# Patient Record
Sex: FEMALE | Race: WHITE | NOT HISPANIC OR LATINO | Employment: OTHER | RURAL
[De-identification: names, ages, dates, MRNs, and addresses within clinical notes are randomized per-mention and may not be internally consistent; named-entity substitution may affect disease eponyms.]

---

## 2019-07-09 ENCOUNTER — TELEPHONE (OUTPATIENT)
Dept: FAMILY MEDICINE CLINIC | Facility: CLINIC | Age: 49
End: 2019-07-09

## 2019-07-09 DIAGNOSIS — I10 ESSENTIAL HYPERTENSION: Primary | ICD-10-CM

## 2019-07-09 RX ORDER — LISINOPRIL AND HYDROCHLOROTHIAZIDE 25; 20 MG/1; MG/1
1 TABLET ORAL DAILY
Qty: 30 TABLET | Refills: 1 | Status: SHIPPED | OUTPATIENT
Start: 2019-07-09 | End: 2019-08-15 | Stop reason: SDUPTHER

## 2019-07-09 NOTE — TELEPHONE ENCOUNTER
REQUESTING A REFILL ON HER MEDICATION LISINOPRIL 20-25MG. PLEASE SEND TO [facility]

## 2019-08-15 ENCOUNTER — TELEPHONE (OUTPATIENT)
Dept: FAMILY MEDICINE CLINIC | Facility: CLINIC | Age: 49
End: 2019-08-15

## 2019-08-15 DIAGNOSIS — I10 ESSENTIAL HYPERTENSION: ICD-10-CM

## 2019-08-15 RX ORDER — LISINOPRIL AND HYDROCHLOROTHIAZIDE 25; 20 MG/1; MG/1
1 TABLET ORAL DAILY
Qty: 30 TABLET | Refills: 2 | Status: SHIPPED | OUTPATIENT
Start: 2019-08-15 | End: 2019-09-16 | Stop reason: SDUPTHER

## 2019-09-13 PROBLEM — K57.30 DIVERTICULOSIS OF COLON: Status: ACTIVE | Noted: 2019-09-13

## 2019-09-13 PROBLEM — M50.00 CERVICAL DISC DISEASE WITH MYELOPATHY: Status: ACTIVE | Noted: 2019-09-13

## 2019-09-13 PROBLEM — M85.88 OSTEOPENIA OF SPINE: Status: ACTIVE | Noted: 2019-09-13

## 2019-09-13 PROBLEM — G47.00 INSOMNIA, ORGANIC: Status: ACTIVE | Noted: 2019-09-13

## 2019-09-13 PROBLEM — Z72.0 TOBACCO USE: Status: ACTIVE | Noted: 2019-09-13

## 2019-09-13 PROBLEM — E78.2 MIXED HYPERLIPIDEMIA: Status: ACTIVE | Noted: 2019-09-13

## 2019-09-13 PROBLEM — I10 HYPERTENSION, BENIGN: Status: ACTIVE | Noted: 2019-09-13

## 2019-09-13 PROBLEM — E66.3 OVERWEIGHT (BMI 25.0-29.9): Status: ACTIVE | Noted: 2019-09-13

## 2019-09-13 PROBLEM — J30.9 ALLERGIC RHINITIS: Status: ACTIVE | Noted: 2019-09-13

## 2019-09-13 RX ORDER — ASPIRIN 81 MG/1
1 TABLET, CHEWABLE ORAL DAILY
COMMUNITY
Start: 2018-05-22 | End: 2019-09-16

## 2019-09-13 RX ORDER — PHENDIMETRAZINE TARTRATE 35 MG/1
1 TABLET ORAL DAILY
COMMUNITY
End: 2019-09-16

## 2019-09-13 RX ORDER — DIPHENHYDRAMINE HCL 25 MG
1 TABLET,DISINTEGRATING ORAL DAILY
COMMUNITY
End: 2019-09-16

## 2019-09-13 RX ORDER — GUAIFENESIN/EPHEDRINE HCL 200-12.5MG
1 TABLET ORAL 3 TIMES DAILY
COMMUNITY
End: 2019-09-16

## 2019-09-16 ENCOUNTER — OFFICE VISIT (OUTPATIENT)
Dept: FAMILY MEDICINE CLINIC | Facility: CLINIC | Age: 49
End: 2019-09-16

## 2019-09-16 VITALS
SYSTOLIC BLOOD PRESSURE: 128 MMHG | DIASTOLIC BLOOD PRESSURE: 68 MMHG | OXYGEN SATURATION: 95 % | RESPIRATION RATE: 18 BRPM | HEART RATE: 71 BPM | WEIGHT: 175 LBS | HEIGHT: 65 IN | TEMPERATURE: 97.7 F | BODY MASS INDEX: 29.16 KG/M2

## 2019-09-16 DIAGNOSIS — Z72.0 TOBACCO USE: ICD-10-CM

## 2019-09-16 DIAGNOSIS — N30.90 CYSTITIS: Primary | ICD-10-CM

## 2019-09-16 DIAGNOSIS — Z13.220 SCREENING FOR HYPERLIPIDEMIA: ICD-10-CM

## 2019-09-16 DIAGNOSIS — E66.3 OVERWEIGHT (BMI 25.0-29.9): ICD-10-CM

## 2019-09-16 DIAGNOSIS — L57.0 ACTINIC KERATOSIS: ICD-10-CM

## 2019-09-16 DIAGNOSIS — I10 HYPERTENSION, BENIGN: ICD-10-CM

## 2019-09-16 DIAGNOSIS — E78.2 MIXED HYPERLIPIDEMIA: ICD-10-CM

## 2019-09-16 DIAGNOSIS — Z12.39 SCREENING FOR BREAST CANCER: ICD-10-CM

## 2019-09-16 LAB
BILIRUB BLD-MCNC: NEGATIVE MG/DL
CLARITY, POC: CLEAR
COLOR UR: NORMAL
GLUCOSE UR STRIP-MCNC: NEGATIVE MG/DL
KETONES UR QL: NEGATIVE
LEUKOCYTE EST, POC: NEGATIVE
NITRITE UR-MCNC: NEGATIVE MG/ML
PH UR: 5 [PH] (ref 5–8)
PROT UR STRIP-MCNC: NEGATIVE MG/DL
RBC # UR STRIP: NEGATIVE /UL
SP GR UR: 1.01 (ref 1–1.03)
UROBILINOGEN UR QL: NORMAL

## 2019-09-16 PROCEDURE — 99214 OFFICE O/P EST MOD 30 MIN: CPT | Performed by: FAMILY MEDICINE

## 2019-09-16 RX ORDER — LISINOPRIL AND HYDROCHLOROTHIAZIDE 25; 20 MG/1; MG/1
1 TABLET ORAL DAILY
Qty: 30 TABLET | Refills: 2 | Status: SHIPPED | OUTPATIENT
Start: 2019-09-16 | End: 2019-10-07 | Stop reason: HOSPADM

## 2019-09-16 RX ORDER — SULFAMETHOXAZOLE AND TRIMETHOPRIM 400; 80 MG/1; MG/1
1 TABLET ORAL 2 TIMES DAILY
Qty: 20 TABLET | Refills: 6 | Status: SHIPPED | OUTPATIENT
Start: 2019-09-16 | End: 2019-10-07 | Stop reason: HOSPADM

## 2019-09-16 NOTE — PROGRESS NOTES
Subjective   Sada Le is a 48 y.o. female.     Sada has been seen by Dr. Trejo- opthalmology in 08/2019 for a retinal tear in right eye. She receives monthly injections to her right eye since April 2019.      Urinary Tract Infection    This is a new problem. The current episode started in the past 7 days. The problem has been gradually worsening. The quality of the pain is described as burning. The pain is moderate. The maximum temperature recorded prior to her arrival was 100 - 100.9 F. Associated symptoms include chills, flank pain, frequency, nausea and urgency. Pertinent negatives include no vomiting. She has tried home medications (AZO) for the symptoms. The treatment provided no relief.   Hypertension   This is a chronic problem. The current episode started more than 1 year ago. The problem is controlled. Pertinent negatives include no blurred vision, chest pain, headaches, palpitations, peripheral edema or shortness of breath. Risk factors for coronary artery disease include dyslipidemia, obesity and family history. Current antihypertension treatment includes ACE inhibitors and diuretics. The current treatment provides significant improvement.   Hyperlipidemia   This is a chronic problem. The current episode started more than 1 year ago. Pertinent negatives include no chest pain or shortness of breath. She is currently on no antihyperlipidemic treatment. The current treatment provides moderate improvement of lipids. Risk factors for coronary artery disease include dyslipidemia, hypertension, obesity and family history.    Lesion:   Sada Le is here today for a lesion. The lesion appeared gradual and has been occurring for 6 month(s) ago.. The course has been increasing in size. The lesion is characterized as no sign of infection. The lesion is located over right shoulder. The symptoms have been associated with recurrent physical trama due to location.     The following portions of the  patient's history were reviewed and updated as appropriate: allergies, current medications, past family history, past medical history, past social history, past surgical history and problem list.    Allergies:  Allergies   Allergen Reactions   • Ciprofloxacin Rash   • Penicillin G Rash       Social History:  Social History     Socioeconomic History   • Marital status:      Spouse name: Not on file   • Number of children: Not on file   • Years of education: Not on file   • Highest education level: Not on file   Tobacco Use   • Smoking status: Current Every Day Smoker     Packs/day: 1.00     Years: 34.00     Pack years: 34.00     Start date: 1985   • Smokeless tobacco: Never Used   Substance and Sexual Activity   • Alcohol use: Yes     Frequency: Monthly or less     Drinks per session: 3 or 4     Binge frequency: Less than monthly     Comment: Occasional   • Drug use: No   • Sexual activity: Defer       Family History:  Family History   Problem Relation Age of Onset   • Alzheimer's disease Mother    • Heart disease Mother         cardiovascular   • Diabetes Father         mellitus   • Heart attack Father    • Heart disease Sister         cardiovascular   • Diabetes Maternal Aunt         mellitus   • Heart disease Maternal Aunt         cardiovascular   • Breast cancer Maternal Aunt    • Diabetes Maternal Uncle         mellitus   • Diabetes Maternal Grandmother         mellitus   • Pancreatic cancer Maternal Grandmother        Past Medical History :  Patient Active Problem List   Diagnosis   • Allergic rhinitis   • Cervical disc disease with myelopathy   • Diverticulosis of colon   • Hypertension, benign   • Insomnia, organic   • Overweight (BMI 25.0-29.9)   • Tobacco use   • Osteopenia of spine   • Mixed hyperlipidemia   • Actinic keratosis       Medication List:    Current Outpatient Medications:   •  lisinopril-hydrochlorothiazide (PRINZIDE,ZESTORETIC) 20-25 MG per tablet, Take 1 tablet by mouth Daily., Disp:  "30 tablet, Rfl: 2  •  Omega-3 Fatty Acids (EPA PO), Take 1 tablet by mouth Daily., Disp: , Rfl:   •  sulfamethoxazole-trimethoprim (BACTRIM,SEPTRA) 400-80 MG tablet, Take 1 tablet by mouth 2 (Two) Times a Day., Disp: 20 tablet, Rfl: 6    Past Surgical History:  Past Surgical History:   Procedure Laterality Date   • TOTAL ABDOMINAL HYSTERECTOMY WITH SALPINGO OOPHORECTOMY  1995    Endometriosis       Review of Systems:  Review of Systems   Constitutional: Positive for chills.   HENT: Negative for congestion and voice change.    Eyes: Negative for blurred vision.   Respiratory: Negative for shortness of breath.    Cardiovascular: Negative for chest pain and palpitations.   Gastrointestinal: Positive for nausea. Negative for abdominal pain, constipation, diarrhea and vomiting.   Endocrine: Negative for cold intolerance, heat intolerance, polydipsia and polyuria.   Genitourinary: Positive for flank pain, frequency and urgency. Negative for pelvic pain.   Skin: Negative for rash.   Neurological: Negative for weakness and headache.   Hematological: Negative for adenopathy.       Physical Exam:  Vital Signs:  Visit Vitals  /68 (BP Location: Right arm, Patient Position: Sitting, Cuff Size: Adult)   Pulse 71   Temp 97.7 °F (36.5 °C) (Oral)   Resp 18   Ht 165.1 cm (65\")   Wt 79.4 kg (175 lb)   LMP 01/01/1996 (Approximate)   SpO2 95% Comment: Room air   Breastfeeding? No   BMI 29.12 kg/m²       Physical Exam   Constitutional: She is oriented to person, place, and time. She appears well-developed and well-nourished. No distress.   Eyes: Conjunctivae are normal.   Neck: Neck supple. No JVD present. No thyromegaly present.   Cardiovascular: Normal rate, regular rhythm, normal heart sounds and intact distal pulses. Exam reveals no gallop and no friction rub.   No murmur heard.  Pulmonary/Chest: Effort normal and breath sounds normal. No respiratory distress. She has no wheezes. She has no rales.   Abdominal: Soft. Bowel " sounds are normal. She exhibits no distension and no mass. There is no tenderness. There is no CVA tenderness.   Musculoskeletal: She exhibits no edema.   Lymphadenopathy:     She has no cervical adenopathy.   Neurological: She is alert and oriented to person, place, and time. She displays normal reflexes. Coordination normal.   Skin: Skin is warm. No rash noted. No erythema.   There is an 8 mm excoriated lesion over the right distal should suggestive of an AK     Psychiatric: She has a normal mood and affect.   Vitals reviewed.      Assessment and Plan:  Problem List Items Addressed This Visit        Unprioritized    Hypertension, benign    Overview     Controlled.   Low salt low calorie diet and regular exercise and followup in 1 mo  She will monitor her pressures and notify us of her pressures over the next 1 week.         Current Assessment & Plan     Hypertension is improving with treatment.  Continue current treatment regimen.  Blood pressure will be reassessed at the next regular appointment.         Relevant Medications    lisinopril-hydrochlorothiazide (PRINZIDE,ZESTORETIC) 20-25 MG per tablet    Other Relevant Orders    CBC & Differential    Comprehensive Metabolic Panel    TSH    Overweight (BMI 25.0-29.9)    Overview     She has lost 40 lb over the last yr. She continues follow a good diet. She will increase exercise.          Tobacco use    Overview     She is strongly encouraged to stop smoking. Cessation techniques discussed and encouraged. The consequences of not stopping discussed, ie, CAD, PVD, Stroke, Lung and other cancers.         Mixed hyperlipidemia    Overview     Diet controled. She should see improvement with her successful wt loss.  We discussed her lipid panel.         Actinic keratosis    Overview     Likely right shoulder we will re-examine in 1 mo given it is avulsed today.            Other Visit Diagnoses     Cystitis    -  Primary    She has had 2 doses of antibotics and her urine is  clear as a results  she will complete 3 more days.     Relevant Medications    sulfamethoxazole-trimethoprim (BACTRIM,SEPTRA) 400-80 MG tablet    Other Relevant Orders    POCT urinalysis dipstick, manual (Completed)    Urine Culture - Urine, Urine, Clean Catch    Screening for hyperlipidemia        Relevant Orders    Lipid Panel With / Chol / HDL Ratio    Screening for breast cancer        Relevant Orders    Mammo Screening Digital Tomosynthesis Bilateral With CAD          An After Visit Summary and PPPS were given to the patient.

## 2019-09-17 LAB
BASOPHILS # BLD AUTO: 0 X10E3/UL (ref 0–0.2)
BASOPHILS NFR BLD AUTO: 0 %
CHOLEST SERPL-MCNC: 231 MG/DL (ref 100–199)
CHOLEST/HDLC SERPL: 4.1 RATIO (ref 0–4.4)
EOSINOPHIL # BLD AUTO: 0.2 X10E3/UL (ref 0–0.4)
EOSINOPHIL NFR BLD AUTO: 2 %
ERYTHROCYTE [DISTWIDTH] IN BLOOD BY AUTOMATED COUNT: 13 % (ref 12.3–15.4)
HCT VFR BLD AUTO: 41.9 % (ref 34–46.6)
HDLC SERPL-MCNC: 56 MG/DL
HGB BLD-MCNC: 14.8 G/DL (ref 11.1–15.9)
IMM GRANULOCYTES # BLD AUTO: 0.1 X10E3/UL (ref 0–0.1)
IMM GRANULOCYTES NFR BLD AUTO: 1 %
LDLC SERPL CALC-MCNC: 152 MG/DL (ref 0–99)
LYMPHOCYTES # BLD AUTO: 2.5 X10E3/UL (ref 0.7–3.1)
LYMPHOCYTES NFR BLD AUTO: 26 %
MCH RBC QN AUTO: 31.8 PG (ref 26.6–33)
MCHC RBC AUTO-ENTMCNC: 35.3 G/DL (ref 31.5–35.7)
MCV RBC AUTO: 90 FL (ref 79–97)
MONOCYTES # BLD AUTO: 0.7 X10E3/UL (ref 0.1–0.9)
MONOCYTES NFR BLD AUTO: 7 %
NEUTROPHILS # BLD AUTO: 6.1 X10E3/UL (ref 1.4–7)
NEUTROPHILS NFR BLD AUTO: 64 %
PLATELET # BLD AUTO: 225 X10E3/UL (ref 150–450)
RBC # BLD AUTO: 4.65 X10E6/UL (ref 3.77–5.28)
TRIGL SERPL-MCNC: 117 MG/DL (ref 0–149)
VLDLC SERPL CALC-MCNC: 23 MG/DL (ref 5–40)
WBC # BLD AUTO: 9.6 X10E3/UL (ref 3.4–10.8)

## 2019-09-18 LAB
BACTERIA UR CULT: NO GROWTH
BACTERIA UR CULT: NORMAL

## 2019-09-19 LAB
TSH SERPL DL<=0.005 MIU/L-ACNC: 2.07 UIU/ML (ref 0.45–4.5)
WRITTEN AUTHORIZATION: NORMAL

## 2019-09-21 ENCOUNTER — RESULTS ENCOUNTER (OUTPATIENT)
Dept: FAMILY MEDICINE CLINIC | Facility: CLINIC | Age: 49
End: 2019-09-21

## 2019-09-21 DIAGNOSIS — I10 HYPERTENSION, BENIGN: ICD-10-CM

## 2019-09-23 ENCOUNTER — HOSPITAL ENCOUNTER (OUTPATIENT)
Dept: MAMMOGRAPHY | Facility: HOSPITAL | Age: 49
Discharge: HOME | End: 2019-09-23
Admitting: FAMILY MEDICINE

## 2019-09-23 DIAGNOSIS — Z12.39 SCREENING FOR BREAST CANCER: ICD-10-CM

## 2019-09-23 PROCEDURE — 77067 SCR MAMMO BI INCL CAD: CPT

## 2019-09-23 PROCEDURE — 77063 BREAST TOMOSYNTHESIS BI: CPT

## 2019-09-24 ENCOUNTER — TELEPHONE (OUTPATIENT)
Dept: FAMILY MEDICINE CLINIC | Facility: CLINIC | Age: 49
End: 2019-09-24

## 2019-09-27 LAB
ALBUMIN SERPL-MCNC: 4.5 G/DL (ref 3.5–5.5)
ALBUMIN/GLOB SERPL: 1.7 {RATIO} (ref 1.2–2.2)
ALP SERPL-CCNC: 96 IU/L (ref 39–117)
ALT SERPL-CCNC: 12 IU/L (ref 0–32)
AST SERPL-CCNC: 15 IU/L (ref 0–40)
BILIRUB SERPL-MCNC: ABNORMAL MG/DL
BUN SERPL-MCNC: 20 MG/DL (ref 6–24)
BUN/CREAT SERPL: 18 (ref 9–23)
CALCIUM SERPL-MCNC: 9.5 MG/DL (ref 8.7–10.2)
CHLORIDE SERPL-SCNC: 87 MMOL/L (ref 96–106)
CO2 SERPL-SCNC: 19 MMOL/L (ref 20–29)
CREAT SERPL-MCNC: 1.09 MG/DL (ref 0.57–1)
GLOBULIN SER CALC-MCNC: 2.6 G/DL (ref 1.5–4.5)
GLUCOSE SERPL-MCNC: 147 MG/DL (ref 65–99)
POTASSIUM SERPL-SCNC: 4 MMOL/L (ref 3.5–5.2)
PROT SERPL-MCNC: 7.1 G/DL (ref 6–8.5)
SODIUM SERPL-SCNC: 131 MMOL/L (ref 134–144)
WRITTEN AUTHORIZATION: NORMAL

## 2019-09-29 ENCOUNTER — HOSPITAL ENCOUNTER (EMERGENCY)
Dept: CARDIOLOGY | Facility: HOSPITAL | Age: 49
Discharge: HOME | End: 2019-09-29

## 2019-09-29 ENCOUNTER — HOSPITAL ENCOUNTER (INPATIENT)
Facility: HOSPITAL | Age: 49
LOS: 8 days | Discharge: HOME HEALTH | End: 2019-10-07
Attending: INTERNAL MEDICINE | Admitting: INTERNAL MEDICINE

## 2019-09-29 ENCOUNTER — APPOINTMENT (OUTPATIENT)
Dept: GENERAL RADIOLOGY | Facility: HOSPITAL | Age: 49
End: 2019-09-29

## 2019-09-29 DIAGNOSIS — I21.02 STEMI INVOLVING LEFT ANTERIOR DESCENDING CORONARY ARTERY (HCC): Primary | ICD-10-CM

## 2019-09-29 PROBLEM — I25.10 CAD (CORONARY ARTERY DISEASE): Chronic | Status: ACTIVE | Noted: 2019-09-29

## 2019-09-29 LAB
ACT BLD: 202 SECONDS (ref 89–137)
ACT BLD: 219 SECONDS (ref 89–137)
ACT BLD: 252 SECONDS (ref 89–137)
ACT BLD: 312 SECONDS (ref 89–137)
ALBUMIN SERPL-MCNC: 3 G/DL (ref 3.5–4.8)
ALBUMIN SERPL-MCNC: 3.3 G/DL (ref 3.5–4.8)
ALBUMIN/GLOB SERPL: 1.3 G/DL (ref 1–1.7)
ALBUMIN/GLOB SERPL: 1.3 G/DL (ref 1–1.7)
ALP SERPL-CCNC: 61 U/L (ref 32–91)
ALP SERPL-CCNC: 64 U/L (ref 32–91)
ALT SERPL W P-5'-P-CCNC: 305 U/L (ref 14–54)
ALT SERPL W P-5'-P-CCNC: 312 U/L (ref 14–54)
ANION GAP SERPL CALCULATED.3IONS-SCNC: 15.3 MMOL/L (ref 5–15)
ANION GAP SERPL CALCULATED.3IONS-SCNC: 16.7 MMOL/L (ref 5–15)
ARTERIAL PATENCY WRIST A: ABNORMAL
ARTERIAL PATENCY WRIST A: ABNORMAL
AST SERPL-CCNC: 344 U/L (ref 15–41)
AST SERPL-CCNC: 437 U/L (ref 15–41)
BACTERIA UR QL AUTO: ABNORMAL /HPF
BASE DEFICIT: -1.4 MEQ/LITER
BASE DEFICIT: -1.8 MEQ/LITER
BASE EXCESS BLDA CALC-SCNC: -2.1 MMOL/L (ref 0–3)
BASE EXCESS BLDA CALC-SCNC: -2.3 MMOL/L (ref 0–3)
BASOPHILS # BLD MANUAL: 0.3 10*3/MM3 (ref 0–0.2)
BASOPHILS NFR BLD AUTO: 1 % (ref 0–1.5)
BDY SITE: ABNORMAL
BDY SITE: ABNORMAL
BILIRUB SERPL-MCNC: 0.8 MG/DL (ref 0.3–1.2)
BILIRUB SERPL-MCNC: 0.9 MG/DL (ref 0.3–1.2)
BILIRUB UR QL STRIP: NEGATIVE
BUN BLD-MCNC: 12 MG/DL (ref 8–20)
BUN BLD-MCNC: 13 MG/DL (ref 8–20)
BUN/CREAT SERPL: 14.4 (ref 5.4–26.2)
BUN/CREAT SERPL: 15 (ref 5.4–26.2)
BURR CELLS BLD QL SMEAR: ABNORMAL
BURR CELLS BLD QL SMEAR: ABNORMAL
CALCIUM SPEC-SCNC: 6.5 MG/DL (ref 8.9–10.3)
CALCIUM SPEC-SCNC: 7.2 MG/DL (ref 8.9–10.3)
CHLORIDE SERPL-SCNC: 101 MMOL/L (ref 101–111)
CHLORIDE SERPL-SCNC: 104 MMOL/L (ref 101–111)
CLARITY UR: CLEAR
CO2 BLDA-SCNC: 25.9 MMOL/L (ref 22–29)
CO2 BLDA-SCNC: 26.5 MMOL/L (ref 22–29)
CO2 SERPL-SCNC: 19 MMOL/L (ref 22–32)
CO2 SERPL-SCNC: 21 MMOL/L (ref 22–32)
COLOR UR: YELLOW
CREAT BLD-MCNC: 0.8 MG/DL (ref 0.4–1)
CREAT BLD-MCNC: 0.9 MG/DL (ref 0.4–1)
CRP SERPL-MCNC: 0.45 MG/DL (ref 0–0.7)
D-LACTATE SERPL-SCNC: 3 MMOL/L (ref 0.5–2.2)
DEPRECATED RDW RBC AUTO: 42 FL (ref 37–54)
DEPRECATED RDW RBC AUTO: 42.4 FL (ref 37–54)
EOSINOPHIL # BLD MANUAL: 0.3 10*3/MM3 (ref 0–0.4)
EOSINOPHIL NFR BLD MANUAL: 1 % (ref 0.3–6.2)
ERYTHROCYTE [DISTWIDTH] IN BLOOD BY AUTOMATED COUNT: 13 % (ref 12.3–15.4)
ERYTHROCYTE [DISTWIDTH] IN BLOOD BY AUTOMATED COUNT: 13.2 % (ref 12.3–15.4)
GFR SERPL CREATININE-BSD FRML MDRD: 67 ML/MIN/1.73
GFR SERPL CREATININE-BSD FRML MDRD: 76 ML/MIN/1.73
GIANT PLATELETS: ABNORMAL
GLOBULIN UR ELPH-MCNC: 2.3 GM/DL (ref 2.5–3.8)
GLOBULIN UR ELPH-MCNC: 2.5 GM/DL (ref 2.5–3.8)
GLUCOSE BLD-MCNC: 123 MG/DL (ref 65–99)
GLUCOSE BLD-MCNC: 132 MG/DL (ref 65–99)
GLUCOSE UR STRIP-MCNC: NEGATIVE MG/DL
HCO3 MIXED: 24.4 MMOL/L (ref 21–29)
HCO3 MIXED: 24.9 MMOL/L (ref 21–29)
HCT VFR BLD AUTO: 38 % (ref 34–46.6)
HCT VFR BLD AUTO: 39.5 % (ref 34–46.6)
HEMODILUTION: NO
HEMODILUTION: NO
HGB BLD-MCNC: 13.1 G/DL (ref 12–15.9)
HGB BLD-MCNC: 13.2 G/DL (ref 12–15.9)
HGB UR QL STRIP.AUTO: ABNORMAL
HOLD SPECIMEN: NORMAL
HOROWITZ INDEX BLD+IHG-RTO: <21 %
HOROWITZ INDEX BLD+IHG-RTO: <21 %
HYALINE CASTS UR QL AUTO: ABNORMAL /LPF
KETONES UR QL STRIP: NEGATIVE
L PNEUMO1 AG UR QL IA: NEGATIVE
LDH SERPL-CCNC: NORMAL U/L
LDH SERPL-CCNC: NORMAL U/L
LEUKOCYTE ESTERASE UR QL STRIP.AUTO: NEGATIVE
LYMPHOCYTES # BLD MANUAL: 0.99 10*3/MM3 (ref 0.7–3.1)
LYMPHOCYTES # BLD MANUAL: 1.52 10*3/MM3 (ref 0.7–3.1)
LYMPHOCYTES NFR BLD MANUAL: 2 % (ref 5–12)
LYMPHOCYTES NFR BLD MANUAL: 2 % (ref 5–12)
LYMPHOCYTES NFR BLD MANUAL: 5 % (ref 19.6–45.3)
LYMPHOCYTES NFR BLD MANUAL: 5 % (ref 19.6–45.3)
MAGNESIUM SERPL-MCNC: 1.9 MG/DL (ref 1.8–2.5)
MAGNESIUM SERPL-MCNC: 1.9 MG/DL (ref 1.8–2.5)
MCH RBC QN AUTO: 30.9 PG (ref 26.6–33)
MCH RBC QN AUTO: 31.2 PG (ref 26.6–33)
MCHC RBC AUTO-ENTMCNC: 33.4 G/DL (ref 31.5–35.7)
MCHC RBC AUTO-ENTMCNC: 34.4 G/DL (ref 31.5–35.7)
MCV RBC AUTO: 90.6 FL (ref 79–97)
MCV RBC AUTO: 92.3 FL (ref 79–97)
MODALITY: ABNORMAL
MODALITY: ABNORMAL
MONOCYTES # BLD AUTO: 0.4 10*3/MM3 (ref 0.1–0.9)
MONOCYTES # BLD AUTO: 0.61 10*3/MM3 (ref 0.1–0.9)
NEUTROPHILS # BLD AUTO: 18.41 10*3/MM3 (ref 1.7–7)
NEUTROPHILS # BLD AUTO: 27.66 10*3/MM3 (ref 1.7–7)
NEUTROPHILS NFR BLD MANUAL: 76 % (ref 42.7–76)
NEUTROPHILS NFR BLD MANUAL: 89 % (ref 42.7–76)
NEUTS BAND NFR BLD MANUAL: 15 % (ref 0–5)
NEUTS BAND NFR BLD MANUAL: 4 % (ref 0–5)
NITRITE UR QL STRIP: NEGATIVE
NRBC SPEC MANUAL: 1 /100 WBC (ref 0–0.2)
O2 SATURATION MIXED: 48.7 %
O2 SATURATION MIXED: 49.1 %
PCO2 MIXED: 48.2 MMHG (ref 35–51)
PCO2 MIXED: 50.2 MMHG (ref 35–51)
PH MIXED: 7.3 PH UNITS (ref 7.32–7.45)
PH MIXED: 7.31 PH UNITS (ref 7.32–7.45)
PH UR STRIP.AUTO: 6 [PH] (ref 5–8)
PHOSPHATE SERPL-MCNC: 3.9 MG/DL (ref 2.4–4.7)
PHOSPHATE SERPL-MCNC: 6.8 MG/DL (ref 2.4–4.7)
PLAT MORPH BLD: NORMAL
PLATELET # BLD AUTO: 212 10*3/MM3 (ref 140–450)
PLATELET # BLD AUTO: 272 10*3/MM3 (ref 140–450)
PMV BLD AUTO: 8.4 FL (ref 6–12)
PMV BLD AUTO: 9.3 FL (ref 6–12)
PO2 MIXED: 29 MMHG
PO2 MIXED: 29.4 MMHG
POTASSIUM BLD-SCNC: 3.7 MMOL/L (ref 3.6–5.1)
POTASSIUM BLD-SCNC: 4.3 MMOL/L (ref 3.6–5.1)
PROCALCITONIN SERPL-MCNC: 0.18 NG/ML (ref 0–0.5)
PROT SERPL-MCNC: 5.3 G/DL (ref 6.1–7.9)
PROT SERPL-MCNC: 5.8 G/DL (ref 6.1–7.9)
PROT UR QL STRIP: ABNORMAL
RBC # BLD AUTO: 4.19 10*6/MM3 (ref 3.77–5.28)
RBC # BLD AUTO: 4.27 10*6/MM3 (ref 3.77–5.28)
RBC # UR: ABNORMAL /HPF
REF LAB TEST METHOD: ABNORMAL
S PNEUM AG SPEC QL LA: NEGATIVE
SCAN SLIDE: NORMAL
SCAN SLIDE: NORMAL
SODIUM BLD-SCNC: 133 MMOL/L (ref 136–144)
SODIUM BLD-SCNC: 136 MMOL/L (ref 136–144)
SP GR UR STRIP: 1.08 (ref 1–1.03)
SQUAMOUS #/AREA URNS HPF: ABNORMAL /HPF
TROPONIN I SERPL-MCNC: 18.62 NG/ML (ref 0–0.03)
TROPONIN I SERPL-MCNC: >73 NG/ML (ref 0–0.03)
UROBILINOGEN UR QL STRIP: ABNORMAL
WBC MORPH BLD: NORMAL
WBC MORPH BLD: NORMAL
WBC NRBC COR # BLD: 19.8 10*3/MM3 (ref 3.4–10.8)
WBC NRBC COR # BLD: 30.4 10*3/MM3 (ref 3.4–10.8)
WBC UR QL AUTO: ABNORMAL /HPF

## 2019-09-29 PROCEDURE — C1725 CATH, TRANSLUMIN NON-LASER: HCPCS | Performed by: INTERNAL MEDICINE

## 2019-09-29 PROCEDURE — B2151ZZ FLUOROSCOPY OF LEFT HEART USING LOW OSMOLAR CONTRAST: ICD-10-PCS | Performed by: INTERNAL MEDICINE

## 2019-09-29 PROCEDURE — B2111ZZ FLUOROSCOPY OF MULTIPLE CORONARY ARTERIES USING LOW OSMOLAR CONTRAST: ICD-10-PCS | Performed by: INTERNAL MEDICINE

## 2019-09-29 PROCEDURE — 93460 R&L HRT ART/VENTRICLE ANGIO: CPT | Performed by: INTERNAL MEDICINE

## 2019-09-29 PROCEDURE — 84145 PROCALCITONIN (PCT): CPT | Performed by: NURSE PRACTITIONER

## 2019-09-29 PROCEDURE — C9600 PERC DRUG-EL COR STENT SING: HCPCS | Performed by: INTERNAL MEDICINE

## 2019-09-29 PROCEDURE — C1887 CATHETER, GUIDING: HCPCS | Performed by: INTERNAL MEDICINE

## 2019-09-29 PROCEDURE — 87086 URINE CULTURE/COLONY COUNT: CPT | Performed by: NURSE PRACTITIONER

## 2019-09-29 PROCEDURE — 25010000002 MAGNESIUM SULFATE 2 GM/50ML SOLUTION: Performed by: INTERNAL MEDICINE

## 2019-09-29 PROCEDURE — B41G1ZZ FLUOROSCOPY OF LEFT LOWER EXTREMITY ARTERIES USING LOW OSMOLAR CONTRAST: ICD-10-PCS | Performed by: INTERNAL MEDICINE

## 2019-09-29 PROCEDURE — 83615 LACTATE (LD) (LDH) ENZYME: CPT | Performed by: INTERNAL MEDICINE

## 2019-09-29 PROCEDURE — C1874 STENT, COATED/COV W/DEL SYS: HCPCS | Performed by: INTERNAL MEDICINE

## 2019-09-29 PROCEDURE — 0099U HC BIOFIRE FILMARRAY RESP PANEL 1: CPT | Performed by: NURSE PRACTITIONER

## 2019-09-29 PROCEDURE — 99152 MOD SED SAME PHYS/QHP 5/>YRS: CPT | Performed by: INTERNAL MEDICINE

## 2019-09-29 PROCEDURE — 85025 COMPLETE CBC W/AUTO DIFF WBC: CPT | Performed by: INTERNAL MEDICINE

## 2019-09-29 PROCEDURE — 84100 ASSAY OF PHOSPHORUS: CPT | Performed by: INTERNAL MEDICINE

## 2019-09-29 PROCEDURE — 027034Z DILATION OF CORONARY ARTERY, ONE ARTERY WITH DRUG-ELUTING INTRALUMINAL DEVICE, PERCUTANEOUS APPROACH: ICD-10-PCS | Performed by: INTERNAL MEDICINE

## 2019-09-29 PROCEDURE — 5A0221D ASSISTANCE WITH CARDIAC OUTPUT USING IMPELLER PUMP, CONTINUOUS: ICD-10-PCS | Performed by: INTERNAL MEDICINE

## 2019-09-29 PROCEDURE — 71045 X-RAY EXAM CHEST 1 VIEW: CPT

## 2019-09-29 PROCEDURE — 85347 COAGULATION TIME ACTIVATED: CPT

## 2019-09-29 PROCEDURE — C1769 GUIDE WIRE: HCPCS | Performed by: INTERNAL MEDICINE

## 2019-09-29 PROCEDURE — C1894 INTRO/SHEATH, NON-LASER: HCPCS | Performed by: INTERNAL MEDICINE

## 2019-09-29 PROCEDURE — 25010000002 PHENYLEPHRINE 10 MG/ML SOLUTION: Performed by: INTERNAL MEDICINE

## 2019-09-29 PROCEDURE — 93308 TTE F-UP OR LMTD: CPT | Performed by: INTERNAL MEDICINE

## 2019-09-29 PROCEDURE — 82803 BLOOD GASES ANY COMBINATION: CPT

## 2019-09-29 PROCEDURE — C1760 CLOSURE DEV, VASC: HCPCS | Performed by: INTERNAL MEDICINE

## 2019-09-29 PROCEDURE — 25010000002 HEPARIN (PORCINE) PER 1000 UNITS: Performed by: INTERNAL MEDICINE

## 2019-09-29 PROCEDURE — 02WA3RZ REVISION OF SHORT-TERM EXTERNAL HEART ASSIST SYSTEM IN HEART, PERCUTANEOUS APPROACH: ICD-10-PCS | Performed by: INTERNAL MEDICINE

## 2019-09-29 PROCEDURE — 87040 BLOOD CULTURE FOR BACTERIA: CPT | Performed by: NURSE PRACTITIONER

## 2019-09-29 PROCEDURE — 92941 PRQ TRLML REVSC TOT OCCL AMI: CPT | Performed by: INTERNAL MEDICINE

## 2019-09-29 PROCEDURE — 93325 DOPPLER ECHO COLOR FLOW MAPG: CPT

## 2019-09-29 PROCEDURE — B41F1ZZ FLUOROSCOPY OF RIGHT LOWER EXTREMITY ARTERIES USING LOW OSMOLAR CONTRAST: ICD-10-PCS | Performed by: INTERNAL MEDICINE

## 2019-09-29 PROCEDURE — 80053 COMPREHEN METABOLIC PANEL: CPT | Performed by: INTERNAL MEDICINE

## 2019-09-29 PROCEDURE — C1751 CATH, INF, PER/CENT/MIDLINE: HCPCS | Performed by: INTERNAL MEDICINE

## 2019-09-29 PROCEDURE — 99292 CRITICAL CARE ADDL 30 MIN: CPT | Performed by: INTERNAL MEDICINE

## 2019-09-29 PROCEDURE — 87899 AGENT NOS ASSAY W/OPTIC: CPT | Performed by: NURSE PRACTITIONER

## 2019-09-29 PROCEDURE — 99153 MOD SED SAME PHYS/QHP EA: CPT | Performed by: INTERNAL MEDICINE

## 2019-09-29 PROCEDURE — 02HQ32Z INSERTION OF MONITORING DEVICE INTO RIGHT PULMONARY ARTERY, PERCUTANEOUS APPROACH: ICD-10-PCS | Performed by: INTERNAL MEDICINE

## 2019-09-29 PROCEDURE — 83735 ASSAY OF MAGNESIUM: CPT | Performed by: INTERNAL MEDICINE

## 2019-09-29 PROCEDURE — 94640 AIRWAY INHALATION TREATMENT: CPT

## 2019-09-29 PROCEDURE — 02HA3RZ INSERTION OF SHORT-TERM EXTERNAL HEART ASSIST SYSTEM INTO HEART, PERCUTANEOUS APPROACH: ICD-10-PCS | Performed by: INTERNAL MEDICINE

## 2019-09-29 PROCEDURE — 33990 INSJ PERQ VAD L HRT ARTERIAL: CPT | Performed by: INTERNAL MEDICINE

## 2019-09-29 PROCEDURE — 83605 ASSAY OF LACTIC ACID: CPT | Performed by: INTERNAL MEDICINE

## 2019-09-29 PROCEDURE — 85007 BL SMEAR W/DIFF WBC COUNT: CPT | Performed by: INTERNAL MEDICINE

## 2019-09-29 PROCEDURE — L1830 KO IMMOB CANVAS LONG PRE OTS: HCPCS | Performed by: INTERNAL MEDICINE

## 2019-09-29 PROCEDURE — 87081 CULTURE SCREEN ONLY: CPT | Performed by: NURSE PRACTITIONER

## 2019-09-29 PROCEDURE — 0 IOPAMIDOL PER 1 ML: Performed by: INTERNAL MEDICINE

## 2019-09-29 PROCEDURE — 4A023N8 MEASUREMENT OF CARDIAC SAMPLING AND PRESSURE, BILATERAL, PERCUTANEOUS APPROACH: ICD-10-PCS | Performed by: INTERNAL MEDICINE

## 2019-09-29 PROCEDURE — 33993 REPOSG PERQ R/L HRT VAD: CPT | Performed by: INTERNAL MEDICINE

## 2019-09-29 PROCEDURE — 94799 UNLISTED PULMONARY SVC/PX: CPT

## 2019-09-29 PROCEDURE — 84484 ASSAY OF TROPONIN QUANT: CPT | Performed by: INTERNAL MEDICINE

## 2019-09-29 PROCEDURE — 86140 C-REACTIVE PROTEIN: CPT | Performed by: NURSE PRACTITIONER

## 2019-09-29 PROCEDURE — 93503 INSERT/PLACE HEART CATHETER: CPT | Performed by: INTERNAL MEDICINE

## 2019-09-29 PROCEDURE — 81001 URINALYSIS AUTO W/SCOPE: CPT | Performed by: NURSE PRACTITIONER

## 2019-09-29 PROCEDURE — 93308 TTE F-UP OR LMTD: CPT

## 2019-09-29 PROCEDURE — 99291 CRITICAL CARE FIRST HOUR: CPT | Performed by: INTERNAL MEDICINE

## 2019-09-29 DEVICE — XIENCE SIERRA™ EVEROLIMUS ELUTING CORONARY STENT SYSTEM 3.50 MM X 33 MM / RAPID-EXCHANGE
Type: IMPLANTABLE DEVICE | Status: FUNCTIONAL
Brand: XIENCE SIERRA™

## 2019-09-29 RX ORDER — ACETAMINOPHEN 650 MG/1
650 SUPPOSITORY RECTAL EVERY 4 HOURS PRN
Status: DISCONTINUED | OUTPATIENT
Start: 2019-09-29 | End: 2019-10-07 | Stop reason: HOSPADM

## 2019-09-29 RX ORDER — MAGNESIUM SULFATE HEPTAHYDRATE 40 MG/ML
2 INJECTION, SOLUTION INTRAVENOUS ONCE
Status: COMPLETED | OUTPATIENT
Start: 2019-09-29 | End: 2019-09-29

## 2019-09-29 RX ORDER — SODIUM CHLORIDE 9 MG/ML
250 INJECTION, SOLUTION INTRAVENOUS ONCE AS NEEDED
Status: DISCONTINUED | OUTPATIENT
Start: 2019-09-29 | End: 2019-10-07 | Stop reason: HOSPADM

## 2019-09-29 RX ORDER — PHENYLEPHRINE HYDROCHLORIDE 10 MG/ML
INJECTION INTRAVENOUS AS NEEDED
Status: DISCONTINUED | OUTPATIENT
Start: 2019-09-29 | End: 2019-09-29 | Stop reason: HOSPADM

## 2019-09-29 RX ORDER — NITROGLYCERIN 0.4 MG/1
0.4 TABLET SUBLINGUAL
Status: DISCONTINUED | OUTPATIENT
Start: 2019-09-29 | End: 2019-10-07 | Stop reason: HOSPADM

## 2019-09-29 RX ORDER — ASPIRIN 81 MG/1
81 TABLET, CHEWABLE ORAL DAILY
Status: DISCONTINUED | OUTPATIENT
Start: 2019-09-30 | End: 2019-10-07 | Stop reason: HOSPADM

## 2019-09-29 RX ORDER — IPRATROPIUM BROMIDE AND ALBUTEROL SULFATE 2.5; .5 MG/3ML; MG/3ML
3 SOLUTION RESPIRATORY (INHALATION)
Status: DISCONTINUED | OUTPATIENT
Start: 2019-09-29 | End: 2019-10-07 | Stop reason: HOSPADM

## 2019-09-29 RX ORDER — SODIUM CHLORIDE 0.9 % (FLUSH) 0.9 %
10 SYRINGE (ML) INJECTION EVERY 12 HOURS SCHEDULED
Status: DISCONTINUED | OUTPATIENT
Start: 2019-09-29 | End: 2019-10-07 | Stop reason: HOSPADM

## 2019-09-29 RX ORDER — NOREPINEPHRINE BIT/0.9 % NACL 8 MG/250ML
INFUSION BOTTLE (ML) INTRAVENOUS CONTINUOUS PRN
Status: DISCONTINUED | OUTPATIENT
Start: 2019-09-29 | End: 2019-09-29 | Stop reason: HOSPADM

## 2019-09-29 RX ORDER — ONDANSETRON 2 MG/ML
4 INJECTION INTRAMUSCULAR; INTRAVENOUS EVERY 6 HOURS PRN
Status: DISCONTINUED | OUTPATIENT
Start: 2019-09-29 | End: 2019-10-07 | Stop reason: HOSPADM

## 2019-09-29 RX ORDER — NITROGLYCERIN 5 MG/ML
INJECTION, SOLUTION INTRAVENOUS AS NEEDED
Status: DISCONTINUED | OUTPATIENT
Start: 2019-09-29 | End: 2019-09-29 | Stop reason: HOSPADM

## 2019-09-29 RX ORDER — ALUMINA, MAGNESIA, AND SIMETHICONE 2400; 2400; 240 MG/30ML; MG/30ML; MG/30ML
15 SUSPENSION ORAL EVERY 6 HOURS PRN
Status: DISCONTINUED | OUTPATIENT
Start: 2019-09-29 | End: 2019-10-07 | Stop reason: HOSPADM

## 2019-09-29 RX ORDER — HEPARIN SODIUM 1000 [USP'U]/ML
INJECTION, SOLUTION INTRAVENOUS; SUBCUTANEOUS AS NEEDED
Status: DISCONTINUED | OUTPATIENT
Start: 2019-09-29 | End: 2019-09-29 | Stop reason: HOSPADM

## 2019-09-29 RX ORDER — PANTOPRAZOLE SODIUM 40 MG/10ML
40 INJECTION, POWDER, LYOPHILIZED, FOR SOLUTION INTRAVENOUS EVERY MORNING
Status: DISCONTINUED | OUTPATIENT
Start: 2019-09-30 | End: 2019-09-29

## 2019-09-29 RX ORDER — SODIUM CHLORIDE 9 MG/ML
100 INJECTION, SOLUTION INTRAVENOUS CONTINUOUS
Status: DISCONTINUED | OUTPATIENT
Start: 2019-09-29 | End: 2019-10-01

## 2019-09-29 RX ORDER — NOREPINEPHRINE BIT/0.9 % NACL 8 MG/250ML
.02-.3 INFUSION BOTTLE (ML) INTRAVENOUS
Status: DISCONTINUED | OUTPATIENT
Start: 2019-09-29 | End: 2019-09-30

## 2019-09-29 RX ORDER — LIDOCAINE HYDROCHLORIDE 20 MG/ML
INJECTION, SOLUTION INFILTRATION; PERINEURAL AS NEEDED
Status: DISCONTINUED | OUTPATIENT
Start: 2019-09-29 | End: 2019-09-29 | Stop reason: HOSPADM

## 2019-09-29 RX ORDER — ONDANSETRON 4 MG/1
4 TABLET, FILM COATED ORAL EVERY 6 HOURS PRN
Status: DISCONTINUED | OUTPATIENT
Start: 2019-09-29 | End: 2019-10-07 | Stop reason: HOSPADM

## 2019-09-29 RX ORDER — ATROPINE SULFATE 1 MG/ML
.5-1 INJECTION, SOLUTION INTRAMUSCULAR; INTRAVENOUS; SUBCUTANEOUS
Status: DISCONTINUED | OUTPATIENT
Start: 2019-09-29 | End: 2019-10-01

## 2019-09-29 RX ORDER — ACETAMINOPHEN 325 MG/1
650 TABLET ORAL EVERY 4 HOURS PRN
Status: DISCONTINUED | OUTPATIENT
Start: 2019-09-29 | End: 2019-10-07 | Stop reason: HOSPADM

## 2019-09-29 RX ORDER — BISACODYL 10 MG
10 SUPPOSITORY, RECTAL RECTAL DAILY PRN
Status: DISCONTINUED | OUTPATIENT
Start: 2019-09-29 | End: 2019-10-07 | Stop reason: HOSPADM

## 2019-09-29 RX ORDER — SODIUM CHLORIDE 0.9 % (FLUSH) 0.9 %
10 SYRINGE (ML) INJECTION AS NEEDED
Status: DISCONTINUED | OUTPATIENT
Start: 2019-09-29 | End: 2019-10-07 | Stop reason: HOSPADM

## 2019-09-29 RX ORDER — SODIUM CHLORIDE 9 MG/ML
INJECTION, SOLUTION INTRAVENOUS CONTINUOUS PRN
Status: COMPLETED | OUTPATIENT
Start: 2019-09-29 | End: 2019-09-29

## 2019-09-29 RX ORDER — VANCOMYCIN HYDROCHLORIDE
1500 ONCE
Status: COMPLETED | OUTPATIENT
Start: 2019-09-29 | End: 2019-09-29

## 2019-09-29 RX ORDER — PANTOPRAZOLE SODIUM 40 MG/10ML
40 INJECTION, POWDER, LYOPHILIZED, FOR SOLUTION INTRAVENOUS EVERY MORNING
Status: DISCONTINUED | OUTPATIENT
Start: 2019-09-30 | End: 2019-09-30

## 2019-09-29 RX ADMIN — Medication 10 ML: at 22:59

## 2019-09-29 RX ADMIN — HEPARIN SODIUM AND DEXTROSE 13 ML/HR: 5000; 5 INJECTION INTRAVENOUS at 20:15

## 2019-09-29 RX ADMIN — VANCOMYCIN HYDROCHLORIDE 1500 MG: at 22:15

## 2019-09-29 RX ADMIN — MAGNESIUM SULFATE HEPTAHYDRATE 2 G: 40 INJECTION, SOLUTION INTRAVENOUS at 22:58

## 2019-09-29 RX ADMIN — IPRATROPIUM BROMIDE AND ALBUTEROL SULFATE 3 ML: .5; 3 SOLUTION RESPIRATORY (INHALATION) at 22:55

## 2019-09-29 RX ADMIN — TICAGRELOR 90 MG: 90 TABLET ORAL at 22:57

## 2019-09-30 ENCOUNTER — HOSPITAL ENCOUNTER (INPATIENT)
Dept: CARDIOLOGY | Facility: HOSPITAL | Age: 49
Discharge: HOME | End: 2019-09-30

## 2019-09-30 VITALS
WEIGHT: 185 LBS | DIASTOLIC BLOOD PRESSURE: 72 MMHG | BODY MASS INDEX: 30.82 KG/M2 | SYSTOLIC BLOOD PRESSURE: 118 MMHG | HEIGHT: 65 IN

## 2019-09-30 PROBLEM — R65.20 SEVERE SEPSIS (HCC): Status: ACTIVE | Noted: 2019-09-30

## 2019-09-30 PROBLEM — A41.9 SEVERE SEPSIS (HCC): Status: ACTIVE | Noted: 2019-09-30

## 2019-09-30 LAB
ACT BLD: 136 SECONDS (ref 89–137)
ALBUMIN SERPL-MCNC: 2.9 G/DL (ref 3.5–4.8)
ALBUMIN/GLOB SERPL: 1.3 G/DL (ref 1–1.7)
ALP SERPL-CCNC: 54 U/L (ref 32–91)
ALT SERPL W P-5'-P-CCNC: 249 U/L (ref 14–54)
ANION GAP SERPL CALCULATED.3IONS-SCNC: 14.9 MMOL/L (ref 5–15)
ARTERIAL PATENCY WRIST A: ABNORMAL
ARTERIAL PATENCY WRIST A: ABNORMAL
ARTICHOKE IGE QN: 106 MG/DL (ref 0–100)
AST SERPL-CCNC: 383 U/L (ref 15–41)
ATMOSPHERIC PRESS: ABNORMAL MM[HG]
B PERT DNA SPEC QL NAA+PROBE: NOT DETECTED
BASE DEFICIT: -2.8 MEQ/LITER
BASE EXCESS BLDA CALC-SCNC: -2.6 MMOL/L (ref 0–3)
BASE EXCESS BLDA CALC-SCNC: -2.7 MMOL/L (ref 0–3)
BASOPHILS # BLD AUTO: 0 10*3/MM3 (ref 0–0.2)
BASOPHILS NFR BLD AUTO: 0.1 % (ref 0–1.5)
BDY SITE: ABNORMAL
BDY SITE: ABNORMAL
BILIRUB SERPL-MCNC: 0.7 MG/DL (ref 0.3–1.2)
BUN BLD-MCNC: 14 MG/DL (ref 8–20)
BUN/CREAT SERPL: 20 (ref 5.4–26.2)
C PNEUM DNA NPH QL NAA+NON-PROBE: NOT DETECTED
CA-I SERPL ISE-MCNC: 1.05 MMOL/L (ref 1.2–1.3)
CA-I SERPL ISE-MCNC: 1.08 MMOL/L (ref 1.2–1.3)
CA-I SERPL ISE-MCNC: 1.1 MMOL/L (ref 1.2–1.3)
CALCIUM SPEC-SCNC: 7 MG/DL (ref 8.9–10.3)
CHLORIDE SERPL-SCNC: 101 MMOL/L (ref 101–111)
CHOLEST SERPL-MCNC: 159 MG/DL
CK MB SERPL-CCNC: 155.9 NG/ML (ref 0.6–6.3)
CK MB SERPL-CCNC: 56.7 NG/ML (ref 0.6–6.3)
CK MB SERPL-CCNC: >300 NG/ML (ref 0.6–6.3)
CK SERPL-CCNC: 1609 U/L (ref 38–234)
CK SERPL-CCNC: 2555 U/L (ref 38–234)
CK SERPL-CCNC: 3333 U/L (ref 38–234)
CO2 BLDA-SCNC: 22.1 MMOL/L (ref 22–29)
CO2 BLDA-SCNC: 24.1 MMOL/L (ref 22–29)
CO2 SERPL-SCNC: 20 MMOL/L (ref 22–32)
CREAT BLD-MCNC: 0.7 MG/DL (ref 0.4–1)
D-LACTATE SERPL-SCNC: 1.8 MMOL/L (ref 0.5–2.2)
D-LACTATE SERPL-SCNC: 2.1 MMOL/L (ref 0.5–2.2)
DEPRECATED RDW RBC AUTO: 42 FL (ref 37–54)
EOSINOPHIL # BLD AUTO: 0 10*3/MM3 (ref 0–0.4)
EOSINOPHIL NFR BLD AUTO: 0.2 % (ref 0.3–6.2)
ERYTHROCYTE [DISTWIDTH] IN BLOOD BY AUTOMATED COUNT: 13 % (ref 12.3–15.4)
FLUAV H1 2009 PAND RNA NPH QL NAA+PROBE: NOT DETECTED
FLUAV H1 HA GENE NPH QL NAA+PROBE: NOT DETECTED
FLUAV H3 RNA NPH QL NAA+PROBE: NOT DETECTED
FLUAV SUBTYP SPEC NAA+PROBE: NOT DETECTED
FLUBV RNA ISLT QL NAA+PROBE: NOT DETECTED
GFR SERPL CREATININE-BSD FRML MDRD: 89 ML/MIN/1.73
GLOBULIN UR ELPH-MCNC: 2.3 GM/DL (ref 2.5–3.8)
GLUCOSE BLD-MCNC: 111 MG/DL (ref 65–99)
HADV DNA SPEC NAA+PROBE: NOT DETECTED
HBA1C MFR BLD: 5.2 % (ref 3.5–5.6)
HCO3 BLDA-SCNC: 21.1 MMOL/L (ref 21–28)
HCO3 MIXED: 22.9 MMOL/L (ref 21–29)
HCOV 229E RNA SPEC QL NAA+PROBE: NOT DETECTED
HCOV HKU1 RNA SPEC QL NAA+PROBE: NOT DETECTED
HCOV NL63 RNA SPEC QL NAA+PROBE: NOT DETECTED
HCOV OC43 RNA SPEC QL NAA+PROBE: NOT DETECTED
HCT VFR BLD AUTO: 34.5 % (ref 34–46.6)
HDLC SERPL QL: 3.98
HDLC SERPL-MCNC: 40 MG/DL
HEMODILUTION: NO
HEMODILUTION: NO
HGB BLD-MCNC: 11.6 G/DL (ref 12–15.9)
HMPV RNA NPH QL NAA+NON-PROBE: NOT DETECTED
HOROWITZ INDEX BLD+IHG-RTO: <21 %
HOROWITZ INDEX BLD+IHG-RTO: <21 %
HPIV1 RNA SPEC QL NAA+PROBE: NOT DETECTED
HPIV2 RNA SPEC QL NAA+PROBE: NOT DETECTED
HPIV3 RNA NPH QL NAA+PROBE: NOT DETECTED
HPIV4 P GENE NPH QL NAA+PROBE: NOT DETECTED
INR PPP: 1.08 (ref 0.9–1.1)
LDLC/HDLC SERPL: 2.16 {RATIO}
LYMPHOCYTES # BLD AUTO: 1.6 10*3/MM3 (ref 0.7–3.1)
LYMPHOCYTES NFR BLD AUTO: 12 % (ref 19.6–45.3)
M PNEUMO IGG SER IA-ACNC: NOT DETECTED
MAGNESIUM SERPL-MCNC: 2.2 MG/DL (ref 1.8–2.5)
MCH RBC QN AUTO: 30.8 PG (ref 26.6–33)
MCHC RBC AUTO-ENTMCNC: 33.7 G/DL (ref 31.5–35.7)
MCV RBC AUTO: 91.5 FL (ref 79–97)
MODALITY: ABNORMAL
MODALITY: ABNORMAL
MONOCYTES # BLD AUTO: 1 10*3/MM3 (ref 0.1–0.9)
MONOCYTES NFR BLD AUTO: 8 % (ref 5–12)
NEUTROPHILS # BLD AUTO: 10.5 10*3/MM3 (ref 1.7–7)
NEUTROPHILS NFR BLD AUTO: 79.7 % (ref 42.7–76)
NRBC BLD AUTO-RTO: 0.1 /100 WBC (ref 0–0.2)
O2 SATURATION MIXED: 91.3 %
PCO2 BLDA: 32 MM HG (ref 35–48)
PCO2 MIXED: 41.4 MMHG (ref 35–51)
PH BLDA: 7.43 PH UNITS (ref 7.35–7.45)
PH MIXED: 7.35 PH UNITS (ref 7.32–7.45)
PHOSPHATE SERPL-MCNC: 3.3 MG/DL (ref 2.4–4.7)
PLATELET # BLD AUTO: 172 10*3/MM3 (ref 140–450)
PMV BLD AUTO: 8.4 FL (ref 6–12)
PO2 BLDA: 115.9 MM HG (ref 83–108)
PO2 MIXED: 65 MMHG
POTASSIUM BLD-SCNC: 3.9 MMOL/L (ref 3.6–5.1)
PROT SERPL-MCNC: 5.2 G/DL (ref 6.1–7.9)
PROTHROMBIN TIME: 11.1 SECONDS (ref 9.6–11.7)
RBC # BLD AUTO: 3.78 10*6/MM3 (ref 3.77–5.28)
RHINOVIRUS RNA SPEC NAA+PROBE: DETECTED
RSV RNA NPH QL NAA+NON-PROBE: NOT DETECTED
SAO2 % BLDCOA: 98.7 % (ref 94–98)
SODIUM BLD-SCNC: 132 MMOL/L (ref 136–144)
TRIGL SERPL-MCNC: 163 MG/DL
TROPONIN I SERPL-MCNC: 30.86 NG/ML (ref 0–0.03)
TROPONIN I SERPL-MCNC: 41.95 NG/ML (ref 0–0.03)
TROPONIN I SERPL-MCNC: 72.34 NG/ML (ref 0–0.03)
VLDLC SERPL-MCNC: 32.6 MG/DL
WBC NRBC COR # BLD: 13.1 10*3/MM3 (ref 3.4–10.8)

## 2019-09-30 PROCEDURE — 93005 ELECTROCARDIOGRAM TRACING: CPT | Performed by: NURSE PRACTITIONER

## 2019-09-30 PROCEDURE — 82550 ASSAY OF CK (CPK): CPT | Performed by: INTERNAL MEDICINE

## 2019-09-30 PROCEDURE — 83605 ASSAY OF LACTIC ACID: CPT | Performed by: INTERNAL MEDICINE

## 2019-09-30 PROCEDURE — 80053 COMPREHEN METABOLIC PANEL: CPT | Performed by: NURSE PRACTITIONER

## 2019-09-30 PROCEDURE — 82565 ASSAY OF CREATININE: CPT | Performed by: INTERNAL MEDICINE

## 2019-09-30 PROCEDURE — 94799 UNLISTED PULMONARY SVC/PX: CPT

## 2019-09-30 PROCEDURE — 25010000002 HEPARIN (PORCINE) 25000-5 UT/500ML-% SOLUTION: Performed by: INTERNAL MEDICINE

## 2019-09-30 PROCEDURE — 25010000002 MORPHINE PER 10 MG

## 2019-09-30 PROCEDURE — 80061 LIPID PANEL: CPT | Performed by: NURSE PRACTITIONER

## 2019-09-30 PROCEDURE — 82803 BLOOD GASES ANY COMBINATION: CPT

## 2019-09-30 PROCEDURE — 84484 ASSAY OF TROPONIN QUANT: CPT | Performed by: NURSE PRACTITIONER

## 2019-09-30 PROCEDURE — 93005 ELECTROCARDIOGRAM TRACING: CPT | Performed by: INTERNAL MEDICINE

## 2019-09-30 PROCEDURE — 82330 ASSAY OF CALCIUM: CPT | Performed by: INTERNAL MEDICINE

## 2019-09-30 PROCEDURE — 99233 SBSQ HOSP IP/OBS HIGH 50: CPT | Performed by: INTERNAL MEDICINE

## 2019-09-30 PROCEDURE — 84484 ASSAY OF TROPONIN QUANT: CPT | Performed by: INTERNAL MEDICINE

## 2019-09-30 PROCEDURE — 33992 RMVL PERQ LEFT HEART VAD: CPT | Performed by: INTERNAL MEDICINE

## 2019-09-30 PROCEDURE — 85347 COAGULATION TIME ACTIVATED: CPT

## 2019-09-30 PROCEDURE — 93325 DOPPLER ECHO COLOR FLOW MAPG: CPT

## 2019-09-30 PROCEDURE — 93308 TTE F-UP OR LMTD: CPT

## 2019-09-30 PROCEDURE — 83036 HEMOGLOBIN GLYCOSYLATED A1C: CPT | Performed by: INTERNAL MEDICINE

## 2019-09-30 PROCEDURE — 93010 ELECTROCARDIOGRAM REPORT: CPT | Performed by: INTERNAL MEDICINE

## 2019-09-30 PROCEDURE — 33993 REPOSG PERQ R/L HRT VAD: CPT | Performed by: INTERNAL MEDICINE

## 2019-09-30 PROCEDURE — 82553 CREATINE MB FRACTION: CPT | Performed by: INTERNAL MEDICINE

## 2019-09-30 PROCEDURE — 85025 COMPLETE CBC W/AUTO DIFF WBC: CPT | Performed by: NURSE PRACTITIONER

## 2019-09-30 PROCEDURE — 85610 PROTHROMBIN TIME: CPT | Performed by: INTERNAL MEDICINE

## 2019-09-30 PROCEDURE — 25010000002 VANCOMYCIN 10 G RECONSTITUTED SOLUTION: Performed by: INTERNAL MEDICINE

## 2019-09-30 PROCEDURE — 84100 ASSAY OF PHOSPHORUS: CPT | Performed by: NURSE PRACTITIONER

## 2019-09-30 PROCEDURE — 83735 ASSAY OF MAGNESIUM: CPT | Performed by: NURSE PRACTITIONER

## 2019-09-30 PROCEDURE — 25010000002 CALCIUM GLUCONATE PER 10 ML: Performed by: NURSE PRACTITIONER

## 2019-09-30 RX ORDER — MORPHINE SULFATE 4 MG/ML
4 INJECTION, SOLUTION INTRAMUSCULAR; INTRAVENOUS ONCE
Status: DISCONTINUED | OUTPATIENT
Start: 2019-09-30 | End: 2019-10-04

## 2019-09-30 RX ORDER — CALCIUM GLUCONATE 20 MG/ML
1 INJECTION, SOLUTION INTRAVENOUS ONCE
Status: COMPLETED | OUTPATIENT
Start: 2019-09-30 | End: 2019-09-30

## 2019-09-30 RX ORDER — HEPARIN SODIUM,PORCINE/D5W 25000/500
13 INTRAVENOUS SOLUTION INTRAVENOUS CONTINUOUS
Status: DISCONTINUED | OUTPATIENT
Start: 2019-09-30 | End: 2019-09-30 | Stop reason: SDUPTHER

## 2019-09-30 RX ORDER — OXYCODONE HYDROCHLORIDE 5 MG/1
5 TABLET ORAL EVERY 4 HOURS PRN
Status: DISCONTINUED | OUTPATIENT
Start: 2019-09-30 | End: 2019-10-03

## 2019-09-30 RX ORDER — HEPARIN SODIUM,PORCINE/D5W 25000/500
13 INTRAVENOUS SOLUTION INTRAVENOUS CONTINUOUS
Status: DISCONTINUED | OUTPATIENT
Start: 2019-09-30 | End: 2019-10-01

## 2019-09-30 RX ORDER — TRAMADOL HYDROCHLORIDE 50 MG/1
50 TABLET ORAL EVERY 4 HOURS PRN
Status: DISCONTINUED | OUTPATIENT
Start: 2019-09-30 | End: 2019-10-02

## 2019-09-30 RX ORDER — MORPHINE SULFATE 4 MG/ML
INJECTION, SOLUTION INTRAMUSCULAR; INTRAVENOUS
Status: COMPLETED
Start: 2019-09-30 | End: 2019-09-30

## 2019-09-30 RX ADMIN — IPRATROPIUM BROMIDE AND ALBUTEROL SULFATE 3 ML: .5; 3 SOLUTION RESPIRATORY (INHALATION) at 15:37

## 2019-09-30 RX ADMIN — IPRATROPIUM BROMIDE AND ALBUTEROL SULFATE 3 ML: .5; 3 SOLUTION RESPIRATORY (INHALATION) at 19:25

## 2019-09-30 RX ADMIN — TICAGRELOR 90 MG: 90 TABLET ORAL at 08:42

## 2019-09-30 RX ADMIN — Medication 10 ML: at 20:03

## 2019-09-30 RX ADMIN — PANTOPRAZOLE SODIUM 40 MG: 40 INJECTION, POWDER, FOR SOLUTION INTRAVENOUS at 06:53

## 2019-09-30 RX ADMIN — TICAGRELOR 90 MG: 90 TABLET ORAL at 20:03

## 2019-09-30 RX ADMIN — IPRATROPIUM BROMIDE AND ALBUTEROL SULFATE 3 ML: .5; 3 SOLUTION RESPIRATORY (INHALATION) at 07:07

## 2019-09-30 RX ADMIN — OXYCODONE HYDROCHLORIDE 5 MG: 5 TABLET ORAL at 05:36

## 2019-09-30 RX ADMIN — IPRATROPIUM BROMIDE AND ALBUTEROL SULFATE 3 ML: .5; 3 SOLUTION RESPIRATORY (INHALATION) at 11:25

## 2019-09-30 RX ADMIN — TRAMADOL HYDROCHLORIDE 50 MG: 50 TABLET, COATED ORAL at 20:03

## 2019-09-30 RX ADMIN — SODIUM CHLORIDE 100 ML/HR: 0.9 INJECTION, SOLUTION INTRAVENOUS at 00:04

## 2019-09-30 RX ADMIN — AZTREONAM 2 G: 2 INJECTION, POWDER, LYOPHILIZED, FOR SOLUTION INTRAMUSCULAR; INTRAVENOUS at 01:19

## 2019-09-30 RX ADMIN — ACETAMINOPHEN 650 MG: 325 TABLET ORAL at 00:39

## 2019-09-30 RX ADMIN — TRAMADOL HYDROCHLORIDE 50 MG: 50 TABLET, COATED ORAL at 16:14

## 2019-09-30 RX ADMIN — CALCIUM GLUCONATE 1 G: 20 INJECTION, SOLUTION INTRAVENOUS at 08:42

## 2019-09-30 RX ADMIN — MORPHINE SULFATE: 4 INJECTION INTRAVENOUS at 11:00

## 2019-09-30 RX ADMIN — ASPIRIN 81 MG 81 MG: 81 TABLET ORAL at 08:42

## 2019-09-30 RX ADMIN — CALCIUM GLUCONATE 2 G: 98 INJECTION, SOLUTION INTRAVENOUS at 23:10

## 2019-09-30 RX ADMIN — Medication 10 ML: at 08:43

## 2019-09-30 RX ADMIN — CALCIUM GLUCONATE 1 G: 20 INJECTION, SOLUTION INTRAVENOUS at 16:14

## 2019-10-01 ENCOUNTER — APPOINTMENT (OUTPATIENT)
Dept: GENERAL RADIOLOGY | Facility: HOSPITAL | Age: 49
End: 2019-10-01

## 2019-10-01 PROBLEM — E83.51 HYPOCALCEMIA: Status: ACTIVE | Noted: 2019-10-01

## 2019-10-01 PROBLEM — E66.9 OBESITY (BMI 30-39.9): Chronic | Status: ACTIVE | Noted: 2019-09-13

## 2019-10-01 PROBLEM — R57.0 SHOCK, CARDIOGENIC: Status: RESOLVED | Noted: 2019-10-01 | Resolved: 2019-10-01

## 2019-10-01 PROBLEM — E66.3 OVERWEIGHT (BMI 25.0-29.9): Chronic | Status: ACTIVE | Noted: 2019-09-13

## 2019-10-01 PROBLEM — F17.200 TOBACCO DEPENDENCY: Chronic | Status: ACTIVE | Noted: 2019-09-13

## 2019-10-01 PROBLEM — R79.89 ELEVATED LFTS: Status: ACTIVE | Noted: 2019-10-01

## 2019-10-01 PROBLEM — R57.0 SHOCK, CARDIOGENIC (HCC): Status: ACTIVE | Noted: 2019-10-01

## 2019-10-01 PROBLEM — J20.6 ACUTE BRONCHITIS DUE TO RHINOVIRUS: Status: ACTIVE | Noted: 2019-10-01

## 2019-10-01 PROBLEM — D69.6 THROMBOCYTOPENIA: Status: ACTIVE | Noted: 2019-10-01

## 2019-10-01 PROBLEM — I25.10 CAD (CORONARY ARTERY DISEASE): Status: ACTIVE | Noted: 2019-09-29

## 2019-10-01 PROBLEM — R65.20 SEVERE SEPSIS (HCC): Status: RESOLVED | Noted: 2019-09-30 | Resolved: 2019-10-01

## 2019-10-01 PROBLEM — G47.00 INSOMNIA, ORGANIC: Chronic | Status: ACTIVE | Noted: 2019-09-13

## 2019-10-01 PROBLEM — A41.9 SEVERE SEPSIS: Status: RESOLVED | Noted: 2019-09-30 | Resolved: 2019-10-01

## 2019-10-01 PROBLEM — D64.9 NORMOCYTIC ANEMIA: Status: ACTIVE | Noted: 2019-10-01

## 2019-10-01 PROBLEM — E78.2 MIXED HYPERLIPIDEMIA: Chronic | Status: ACTIVE | Noted: 2019-09-13

## 2019-10-01 PROBLEM — Z72.0 TOBACCO USE: Chronic | Status: ACTIVE | Noted: 2019-09-13

## 2019-10-01 PROBLEM — I10 HYPERTENSION, BENIGN: Chronic | Status: ACTIVE | Noted: 2019-09-13

## 2019-10-01 LAB
ALBUMIN SERPL-MCNC: 2.9 G/DL (ref 3.5–4.8)
ALBUMIN/GLOB SERPL: 1.2 G/DL (ref 1–1.7)
ALP SERPL-CCNC: 50 U/L (ref 32–91)
ALT SERPL W P-5'-P-CCNC: 162 U/L (ref 14–54)
ANION GAP SERPL CALCULATED.3IONS-SCNC: 14.8 MMOL/L (ref 5–15)
APTT PPP: 26.5 SECONDS (ref 61–76.5)
AST SERPL-CCNC: 149 U/L (ref 15–41)
BACTERIA SPEC AEROBE CULT: NO GROWTH
BASOPHILS # BLD AUTO: 0 10*3/MM3 (ref 0–0.2)
BASOPHILS NFR BLD AUTO: 0.3 % (ref 0–1.5)
BILIRUB SERPL-MCNC: 0.9 MG/DL (ref 0.3–1.2)
BUN BLD-MCNC: 6 MG/DL (ref 8–20)
BUN/CREAT SERPL: 7.5 (ref 5.4–26.2)
CA-I SERPL ISE-MCNC: 1.14 MMOL/L (ref 1.2–1.3)
CA-I SERPL ISE-MCNC: 1.16 MMOL/L (ref 1.2–1.3)
CA-I SERPL ISE-MCNC: 1.16 MMOL/L (ref 1.2–1.3)
CALCIUM SPEC-SCNC: 7.7 MG/DL (ref 8.9–10.3)
CHLORIDE SERPL-SCNC: 102 MMOL/L (ref 101–111)
CK MB SERPL-CCNC: 21.5 NG/ML (ref 0.6–6.3)
CK SERPL-CCNC: 1085 U/L (ref 20–180)
CO2 SERPL-SCNC: 18 MMOL/L (ref 22–32)
CREAT BLD-MCNC: 0.7 MG/DL (ref 0.4–1)
CREAT BLD-MCNC: 0.8 MG/DL (ref 0.4–1)
DEPRECATED RDW RBC AUTO: 44.6 FL (ref 37–54)
EOSINOPHIL # BLD AUTO: 0.1 10*3/MM3 (ref 0–0.4)
EOSINOPHIL NFR BLD AUTO: 1 % (ref 0.3–6.2)
ERYTHROCYTE [DISTWIDTH] IN BLOOD BY AUTOMATED COUNT: 13.4 % (ref 12.3–15.4)
GFR SERPL CREATININE-BSD FRML MDRD: 76 ML/MIN/1.73
GFR SERPL CREATININE-BSD FRML MDRD: 89 ML/MIN/1.73
GLOBULIN UR ELPH-MCNC: 2.4 GM/DL (ref 2.5–3.8)
GLUCOSE BLD-MCNC: 86 MG/DL (ref 65–99)
HCT VFR BLD AUTO: 32.5 % (ref 34–46.6)
HGB BLD-MCNC: 10.6 G/DL (ref 12–15.9)
LYMPHOCYTES # BLD AUTO: 2.4 10*3/MM3 (ref 0.7–3.1)
LYMPHOCYTES NFR BLD AUTO: 22.5 % (ref 19.6–45.3)
MAGNESIUM SERPL-MCNC: 1.7 MG/DL (ref 1.8–2.5)
MCH RBC QN AUTO: 30.8 PG (ref 26.6–33)
MCHC RBC AUTO-ENTMCNC: 32.7 G/DL (ref 31.5–35.7)
MCV RBC AUTO: 94.4 FL (ref 79–97)
MONOCYTES # BLD AUTO: 0.8 10*3/MM3 (ref 0.1–0.9)
MONOCYTES NFR BLD AUTO: 7.6 % (ref 5–12)
MRSA SPEC QL CULT: NORMAL
NEUTROPHILS # BLD AUTO: 7.5 10*3/MM3 (ref 1.7–7)
NEUTROPHILS NFR BLD AUTO: 68.6 % (ref 42.7–76)
NRBC BLD AUTO-RTO: 0 /100 WBC (ref 0–0.2)
PHOSPHATE SERPL-MCNC: 2.5 MG/DL (ref 2.4–4.7)
PLATELET # BLD AUTO: 137 10*3/MM3 (ref 140–450)
PMV BLD AUTO: 8.9 FL (ref 6–12)
POTASSIUM BLD-SCNC: 3.8 MMOL/L (ref 3.6–5.1)
PROT SERPL-MCNC: 5.3 G/DL (ref 6.1–7.9)
RBC # BLD AUTO: 3.44 10*6/MM3 (ref 3.77–5.28)
SODIUM BLD-SCNC: 131 MMOL/L (ref 136–144)
TROPONIN I SERPL-MCNC: 15.81 NG/ML (ref 0–0.03)
TROPONIN I SERPL-MCNC: 17.05 NG/ML (ref 0–0.03)
TROPONIN I SERPL-MCNC: 22.79 NG/ML (ref 0–0.03)
TROPONIN I SERPL-MCNC: 23.11 NG/ML (ref 0–0.03)
WBC NRBC COR # BLD: 10.9 10*3/MM3 (ref 3.4–10.8)

## 2019-10-01 PROCEDURE — 80053 COMPREHEN METABOLIC PANEL: CPT | Performed by: NURSE PRACTITIONER

## 2019-10-01 PROCEDURE — 94799 UNLISTED PULMONARY SVC/PX: CPT

## 2019-10-01 PROCEDURE — 82550 ASSAY OF CK (CPK): CPT | Performed by: INTERNAL MEDICINE

## 2019-10-01 PROCEDURE — 93010 ELECTROCARDIOGRAM REPORT: CPT | Performed by: INTERNAL MEDICINE

## 2019-10-01 PROCEDURE — 82553 CREATINE MB FRACTION: CPT | Performed by: INTERNAL MEDICINE

## 2019-10-01 PROCEDURE — 84484 ASSAY OF TROPONIN QUANT: CPT | Performed by: INTERNAL MEDICINE

## 2019-10-01 PROCEDURE — 99222 1ST HOSP IP/OBS MODERATE 55: CPT | Performed by: INTERNAL MEDICINE

## 2019-10-01 PROCEDURE — 90686 IIV4 VACC NO PRSV 0.5 ML IM: CPT | Performed by: INTERNAL MEDICINE

## 2019-10-01 PROCEDURE — 93005 ELECTROCARDIOGRAM TRACING: CPT | Performed by: INTERNAL MEDICINE

## 2019-10-01 PROCEDURE — 25010000002 CALCIUM GLUCONATE PER 10 ML: Performed by: INTERNAL MEDICINE

## 2019-10-01 PROCEDURE — 99233 SBSQ HOSP IP/OBS HIGH 50: CPT | Performed by: INTERNAL MEDICINE

## 2019-10-01 PROCEDURE — 85025 COMPLETE CBC W/AUTO DIFF WBC: CPT | Performed by: NURSE PRACTITIONER

## 2019-10-01 PROCEDURE — 25010000002 INFLUENZA VAC SPLIT QUAD 0.5 ML SUSPENSION PREFILLED SYRINGE: Performed by: INTERNAL MEDICINE

## 2019-10-01 PROCEDURE — 71045 X-RAY EXAM CHEST 1 VIEW: CPT

## 2019-10-01 PROCEDURE — 25010000002 MAGNESIUM SULFATE 2 GM/50ML SOLUTION: Performed by: INTERNAL MEDICINE

## 2019-10-01 PROCEDURE — 83735 ASSAY OF MAGNESIUM: CPT | Performed by: NURSE PRACTITIONER

## 2019-10-01 PROCEDURE — G0008 ADMIN INFLUENZA VIRUS VAC: HCPCS | Performed by: INTERNAL MEDICINE

## 2019-10-01 PROCEDURE — 82565 ASSAY OF CREATININE: CPT | Performed by: INTERNAL MEDICINE

## 2019-10-01 PROCEDURE — 82330 ASSAY OF CALCIUM: CPT | Performed by: INTERNAL MEDICINE

## 2019-10-01 PROCEDURE — 85730 THROMBOPLASTIN TIME PARTIAL: CPT | Performed by: INTERNAL MEDICINE

## 2019-10-01 PROCEDURE — 25010000002 ENOXAPARIN PER 10 MG: Performed by: INTERNAL MEDICINE

## 2019-10-01 PROCEDURE — 84100 ASSAY OF PHOSPHORUS: CPT | Performed by: NURSE PRACTITIONER

## 2019-10-01 RX ORDER — MAGNESIUM SULFATE HEPTAHYDRATE 40 MG/ML
2 INJECTION, SOLUTION INTRAVENOUS ONCE
Status: COMPLETED | OUTPATIENT
Start: 2019-10-01 | End: 2019-10-01

## 2019-10-01 RX ORDER — SPIRONOLACTONE 25 MG/1
12.5 TABLET ORAL ONCE
Status: DISCONTINUED | OUTPATIENT
Start: 2019-10-02 | End: 2019-10-07 | Stop reason: HOSPADM

## 2019-10-01 RX ORDER — POTASSIUM CHLORIDE 20 MEQ/1
20 TABLET, EXTENDED RELEASE ORAL DAILY
Status: DISCONTINUED | OUTPATIENT
Start: 2019-10-01 | End: 2019-10-05

## 2019-10-01 RX ORDER — ATORVASTATIN CALCIUM 40 MG/1
40 TABLET, FILM COATED ORAL NIGHTLY
Status: DISCONTINUED | OUTPATIENT
Start: 2019-10-01 | End: 2019-10-07 | Stop reason: HOSPADM

## 2019-10-01 RX ORDER — SPIRONOLACTONE 25 MG/1
12.5 TABLET ORAL DAILY
Status: DISCONTINUED | OUTPATIENT
Start: 2019-10-01 | End: 2019-10-03

## 2019-10-01 RX ADMIN — TRAMADOL HYDROCHLORIDE 50 MG: 50 TABLET, COATED ORAL at 14:35

## 2019-10-01 RX ADMIN — TRAMADOL HYDROCHLORIDE 50 MG: 50 TABLET, COATED ORAL at 20:22

## 2019-10-01 RX ADMIN — METOPROLOL TARTRATE 12.5 MG: 25 TABLET, FILM COATED ORAL at 11:10

## 2019-10-01 RX ADMIN — ACETAMINOPHEN 650 MG: 325 TABLET ORAL at 23:19

## 2019-10-01 RX ADMIN — TRAMADOL HYDROCHLORIDE 50 MG: 50 TABLET, COATED ORAL at 00:32

## 2019-10-01 RX ADMIN — Medication 10 ML: at 20:24

## 2019-10-01 RX ADMIN — SODIUM CHLORIDE 500 ML: 900 INJECTION, SOLUTION INTRAVENOUS at 14:35

## 2019-10-01 RX ADMIN — ATORVASTATIN CALCIUM 40 MG: 40 TABLET, FILM COATED ORAL at 20:23

## 2019-10-01 RX ADMIN — INFLUENZA A VIRUS A/BRISBANE/02/2018 IVR-190 (H1N1) ANTIGEN (PROPIOLACTONE INACTIVATED), INFLUENZA A VIRUS A/KANSAS/14/2017 X-327 (H3N2) ANTIGEN (PROPIOLACTONE INACTIVATED), INFLUENZA B VIRUS B/MARYLAND/15/2016 ANTIGEN (PROPIOLACTONE INACTIVATED), INFLUENZA B VIRUS B/PHUKET/3073/2013 BVR-1B ANTIGEN (PROPIOLACTONE INACTIVATED) 0.5 ML: 15; 15; 15; 15 INJECTION, SUSPENSION INTRAMUSCULAR at 11:18

## 2019-10-01 RX ADMIN — IPRATROPIUM BROMIDE AND ALBUTEROL SULFATE 3 ML: .5; 3 SOLUTION RESPIRATORY (INHALATION) at 11:39

## 2019-10-01 RX ADMIN — SODIUM CHLORIDE 500 ML: 0.9 INJECTION, SOLUTION INTRAVENOUS at 23:15

## 2019-10-01 RX ADMIN — IPRATROPIUM BROMIDE AND ALBUTEROL SULFATE 3 ML: .5; 3 SOLUTION RESPIRATORY (INHALATION) at 19:50

## 2019-10-01 RX ADMIN — CALCIUM GLUCONATE 2 G: 98 INJECTION, SOLUTION INTRAVENOUS at 14:35

## 2019-10-01 RX ADMIN — SPIRONOLACTONE 12.5 MG: 25 TABLET ORAL at 20:22

## 2019-10-01 RX ADMIN — Medication 10 ML: at 08:24

## 2019-10-01 RX ADMIN — MAGNESIUM SULFATE HEPTAHYDRATE 2 G: 40 INJECTION, SOLUTION INTRAVENOUS at 11:09

## 2019-10-01 RX ADMIN — ENOXAPARIN SODIUM 40 MG: 40 INJECTION SUBCUTANEOUS at 16:56

## 2019-10-01 RX ADMIN — ASPIRIN 81 MG 81 MG: 81 TABLET ORAL at 08:23

## 2019-10-01 RX ADMIN — TICAGRELOR 90 MG: 90 TABLET ORAL at 08:23

## 2019-10-01 RX ADMIN — TICAGRELOR 90 MG: 90 TABLET ORAL at 20:23

## 2019-10-01 RX ADMIN — IPRATROPIUM BROMIDE AND ALBUTEROL SULFATE 3 ML: .5; 3 SOLUTION RESPIRATORY (INHALATION) at 15:16

## 2019-10-01 RX ADMIN — POTASSIUM CHLORIDE 20 MEQ: 1500 TABLET, EXTENDED RELEASE ORAL at 11:10

## 2019-10-01 RX ADMIN — TRAMADOL HYDROCHLORIDE 50 MG: 50 TABLET, COATED ORAL at 08:23

## 2019-10-01 NOTE — PLAN OF CARE
Problem: Skin Injury Risk (Adult)  Goal: Identify Related Risk Factors and Signs and Symptoms  Outcome: Ongoing (interventions implemented as appropriate)   10/01/19 1621   Skin Injury Risk (Adult)   Related Risk Factors (Skin Injury Risk) critical care admission     Goal: Skin Health and Integrity  Outcome: Ongoing (interventions implemented as appropriate)   10/01/19 1621   Skin Injury Risk (Adult)   Skin Health and Integrity making progress toward outcome       Problem: Patient Care Overview  Goal: Plan of Care Review  Outcome: Ongoing (interventions implemented as appropriate)   10/01/19 1621   Coping/Psychosocial   Plan of Care Reviewed With patient   Plan of Care Review   Progress improving       Problem: Pain, Chronic (Adult)  Goal: Identify Related Risk Factors and Signs and Symptoms  Outcome: Ongoing (interventions implemented as appropriate)   10/01/19 1621   Pain, Chronic (Adult)   Related Risk Factors (Chronic Pain) nerve injury   Signs and Symptoms (Chronic Pain) verbalization of pain/discomfort for a prolonged time period     Goal: Acceptable Pain/Comfort Level and Functional Ability  Outcome: Ongoing (interventions implemented as appropriate)   10/01/19 1621   Pain, Chronic (Adult)   Acceptable Pain/Comfort Level and Functional Ability making progress toward outcome       Problem: Cardiac: ACS (Acute Coronary Syndrome) (Adult)  Goal: Signs and Symptoms of Listed Potential Problems Will be Absent, Minimized or Managed (Cardiac: ACS)  Outcome: Ongoing (interventions implemented as appropriate)   10/01/19 1621   Goal/Outcome Evaluation   Problems Assessed (Acute Coronary Syndrome) all   Problems Present (Acute Coronary Syn) situational response

## 2019-10-01 NOTE — PROGRESS NOTES
Continued Stay Note  [facility]     Patient Name: Sada Le  MRN: [id number]  Today's Date: 10/1/2019    Admit Date: 9/29/2019    Discharge Plan     Row Name 10/01/19 1159       Plan    Plan  [facility] home care. Watch for lifevest needs. Echo is pending> Brillenta prior auth is in peer to peer and may take 24-48 hours    Plan Comments  Spoke with patient and daughter at bedside. Daughter states this is all new for her mother and she needs education, medicine teaching and is very weak. [facility] home care referral made. Still watching for life vest needs and brillenta is in peer to peer.     Row Name 10/01/19 1139       Plan    Plan  Home with family. Watch for lifevest needs. Echo is pending. Brillenta prior auth is in process of peer to peer    Plan Comments  Call placed to [facility] HIP rep ericka at [phone]. He states patient will have to have peer to peer to get brilenta as she has not been on other drugs on the formulary for this plan and failed them. Comfort Montiel notified and states she will do peer to peer. Call to prior auth and chose peer to peer option  and left voicemail with chela information . Peer to peer line states they have 24 -48 hours to call her back. Comfort notified. verbalizes understanding. Patient and daughter informed and both verbalize understanding as well    Row Name 10/01/19 8230       Plan    Plan  Home with family. Brillenta prior auth was denied. See also below    Plan Comments  Call placed at 9 am today to insurance prior auth department. Narayan BARBOSA states this patient was denied because patient had not failed plavix and that was their formulary for stemi. Comfort montiel NP for cardiolgy informed and she states she will call the office and try to get the prior auth changed. States she wants patient on Brillenta because it is the drug of choice for stemi. Patient and daughter informed at bedside that it was denied, but that we are woking to get it overturned. Comfort Montiel can be reached at  [phone]             Cari Man RN
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
## 2019-10-01 NOTE — PROGRESS NOTES
Continued Stay Note  KHUSHBU Kemp     Patient Name: Sada Le  MRN: [id number]  Today's Date: 10/1/2019    Admit Date: 9/29/2019    Discharge Plan     Row Name 10/01/19 1052       Plan    Plan  Home with family. Brillenta prior auth was denied. See also below    Plan Comments  Call placed at 9 am today to insurance prior auth department. Narayan BARBOSA states this patient was denied because patient had not failed plavix and that was their formulary for stemi. Comfort montiel NP for cardiolgy informed and she states she will call the office and try to get the prior auth changed. States she wants patient on Brillenta because it is the drug of choice for stemi. Patient and daughter informed at bedside that it was denied, but that we are woking to get it overturned. Comfort Montiel can be reached at [phone]         NP notes state that patient is pending an echo and if EF mendez not improved she will need lifevest    Cari Man RN
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
## 2019-10-01 NOTE — DISCHARGE PLACEMENT REQUEST
"Magdalena Le (48 y.o. Female)     Date of Birth Social Security Number Address Home Phone MRN    1970  [address] [facility] [phone] [id number]    Religious Marital Status          Unknown        Admission Date Admission Type Admitting Provider Attending Provider Department, Room/Bed    9/29/19 Urgent Brant Martinez MD Keith, Matthew Cody Lee, MD [facility] CARDIOVASCULAR CARE UNIT, 2209/1    Discharge Date Discharge Disposition Discharge Destination                       Attending Provider:  Brant Martinez MD    Allergies:  Penicillins, Ciprofloxacin, Penicillin G    Isolation:  Contact Drop   Infection:  Rhinovirus  (09/30/19)   Code Status:  CPR    Ht:  165.1 cm (65\")   Wt:  84 kg (185 lb 3 oz)    Admission Cmt:  None   Principal Problem:  STEMI involving left anterior descending coronary artery (CMS/HCC) [I21.02]                 Active Insurance as of 9/29/2019     Primary Coverage     Payor Plan Insurance Group Employer/Plan Group    [employer] [id number]     Payor Plan Address Payor Plan Phone Number Payor Plan Fax Number Effective Dates    MAIL STOP:   8/1/2019 - None Entered    [address] [address]       [facility] [address]       Subscriber Name Subscriber Birth Date Member ID       MAGDALENA LE 1970 [id number]                 Emergency Contacts      (Rel.) Home Phone Work Phone Mobile Phone    ARTHUR CORBIN (Daughter) -- -- [phone]              "

## 2019-10-01 NOTE — PROGRESS NOTES
[facility]  Brant Martinez MD, PhD        LOS:  LOS: 2 days   Patient Name: Sada Le  Age/Sex: 48 y.o. female  : 1970  MRN: [id number]    Day of Service: 10/01/19   Length of Stay: 2  Encounter Provider: Brant Martinez MD, PhD  Place of Service: [facility] CARDIOLOGY  Patient Care Team:  Yodit Johnston MD as PCP - General (Family Medicine)    Subjective:     Chief Complaint: f/u anterior STEMI    Subjective: Patient is doing well, she reports feeling tired but denies CP or SOA.  She did have temp 100.9 at 4pm yesterday but has had no additional elevated temps.  WBC trending down.  Troponin trending down. Impella was removed yesterday.  No issues with groin sites. BP dropped with metoprolol which was stopped. I started aldactone with -120 systolic on my encounter and pt feeling well and creatinine ok today on review. Pt CP free. On RA doing well in ICU, Tele reviewed. NSR. No ventricular ectopy or tachyarrhythmia s/p reperfusion.     Current Medications:   Scheduled Meds:  aspirin 81 mg Oral Daily   atorvastatin 40 mg Oral Nightly   enoxaparin 40 mg Subcutaneous Q24H   ipratropium-albuterol 3 mL Nebulization 4x Daily - RT   magnesium sulfate 2 g Intravenous Once   metoprolol tartrate 12.5 mg Oral Q12H   Morphine 4 mg Intravenous Once   sodium chloride 10 mL Intravenous Q12H   ticagrelor 90 mg Oral BID     Continuous Infusions:     Allergies:  Allergies   Allergen Reactions   • Penicillins Rash   • Ciprofloxacin Rash   • Penicillin G Rash       Review of Systems   Constitution: Positive for weakness.        Generalized fatigue   HENT: Negative.    Eyes: Negative.    Cardiovascular: Negative.    Respiratory: Negative.    Endocrine: Negative.    Hematologic/Lymphatic: Negative.    Skin: Negative.    Musculoskeletal: Negative.    Gastrointestinal: Negative.    Genitourinary: Negative.    Psychiatric/Behavioral: Negative.    Allergic/Immunologic:  Negative.        Objective:     Temp:  [98.1 °F (36.7 °C)-100.9 °F (38.3 °C)] 98.9 °F (37.2 °C)  Heart Rate:  [] 77  Resp:  [16-18] 18  BP: ()/(51-87) 98/51  Arterial Line BP: (113-126)/(66-77) 113/66     Intake/Output Summary (Last 24 hours) at 10/1/2019 0952  Last data filed at 10/1/2019 0530  Gross per 24 hour   Intake 3010 ml   Output 2210 ml   Net 800 ml     Body mass index is 30.82 kg/m².      09/29/19 2015 09/30/19  0557 10/01/19  0400   Weight: 84.1 kg (185 lb 6.5 oz) 84.1 kg (185 lb 6.5 oz) 84 kg (185 lb 3 oz)         Physical Exam:  General Appearance:    Alert, cooperative, in no acute distress, comfortable on RA               Neck:   supple, no JVD, no bruits.    Lungs:     Clear to auscultation,respirations regular, even and                  unlabored    Heart:    Regular rhythm and normal rate, normal S1 and S2, no murmurs   Abdomen:     Normal bowel sounds, no masses, no organomegaly, soft        non-tender, non-distended, no guarding, no rebound                tenderness   Extremities:   Moves all extremities well, no edema, no cyanosis, no             Redness, bilat groin soft left groin bruised but no hematoma   Pulses:   Pulses palpable and equal bilaterally   Skin:   No bleeding, bruising or rash   Neurologic:   Awake, alert, oriented x3    Groin stable, no hematoma, pulses distally intact bilaterally.          Lab Review:   Results from last 7 days   Lab Units 10/01/19  0533 09/30/19  2133  09/30/19  0538   SODIUM mmol/L 131*  --   --  132*   POTASSIUM mmol/L 3.8  --   --  3.9   CHLORIDE mmol/L 102  --   --  101   CO2 mmol/L 18.0*  --   --  20.0*   BUN mg/dL 6*  --   --  14   CREATININE mg/dL 0.80 0.70   < > 0.70   GLUCOSE mg/dL 86  --   --  111*   CALCIUM mg/dL 7.7*  --   --  7.0*   AST (SGOT) U/L 149*  --   --  383*   ALT (SGPT) U/L 162*  --   --  249*    < > = values in this interval not displayed.     Results from last 7 days   Lab Units 10/01/19  0533 10/01/19  0022  09/30/19  2133 09/30/19  1833 09/30/19  1411 09/30/19  1410 09/30/19  0538 09/29/19  2133 09/29/19  1859   CK TOTAL U/L 1,085*  --  1,609*  --  2,555*  --  3,333*  --   --    TROPONIN I ng/mL 23.110* 22.790*  --  30.860*  --  41.950* 72.340* >73.000* 18.620*     Results from last 7 days   Lab Units 10/01/19  0533 09/30/19  0538   WBC 10*3/mm3 10.90* 13.10*   HEMOGLOBIN g/dL 10.6* 11.6*   HEMATOCRIT % 32.5* 34.5   PLATELETS 10*3/mm3 137* 172     Results from last 7 days   Lab Units 10/01/19  0533 09/30/19  0632   INR   --  1.08   APTT seconds 26.5*  --      Results from last 7 days   Lab Units 10/01/19  0533 09/30/19  0538   MAGNESIUM mg/dL 1.7* 2.2     Results from last 7 days   Lab Units 09/30/19  0538   CHOLESTEROL mg/dL 159   TRIGLYCERIDES mg/dL 163*   HDL CHOL mg/dL 40               Recent Radiology:  Imaging Results (most recent)     Procedure Component Value Units Date/Time    XR Chest 1 View [[id number]] Collected:  09/29/19 1956     Updated:  09/29/19 2159    Narrative:       Exam: Frontal chest    INDICATION: Dyspnea on exertion    COMPARISON: None    FINDINGS:  The lungs are clear and there are no effusions.  Poor inspiratory effort.    The heart size is normal.  Central catheter arising from the abdomen and terminates overlying the aorta.    Normal upper abdomen.      Impression:       There is some sort of catheter overlying the aorta, but below the diaphragm the catheters seem to the right of midline.  The exact course of this catheter is unclear.    No acute findings.    Electronically signed by:  Navdeep Maharaj M.D.    9/29/2019 7:58 PM          ECHOCARDIOGRAM:    Results for orders placed during the hospital encounter of 09/29/19   Adult Transthoracic Echo Limited W/ Cont if Necessary Per Protocol    Narrative Brant Martinez MD     9/29/2019 10:09 PM   Brant Martinez MD, PhD  Date of service 9-29-19    Procedure :   ultrasound-guided repositioning of Impella CP hemogenic    ventricular assist device in the CVICU.     Ultrasound was obtained in limited fashion demonstrating   malpositioning of the Impella CP device in the left ventricle   after transport to the CVICU.  The device was then repositioned   approximately 3 to 3.5 cm from the aortic valve annulus.  There   were no complications no blood loss no contrast usage.  Patient   remained heme dynamically and electrically stable.  There were no   low flow alarms or suction events.  The patient tolerated the   procedure well.  The device was resecured in place in the left   groin as well as to the left leg sterilely.  Knee immobilization   device was also placed.      No complications    Brant Martinez MD, PhD         I reviewed the patient's new clinical results.    EKG:      Assessment:       STEMI involving left anterior descending coronary artery (CMS/HCC)    Hypertension, benign    Overweight (BMI 25.0-29.9)    Tobacco use    CAD (coronary artery disease)    Severe sepsis (CMS/HCC)    1. LAD STEMI/CAD  - s/p PCI to the proximal / ostial to mid LAD with Xience 3.5 x 33 mm drug-eluting stent postdilated to 4.5 mm at 18 familia with good angiographic results.  - residual borderline lesions in the distal LAD which will be staged for FFR in future. Nonobstructive disease in the circumflex and RCA  -Dual platelet therapy aspirin and Brilinta uninterrupted 1 year  -High intensity statin therapy per guidelines - LFTs trending down, will plan to start statin therapy tomorrow with lipitor 20mg po daily  -[place] ACE inhibitor and Aldactone as able per blood pressures and renal function  - troponin trending down 72 9/30, down to 23     2. Cardiogenic shock / acute systolic HF   - improved, impella removed 9/29  - metop 12.5mg PO Q12hr initiated  -Estimated EF 20%, postop echo 30% with mid to apical septal akinesis, apical akinesis mid to apical anterior akinesis and apical lateral and apical inferior akinesis.  -Afterload reducers as  indicated- b/p borderline, will try to start low dose ACEI tommorrow  -will reassess most recent ECHO, may need to d/c with lifevest if EF remains < 35%     3. Essential hypertension  - controlled, b/p 110-120 systolic currently  - Hold lisinopril, afterload reduction as needed     4. Hyperlipidemia  - needs high intensity statin, however holding d/t elevated LFTs likely shock liver     5. Elevated LFTs, downtrend with resolution of jhypotension  - likely shock, continue to follow, avoid statin - will plan to start tomorrow     6. Insomnia, melatonin as needed, avoid oversedation    7. Hypokalemia / Hypomagnesium    Plan:   Continue DAPT with ASA / Brilinta for 1 year uninterrupted  Patient doing well, her b/p is borderline 90s systolic, mild JVD not volume deplete.  Will stop low dose BB, started aldactone 12.5 daily  Plan to start statin therapy tomorrow- LFTs trending down  Echo with EF 30-35%, will offer life vest for 40 days followed by echo and if no improvement then ICD referral.   Smoking cessation counseling provided  Kidney function stable, will continue to monitor, ensure electrolytes adequately replaced to ensure K at 4 and Mg at 2     D/w patient and daughter at bedside today with nursing staff present.       Brant Martinez MD, PhD  10/01/19  9:52 AM

## 2019-10-01 NOTE — PLAN OF CARE
Problem: Patient Care Overview  Goal: Plan of Care Review  Outcome: Ongoing (interventions implemented as appropriate)  S/p stent placement 9/29. No issues overnight. Increased mobility. IS encouraged.

## 2019-10-01 NOTE — PAYOR COMM NOTE
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 hyperlipidemia      STEMI   -s/p emergent cardiac catheterization with stent to LAD, additional nonobstructive lesions to distal LAD, RCA, and LCx that warrant future evaluation (09/29/19)  -on aspirin and Brilinta  -started on statin  -ACE-I on hold  -troponin trended down to 22, now 23; monitor  -cardiology following      Cardiac arrest with ROSC  -VF arrest in  transit with EMS, ACLS protocol with successful ROSC, short, awakened appropriately and did not require intubation  -VF arrest x 1 in cath lab, corrected by successful defibrillation, again awoke and was neurologically intact.  -Chest sore secondary to ACLS measures, at risk for pneumonia, aggressive pulmonary toileting, Incentive spirometry     Cardiogenic shock / acute systolic HF   -improved  -Impella removed 09/30/19  -started on metoprolol 12.5mg PO q12h on 10/01, but now hypotensive; hold BB for now  -estimated EF 20%, postop echo 30% with mid to apical septal akinesis, apical akinesis mid to apical anterior akinesis and apical lateral and apical inferior akinesis.  -per cardiology, afterload reducers as indicated- b/p borderline, will try to start low dose ACEI tommorrow  -per cardiology, may need Lifevest at discharge if EF remains < 35%; CM following      Sepsis  and Pneumonia ruled out by pulmonary     Acute bronchitis secondary to Rhinovirus  -supportive care  -droplet precautions      Elevated LFTs, likely shock liver  -Alk Phos 64, ,  on admission  -LFTs trending down, monitor    Acute hypocalcemia  -replace and monitor     Normocytic Anemia  -hgb trending down  -monitor H&H    Thrombocytopenia  -likely secondary to aspirin, Brilinta, and Lovenox  -monitor and trend      Essential Hypertension, chronic, controlled  -lisinopril on hold d/t borderline low BP  -patient now hypotensive after starting on metoprolol----will hold metoprolol and closely monitor BP     Hyperlipidemia  -started on statin   -monitor LFTs     Likely COPD, related to smoking history  -Duonebs scheduled and PRN  -pulmonary recommends outpatient PFTs and follow up in outpatient pulmonary clinic     Insomnia  -on melatonin     Tobacco dependency  -34 pack year smoking history; reports that she has quit on admission  -encourage cessation     Obesity  -BMI 30.82  -per PCP, patient lost 40 lb over the past year  -continued  lifestyle modifications encouraged    Hypokalemia / Hypomagnesium----replaced    VTE prophylaxis - Lovenox       Plan for disposition:  Home with home health once cleared by cardiology     Electronically signed by DARIEL Muir, 10/01/19, 1:37 PM.            Electronically signed by Damaris Willis APRN at 10/01/19 0692

## 2019-10-02 LAB
ALBUMIN SERPL-MCNC: 2.7 G/DL (ref 3.5–4.8)
ALBUMIN/GLOB SERPL: 1.2 G/DL (ref 1–1.7)
ALP SERPL-CCNC: 43 U/L (ref 32–91)
ALT SERPL W P-5'-P-CCNC: 104 U/L (ref 14–54)
ANION GAP SERPL CALCULATED.3IONS-SCNC: 10.9 MMOL/L (ref 5–15)
AST SERPL-CCNC: 75 U/L (ref 15–41)
BASOPHILS # BLD AUTO: 0 10*3/MM3 (ref 0–0.2)
BASOPHILS NFR BLD AUTO: 0.3 % (ref 0–1.5)
BH CV ECHO MEAS - % IVS THICK: 19 %
BH CV ECHO MEAS - % LVPW THICK: 60.6 %
BH CV ECHO MEAS - AO ROOT AREA (BSA CORRECTED): 2.3
BH CV ECHO MEAS - AO ROOT AREA: 14.6 CM^2
BH CV ECHO MEAS - AO ROOT DIAM: 4.3 CM
BH CV ECHO MEAS - BSA(HAYCOCK): 2 M^2
BH CV ECHO MEAS - BSA: 1.9 M^2
BH CV ECHO MEAS - BZI_BMI: 30.8 KILOGRAMS/M^2
BH CV ECHO MEAS - BZI_METRIC_HEIGHT: 165.1 CM
BH CV ECHO MEAS - BZI_METRIC_WEIGHT: 83.9 KG
BH CV ECHO MEAS - EDV(CUBED): 114.3 ML
BH CV ECHO MEAS - EDV(MOD-SP2): 93.8 ML
BH CV ECHO MEAS - EDV(MOD-SP4): 100.4 ML
BH CV ECHO MEAS - EDV(TEICH): 110.3 ML
BH CV ECHO MEAS - EF(CUBED): 67.4 %
BH CV ECHO MEAS - EF(MOD-BP): 34 %
BH CV ECHO MEAS - EF(MOD-SP2): 35.8 %
BH CV ECHO MEAS - EF(MOD-SP4): 45.2 %
BH CV ECHO MEAS - EF(TEICH): 58.9 %
BH CV ECHO MEAS - ESV(CUBED): 37.2 ML
BH CV ECHO MEAS - ESV(MOD-SP2): 60.2 ML
BH CV ECHO MEAS - ESV(MOD-SP4): 55 ML
BH CV ECHO MEAS - ESV(TEICH): 45.4 ML
BH CV ECHO MEAS - FS: 31.2 %
BH CV ECHO MEAS - IVS/LVPW: 1.3
BH CV ECHO MEAS - IVSD: 1.1 CM
BH CV ECHO MEAS - IVSS: 1.3 CM
BH CV ECHO MEAS - LV DIASTOLIC VOL/BSA (35-75): 52.5 ML/M^2
BH CV ECHO MEAS - LV MASS(C)D: 168.5 GRAMS
BH CV ECHO MEAS - LV MASS(C)DI: 88.1 GRAMS/M^2
BH CV ECHO MEAS - LV MASS(C)S: 151.3 GRAMS
BH CV ECHO MEAS - LV MASS(C)SI: 79.1 GRAMS/M^2
BH CV ECHO MEAS - LV SYSTOLIC VOL/BSA (12-30): 28.7 ML/M^2
BH CV ECHO MEAS - LVIDD: 4.9 CM
BH CV ECHO MEAS - LVIDS: 3.3 CM
BH CV ECHO MEAS - LVOT AREA: 4.2 CM^2
BH CV ECHO MEAS - LVOT DIAM: 2.3 CM
BH CV ECHO MEAS - LVPWD: 0.84 CM
BH CV ECHO MEAS - LVPWS: 1.4 CM
BH CV ECHO MEAS - RVDD: 2.3 CM
BH CV ECHO MEAS - SI(CUBED): 40.3 ML/M^2
BH CV ECHO MEAS - SI(MOD-SP2): 17.6 ML/M^2
BH CV ECHO MEAS - SI(MOD-SP4): 23.7 ML/M^2
BH CV ECHO MEAS - SI(TEICH): 33.9 ML/M^2
BH CV ECHO MEAS - SV(CUBED): 77.1 ML
BH CV ECHO MEAS - SV(MOD-SP2): 33.6 ML
BH CV ECHO MEAS - SV(MOD-SP4): 45.4 ML
BH CV ECHO MEAS - SV(TEICH): 64.9 ML
BILIRUB SERPL-MCNC: 0.7 MG/DL (ref 0.3–1.2)
BUN BLD-MCNC: 5 MG/DL (ref 8–20)
BUN/CREAT SERPL: 7.1 (ref 5.4–26.2)
CA-I SERPL ISE-MCNC: 1.13 MMOL/L (ref 1.2–1.3)
CALCIUM SPEC-SCNC: 7.4 MG/DL (ref 8.9–10.3)
CHLORIDE SERPL-SCNC: 104 MMOL/L (ref 101–111)
CO2 SERPL-SCNC: 22 MMOL/L (ref 22–32)
CREAT BLD-MCNC: 0.7 MG/DL (ref 0.4–1)
DEPRECATED RDW RBC AUTO: 43.8 FL (ref 37–54)
EOSINOPHIL # BLD AUTO: 0.1 10*3/MM3 (ref 0–0.4)
EOSINOPHIL NFR BLD AUTO: 1.8 % (ref 0.3–6.2)
ERYTHROCYTE [DISTWIDTH] IN BLOOD BY AUTOMATED COUNT: 13.3 % (ref 12.3–15.4)
GFR SERPL CREATININE-BSD FRML MDRD: 89 ML/MIN/1.73
GLOBULIN UR ELPH-MCNC: 2.2 GM/DL (ref 2.5–3.8)
GLUCOSE BLD-MCNC: 93 MG/DL (ref 65–99)
HCT VFR BLD AUTO: 26.3 % (ref 34–46.6)
HGB BLD-MCNC: 8.9 G/DL (ref 12–15.9)
LYMPHOCYTES # BLD AUTO: 1.9 10*3/MM3 (ref 0.7–3.1)
LYMPHOCYTES NFR BLD AUTO: 24.7 % (ref 19.6–45.3)
MAGNESIUM SERPL-MCNC: 1.9 MG/DL (ref 1.8–2.5)
MCH RBC QN AUTO: 31.7 PG (ref 26.6–33)
MCHC RBC AUTO-ENTMCNC: 34 G/DL (ref 31.5–35.7)
MCV RBC AUTO: 93.1 FL (ref 79–97)
MONOCYTES # BLD AUTO: 0.7 10*3/MM3 (ref 0.1–0.9)
MONOCYTES NFR BLD AUTO: 9.2 % (ref 5–12)
NEUTROPHILS # BLD AUTO: 5 10*3/MM3 (ref 1.7–7)
NEUTROPHILS NFR BLD AUTO: 64 % (ref 42.7–76)
NRBC BLD AUTO-RTO: 0 /100 WBC (ref 0–0.2)
PHOSPHATE SERPL-MCNC: 3 MG/DL (ref 2.4–4.7)
PLATELET # BLD AUTO: 104 10*3/MM3 (ref 140–450)
PMV BLD AUTO: 8.9 FL (ref 6–12)
POTASSIUM BLD-SCNC: 3.9 MMOL/L (ref 3.6–5.1)
PROT SERPL-MCNC: 4.9 G/DL (ref 6.1–7.9)
RBC # BLD AUTO: 2.82 10*6/MM3 (ref 3.77–5.28)
SODIUM BLD-SCNC: 133 MMOL/L (ref 136–144)
TROPONIN I SERPL-MCNC: 10.02 NG/ML (ref 0–0.03)
TROPONIN I SERPL-MCNC: 11.44 NG/ML (ref 0–0.03)
TROPONIN I SERPL-MCNC: 15.15 NG/ML (ref 0–0.03)
TROPONIN I SERPL-MCNC: 8.46 NG/ML (ref 0–0.03)
WBC NRBC COR # BLD: 7.9 10*3/MM3 (ref 3.4–10.8)

## 2019-10-02 PROCEDURE — 99233 SBSQ HOSP IP/OBS HIGH 50: CPT | Performed by: INTERNAL MEDICINE

## 2019-10-02 PROCEDURE — 93005 ELECTROCARDIOGRAM TRACING: CPT | Performed by: NURSE PRACTITIONER

## 2019-10-02 PROCEDURE — 85025 COMPLETE CBC W/AUTO DIFF WBC: CPT | Performed by: NURSE PRACTITIONER

## 2019-10-02 PROCEDURE — 94799 UNLISTED PULMONARY SVC/PX: CPT

## 2019-10-02 PROCEDURE — 84484 ASSAY OF TROPONIN QUANT: CPT | Performed by: INTERNAL MEDICINE

## 2019-10-02 PROCEDURE — 82330 ASSAY OF CALCIUM: CPT | Performed by: NURSE PRACTITIONER

## 2019-10-02 PROCEDURE — 25010000002 ENOXAPARIN PER 10 MG: Performed by: INTERNAL MEDICINE

## 2019-10-02 PROCEDURE — 80053 COMPREHEN METABOLIC PANEL: CPT | Performed by: INTERNAL MEDICINE

## 2019-10-02 PROCEDURE — 25010000002 CALCIUM GLUCONATE PER 10 ML: Performed by: NURSE PRACTITIONER

## 2019-10-02 PROCEDURE — 93010 ELECTROCARDIOGRAM REPORT: CPT | Performed by: INTERNAL MEDICINE

## 2019-10-02 PROCEDURE — 83735 ASSAY OF MAGNESIUM: CPT | Performed by: INTERNAL MEDICINE

## 2019-10-02 PROCEDURE — 84100 ASSAY OF PHOSPHORUS: CPT | Performed by: NURSE PRACTITIONER

## 2019-10-02 RX ORDER — DIAZEPAM 5 MG/1
5 TABLET ORAL EVERY 8 HOURS PRN
Status: DISCONTINUED | OUTPATIENT
Start: 2019-10-02 | End: 2019-10-07 | Stop reason: HOSPADM

## 2019-10-02 RX ORDER — BENZONATATE 100 MG/1
200 CAPSULE ORAL 3 TIMES DAILY PRN
Status: DISCONTINUED | OUTPATIENT
Start: 2019-10-02 | End: 2019-10-07 | Stop reason: HOSPADM

## 2019-10-02 RX ADMIN — NYSTATIN 15 ML: 100000 SUSPENSION ORAL at 21:03

## 2019-10-02 RX ADMIN — BENZONATATE 200 MG: 100 CAPSULE, LIQUID FILLED ORAL at 05:48

## 2019-10-02 RX ADMIN — IPRATROPIUM BROMIDE AND ALBUTEROL SULFATE 3 ML: .5; 3 SOLUTION RESPIRATORY (INHALATION) at 06:14

## 2019-10-02 RX ADMIN — TRAMADOL HYDROCHLORIDE 50 MG: 50 TABLET, COATED ORAL at 15:24

## 2019-10-02 RX ADMIN — SPIRONOLACTONE 12.5 MG: 25 TABLET ORAL at 09:41

## 2019-10-02 RX ADMIN — ATORVASTATIN CALCIUM 40 MG: 40 TABLET, FILM COATED ORAL at 21:03

## 2019-10-02 RX ADMIN — Medication 10 ML: at 09:45

## 2019-10-02 RX ADMIN — ENOXAPARIN SODIUM 40 MG: 40 INJECTION SUBCUTANEOUS at 17:18

## 2019-10-02 RX ADMIN — ASPIRIN 81 MG 81 MG: 81 TABLET ORAL at 09:41

## 2019-10-02 RX ADMIN — ACETAMINOPHEN 650 MG: 325 TABLET ORAL at 12:11

## 2019-10-02 RX ADMIN — ACETAMINOPHEN 650 MG: 325 TABLET ORAL at 22:30

## 2019-10-02 RX ADMIN — DIAZEPAM 5 MG: 5 TABLET ORAL at 17:14

## 2019-10-02 RX ADMIN — IPRATROPIUM BROMIDE AND ALBUTEROL SULFATE 3 ML: .5; 3 SOLUTION RESPIRATORY (INHALATION) at 11:41

## 2019-10-02 RX ADMIN — BENZONATATE 200 MG: 100 CAPSULE, LIQUID FILLED ORAL at 21:14

## 2019-10-02 RX ADMIN — TRAMADOL HYDROCHLORIDE 50 MG: 50 TABLET, COATED ORAL at 09:41

## 2019-10-02 RX ADMIN — CALCIUM GLUCONATE 2 G: 98 INJECTION, SOLUTION INTRAVENOUS at 04:12

## 2019-10-02 RX ADMIN — TICAGRELOR 90 MG: 90 TABLET ORAL at 21:03

## 2019-10-02 RX ADMIN — IPRATROPIUM BROMIDE AND ALBUTEROL SULFATE 3 ML: .5; 3 SOLUTION RESPIRATORY (INHALATION) at 15:43

## 2019-10-02 RX ADMIN — TICAGRELOR 90 MG: 90 TABLET ORAL at 09:41

## 2019-10-02 RX ADMIN — MAGNESIUM HYDROXIDE 10 ML: 2400 SUSPENSION ORAL at 09:41

## 2019-10-02 RX ADMIN — IPRATROPIUM BROMIDE AND ALBUTEROL SULFATE 3 ML: .5; 3 SOLUTION RESPIRATORY (INHALATION) at 20:02

## 2019-10-02 RX ADMIN — POTASSIUM CHLORIDE 20 MEQ: 1500 TABLET, EXTENDED RELEASE ORAL at 09:41

## 2019-10-02 RX ADMIN — Medication 10 ML: at 21:14

## 2019-10-02 NOTE — PROGRESS NOTES
"[facility]        LOS:  LOS: 3 days   Patient Name: Sada Le  Age/Sex: 48 y.o. female  : 1970  MRN: [id number]    Day of Service: 10/02/19   Length of Stay: 3  Encounter Provider: Amos Olivia MD, PhD  Place of Service: [facility]  Patient Care Team:  Yodit Johnston MD as PCP - General (Family Medicine)    Subjective:     Chief Complaint: f/u anterior STEMI    Subjective: patient reports she does not feel well today.  She has been up out of bed, she has ambulated several times yesterday and today.  She reports feeling chilled and achy.  She states her mouth and tongue hurt as well as her left arm.  She \"feels sore\". She denies chest pain and SOA.  She did have some hypotension yesterday, she received IV fluids.  Her metoprolol has been discontinued.  She has been initiated on Spironolactone. She also reports she has not slept in 2 days rarely due to oral mucositis from biting her tongue and anxiety (feeling shaky)    Current Medications:   Scheduled Meds:  aspirin 81 mg Oral Daily   atorvastatin 40 mg Oral Nightly   enoxaparin 40 mg Subcutaneous Q24H   ipratropium-albuterol 3 mL Nebulization 4x Daily - RT   Morphine 4 mg Intravenous Once   potassium chloride 20 mEq Oral Daily   sodium chloride 10 mL Intravenous Q12H   spironolactone 12.5 mg Oral Daily   spironolactone 12.5 mg Oral Once   ticagrelor 90 mg Oral BID     Continuous Infusions:     Allergies:  Allergies   Allergen Reactions   • Penicillins Rash   • Ciprofloxacin Rash   • Penicillin G Rash     Review of Symptoms:  Constitutional: Patient afebrile no chills or unexpected weight changes but feels anxious shaky  Respiratory: No cough, no wheezing or dyspnea  Cardiovascular: No chest pain per se but does have chest wall pain from previous defibrillation, but no palpitations, dyspnea, orthopnea and no edema  Gastrointestinal: No nausea, vomiting, constipation or diarrhea.  No " melena or dark stools  Oral: Complaining of lateral tongue pain    All other systems reviewed and are negative            Objective:     Temp:  [98 °F (36.7 °C)-100.6 °F (38.1 °C)] 98 °F (36.7 °C)  Heart Rate:  [63-87] 70  Resp:  [14-15] 14  BP: ()/(46-75) 106/72     Intake/Output Summary (Last 24 hours) at 10/2/2019 1139  Last data filed at 10/2/2019 0441  Gross per 24 hour   Intake 1767 ml   Output 1650 ml   Net 117 ml     Body mass index is 29.9 kg/m².      09/30/19  0557 10/01/19  0400 10/02/19  0400   Weight: 84.1 kg (185 lb 6.5 oz) 84 kg (185 lb 3 oz) 81.5 kg (179 lb 10.8 oz)       Physical exam  Constitutional: well-nourished, and appears stated age in no acute distress  PERRL: Conjunctiva clear, no pallor, anicteric  HENMT: normocephalic, normal dentition, no cyanosis or pallor  Neck:no bruits, or thrills and bilateral normal carotid upstroke. Normal jugular venous pressure  Cardiovascular: No parasternal heaves an non-displaced focal PMI. Normal rate and rhythm: no rub, gallop, murmur or click and normal S1 and S2; no lower or upper extremity edema.   Lungs: unlabored, no wheezing with no rales or rhonchi on auscultation.  Extremities: Warm, no clubbing, cyanosis. Full and equal peripheral pulses in extremities with no bruits appreciated.   Abdomen: soft, non-tender, non-distended  Musculoskeletal: no joint tenderness or swelling and no erythema  Skin: Warm and dry, non-erythematous   Neuro:alert and normal affect. Oriented to time, place and person.       Lab Review:   Results from last 7 days   Lab Units 10/02/19  0116 10/01/19  1235 10/01/19  0533   SODIUM mmol/L 133*  --  131*   POTASSIUM mmol/L 3.9  --  3.8   CHLORIDE mmol/L 104  --  102   CO2 mmol/L 22.0  --  18.0*   BUN mg/dL 5*  --  6*   CREATININE mg/dL 0.70 0.70 0.80   GLUCOSE mg/dL 93  --  86   CALCIUM mg/dL 7.4*  --  7.7*   AST (SGOT) U/L 75*  --  149*   ALT (SGPT) U/L 104*  --  162*     Results from last 7 days   Lab Units 10/02/19  0554  10/02/19  0116 10/01/19  1828 10/01/19  1235 10/01/19  0533 10/01/19  0022 09/30/19  2133 09/30/19  1833 09/30/19  1411 09/30/19  1410 09/30/19  0538 09/29/19  2133 09/29/19  1859   CK TOTAL U/L  --   --   --   --  1,085*  --  1,609*  --  2,555*  --  3,333*  --   --    TROPONIN I ng/mL 11.440* 15.150* 15.810* 17.050* 23.110* 22.790*  --  30.860*  --  41.950* 72.340* >73.000* 18.620*     Results from last 7 days   Lab Units 10/02/19  0116 10/01/19  0533   WBC 10*3/mm3 7.90 10.90*   HEMOGLOBIN g/dL 8.9* 10.6*   HEMATOCRIT % 26.3* 32.5*   PLATELETS 10*3/mm3 104* 137*     Results from last 7 days   Lab Units 10/01/19  0533 09/30/19  0632   INR   --  1.08   APTT seconds 26.5*  --      Results from last 7 days   Lab Units 10/02/19  0116 10/01/19  0533   MAGNESIUM mg/dL 1.9 1.7*     Results from last 7 days   Lab Units 09/30/19  0538   CHOLESTEROL mg/dL 159   TRIGLYCERIDES mg/dL 163*   HDL CHOL mg/dL 40               Recent Radiology:  Imaging Results (most recent)     Procedure Component Value Units Date/Time    XR Chest 1 View [[id number]] Collected:  10/01/19 1418     Updated:  10/01/19 1421    Narrative:       DATE OF EXAM:  10/1/2019 2:14 PM     PROCEDURE:  XR CHEST 1 VW-     INDICATIONS:  SOA; I21.02-ST elevation (STEMI) myocardial infarction involving left  anterior descending coronary artery       COMPARISON:  AP portable chest 09/29/2019.     TECHNIQUE:   Single radiographic view of the chest was obtained.     FINDINGS:  Mild medial left basilar and right infrahilar linear opacities.  Persistent subsegmental atelectasis or peribronchial lower infiltrates.  Stable mild cardiac enlargement. No pleural effusion or pneumothorax.       Impression:       Linear opacities in the right infrahilar region medial left lung base  favored to represent atelectasis, although subtle peribronchiolar  infiltrates or excluded.     Electronically Signed By-Dr. Sarita Pham MD On:10/1/2019 2:19 PM  This report was finalized on  [id number] by Dr. Sarita Pham MD.    XR Chest 1 View [[id number]] Collected:  09/29/19 1956     Updated:  09/29/19 2159    Narrative:       Exam: Frontal chest    INDICATION: Dyspnea on exertion    COMPARISON: None    FINDINGS:  The lungs are clear and there are no effusions.  Poor inspiratory effort.    The heart size is normal.  Central catheter arising from the abdomen and terminates overlying the aorta.    Normal upper abdomen.      Impression:       There is some sort of catheter overlying the aorta, but below the diaphragm the catheters seem to the right of midline.  The exact course of this catheter is unclear.    No acute findings.    Electronically signed by:  Navdeep Maharaj M.D.    9/29/2019 7:58 PM          ECHOCARDIOGRAM:    Results for orders placed during the hospital encounter of 09/29/19   Adult Transthoracic Echo Limited W/ Cont if Necessary Per Protocol    Narrative Brant Martinez MD     9/29/2019 10:09 PM   Brant Martinez MD, PhD  Date of service 9-29-19    Procedure :   ultrasound-guided repositioning of Impella CP hemogenic   ventricular assist device in the CVICU.     Ultrasound was obtained in limited fashion demonstrating   malpositioning of the Impella CP device in the left ventricle   after transport to the CVICU.  The device was then repositioned   approximately 3 to 3.5 cm from the aortic valve annulus.  There   were no complications no blood loss no contrast usage.  Patient   remained heme dynamically and electrically stable.  There were no   low flow alarms or suction events.  The patient tolerated the   procedure well.  The device was resecured in place in the left   groin as well as to the left leg sterilely.  Knee immobilization   device was also placed.      No complications    Brant Martinez MD, PhD         I reviewed the patient's new clinical results.    EKG:      Assessment:       STEMI involving left anterior descending coronary artery  (CMS/HCC)    Hypertension, benign    Insomnia, organic    Obesity (BMI 30-39.9)    Tobacco dependency    Mixed hyperlipidemia    CAD (coronary artery disease)    Acute bronchitis due to Rhinovirus    Elevated LFTs    Hypocalcemia    Normocytic anemia    Thrombocytopenia (CMS/HCC)    1. LAD STEMI/CAD  - s/p PCI to the proximal / ostial to mid LAD with Xience 3.5 x 33 mm drug-eluting stent postdilated to 4.5 mm at 18 familia with good angiographic results.  - residual borderline lesions in the distal LAD which will be staged for FFR in future. Nonobstructive disease in the circumflex and RCA  -Dual platelet therapy aspirin and Brilinta uninterrupted 1 year  -High intensity statin therapy per guidelines - LFTs trending down, on statin therapy  -ACEI and metop currently held d/t borderline b/p, on spironolactone  - troponin trending down 11.4 10/2     2. Cardiogenic shock / acute systolic HF   - improved, impella removed 9/29  - metop stopped yesterday d/t hypotension, holding ACEI, on spironolactone  -Estimated EF 20%, postop echo 30% with mid to apical septal akinesis, apical akinesis mid to apical anterior akinesis and apical lateral and apical inferior akinesis.  -will reassess most recent ECHO, may need to d/c with lifevest if EF remains < 35%     3. Cardiac Arrest with  ROSC    4. H/o Essential hypertension however borderline b/p currently  - holding BB and ACEI, b/p has been borderline     4. Hyperlipidemia  - needs high intensity statin, initially held d/t shock liver, LFTs trending down, on statin therapy     5. Elevated LFTs  - likely shock, trended down     6. Insomnia, melatonin as needed, avoid oversedation     7. Hypokalemia / Hypomagnesium  - replace to maintain K at 4 and Mg at 2    8. Acute bronchitis and Rhinovirus    9. Possible COPD, smoking history  - pulm following, recommends PFTs and outpt f/u   - on duonebs    Plan:   Continue DAPT uninterrupted for 1 year- I am awaiting peer to peer review call  from insurance as they are currently denying coverage for Brilinta despite FDA guidelines stating brilinta over plavix.    Continue statin therapy  On spironolactone, had to stop metop 12.5mg PO BID yesterday d/t hypotension, not on ACEI d/t borderline b/p  I personally reviewed the echo from 2 days ago which showed ejection fraction of approximately 35%.  Get improvement from presenting cardia genic shock ejection fraction of approximately 15%.  [facility] to [facility].  Pt is high risk for PNA, continue pulm. Toilet, IS, up out of bed, cough / deep breath  For her mouth pain I am going to prescribe a benzocaine wash.  For her anxiety am going to prescribe Valium 5 mg every 8 as needed.    She is stable for transfer to the floor.    Greater than 30 minutes of face-to-face/floor time was spent with the patient and family and nursing coordinating plan and patient management.

## 2019-10-02 NOTE — PLAN OF CARE
Problem: Skin Injury Risk (Adult)  Intervention: Prevent/Manage Excess Moisture   10/01/19 2000 10/02/19 0000   Hygiene Care   Bathing/Skin Care bedtime care --    Skin Interventions   Skin Protection --  adhesive use limited;incontinence pads utilized;pulse oximeter probe site changed;skin-to-device areas padded;skin-to-skin areas padded;transparent dressing maintained;tubing/devices free from skin contact;sacral silicone foam dressing in place     Intervention: Maintain Head of Bed Elevation Less Than 30 Degrees as Tolerated   10/02/19 0000   Positioning   Head of Bed (HOB) HOB at 20-30 degrees     Intervention: Prevent/Minimize Shear/Friction Injuries   10/02/19 0000   Skin Interventions   Pressure Reduction Devices foam padding utilized;positioning supports utilized;pressure-redistributing mattress utilized;specialty bed utilized   Positioning   Positioning/Transfer Devices pillows     Intervention: Prevent or Minimize Pressure   10/02/19 0000   Skin Interventions   Pressure Reduction Techniques frequent weight shift encouraged;weight shift assistance provided;pressure points protected   Positioning   Body Position turned  (turns self)       Goal: Identify Related Risk Factors and Signs and Symptoms  Outcome: Ongoing (interventions implemented as appropriate)   10/02/19 0339   Skin Injury Risk (Adult)   Related Risk Factors (Skin Injury Risk) critical care admission     Goal: Skin Health and Integrity  Outcome: Ongoing (interventions implemented as appropriate)   10/02/19 0339   Skin Injury Risk (Adult)   Skin Health and Integrity making progress toward outcome       Problem: Patient Care Overview  Goal: Plan of Care Review  Outcome: Ongoing (interventions implemented as appropriate)   10/01/19 2000 10/02/19 0339   Coping/Psychosocial   Plan of Care Reviewed With patient;daughter --    Plan of Care Review   Progress --  improving     Goal: Individualization and Mutuality  Outcome: Ongoing (interventions  implemented as appropriate)    Goal: Discharge Needs Assessment  Outcome: Ongoing (interventions implemented as appropriate)    Goal: Interprofessional Rounds/Family Conf  Outcome: Ongoing (interventions implemented as appropriate)   10/02/19 0339   Interdisciplinary Rounds/Family Conf   Summary Pt receiving fluid and electrolyte therapy overnight. Will cont to monitor.    Participants nursing;patient;family;advanced practice nurse       Problem: Pain, Chronic (Adult)  Intervention: Manage Persistent Pain Analgesia   10/02/19 0000 10/02/19 0339   Promote Effective Bowel Elimination   Bowel Intervention --  adequate fluid intake promoted;ambulation promoted   Safety Management   Medication Review/Management medications reviewed;high risk medications identified --      Intervention: Support Psychosocial Response to Persistent Pain   10/02/19 0000   Coping/Psychosocial Interventions   Supportive Measures relaxation techniques promoted   Psychosocial Support   Diversional Activities smartphone     Intervention: Mutually Develop/Implement Persistent Pain Management Plan   10/02/19 0000 10/02/19 0339   Promote Oxygenation/Perfusion   Pain Management Interventions --  see MAR;quiet environment facilitated   Cognitive Interventions   Sensory Stimulation Regulation care clustered;lighting decreased;quiet environment promoted --        Goal: Identify Related Risk Factors and Signs and Symptoms  Outcome: Ongoing (interventions implemented as appropriate)   10/02/19 0339   Pain, Chronic (Adult)   Related Risk Factors (Chronic Pain) nerve injury   Signs and Symptoms (Chronic Pain) verbalization of pain/discomfort for a prolonged time period     Goal: Acceptable Pain/Comfort Level and Functional Ability  Outcome: Ongoing (interventions implemented as appropriate)   10/02/19 0339   Pain, Chronic (Adult)   Acceptable Pain/Comfort Level and Functional Ability making progress toward outcome       Problem: Cardiac: ACS (Acute Coronary  Syndrome) (Adult)  Goal: Signs and Symptoms of Listed Potential Problems Will be Absent, Minimized or Managed (Cardiac: ACS)  Outcome: Ongoing (interventions implemented as appropriate)   10/02/19 0338   Goal/Outcome Evaluation   Problems Assessed (Acute Coronary Syndrome) all   Problems Present (Acute Coronary Syn) situational response
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
## 2019-10-03 ENCOUNTER — HOSPITAL ENCOUNTER (INPATIENT)
Dept: CARDIOLOGY | Facility: HOSPITAL | Age: 49
Discharge: HOME | End: 2019-10-03

## 2019-10-03 LAB
ALBUMIN SERPL-MCNC: 3.2 G/DL (ref 3.5–4.8)
ALBUMIN/GLOB SERPL: 1.1 G/DL (ref 1–1.7)
ALP SERPL-CCNC: 52 U/L (ref 32–91)
ALT SERPL W P-5'-P-CCNC: 88 U/L (ref 14–54)
ANION GAP SERPL CALCULATED.3IONS-SCNC: 14.3 MMOL/L (ref 5–15)
AST SERPL-CCNC: 56 U/L (ref 15–41)
BILIRUB SERPL-MCNC: 0.6 MG/DL (ref 0.3–1.2)
BUN BLD-MCNC: 6 MG/DL (ref 8–20)
BUN/CREAT SERPL: 7.5 (ref 5.4–26.2)
CA-I SERPL ISE-MCNC: 1.17 MMOL/L (ref 1.2–1.3)
CALCIUM SPEC-SCNC: 8.3 MG/DL (ref 8.9–10.3)
CHLORIDE SERPL-SCNC: 103 MMOL/L (ref 101–111)
CO2 SERPL-SCNC: 25 MMOL/L (ref 22–32)
CREAT BLD-MCNC: 0.8 MG/DL (ref 0.4–1)
DEPRECATED RDW RBC AUTO: 42.4 FL (ref 37–54)
EOSINOPHIL # BLD MANUAL: 0.37 10*3/MM3 (ref 0–0.4)
EOSINOPHIL NFR BLD MANUAL: 5 % (ref 0.3–6.2)
ERYTHROCYTE [DISTWIDTH] IN BLOOD BY AUTOMATED COUNT: 12.9 % (ref 12.3–15.4)
GFR SERPL CREATININE-BSD FRML MDRD: 76 ML/MIN/1.73
GLOBULIN UR ELPH-MCNC: 2.8 GM/DL (ref 2.5–3.8)
GLUCOSE BLD-MCNC: 93 MG/DL (ref 65–99)
HCT VFR BLD AUTO: 30.2 % (ref 34–46.6)
HGB BLD-MCNC: 10.2 G/DL (ref 12–15.9)
LARGE PLATELETS: ABNORMAL
LYMPHOCYTES # BLD MANUAL: 1.55 10*3/MM3 (ref 0.7–3.1)
LYMPHOCYTES NFR BLD MANUAL: 11 % (ref 5–12)
LYMPHOCYTES NFR BLD MANUAL: 21 % (ref 19.6–45.3)
MAGNESIUM SERPL-MCNC: 2.2 MG/DL (ref 1.8–2.5)
MCH RBC QN AUTO: 31.6 PG (ref 26.6–33)
MCHC RBC AUTO-ENTMCNC: 33.8 G/DL (ref 31.5–35.7)
MCV RBC AUTO: 93.6 FL (ref 79–97)
METAMYELOCYTES NFR BLD MANUAL: 2 % (ref 0–0)
MONOCYTES # BLD AUTO: 0.81 10*3/MM3 (ref 0.1–0.9)
NEUTROPHILS # BLD AUTO: 4.51 10*3/MM3 (ref 1.7–7)
NEUTROPHILS NFR BLD MANUAL: 57 % (ref 42.7–76)
NEUTS BAND NFR BLD MANUAL: 4 % (ref 0–5)
PHOSPHATE SERPL-MCNC: 4.8 MG/DL (ref 2.4–4.7)
PLATELET # BLD AUTO: 141 10*3/MM3 (ref 140–450)
PMV BLD AUTO: 9.4 FL (ref 6–12)
POTASSIUM BLD-SCNC: 4.3 MMOL/L (ref 3.6–5.1)
PROT SERPL-MCNC: 6 G/DL (ref 6.1–7.9)
RBC # BLD AUTO: 3.22 10*6/MM3 (ref 3.77–5.28)
RBC MORPH BLD: NORMAL
SCAN SLIDE: NORMAL
SODIUM BLD-SCNC: 138 MMOL/L (ref 136–144)
TROPONIN I SERPL-MCNC: 3.31 NG/ML (ref 0–0.03)
TROPONIN I SERPL-MCNC: 3.6 NG/ML (ref 0–0.03)
TROPONIN I SERPL-MCNC: 5.99 NG/ML (ref 0–0.03)
TROPONIN I SERPL-MCNC: 9.15 NG/ML (ref 0–0.03)
WBC MORPH BLD: NORMAL
WBC NRBC COR # BLD: 7.4 10*3/MM3 (ref 3.4–10.8)

## 2019-10-03 PROCEDURE — 85007 BL SMEAR W/DIFF WBC COUNT: CPT | Performed by: NURSE PRACTITIONER

## 2019-10-03 PROCEDURE — 99232 SBSQ HOSP IP/OBS MODERATE 35: CPT | Performed by: INTERNAL MEDICINE

## 2019-10-03 PROCEDURE — 94799 UNLISTED PULMONARY SVC/PX: CPT

## 2019-10-03 PROCEDURE — 82330 ASSAY OF CALCIUM: CPT | Performed by: NURSE PRACTITIONER

## 2019-10-03 PROCEDURE — 85025 COMPLETE CBC W/AUTO DIFF WBC: CPT | Performed by: NURSE PRACTITIONER

## 2019-10-03 PROCEDURE — 99233 SBSQ HOSP IP/OBS HIGH 50: CPT | Performed by: INTERNAL MEDICINE

## 2019-10-03 PROCEDURE — 25010000002 ENOXAPARIN PER 10 MG: Performed by: INTERNAL MEDICINE

## 2019-10-03 PROCEDURE — 93306 TTE W/DOPPLER COMPLETE: CPT

## 2019-10-03 PROCEDURE — 84100 ASSAY OF PHOSPHORUS: CPT | Performed by: NURSE PRACTITIONER

## 2019-10-03 PROCEDURE — 93005 ELECTROCARDIOGRAM TRACING: CPT | Performed by: NURSE PRACTITIONER

## 2019-10-03 PROCEDURE — 83735 ASSAY OF MAGNESIUM: CPT | Performed by: INTERNAL MEDICINE

## 2019-10-03 PROCEDURE — 25010000002 FUROSEMIDE PER 20 MG: Performed by: INTERNAL MEDICINE

## 2019-10-03 PROCEDURE — 84484 ASSAY OF TROPONIN QUANT: CPT | Performed by: INTERNAL MEDICINE

## 2019-10-03 PROCEDURE — 93010 ELECTROCARDIOGRAM REPORT: CPT | Performed by: INTERNAL MEDICINE

## 2019-10-03 PROCEDURE — 0399T HC MYOCARDL STRAIN IMAG QUAN ASSMT PER SESS: CPT

## 2019-10-03 PROCEDURE — 80053 COMPREHEN METABOLIC PANEL: CPT | Performed by: INTERNAL MEDICINE

## 2019-10-03 RX ORDER — TRAMADOL HYDROCHLORIDE 50 MG/1
25 TABLET ORAL EVERY 6 HOURS PRN
Status: DISCONTINUED | OUTPATIENT
Start: 2019-10-03 | End: 2019-10-07 | Stop reason: HOSPADM

## 2019-10-03 RX ORDER — FUROSEMIDE 20 MG/1
20 TABLET ORAL DAILY
Status: DISCONTINUED | OUTPATIENT
Start: 2019-10-04 | End: 2019-10-04

## 2019-10-03 RX ORDER — SPIRONOLACTONE 25 MG/1
25 TABLET ORAL DAILY
Status: DISCONTINUED | OUTPATIENT
Start: 2019-10-04 | End: 2019-10-07 | Stop reason: HOSPADM

## 2019-10-03 RX ORDER — FUROSEMIDE 10 MG/ML
40 INJECTION INTRAMUSCULAR; INTRAVENOUS ONCE
Status: COMPLETED | OUTPATIENT
Start: 2019-10-03 | End: 2019-10-03

## 2019-10-03 RX ADMIN — Medication 10 ML: at 09:41

## 2019-10-03 RX ADMIN — NYSTATIN 15 ML: 100000 SUSPENSION ORAL at 11:07

## 2019-10-03 RX ADMIN — NYSTATIN 15 ML: 100000 SUSPENSION ORAL at 19:56

## 2019-10-03 RX ADMIN — TICAGRELOR 90 MG: 90 TABLET ORAL at 08:47

## 2019-10-03 RX ADMIN — IPRATROPIUM BROMIDE AND ALBUTEROL SULFATE 3 ML: .5; 3 SOLUTION RESPIRATORY (INHALATION) at 08:22

## 2019-10-03 RX ADMIN — NYSTATIN 15 ML: 100000 SUSPENSION ORAL at 20:51

## 2019-10-03 RX ADMIN — IPRATROPIUM BROMIDE AND ALBUTEROL SULFATE 3 ML: .5; 3 SOLUTION RESPIRATORY (INHALATION) at 19:04

## 2019-10-03 RX ADMIN — ENOXAPARIN SODIUM 40 MG: 40 INJECTION SUBCUTANEOUS at 15:36

## 2019-10-03 RX ADMIN — NYSTATIN 15 ML: 100000 SUSPENSION ORAL at 08:52

## 2019-10-03 RX ADMIN — Medication 10 ML: at 08:48

## 2019-10-03 RX ADMIN — BENZONATATE 200 MG: 100 CAPSULE, LIQUID FILLED ORAL at 22:37

## 2019-10-03 RX ADMIN — DIAZEPAM 5 MG: 5 TABLET ORAL at 20:50

## 2019-10-03 RX ADMIN — ATORVASTATIN CALCIUM 40 MG: 40 TABLET, FILM COATED ORAL at 20:50

## 2019-10-03 RX ADMIN — DIAZEPAM 5 MG: 5 TABLET ORAL at 02:58

## 2019-10-03 RX ADMIN — TRAMADOL HYDROCHLORIDE 25 MG: 50 TABLET, FILM COATED ORAL at 13:56

## 2019-10-03 RX ADMIN — Medication 10 ML: at 20:51

## 2019-10-03 RX ADMIN — BENZONATATE 200 MG: 100 CAPSULE, LIQUID FILLED ORAL at 08:48

## 2019-10-03 RX ADMIN — ASPIRIN 81 MG 81 MG: 81 TABLET ORAL at 08:48

## 2019-10-03 RX ADMIN — TICAGRELOR 90 MG: 90 TABLET ORAL at 20:50

## 2019-10-03 RX ADMIN — FUROSEMIDE 40 MG: 10 INJECTION, SOLUTION INTRAMUSCULAR; INTRAVENOUS at 20:50

## 2019-10-03 RX ADMIN — SPIRONOLACTONE 12.5 MG: 25 TABLET ORAL at 08:48

## 2019-10-03 NOTE — NURSING NOTE
Transferred patient to [facility] via nurse transport. All of her belongings were brought along with her. No acute distress noted. Gave report to GRACIA Orlando.

## 2019-10-03 NOTE — PROGRESS NOTES
Continued Stay Note  KHUSHBU Kemp     Patient Name: Sada Le  MRN: [id number]  Today's Date: 10/3/2019    Admit Date: 9/29/2019    Called Peer to Peer # [phone] (Ref #[id number]) spoke with representative to check on status . Informed  it can take 24-48 hrs.for a peer to peer MD to our physician.  Updated Belinda WEBER.        Chary Garcia RN

## 2019-10-03 NOTE — PROGRESS NOTES
[facility]  Brant Martinez MD, PhD        LOS:  LOS: 4 days   Patient Name: Sada Le  Age/Sex: 49 y.o. female  : 1970  MRN: [id number]    Day of Service: 10/03/19   Length of Stay: 4  Encounter Provider: Brant Martinez MD PhD  Place of Service: [facility] CARDIOLOGY  Patient Care Team:  Yodit Johnston MD as PCP - General (Family Medicine)    Subjective:     Chief Complaint: f/u anterior STEMI    Subjective: Seen and examined at bedside with nursing present.  Overall patient reports she feels better today.  Hemoglobin improved last lab likely delusional.  All counts essentially better.  No acute bleeding suspected.  No hypotension overnight.  Tolerating Aldactone with normal renal function 0.8 and no hyperkalemia.  Not able to tolerate beta-blockers or ACE inhibitors at this time.  Pending re-echo for LV systolic function analysis.  We discussed LifeVest which she will need for at least 40 days.  She still under close observation for post acute myocardial infarctions sequela and complications after large infarct troponin greater than 73 which is max detectable limit.  She still c/o mouth pain, she also complains of neck and left arm pain.  She denies CP.  She states she has some exertional dyspnea but does not feel overtly short of breath.  Rhinovirus positive.    Current Medications:   Scheduled Meds:  aspirin 81 mg Oral Daily   atorvastatin 40 mg Oral Nightly   enoxaparin 40 mg Subcutaneous Q24H   hydrocortisone-diphenhydramine-nystatin 15 mL Oral 4x Daily   ipratropium-albuterol 3 mL Nebulization 4x Daily - RT   Morphine 4 mg Intravenous Once   potassium chloride 20 mEq Oral Daily   sodium chloride 10 mL Intravenous Q12H   spironolactone 12.5 mg Oral Daily   spironolactone 12.5 mg Oral Once   ticagrelor 90 mg Oral BID     Continuous Infusions:     Allergies:  Allergies   Allergen Reactions   • Penicillins Rash   • Ciprofloxacin Rash   • Penicillin G  Rash       Review of Systems   Constitution: Negative.   HENT:        Tongue pain / sore mouth   Cardiovascular: Negative.    Respiratory:        Mild dyspnea with exertion   Hematologic/Lymphatic: Negative.    Skin: Negative.    Musculoskeletal: Negative.    Gastrointestinal: Negative.    Genitourinary: Negative.    Neurological: Negative.    Psychiatric/Behavioral: Negative.          Objective:     Temp:  [98.7 °F (37.1 °C)-100.4 °F (38 °C)] 98.8 °F (37.1 °C)  Heart Rate:  [70-92] 78  Resp:  [12-18] 16  BP: (103-115)/(71-76) 103/71     Intake/Output Summary (Last 24 hours) at 10/3/2019 1008  Last data filed at 10/3/2019 0636  Gross per 24 hour   Intake 1160 ml   Output 950 ml   Net 210 ml     Body mass index is 30.5 kg/m².      10/02/19  0400 10/02/19  2222 10/03/19  0528   Weight: 81.5 kg (179 lb 10.8 oz) 80.6 kg (177 lb 11.1 oz) 80.6 kg (177 lb 11.1 oz)         Physical Exam:  General Appearance:    Alert, cooperative, in no acute distress, alert and oriented, no distress, comfortable in bed, supplemental O2.           Throat:  bruising on left side of tongue, trachea midline   Neck:   supple, mild JVD HJR   Lungs:     Rhonchi in bilat bases, otherwise clear apically, on 2L NC, non labored, normal excursion    Heart:    Regular rhythm and normal rate 70s to 80s, normal S1 and S2, no murmurs no S3 no S4 no heave no lift   Abdomen:     Normal bowel sounds, no masses, no organomegaly, soft        non-tender, non-distended, no guarding, no rebound                tenderness no discoloration or ecchymoses   Extremities:   Moves all extremities well, no edema, no cyanosis, no             Redness bilat groin site with minimal bruising, no hematoma, no oozing   Pulses:   Pulses palpable and equal bilaterally   Skin:   No bleeding, bruising or rash   Neurologic:   Awake, alert, oriented x3, no focal neurologic deficits.         Lab Review:   Results from last 7 days   Lab Units 10/03/19  0541 10/02/19  0116   SODIUM  mmol/L 138 133*   POTASSIUM mmol/L 4.3 3.9   CHLORIDE mmol/L 103 104   CO2 mmol/L 25.0 22.0   BUN mg/dL 6* 5*   CREATININE mg/dL 0.80 0.70   GLUCOSE mg/dL 93 93   CALCIUM mg/dL 8.3* 7.4*   AST (SGOT) U/L 56* 75*   ALT (SGPT) U/L 88* 104*     Results from last 7 days   Lab Units 10/03/19  0541 10/03/19  0005 10/02/19  1820 10/02/19  1343 10/02/19  0554 10/02/19  0116 10/01/19  1828 10/01/19  1235 10/01/19  0533 10/01/19  0022 09/30/19  2133 09/30/19  1833 09/30/19  1411 09/30/19  1410 09/30/19  0538 09/29/19  2133 09/29/19  1859   CK TOTAL U/L  --   --   --   --   --   --   --   --  1,085*  --  1,609*  --  2,555*  --  3,333*  --   --    TROPONIN I ng/mL 5.990* 9.150* 8.460* 10.020* 11.440* 15.150* 15.810* 17.050* 23.110* 22.790*  --  30.860*  --  41.950* 72.340* >73.000* 18.620*     Results from last 7 days   Lab Units 10/03/19  0541 10/02/19  0116   WBC 10*3/mm3 7.40 7.90   HEMOGLOBIN g/dL 10.2* 8.9*   HEMATOCRIT % 30.2* 26.3*   PLATELETS 10*3/mm3 141 104*     Results from last 7 days   Lab Units 10/01/19  0533 09/30/19  0632   INR   --  1.08   APTT seconds 26.5*  --      Results from last 7 days   Lab Units 10/03/19  0541 10/02/19  0116   MAGNESIUM mg/dL 2.2 1.9     Results from last 7 days   Lab Units 09/30/19  0538   CHOLESTEROL mg/dL 159   TRIGLYCERIDES mg/dL 163*   HDL CHOL mg/dL 40               Recent Radiology:  Imaging Results (most recent)     Procedure Component Value Units Date/Time    XR Chest 1 View [[id number]] Collected:  10/01/19 1418     Updated:  10/01/19 1421    Narrative:       DATE OF EXAM:  10/1/2019 2:14 PM     PROCEDURE:  XR CHEST 1 VW-     INDICATIONS:  SOA; I21.02-ST elevation (STEMI) myocardial infarction involving left  anterior descending coronary artery       COMPARISON:  AP portable chest 09/29/2019.     TECHNIQUE:   Single radiographic view of the chest was obtained.     FINDINGS:  Mild medial left basilar and right infrahilar linear opacities.  Persistent subsegmental atelectasis or  peribronchial lower infiltrates.  Stable mild cardiac enlargement. No pleural effusion or pneumothorax.       Impression:       Linear opacities in the right infrahilar region medial left lung base  favored to represent atelectasis, although subtle peribronchiolar  infiltrates or excluded.     Electronically Signed By-Dr. Sarita Pham MD On:10/1/2019 2:19 PM  This report was finalized on [id number] by Dr. Sarita Pham MD.    XR Chest 1 View [[id number]] Collected:  09/29/19 1956     Updated:  09/29/19 2159    Narrative:       Exam: Frontal chest    INDICATION: Dyspnea on exertion    COMPARISON: None    FINDINGS:  The lungs are clear and there are no effusions.  Poor inspiratory effort.    The heart size is normal.  Central catheter arising from the abdomen and terminates overlying the aorta.    Normal upper abdomen.      Impression:       There is some sort of catheter overlying the aorta, but below the diaphragm the catheters seem to the right of midline.  The exact course of this catheter is unclear.    No acute findings.    Electronically signed by:  Navdepe Maharaj M.D.    9/29/2019 7:58 PM          ECHOCARDIOGRAM:    Results for orders placed during the hospital encounter of 09/29/19   Adult Transthoracic Echo Limited W/ Cont if Necessary Per Protocol    Narrative · Left ventricular systolic function is severely decreased.  · Moderate dilation of the aortic root is present.            I reviewed the patient's new clinical results.    EKG:      Assessment:       STEMI involving left anterior descending coronary artery (CMS/HCC)    Hypertension, benign    Insomnia, organic    Obesity (BMI 30-39.9)    Tobacco dependency    Mixed hyperlipidemia    CAD (coronary artery disease)    Acute bronchitis due to Rhinovirus    Elevated LFTs    Hypocalcemia    Normocytic anemia    Thrombocytopenia (CMS/HCC)    1. LAD STEMI/CAD  - s/p PCI to the proximal / ostial to mid LAD with Xience 3.5 x 33 mm drug-eluting stent  postdilated to 4.5 mm at 18 familia with good angiographic results.  - residual borderline lesions in the distal LAD which will be staged for FFR in future. Nonobstructive disease in the circumflex and RCA  -Dual platelet therapy aspirin and Brilinta uninterrupted 1 year, consider longer-term Plavix therapy with benefits up to 30 minutes per DAPT trial with low bleeding risk and residual lesions possible further unstable plaque.  -High intensity statin therapy per guidelines - LFTs trending down, on statin therapy  -ACEI and metop currently held d/t borderline b/p, on spironolactone, increased to 25 daily  - troponin trending down 11.4 10/2, down to 5 today tinged to fall     2. Cardiogenic shock / acute systolic HF   - improved, impella removed 9/29  - metop stopped previously d/t hypotension, holding ACEI, on spironolactone, increase as above  -Estimated EF 20%, postop echo 30% with mid to apical septal akinesis, apical akinesis mid to apical anterior akinesis and apical lateral and apical inferior akinesis.  -We will need LifeVest, approved  Repeat 2D echo pending for assessment on medical therapy     3. Cardiac Arrest with  ROSC, VF arrest, none since reperfusion or greater than 48 hours out from initial insult.  No indication for antiarrhythmic or ICD acutely.     4. H/o Essential hypertension however borderline b/p currently  - holding BB and ACEI, b/p has been borderline     4. Hyperlipidemia  - needs high intensity statin, initially held d/t shock liver, LFTs trending down, on statin therapy     5. Elevated LFTs  - likely shock, trended down     6. Insomnia, melatonin as needed, avoid oversedation     7. Hypokalemia / Hypomagnesium  - replace to maintain K at 4 and Mg at 2  On Aldactone, no loop diuretics presently, replace as needed     8. Acute bronchitis and Rhinovirus     9. Possible COPD, smoking history  - pulm following, recommends PFTs and outpt f/u   - on duonebs    Plan:   Continue DAPT uninterrupted  for 1 year- I am awaiting peer to peer review call from insurance as they are currently denying coverage for Brilinta despite FDA guidelines stating brilinta over plavix-- I am in discussion with case management.   Continue statin therapy.   On spironolactone, had to stop metop 12.5mg PO BID 10/1 d/t hypotension, not on ACEI d/t borderline b/p--- b/p remains 100s systolic  EF remains < 35%, lifevest has been ordered- I have filled out appropriate [employer] paperwork  Pt is high risk for PNA, continue pulm. Toilet, IS, up out of bed, cough / deep breath  She is requesting Ultram for pain instead of oxycodone's , will order  She has benzocaine mouthwash for her sore mouth.          Continues to be in guarded condition, borderline blood pressures unable to tolerate advanced heart failure therapies status post large anterior MI. continue to follow for postinfarction mechanical complications    It is a pleasure to be involved in her cardiac care  Brant Martinez MD, PhD    Greater than 35 minutes spent face-to-face with the patient greater 50% education of post infarct sequela, complications potentially, though LifeVest discussion, medical therapy discussion and antiplatelet therapy discussion.    10/03/19  10:08 AM

## 2019-10-03 NOTE — CONSULTS
Cardiac rehab brochure, antiplatelet information along with Brilinta prescription cards explained.  working on that for patient. Explained cardiac rehab program and benefits. Call following discharge. GRACIA Rankin

## 2019-10-03 NOTE — PLAN OF CARE
Problem: Skin Injury Risk (Adult)  Goal: Identify Related Risk Factors and Signs and Symptoms  Outcome: Ongoing (interventions implemented as appropriate)    Goal: Skin Health and Integrity  Outcome: Ongoing (interventions implemented as appropriate)      Problem: Patient Care Overview  Goal: Plan of Care Review  Outcome: Ongoing (interventions implemented as appropriate)   10/03/19 0443   Coping/Psychosocial   Plan of Care Reviewed With patient   Plan of Care Review   Progress improving   OTHER   Outcome Summary Pt came from [facility] last night and rested well throughout the night. Pt did spike a fever last night but has been having one the past few nights; tylenol was given.        Problem: Pain, Chronic (Adult)  Goal: Identify Related Risk Factors and Signs and Symptoms  Outcome: Ongoing (interventions implemented as appropriate)    Goal: Acceptable Pain/Comfort Level and Functional Ability  Outcome: Ongoing (interventions implemented as appropriate)      Problem: Cardiac: ACS (Acute Coronary Syndrome) (Adult)  Goal: Signs and Symptoms of Listed Potential Problems Will be Absent, Minimized or Managed (Cardiac: ACS)  Outcome: Ongoing (interventions implemented as appropriate)

## 2019-10-04 LAB
ALBUMIN SERPL-MCNC: 3.6 G/DL (ref 3.5–4.8)
ALBUMIN/GLOB SERPL: 1.1 G/DL (ref 1–1.7)
ALP SERPL-CCNC: 57 U/L (ref 32–91)
ALT SERPL W P-5'-P-CCNC: 77 U/L (ref 14–54)
ANION GAP SERPL CALCULATED.3IONS-SCNC: 17.6 MMOL/L (ref 5–15)
AST SERPL-CCNC: 44 U/L (ref 15–41)
BACTERIA SPEC AEROBE CULT: NORMAL
BACTERIA SPEC AEROBE CULT: NORMAL
BASOPHILS # BLD AUTO: 0 10*3/MM3 (ref 0–0.2)
BASOPHILS NFR BLD AUTO: 0.6 % (ref 0–1.5)
BH CV ECHO MEAS - ACS: 1.9 CM
BH CV ECHO MEAS - AO MAX PG (FULL): -0.33 MMHG
BH CV ECHO MEAS - AO MAX PG: 4 MMHG
BH CV ECHO MEAS - AO MEAN PG (FULL): 0.45 MMHG
BH CV ECHO MEAS - AO MEAN PG: 2.5 MMHG
BH CV ECHO MEAS - AO ROOT AREA (BSA CORRECTED): 1.9
BH CV ECHO MEAS - AO ROOT AREA: 10 CM^2
BH CV ECHO MEAS - AO ROOT DIAM: 3.6 CM
BH CV ECHO MEAS - AO V2 MAX: 99.7 CM/SEC
BH CV ECHO MEAS - AO V2 MEAN: 76.3 CM/SEC
BH CV ECHO MEAS - AO V2 VTI: 18.3 CM
BH CV ECHO MEAS - AORTIC HR: 76.2 BPM
BH CV ECHO MEAS - AORTIC R-R: 0.79 SEC
BH CV ECHO MEAS - ASC AORTA: 3.3 CM
BH CV ECHO MEAS - AVA(I,A): 5.7 CM^2
BH CV ECHO MEAS - AVA(I,D): 5.7 CM^2
BH CV ECHO MEAS - AVA(V,A): 5.1 CM^2
BH CV ECHO MEAS - AVA(V,D): 5.1 CM^2
BH CV ECHO MEAS - BSA(HAYCOCK): 1.9 M^2
BH CV ECHO MEAS - BSA: 1.9 M^2
BH CV ECHO MEAS - BZI_BMI: 30.4 KILOGRAMS/M^2
BH CV ECHO MEAS - BZI_METRIC_HEIGHT: 162.6 CM
BH CV ECHO MEAS - BZI_METRIC_WEIGHT: 80.3 KG
BH CV ECHO MEAS - CI(AO): 7.5 L/MIN/M^2
BH CV ECHO MEAS - CI(LVOT): 4.2 L/MIN/M^2
BH CV ECHO MEAS - CO(AO): 13.9 L/MIN
BH CV ECHO MEAS - CO(LVOT): 7.9 L/MIN
BH CV ECHO MEAS - EDV(CUBED): 175.5 ML
BH CV ECHO MEAS - EDV(MOD-SP2): 80.5 ML
BH CV ECHO MEAS - EDV(MOD-SP4): 101.1 ML
BH CV ECHO MEAS - EDV(TEICH): 153.6 ML
BH CV ECHO MEAS - EF(CUBED): 51.3 %
BH CV ECHO MEAS - EF(MOD-BP): 34 %
BH CV ECHO MEAS - EF(MOD-SP2): 39 %
BH CV ECHO MEAS - EF(MOD-SP4): 33.6 %
BH CV ECHO MEAS - EF(TEICH): 42.8 %
BH CV ECHO MEAS - ESV(CUBED): 85.4 ML
BH CV ECHO MEAS - ESV(MOD-SP2): 49.1 ML
BH CV ECHO MEAS - ESV(MOD-SP4): 67.1 ML
BH CV ECHO MEAS - ESV(TEICH): 87.9 ML
BH CV ECHO MEAS - FS: 21.4 %
BH CV ECHO MEAS - IVS/LVPW: 1.3
BH CV ECHO MEAS - IVSD: 1.1 CM
BH CV ECHO MEAS - LA DIMENSION(2D): 3 CM
BH CV ECHO MEAS - LV DIASTOLIC VOL/BSA (35-75): 54.5 ML/M^2
BH CV ECHO MEAS - LV MASS(C)D: 220.7 GRAMS
BH CV ECHO MEAS - LV MASS(C)DI: 118.9 GRAMS/M^2
BH CV ECHO MEAS - LV MAX PG: 4.3 MMHG
BH CV ECHO MEAS - LV MEAN PG: 2.1 MMHG
BH CV ECHO MEAS - LV SYSTOLIC VOL/BSA (12-30): 36.1 ML/M^2
BH CV ECHO MEAS - LV V1 MAX: 103.8 CM/SEC
BH CV ECHO MEAS - LV V1 MEAN: 66.8 CM/SEC
BH CV ECHO MEAS - LV V1 VTI: 21.1 CM
BH CV ECHO MEAS - LVIDD: 5.6 CM
BH CV ECHO MEAS - LVIDS: 4.4 CM
BH CV ECHO MEAS - LVOT AREA: 4.9 CM^2
BH CV ECHO MEAS - LVOT DIAM: 2.5 CM
BH CV ECHO MEAS - LVPWD: 0.88 CM
BH CV ECHO MEAS - MV A MAX VEL: 68.6 CM/SEC
BH CV ECHO MEAS - MV DEC SLOPE: 573.1 CM/SEC^2
BH CV ECHO MEAS - MV DEC TIME: 0.17 SEC
BH CV ECHO MEAS - MV E MAX VEL: 96.2 CM/SEC
BH CV ECHO MEAS - MV E/A: 1.4
BH CV ECHO MEAS - MV MAX PG: 4 MMHG
BH CV ECHO MEAS - MV MEAN PG: 1.9 MMHG
BH CV ECHO MEAS - MV V2 MAX: 100.3 CM/SEC
BH CV ECHO MEAS - MV V2 MEAN: 66 CM/SEC
BH CV ECHO MEAS - MV V2 VTI: 26.1 CM
BH CV ECHO MEAS - MVA(VTI): 4 CM^2
BH CV ECHO MEAS - PA ACC TIME: 0.16 SEC
BH CV ECHO MEAS - PA MAX PG (FULL): 0.17 MMHG
BH CV ECHO MEAS - PA MAX PG: 2.6 MMHG
BH CV ECHO MEAS - PA MEAN PG (FULL): 0.39 MMHG
BH CV ECHO MEAS - PA MEAN PG: 1.4 MMHG
BH CV ECHO MEAS - PA PR(ACCEL): 5.4 MMHG
BH CV ECHO MEAS - PA V2 MAX: 81.1 CM/SEC
BH CV ECHO MEAS - PA V2 MEAN: 56.2 CM/SEC
BH CV ECHO MEAS - PA V2 VTI: 17.8 CM
BH CV ECHO MEAS - PVA(I,A): 4.9 CM^2
BH CV ECHO MEAS - PVA(I,D): 4.9 CM^2
BH CV ECHO MEAS - PVA(V,A): 5.3 CM^2
BH CV ECHO MEAS - PVA(V,D): 5.3 CM^2
BH CV ECHO MEAS - QP/QS: 0.84
BH CV ECHO MEAS - RAP SYSTOLE: 3 MMHG
BH CV ECHO MEAS - RV MAX PG: 2.5 MMHG
BH CV ECHO MEAS - RV MEAN PG: 1 MMHG
BH CV ECHO MEAS - RV V1 MAX: 78.4 CM/SEC
BH CV ECHO MEAS - RV V1 MEAN: 46.7 CM/SEC
BH CV ECHO MEAS - RV V1 VTI: 15.8 CM
BH CV ECHO MEAS - RVDD: 1.9 CM
BH CV ECHO MEAS - RVOT AREA: 5.5 CM^2
BH CV ECHO MEAS - RVOT DIAM: 2.6 CM
BH CV ECHO MEAS - RVSP: 9.5 MMHG
BH CV ECHO MEAS - SI(AO): 98.6 ML/M^2
BH CV ECHO MEAS - SI(CUBED): 48.5 ML/M^2
BH CV ECHO MEAS - SI(LVOT): 55.7 ML/M^2
BH CV ECHO MEAS - SI(MOD-SP2): 16.9 ML/M^2
BH CV ECHO MEAS - SI(MOD-SP4): 18.3 ML/M^2
BH CV ECHO MEAS - SI(TEICH): 35.4 ML/M^2
BH CV ECHO MEAS - SV(AO): 183.1 ML
BH CV ECHO MEAS - SV(CUBED): 90.1 ML
BH CV ECHO MEAS - SV(LVOT): 103.4 ML
BH CV ECHO MEAS - SV(MOD-SP2): 31.4 ML
BH CV ECHO MEAS - SV(MOD-SP4): 34 ML
BH CV ECHO MEAS - SV(RVOT): 86.5 ML
BH CV ECHO MEAS - SV(TEICH): 65.7 ML
BH CV ECHO MEAS - TR MAX VEL: 127.1 CM/SEC
BILIRUB SERPL-MCNC: 1.1 MG/DL (ref 0.3–1.2)
BUN BLD-MCNC: 7 MG/DL (ref 8–20)
BUN/CREAT SERPL: 7.8 (ref 5.4–26.2)
CA-I SERPL ISE-MCNC: 1.16 MMOL/L (ref 1.2–1.3)
CALCIUM SPEC-SCNC: 8.4 MG/DL (ref 8.9–10.3)
CHLORIDE SERPL-SCNC: 101 MMOL/L (ref 101–111)
CO2 SERPL-SCNC: 21 MMOL/L (ref 22–32)
CREAT BLD-MCNC: 0.9 MG/DL (ref 0.4–1)
DEPRECATED RDW RBC AUTO: 42.4 FL (ref 37–54)
EOSINOPHIL # BLD AUTO: 0.2 10*3/MM3 (ref 0–0.4)
EOSINOPHIL NFR BLD AUTO: 3.9 % (ref 0.3–6.2)
ERYTHROCYTE [DISTWIDTH] IN BLOOD BY AUTOMATED COUNT: 13.2 % (ref 12.3–15.4)
GFR SERPL CREATININE-BSD FRML MDRD: 66 ML/MIN/1.73
GLOBULIN UR ELPH-MCNC: 3.3 GM/DL (ref 2.5–3.8)
GLUCOSE BLD-MCNC: 96 MG/DL (ref 65–99)
HCT VFR BLD AUTO: 35.1 % (ref 34–46.6)
HGB BLD-MCNC: 12 G/DL (ref 12–15.9)
LYMPHOCYTES # BLD AUTO: 1.3 10*3/MM3 (ref 0.7–3.1)
LYMPHOCYTES NFR BLD AUTO: 20 % (ref 19.6–45.3)
MAGNESIUM SERPL-MCNC: 2.2 MG/DL (ref 1.8–2.5)
MCH RBC QN AUTO: 31.2 PG (ref 26.6–33)
MCHC RBC AUTO-ENTMCNC: 34.1 G/DL (ref 31.5–35.7)
MCV RBC AUTO: 91.4 FL (ref 79–97)
MONOCYTES # BLD AUTO: 0.8 10*3/MM3 (ref 0.1–0.9)
MONOCYTES NFR BLD AUTO: 12.4 % (ref 5–12)
NEUTROPHILS # BLD AUTO: 4 10*3/MM3 (ref 1.7–7)
NEUTROPHILS NFR BLD AUTO: 63.1 % (ref 42.7–76)
NRBC BLD AUTO-RTO: 0.1 /100 WBC (ref 0–0.2)
PHOSPHATE SERPL-MCNC: 5.3 MG/DL (ref 2.4–4.7)
PLATELET # BLD AUTO: 170 10*3/MM3 (ref 140–450)
PMV BLD AUTO: 8.8 FL (ref 6–12)
POTASSIUM BLD-SCNC: 3.6 MMOL/L (ref 3.6–5.1)
PROT SERPL-MCNC: 6.9 G/DL (ref 6.1–7.9)
RBC # BLD AUTO: 3.84 10*6/MM3 (ref 3.77–5.28)
SODIUM BLD-SCNC: 136 MMOL/L (ref 136–144)
TROPONIN I SERPL-MCNC: 1.06 NG/ML (ref 0–0.03)
TROPONIN I SERPL-MCNC: 1.09 NG/ML (ref 0–0.03)
TROPONIN I SERPL-MCNC: 1.79 NG/ML (ref 0–0.03)
TROPONIN I SERPL-MCNC: 2.61 NG/ML (ref 0–0.03)
WBC NRBC COR # BLD: 6.4 10*3/MM3 (ref 3.4–10.8)

## 2019-10-04 PROCEDURE — 83735 ASSAY OF MAGNESIUM: CPT | Performed by: INTERNAL MEDICINE

## 2019-10-04 PROCEDURE — 94760 N-INVAS EAR/PLS OXIMETRY 1: CPT

## 2019-10-04 PROCEDURE — 25010000002 ENOXAPARIN PER 10 MG: Performed by: INTERNAL MEDICINE

## 2019-10-04 PROCEDURE — 85025 COMPLETE CBC W/AUTO DIFF WBC: CPT | Performed by: NURSE PRACTITIONER

## 2019-10-04 PROCEDURE — 99233 SBSQ HOSP IP/OBS HIGH 50: CPT | Performed by: INTERNAL MEDICINE

## 2019-10-04 PROCEDURE — 99232 SBSQ HOSP IP/OBS MODERATE 35: CPT | Performed by: INTERNAL MEDICINE

## 2019-10-04 PROCEDURE — 82330 ASSAY OF CALCIUM: CPT | Performed by: NURSE PRACTITIONER

## 2019-10-04 PROCEDURE — 0399T ADULT TRANSTHORACIC ECHO COMPLETE W/ CONT IF NECESSARY PER PROTOCOL: CPT | Performed by: INTERNAL MEDICINE

## 2019-10-04 PROCEDURE — 94799 UNLISTED PULMONARY SVC/PX: CPT

## 2019-10-04 PROCEDURE — 93005 ELECTROCARDIOGRAM TRACING: CPT | Performed by: NURSE PRACTITIONER

## 2019-10-04 PROCEDURE — 84484 ASSAY OF TROPONIN QUANT: CPT | Performed by: INTERNAL MEDICINE

## 2019-10-04 PROCEDURE — 80053 COMPREHEN METABOLIC PANEL: CPT | Performed by: INTERNAL MEDICINE

## 2019-10-04 PROCEDURE — 93010 ELECTROCARDIOGRAM REPORT: CPT | Performed by: INTERNAL MEDICINE

## 2019-10-04 PROCEDURE — 84100 ASSAY OF PHOSPHORUS: CPT | Performed by: NURSE PRACTITIONER

## 2019-10-04 PROCEDURE — 25010000002 FUROSEMIDE PER 20 MG: Performed by: INTERNAL MEDICINE

## 2019-10-04 PROCEDURE — 93306 TTE W/DOPPLER COMPLETE: CPT | Performed by: INTERNAL MEDICINE

## 2019-10-04 RX ORDER — POTASSIUM CHLORIDE 20 MEQ/1
20 TABLET, EXTENDED RELEASE ORAL ONCE
Status: COMPLETED | OUTPATIENT
Start: 2019-10-04 | End: 2019-10-04

## 2019-10-04 RX ORDER — FUROSEMIDE 10 MG/ML
40 INJECTION INTRAMUSCULAR; INTRAVENOUS ONCE
Status: DISCONTINUED | OUTPATIENT
Start: 2019-10-04 | End: 2019-10-04

## 2019-10-04 RX ORDER — FUROSEMIDE 10 MG/ML
20 INJECTION INTRAMUSCULAR; INTRAVENOUS ONCE
Status: COMPLETED | OUTPATIENT
Start: 2019-10-04 | End: 2019-10-04

## 2019-10-04 RX ORDER — FUROSEMIDE 40 MG/1
40 TABLET ORAL DAILY
Status: DISCONTINUED | OUTPATIENT
Start: 2019-10-05 | End: 2019-10-07 | Stop reason: HOSPADM

## 2019-10-04 RX ORDER — POTASSIUM CHLORIDE 750 MG/1
10 TABLET, FILM COATED, EXTENDED RELEASE ORAL ONCE
Status: COMPLETED | OUTPATIENT
Start: 2019-10-04 | End: 2019-10-04

## 2019-10-04 RX ORDER — FUROSEMIDE 20 MG/1
20 TABLET ORAL ONCE
Status: COMPLETED | OUTPATIENT
Start: 2019-10-04 | End: 2019-10-04

## 2019-10-04 RX ORDER — POTASSIUM CHLORIDE 20 MEQ/1
20 TABLET, EXTENDED RELEASE ORAL DAILY
Status: DISCONTINUED | OUTPATIENT
Start: 2019-10-04 | End: 2019-10-04

## 2019-10-04 RX ORDER — METOPROLOL SUCCINATE 25 MG/1
12.5 TABLET, EXTENDED RELEASE ORAL
Status: DISCONTINUED | OUTPATIENT
Start: 2019-10-04 | End: 2019-10-07 | Stop reason: HOSPADM

## 2019-10-04 RX ORDER — FUROSEMIDE 40 MG/1
40 TABLET ORAL DAILY
Status: DISCONTINUED | OUTPATIENT
Start: 2019-10-05 | End: 2019-10-04

## 2019-10-04 RX ADMIN — DIAZEPAM 5 MG: 5 TABLET ORAL at 08:09

## 2019-10-04 RX ADMIN — Medication 10 ML: at 08:43

## 2019-10-04 RX ADMIN — TRAMADOL HYDROCHLORIDE 25 MG: 50 TABLET, FILM COATED ORAL at 21:41

## 2019-10-04 RX ADMIN — NYSTATIN 15 ML: 100000 SUSPENSION ORAL at 20:53

## 2019-10-04 RX ADMIN — IPRATROPIUM BROMIDE AND ALBUTEROL SULFATE 3 ML: .5; 3 SOLUTION RESPIRATORY (INHALATION) at 06:50

## 2019-10-04 RX ADMIN — IPRATROPIUM BROMIDE AND ALBUTEROL SULFATE 3 ML: .5; 3 SOLUTION RESPIRATORY (INHALATION) at 18:29

## 2019-10-04 RX ADMIN — BENZONATATE 200 MG: 100 CAPSULE, LIQUID FILLED ORAL at 08:09

## 2019-10-04 RX ADMIN — BENZONATATE 200 MG: 100 CAPSULE, LIQUID FILLED ORAL at 21:02

## 2019-10-04 RX ADMIN — NYSTATIN 15 ML: 100000 SUSPENSION ORAL at 18:01

## 2019-10-04 RX ADMIN — TRAMADOL HYDROCHLORIDE 25 MG: 50 TABLET, FILM COATED ORAL at 08:09

## 2019-10-04 RX ADMIN — FUROSEMIDE 20 MG: 20 TABLET ORAL at 11:59

## 2019-10-04 RX ADMIN — DIAZEPAM 5 MG: 5 TABLET ORAL at 20:51

## 2019-10-04 RX ADMIN — NYSTATIN 15 ML: 100000 SUSPENSION ORAL at 08:02

## 2019-10-04 RX ADMIN — TRAMADOL HYDROCHLORIDE 25 MG: 50 TABLET, FILM COATED ORAL at 16:11

## 2019-10-04 RX ADMIN — NYSTATIN 15 ML: 100000 SUSPENSION ORAL at 11:59

## 2019-10-04 RX ADMIN — FUROSEMIDE 20 MG: 20 TABLET ORAL at 08:02

## 2019-10-04 RX ADMIN — POTASSIUM CHLORIDE 20 MEQ: 1500 TABLET, EXTENDED RELEASE ORAL at 08:01

## 2019-10-04 RX ADMIN — Medication 10 ML: at 20:52

## 2019-10-04 RX ADMIN — ASPIRIN 81 MG 81 MG: 81 TABLET ORAL at 08:02

## 2019-10-04 RX ADMIN — IPRATROPIUM BROMIDE AND ALBUTEROL SULFATE 3 ML: .5; 3 SOLUTION RESPIRATORY (INHALATION) at 15:09

## 2019-10-04 RX ADMIN — POTASSIUM CHLORIDE 20 MEQ: 1500 TABLET, EXTENDED RELEASE ORAL at 14:00

## 2019-10-04 RX ADMIN — TICAGRELOR 90 MG: 90 TABLET ORAL at 08:01

## 2019-10-04 RX ADMIN — SPIRONOLACTONE 25 MG: 25 TABLET ORAL at 08:02

## 2019-10-04 RX ADMIN — POTASSIUM CHLORIDE 10 MEQ: 10 TABLET, EXTENDED RELEASE ORAL at 11:59

## 2019-10-04 RX ADMIN — ATORVASTATIN CALCIUM 40 MG: 40 TABLET, FILM COATED ORAL at 20:51

## 2019-10-04 RX ADMIN — IPRATROPIUM BROMIDE AND ALBUTEROL SULFATE 3 ML: .5; 3 SOLUTION RESPIRATORY (INHALATION) at 11:20

## 2019-10-04 RX ADMIN — METOPROLOL SUCCINATE 12.5 MG: 25 TABLET, EXTENDED RELEASE ORAL at 14:00

## 2019-10-04 RX ADMIN — FUROSEMIDE 20 MG: 10 INJECTION, SOLUTION INTRAMUSCULAR; INTRAVENOUS at 14:00

## 2019-10-04 RX ADMIN — TICAGRELOR 90 MG: 90 TABLET ORAL at 20:52

## 2019-10-04 RX ADMIN — ENOXAPARIN SODIUM 40 MG: 40 INJECTION SUBCUTANEOUS at 15:18

## 2019-10-04 NOTE — PLAN OF CARE
Problem: Skin Injury Risk (Adult)  Goal: Identify Related Risk Factors and Signs and Symptoms  Outcome: Ongoing (interventions implemented as appropriate)   10/04/19 1555   Skin Injury Risk (Adult)   Related Risk Factors (Skin Injury Risk) critical care admission     Goal: Skin Health and Integrity  Outcome: Ongoing (interventions implemented as appropriate)      Problem: Patient Care Overview  Goal: Plan of Care Review  Outcome: Ongoing (interventions implemented as appropriate)    Goal: Discharge Needs Assessment  Outcome: Ongoing (interventions implemented as appropriate)      Problem: Pain, Chronic (Adult)  Goal: Identify Related Risk Factors and Signs and Symptoms  Outcome: Ongoing (interventions implemented as appropriate)    Goal: Acceptable Pain/Comfort Level and Functional Ability  Outcome: Ongoing (interventions implemented as appropriate)      Problem: Cardiac: ACS (Acute Coronary Syndrome) (Adult)  Goal: Signs and Symptoms of Listed Potential Problems Will be Absent, Minimized or Managed (Cardiac: ACS)  Outcome: Ongoing (interventions implemented as appropriate)
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
## 2019-10-04 NOTE — PROGRESS NOTES
"Heart Failure Program  Nurse Navigator  Discharge Planning    Patient Name:Sada Le  :1970  Cardiologist:Dr. Martinez  Current Admission Date: 2019   Previous Admission: none  Admission frequency:  1 admissions in 6 months    Heart Failure history per record:    Symptoms on admission:  Patient admitted 19 for STEMI, s/p PCI and cardiac arrest, no history of cardiac problems prior to admission, acute systolic HF new diagnosis.  Patient will go home on Life Vest.      Admission Weight:  Flowsheet Rows      First Filed Value   Admission Height  165.1 cm (65\") Documented at 2019 0557   Admission Weight  84.1 kg (185 lb 6.5 oz) Documented at 2019            Current Home Medications:  Prior to Admission medications    Medication Sig Start Date End Date Taking? Authorizing Provider   lisinopril-hydrochlorothiazide (PRINZIDE,ZESTORETIC) 20-25 MG per tablet Take 1 tablet by mouth Daily. 19  Yes Yodit Johnston MD   Omega-3 Fatty Acids (EPA PO) Take 1 tablet by mouth Daily.    Provider, MD Al   sulfamethoxazole-trimethoprim (BACTRIM,SEPTRA) 400-80 MG tablet Take 1 tablet by mouth 2 (Two) Times a Day. 19   Yodit Johnston MD       Social history:   Patient lives home with family, patient advises she tries to eat a low calorie and lower salt diet, advises she has lost 60lbs this year intentionally trying to improve her health. No issues with medication or transportation reported.    Smoking status:current, advises she was a 1 ppd smoker but is adamant of quiting    Diagnostics Testing:  BNP level:      Echocardiogram:  Results for orders placed during the hospital encounter of 19   Adult Transthoracic Echo Limited W/ Cont if Necessary Per Protocol    Narrative · Left ventricular systolic function is severely decreased.  · Moderate dilation of the aortic root is present.            Patient Assessment:   Resp even and unlabored, sitting in bed, able to have " "conversation without SOA.      Current O2:none  Home O2:none    Education provided to patient:  Yes- Heart Failure disease education  Yes -Symptom identification/management  yes -Daily Weights  yes- Diet education  n/a- Fluid restriction (if ordered)  Yes- Medication education  yes- Smoking cessation    Acceptance of learning: acceptable, attentive, ask questions, utilizes teachback    Identified needs/barriers:   New diangosis    Intervention:   Extensive teaching about HF s/s and self-care initiatives for post discharge    Patient goal:  \"Gain strength to get back to walking 4 miles a day\"    Anneliese Frederick CNS  Heart Failure Program    "

## 2019-10-04 NOTE — PLAN OF CARE
Problem: Skin Injury Risk (Adult)  Goal: Identify Related Risk Factors and Signs and Symptoms  Outcome: Ongoing (interventions implemented as appropriate)    Goal: Skin Health and Integrity  Outcome: Ongoing (interventions implemented as appropriate)      Problem: Patient Care Overview  Goal: Plan of Care Review  Outcome: Ongoing (interventions implemented as appropriate)   10/04/19 0434   Coping/Psychosocial   Plan of Care Reviewed With patient   Plan of Care Review   Progress improving   OTHER   Outcome Summary Pt recieved life vest yesterday and rested throuhgout the night. Started IV lasix last night and will start PO today       Problem: Pain, Chronic (Adult)  Goal: Identify Related Risk Factors and Signs and Symptoms  Outcome: Ongoing (interventions implemented as appropriate)    Goal: Acceptable Pain/Comfort Level and Functional Ability  Outcome: Ongoing (interventions implemented as appropriate)      Problem: Cardiac: ACS (Acute Coronary Syndrome) (Adult)  Goal: Signs and Symptoms of Listed Potential Problems Will be Absent, Minimized or Managed (Cardiac: ACS)  Outcome: Ongoing (interventions implemented as appropriate)

## 2019-10-04 NOTE — PROGRESS NOTES
[facility]  Brant Matrinez MD,PhD        LOS:  LOS: 5 days   Patient Name: Sada Le  Age/Sex: 49 y.o. female  : 1970  MRN: [id number]    Day of Service: 10/04/19   Length of Stay: 5  Place of Service: [facility] CARDIOLOGY  Patient Care Team:  Yodit Johnston MD as PCP - General (Family Medicine)    Subjective:     Chief Complaint: f/u anterior STEMI    Subjective:seen today, edema improved with lasix iv yesterday, repeat dosing today.  Patient is on room air, better isaura 98%, she complains about left side of mouth hurting.  She states she has minimal SOA.  She is net negative 1.2L.  She still complains of  exertional dyspnea and fatigue.  She has generalized soreness.  She has some anxiety but states valium helping. No hemodynamic or electrical instability, willl try to start low dose metop XL 12.5 daily and additional lasix today x 1.   Current Medications:   Scheduled Meds:    aspirin 81 mg Oral Daily   atorvastatin 40 mg Oral Nightly   enoxaparin 40 mg Subcutaneous Q24H   furosemide 40 mg Oral Daily   hydrocortisone-diphenhydramine-nystatin 15 mL Oral 4x Daily   ipratropium-albuterol 3 mL Nebulization 4x Daily - RT   Morphine 4 mg Intravenous Once   potassium chloride 20 mEq Oral Daily   potassium chloride 10 mEq Oral Once   sodium chloride 10 mL Intravenous Q12H   spironolactone 12.5 mg Oral Once   spironolactone 25 mg Oral Daily   ticagrelor 90 mg Oral BID     Continuous Infusions:     Allergies:  Allergies   Allergen Reactions   • Penicillins Rash   • Ciprofloxacin Rash   • Penicillin G Rash       Review of Systems   Constitution:        Generalized body soreness   HENT:        Left side of mouth and left side of tongue sore, ulceration noted   Cardiovascular: Positive for dyspnea on exertion.   Respiratory: Positive for cough.         Mild ALDRIDGE   Endocrine: Negative.    Hematologic/Lymphatic: Negative.    Skin:        Bruising on bilat groin    Gastrointestinal: Negative.    Genitourinary: Negative.    Neurological: Negative.    Psychiatric/Behavioral: The patient is nervous/anxious.    Allergic/Immunologic: Negative.          Objective:     Temp:  [98.5 °F (36.9 °C)-99.9 °F (37.7 °C)] 98.9 °F (37.2 °C)  Heart Rate:  [] 82  Resp:  [12-18] 18  BP: (111-127)/(76-84) 111/78     Intake/Output Summary (Last 24 hours) at 10/4/2019 1132  Last data filed at 10/4/2019 0459  Gross per 24 hour   Intake 720 ml   Output 2000 ml   Net -1280 ml     Body mass index is 30.31 kg/m².      10/02/19  2222 10/03/19  0528 10/04/19  0447   Weight: 80.6 kg (177 lb 11.1 oz) 80.6 kg (177 lb 11.1 oz) 80.1 kg (176 lb 9.4 oz)         Physical Exam:  General Appearance:    Alert, cooperative, in no acute distress, AAOx3, on RA           Throat:   Poor dentition, ulceration on left lower cheek, tongue abrasion   Neck:   supple,  + JVD/HJR still but better   Lungs:     Relatively clear, non labored, room air, even respirations    Heart:    Regular rhythm and normal rate, normal S1 and S2, no new murmurs   Abdomen:     Normal bowel sounds, no masses, no organomegaly, soft        non-tender, non-distended, no guarding, no rebound                Tenderness, less bloated she says   Extremities:   Moves all extremities well, no edema, no cyanosis, bilat groin with slight bruising, no hematoma, groins stable   Pulses:   Pulses palpable and equal bilaterally       Neurologic:   Awake, alert, oriented x3, no dificits         Lab Review:   Results from last 7 days   Lab Units 10/04/19  0534 10/03/19  0541   SODIUM mmol/L 136 138   POTASSIUM mmol/L 3.6 4.3   CHLORIDE mmol/L 101 103   CO2 mmol/L 21.0* 25.0   BUN mg/dL 7* 6*   CREATININE mg/dL 0.90 0.80   GLUCOSE mg/dL 96 93   CALCIUM mg/dL 8.4* 8.3*   AST (SGOT) U/L 44* 56*   ALT (SGPT) U/L 77* 88*     Results from last 7 days   Lab Units 10/04/19  0535 10/03/19  2331 10/03/19  1827 10/03/19  1312 10/03/19  0541 10/03/19  0005 10/02/19  1820  10/02/19  1343 10/02/19  0554 10/02/19  0116 10/01/19  1828 10/01/19  1235 10/01/19  0533 10/01/19  0022 09/30/19  2133 09/30/19  1833 09/30/19  1411 09/30/19  1410 09/30/19  0538 09/29/19  2133 09/29/19  1859   CK TOTAL U/L  --   --   --   --   --   --   --   --   --   --   --   --  1,085*  --  1,609*  --  2,555*  --  3,333*  --   --    TROPONIN I ng/mL 1.790* 2.610* 3.310* 3.600* 5.990* 9.150* 8.460* 10.020* 11.440* 15.150* 15.810* 17.050* 23.110* 22.790*  --  30.860*  --  41.950* 72.340* >73.000* 18.620*     Results from last 7 days   Lab Units 10/04/19  0532 10/03/19  0541   WBC 10*3/mm3 6.40 7.40   HEMOGLOBIN g/dL 12.0 10.2*   HEMATOCRIT % 35.1 30.2*   PLATELETS 10*3/mm3 170 141     Results from last 7 days   Lab Units 10/01/19  0533 09/30/19  0632   INR   --  1.08   APTT seconds 26.5*  --      Results from last 7 days   Lab Units 10/04/19  0534 10/03/19  0541   MAGNESIUM mg/dL 2.2 2.2     Results from last 7 days   Lab Units 09/30/19  0538   CHOLESTEROL mg/dL 159   TRIGLYCERIDES mg/dL 163*   HDL CHOL mg/dL 40               Recent Radiology:  Imaging Results (most recent)     Procedure Component Value Units Date/Time    XR Chest 1 View [[id number]] Collected:  10/01/19 1418     Updated:  10/01/19 1421    Narrative:       DATE OF EXAM:  10/1/2019 2:14 PM     PROCEDURE:  XR CHEST 1 VW-     INDICATIONS:  SOA; I21.02-ST elevation (STEMI) myocardial infarction involving left  anterior descending coronary artery       COMPARISON:  AP portable chest 09/29/2019.     TECHNIQUE:   Single radiographic view of the chest was obtained.     FINDINGS:  Mild medial left basilar and right infrahilar linear opacities.  Persistent subsegmental atelectasis or peribronchial lower infiltrates.  Stable mild cardiac enlargement. No pleural effusion or pneumothorax.       Impression:       Linear opacities in the right infrahilar region medial left lung base  favored to represent atelectasis, although subtle peribronchiolar  infiltrates  or excluded.     Electronically Signed By-Dr. Sarita Pham MD On:10/1/2019 2:19 PM  This report was finalized on [id number] by Dr. Sarita Pham MD.    XR Chest 1 View [[id number]] Collected:  09/29/19 1956     Updated:  09/29/19 2159    Narrative:       Exam: Frontal chest    INDICATION: Dyspnea on exertion    COMPARISON: None    FINDINGS:  The lungs are clear and there are no effusions.  Poor inspiratory effort.    The heart size is normal.  Central catheter arising from the abdomen and terminates overlying the aorta.    Normal upper abdomen.      Impression:       There is some sort of catheter overlying the aorta, but below the diaphragm the catheters seem to the right of midline.  The exact course of this catheter is unclear.    No acute findings.    Electronically signed by:  Navdeep Maharaj M.D.    9/29/2019 7:58 PM          ECHOCARDIOGRAM:    Results for orders placed during the hospital encounter of 09/29/19   Adult Transthoracic Echo Limited W/ Cont if Necessary Per Protocol    Narrative · Left ventricular systolic function is severely decreased.  · Moderate dilation of the aortic root is present.            I reviewed the patient's new clinical results.    EKG:      Assessment:       STEMI involving left anterior descending coronary artery (CMS/HCC)    Hypertension, benign    Insomnia, organic    Obesity (BMI 30-39.9)    Tobacco dependency    Mixed hyperlipidemia    CAD (coronary artery disease)    Acute bronchitis due to Rhinovirus    Elevated LFTs    Hypocalcemia    Normocytic anemia    Thrombocytopenia (CMS/HCC)    1. LAD STEMI/CAD  - s/p PCI to the proximal / ostial to mid LAD with Xience 3.5 x 33 mm drug-eluting stent postdilated to 4.5 mm at 18 familia with good angiographic results.  - residual borderline lesions in the distal LAD which will be staged for FFR in future. Nonobstructive disease in the circumflex and RCA  -Dual platelet therapy aspirin and Brilinta uninterrupted 1 year, consider  longer-term Plavix therapy with benefits up to 30 minutes per DAPT trial with low bleeding risk and residual lesions possible further unstable plaque.  -High intensity statin therapy per guidelines - LFTs trending down, on statin therapy  -ACEI  currently held d/t borderline b/p, on spironolactone, increased to 25 daily  -troponin trending down, peak > 75, down to 1.7 today  - start metop XL 12.5 daily tomorrow  - additional lasix x 1 today  Replace K     2. Cardiogenic shock / acute systolic HF   - improved, impella removed 9/29  -Estimated EF 20%, postop echo 30% with mid to apical septal akinesis, apical akinesis mid to apical anterior akinesis and apical lateral and apical inferior akinesis.  -lifevest at discharge- already has vest in room  - unable to tolerate metop / have not initiated ACEI d/t hypotension  - lasix 40mg daily added, iv 20mg extra x 1 today  Replace K     3. Cardiac Arrest with  ROSC, VF arrest, none since reperfusion or greater than 48 hours out from initial insult.  No indication for antiarrhythmic or ICD acutely.     4. H/o Essential hypertension however borderline b/p currently  - holding ACEI, b/p has been borderline     4. Hyperlipidemia  - needs high intensity statin, initially held d/t shock liver, LFTs trending down, on statin therapy     5. Elevated LFTs  - likely shock, trended down     6. Insomnia, melatonin as needed, avoid oversedation     7. Hypokalemia / Hypomagnesium  - replace to maintain K at 4 and Mg at 2  - On Aldactone, lasix added, will replace as needed     8. Acute bronchitis and Rhinovirus     9. Possible COPD, smoking history  - pulm following, recommends PFTs and outpt f/u   - on duonebs    Plan:   Continue DAPT uninterrupted x 1 year- insurance has approved Brilinta  Continue statin therapy  On spironolactone, not on ACEI d/t borderline b/p  We have initiated Lasix 40mg PO daily, observe b/p, can try restarting metop 12.5mg XL tomorrow PO if pt's b/p tolerates with  scheduled diuresis.  EF < 35%, pt has lifevest for discharge.   Pt is high risk for PNA, continue pulm. Toilet, IS, up out of bed, cough / deep breath    Discussed above plans with patient and daughter at bedside and nursing as well.    Guarded but stable condition s/p large anterior MI    Cont to watch through the weekend and anticipated DC on Monday on stable meds, advanced as able per GDMT guidelines, BP borderline..     Brant Martinez MD, PhD    10/04/19  11:32 AM

## 2019-10-05 LAB
ALBUMIN SERPL-MCNC: 3.7 G/DL (ref 3.5–4.8)
ALBUMIN/GLOB SERPL: 1.1 G/DL (ref 1–1.7)
ALP SERPL-CCNC: 58 U/L (ref 32–91)
ALT SERPL W P-5'-P-CCNC: 65 U/L (ref 14–54)
ANION GAP SERPL CALCULATED.3IONS-SCNC: 16.4 MMOL/L (ref 5–15)
AST SERPL-CCNC: 33 U/L (ref 15–41)
BILIRUB SERPL-MCNC: 0.8 MG/DL (ref 0.3–1.2)
BUN BLD-MCNC: 14 MG/DL (ref 8–20)
BUN/CREAT SERPL: 14 (ref 5.4–26.2)
CA-I SERPL ISE-MCNC: 1.23 MMOL/L (ref 1.2–1.3)
CALCIUM SPEC-SCNC: 9 MG/DL (ref 8.9–10.3)
CHLORIDE SERPL-SCNC: 99 MMOL/L (ref 101–111)
CO2 SERPL-SCNC: 25 MMOL/L (ref 22–32)
CREAT BLD-MCNC: 1 MG/DL (ref 0.4–1)
DEPRECATED RDW RBC AUTO: 42.9 FL (ref 37–54)
EOSINOPHIL # BLD MANUAL: 0.28 10*3/MM3 (ref 0–0.4)
EOSINOPHIL NFR BLD MANUAL: 4 % (ref 0.3–6.2)
ERYTHROCYTE [DISTWIDTH] IN BLOOD BY AUTOMATED COUNT: 13.3 % (ref 12.3–15.4)
GFR SERPL CREATININE-BSD FRML MDRD: 59 ML/MIN/1.73
GLOBULIN UR ELPH-MCNC: 3.4 GM/DL (ref 2.5–3.8)
GLUCOSE BLD-MCNC: 107 MG/DL (ref 65–99)
HCT VFR BLD AUTO: 35.1 % (ref 34–46.6)
HGB BLD-MCNC: 12 G/DL (ref 12–15.9)
LYMPHOCYTES # BLD MANUAL: 1.85 10*3/MM3 (ref 0.7–3.1)
LYMPHOCYTES NFR BLD MANUAL: 12 % (ref 5–12)
LYMPHOCYTES NFR BLD MANUAL: 26 % (ref 19.6–45.3)
MAGNESIUM SERPL-MCNC: 2.1 MG/DL (ref 1.8–2.5)
MCH RBC QN AUTO: 31.5 PG (ref 26.6–33)
MCHC RBC AUTO-ENTMCNC: 34.3 G/DL (ref 31.5–35.7)
MCV RBC AUTO: 91.9 FL (ref 79–97)
METAMYELOCYTES NFR BLD MANUAL: 2 % (ref 0–0)
MONOCYTES # BLD AUTO: 0.85 10*3/MM3 (ref 0.1–0.9)
NEUTROPHILS # BLD AUTO: 3.98 10*3/MM3 (ref 1.7–7)
NEUTROPHILS NFR BLD MANUAL: 52 % (ref 42.7–76)
NEUTS BAND NFR BLD MANUAL: 4 % (ref 0–5)
PHOSPHATE SERPL-MCNC: 5 MG/DL (ref 2.4–4.7)
PLAT MORPH BLD: NORMAL
PLATELET # BLD AUTO: 237 10*3/MM3 (ref 140–450)
PMV BLD AUTO: 8.3 FL (ref 6–12)
POTASSIUM BLD-SCNC: 4.4 MMOL/L (ref 3.6–5.1)
PROT SERPL-MCNC: 7.1 G/DL (ref 6.1–7.9)
RBC # BLD AUTO: 3.82 10*6/MM3 (ref 3.77–5.28)
RBC MORPH BLD: NORMAL
SCAN SLIDE: NORMAL
SODIUM BLD-SCNC: 136 MMOL/L (ref 136–144)
TOXIC GRANULATION: ABNORMAL
TROPONIN I SERPL-MCNC: 0.73 NG/ML (ref 0–0.03)
TROPONIN I SERPL-MCNC: 0.92 NG/ML (ref 0–0.03)
WBC NRBC COR # BLD: 7.1 10*3/MM3 (ref 3.4–10.8)

## 2019-10-05 PROCEDURE — 80053 COMPREHEN METABOLIC PANEL: CPT | Performed by: INTERNAL MEDICINE

## 2019-10-05 PROCEDURE — 99233 SBSQ HOSP IP/OBS HIGH 50: CPT | Performed by: INTERNAL MEDICINE

## 2019-10-05 PROCEDURE — 93010 ELECTROCARDIOGRAM REPORT: CPT | Performed by: INTERNAL MEDICINE

## 2019-10-05 PROCEDURE — 25010000002 ENOXAPARIN PER 10 MG: Performed by: INTERNAL MEDICINE

## 2019-10-05 PROCEDURE — 93005 ELECTROCARDIOGRAM TRACING: CPT | Performed by: NURSE PRACTITIONER

## 2019-10-05 PROCEDURE — 85025 COMPLETE CBC W/AUTO DIFF WBC: CPT | Performed by: NURSE PRACTITIONER

## 2019-10-05 PROCEDURE — 84484 ASSAY OF TROPONIN QUANT: CPT | Performed by: INTERNAL MEDICINE

## 2019-10-05 PROCEDURE — 85007 BL SMEAR W/DIFF WBC COUNT: CPT | Performed by: NURSE PRACTITIONER

## 2019-10-05 PROCEDURE — 84100 ASSAY OF PHOSPHORUS: CPT | Performed by: NURSE PRACTITIONER

## 2019-10-05 PROCEDURE — 99232 SBSQ HOSP IP/OBS MODERATE 35: CPT | Performed by: INTERNAL MEDICINE

## 2019-10-05 PROCEDURE — 82330 ASSAY OF CALCIUM: CPT | Performed by: NURSE PRACTITIONER

## 2019-10-05 PROCEDURE — 94760 N-INVAS EAR/PLS OXIMETRY 1: CPT

## 2019-10-05 PROCEDURE — 83735 ASSAY OF MAGNESIUM: CPT | Performed by: INTERNAL MEDICINE

## 2019-10-05 PROCEDURE — 94799 UNLISTED PULMONARY SVC/PX: CPT

## 2019-10-05 RX ORDER — LIDOCAINE 50 MG/G
1 PATCH TOPICAL
Status: DISCONTINUED | OUTPATIENT
Start: 2019-10-05 | End: 2019-10-07 | Stop reason: HOSPADM

## 2019-10-05 RX ADMIN — ASPIRIN 81 MG 81 MG: 81 TABLET ORAL at 09:02

## 2019-10-05 RX ADMIN — DIAZEPAM 5 MG: 5 TABLET ORAL at 17:22

## 2019-10-05 RX ADMIN — NYSTATIN 15 ML: 100000 SUSPENSION ORAL at 17:46

## 2019-10-05 RX ADMIN — SPIRONOLACTONE 25 MG: 25 TABLET ORAL at 09:02

## 2019-10-05 RX ADMIN — TRAMADOL HYDROCHLORIDE 25 MG: 50 TABLET, FILM COATED ORAL at 11:49

## 2019-10-05 RX ADMIN — TICAGRELOR 90 MG: 90 TABLET ORAL at 20:20

## 2019-10-05 RX ADMIN — Medication 10 ML: at 20:20

## 2019-10-05 RX ADMIN — POTASSIUM CHLORIDE 20 MEQ: 1500 TABLET, EXTENDED RELEASE ORAL at 09:02

## 2019-10-05 RX ADMIN — NYSTATIN 15 ML: 100000 SUSPENSION ORAL at 09:00

## 2019-10-05 RX ADMIN — DIAZEPAM 5 MG: 5 TABLET ORAL at 09:12

## 2019-10-05 RX ADMIN — IPRATROPIUM BROMIDE AND ALBUTEROL SULFATE 3 ML: .5; 3 SOLUTION RESPIRATORY (INHALATION) at 14:26

## 2019-10-05 RX ADMIN — IPRATROPIUM BROMIDE AND ALBUTEROL SULFATE 3 ML: .5; 3 SOLUTION RESPIRATORY (INHALATION) at 18:13

## 2019-10-05 RX ADMIN — ATORVASTATIN CALCIUM 40 MG: 40 TABLET, FILM COATED ORAL at 20:20

## 2019-10-05 RX ADMIN — FUROSEMIDE 40 MG: 40 TABLET ORAL at 09:02

## 2019-10-05 RX ADMIN — BENZONATATE 200 MG: 100 CAPSULE, LIQUID FILLED ORAL at 09:12

## 2019-10-05 RX ADMIN — TRAMADOL HYDROCHLORIDE 25 MG: 50 TABLET, FILM COATED ORAL at 20:24

## 2019-10-05 RX ADMIN — Medication 10 ML: at 09:02

## 2019-10-05 RX ADMIN — ACETAMINOPHEN 650 MG: 325 TABLET ORAL at 17:22

## 2019-10-05 RX ADMIN — NYSTATIN 15 ML: 100000 SUSPENSION ORAL at 11:49

## 2019-10-05 RX ADMIN — METOPROLOL SUCCINATE 12.5 MG: 25 TABLET, EXTENDED RELEASE ORAL at 09:01

## 2019-10-05 RX ADMIN — ENOXAPARIN SODIUM 40 MG: 40 INJECTION SUBCUTANEOUS at 16:35

## 2019-10-05 RX ADMIN — IPRATROPIUM BROMIDE AND ALBUTEROL SULFATE 3 ML: .5; 3 SOLUTION RESPIRATORY (INHALATION) at 06:39

## 2019-10-05 RX ADMIN — NYSTATIN 15 ML: 100000 SUSPENSION ORAL at 20:20

## 2019-10-05 RX ADMIN — LIDOCAINE 1 PATCH: 50 PATCH CUTANEOUS at 14:35

## 2019-10-05 RX ADMIN — IPRATROPIUM BROMIDE AND ALBUTEROL SULFATE 3 ML: .5; 3 SOLUTION RESPIRATORY (INHALATION) at 11:44

## 2019-10-05 RX ADMIN — TICAGRELOR 90 MG: 90 TABLET ORAL at 09:01

## 2019-10-05 NOTE — PLAN OF CARE
Problem: Skin Injury Risk (Adult)  Goal: Skin Health and Integrity  Outcome: Ongoing (interventions implemented as appropriate)      Problem: Patient Care Overview  Goal: Plan of Care Review  Outcome: Ongoing (interventions implemented as appropriate)   10/05/19 1800   Coping/Psychosocial   Plan of Care Reviewed With patient   Plan of Care Review   Progress improving   OTHER   Outcome Summary Patient still having left sided chest/arm pain. Vitals remained stable and plans for discharge on Monday.      Goal: Individualization and Mutuality  Outcome: Ongoing (interventions implemented as appropriate)    Goal: Discharge Needs Assessment  Outcome: Ongoing (interventions implemented as appropriate)    Goal: Interprofessional Rounds/Family Conf  Outcome: Ongoing (interventions implemented as appropriate)      Problem: Pain, Chronic (Adult)  Goal: Acceptable Pain/Comfort Level and Functional Ability  Outcome: Ongoing (interventions implemented as appropriate)      Problem: Cardiac: ACS (Acute Coronary Syndrome) (Adult)  Goal: Signs and Symptoms of Listed Potential Problems Will be Absent, Minimized or Managed (Cardiac: ACS)  Outcome: Ongoing (interventions implemented as appropriate)

## 2019-10-05 NOTE — PLAN OF CARE
Problem: Patient Care Overview  Goal: Plan of Care Review  Outcome: Ongoing (interventions implemented as appropriate)      Problem: Pain, Chronic (Adult)  Goal: Acceptable Pain/Comfort Level and Functional Ability  Outcome: Ongoing (interventions implemented as appropriate)      Problem: Cardiac: ACS (Acute Coronary Syndrome) (Adult)  Goal: Signs and Symptoms of Listed Potential Problems Will be Absent, Minimized or Managed (Cardiac: ACS)  Outcome: Ongoing (interventions implemented as appropriate)
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
## 2019-10-05 NOTE — PROGRESS NOTES
Referring Provider: Hospitalist    Reason for follow-up: Follow-up acute myocardial function  Patient Care Team:  Yodit Johnston MD as PCP - General (Family Medicine)    Subjective .  No chest pain or shortness of breath    Objective  In bed comfortably     Review of Systems   Constitution: Negative for fever and malaise/fatigue.   HENT: Negative for ear pain and nosebleeds.    Eyes: Negative for blurred vision and double vision.   Cardiovascular: Negative for chest pain, dyspnea on exertion and palpitations.   Respiratory: Negative for cough and shortness of breath.    Skin: Negative for rash.   Musculoskeletal: Negative for joint pain.   Gastrointestinal: Negative for abdominal pain, nausea and vomiting.   Neurological: Negative for focal weakness and headaches.   Psychiatric/Behavioral: Negative for depression. The patient is not nervous/anxious.    All other systems reviewed and are negative.      Penicillins; Ciprofloxacin; and Penicillin g    Scheduled Meds:    aspirin 81 mg Oral Daily   atorvastatin 40 mg Oral Nightly   enoxaparin 40 mg Subcutaneous Q24H   furosemide 40 mg Oral Daily   hydrocortisone-diphenhydramine-nystatin 15 mL Oral 4x Daily   ipratropium-albuterol 3 mL Nebulization 4x Daily - RT   metoprolol succinate XL 12.5 mg Oral Q24H   potassium chloride 20 mEq Oral Daily   sodium chloride 10 mL Intravenous Q12H   spironolactone 12.5 mg Oral Once   spironolactone 25 mg Oral Daily   ticagrelor 90 mg Oral BID     Continuous Infusions:   PRN Meds:.•  acetaminophen **OR** acetaminophen  •  aluminum-magnesium hydroxide-simethicone  •  benzocaine  •  benzonatate  •  bisacodyl  •  diazePAM  •  ipratropium-albuterol  •  magnesium hydroxide  •  nitroglycerin  •  ondansetron **OR** ondansetron  •  sodium chloride  •  sodium chloride  •  traMADol        VITAL SIGNS  Vitals:    10/05/19 0900 10/05/19 1030 10/05/19 1144 10/05/19 1147   BP: 118/78 123/79     Pulse: 73 74 82 78   Resp:  14 16 16   Temp:  98.3 °F  "(36.8 °C)     TempSrc:  Oral     SpO2:  95% 95%    Weight:       Height:           Flowsheet Rows      First Filed Value   Admission Height  165.1 cm (65\") Documented at 09/30/2019 0557   Admission Weight  84.1 kg (185 lb 6.5 oz) Documented at 09/29/2019 2015           TELEMETRY: Sinus rhythm    Physical Exam:  Physical Exam   Constitutional: She appears well-developed and well-nourished.   HENT:   Head: Normocephalic and atraumatic.   Eyes: Conjunctivae and EOM are normal. Pupils are equal, round, and reactive to light. No scleral icterus.   Neck: Normal range of motion. Neck supple. No JVD present. Carotid bruit is not present.   Cardiovascular: Normal rate, regular rhythm, S1 normal, S2 normal, normal heart sounds and intact distal pulses. PMI is not displaced.   Pulmonary/Chest: Effort normal and breath sounds normal. She has no wheezes. She has no rales.   Abdominal: Soft. Bowel sounds are normal.   Musculoskeletal: Normal range of motion.   Neurological: She is alert. She has normal strength.   No focal deficits   Skin: Skin is warm and dry. No rash noted.   Psychiatric: She has a normal mood and affect.        Results Review:   I reviewed the patient's new clinical results.  Lab Results (last 24 hours)     Procedure Component Value Units Date/Time    CBC & Differential [[id number]] Collected:  10/05/19 0605    Specimen:  Blood Updated:  10/05/19 0807    Narrative:       The following orders were created for panel order CBC & Differential.  Procedure                               Abnormality         Status                     ---------                               -----------         ------                     CBC Auto Differential[[id number]]        Normal              Final result                 Please view results for these tests on the individual orders.    CBC Auto Differential [[id number]]  (Normal) Collected:  10/05/19 0605    Specimen:  Blood Updated:  10/05/19 0807     WBC 7.10 10*3/mm3      RBC 3.82 " 10*6/mm3      Hemoglobin 12.0 g/dL      Hematocrit 35.1 %      MCV 91.9 fL      MCH 31.5 pg      MCHC 34.3 g/dL      RDW 13.3 %      RDW-SD 42.9 fl      MPV 8.3 fL      Platelets 237 10*3/mm3     Scan Slide [[id number]] Collected:  10/05/19 0605    Specimen:  Blood Updated:  10/05/19 0807     Scan Slide --     Comment: See Manual Differential Results       Manual Differential [[id number]]  (Abnormal) Collected:  10/05/19 0605    Specimen:  Blood Updated:  10/05/19 0807     Neutrophil % 52.0 %      Lymphocyte % 26.0 %      Monocyte % 12.0 %      Eosinophil % 4.0 %      Bands %  4.0 %      Metamyelocyte % 2.0 %      Neutrophils Absolute 3.98 10*3/mm3      Lymphocytes Absolute 1.85 10*3/mm3      Monocytes Absolute 0.85 10*3/mm3      Eosinophils Absolute 0.28 10*3/mm3      RBC Morphology Normal     Toxic Granulation Slight/1+     Platelet Morphology Normal    Narrative:       Slide Reviewed    Comprehensive Metabolic Panel [[id number]]  (Abnormal) Collected:  10/05/19 0605    Specimen:  Blood Updated:  10/05/19 0712     Glucose 107 mg/dL      BUN 14 mg/dL      Creatinine 1.00 mg/dL      Sodium 136 mmol/L      Potassium 4.4 mmol/L      Chloride 99 mmol/L      CO2 25.0 mmol/L      Calcium 9.0 mg/dL      Total Protein 7.1 g/dL      Albumin 3.70 g/dL      ALT (SGPT) 65 U/L      AST (SGOT) 33 U/L      Alkaline Phosphatase 58 U/L      Total Bilirubin 0.8 mg/dL      eGFR Non African Amer 59 mL/min/1.73      Globulin 3.4 gm/dL      A/G Ratio 1.1 g/dL      BUN/Creatinine Ratio 14.0     Anion Gap 16.4 mmol/L     Phosphorus [[id number]]  (Abnormal) Collected:  10/05/19 0605    Specimen:  Blood Updated:  10/05/19 0711     Phosphorus 5.0 mg/dL     Magnesium [[id number]]  (Normal) Collected:  10/05/19 0605    Specimen:  Blood Updated:  10/05/19 0711     Magnesium 2.1 mg/dL     Troponin [[id number]]  (Abnormal) Collected:  10/05/19 0605    Specimen:  Blood Updated:  10/05/19 0704     Troponin I 0.730 ng/mL      Comment: Result checked         Narrative:       Troponin I Reference Range:    0.00-0.03  Negative.  Repeat testing in 4-6 hours if clinically indicated.    0.04-0.29  Suspicious for myocardial injury. Serial measurements and clinical  correlation may be necessary to confirm or exclude diagnosis of acute  coronary syndrome.  Repeat testing in 4-6 hours if indicated.     >0.29 Consistent with myocardial injury.  Recommend clinical and laboratory correlation.     Results my be falsely decreased if patient taking Biotin.     Calcium, Ionized [[id number]]  (Normal) Collected:  10/05/19 0605    Specimen:  Blood Updated:  10/05/19 0628     Ionized Calcium 1.23 mmol/L     Troponin [[id number]]  (Abnormal) Collected:  10/04/19 2342    Specimen:  Blood Updated:  10/05/19 0109     Troponin I 0.920 ng/mL     Narrative:       Troponin I Reference Range:    0.00-0.03  Negative.  Repeat testing in 4-6 hours if clinically indicated.    0.04-0.29  Suspicious for myocardial injury. Serial measurements and clinical  correlation may be necessary to confirm or exclude diagnosis of acute  coronary syndrome.  Repeat testing in 4-6 hours if indicated.     >0.29 Consistent with myocardial injury.  Recommend clinical and laboratory correlation.     Results my be falsely decreased if patient taking Biotin.     Blood Culture - Blood, Groin, left [[id number]] Collected:  09/29/19 2252    Specimen:  Blood from Groin, left Updated:  10/04/19 2300     Blood Culture No growth at 5 days    Blood Culture - Blood, Arm, Right [[id number]] Collected:  09/29/19 2157    Specimen:  Blood from Arm, Right Updated:  10/04/19 2227     Blood Culture No growth at 5 days    Troponin [[id number]]  (Abnormal) Collected:  10/04/19 1818    Specimen:  Blood Updated:  10/04/19 1944     Troponin I 1.060 ng/mL     Narrative:       Troponin I Reference Range:    0.00-0.03  Negative.  Repeat testing in 4-6 hours if clinically indicated.    0.04-0.29  Suspicious for myocardial injury. Serial measurements  and clinical  correlation may be necessary to confirm or exclude diagnosis of acute  coronary syndrome.  Repeat testing in 4-6 hours if indicated.     >0.29 Consistent with myocardial injury.  Recommend clinical and laboratory correlation.     Results my be falsely decreased if patient taking Biotin.     Troponin [[id number]]  (Abnormal) Collected:  10/04/19 1245    Specimen:  Blood Updated:  10/04/19 1450     Troponin I 1.090 ng/mL     Narrative:       Troponin I Reference Range:    0.00-0.03  Negative.  Repeat testing in 4-6 hours if clinically indicated.    0.04-0.29  Suspicious for myocardial injury. Serial measurements and clinical  correlation may be necessary to confirm or exclude diagnosis of acute  coronary syndrome.  Repeat testing in 4-6 hours if indicated.     >0.29 Consistent with myocardial injury.  Recommend clinical and laboratory correlation.     Results my be falsely decreased if patient taking Biotin.           Imaging Results (last 24 hours)     ** No results found for the last 24 hours. **          EKG      I personally viewed and interpreted the patient's EKG/Telemetry data:    ECHOCARDIOGRAM:    STRESS MYOVIEW:    CARDIAC CATHETERIZATION:    OTHER:         Assessment/Plan     #1 coronary artery disease status post MI and stent placement to the LAD  2.  History of hypertension  3.  History of hyperlipidemia  4.  History of elevated liver function tests  5.  Status post cardiac shock with congestive heart failure  6.  Status post cardiac arrest and successful resuscitation  7.  Anemia with thrombocytopenia    Patient presented with chest pain and had acute anterior wall MI status post PCI to the proximal ostial and mid LAD with drug-eluting stents  Patient had an awful disease in the circumducts artery and RCA  Patient is currently on aspirin Brilinta spironolactone beta-blockers and statins  If patient's blood pressure is better we will also start on low-dose ACE inhibitors  Patient had an  echocardiogram which showed a EF of about 20 to 30%  Patient will go home on LifeVest patient statins were held secondary to elevated liver function tests but they are better now  Patient is advised to stop smoking      I discussed the patients findings and my recommendations with patient and family    Cecilio Rand MD  10/05/19  12:25 PM

## 2019-10-06 LAB
ALBUMIN SERPL-MCNC: 3.6 G/DL (ref 3.5–4.8)
ALBUMIN/GLOB SERPL: 1 G/DL (ref 1–1.7)
ALP SERPL-CCNC: 59 U/L (ref 32–91)
ALT SERPL W P-5'-P-CCNC: 56 U/L (ref 14–54)
ANION GAP SERPL CALCULATED.3IONS-SCNC: 16.5 MMOL/L (ref 5–15)
ANISOCYTOSIS BLD QL: NORMAL
AST SERPL-CCNC: 29 U/L (ref 15–41)
BILIRUB SERPL-MCNC: 0.8 MG/DL (ref 0.3–1.2)
BUN BLD-MCNC: 16 MG/DL (ref 8–20)
BUN/CREAT SERPL: 16 (ref 5.4–26.2)
CA-I SERPL ISE-MCNC: 1.22 MMOL/L (ref 1.2–1.3)
CALCIUM SPEC-SCNC: 8.9 MG/DL (ref 8.9–10.3)
CHLORIDE SERPL-SCNC: 96 MMOL/L (ref 101–111)
CO2 SERPL-SCNC: 26 MMOL/L (ref 22–32)
CREAT BLD-MCNC: 1 MG/DL (ref 0.4–1)
DEPRECATED RDW RBC AUTO: 42.9 FL (ref 37–54)
EOSINOPHIL # BLD MANUAL: 0.21 10*3/MM3 (ref 0–0.4)
EOSINOPHIL NFR BLD MANUAL: 3 % (ref 0.3–6.2)
ERYTHROCYTE [DISTWIDTH] IN BLOOD BY AUTOMATED COUNT: 13.3 % (ref 12.3–15.4)
GFR SERPL CREATININE-BSD FRML MDRD: 59 ML/MIN/1.73
GIANT PLATELETS: NORMAL
GLOBULIN UR ELPH-MCNC: 3.5 GM/DL (ref 2.5–3.8)
GLUCOSE BLD-MCNC: 90 MG/DL (ref 65–99)
HCT VFR BLD AUTO: 35.2 % (ref 34–46.6)
HGB BLD-MCNC: 11.9 G/DL (ref 12–15.9)
LYMPHOCYTES # BLD MANUAL: 2.03 10*3/MM3 (ref 0.7–3.1)
LYMPHOCYTES NFR BLD MANUAL: 12 % (ref 5–12)
LYMPHOCYTES NFR BLD MANUAL: 29 % (ref 19.6–45.3)
MAGNESIUM SERPL-MCNC: 2.3 MG/DL (ref 1.8–2.5)
MCH RBC QN AUTO: 31.2 PG (ref 26.6–33)
MCHC RBC AUTO-ENTMCNC: 33.8 G/DL (ref 31.5–35.7)
MCV RBC AUTO: 92.5 FL (ref 79–97)
MONOCYTES # BLD AUTO: 0.84 10*3/MM3 (ref 0.1–0.9)
NEUTROPHILS # BLD AUTO: 3.92 10*3/MM3 (ref 1.7–7)
NEUTROPHILS NFR BLD MANUAL: 51 % (ref 42.7–76)
NEUTS BAND NFR BLD MANUAL: 5 % (ref 0–5)
PHOSPHATE SERPL-MCNC: 5.4 MG/DL (ref 2.4–4.7)
PLATELET # BLD AUTO: 264 10*3/MM3 (ref 140–450)
PMV BLD AUTO: 8.7 FL (ref 6–12)
POTASSIUM BLD-SCNC: 4.5 MMOL/L (ref 3.6–5.1)
PROT SERPL-MCNC: 7.1 G/DL (ref 6.1–7.9)
RBC # BLD AUTO: 3.81 10*6/MM3 (ref 3.77–5.28)
SCAN SLIDE: NORMAL
SODIUM BLD-SCNC: 134 MMOL/L (ref 136–144)
TROPONIN I SERPL-MCNC: 0.34 NG/ML (ref 0–0.03)
WBC MORPH BLD: NORMAL
WBC NRBC COR # BLD: 7 10*3/MM3 (ref 3.4–10.8)

## 2019-10-06 PROCEDURE — 80053 COMPREHEN METABOLIC PANEL: CPT | Performed by: INTERNAL MEDICINE

## 2019-10-06 PROCEDURE — 25010000002 ENOXAPARIN PER 10 MG: Performed by: INTERNAL MEDICINE

## 2019-10-06 PROCEDURE — 85025 COMPLETE CBC W/AUTO DIFF WBC: CPT | Performed by: NURSE PRACTITIONER

## 2019-10-06 PROCEDURE — 82330 ASSAY OF CALCIUM: CPT | Performed by: NURSE PRACTITIONER

## 2019-10-06 PROCEDURE — 83735 ASSAY OF MAGNESIUM: CPT | Performed by: INTERNAL MEDICINE

## 2019-10-06 PROCEDURE — 84100 ASSAY OF PHOSPHORUS: CPT | Performed by: NURSE PRACTITIONER

## 2019-10-06 PROCEDURE — 94799 UNLISTED PULMONARY SVC/PX: CPT

## 2019-10-06 PROCEDURE — 93005 ELECTROCARDIOGRAM TRACING: CPT | Performed by: NURSE PRACTITIONER

## 2019-10-06 PROCEDURE — 85007 BL SMEAR W/DIFF WBC COUNT: CPT | Performed by: NURSE PRACTITIONER

## 2019-10-06 PROCEDURE — 84484 ASSAY OF TROPONIN QUANT: CPT | Performed by: INTERNAL MEDICINE

## 2019-10-06 PROCEDURE — 99232 SBSQ HOSP IP/OBS MODERATE 35: CPT | Performed by: INTERNAL MEDICINE

## 2019-10-06 PROCEDURE — 93010 ELECTROCARDIOGRAM REPORT: CPT | Performed by: INTERNAL MEDICINE

## 2019-10-06 RX ADMIN — NYSTATIN 15 ML: 100000 SUSPENSION ORAL at 20:12

## 2019-10-06 RX ADMIN — TRAMADOL HYDROCHLORIDE 25 MG: 50 TABLET, FILM COATED ORAL at 20:06

## 2019-10-06 RX ADMIN — LIDOCAINE 1 PATCH: 50 PATCH CUTANEOUS at 08:41

## 2019-10-06 RX ADMIN — BENZONATATE 200 MG: 100 CAPSULE, LIQUID FILLED ORAL at 08:30

## 2019-10-06 RX ADMIN — IPRATROPIUM BROMIDE AND ALBUTEROL SULFATE 3 ML: .5; 3 SOLUTION RESPIRATORY (INHALATION) at 18:47

## 2019-10-06 RX ADMIN — IPRATROPIUM BROMIDE AND ALBUTEROL SULFATE 3 ML: .5; 3 SOLUTION RESPIRATORY (INHALATION) at 15:03

## 2019-10-06 RX ADMIN — NYSTATIN 15 ML: 100000 SUSPENSION ORAL at 11:57

## 2019-10-06 RX ADMIN — TRAMADOL HYDROCHLORIDE 25 MG: 50 TABLET, FILM COATED ORAL at 11:57

## 2019-10-06 RX ADMIN — ENOXAPARIN SODIUM 40 MG: 40 INJECTION SUBCUTANEOUS at 17:09

## 2019-10-06 RX ADMIN — BENZONATATE 200 MG: 100 CAPSULE, LIQUID FILLED ORAL at 20:10

## 2019-10-06 RX ADMIN — Medication 10 ML: at 20:13

## 2019-10-06 RX ADMIN — SPIRONOLACTONE 25 MG: 25 TABLET ORAL at 08:30

## 2019-10-06 RX ADMIN — IPRATROPIUM BROMIDE AND ALBUTEROL SULFATE 3 ML: .5; 3 SOLUTION RESPIRATORY (INHALATION) at 06:16

## 2019-10-06 RX ADMIN — IPRATROPIUM BROMIDE AND ALBUTEROL SULFATE 3 ML: .5; 3 SOLUTION RESPIRATORY (INHALATION) at 10:17

## 2019-10-06 RX ADMIN — FUROSEMIDE 40 MG: 40 TABLET ORAL at 08:29

## 2019-10-06 RX ADMIN — ATORVASTATIN CALCIUM 40 MG: 40 TABLET, FILM COATED ORAL at 20:06

## 2019-10-06 RX ADMIN — TICAGRELOR 90 MG: 90 TABLET ORAL at 08:30

## 2019-10-06 RX ADMIN — DIAZEPAM 5 MG: 5 TABLET ORAL at 17:29

## 2019-10-06 RX ADMIN — DIAZEPAM 5 MG: 5 TABLET ORAL at 08:30

## 2019-10-06 RX ADMIN — Medication 10 ML: at 08:31

## 2019-10-06 RX ADMIN — NYSTATIN 15 ML: 100000 SUSPENSION ORAL at 09:41

## 2019-10-06 RX ADMIN — TICAGRELOR 90 MG: 90 TABLET ORAL at 20:06

## 2019-10-06 RX ADMIN — NYSTATIN 15 ML: 100000 SUSPENSION ORAL at 17:09

## 2019-10-06 RX ADMIN — ASPIRIN 81 MG 81 MG: 81 TABLET ORAL at 08:30

## 2019-10-06 RX ADMIN — METOPROLOL SUCCINATE 12.5 MG: 25 TABLET, EXTENDED RELEASE ORAL at 08:29
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
## 2019-10-06 NOTE — PLAN OF CARE
Problem: Skin Injury Risk (Adult)  Goal: Skin Health and Integrity  Outcome: Ongoing (interventions implemented as appropriate)      Problem: Patient Care Overview  Goal: Plan of Care Review  Outcome: Ongoing (interventions implemented as appropriate)   10/06/19 1524   Coping/Psychosocial   Plan of Care Reviewed With patient   Plan of Care Review   Progress improving   OTHER   Outcome Summary Patients vitals remained stable today and less complaints of pain today. Plans to D/C tomorrow. Patient voiced some anxiety about going home. Will continue to monitor.     Goal: Individualization and Mutuality  Outcome: Ongoing (interventions implemented as appropriate)    Goal: Discharge Needs Assessment  Outcome: Ongoing (interventions implemented as appropriate)    Goal: Interprofessional Rounds/Family Conf  Outcome: Ongoing (interventions implemented as appropriate)      Problem: Pain, Chronic (Adult)  Goal: Acceptable Pain/Comfort Level and Functional Ability  Outcome: Ongoing (interventions implemented as appropriate)      Problem: Cardiac: ACS (Acute Coronary Syndrome) (Adult)  Goal: Signs and Symptoms of Listed Potential Problems Will be Absent, Minimized or Managed (Cardiac: ACS)  Outcome: Ongoing (interventions implemented as appropriate)

## 2019-10-06 NOTE — PROGRESS NOTES
Referring Provider: Hospitalist    Reason for follow-up: Follow-up acute myocardial function  Patient Care Team:  Yodit Johnston MD as PCP - General (Family Medicine)    Subjective .  No chest pain or shortness of breath    Objective  In bed comfortably     Review of Systems   Constitution: Negative for fever and malaise/fatigue.   HENT: Negative for ear pain and nosebleeds.    Eyes: Negative for blurred vision and double vision.   Cardiovascular: Negative for chest pain, dyspnea on exertion and palpitations.   Respiratory: Negative for cough and shortness of breath.    Skin: Negative for rash.   Musculoskeletal: Negative for joint pain.   Gastrointestinal: Negative for abdominal pain, nausea and vomiting.   Neurological: Negative for focal weakness and headaches.   Psychiatric/Behavioral: Negative for depression. The patient is not nervous/anxious.    All other systems reviewed and are negative.      Penicillins; Ciprofloxacin; and Penicillin g    Scheduled Meds:    aspirin 81 mg Oral Daily   atorvastatin 40 mg Oral Nightly   enoxaparin 40 mg Subcutaneous Q24H   furosemide 40 mg Oral Daily   hydrocortisone-diphenhydramine-nystatin 15 mL Oral 4x Daily   ipratropium-albuterol 3 mL Nebulization 4x Daily - RT   lidocaine 1 patch Transdermal Q24H   metoprolol succinate XL 12.5 mg Oral Q24H   sodium chloride 10 mL Intravenous Q12H   spironolactone 12.5 mg Oral Once   spironolactone 25 mg Oral Daily   ticagrelor 90 mg Oral BID     Continuous Infusions:   PRN Meds:.•  acetaminophen **OR** acetaminophen  •  aluminum-magnesium hydroxide-simethicone  •  benzocaine  •  benzonatate  •  bisacodyl  •  diazePAM  •  ipratropium-albuterol  •  magnesium hydroxide  •  nitroglycerin  •  ondansetron **OR** ondansetron  •  sodium chloride  •  sodium chloride  •  traMADol        VITAL SIGNS  Vitals:    10/06/19 0630 10/06/19 0829 10/06/19 1017 10/06/19 1020   BP: 104/68 116/73     BP Location: Right arm      Patient Position: Lying     "  Pulse: 72 76 80 78   Resp: 12 24 22   Temp: 98.5 °F (36.9 °C)      TempSrc: Oral      SpO2: 91%  94%    Weight:       Height:           Flowsheet Rows      First Filed Value   Admission Height  165.1 cm (65\") Documented at 09/30/2019 0557   Admission Weight  84.1 kg (185 lb 6.5 oz) Documented at 09/29/2019 2015           TELEMETRY: Sinus rhythm    Physical Exam:  Physical Exam   Constitutional: She appears well-developed and well-nourished.   HENT:   Head: Normocephalic and atraumatic.   Eyes: Conjunctivae and EOM are normal. Pupils are equal, round, and reactive to light. No scleral icterus.   Neck: Normal range of motion. Neck supple. No JVD present. Carotid bruit is not present.   Cardiovascular: Normal rate, regular rhythm, S1 normal, S2 normal, normal heart sounds and intact distal pulses. PMI is not displaced.   Pulmonary/Chest: Effort normal and breath sounds normal. She has no wheezes. She has no rales.   Abdominal: Soft. Bowel sounds are normal.   Musculoskeletal: Normal range of motion.   Neurological: She is alert. She has normal strength.   No focal deficits   Skin: Skin is warm and dry. No rash noted.   Psychiatric: She has a normal mood and affect.        Results Review:   I reviewed the patient's new clinical results.  Lab Results (last 24 hours)     Procedure Component Value Units Date/Time    Troponin [[id number]]  (Abnormal) Collected:  10/06/19 0210    Specimen:  Blood Updated:  10/06/19 0401     Troponin I 0.340 ng/mL     Narrative:       Troponin I Reference Range:    0.00-0.03  Negative.  Repeat testing in 4-6 hours if clinically indicated.    0.04-0.29  Suspicious for myocardial injury. Serial measurements and clinical  correlation may be necessary to confirm or exclude diagnosis of acute  coronary syndrome.  Repeat testing in 4-6 hours if indicated.     >0.29 Consistent with myocardial injury.  Recommend clinical and laboratory correlation.     Results my be falsely decreased if patient " taking Biotin.     CBC & Differential [[id number]] Collected:  10/06/19 0210    Specimen:  Blood Updated:  10/06/19 0355    Narrative:       The following orders were created for panel order CBC & Differential.  Procedure                               Abnormality         Status                     ---------                               -----------         ------                     CBC Auto Differential[[id number]]        Abnormal            Final result                 Please view results for these tests on the individual orders.    CBC Auto Differential [[id number]]  (Abnormal) Collected:  10/06/19 0210    Specimen:  Blood Updated:  10/06/19 0355     WBC 7.00 10*3/mm3      RBC 3.81 10*6/mm3      Hemoglobin 11.9 g/dL      Hematocrit 35.2 %      MCV 92.5 fL      MCH 31.2 pg      MCHC 33.8 g/dL      RDW 13.3 %      RDW-SD 42.9 fl      MPV 8.7 fL      Platelets 264 10*3/mm3     Scan Slide [[id number]] Collected:  10/06/19 0210    Specimen:  Blood Updated:  10/06/19 0355     Scan Slide --     Comment: See Manual Differential Results       Manual Differential [[id number]] Collected:  10/06/19 0210    Specimen:  Blood Updated:  10/06/19 0355     Neutrophil % 51.0 %      Lymphocyte % 29.0 %      Monocyte % 12.0 %      Eosinophil % 3.0 %      Bands %  5.0 %      Neutrophils Absolute 3.92 10*3/mm3      Lymphocytes Absolute 2.03 10*3/mm3      Monocytes Absolute 0.84 10*3/mm3      Eosinophils Absolute 0.21 10*3/mm3      Anisocytosis Slight/1+     WBC Morphology Normal     Giant Platelets Slight/1+    Phosphorus [[id number]]  (Abnormal) Collected:  10/06/19 0210    Specimen:  Blood Updated:  10/06/19 0351     Phosphorus 5.4 mg/dL     Comprehensive Metabolic Panel [[id number]]  (Abnormal) Collected:  10/06/19 0210    Specimen:  Blood Updated:  10/06/19 0351     Glucose 90 mg/dL      BUN 16 mg/dL      Creatinine 1.00 mg/dL      Sodium 134 mmol/L      Potassium 4.5 mmol/L      Chloride 96 mmol/L      CO2 26.0 mmol/L      Calcium 8.9  mg/dL      Total Protein 7.1 g/dL      Albumin 3.60 g/dL      ALT (SGPT) 56 U/L      AST (SGOT) 29 U/L      Alkaline Phosphatase 59 U/L      Total Bilirubin 0.8 mg/dL      eGFR Non African Amer 59 mL/min/1.73      Globulin 3.5 gm/dL      A/G Ratio 1.0 g/dL      BUN/Creatinine Ratio 16.0     Anion Gap 16.5 mmol/L     Magnesium [[id number]]  (Normal) Collected:  10/06/19 0210    Specimen:  Blood Updated:  10/06/19 0351     Magnesium 2.3 mg/dL     Calcium, Ionized [[id number]]  (Normal) Collected:  10/06/19 0210    Specimen:  Blood Updated:  10/06/19 0310     Ionized Calcium 1.22 mmol/L           Imaging Results (last 24 hours)     ** No results found for the last 24 hours. **          EKG      I personally viewed and interpreted the patient's EKG/Telemetry data:    ECHOCARDIOGRAM:    STRESS MYOVIEW:    CARDIAC CATHETERIZATION:    OTHER:         Assessment/Plan     #1 coronary artery disease status post MI and stent placement to the LAD  2.  History of hypertension  3.  History of hyperlipidemia  4.  History of elevated liver function tests  5.  Status post cardiac shock with congestive heart failure  6.  Status post cardiac arrest and successful resuscitation  7.  Anemia with thrombocytopenia    Patient presented with chest pain and had acute anterior wall MI status post PCI to the proximal ostial and mid LAD with drug-eluting stents  Patient had nonobstructive disease in the circumflex artery and RCA  Patient is currently on aspirin Brilinta spironolactone beta-blockers and statins  If patient's blood pressure is better we will also start on low-dose ACE inhibitors  Patient had an echocardiogram which showed a EF of about 20 to 30%  Patient will go home on [employer] patient statins were held secondary to elevated liver function tests but they are better now  Patient is advised to stop smoking      I discussed the patients findings and my recommendations with patient and family    Cecilio Rand MD  10/06/19  12:05  PM

## 2019-10-07 ENCOUNTER — APPOINTMENT (OUTPATIENT)
Dept: GENERAL RADIOLOGY | Facility: HOSPITAL | Age: 49
End: 2019-10-07

## 2019-10-07 VITALS
WEIGHT: 171.96 LBS | HEART RATE: 69 BPM | RESPIRATION RATE: 20 BRPM | HEIGHT: 64 IN | BODY MASS INDEX: 29.36 KG/M2 | OXYGEN SATURATION: 95 % | SYSTOLIC BLOOD PRESSURE: 117 MMHG | DIASTOLIC BLOOD PRESSURE: 80 MMHG | TEMPERATURE: 97.6 F

## 2019-10-07 PROBLEM — R57.0 SHOCK, CARDIOGENIC (HCC): Status: ACTIVE | Noted: 2019-10-07

## 2019-10-07 PROBLEM — I25.5 ISCHEMIC CARDIOMYOPATHY: Status: ACTIVE | Noted: 2019-10-07

## 2019-10-07 PROBLEM — I21.02 STEMI INVOLVING LEFT ANTERIOR DESCENDING CORONARY ARTERY: Status: ACTIVE | Noted: 2019-10-07

## 2019-10-07 PROBLEM — I46.9 CARDIAC ARREST WITH VENTRICULAR FIBRILLATION (HCC): Status: ACTIVE | Noted: 2019-10-07

## 2019-10-07 PROBLEM — I49.01 CARDIAC ARREST WITH VENTRICULAR FIBRILLATION (HCC): Status: ACTIVE | Noted: 2019-10-07

## 2019-10-07 LAB
ALBUMIN SERPL-MCNC: 3.5 G/DL (ref 3.5–4.8)
ALBUMIN/GLOB SERPL: 1.1 G/DL (ref 1–1.7)
ALP SERPL-CCNC: 76 U/L (ref 32–91)
ALT SERPL W P-5'-P-CCNC: 50 U/L (ref 14–54)
ANION GAP SERPL CALCULATED.3IONS-SCNC: 15.5 MMOL/L (ref 5–15)
AST SERPL-CCNC: 27 U/L (ref 15–41)
BASOPHILS # BLD AUTO: 0 10*3/MM3 (ref 0–0.2)
BASOPHILS NFR BLD AUTO: 0.5 % (ref 0–1.5)
BILIRUB SERPL-MCNC: 0.4 MG/DL (ref 0.3–1.2)
BUN BLD-MCNC: 16 MG/DL (ref 8–20)
BUN/CREAT SERPL: 17.8 (ref 5.4–26.2)
CA-I SERPL ISE-MCNC: 1.18 MMOL/L (ref 1.2–1.3)
CALCIUM SPEC-SCNC: 8.6 MG/DL (ref 8.9–10.3)
CHLORIDE SERPL-SCNC: 98 MMOL/L (ref 101–111)
CO2 SERPL-SCNC: 27 MMOL/L (ref 22–32)
CREAT BLD-MCNC: 0.9 MG/DL (ref 0.4–1)
DEPRECATED RDW RBC AUTO: 42.4 FL (ref 37–54)
EOSINOPHIL # BLD AUTO: 0.3 10*3/MM3 (ref 0–0.4)
EOSINOPHIL NFR BLD AUTO: 3.4 % (ref 0.3–6.2)
ERYTHROCYTE [DISTWIDTH] IN BLOOD BY AUTOMATED COUNT: 12.9 % (ref 12.3–15.4)
GFR SERPL CREATININE-BSD FRML MDRD: 66 ML/MIN/1.73
GLOBULIN UR ELPH-MCNC: 3.1 GM/DL (ref 2.5–3.8)
GLUCOSE BLD-MCNC: 104 MG/DL (ref 65–99)
HCT VFR BLD AUTO: 33.7 % (ref 34–46.6)
HGB BLD-MCNC: 11.3 G/DL (ref 12–15.9)
LYMPHOCYTES # BLD AUTO: 2.2 10*3/MM3 (ref 0.7–3.1)
LYMPHOCYTES NFR BLD AUTO: 27.9 % (ref 19.6–45.3)
MAGNESIUM SERPL-MCNC: 2.3 MG/DL (ref 1.8–2.5)
MCH RBC QN AUTO: 31.1 PG (ref 26.6–33)
MCHC RBC AUTO-ENTMCNC: 33.6 G/DL (ref 31.5–35.7)
MCV RBC AUTO: 92.7 FL (ref 79–97)
MONOCYTES # BLD AUTO: 0.8 10*3/MM3 (ref 0.1–0.9)
MONOCYTES NFR BLD AUTO: 10.8 % (ref 5–12)
NEUTROPHILS # BLD AUTO: 4.5 10*3/MM3 (ref 1.7–7)
NEUTROPHILS NFR BLD AUTO: 57.4 % (ref 42.7–76)
NRBC BLD AUTO-RTO: 0.1 /100 WBC (ref 0–0.2)
PHOSPHATE SERPL-MCNC: 4.2 MG/DL (ref 2.4–4.7)
PLATELET # BLD AUTO: 283 10*3/MM3 (ref 140–450)
PMV BLD AUTO: 8.8 FL (ref 6–12)
POTASSIUM BLD-SCNC: 4.5 MMOL/L (ref 3.6–5.1)
PROT SERPL-MCNC: 6.6 G/DL (ref 6.1–7.9)
RBC # BLD AUTO: 3.63 10*6/MM3 (ref 3.77–5.28)
SODIUM BLD-SCNC: 136 MMOL/L (ref 136–144)
WBC NRBC COR # BLD: 7.9 10*3/MM3 (ref 3.4–10.8)

## 2019-10-07 PROCEDURE — 99238 HOSP IP/OBS DSCHRG MGMT 30/<: CPT | Performed by: HOSPITALIST

## 2019-10-07 PROCEDURE — 82330 ASSAY OF CALCIUM: CPT | Performed by: NURSE PRACTITIONER

## 2019-10-07 PROCEDURE — 71046 X-RAY EXAM CHEST 2 VIEWS: CPT

## 2019-10-07 PROCEDURE — 85025 COMPLETE CBC W/AUTO DIFF WBC: CPT | Performed by: NURSE PRACTITIONER

## 2019-10-07 PROCEDURE — 93005 ELECTROCARDIOGRAM TRACING: CPT | Performed by: NURSE PRACTITIONER

## 2019-10-07 PROCEDURE — 84100 ASSAY OF PHOSPHORUS: CPT | Performed by: NURSE PRACTITIONER

## 2019-10-07 PROCEDURE — 99231 SBSQ HOSP IP/OBS SF/LOW 25: CPT | Performed by: INTERNAL MEDICINE

## 2019-10-07 PROCEDURE — 83735 ASSAY OF MAGNESIUM: CPT | Performed by: INTERNAL MEDICINE

## 2019-10-07 PROCEDURE — 93010 ELECTROCARDIOGRAM REPORT: CPT | Performed by: INTERNAL MEDICINE

## 2019-10-07 PROCEDURE — 80053 COMPREHEN METABOLIC PANEL: CPT | Performed by: INTERNAL MEDICINE

## 2019-10-07 PROCEDURE — 94799 UNLISTED PULMONARY SVC/PX: CPT

## 2019-10-07 RX ORDER — BENZONATATE 100 MG/1
100 CAPSULE ORAL 3 TIMES DAILY PRN
Qty: 30 CAPSULE | Refills: 0 | Status: SHIPPED | OUTPATIENT
Start: 2019-10-07 | End: 2019-10-25

## 2019-10-07 RX ORDER — BISACODYL 10 MG
10 SUPPOSITORY, RECTAL RECTAL DAILY PRN
Qty: 14 SUPPOSITORY | Refills: 0 | Status: SHIPPED | OUTPATIENT
Start: 2019-10-07 | End: 2019-10-21

## 2019-10-07 RX ORDER — ASPIRIN 81 MG/1
81 TABLET, CHEWABLE ORAL DAILY
Qty: 30 TABLET | Refills: 6 | Status: SHIPPED | OUTPATIENT
Start: 2019-10-08 | End: 2020-05-04 | Stop reason: SDUPTHER

## 2019-10-07 RX ORDER — NITROGLYCERIN 0.4 MG/1
0.4 TABLET SUBLINGUAL
Qty: 100 TABLET | Refills: 12 | Status: SHIPPED | OUTPATIENT
Start: 2019-10-07 | End: 2021-02-11

## 2019-10-07 RX ORDER — SPIRONOLACTONE 25 MG/1
25 TABLET ORAL DAILY
Qty: 30 TABLET | Refills: 3 | Status: SHIPPED | OUTPATIENT
Start: 2019-10-08 | End: 2020-02-19 | Stop reason: SDUPTHER

## 2019-10-07 RX ORDER — FUROSEMIDE 40 MG/1
40 TABLET ORAL DAILY
Qty: 30 TABLET | Refills: 3 | Status: SHIPPED | OUTPATIENT
Start: 2019-10-08 | End: 2019-10-25 | Stop reason: SDUPTHER

## 2019-10-07 RX ORDER — ATORVASTATIN CALCIUM 40 MG/1
40 TABLET, FILM COATED ORAL NIGHTLY
Qty: 30 TABLET | Refills: 3 | Status: SHIPPED | OUTPATIENT
Start: 2019-10-07 | End: 2019-10-30 | Stop reason: DRUGHIGH

## 2019-10-07 RX ORDER — METOPROLOL SUCCINATE 25 MG/1
12.5 TABLET, EXTENDED RELEASE ORAL
Qty: 30 TABLET | Refills: 3 | Status: SHIPPED | OUTPATIENT
Start: 2019-10-08 | End: 2019-10-16 | Stop reason: SDUPTHER

## 2019-10-07 RX ADMIN — LIDOCAINE 1 PATCH: 50 PATCH CUTANEOUS at 08:26

## 2019-10-07 RX ADMIN — TRAMADOL HYDROCHLORIDE 25 MG: 50 TABLET, FILM COATED ORAL at 08:23

## 2019-10-07 RX ADMIN — METOPROLOL SUCCINATE 12.5 MG: 25 TABLET, EXTENDED RELEASE ORAL at 08:23

## 2019-10-07 RX ADMIN — NYSTATIN 15 ML: 100000 SUSPENSION ORAL at 11:39

## 2019-10-07 RX ADMIN — SPIRONOLACTONE 25 MG: 25 TABLET ORAL at 08:21

## 2019-10-07 RX ADMIN — ASPIRIN 81 MG 81 MG: 81 TABLET ORAL at 08:23

## 2019-10-07 RX ADMIN — IPRATROPIUM BROMIDE AND ALBUTEROL SULFATE 3 ML: .5; 3 SOLUTION RESPIRATORY (INHALATION) at 06:40

## 2019-10-07 RX ADMIN — TICAGRELOR 90 MG: 90 TABLET ORAL at 08:23

## 2019-10-07 RX ADMIN — BENZONATATE 200 MG: 100 CAPSULE, LIQUID FILLED ORAL at 11:34

## 2019-10-07 RX ADMIN — NYSTATIN 15 ML: 100000 SUSPENSION ORAL at 08:20

## 2019-10-07 RX ADMIN — Medication 10 ML: at 08:24

## 2019-10-07 RX ADMIN — DIAZEPAM 5 MG: 5 TABLET ORAL at 11:34

## 2019-10-07 RX ADMIN — IPRATROPIUM BROMIDE AND ALBUTEROL SULFATE 3 ML: .5; 3 SOLUTION RESPIRATORY (INHALATION) at 10:52

## 2019-10-07 RX ADMIN — FUROSEMIDE 40 MG: 40 TABLET ORAL at 08:23
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
## 2019-10-07 NOTE — NURSING NOTE
Pt discharged home; with all belongings;wearing life vest accompanied by family and transporter; no distress noted

## 2019-10-07 NOTE — PROGRESS NOTES
Case Management Discharge Note    Final Note: [facility]                  Final Discharge Disposition Code: 06 - home with home health care
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
## 2019-10-07 NOTE — PLAN OF CARE
Problem: Skin Injury Risk (Adult)  Goal: Identify Related Risk Factors and Signs and Symptoms  Outcome: Ongoing (interventions implemented as appropriate)    Goal: Skin Health and Integrity  Outcome: Ongoing (interventions implemented as appropriate)      Problem: Patient Care Overview  Goal: Plan of Care Review  Outcome: Ongoing (interventions implemented as appropriate)    Goal: Individualization and Mutuality  Outcome: Ongoing (interventions implemented as appropriate)    Goal: Discharge Needs Assessment  Outcome: Ongoing (interventions implemented as appropriate)    Goal: Interprofessional Rounds/Family Conf  Outcome: Ongoing (interventions implemented as appropriate)      Problem: Pain, Chronic (Adult)  Goal: Identify Related Risk Factors and Signs and Symptoms  Outcome: Ongoing (interventions implemented as appropriate)    Goal: Acceptable Pain/Comfort Level and Functional Ability  Outcome: Ongoing (interventions implemented as appropriate)      Problem: Cardiac: ACS (Acute Coronary Syndrome) (Adult)  Goal: Signs and Symptoms of Listed Potential Problems Will be Absent, Minimized or Managed (Cardiac: ACS)  Outcome: Ongoing (interventions implemented as appropriate)

## 2019-10-07 NOTE — DISCHARGE INSTRUCTIONS
Rest and relax today  no lifting over 10 lbs x 2week  no driving for 24 hrs  no tub baths or hot tubs for 1 week  no stairs for 3 days  monitor right groin for s/s of bleeding    Continue aspirin and brilinta uninterrupted x 30 days   Patient to remain off work x 30 days or until otherwise directed by MD   Daily weight, call office if weight gain exceeds 2lb in 24 hr or 5 lb in 1 week  Keep b/p and weight log and present to provider at follow up.

## 2019-10-08 ENCOUNTER — READMISSION MANAGEMENT (OUTPATIENT)
Dept: CALL CENTER | Facility: HOSPITAL | Age: 49
End: 2019-10-08

## 2019-10-08 NOTE — OUTREACH NOTE
Prep Survey      Responses   Facility patient discharged from?  [facility]   Is patient eligible?  Yes   Discharge diagnosis    STEMI (POA) s/p SERENITY to LAD with nonobstructive lesions to distal LAD, RCA and LCx   Does the patient have one of the following disease processes/diagnoses(primary or secondary)?  Acute MI (STEMI,NSTEMI)   Does the patient have Home health ordered?  Yes   What is the [facility] health agency?   [facility]   Is there a DME ordered?  Yes   What DME was ordered?  [employer]   Prep survey completed?  Yes          Ginny Varma RN

## 2019-10-09 ENCOUNTER — READMISSION MANAGEMENT (OUTPATIENT)
Dept: CALL CENTER | Facility: HOSPITAL | Age: 49
End: 2019-10-09

## 2019-10-09 NOTE — OUTREACH NOTE
AMI Week 1 Survey      Responses   Facility patient discharged from?  [facility]   Does the patient have one of the following disease processes/diagnoses(primary or secondary)?  Acute MI (STEMI,NSTEMI)   Is there a successful TCM telephone encounter documented?  No   Week 1 attempt successful?  Yes   Call start time  1127   Call end time  1130   Discharge diagnosis    STEMI (POA) s/p SERENITY to LAD with nonobstructive lesions to distal LAD, RCA and LCx   Meds reviewed with patient/caregiver?  Yes   Is the patient having any side effects they believe may be caused by any medication additions or changes?  No   Does the patient have all prescriptions related to this admission filled (includes statins,anticoagulants,HTN meds,anti-arrhythmia meds)  Yes   Is the patient taking all medications as directed (includes completed medication regime)?  Yes   Does the patient have a primary care provider?   Yes   Does the patient have an appointment with their PCP,cardiologist,or clinic within 7 days of discharge?  Greater than 7 days   What is preventing the patient from scheduling follow up appointments within 7 days of discharge?  Earlier appointment not available   Nursing Interventions  Verified appointment date/time/provider   Has the patient kept scheduled appointments due by today?  N/A   What is the Home health agency?   [facility]   Has home health visited the patient within 72 hours of discharge?  Yes   What DME was ordered?  [employer]   Has all DME been delivered?  Yes   Did the patient receive a copy of their discharge instructions?  Yes   Nursing interventions  Reviewed instructions with patient   What is the patient's perception of their health status since discharge?  Improving   Nursing interventions  Nurse provided patient education   Is the patient/caregiver able to teach back signs and symptoms of when to call for help immediately:  Sudden chest discomfort, Sudden discomfort in arms, back, neck or jaw, Shortness of breath  at any time, Sudden sweating or clammy skin, Nausea or vomiting, Dizziness or lightheadedness, Irregular or rapid heart rate   Is the patient/caregiver able to teach back lifestyle changes to help prevent MIs  Heart healthy diet, Regular exercise as approved by provider, Maintaining a healthy weight, Reducing stress   Is the patient/caregiver able to teach back ways to prevent a second heart attack:  Take medications, Follow up with MD, Participate in Cardiac Rehab, Manage risk factors   Is the patient/caregiver able to teach back the hierarchy of who to call/visit for symptoms/problems? PCP, Specialist, Home health nurse, Urgent Care, ED, 911  Yes   Week 1 call completed?  Yes          Humera Courtney LPN
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
## 2019-10-11 ENCOUNTER — TELEPHONE (OUTPATIENT)
Dept: CARDIAC REHAB | Facility: HOSPITAL | Age: 49
End: 2019-10-11

## 2019-10-15 PROBLEM — Z92.89 HISTORY OF ECHOCARDIOGRAM: Status: ACTIVE | Noted: 2019-10-03

## 2019-10-15 PROBLEM — Z95.5 STENTED CORONARY ARTERY: Status: ACTIVE | Noted: 2019-09-29

## 2019-10-16 ENCOUNTER — READMISSION MANAGEMENT (OUTPATIENT)
Dept: CALL CENTER | Facility: HOSPITAL | Age: 49
End: 2019-10-16

## 2019-10-16 ENCOUNTER — OFFICE VISIT (OUTPATIENT)
Dept: CARDIOLOGY | Facility: CLINIC | Age: 49
End: 2019-10-16

## 2019-10-16 VITALS
WEIGHT: 166 LBS | HEART RATE: 75 BPM | HEIGHT: 64 IN | SYSTOLIC BLOOD PRESSURE: 112 MMHG | DIASTOLIC BLOOD PRESSURE: 82 MMHG | RESPIRATION RATE: 18 BRPM | BODY MASS INDEX: 28.34 KG/M2

## 2019-10-16 DIAGNOSIS — I46.9 CARDIAC ARREST WITH VENTRICULAR FIBRILLATION (HCC): ICD-10-CM

## 2019-10-16 DIAGNOSIS — Z95.5 STENTED CORONARY ARTERY: ICD-10-CM

## 2019-10-16 DIAGNOSIS — I21.02 STEMI INVOLVING LEFT ANTERIOR DESCENDING CORONARY ARTERY (HCC): ICD-10-CM

## 2019-10-16 DIAGNOSIS — I25.5 ISCHEMIC CARDIOMYOPATHY: ICD-10-CM

## 2019-10-16 DIAGNOSIS — E78.2 MIXED HYPERLIPIDEMIA: Chronic | ICD-10-CM

## 2019-10-16 DIAGNOSIS — I10 ESSENTIAL HYPERTENSION: Primary | ICD-10-CM

## 2019-10-16 DIAGNOSIS — I49.01 CARDIAC ARREST WITH VENTRICULAR FIBRILLATION (HCC): ICD-10-CM

## 2019-10-16 PROCEDURE — 99214 OFFICE O/P EST MOD 30 MIN: CPT | Performed by: INTERNAL MEDICINE

## 2019-10-16 RX ORDER — LISINOPRIL 2.5 MG/1
2.5 TABLET ORAL DAILY
Qty: 30 TABLET | Refills: 3 | Status: SHIPPED | OUTPATIENT
Start: 2019-10-16 | End: 2019-10-30 | Stop reason: DRUGHIGH

## 2019-10-16 RX ORDER — METOPROLOL SUCCINATE 25 MG/1
25 TABLET, EXTENDED RELEASE ORAL DAILY
Qty: 30 TABLET | Refills: 3 | Status: SHIPPED | OUTPATIENT
Start: 2019-10-16 | End: 2020-04-02

## 2019-10-16 NOTE — OUTREACH NOTE
AMI Week 1 Survey      Responses   Facility patient discharged from?  [facility]   Does the patient have one of the following disease processes/diagnoses(primary or secondary)?  Acute MI (STEMI,NSTEMI)   Revoke  Decline to participate [NO ANSWER, LEFT VM]      PATIENT RETURNED CALL. VOICES NO NEEDS AT THIS TIME.     Humera Courtney LPN

## 2019-10-16 NOTE — PROGRESS NOTES
Cardiology clinic note  Brant Martinez MD, PhD  [facility] heart specialists  [facility] cardiology    Subjective:     Encounter Date:10/16/2019      Patient ID: Sada Le is a 49 y.o. female.    Chief Complaint:  Chief Complaint   Patient presents with   • Follow-up     PCI        HPI:  History of Present Illness  I had the pleasure of seeing this very pleasant 49-year-old female who is well-known to me from recent hospitalization with LAD ST elevation myocardial infarction complicated by cardiogenic shock and VF arrest prior to revascularization.  She underwent located high risk PCI with Impella support with 3.5 x 33 mm Xience drug-eluting stent to the proximal LAD postdilated to 4.5 mm with good angiographic results.  She is maintained on dual antiplatelet therapy with aspirin and ticagrelor.  Ultimately she had continued hemodynamic support with Impella left ventricular unloading out to 24 to 36 hours with uncomplicated removal of Impella support.  She was observed in the hospital for 7 days to rule out any postinfarction mechanical complications.  She was initiated on goal-directed medical therapy as her hemodynamics allowed with Aldactone 25 daily as well as low-dose metoprolol on discharge 12.5 mg p.o. daily.  She has known essential hypertension which was treated previously with lisinopril which currently she is off secondary to lower blood pressures during her hospitalization.  She presents doing well today with stable bilateral groins with mild bruising but patent distal pulses and no claudication symptoms.  No hematoma no pseudoaneurysms or other peripheral vascular complications.  She does have occasional atypical chest discomfort as shooting pains but she does not know whether she is just hypersensitive to these feelings but nothing feels like the pressure she experienced with her MI.  She is done well and she is now 2 weeks post infarction in her underlying ejection fraction  was 38% biplane with mid to apical anteroseptal and anterior wall and apical akinesis.  Peak troponin was greater than 72 and treated down throughout her hospitalization.  She presents today with no heart failure signs or symptoms and is on Lasix 40 mg p.o. daily in addition to Aldactone and metoprolol.  She presents today for medication optimization and blood pressure today is 112/66 but at home has been ranging up to 130 systolic.  She has stopped smoking since her MI and continues to be abstinent which I encouraged.  Her renal function is normal per last labs.  No syncope or palpitations reported.  No PND orthopnea.  She does have NYHA class II dyspnea on exertion and is trying to work with cardiac rehab recently with very light home physical therapy.  I advised her to take the next 2 weeks easy to encourage stable scar formation as well as up titrate medicines as able.  She says she is not taking any salt intake significantly and is doing well with her diet.  No other concerning signs or symptoms today.      Review of systems otherwise is 14 point review of systems is negative.        The following portions of the patient's history were reviewed and updated as appropriate: allergies, current medications, past family history, past medical history, past social history, past surgical history and problem list.    Problem List:  Patient Active Problem List   Diagnosis   • Allergic rhinitis   • Cervical disc disease with myelopathy   • Diverticulosis of colon   • Hypertension, benign   • Insomnia, organic   • Obesity (BMI 30-39.9)   • Tobacco dependency   • Osteopenia of spine   • Mixed hyperlipidemia   • Actinic keratosis   • STEMI involving left anterior descending coronary artery (CMS/HCC)   • CAD (coronary artery disease)   • Acute bronchitis due to Rhinovirus   • Elevated LFTs   • Hypocalcemia   • Normocytic anemia   • Thrombocytopenia (CMS/HCC)   • STEMI involving left anterior descending coronary artery  (CMS/HCC)   • Cardiac arrest with ventricular fibrillation (CMS/HCC)   • Shock, cardiogenic (CMS/HCC)   • Ischemic cardiomyopathy   • Stented coronary artery   • History of echocardiogram       Past Medical History:  Past Medical History:   Diagnosis Date   • Absence of left thumb     Impression: hx of MRSA, she is signficantly improved She will continue meds and followup if sxs have not resolved over the next 2 weeks.. She is released from care and will notify us of any problems   • Acute otitis media    • Allergic rhinitis    • Arthritis     neck   • Cancer (CMS/HCC)     skin   • Cervical disc disease with myelopathy    • Contact dermatitis or eczema    • Coronary artery disease    • Disorder of bone and cartilage    • Diverticulitis of colon     Impression: CT confirms in the sigmoid colon 10/2011   • Diverticulosis of colon     Impression: No sxs 02/13/2013   • Elevated cholesterol    • Elevated temperature    • External otitis of left ear    • Hearing loss due to cerumen impaction, left    • Hemangioma of liver    • History of echocardiogram 10/03/2019    Severely reduced LV systolic function. EF 34% Distribution indicative of LAD territory injury. Akinesis of the apex as well as apical lateral and mid to apical septal walls. Preservation of posterior inferior lateral walls. Mild AI, mild TR. Normal RV function.    • Hordeolum externum of left lower eyelid     Impression: She was started on Bactrim DS for her infection. She was also advised to use warm compression. She will followup should sxs not improve over the next 48 hrs and resolve over the next 1 weeek.   • Hyperlipidemia     Impression: Diet controled. She should see improvement with her successful wt loss. We discussed her lipid panel.   • Hypertension, benign     Impression: new onset Low salt low calorie diet and regular exercise and followup in 1 mo She will monitor her pressures and notify us of her pressures over the next 1 week.   • Inclusion  cyst of right breast     Impression: We will follow for now. She is encouraged to have Mammography. She is presently without insurance and reluctant. She bobby consider. She will notify us of any change at all in the lesion for the only way to be certain of it's etilogy is to remove it. She understands.   • Insomnia, organic    • Menopausal syndrome    • Overweight (BMI 25.0-29.9)    • RUQ abdominal pain     Impression: Resolving, negative GB u/s, HIDA EF 77%, we will follow   • Stented coronary artery 09/29/2019   • Tobacco use     Impression: She is strongly encouraged to stop smoking. Cessation techniques discussed and encouraged. The consequences of not stopping discussed, ie, CAD, PVD, Stroke, Lung and other cancers.       Past Surgical History:  Past Surgical History:   Procedure Laterality Date   • BIVENTRICULAR ASSIST DEVICE/LEFT VENTRICULAR ASSIST DEVICE INSERTION N/A 9/29/2019    Procedure: Left Ventricular Assist Device;  Surgeon: Brant Martinez MD;  [facility];  Service: Cardiovascular   • CARDIAC CATHETERIZATION N/A 9/29/2019    Procedure: Left Heart Cath;  Surgeon: Brant Martinez MD;  [facility];  Service: Cardiovascular   • CARDIAC CATHETERIZATION N/A 9/29/2019    Procedure: Coronary angiography;  Surgeon: Brant Martinez MD;  [facility];  Service: Cardiovascular   • CARDIAC CATHETERIZATION N/A 9/29/2019    Procedure: Left ventriculography;  Surgeon: Brant Martinez MD;  [facility];  Service: Cardiovascular   • CARDIAC CATHETERIZATION N/A 9/29/2019    Procedure: Stent SERENITY coronary;  Surgeon: Brant Martinez MD;  [facility];  Service: Cardiovascular   • CARDIAC ELECTROPHYSIOLOGY PROCEDURE  9/29/2019    Procedure: Impella Insertion;  Surgeon: Brant Martinez MD;  [facility];   "Service: Cardiovascular   • NM RT/LT HEART CATHETERS N/A 2019    Procedure: Percutaneous Coronary Intervention;  Surgeon: Brant Martinez MD;  Location: [facility] INVASIVE LOCATION;  Service: Cardiovascular   • TOTAL ABDOMINAL HYSTERECTOMY WITH SALPINGO OOPHORECTOMY      Endometriosis       Social History:  Social History     Socioeconomic History   • Marital status:      Spouse name: Not on file   • Number of children: Not on file   • Years of education: Not on file   • Highest education level: Not on file   Tobacco Use   • Smoking status: Former Smoker     Packs/day: 1.00     Years: 34.00     Pack years: 34.00     Types: Cigarettes     Start date:      Last attempt to quit: 2019     Years since quittin.0   • Smokeless tobacco: Never Used   • Tobacco comment: States she is quitting as of 2019.   Substance and Sexual Activity   • Alcohol use: Yes     Alcohol/week: 2.4 oz     Types: 4 Cans of beer per week     Frequency: Monthly or less     Drinks per session: 3 or 4     Binge frequency: Less than monthly   • Drug use: Yes     Types: Marijuana     Comment: occasional   • Sexual activity: Defer       Allergies:  Allergies   Allergen Reactions   • Penicillins Rash   • Ciprofloxacin Rash   • Penicillin G Rash       Immunizations:  Immunization History   Administered Date(s) Administered   • DT 2004   • FLUARIX/FLUZONE/AFLURIA/FLULAVAL QUAD 10/01/2019   • Tdap 2011       ROS:  ROS  Negative as above x14 point review of systems call Minervayolanda     Objective:         /82 (BP Location: Left arm, Patient Position: Sitting)   Pulse 75   Resp 18   Ht 162.6 cm (64\")   Wt 75.3 kg (166 lb)   LMP 1996 (Approximate)   BMI 28.49 kg/m²     Physical Exam  General Appearance:    Alert, cooperative, in no acute distress, AAOx3, on RA               Mouth /throat:   Poor dentition tobacco discoloration, no JVD no ulcers   Neck:   supple,   minimal JVD/HJR+ still " but better   Lungs:     Relatively clear, non labored, room air, even respirations    Heart:    Regular rhythm and normal rate, normal S1 and S2, no new murmurs, no S3-S4 no heave no lift   Abdomen:     Normal bowel sounds, no masses, no organomegaly, soft        non-tender, non-distended, no guarding, no rebound                Tenderness   Extremities:   Moves all extremities well, no edema, no cyanosis, bilat groin with slight bruising, no hematoma, groins stable   Pulses:   Pulses palpable and equal bilaterally 2+ bilaterally, cap refill less than 2 seconds         Neurologic:   Awake, alert, oriented x3, no dificits grossly bilaterally        In-Office Procedure(s):  Procedures    ASCVD RIsk Score::  The ASCVD Risk score (Marielena SALOMON Jr., et al., 2013) failed to calculate for the following reasons:    The patient has a prior MI or stroke diagnosis    Recent Radiology:  Imaging Results (most recent)     None          Lab Review:   Admission on 09/29/2019, Discharged on 10/07/2019   No results displayed because visit has over 200 results.      Office Visit on 09/16/2019   Component Date Value   • Color 09/16/2019 Za    • Clarity, UA 09/16/2019 Clear    • Glucose, UA 09/16/2019 Negative    • Bilirubin 09/16/2019 Negative    • Ketones, UA 09/16/2019 Negative    • Specific Gravity  09/16/2019 1.015    • Blood, UA 09/16/2019 Negative    • pH, Urine 09/16/2019 5.0    • Protein, POC 09/16/2019 Negative    • Urobilinogen, UA 09/16/2019 Normal    • Leukocytes 09/16/2019 Negative    • Nitrite, UA 09/16/2019 Negative    • Urine Culture 09/16/2019 Final report    • Result 1 09/16/2019 No growth    • WBC 09/16/2019 9.6    • RBC 09/16/2019 4.65    • Hemoglobin 09/16/2019 14.8    • Hematocrit 09/16/2019 41.9    • MCV 09/16/2019 90    • MCH 09/16/2019 31.8    • MCHC 09/16/2019 35.3    • RDW 09/16/2019 13.0    • Platelets 09/16/2019 225    • Neutrophil Rel % 09/16/2019 64    • Lymphocyte Rel % 09/16/2019 26    • Monocyte Rel %  09/16/2019 7    • Eosinophil Rel % 09/16/2019 2    • Basophil Rel % 09/16/2019 0    • Neutrophils Absolute 09/16/2019 6.1    • Lymphocytes Absolute 09/16/2019 2.5    • Monocytes Absolute 09/16/2019 0.7    • Eosinophils Absolute 09/16/2019 0.2    • Basophils Absolute 09/16/2019 0.0    • Immature Granulocyte Rel* 09/16/2019 1    • Immature Grans Absolute 09/16/2019 0.1    • Total Cholesterol 09/16/2019 231*   • Triglycerides 09/16/2019 117    • HDL Cholesterol 09/16/2019 56    • VLDL Cholesterol 09/16/2019 23    • LDL Cholesterol  09/16/2019 152*   • Chol/HDL Ratio 09/16/2019 4.1    • TSH 09/16/2019 2.070    • Written Authorization 09/16/2019 Comment    • Glucose 09/16/2019 147*   • BUN 09/16/2019 20    • Creatinine 09/16/2019 1.09*   • eGFR Non African Am 09/16/2019 60    • eGFR  Am 09/16/2019 69    • BUN/Creatinine Ratio 09/16/2019 18    • Sodium 09/16/2019 131*   • Potassium 09/16/2019 4.0    • Chloride 09/16/2019 87*   • Total CO2 09/16/2019 19*   • Calcium 09/16/2019 9.5    • Total Protein 09/16/2019 7.1    • Albumin 09/16/2019 4.5    • Globulin 09/16/2019 2.6    • A/G Ratio 09/16/2019 1.7    • Total Bilirubin 09/16/2019 CANCELED    • Alkaline Phosphatase 09/16/2019 96    • AST (SGOT) 09/16/2019 15    • ALT (SGPT) 09/16/2019 12    • Written Authorization 09/16/2019 Comment    Abstract on 09/03/2019   Component Date Value   • HM Mammogram 11/23/2007 see report    • HM Dexa Scan 09/30/2005 see report                 Assessment:          Diagnosis Plan   1. Essential hypertension  metoprolol succinate XL (TOPROL-XL) 25 MG 24 hr tablet    lisinopril (PRINIVIL,ZESTRIL) 2.5 MG tablet   2. Ischemic cardiomyopathy  Adult Transthoracic Echo Complete W/ Cont if Necessary Per Protocol   3. Mixed hyperlipidemia     4. STEMI involving left anterior descending coronary artery (CMS/HCC)     5. Cardiac arrest with ventricular fibrillation (CMS/HCC)     6. Stented coronary artery            Plan:      1. LAD STEMI/CAD  -  s/p PCI to the proximal / ostial to mid LAD with Xience 3.5 x 33 mm drug-eluting stent postdilated to 4.5 mm at 18 familia with good angiographic results.  - residual borderline lesions in the distal LAD which will be staged for FFR in future. Nonobstructive disease in the circumflex and RCA  -Dual platelet therapy aspirin and Brilinta uninterrupted 1 year, consider longer-term Plavix therapy with benefits up to 30 minutes per DAPT trial with low bleeding risk and residual lesions possible further unstable plaque.  -High intensity statin therapy per guidelines   - on spironolactone, continue 25 daily  -Increase metop XL 25 daily   -Continue Lasix 40 daily, additional twice daily as needed  Add lisinopril 2.5 daily      2. acute systolic HF   - improved, impella removed 9/29  -Estimated EF 35 ,  38% biplane,  mid to apical septal akinesis, apical akinesis mid to apical anterior akinesis and apical lateral and apical inferior akinesis.  Metoprolol as above  Start ACE very low-dose as above continue Aldactone as above  Return to clinic in 2 weeks for further medication titration  Avoid hypotension systolic less than 90     3. Cardiac Arrest with  ROSC, VF arrest, none since reperfusion or greater than 48 hours out from initial insult.  No indication for antiarrhythmic or ICD acutely.  No reperfusion arrhythmia outpatient     4. H/o Essential hypertension however borderline b/p currently  -Stable at goal     4. Hyperlipidemia  -High intensity statin therapy on board     5. Elevated LFTs  -Resolved     6.  Anxiety  Inform patient to see primary care for anxiety for SSRI versus other anxiety lytic.  No suicidal homicidal ideation at this time.     7. Hypokalemia / Hypomagnesium  Recheck labs in 1 to 2 weeks as starting low-dose ACE inhibitor     8. Acute bronchitis and Rhinovirus, stable and improving       Is a pleasure to be involved in her cardiovascular care.  Please call with any questions or  concerns.  Sincerely,  Brant Martinez MD, PhD    Return to clinic in 2 weeks for further medication titration as well as checking labs and electrolytes and renal function.  Will need reecho 40 days status post MI and of less than 35% will be referred for ICD    Greater than 35 minutes spent with the patient today with greater than 50% education of post infarct care, physical therapy and activity restrictions, medication changes and goals, echo follow-up and clinic follow-up.         Brant aMrtinez MD  10/16/19  .

## 2019-10-25 ENCOUNTER — TELEPHONE (OUTPATIENT)
Dept: FAMILY MEDICINE CLINIC | Facility: CLINIC | Age: 49
End: 2019-10-25

## 2019-10-25 ENCOUNTER — OFFICE VISIT (OUTPATIENT)
Dept: FAMILY MEDICINE CLINIC | Facility: CLINIC | Age: 49
End: 2019-10-25

## 2019-10-25 VITALS
SYSTOLIC BLOOD PRESSURE: 128 MMHG | RESPIRATION RATE: 18 BRPM | HEART RATE: 76 BPM | BODY MASS INDEX: 29.67 KG/M2 | HEIGHT: 64 IN | TEMPERATURE: 96.1 F | DIASTOLIC BLOOD PRESSURE: 82 MMHG | WEIGHT: 173.8 LBS | OXYGEN SATURATION: 98 %

## 2019-10-25 DIAGNOSIS — Z87.891 HISTORY OF TOBACCO USE: ICD-10-CM

## 2019-10-25 DIAGNOSIS — I10 HYPERTENSION, BENIGN: Chronic | ICD-10-CM

## 2019-10-25 DIAGNOSIS — Z12.39 ENCOUNTER FOR SCREENING FOR MALIGNANT NEOPLASM OF BREAST: ICD-10-CM

## 2019-10-25 DIAGNOSIS — Z12.4 CERVICAL CANCER SCREENING: ICD-10-CM

## 2019-10-25 DIAGNOSIS — E66.9 OBESITY (BMI 30-39.9): Chronic | ICD-10-CM

## 2019-10-25 DIAGNOSIS — B07.9 VIRAL WARTS, UNSPECIFIED TYPE: ICD-10-CM

## 2019-10-25 DIAGNOSIS — I25.5 ISCHEMIC CARDIOMYOPATHY: ICD-10-CM

## 2019-10-25 DIAGNOSIS — I25.10 CORONARY ARTERY DISEASE INVOLVING NATIVE CORONARY ARTERY OF NATIVE HEART WITHOUT ANGINA PECTORIS: ICD-10-CM

## 2019-10-25 DIAGNOSIS — M85.88 OSTEOPENIA OF SPINE: ICD-10-CM

## 2019-10-25 DIAGNOSIS — F41.9 ANXIETY: ICD-10-CM

## 2019-10-25 DIAGNOSIS — Z00.01 ANNUAL VISIT FOR GENERAL ADULT MEDICAL EXAMINATION WITH ABNORMAL FINDINGS: Primary | ICD-10-CM

## 2019-10-25 DIAGNOSIS — E78.2 MIXED HYPERLIPIDEMIA: Chronic | ICD-10-CM

## 2019-10-25 DIAGNOSIS — Z12.11 COLON CANCER SCREENING: ICD-10-CM

## 2019-10-25 PROBLEM — J20.6 ACUTE BRONCHITIS DUE TO RHINOVIRUS: Status: RESOLVED | Noted: 2019-10-01 | Resolved: 2019-10-25

## 2019-10-25 PROBLEM — E66.3 OVERWEIGHT: Status: ACTIVE | Noted: 2019-10-25

## 2019-10-25 PROBLEM — L57.0 ACTINIC KERATOSIS: Status: RESOLVED | Noted: 2019-09-16 | Resolved: 2019-10-25

## 2019-10-25 PROBLEM — F17.200 TOBACCO DEPENDENCY: Chronic | Status: RESOLVED | Noted: 2019-09-13 | Resolved: 2019-10-25

## 2019-10-25 PROCEDURE — 99214 OFFICE O/P EST MOD 30 MIN: CPT | Performed by: FAMILY MEDICINE

## 2019-10-25 PROCEDURE — 99396 PREV VISIT EST AGE 40-64: CPT | Performed by: FAMILY MEDICINE

## 2019-10-25 RX ORDER — ALPRAZOLAM 0.25 MG/1
0.25 TABLET ORAL EVERY 6 HOURS PRN
Qty: 30 TABLET | Refills: 0 | Status: SHIPPED | OUTPATIENT
Start: 2019-10-25 | End: 2019-10-25 | Stop reason: SDUPTHER

## 2019-10-25 RX ORDER — BUPROPION HYDROCHLORIDE 150 MG/1
150 TABLET ORAL DAILY
Qty: 30 TABLET | Refills: 12 | Status: SHIPPED | OUTPATIENT
Start: 2019-10-25 | End: 2020-11-02

## 2019-10-25 RX ORDER — FUROSEMIDE 40 MG/1
40 TABLET ORAL 2 TIMES DAILY
Qty: 60 TABLET | Refills: 12 | Status: ON HOLD | OUTPATIENT
Start: 2019-10-25 | End: 2020-02-25 | Stop reason: SDUPTHER

## 2019-10-25 RX ORDER — ALPRAZOLAM 0.25 MG/1
0.25 TABLET ORAL EVERY 6 HOURS PRN
Qty: 30 TABLET | Refills: 0 | Status: SHIPPED | OUTPATIENT
Start: 2019-10-25 | End: 2019-11-26

## 2019-10-30 ENCOUNTER — OFFICE VISIT (OUTPATIENT)
Dept: CARDIOLOGY | Facility: CLINIC | Age: 49
End: 2019-10-30

## 2019-10-30 VITALS
SYSTOLIC BLOOD PRESSURE: 114 MMHG | HEIGHT: 64 IN | WEIGHT: 173.8 LBS | RESPIRATION RATE: 18 BRPM | BODY MASS INDEX: 29.67 KG/M2 | DIASTOLIC BLOOD PRESSURE: 78 MMHG | HEART RATE: 62 BPM

## 2019-10-30 DIAGNOSIS — I25.5 ISCHEMIC CARDIOMYOPATHY: ICD-10-CM

## 2019-10-30 DIAGNOSIS — I10 HYPERTENSION, BENIGN: Chronic | ICD-10-CM

## 2019-10-30 DIAGNOSIS — I46.9 CARDIAC ARREST WITH VENTRICULAR FIBRILLATION (HCC): ICD-10-CM

## 2019-10-30 DIAGNOSIS — E78.2 MIXED HYPERLIPIDEMIA: Chronic | ICD-10-CM

## 2019-10-30 DIAGNOSIS — I49.01 CARDIAC ARREST WITH VENTRICULAR FIBRILLATION (HCC): ICD-10-CM

## 2019-10-30 DIAGNOSIS — Z95.5 STENTED CORONARY ARTERY: ICD-10-CM

## 2019-10-30 DIAGNOSIS — I21.02 STEMI INVOLVING LEFT ANTERIOR DESCENDING CORONARY ARTERY (HCC): Primary | ICD-10-CM

## 2019-10-30 PROCEDURE — 99214 OFFICE O/P EST MOD 30 MIN: CPT | Performed by: INTERNAL MEDICINE

## 2019-10-30 RX ORDER — LISINOPRIL 5 MG/1
5 TABLET ORAL 2 TIMES DAILY
Qty: 60 TABLET | Refills: 5 | Status: SHIPPED | OUTPATIENT
Start: 2019-10-30 | End: 2020-01-08

## 2019-10-30 RX ORDER — ATORVASTATIN CALCIUM 80 MG/1
80 TABLET, FILM COATED ORAL DAILY
Qty: 30 TABLET | Refills: 5 | Status: SHIPPED | OUTPATIENT
Start: 2019-10-30 | End: 2020-02-17

## 2019-10-31 ENCOUNTER — HOSPITAL ENCOUNTER (OUTPATIENT)
Dept: CARDIOLOGY | Facility: HOSPITAL | Age: 49
Discharge: HOME | End: 2019-10-31
Admitting: INTERNAL MEDICINE

## 2019-10-31 ENCOUNTER — APPOINTMENT (OUTPATIENT)
Dept: CARDIAC REHAB | Facility: HOSPITAL | Age: 49
End: 2019-10-31

## 2019-10-31 VITALS — WEIGHT: 173.72 LBS | HEIGHT: 64 IN | BODY MASS INDEX: 29.66 KG/M2

## 2019-10-31 DIAGNOSIS — I25.5 ISCHEMIC CARDIOMYOPATHY: ICD-10-CM

## 2019-10-31 PROCEDURE — 93306 TTE W/DOPPLER COMPLETE: CPT

## 2019-11-01 ENCOUNTER — TELEPHONE (OUTPATIENT)
Dept: CARDIOLOGY | Facility: CLINIC | Age: 49
End: 2019-11-01

## 2019-11-01 NOTE — TELEPHONE ENCOUNTER
Echo not read yet. Will check again on Monday. KH   Comprehensive ROS performed and negative except as in HPI

## 2019-11-06 ENCOUNTER — TELEPHONE (OUTPATIENT)
Dept: CARDIOLOGY | Facility: CLINIC | Age: 49
End: 2019-11-06

## 2019-11-06 LAB
BH CV ECHO MEAS - ACS: 1.9 CM
BH CV ECHO MEAS - AI DEC SLOPE: 163.8 CM/SEC^2
BH CV ECHO MEAS - AI DEC TIME: 3 SEC
BH CV ECHO MEAS - AI MAX PG: 93.2 MMHG
BH CV ECHO MEAS - AI MAX VEL: 482.5 CM/SEC
BH CV ECHO MEAS - AI P1/2T: 862.9 MSEC
BH CV ECHO MEAS - AO MAX PG (FULL): 3.5 MMHG
BH CV ECHO MEAS - AO MAX PG: 8.6 MMHG
BH CV ECHO MEAS - AO MEAN PG (FULL): 2.1 MMHG
BH CV ECHO MEAS - AO MEAN PG: 4.2 MMHG
BH CV ECHO MEAS - AO ROOT AREA (BSA CORRECTED): 1.6
BH CV ECHO MEAS - AO ROOT AREA: 6.8 CM^2
BH CV ECHO MEAS - AO ROOT DIAM: 2.9 CM
BH CV ECHO MEAS - AO V2 MAX: 147 CM/SEC
BH CV ECHO MEAS - AO V2 MEAN: 95.1 CM/SEC
BH CV ECHO MEAS - AO V2 VTI: 28.2 CM
BH CV ECHO MEAS - AVA(I,A): 3.2 CM^2
BH CV ECHO MEAS - AVA(I,D): 3.2 CM^2
BH CV ECHO MEAS - AVA(V,A): 2.9 CM^2
BH CV ECHO MEAS - AVA(V,D): 2.9 CM^2
BH CV ECHO MEAS - BSA(HAYCOCK): 1.9 M^2
BH CV ECHO MEAS - BSA: 1.8 M^2
BH CV ECHO MEAS - BZI_BMI: 29.7 KILOGRAMS/M^2
BH CV ECHO MEAS - BZI_METRIC_HEIGHT: 162.6 CM
BH CV ECHO MEAS - BZI_METRIC_WEIGHT: 78.5 KG
BH CV ECHO MEAS - EDV(CUBED): 131.4 ML
BH CV ECHO MEAS - EDV(MOD-SP4): 111.9 ML
BH CV ECHO MEAS - EDV(TEICH): 122.9 ML
BH CV ECHO MEAS - EF(CUBED): 67.5 %
BH CV ECHO MEAS - EF(MOD-BP): 55 %
BH CV ECHO MEAS - EF(MOD-SP4): 58 %
BH CV ECHO MEAS - EF(TEICH): 58.8 %
BH CV ECHO MEAS - ESV(CUBED): 42.7 ML
BH CV ECHO MEAS - ESV(MOD-SP4): 47 ML
BH CV ECHO MEAS - ESV(TEICH): 50.7 ML
BH CV ECHO MEAS - FS: 31.3 %
BH CV ECHO MEAS - IVS/LVPW: 1.1
BH CV ECHO MEAS - IVSD: 1.3 CM
BH CV ECHO MEAS - LA DIMENSION: 3.1 CM
BH CV ECHO MEAS - LA/AO: 1
BH CV ECHO MEAS - LV DIASTOLIC VOL/BSA (35-75): 60.9 ML/M^2
BH CV ECHO MEAS - LV MASS(C)D: 247.3 GRAMS
BH CV ECHO MEAS - LV MASS(C)DI: 134.5 GRAMS/M^2
BH CV ECHO MEAS - LV MAX PG: 5.1 MMHG
BH CV ECHO MEAS - LV MEAN PG: 2.1 MMHG
BH CV ECHO MEAS - LV SYSTOLIC VOL/BSA (12-30): 25.6 ML/M^2
BH CV ECHO MEAS - LV V1 MAX: 113.1 CM/SEC
BH CV ECHO MEAS - LV V1 MEAN: 65.8 CM/SEC
BH CV ECHO MEAS - LV V1 VTI: 23.7 CM
BH CV ECHO MEAS - LVIDD: 5.1 CM
BH CV ECHO MEAS - LVIDS: 3.5 CM
BH CV ECHO MEAS - LVOT AREA: 3.8 CM^2
BH CV ECHO MEAS - LVOT DIAM: 2.2 CM
BH CV ECHO MEAS - LVPWD: 1.2 CM
BH CV ECHO MEAS - MR MAX PG: 57.5 MMHG
BH CV ECHO MEAS - MR MAX VEL: 379 CM/SEC
BH CV ECHO MEAS - MV A MAX VEL: 70.5 CM/SEC
BH CV ECHO MEAS - MV E MAX VEL: 55.2 CM/SEC
BH CV ECHO MEAS - MV E/A: 0.78
BH CV ECHO MEAS - MV MAX PG: 2.3 MMHG
BH CV ECHO MEAS - MV MEAN PG: 0.64 MMHG
BH CV ECHO MEAS - MV V2 MAX: 76.4 CM/SEC
BH CV ECHO MEAS - MV V2 MEAN: 36.5 CM/SEC
BH CV ECHO MEAS - MV V2 VTI: 25.2 CM
BH CV ECHO MEAS - MVA(VTI): 3.5 CM^2
BH CV ECHO MEAS - PA ACC TIME: 0.11 SEC
BH CV ECHO MEAS - PA MAX PG: 3.2 MMHG
BH CV ECHO MEAS - PA PR(ACCEL): 27.6 MMHG
BH CV ECHO MEAS - PA V2 MAX: 89.3 CM/SEC
BH CV ECHO MEAS - PI END-D VEL: 104.9 CM/SEC
BH CV ECHO MEAS - RAP SYSTOLE: 10 MMHG
BH CV ECHO MEAS - RVDD(2D): 1.7 CM
BH CV ECHO MEAS - RVDD: 1.7 CM
BH CV ECHO MEAS - RVSP: 55.1 MMHG
BH CV ECHO MEAS - SI(AO): 104.6 ML/M^2
BH CV ECHO MEAS - SI(CUBED): 48.2 ML/M^2
BH CV ECHO MEAS - SI(LVOT): 48.4 ML/M^2
BH CV ECHO MEAS - SI(MOD-SP4): 35.3 ML/M^2
BH CV ECHO MEAS - SI(TEICH): 39.3 ML/M^2
BH CV ECHO MEAS - SV(AO): 192.3 ML
BH CV ECHO MEAS - SV(CUBED): 88.7 ML
BH CV ECHO MEAS - SV(LVOT): 89 ML
BH CV ECHO MEAS - SV(MOD-SP4): 64.9 ML
BH CV ECHO MEAS - SV(TEICH): 72.2 ML
BH CV ECHO MEAS - TR MAX VEL: 309.8 CM/SEC
MAXIMAL PREDICTED HEART RATE: 170 BPM
STRESS TARGET HR: 145 BPM

## 2019-11-06 PROCEDURE — 93306 TTE W/DOPPLER COMPLETE: CPT | Performed by: INTERNAL MEDICINE

## 2019-11-07 DIAGNOSIS — Q21.10 INTERATRIAL SEPTAL DEFECT: Primary | ICD-10-CM

## 2019-11-07 NOTE — PROGRESS NOTES
Brant Martinez MD, PhD  [facility]  [facility] cardiology  Cardiology clinic note    Subjective:     Encounter Date:10/30/2019      Patient ID: Saad Le is a 49 y.o. female.    Chief Complaint:  Chief Complaint   Patient presents with   • Follow-up       HPI:  History of Present Illness  Per prior encounter  I had the pleasure of seeing this very pleasant 49-year-old female who is well-known to me from recent hospitalization with LAD ST elevation myocardial infarction complicated by cardiogenic shock and VF arrest prior to revascularization.  She underwent located high risk PCI with Impella support with 3.5 x 33 mm Xience drug-eluting stent to the proximal LAD postdilated to 4.5 mm with good angiographic results.  She is maintained on dual antiplatelet therapy with aspirin and ticagrelor.  Ultimately she had continued hemodynamic support with Impella left ventricular unloading out to 24 to 36 hours with uncomplicated removal of Impella support.  She was observed in the hospital for 7 days to rule out any postinfarction mechanical complications.  She was initiated on goal-directed medical therapy as her hemodynamics allowed with Aldactone 25 daily as well as low-dose metoprolol on discharge 12.5 mg p.o. daily.  She has known essential hypertension which was treated previously with lisinopril which currently she is off secondary to lower blood pressures during her hospitalization.  She presents doing well today with stable bilateral groins with mild bruising but patent distal pulses and no claudication symptoms.  No hematoma no pseudoaneurysms or other peripheral vascular complications.  She does have occasional atypical chest discomfort as shooting pains but she does not know whether she is just hypersensitive to these feelings but nothing feels like the pressure she experienced with her MI.  She is done well and she is now 2 weeks post infarction in her underlying ejection fraction was 38%  biplane with mid to apical anteroseptal and anterior wall and apical akinesis.  Peak troponin was greater than 72 and treated down throughout her hospitalization.  She presents today with no heart failure signs or symptoms and is on Lasix 40 mg p.o. daily in addition to Aldactone and metoprolol.  She presents today for medication optimization and blood pressure today is 112/66 but at home has been ranging up to 130 systolic.  She has stopped smoking since her MI and continues to be abstinent which I encouraged.  Her renal function is normal per last labs.  No syncope or palpitations reported.  No PND orthopnea.  She does have NYHA class II dyspnea on exertion and is trying to work with cardiac rehab recently with very light home physical therapy.  I advised her to take the next 2 weeks easy to encourage stable scar formation as well as up titrate medicines as able.  She says she is not taking any salt intake significantly and is doing well with her diet.  No other concerning signs or symptoms last visit.    On today's encounter she is doing very well and euvolemic.  She is on Lasix with much improved volume status.  She is eager to repeat 2D echo.  She is chest pain-free.  She is compliant with all medications.  No dizziness presyncope chest pain nausea vomiting diarrhea fevers chills sweats or other concerning complaints.  She has NYHA2 shortness of air with exertion.  She is starting cardiac rehab.  We have advanced medical therapy to max tolerated doses.  She has no other complaints today.       Review of systems otherwise is 14 point review of systems is negative.  The following portions of the patient's history were reviewed and updated as appropriate: allergies, current medications, past family history, past medical history, past social history, past surgical history and problem list.    Problem List:  Patient Active Problem List   Diagnosis   • Allergic rhinitis   • Cervical disc disease with myelopathy   •  Diverticulosis of colon   • Hypertension, benign   • Insomnia, organic   • Obesity (BMI 30-39.9)   • Osteopenia of spine   • Mixed hyperlipidemia   • STEMI involving left anterior descending coronary artery (CMS/HCC)   • CAD (coronary artery disease)   • Elevated LFTs   • Hypocalcemia   • Normocytic anemia   • Thrombocytopenia (CMS/HCC)   • Cardiac arrest with ventricular fibrillation (CMS/HCC)   • Shock, cardiogenic (CMS/HCC)   • Ischemic cardiomyopathy   • Stented coronary artery   • History of echocardiogram   • Annual visit for general adult medical examination with abnormal findings   • Cervical cancer screening   • Encounter for screening for malignant neoplasm of breast   • Colon cancer screening   • History of tobacco use       Past Medical History:  Past Medical History:   Diagnosis Date   • Absence of left thumb     Impression: hx of MRSA, she is signficantly improved She will continue meds and followup if sxs have not resolved over the next 2 weeks.. She is released from care and will notify us of any problems   • Acute otitis media    • Allergic rhinitis    • Arthritis     neck   • Cancer (CMS/HCC)     skin   • Cervical disc disease with myelopathy    • Contact dermatitis or eczema    • Coronary artery disease    • Disorder of bone and cartilage    • Diverticulitis of colon     Impression: CT confirms in the sigmoid colon 10/2011   • Diverticulosis of colon     Impression: No sxs 02/13/2013   • Elevated cholesterol    • Elevated temperature    • External otitis of left ear    • Hearing loss due to cerumen impaction, left    • Hemangioma of liver    • History of echocardiogram 10/03/2019    Severely reduced LV systolic function. EF 34% Distribution indicative of LAD territory injury. Akinesis of the apex as well as apical lateral and mid to apical septal walls. Preservation of posterior inferior lateral walls. Mild AI, mild TR. Normal RV function.    • Hordeolum externum of left lower eyelid      Impression: She was started on Bactrim DS for her infection. She was also advised to use warm compression. She will followup should sxs not improve over the next 48 hrs and resolve over the next 1 weeek.   • Hyperlipidemia     Impression: Diet controled. She should see improvement with her successful wt loss. We discussed her lipid panel.   • Hypertension, benign     Impression: new onset Low salt low calorie diet and regular exercise and followup in 1 mo She will monitor her pressures and notify us of her pressures over the next 1 week.   • Inclusion cyst of right breast     Impression: We will follow for now. She is encouraged to have Mammography. She is presently without insurance and reluctant. She bobby consider. She will notify us of any change at all in the lesion for the only way to be certain of it's etilogy is to remove it. She understands.   • Insomnia, organic    • Menopausal syndrome    • Overweight (BMI 25.0-29.9)    • RUQ abdominal pain     Impression: Resolving, negative GB u/s, HIDA EF 77%, we will follow   • Stented coronary artery 09/29/2019   • Tobacco use     Impression: She is strongly encouraged to stop smoking. Cessation techniques discussed and encouraged. The consequences of not stopping discussed, ie, CAD, PVD, Stroke, Lung and other cancers.       Past Surgical History:  Past Surgical History:   Procedure Laterality Date   • BIVENTRICULAR ASSIST DEVICE/LEFT VENTRICULAR ASSIST DEVICE INSERTION N/A 9/29/2019    Procedure: Left Ventricular Assist Device;  Surgeon: Brant Martinez MD;  Location: [facility] CATH INVASIVE LOCATION;  Service: Cardiovascular   • CARDIAC CATHETERIZATION N/A 9/29/2019    Procedure: Left Heart Cath;  Surgeon: Brant Martinez MD;  Location: [facility] CATH INVASIVE LOCATION;  Service: Cardiovascular   • CARDIAC CATHETERIZATION N/A 9/29/2019    Procedure: Coronary angiography;  Surgeon: Brant Martinez MD;  Location: [facility] CATH INVASIVE LOCATION;   Service: Cardiovascular   • CARDIAC CATHETERIZATION N/A 2019    Procedure: Left ventriculography;  Surgeon: Brant Martinez MD;  Location: [facility] CATH INVASIVE LOCATION;  Service: Cardiovascular   • CARDIAC CATHETERIZATION N/A 2019    Procedure: Stent SERENITY coronary;  Surgeon: Brant Martinez MD;  Location: [facility] CATH INVASIVE LOCATION;  Service: Cardiovascular   • CARDIAC ELECTROPHYSIOLOGY PROCEDURE  2019    Procedure: Impella Insertion;  Surgeon: Brant Martinez MD;  Location: [facility] CATH INVASIVE LOCATION;  Service: Cardiovascular   • IN RT/LT HEART CATHETERS N/A 2019    Procedure: Percutaneous Coronary Intervention;  Surgeon: Brant Martinez MD;  Location: [facility] CATH INVASIVE LOCATION;  Service: Cardiovascular   • TOTAL ABDOMINAL HYSTERECTOMY WITH SALPINGO OOPHORECTOMY      Endometriosis       Social History:  Social History     Socioeconomic History   • Marital status:      Spouse name: Not on file   • Number of children: Not on file   • Years of education: Not on file   • Highest education level: Not on file   Tobacco Use   • Smoking status: Former Smoker     Packs/day: 1.00     Years: 34.00     Pack years: 34.00     Types: Cigarettes     Start date:      Last attempt to quit: 2019     Years since quittin.1   • Smokeless tobacco: Never Used   • Tobacco comment: States she is quitting as of 2019.   Substance and Sexual Activity   • Alcohol use: Yes     Alcohol/week: 2.4 oz     Types: 4 Cans of beer per week     Frequency: Monthly or less     Drinks per session: 3 or 4     Binge frequency: Less than monthly   • Drug use: Yes     Types: Marijuana     Comment: occasional   • Sexual activity: Defer       Allergies:  Allergies   Allergen Reactions   • Penicillins Rash   • Ciprofloxacin Rash       Immunizations:  Immunization History   Administered Date(s) Administered   • DT 2004   • FLUARIX/FLUZONE/AFLURIA/FLULAVAL QUAD  "10/01/2019   • Tdap 01/01/2011       ROS:  ROS  Negative as above  NYHA class II  CCS class I     Objective:         /78 (BP Location: Left arm, Patient Position: Sitting)   Pulse 62   Resp 18   Ht 162.6 cm (64\")   Wt 78.8 kg (173 lb 12.8 oz)   LMP 01/01/1996 (Approximate)   BMI 29.83 kg/m²     Physical Exam  General Appearance:    Alert, cooperative, in no acute distress, AAOx3, on RA               Mouth /throat:   Poor dentition tobacco discoloration, no JVD no ulcers   Neck:   supple,   minimal JVD/HJR+ still but better   Lungs:     Relatively clear, non labored, room air, even respirations    Heart:    Regular rhythm and normal rate, normal S1 and S2, no new murmurs, no S3-S4 no heave no lift   Abdomen:     Normal bowel sounds, no masses, no organomegaly, soft        non-tender, non-distended, no guarding, no rebound                Tenderness   Extremities:   Moves all extremities well, no edema, no cyanosis, bilat groin with slight bruising, no hematoma, groins stable   Pulses:   Pulses palpable and equal bilaterally 2+ bilaterally, cap refill less than 2 seconds         Neurologic:   Awake, alert, oriented x3, no dificits grossly bilaterally     Unchanged from prior encounter  In-Office Procedure(s):  Procedures    ASCVD RIsk Score::  The ASCVD Risk score (Sawyer AIME Jr., et al., 2013) failed to calculate for the following reasons:    The patient has a prior MI or stroke diagnosis    Recent Radiology:  Imaging Results (Most Recent)     None          Lab Review:   Admission on 09/29/2019, Discharged on 10/07/2019   No results displayed because visit has over 200 results.      Office Visit on 09/16/2019   Component Date Value   • Color 09/16/2019 Za    • Clarity, UA 09/16/2019 Clear    • Glucose, UA 09/16/2019 Negative    • Bilirubin 09/16/2019 Negative    • Ketones, UA 09/16/2019 Negative    • Specific Gravity  09/16/2019 1.015    • Blood, UA 09/16/2019 Negative    • pH, Urine 09/16/2019 5.0    • " Protein, POC 09/16/2019 Negative    • Urobilinogen, UA 09/16/2019 Normal    • Leukocytes 09/16/2019 Negative    • Nitrite, UA 09/16/2019 Negative    • Urine Culture 09/16/2019 Final report    • Result 1 09/16/2019 No growth    • WBC 09/16/2019 9.6    • RBC 09/16/2019 4.65    • Hemoglobin 09/16/2019 14.8    • Hematocrit 09/16/2019 41.9    • MCV 09/16/2019 90    • MCH 09/16/2019 31.8    • MCHC 09/16/2019 35.3    • RDW 09/16/2019 13.0    • Platelets 09/16/2019 225    • Neutrophil Rel % 09/16/2019 64    • Lymphocyte Rel % 09/16/2019 26    • Monocyte Rel % 09/16/2019 7    • Eosinophil Rel % 09/16/2019 2    • Basophil Rel % 09/16/2019 0    • Neutrophils Absolute 09/16/2019 6.1    • Lymphocytes Absolute 09/16/2019 2.5    • Monocytes Absolute 09/16/2019 0.7    • Eosinophils Absolute 09/16/2019 0.2    • Basophils Absolute 09/16/2019 0.0    • Immature Granulocyte Rel* 09/16/2019 1    • Immature Grans Absolute 09/16/2019 0.1    • Total Cholesterol 09/16/2019 231*   • Triglycerides 09/16/2019 117    • HDL Cholesterol 09/16/2019 56    • VLDL Cholesterol 09/16/2019 23    • LDL Cholesterol  09/16/2019 152*   • Chol/HDL Ratio 09/16/2019 4.1    • TSH 09/16/2019 2.070    • Written Authorization 09/16/2019 Comment    • Glucose 09/16/2019 147*   • BUN 09/16/2019 20    • Creatinine 09/16/2019 1.09*   • eGFR Non African Am 09/16/2019 60    • eGFR  Am 09/16/2019 69    • BUN/Creatinine Ratio 09/16/2019 18    • Sodium 09/16/2019 131*   • Potassium 09/16/2019 4.0    • Chloride 09/16/2019 87*   • Total CO2 09/16/2019 19*   • Calcium 09/16/2019 9.5    • Total Protein 09/16/2019 7.1    • Albumin 09/16/2019 4.5    • Globulin 09/16/2019 2.6    • A/G Ratio 09/16/2019 1.7    • Total Bilirubin 09/16/2019 CANCELED    • Alkaline Phosphatase 09/16/2019 96    • AST (SGOT) 09/16/2019 15    • ALT (SGPT) 09/16/2019 12    • Written Authorization 09/16/2019 Comment    Abstract on 09/03/2019   Component Date Value   • HM Mammogram 11/23/2007 see report     • HM Dexa Scan 09/30/2005 see report                 Assessment:          Diagnosis Plan   1. STEMI involving left anterior descending coronary artery (CMS/HCC)     2. Hypertension, benign     3. Mixed hyperlipidemia     4. Cardiac arrest with ventricular fibrillation (CMS/HCC)     5. Ischemic cardiomyopathy     6. Stented coronary artery            Plan:      1. LAD STEMI/CAD  - s/p PCI to the proximal / ostial to mid LAD with Xience 3.5 x 33 mm drug-eluting stent postdilated to 4.5 mm at 18 familia with good angiographic results.  - residual borderline lesions in the distal LAD which will be staged for FFR in future if she has chest pain. Nonobstructive disease in the circumflex and RCA  -Dual platelet therapy aspirin and Brilinta uninterrupted 1 year, consider longer-term Plavix therapy with benefits up to 30 months per DAPT trial with low bleeding risk and residual lesions possible further unstable plaque.  -High intensity statin therapy per guidelines   - on spironolactone, continue 25 daily  -Continue metop XL 25 daily   -Continue Lasix 40 daily, additional twice daily as needed  Increase lisinopril to 5 twice daily  Increase atorvastatin to 80 daily     2. acute systolic HF   - improved, impella removed 9/29  -Estimated EF 35 ,  38% biplane,  mid to apical septal akinesis, apical akinesis mid to apical anterior akinesis and apical lateral and apical inferior akinesis.  Metoprolol as above  Increase ACE inhibitor as above  Increase statin as above  Avoid hypotension systolic less than 90    Pending repeat 2D echo for LV functional assessment     3. Cardiac Arrest with  ROSC, VF arrest, none since reperfusion or greater than 48 hours out from initial insult.  No indication for antiarrhythmic or ICD acutely.  No reperfusion arrhythmia outpatient     4. H/o Essential hypertension however borderline b/p currently  -Stable at goal     4. Hyperlipidemia  -High intensity statin therapy on board     5. Elevated  LFTs  -Resolved     6.  Anxiety  Inform patient to see primary care for anxiety for SSRI versus other anxiety lytic     7. Hypokalemia / Hypomagnesium  Recheck labs in 1 to 2 weeks as starting low-dose ACE inhibitor     8. Acute bronchitis and Rhinovirus, stable and improving        Is a pleasure to be involved in her cardiovascular care.  Please call with any questions or concerns.  Sincerely,  Brant Martinez MD, PhD      reecho pending status post MI and of less than 35% will be referred for ICD     Greater than 25 minutes spent with the patient today with greater than 50% education of post infarct care, physical therapy and activity restrictions, medication changes and goals, echo follow-up and clinic follow-up.            Brant Martinez MD  11/07/19  .

## 2019-11-07 NOTE — TELEPHONE ENCOUNTER
Spoke with patient regarding echo results. Per dr eduardo, she may take off her lifevest. EF 55% ut he does want her to have an echo with bubble study to rule out possible PFO. Patient agreed to have bubble study and will send over for scheduling. BRIAN

## 2019-11-09 PROBLEM — R57.0 SHOCK, CARDIOGENIC: Status: RESOLVED | Noted: 2019-10-07 | Resolved: 2019-11-09

## 2019-11-09 NOTE — ASSESSMENT & PLAN NOTE
Lipid abnormalities are improving with treatment.  Nutritional counseling was provided. and Pharmacotherapy as ordered.  Lipids will be reassessed in 3 months.

## 2019-11-11 ENCOUNTER — OFFICE VISIT (OUTPATIENT)
Dept: CARDIAC REHAB | Facility: HOSPITAL | Age: 49
End: 2019-11-11

## 2019-11-11 DIAGNOSIS — I21.02 ST ELEVATION MYOCARDIAL INFARCTION INVOLVING LEFT ANTERIOR DESCENDING (LAD) CORONARY ARTERY (HCC): Primary | ICD-10-CM

## 2019-11-11 PROCEDURE — 93798 PHYS/QHP OP CAR RHAB W/ECG: CPT

## 2019-11-13 ENCOUNTER — TELEPHONE (OUTPATIENT)
Dept: CARDIOLOGY | Facility: CLINIC | Age: 49
End: 2019-11-13

## 2019-11-13 ENCOUNTER — TREATMENT (OUTPATIENT)
Dept: CARDIAC REHAB | Facility: HOSPITAL | Age: 49
End: 2019-11-13

## 2019-11-13 DIAGNOSIS — I21.02 ST ELEVATION MYOCARDIAL INFARCTION INVOLVING LEFT ANTERIOR DESCENDING (LAD) CORONARY ARTERY (HCC): Primary | ICD-10-CM

## 2019-11-13 PROCEDURE — 93798 PHYS/QHP OP CAR RHAB W/ECG: CPT

## 2019-11-13 NOTE — TELEPHONE ENCOUNTER
Mrs Le went to Cardiac rehab today 11-13-19, was told to call and report her Blood Pressure. States her blood press during her workout was 90/52 and after her workout was 86/56. Requesting a call back. Thank you    [phone]

## 2019-11-15 ENCOUNTER — TREATMENT (OUTPATIENT)
Dept: CARDIAC REHAB | Facility: HOSPITAL | Age: 49
End: 2019-11-15

## 2019-11-15 DIAGNOSIS — I21.02 ST ELEVATION MYOCARDIAL INFARCTION INVOLVING LEFT ANTERIOR DESCENDING (LAD) CORONARY ARTERY (HCC): Primary | ICD-10-CM

## 2019-11-15 PROCEDURE — 93798 PHYS/QHP OP CAR RHAB W/ECG: CPT

## 2019-11-18 ENCOUNTER — TREATMENT (OUTPATIENT)
Dept: CARDIAC REHAB | Facility: HOSPITAL | Age: 49
End: 2019-11-18

## 2019-11-18 DIAGNOSIS — I21.02 ST ELEVATION MYOCARDIAL INFARCTION INVOLVING LEFT ANTERIOR DESCENDING (LAD) CORONARY ARTERY (HCC): Primary | ICD-10-CM

## 2019-11-18 PROCEDURE — 93798 PHYS/QHP OP CAR RHAB W/ECG: CPT

## 2019-11-20 ENCOUNTER — TREATMENT (OUTPATIENT)
Dept: CARDIAC REHAB | Facility: HOSPITAL | Age: 49
End: 2019-11-20

## 2019-11-20 DIAGNOSIS — I21.02 ST ELEVATION MYOCARDIAL INFARCTION INVOLVING LEFT ANTERIOR DESCENDING (LAD) CORONARY ARTERY (HCC): Primary | ICD-10-CM

## 2019-11-20 PROCEDURE — 93798 PHYS/QHP OP CAR RHAB W/ECG: CPT

## 2019-11-22 ENCOUNTER — TREATMENT (OUTPATIENT)
Dept: CARDIAC REHAB | Facility: HOSPITAL | Age: 49
End: 2019-11-22

## 2019-11-22 DIAGNOSIS — I21.02 ST ELEVATION MYOCARDIAL INFARCTION INVOLVING LEFT ANTERIOR DESCENDING (LAD) CORONARY ARTERY (HCC): Primary | ICD-10-CM

## 2019-11-22 PROCEDURE — 93798 PHYS/QHP OP CAR RHAB W/ECG: CPT

## 2019-11-25 ENCOUNTER — TREATMENT (OUTPATIENT)
Dept: CARDIAC REHAB | Facility: HOSPITAL | Age: 49
End: 2019-11-25

## 2019-11-25 ENCOUNTER — TELEPHONE (OUTPATIENT)
Dept: CARDIOLOGY | Facility: CLINIC | Age: 49
End: 2019-11-25

## 2019-11-25 DIAGNOSIS — I21.02 ST ELEVATION MYOCARDIAL INFARCTION INVOLVING LEFT ANTERIOR DESCENDING (LAD) CORONARY ARTERY (HCC): Primary | ICD-10-CM

## 2019-11-25 PROCEDURE — 93798 PHYS/QHP OP CAR RHAB W/ECG: CPT

## 2019-11-26 ENCOUNTER — APPOINTMENT (OUTPATIENT)
Dept: GENERAL RADIOLOGY | Facility: HOSPITAL | Age: 49
End: 2019-11-26

## 2019-11-26 ENCOUNTER — HOSPITAL ENCOUNTER (OUTPATIENT)
Facility: HOSPITAL | Age: 49
Setting detail: OBSERVATION
Discharge: HOME | End: 2019-11-27
Attending: EMERGENCY MEDICINE | Admitting: INTERNAL MEDICINE

## 2019-11-26 DIAGNOSIS — R06.02 SHORTNESS OF BREATH: ICD-10-CM

## 2019-11-26 DIAGNOSIS — R07.9 CHEST PAIN, UNSPECIFIED TYPE: Primary | ICD-10-CM

## 2019-11-26 DIAGNOSIS — E87.6 HYPOKALEMIA: ICD-10-CM

## 2019-11-26 PROBLEM — F41.1 GENERALIZED ANXIETY DISORDER: Chronic | Status: ACTIVE | Noted: 2019-11-26

## 2019-11-26 PROBLEM — Q21.12 PATENT FORAMEN OVALE: Chronic | Status: ACTIVE | Noted: 2019-11-26

## 2019-11-26 LAB
ANION GAP SERPL CALCULATED.3IONS-SCNC: 16 MMOL/L (ref 5–15)
BASOPHILS # BLD AUTO: 0 10*3/MM3 (ref 0–0.2)
BASOPHILS NFR BLD AUTO: 0.4 % (ref 0–1.5)
BUN BLD-MCNC: 16 MG/DL (ref 6–20)
BUN/CREAT SERPL: 16.7 (ref 7–25)
CALCIUM SPEC-SCNC: 9.5 MG/DL (ref 8.6–10.5)
CHLORIDE SERPL-SCNC: 96 MMOL/L (ref 98–107)
CO2 SERPL-SCNC: 27 MMOL/L (ref 22–29)
CREAT BLD-MCNC: 0.96 MG/DL (ref 0.57–1)
D DIMER PPP FEU-MCNC: 0.56 MCGFEU/ML (ref 0.17–0.59)
DEPRECATED RDW RBC AUTO: 41.1 FL (ref 37–54)
EOSINOPHIL # BLD AUTO: 0.1 10*3/MM3 (ref 0–0.4)
EOSINOPHIL NFR BLD AUTO: 1.7 % (ref 0.3–6.2)
ERYTHROCYTE [DISTWIDTH] IN BLOOD BY AUTOMATED COUNT: 12.9 % (ref 12.3–15.4)
GFR SERPL CREATININE-BSD FRML MDRD: 62 ML/MIN/1.73
GLUCOSE BLD-MCNC: 130 MG/DL (ref 65–99)
HCT VFR BLD AUTO: 35.5 % (ref 34–46.6)
HGB BLD-MCNC: 12.1 G/DL (ref 12–15.9)
INR PPP: 1.03 (ref 0.9–1.1)
LYMPHOCYTES # BLD AUTO: 2 10*3/MM3 (ref 0.7–3.1)
LYMPHOCYTES NFR BLD AUTO: 26.9 % (ref 19.6–45.3)
MCH RBC QN AUTO: 31.2 PG (ref 26.6–33)
MCHC RBC AUTO-ENTMCNC: 34.3 G/DL (ref 31.5–35.7)
MCV RBC AUTO: 91.1 FL (ref 79–97)
MONOCYTES # BLD AUTO: 0.7 10*3/MM3 (ref 0.1–0.9)
MONOCYTES NFR BLD AUTO: 8.6 % (ref 5–12)
NEUTROPHILS # BLD AUTO: 4.7 10*3/MM3 (ref 1.7–7)
NEUTROPHILS NFR BLD AUTO: 62.4 % (ref 42.7–76)
NRBC BLD AUTO-RTO: 0 /100 WBC (ref 0–0.2)
NT-PROBNP SERPL-MCNC: 258.9 PG/ML (ref 5–900)
PLATELET # BLD AUTO: 184 10*3/MM3 (ref 140–450)
PMV BLD AUTO: 9.4 FL (ref 6–12)
POTASSIUM BLD-SCNC: 3.3 MMOL/L (ref 3.5–5.2)
POTASSIUM BLD-SCNC: 3.3 MMOL/L (ref 3.5–5.2)
PROTHROMBIN TIME: 10.8 SECONDS (ref 9.6–11.7)
RBC # BLD AUTO: 3.89 10*6/MM3 (ref 3.77–5.28)
SODIUM BLD-SCNC: 139 MMOL/L (ref 136–145)
TROPONIN T SERPL-MCNC: <0.01 NG/ML (ref 0–0.03)
TROPONIN T SERPL-MCNC: <0.01 NG/ML (ref 0–0.03)
WBC NRBC COR # BLD: 7.6 10*3/MM3 (ref 3.4–10.8)

## 2019-11-26 PROCEDURE — 84484 ASSAY OF TROPONIN QUANT: CPT | Performed by: PHYSICIAN ASSISTANT

## 2019-11-26 PROCEDURE — 83880 ASSAY OF NATRIURETIC PEPTIDE: CPT | Performed by: EMERGENCY MEDICINE

## 2019-11-26 PROCEDURE — 85379 FIBRIN DEGRADATION QUANT: CPT | Performed by: EMERGENCY MEDICINE

## 2019-11-26 PROCEDURE — 84484 ASSAY OF TROPONIN QUANT: CPT | Performed by: EMERGENCY MEDICINE

## 2019-11-26 PROCEDURE — 93005 ELECTROCARDIOGRAM TRACING: CPT

## 2019-11-26 PROCEDURE — G0378 HOSPITAL OBSERVATION PER HR: HCPCS

## 2019-11-26 PROCEDURE — 80048 BASIC METABOLIC PNL TOTAL CA: CPT | Performed by: EMERGENCY MEDICINE

## 2019-11-26 PROCEDURE — 85610 PROTHROMBIN TIME: CPT | Performed by: PHYSICIAN ASSISTANT

## 2019-11-26 PROCEDURE — 85025 COMPLETE CBC W/AUTO DIFF WBC: CPT | Performed by: EMERGENCY MEDICINE

## 2019-11-26 PROCEDURE — 84132 ASSAY OF SERUM POTASSIUM: CPT | Performed by: PHYSICIAN ASSISTANT

## 2019-11-26 PROCEDURE — 93005 ELECTROCARDIOGRAM TRACING: CPT | Performed by: EMERGENCY MEDICINE

## 2019-11-26 PROCEDURE — 71045 X-RAY EXAM CHEST 1 VIEW: CPT

## 2019-11-26 PROCEDURE — 99219 PR INITIAL OBSERVATION CARE/DAY 50 MINUTES: CPT | Performed by: PHYSICIAN ASSISTANT

## 2019-11-26 PROCEDURE — 99285 EMERGENCY DEPT VISIT HI MDM: CPT

## 2019-11-26 RX ORDER — ASPIRIN 81 MG/1
81 TABLET, CHEWABLE ORAL DAILY
Status: DISCONTINUED | OUTPATIENT
Start: 2019-11-27 | End: 2019-11-27 | Stop reason: HOSPADM

## 2019-11-26 RX ORDER — SODIUM CHLORIDE 0.9 % (FLUSH) 0.9 %
10 SYRINGE (ML) INJECTION EVERY 12 HOURS SCHEDULED
Status: DISCONTINUED | OUTPATIENT
Start: 2019-11-26 | End: 2019-11-27 | Stop reason: HOSPADM

## 2019-11-26 RX ORDER — MAGNESIUM SULFATE HEPTAHYDRATE 40 MG/ML
2 INJECTION, SOLUTION INTRAVENOUS AS NEEDED
Status: DISCONTINUED | OUTPATIENT
Start: 2019-11-26 | End: 2019-11-27 | Stop reason: HOSPADM

## 2019-11-26 RX ORDER — FUROSEMIDE 40 MG/1
40 TABLET ORAL 2 TIMES DAILY
Status: DISCONTINUED | OUTPATIENT
Start: 2019-11-26 | End: 2019-11-27 | Stop reason: HOSPADM

## 2019-11-26 RX ORDER — SODIUM CHLORIDE 0.9 % (FLUSH) 0.9 %
10 SYRINGE (ML) INJECTION AS NEEDED
Status: DISCONTINUED | OUTPATIENT
Start: 2019-11-26 | End: 2019-11-26

## 2019-11-26 RX ORDER — MAGNESIUM SULFATE HEPTAHYDRATE 40 MG/ML
4 INJECTION, SOLUTION INTRAVENOUS AS NEEDED
Status: DISCONTINUED | OUTPATIENT
Start: 2019-11-26 | End: 2019-11-27 | Stop reason: HOSPADM

## 2019-11-26 RX ORDER — ONDANSETRON 2 MG/ML
4 INJECTION INTRAMUSCULAR; INTRAVENOUS EVERY 6 HOURS PRN
Status: DISCONTINUED | OUTPATIENT
Start: 2019-11-26 | End: 2019-11-27 | Stop reason: HOSPADM

## 2019-11-26 RX ORDER — LISINOPRIL 5 MG/1
5 TABLET ORAL NIGHTLY
Status: DISCONTINUED | OUTPATIENT
Start: 2019-11-26 | End: 2019-11-27 | Stop reason: HOSPADM

## 2019-11-26 RX ORDER — POTASSIUM CHLORIDE 1.5 G/1.77G
40 POWDER, FOR SOLUTION ORAL AS NEEDED
Status: DISCONTINUED | OUTPATIENT
Start: 2019-11-26 | End: 2019-11-27 | Stop reason: HOSPADM

## 2019-11-26 RX ORDER — SODIUM CHLORIDE 9 MG/ML
100 INJECTION, SOLUTION INTRAVENOUS CONTINUOUS
Status: DISCONTINUED | OUTPATIENT
Start: 2019-11-26 | End: 2019-11-26

## 2019-11-26 RX ORDER — METOPROLOL SUCCINATE 25 MG/1
25 TABLET, EXTENDED RELEASE ORAL DAILY
Status: DISCONTINUED | OUTPATIENT
Start: 2019-11-27 | End: 2019-11-27 | Stop reason: HOSPADM

## 2019-11-26 RX ORDER — NITROGLYCERIN 0.4 MG/1
0.4 TABLET SUBLINGUAL
Status: DISCONTINUED | OUTPATIENT
Start: 2019-11-26 | End: 2019-11-27 | Stop reason: HOSPADM

## 2019-11-26 RX ORDER — KETOROLAC TROMETHAMINE 15 MG/ML
15 INJECTION, SOLUTION INTRAMUSCULAR; INTRAVENOUS EVERY 6 HOURS PRN
Status: DISCONTINUED | OUTPATIENT
Start: 2019-11-26 | End: 2019-11-27 | Stop reason: HOSPADM

## 2019-11-26 RX ORDER — ATORVASTATIN CALCIUM 40 MG/1
80 TABLET, FILM COATED ORAL DAILY
Status: DISCONTINUED | OUTPATIENT
Start: 2019-11-27 | End: 2019-11-27 | Stop reason: HOSPADM

## 2019-11-26 RX ORDER — SPIRONOLACTONE 25 MG/1
25 TABLET ORAL DAILY
Status: DISCONTINUED | OUTPATIENT
Start: 2019-11-27 | End: 2019-11-27 | Stop reason: HOSPADM

## 2019-11-26 RX ORDER — DOCUSATE SODIUM 100 MG/1
100 CAPSULE, LIQUID FILLED ORAL 2 TIMES DAILY PRN
Status: DISCONTINUED | OUTPATIENT
Start: 2019-11-26 | End: 2019-11-27 | Stop reason: HOSPADM

## 2019-11-26 RX ORDER — POTASSIUM CHLORIDE 20 MEQ/1
40 TABLET, EXTENDED RELEASE ORAL AS NEEDED
Status: DISCONTINUED | OUTPATIENT
Start: 2019-11-26 | End: 2019-11-27 | Stop reason: HOSPADM

## 2019-11-26 RX ORDER — BUPROPION HYDROCHLORIDE 150 MG/1
150 TABLET ORAL DAILY
Status: DISCONTINUED | OUTPATIENT
Start: 2019-11-27 | End: 2019-11-27 | Stop reason: HOSPADM

## 2019-11-26 RX ORDER — NICOTINE 21 MG/24HR
1 PATCH, TRANSDERMAL 24 HOURS TRANSDERMAL EVERY 24 HOURS
Status: DISCONTINUED | OUTPATIENT
Start: 2019-11-26 | End: 2019-11-27 | Stop reason: HOSPADM

## 2019-11-26 RX ORDER — SODIUM CHLORIDE 0.9 % (FLUSH) 0.9 %
10 SYRINGE (ML) INJECTION AS NEEDED
Status: DISCONTINUED | OUTPATIENT
Start: 2019-11-26 | End: 2019-11-27 | Stop reason: HOSPADM

## 2019-11-26 RX ORDER — CALCIUM GLUCONATE 20 MG/ML
2 INJECTION, SOLUTION INTRAVENOUS AS NEEDED
Status: DISCONTINUED | OUTPATIENT
Start: 2019-11-26 | End: 2019-11-27 | Stop reason: HOSPADM

## 2019-11-26 RX ORDER — POLYETHYLENE GLYCOL 3350 17 G/17G
17 POWDER, FOR SOLUTION ORAL DAILY PRN
COMMUNITY
End: 2020-09-02

## 2019-11-26 RX ORDER — ONDANSETRON 4 MG/1
4 TABLET, FILM COATED ORAL EVERY 6 HOURS PRN
Status: DISCONTINUED | OUTPATIENT
Start: 2019-11-26 | End: 2019-11-27 | Stop reason: HOSPADM

## 2019-11-26 RX ORDER — CALCIUM GLUCONATE 20 MG/ML
1 INJECTION, SOLUTION INTRAVENOUS AS NEEDED
Status: DISCONTINUED | OUTPATIENT
Start: 2019-11-26 | End: 2019-11-27 | Stop reason: HOSPADM

## 2019-11-26 RX ORDER — POTASSIUM CHLORIDE 20 MEQ/1
20 TABLET, EXTENDED RELEASE ORAL DAILY
Status: DISCONTINUED | OUTPATIENT
Start: 2019-11-26 | End: 2019-11-26

## 2019-11-26 RX ORDER — CHOLECALCIFEROL (VITAMIN D3) 125 MCG
5 CAPSULE ORAL NIGHTLY PRN
Status: DISCONTINUED | OUTPATIENT
Start: 2019-11-26 | End: 2019-11-27 | Stop reason: HOSPADM

## 2019-11-26 RX ADMIN — POTASSIUM CHLORIDE 20 MEQ: 1500 TABLET, EXTENDED RELEASE ORAL at 19:02

## 2019-11-26 RX ADMIN — NITROGLYCERIN 1 INCH: 20 OINTMENT TOPICAL at 16:52

## 2019-11-27 ENCOUNTER — DOCUMENTATION (OUTPATIENT)
Dept: CARDIOLOGY | Facility: CLINIC | Age: 49
End: 2019-11-27

## 2019-11-27 ENCOUNTER — APPOINTMENT (OUTPATIENT)
Dept: CARDIAC REHAB | Facility: HOSPITAL | Age: 49
End: 2019-11-27

## 2019-11-27 VITALS
DIASTOLIC BLOOD PRESSURE: 63 MMHG | HEART RATE: 62 BPM | BODY MASS INDEX: 29.24 KG/M2 | RESPIRATION RATE: 16 BRPM | TEMPERATURE: 98.1 F | OXYGEN SATURATION: 98 % | HEIGHT: 64 IN | SYSTOLIC BLOOD PRESSURE: 98 MMHG | WEIGHT: 171.3 LBS

## 2019-11-27 LAB
ALBUMIN SERPL-MCNC: 4.1 G/DL (ref 3.5–5.2)
ALBUMIN/GLOB SERPL: 1.4 G/DL
ALP SERPL-CCNC: 109 U/L (ref 39–117)
ALT SERPL W P-5'-P-CCNC: 22 U/L (ref 1–33)
ANION GAP SERPL CALCULATED.3IONS-SCNC: 12 MMOL/L (ref 5–15)
AST SERPL-CCNC: 16 U/L (ref 1–32)
BASOPHILS # BLD AUTO: 0 10*3/MM3 (ref 0–0.2)
BASOPHILS NFR BLD AUTO: 0.4 % (ref 0–1.5)
BILIRUB SERPL-MCNC: 0.3 MG/DL (ref 0.2–1.2)
BUN BLD-MCNC: 16 MG/DL (ref 6–20)
BUN/CREAT SERPL: 16.2 (ref 7–25)
CALCIUM SPEC-SCNC: 9.4 MG/DL (ref 8.6–10.5)
CHLORIDE SERPL-SCNC: 97 MMOL/L (ref 98–107)
CHOLEST SERPL-MCNC: 145 MG/DL (ref 0–200)
CK SERPL-CCNC: 121 U/L (ref 20–180)
CO2 SERPL-SCNC: 30 MMOL/L (ref 22–29)
CREAT BLD-MCNC: 0.99 MG/DL (ref 0.57–1)
DEPRECATED RDW RBC AUTO: 42 FL (ref 37–54)
EOSINOPHIL # BLD AUTO: 0.2 10*3/MM3 (ref 0–0.4)
EOSINOPHIL NFR BLD AUTO: 2.4 % (ref 0.3–6.2)
ERYTHROCYTE [DISTWIDTH] IN BLOOD BY AUTOMATED COUNT: 13.1 % (ref 12.3–15.4)
FERRITIN SERPL-MCNC: 173.9 NG/ML (ref 13–150)
GFR SERPL CREATININE-BSD FRML MDRD: 59 ML/MIN/1.73
GLOBULIN UR ELPH-MCNC: 3 GM/DL
GLUCOSE BLD-MCNC: 112 MG/DL (ref 65–99)
HBA1C MFR BLD: 5.2 % (ref 3.5–5.6)
HCT VFR BLD AUTO: 35.5 % (ref 34–46.6)
HDLC SERPL-MCNC: 52 MG/DL (ref 40–60)
HGB BLD-MCNC: 12.1 G/DL (ref 12–15.9)
LDLC SERPL CALC-MCNC: 51 MG/DL (ref 0–100)
LDLC/HDLC SERPL: 0.97 {RATIO}
LYMPHOCYTES # BLD AUTO: 3 10*3/MM3 (ref 0.7–3.1)
LYMPHOCYTES NFR BLD AUTO: 34.1 % (ref 19.6–45.3)
MAGNESIUM SERPL-MCNC: 1.9 MG/DL (ref 1.6–2.6)
MCH RBC QN AUTO: 31.2 PG (ref 26.6–33)
MCHC RBC AUTO-ENTMCNC: 34.2 G/DL (ref 31.5–35.7)
MCV RBC AUTO: 91.4 FL (ref 79–97)
MONOCYTES # BLD AUTO: 0.8 10*3/MM3 (ref 0.1–0.9)
MONOCYTES NFR BLD AUTO: 9 % (ref 5–12)
NEUTROPHILS # BLD AUTO: 4.7 10*3/MM3 (ref 1.7–7)
NEUTROPHILS NFR BLD AUTO: 54.1 % (ref 42.7–76)
NRBC BLD AUTO-RTO: 0 /100 WBC (ref 0–0.2)
NT-PROBNP SERPL-MCNC: 401.3 PG/ML (ref 5–900)
PHOSPHATE SERPL-MCNC: 5.2 MG/DL (ref 2.5–4.5)
PLATELET # BLD AUTO: 185 10*3/MM3 (ref 140–450)
PMV BLD AUTO: 9.3 FL (ref 6–12)
POTASSIUM BLD-SCNC: 3.5 MMOL/L (ref 3.5–5.2)
PROT SERPL-MCNC: 7.1 G/DL (ref 6–8.5)
RBC # BLD AUTO: 3.88 10*6/MM3 (ref 3.77–5.28)
SODIUM BLD-SCNC: 139 MMOL/L (ref 136–145)
TRIGL SERPL-MCNC: 212 MG/DL (ref 0–150)
TROPONIN T SERPL-MCNC: <0.01 NG/ML (ref 0–0.03)
TROPONIN T SERPL-MCNC: <0.01 NG/ML (ref 0–0.03)
TSH SERPL DL<=0.05 MIU/L-ACNC: 2.31 UIU/ML (ref 0.27–4.2)
VLDLC SERPL-MCNC: 42.4 MG/DL
WBC NRBC COR # BLD: 8.7 10*3/MM3 (ref 3.4–10.8)

## 2019-11-27 PROCEDURE — 84443 ASSAY THYROID STIM HORMONE: CPT | Performed by: PHYSICIAN ASSISTANT

## 2019-11-27 PROCEDURE — 99217 PR OBSERVATION CARE DISCHARGE MANAGEMENT: CPT | Performed by: INTERNAL MEDICINE

## 2019-11-27 PROCEDURE — 85025 COMPLETE CBC W/AUTO DIFF WBC: CPT | Performed by: PHYSICIAN ASSISTANT

## 2019-11-27 PROCEDURE — 80053 COMPREHEN METABOLIC PANEL: CPT | Performed by: PHYSICIAN ASSISTANT

## 2019-11-27 PROCEDURE — 84100 ASSAY OF PHOSPHORUS: CPT | Performed by: PHYSICIAN ASSISTANT

## 2019-11-27 PROCEDURE — 84484 ASSAY OF TROPONIN QUANT: CPT | Performed by: PHYSICIAN ASSISTANT

## 2019-11-27 PROCEDURE — 82728 ASSAY OF FERRITIN: CPT | Performed by: PHYSICIAN ASSISTANT

## 2019-11-27 PROCEDURE — 82550 ASSAY OF CK (CPK): CPT | Performed by: PHYSICIAN ASSISTANT

## 2019-11-27 PROCEDURE — 83880 ASSAY OF NATRIURETIC PEPTIDE: CPT | Performed by: PHYSICIAN ASSISTANT

## 2019-11-27 PROCEDURE — 83735 ASSAY OF MAGNESIUM: CPT | Performed by: PHYSICIAN ASSISTANT

## 2019-11-27 PROCEDURE — 80061 LIPID PANEL: CPT | Performed by: PHYSICIAN ASSISTANT

## 2019-11-27 PROCEDURE — 93005 ELECTROCARDIOGRAM TRACING: CPT | Performed by: NURSE PRACTITIONER

## 2019-11-27 PROCEDURE — G0378 HOSPITAL OBSERVATION PER HR: HCPCS

## 2019-11-27 PROCEDURE — 83036 HEMOGLOBIN GLYCOSYLATED A1C: CPT | Performed by: PHYSICIAN ASSISTANT

## 2019-11-27 RX ORDER — PRASUGREL 10 MG/1
10 TABLET, FILM COATED ORAL DAILY
Qty: 30 TABLET | Refills: 6 | Status: SHIPPED | OUTPATIENT
Start: 2019-11-27 | End: 2020-06-29 | Stop reason: SDUPTHER

## 2019-11-27 RX ORDER — PRASUGREL 10 MG/1
10 TABLET, FILM COATED ORAL ONCE
Status: COMPLETED | OUTPATIENT
Start: 2019-11-27 | End: 2019-11-27

## 2019-11-27 RX ADMIN — Medication 10 ML: at 09:10

## 2019-11-27 RX ADMIN — PRASUGREL 10 MG: 10 TABLET, FILM COATED ORAL at 16:51

## 2019-11-27 RX ADMIN — METOPROLOL SUCCINATE 25 MG: 25 TABLET, EXTENDED RELEASE ORAL at 09:09

## 2019-11-27 RX ADMIN — FUROSEMIDE 40 MG: 40 TABLET ORAL at 09:09

## 2019-11-27 RX ADMIN — BUPROPION HYDROCHLORIDE 150 MG: 150 TABLET, EXTENDED RELEASE ORAL at 09:09

## 2019-11-27 RX ADMIN — SPIRONOLACTONE 25 MG: 25 TABLET, FILM COATED ORAL at 09:09

## 2019-11-27 RX ADMIN — ASPIRIN 81 MG 81 MG: 81 TABLET ORAL at 09:09

## 2019-11-27 RX ADMIN — TICAGRELOR 90 MG: 90 TABLET ORAL at 11:23

## 2019-11-29 ENCOUNTER — TREATMENT (OUTPATIENT)
Dept: CARDIAC REHAB | Facility: HOSPITAL | Age: 49
End: 2019-11-29

## 2019-11-29 DIAGNOSIS — I21.02 ST ELEVATION MYOCARDIAL INFARCTION INVOLVING LEFT ANTERIOR DESCENDING (LAD) CORONARY ARTERY (HCC): Primary | ICD-10-CM

## 2019-11-29 PROCEDURE — 93798 PHYS/QHP OP CAR RHAB W/ECG: CPT

## 2019-12-02 ENCOUNTER — TREATMENT (OUTPATIENT)
Dept: CARDIAC REHAB | Facility: HOSPITAL | Age: 49
End: 2019-12-02

## 2019-12-02 ENCOUNTER — TELEPHONE (OUTPATIENT)
Dept: CARDIOLOGY | Facility: CLINIC | Age: 49
End: 2019-12-02

## 2019-12-02 DIAGNOSIS — I21.02 ST ELEVATION MYOCARDIAL INFARCTION INVOLVING LEFT ANTERIOR DESCENDING (LAD) CORONARY ARTERY (HCC): Primary | ICD-10-CM

## 2019-12-02 PROCEDURE — 93798 PHYS/QHP OP CAR RHAB W/ECG: CPT

## 2019-12-02 NOTE — TELEPHONE ENCOUNTER
Obtained PA for effient 10mg daily.   Ref number [id number].  Approved through 12/2/19-12/1/20  Patient and pharmacy notified.

## 2019-12-06 ENCOUNTER — TREATMENT (OUTPATIENT)
Dept: CARDIAC REHAB | Facility: HOSPITAL | Age: 49
End: 2019-12-06

## 2019-12-06 DIAGNOSIS — I21.02 ST ELEVATION MYOCARDIAL INFARCTION INVOLVING LEFT ANTERIOR DESCENDING (LAD) CORONARY ARTERY (HCC): Primary | ICD-10-CM

## 2019-12-06 PROCEDURE — 93798 PHYS/QHP OP CAR RHAB W/ECG: CPT

## 2019-12-09 ENCOUNTER — TREATMENT (OUTPATIENT)
Dept: CARDIAC REHAB | Facility: HOSPITAL | Age: 49
End: 2019-12-09

## 2019-12-09 ENCOUNTER — TELEPHONE (OUTPATIENT)
Dept: FAMILY MEDICINE CLINIC | Facility: CLINIC | Age: 49
End: 2019-12-09

## 2019-12-09 DIAGNOSIS — I21.02 ST ELEVATION MYOCARDIAL INFARCTION INVOLVING LEFT ANTERIOR DESCENDING (LAD) CORONARY ARTERY (HCC): Primary | ICD-10-CM

## 2019-12-09 PROCEDURE — 93798 PHYS/QHP OP CAR RHAB W/ECG: CPT

## 2019-12-09 NOTE — TELEPHONE ENCOUNTER
----- Message from Yodit Johnston MD sent at 11/29/2019  5:28 PM EST -----  See how she is doing she had an admission over night for CP and SOA workup negative. Her BS was mildly elevated deserves follow up at some time fasting.

## 2019-12-11 ENCOUNTER — TREATMENT (OUTPATIENT)
Dept: CARDIAC REHAB | Facility: HOSPITAL | Age: 49
End: 2019-12-11

## 2019-12-11 ENCOUNTER — OFFICE VISIT (OUTPATIENT)
Dept: CARDIOLOGY | Facility: CLINIC | Age: 49
End: 2019-12-11

## 2019-12-11 VITALS
SYSTOLIC BLOOD PRESSURE: 118 MMHG | DIASTOLIC BLOOD PRESSURE: 78 MMHG | HEIGHT: 64 IN | WEIGHT: 183 LBS | RESPIRATION RATE: 18 BRPM | HEART RATE: 59 BPM | BODY MASS INDEX: 31.24 KG/M2

## 2019-12-11 DIAGNOSIS — Z95.5 STENTED CORONARY ARTERY: ICD-10-CM

## 2019-12-11 DIAGNOSIS — E78.2 MIXED HYPERLIPIDEMIA: Chronic | ICD-10-CM

## 2019-12-11 DIAGNOSIS — I25.5 ISCHEMIC CARDIOMYOPATHY: ICD-10-CM

## 2019-12-11 DIAGNOSIS — I21.02 STEMI INVOLVING LEFT ANTERIOR DESCENDING CORONARY ARTERY (HCC): ICD-10-CM

## 2019-12-11 DIAGNOSIS — Q23.1 BICUSPID AORTIC VALVE: ICD-10-CM

## 2019-12-11 DIAGNOSIS — I21.02 ST ELEVATION MYOCARDIAL INFARCTION INVOLVING LEFT ANTERIOR DESCENDING (LAD) CORONARY ARTERY (HCC): Primary | ICD-10-CM

## 2019-12-11 DIAGNOSIS — I10 HYPERTENSION, BENIGN: Chronic | ICD-10-CM

## 2019-12-11 DIAGNOSIS — Q21.10 ASD (ATRIAL SEPTAL DEFECT): Primary | ICD-10-CM

## 2019-12-11 PROCEDURE — 99214 OFFICE O/P EST MOD 30 MIN: CPT | Performed by: INTERNAL MEDICINE

## 2019-12-11 PROCEDURE — 93798 PHYS/QHP OP CAR RHAB W/ECG: CPT

## 2019-12-13 ENCOUNTER — TREATMENT (OUTPATIENT)
Dept: CARDIAC REHAB | Facility: HOSPITAL | Age: 49
End: 2019-12-13

## 2019-12-13 DIAGNOSIS — I21.02 ST ELEVATION MYOCARDIAL INFARCTION INVOLVING LEFT ANTERIOR DESCENDING (LAD) CORONARY ARTERY (HCC): Primary | ICD-10-CM

## 2019-12-13 PROCEDURE — 93798 PHYS/QHP OP CAR RHAB W/ECG: CPT

## 2019-12-16 ENCOUNTER — TREATMENT (OUTPATIENT)
Dept: CARDIAC REHAB | Facility: HOSPITAL | Age: 49
End: 2019-12-16

## 2019-12-16 DIAGNOSIS — I21.02 ST ELEVATION MYOCARDIAL INFARCTION INVOLVING LEFT ANTERIOR DESCENDING (LAD) CORONARY ARTERY (HCC): Primary | ICD-10-CM

## 2019-12-16 PROCEDURE — 93798 PHYS/QHP OP CAR RHAB W/ECG: CPT

## 2019-12-18 ENCOUNTER — TREATMENT (OUTPATIENT)
Dept: CARDIAC REHAB | Facility: HOSPITAL | Age: 49
End: 2019-12-18

## 2019-12-18 DIAGNOSIS — I21.02 ST ELEVATION MYOCARDIAL INFARCTION INVOLVING LEFT ANTERIOR DESCENDING (LAD) CORONARY ARTERY (HCC): Primary | ICD-10-CM

## 2019-12-18 PROCEDURE — 93010 ELECTROCARDIOGRAM REPORT: CPT | Performed by: INTERNAL MEDICINE

## 2019-12-18 PROCEDURE — 93798 PHYS/QHP OP CAR RHAB W/ECG: CPT

## 2019-12-20 ENCOUNTER — APPOINTMENT (OUTPATIENT)
Dept: CARDIAC REHAB | Facility: HOSPITAL | Age: 49
End: 2019-12-20

## 2019-12-23 ENCOUNTER — TREATMENT (OUTPATIENT)
Dept: CARDIAC REHAB | Facility: HOSPITAL | Age: 49
End: 2019-12-23

## 2019-12-23 DIAGNOSIS — I21.02 ST ELEVATION MYOCARDIAL INFARCTION INVOLVING LEFT ANTERIOR DESCENDING (LAD) CORONARY ARTERY (HCC): Primary | ICD-10-CM

## 2019-12-23 PROCEDURE — 93798 PHYS/QHP OP CAR RHAB W/ECG: CPT

## 2019-12-26 ENCOUNTER — TRANSCRIBE ORDERS (OUTPATIENT)
Dept: CARDIAC REHAB | Facility: HOSPITAL | Age: 49
End: 2019-12-26

## 2019-12-26 ENCOUNTER — TREATMENT (OUTPATIENT)
Dept: CARDIAC REHAB | Facility: HOSPITAL | Age: 49
End: 2019-12-26

## 2019-12-26 DIAGNOSIS — I21.02 STEMI INVOLVING LEFT ANTERIOR DESCENDING CORONARY ARTERY (HCC): Primary | ICD-10-CM

## 2019-12-26 DIAGNOSIS — Z95.5 S/P INSERTION OF NON-DRUG ELUTING CORONARY ARTERY STENT: ICD-10-CM

## 2019-12-26 DIAGNOSIS — I21.02 ST ELEVATION MYOCARDIAL INFARCTION INVOLVING LEFT ANTERIOR DESCENDING (LAD) CORONARY ARTERY (HCC): Primary | ICD-10-CM

## 2019-12-26 PROCEDURE — 93798 PHYS/QHP OP CAR RHAB W/ECG: CPT

## 2019-12-27 ENCOUNTER — TREATMENT (OUTPATIENT)
Dept: CARDIAC REHAB | Facility: HOSPITAL | Age: 49
End: 2019-12-27

## 2019-12-27 DIAGNOSIS — I21.02 ST ELEVATION MYOCARDIAL INFARCTION INVOLVING LEFT ANTERIOR DESCENDING (LAD) CORONARY ARTERY (HCC): Primary | ICD-10-CM

## 2019-12-27 PROCEDURE — 93798 PHYS/QHP OP CAR RHAB W/ECG: CPT

## 2019-12-30 ENCOUNTER — TREATMENT (OUTPATIENT)
Dept: CARDIAC REHAB | Facility: HOSPITAL | Age: 49
End: 2019-12-30

## 2019-12-30 DIAGNOSIS — I21.02 ST ELEVATION MYOCARDIAL INFARCTION INVOLVING LEFT ANTERIOR DESCENDING (LAD) CORONARY ARTERY (HCC): Primary | ICD-10-CM

## 2019-12-30 PROCEDURE — 93798 PHYS/QHP OP CAR RHAB W/ECG: CPT

## 2020-01-02 NOTE — H&P (VIEW-ONLY)
Cardiology clinic note  Brant Martinez MD, PhD  [facility] cardiology  Subjective:     Encounter Date:12/11/2019      Patient ID: Sada Le is a 49 y.o. female.    Chief Complaint:  Chief Complaint   Patient presents with   • Follow-up       HPI:  History of Present Illness  Per prior encounter  I had the pleasure of seeing this very pleasant 49-year-old female who is well-known to me from recent hospitalization with LAD ST elevation myocardial infarction complicated by cardiogenic shock and VF arrest prior to revascularization.  She underwent located high risk PCI with Impella support with 3.5 x 33 mm Xience drug-eluting stent to the proximal LAD postdilated to 4.5 mm with good angiographic results.  She is maintained on dual antiplatelet therapy with aspirin and ticagrelor.  Ultimately she had continued hemodynamic support with Impella left ventricular unloading out to 24 to 36 hours with uncomplicated removal of Impella support.  She was observed in the hospital for 7 days to rule out any postinfarction mechanical complications.  She was initiated on goal-directed medical therapy as her hemodynamics allowed with Aldactone 25 daily as well as low-dose metoprolol on discharge 12.5 mg p.o. daily.  She has known essential hypertension which was treated previously with lisinopril which currently she is off secondary to lower blood pressures during her hospitalization.  She presents doing well today with stable bilateral groins with mild bruising but patent distal pulses and no claudication symptoms.  No hematoma no pseudoaneurysms or other peripheral vascular complications.  She does have occasional atypical chest discomfort as shooting pains but she does not know whether she is just hypersensitive to these feelings but nothing feels like the pressure she experienced with her MI.  She is done well and she is now 2 weeks post infarction in her underlying ejection fraction was 38% biplane with  mid to apical anteroseptal and anterior wall and apical akinesis.  Peak troponin was greater than 72 and treated down throughout her hospitalization.  She presents today with no heart failure signs or symptoms and is on Lasix 40 mg p.o. daily in addition to Aldactone and metoprolol.  She presents today for medication optimization and blood pressure today is 112/66 but at home has been ranging up to 130 systolic.  She has stopped smoking since her MI and continues to be abstinent which I encouraged.  Her renal function is normal per last labs.  No syncope or palpitations reported.  No PND orthopnea.  She does have NYHA class II dyspnea on exertion and is trying to work with cardiac rehab recently with very light home physical therapy.  I advised her to take the next 2 weeks easy to encourage stable scar formation as well as up titrate medicines as able.  She says she is not taking any salt intake significantly and is doing well with her diet.  No other concerning signs or symptoms last visit.     On today's encounter she is doing very well and euvolemic.  She previously had a hospitalization for some shortness of air and likely anxiety.  She was discharged shortly thereafter.  No EKG changes or chest pain or troponin elevation.  Follow-up 2D echo revealed good recovery of LV systolic function to greater than 50% with mild residual anterior wall abnormality with mild hypokinesis in the infarcted territory.  Notably she had observation of redundant and aneurysmal interatrial septum and bubble study did reveal positivity suggesting PFO and left right interatrial shunt.  She does have a mildly enlarged right ventricle as well as recent demonstration of moderately dilated ascending aorta measuring 4.6 cm.  The aortic valve is not well seen but cannot exclude a bicuspid aortic valve.  We suggested a transesophageal echo which is been ordered and the patient agrees with for possible evaluation of candidacy of closure of her  PFO/small S ASD as well as better visualization of her aortic valve.  No other acute signs or symptoms today.  NYHA class I-II, CCS class I.  She is compliant with aspirin and Effient as her dual antiplatelet therapy and is on low-dose Lasix only along with goal-directed medical therapy including beta-blocker and Aldactone as she was previously limited by assistant low systolics for ACE inhibitor initiation.  No other symptoms today.  No palpitations no syncope no other concerns.    Review of systems negative x14 point review of systems except as mentioned above    Historical data copied forward from previous encounters and EMR as unchanged    The following portions of the patient's history were reviewed and updated as appropriate: allergies, current medications, past family history, past medical history, past social history, past surgical history and problem list.    Problem List:  Patient Active Problem List   Diagnosis   • Allergic rhinitis   • Cervical disc disease with myelopathy   • Diverticulosis of colon   • Hypertension, benign   • Insomnia, organic   • Obesity (BMI 30-39.9)   • Osteopenia of spine   • Mixed hyperlipidemia   • STEMI involving left anterior descending coronary artery (CMS/HCC)   • Elevated LFTs   • Hypocalcemia   • Normocytic anemia   • Thrombocytopenia (CMS/HCC)   • Cardiac arrest with ventricular fibrillation (CMS/HCC)   • Ischemic cardiomyopathy   • Stented coronary artery   • History of echocardiogram   • Annual visit for general adult medical examination with abnormal findings   • Cervical cancer screening   • Encounter for screening for malignant neoplasm of breast   • Colon cancer screening   • History of tobacco use   • Chest pain   • Generalized anxiety disorder   • Hypokalemia       Past Medical History:  Past Medical History:   Diagnosis Date   • Absence of left thumb     Impression: hx of MRSA, she is signficantly improved She will continue meds and followup if sxs have not  resolved over the next 2 weeks.. She is released from care and will notify us of any problems   • Acute otitis media    • Allergic rhinitis    • Arthritis     neck   • Cancer (CMS/HCC)     skin   • Cervical disc disease with myelopathy    • Contact dermatitis or eczema    • Coronary artery disease    • Disorder of bone and cartilage    • Diverticulitis of colon     Impression: CT confirms in the sigmoid colon 10/2011   • Diverticulosis of colon     Impression: No sxs 02/13/2013   • Elevated cholesterol    • Elevated temperature    • External otitis of left ear    • Hearing loss due to cerumen impaction, left    • Hemangioma of liver    • History of echocardiogram 10/03/2019    Severely reduced LV systolic function. EF 34% Distribution indicative of LAD territory injury. Akinesis of the apex as well as apical lateral and mid to apical septal walls. Preservation of posterior inferior lateral walls. Mild AI, mild TR. Normal RV function.    • Hordeolum externum of left lower eyelid     Impression: She was started on Bactrim DS for her infection. She was also advised to use warm compression. She will followup should sxs not improve over the next 48 hrs and resolve over the next 1 weeek.   • Hyperlipidemia     Impression: Diet controled. She should see improvement with her successful wt loss. We discussed her lipid panel.   • Hypertension, benign     Impression: new onset Low salt low calorie diet and regular exercise and followup in 1 mo She will monitor her pressures and notify us of her pressures over the next 1 week.   • Inclusion cyst of right breast     Impression: We will follow for now. She is encouraged to have Mammography. She is presently without insurance and reluctant. She bobby consider. She will notify us of any change at all in the lesion for the only way to be certain of it's etilogy is to remove it. She understands.   • Insomnia, organic    • Menopausal syndrome    • Overweight (BMI 25.0-29.9)    • RUQ  abdominal pain     Impression: Resolving, negative GB u/s, HIDA EF 77%, we will follow   • Stented coronary artery 09/29/2019   • Tobacco use     Impression: She is strongly encouraged to stop smoking. Cessation techniques discussed and encouraged. The consequences of not stopping discussed, ie, CAD, PVD, Stroke, Lung and other cancers.       Past Surgical History:  Past Surgical History:   Procedure Laterality Date   • BIVENTRICULAR ASSIST DEVICE/LEFT VENTRICULAR ASSIST DEVICE INSERTION N/A 9/29/2019    Procedure: Left Ventricular Assist Device;  Surgeon: Brant Martinez MD;  Location: [facility] CATH INVASIVE LOCATION;  Service: Cardiovascular   • CARDIAC CATHETERIZATION N/A 9/29/2019    Procedure: Left Heart Cath;  Surgeon: Brant Martinez MD;  Location: [facility] CATH INVASIVE LOCATION;  Service: Cardiovascular   • CARDIAC CATHETERIZATION N/A 9/29/2019    Procedure: Coronary angiography;  Surgeon: Brant Martinez MD;  Location: [facility] CATH INVASIVE LOCATION;  Service: Cardiovascular   • CARDIAC CATHETERIZATION N/A 9/29/2019    Procedure: Left ventriculography;  Surgeon: Brant Martinez MD;  Location: [facility] CATH INVASIVE LOCATION;  Service: Cardiovascular   • CARDIAC CATHETERIZATION N/A 9/29/2019    Procedure: Stent SERENITY coronary;  Surgeon: Brant Martinez MD;  Location: [facility] CATH INVASIVE LOCATION;  Service: Cardiovascular   • CARDIAC ELECTROPHYSIOLOGY PROCEDURE  9/29/2019    Procedure: Impella Insertion;  Surgeon: Brant Martinez MD;  Location: [facility] CATH INVASIVE LOCATION;  Service: Cardiovascular   • IA RT/LT HEART CATHETERS N/A 9/29/2019    Procedure: Percutaneous Coronary Intervention;  Surgeon: Brant Martinez MD;  Location: [facility] CATH INVASIVE LOCATION;  Service: Cardiovascular   • TOTAL ABDOMINAL HYSTERECTOMY WITH SALPINGO OOPHORECTOMY  1995    Endometriosis       Social History:  Social History     Socioeconomic History   • Marital status:  "     Spouse name: Not on file   • Number of children: Not on file   • Years of education: Not on file   • Highest education level: Not on file   Tobacco Use   • Smoking status: Former Smoker     Packs/day: 1.00     Years: 34.00     Pack years: 34.00     Types: Cigarettes     Start date:      Last attempt to quit: 2019     Years since quittin.2   • Smokeless tobacco: Never Used   • Tobacco comment: States she is quitting as of 2019.   Substance and Sexual Activity   • Alcohol use: Yes     Alcohol/week: 4.0 standard drinks     Types: 4 Cans of beer per week     Frequency: Monthly or less     Drinks per session: 3 or 4     Binge frequency: Less than monthly   • Drug use: Yes     Types: Marijuana     Comment: occasional   • Sexual activity: Defer       Allergies:  Allergies   Allergen Reactions   • Penicillins Rash   • Ciprofloxacin Rash       Immunizations:  Immunization History   Administered Date(s) Administered   • DT 2004   • FLUARIX/FLUZONE/AFLURIA/FLULAVAL QUAD 10/01/2019   • Tdap 2011       ROS:  ROS  Negative as above     Objective:         /78 (BP Location: Left arm, Patient Position: Sitting)   Pulse 59   Resp 18   Ht 162.6 cm (64\")   Wt 83 kg (183 lb)   LMP 1996 (Approximate)   BMI 31.41 kg/m²     Physical Exam  General Appearance:    Alert, cooperative, in no acute distress, AAOx3, on RA               Mouth /throat:   Poor dentition tobacco discoloration, no JVD no ulcers   Neck:   supple,   minimal JVD/HJR+ still but better   Lungs:     Relatively clear, non labored, room air, even respirations    Heart:    Regular rhythm and normal rate, normal S1 and S2, no new murmurs, no S3-S4 no heave no lift   Abdomen:     Normal bowel sounds, no masses, no organomegaly, soft        non-tender, non-distended, no guarding, no rebound                Tenderness   Extremities:   Moves all extremities well, no edema, no cyanosis, bilat groin with slight bruising, no " hematoma, groins stable   Pulses:   Pulses palpable and equal bilaterally 2+ bilaterally, cap refill less than 2 seconds         Neurologic:   Awake, alert, oriented x3, no dificits grossly bilaterally      Unchanged from prior encounter    In-Office Procedure(s):  Procedures    ASCVD RIsk Score::  The ASCVD Risk score (Marielena SALOMON Jr., et al., 2013) failed to calculate for the following reasons:    The patient has a prior MI or stroke diagnosis    Recent Radiology:  Imaging Results (Most Recent)     None          Lab Review:   Admission on 11/26/2019, Discharged on 11/27/2019   Component Date Value   • Glucose 11/26/2019 130*   • BUN 11/26/2019 16    • Creatinine 11/26/2019 0.96    • Sodium 11/26/2019 139    • Potassium 11/26/2019 3.3*   • Chloride 11/26/2019 96*   • CO2 11/26/2019 27.0    • Calcium 11/26/2019 9.5    • eGFR Non  Amer 11/26/2019 62    • BUN/Creatinine Ratio 11/26/2019 16.7    • Anion Gap 11/26/2019 16.0*   • proBNP 11/26/2019 258.9    • Troponin T 11/26/2019 <0.010    • D-Dimer, Quantitative 11/26/2019 0.56    • WBC 11/26/2019 7.60    • RBC 11/26/2019 3.89    • Hemoglobin 11/26/2019 12.1    • Hematocrit 11/26/2019 35.5    • MCV 11/26/2019 91.1    • MCH 11/26/2019 31.2    • MCHC 11/26/2019 34.3    • RDW 11/26/2019 12.9    • RDW-SD 11/26/2019 41.1    • MPV 11/26/2019 9.4    • Platelets 11/26/2019 184    • Neutrophil % 11/26/2019 62.4    • Lymphocyte % 11/26/2019 26.9    • Monocyte % 11/26/2019 8.6    • Eosinophil % 11/26/2019 1.7    • Basophil % 11/26/2019 0.4    • Neutrophils, Absolute 11/26/2019 4.70    • Lymphocytes, Absolute 11/26/2019 2.00    • Monocytes, Absolute 11/26/2019 0.70    • Eosinophils, Absolute 11/26/2019 0.10    • Basophils, Absolute 11/26/2019 0.00    • nRBC 11/26/2019 0.0    • Protime 11/26/2019 10.8    • INR 11/26/2019 1.03    • Troponin T 11/26/2019 <0.010    • Troponin T 11/27/2019 <0.010    • Potassium 11/26/2019 3.3*   • proBNP 11/27/2019 401.3    • WBC 11/27/2019 8.70    •  RBC 11/27/2019 3.88    • Hemoglobin 11/27/2019 12.1    • Hematocrit 11/27/2019 35.5    • MCV 11/27/2019 91.4    • MCH 11/27/2019 31.2    • MCHC 11/27/2019 34.2    • RDW 11/27/2019 13.1    • RDW-SD 11/27/2019 42.0    • MPV 11/27/2019 9.3    • Platelets 11/27/2019 185    • Neutrophil % 11/27/2019 54.1    • Lymphocyte % 11/27/2019 34.1    • Monocyte % 11/27/2019 9.0    • Eosinophil % 11/27/2019 2.4    • Basophil % 11/27/2019 0.4    • Neutrophils, Absolute 11/27/2019 4.70    • Lymphocytes, Absolute 11/27/2019 3.00    • Monocytes, Absolute 11/27/2019 0.80    • Eosinophils, Absolute 11/27/2019 0.20    • Basophils, Absolute 11/27/2019 0.00    • nRBC 11/27/2019 0.0    • Creatine Kinase 11/27/2019 121    • Glucose 11/27/2019 112*   • BUN 11/27/2019 16    • Creatinine 11/27/2019 0.99    • Sodium 11/27/2019 139    • Potassium 11/27/2019 3.5    • Chloride 11/27/2019 97*   • CO2 11/27/2019 30.0*   • Calcium 11/27/2019 9.4    • Total Protein 11/27/2019 7.1    • Albumin 11/27/2019 4.10    • ALT (SGPT) 11/27/2019 22    • AST (SGOT) 11/27/2019 16    • Alkaline Phosphatase 11/27/2019 109    • Total Bilirubin 11/27/2019 0.3    • eGFR Non African Amer 11/27/2019 59*   • Globulin 11/27/2019 3.0    • A/G Ratio 11/27/2019 1.4    • BUN/Creatinine Ratio 11/27/2019 16.2    • Anion Gap 11/27/2019 12.0    • Ferritin 11/27/2019 173.90*   • Hemoglobin A1C 11/27/2019 5.2    • Total Cholesterol 11/27/2019 145    • Triglycerides 11/27/2019 212*   • HDL Cholesterol 11/27/2019 52    • LDL Cholesterol  11/27/2019 51    • VLDL Cholesterol 11/27/2019 42.4    • LDL/HDL Ratio 11/27/2019 0.97    • Magnesium 11/27/2019 1.9    • Phosphorus 11/27/2019 5.2*   • TSH 11/27/2019 2.310    • Troponin T 11/27/2019 <0.010    Hospital Outpatient Visit on 10/31/2019   Component Date Value   • BSA 10/31/2019 1.8    • BH CV ECHO PERICO - RVDD 10/31/2019 1.7    • IVSd 10/31/2019 1.3    • LVIDd 10/31/2019 5.1    • LVIDs 10/31/2019 3.5    • LVPWd 10/31/2019 1.2    • IVS/LVPW  10/31/2019 1.1    • FS 10/31/2019 31.3    • EDV(Teich) 10/31/2019 122.9    • ESV(Teich) 10/31/2019 50.7    • EF(Teich) 10/31/2019 58.8    • EDV(cubed) 10/31/2019 131.4    • ESV(cubed) 10/31/2019 42.7    • EF(cubed) 10/31/2019 67.5    • LV mass(C)d 10/31/2019 247.3    • LV mass(C)dI 10/31/2019 134.5    • SV(Teich) 10/31/2019 72.2    • SI(Teich) 10/31/2019 39.3    • SV(cubed) 10/31/2019 88.7    • SI(cubed) 10/31/2019 48.2    • Ao root diam 10/31/2019 2.9    • Ao root area 10/31/2019 6.8    • ACS 10/31/2019 1.9    • LA dimension 10/31/2019 3.1    • LA/Ao 10/31/2019 1.0    • LVOT diam 10/31/2019 2.2    • LVOT area 10/31/2019 3.8    • EDV(MOD-sp4) 10/31/2019 111.9    • ESV(MOD-sp4) 10/31/2019 47.0    • EF(MOD-sp4) 10/31/2019 58.0    • SV(MOD-sp4) 10/31/2019 64.9    • SI(MOD-sp4) 10/31/2019 35.3    • Ao root area (BSA correc* 10/31/2019 1.6    • LV Amin Vol (BSA correct* 10/31/2019 60.9    • LV Sys Vol (BSA correcte* 10/31/2019 25.6    • MV E max bernie 10/31/2019 55.2    • MV A max bernie 10/31/2019 70.5    • MV E/A 10/31/2019 0.78    • MV V2 max 10/31/2019 76.4    • MV max PG 10/31/2019 2.3    • MV V2 mean 10/31/2019 36.5    • MV mean PG 10/31/2019 0.64    • MV V2 VTI 10/31/2019 25.2    • MVA(VTI) 10/31/2019 3.5    • Ao pk bernie 10/31/2019 147.0    • Ao max PG 10/31/2019 8.6    • Ao max PG (full) 10/31/2019 3.5    • Ao V2 mean 10/31/2019 95.1    • Ao mean PG 10/31/2019 4.2    • Ao mean PG (full) 10/31/2019 2.1    • Ao V2 VTI 10/31/2019 28.2    • HERNANDEZ(I,A) 10/31/2019 3.2    • HERNANDEZ(I,D) 10/31/2019 3.2    • HERNANDEZ(V,A) 10/31/2019 2.9    • HERNANDEZ(V,D) 10/31/2019 2.9    • AI max bernie 10/31/2019 482.5    • AI max PG 10/31/2019 93.2    • AI dec slope 10/31/2019 163.8    • AI dec time 10/31/2019 3.0    • AI P1/2t 10/31/2019 862.9    • LV V1 max PG 10/31/2019 5.1    • LV V1 mean PG 10/31/2019 2.1    • LV V1 max 10/31/2019 113.1    • LV V1 mean 10/31/2019 65.8    • LV V1 VTI 10/31/2019 23.7    • MR max bernie 10/31/2019 379.0    • MR max PG  10/31/2019 57.5    • SV(Ao) 10/31/2019 192.3    • SI(Ao) 10/31/2019 104.6    • SV(LVOT) 10/31/2019 89.0    • SI(LVOT) 10/31/2019 48.4    • PA V2 max 10/31/2019 89.3    • PA max PG 10/31/2019 3.2    • PA acc time 10/31/2019 0.11    • PI end-d bernie 10/31/2019 104.9    • TR max bernie 10/31/2019 309.8    • RVSP(TR) 10/31/2019 55.1    • RAP systole 10/31/2019 10.0    • PA pr(Accel) 10/31/2019 27.6    • BH CV ECHO PERICO - BZI_BMI 10/31/2019 29.7    • BH CV ECHO PERICO - BSA(HA* 10/31/2019 1.9    • BH CV ECHO PERICO - BZI_ME* 10/31/2019 78.5    • BH CV ECHO PERICO - BZI_ME* 10/31/2019 162.6    • Target HR (85%) 10/31/2019 145    • Max. Pred. HR (100%) 10/31/2019 170    • EF(MOD-bp) 10/31/2019 55.0    • RVDd(2D) 10/31/2019 1.7    No results displayed because visit has over 200 results.      Office Visit on 09/16/2019   Component Date Value   • Color 09/16/2019 Za    • Clarity, UA 09/16/2019 Clear    • Glucose, UA 09/16/2019 Negative    • Bilirubin 09/16/2019 Negative    • Ketones, UA 09/16/2019 Negative    • Specific Gravity  09/16/2019 1.015    • Blood, UA 09/16/2019 Negative    • pH, Urine 09/16/2019 5.0    • Protein, POC 09/16/2019 Negative    • Urobilinogen, UA 09/16/2019 Normal    • Leukocytes 09/16/2019 Negative    • Nitrite, UA 09/16/2019 Negative    • Urine Culture 09/16/2019 Final report    • Result 1 09/16/2019 No growth    • WBC 09/16/2019 9.6    • RBC 09/16/2019 4.65    • Hemoglobin 09/16/2019 14.8    • Hematocrit 09/16/2019 41.9    • MCV 09/16/2019 90    • MCH 09/16/2019 31.8    • MCHC 09/16/2019 35.3    • RDW 09/16/2019 13.0    • Platelets 09/16/2019 225    • Neutrophil Rel % 09/16/2019 64    • Lymphocyte Rel % 09/16/2019 26    • Monocyte Rel % 09/16/2019 7    • Eosinophil Rel % 09/16/2019 2    • Basophil Rel % 09/16/2019 0    • Neutrophils Absolute 09/16/2019 6.1    • Lymphocytes Absolute 09/16/2019 2.5    • Monocytes Absolute 09/16/2019 0.7    • Eosinophils Absolute 09/16/2019 0.2    • Basophils Absolute 09/16/2019  0.0    • Immature Granulocyte Rel* 09/16/2019 1    • Immature Grans Absolute 09/16/2019 0.1    • Total Cholesterol 09/16/2019 231*   • Triglycerides 09/16/2019 117    • HDL Cholesterol 09/16/2019 56    • VLDL Cholesterol 09/16/2019 23    • LDL Cholesterol  09/16/2019 152*   • Chol/HDL Ratio 09/16/2019 4.1    • TSH 09/16/2019 2.070    • Written Authorization 09/16/2019 Comment    • Glucose 09/16/2019 147*   • BUN 09/16/2019 20    • Creatinine 09/16/2019 1.09*   • eGFR Non African Am 09/16/2019 60    • eGFR  Am 09/16/2019 69    • BUN/Creatinine Ratio 09/16/2019 18    • Sodium 09/16/2019 131*   • Potassium 09/16/2019 4.0    • Chloride 09/16/2019 87*   • Total CO2 09/16/2019 19*   • Calcium 09/16/2019 9.5    • Total Protein 09/16/2019 7.1    • Albumin 09/16/2019 4.5    • Globulin 09/16/2019 2.6    • A/G Ratio 09/16/2019 1.7    • Total Bilirubin 09/16/2019 CANCELED    • Alkaline Phosphatase 09/16/2019 96    • AST (SGOT) 09/16/2019 15    • ALT (SGPT) 09/16/2019 12    • Written Authorization 09/16/2019 Comment    Abstract on 09/03/2019   Component Date Value   •  Mammogram 11/23/2007 see report    •  Dexa Scan 09/30/2005 see report                 Assessment:          Diagnosis Plan   1. ASD (atrial septal defect)  Adult Transesophageal Echo (NAFISA) W/ Cont if Necessary Per Protocol   2. Bicuspid aortic valve  Adult Transesophageal Echo (NAFISA) W/ Cont if Necessary Per Protocol   3. STEMI involving left anterior descending coronary artery (CMS/HCC)     4. Ischemic cardiomyopathy     5. Stented coronary artery     6. Mixed hyperlipidemia     7. Hypertension, benign            Plan:        1. LAD STEMI/CAD  - s/p PCI to the proximal / ostial to mid LAD with Xience 3.5 x 33 mm drug-eluting stent postdilated to 4.5 mm at 18 familia with good angiographic results.  - residual borderline lesions in the distal LAD which will be staged for FFR in future if she has chest pain. Nonobstructive disease in the circumflex and  RCA  -Dual platelet therapy aspirin and Effient uninterrupted 1 year, consider longer-term Plavix therapy with benefits up to 30 months per DAPT trial with low bleeding risk and residual lesions possible further unstable plaque.  -High intensity statin therapy per guidelines   - on spironolactone, continue 25 daily  -Continue metop XL 25 daily   -Continue Lasix 20 daily, additional twice daily as needed  Continue lisinopril to 5 twice daily  Continue atorvastatin to 80 daily  Asymptomatic presently, clinically silent     2. acute systolic HF   - improved, impella removed 9/29  -Estimated EF 35 ,  38% biplane initially and with max medical therapy improved greater than 50% with mild anterior wall hypokinesis regionally, continue max medical therapy  Metoprolol as above   ACE inhibitor as above   statin as above  Avoid hypotension systolic less than 90     3. Cardiac Arrest with  ROSC, VF arrest, none since reperfusion or greater than 48 hours out from initial insult.  No indication for antiarrhythmic or ICD acutely.  No reperfusion arrhythmia outpatient     4. H/o Essential hypertension however borderline b/p currently  -Stable at goal     4. Hyperlipidemia  -High intensity statin therapy on board     5. Elevated LFTs  -Resolved     6.  Anxiety  per primary care     7. Hypokalemia / Hypomagnesium  Stable     8. Acute bronchitis and Rhinovirus, stable and improving     New problem finding suggesting PFO versus ASD and left right interatrial shunt with positive bubble study  Refer to Dr. Amos Olivia for evaluation of closure    NAFISA for examination of bicuspid aortic valve, ascending aorta examination, ASD versus PFO size and morphology and candidacy for closure morphologically.      It is a pleasure to be involved in her cardiovascular care.  Please call with any questions or concerns.  Sincerely,  Brant Martinez MD, PhD     Greater than 25 minutes spent face-to-face with the patient today with greater than 50% of  time spent on echo findings of possible PFO versus ASD, indications for closure, therapeutic options, plan of care, need for NAFISA with explanation of procedure as well as other questions answered.    Level of Care:                 Brant Martinez MD  01/02/20  .

## 2020-01-02 NOTE — PROGRESS NOTES
Cardiology clinic note  Brant Martinez MD, PhD  [facility] cardiology  Subjective:     Encounter Date:12/11/2019      Patient ID: Sada Le is a 49 y.o. female.    Chief Complaint:  Chief Complaint   Patient presents with   • Follow-up       HPI:  History of Present Illness  Per prior encounter  I had the pleasure of seeing this very pleasant 49-year-old female who is well-known to me from recent hospitalization with LAD ST elevation myocardial infarction complicated by cardiogenic shock and VF arrest prior to revascularization.  She underwent located high risk PCI with Impella support with 3.5 x 33 mm Xience drug-eluting stent to the proximal LAD postdilated to 4.5 mm with good angiographic results.  She is maintained on dual antiplatelet therapy with aspirin and ticagrelor.  Ultimately she had continued hemodynamic support with Impella left ventricular unloading out to 24 to 36 hours with uncomplicated removal of Impella support.  She was observed in the hospital for 7 days to rule out any postinfarction mechanical complications.  She was initiated on goal-directed medical therapy as her hemodynamics allowed with Aldactone 25 daily as well as low-dose metoprolol on discharge 12.5 mg p.o. daily.  She has known essential hypertension which was treated previously with lisinopril which currently she is off secondary to lower blood pressures during her hospitalization.  She presents doing well today with stable bilateral groins with mild bruising but patent distal pulses and no claudication symptoms.  No hematoma no pseudoaneurysms or other peripheral vascular complications.  She does have occasional atypical chest discomfort as shooting pains but she does not know whether she is just hypersensitive to these feelings but nothing feels like the pressure she experienced with her MI.  She is done well and she is now 2 weeks post infarction in her underlying ejection fraction was 38% biplane with  mid to apical anteroseptal and anterior wall and apical akinesis.  Peak troponin was greater than 72 and treated down throughout her hospitalization.  She presents today with no heart failure signs or symptoms and is on Lasix 40 mg p.o. daily in addition to Aldactone and metoprolol.  She presents today for medication optimization and blood pressure today is 112/66 but at home has been ranging up to 130 systolic.  She has stopped smoking since her MI and continues to be abstinent which I encouraged.  Her renal function is normal per last labs.  No syncope or palpitations reported.  No PND orthopnea.  She does have NYHA class II dyspnea on exertion and is trying to work with cardiac rehab recently with very light home physical therapy.  I advised her to take the next 2 weeks easy to encourage stable scar formation as well as up titrate medicines as able.  She says she is not taking any salt intake significantly and is doing well with her diet.  No other concerning signs or symptoms last visit.     On today's encounter she is doing very well and euvolemic.  She previously had a hospitalization for some shortness of air and likely anxiety.  She was discharged shortly thereafter.  No EKG changes or chest pain or troponin elevation.  Follow-up 2D echo revealed good recovery of LV systolic function to greater than 50% with mild residual anterior wall abnormality with mild hypokinesis in the infarcted territory.  Notably she had observation of redundant and aneurysmal interatrial septum and bubble study did reveal positivity suggesting PFO and left right interatrial shunt.  She does have a mildly enlarged right ventricle as well as recent demonstration of moderately dilated ascending aorta measuring 4.6 cm.  The aortic valve is not well seen but cannot exclude a bicuspid aortic valve.  We suggested a transesophageal echo which is been ordered and the patient agrees with for possible evaluation of candidacy of closure of her  PFO/small S ASD as well as better visualization of her aortic valve.  No other acute signs or symptoms today.  NYHA class I-II, CCS class I.  She is compliant with aspirin and Effient as her dual antiplatelet therapy and is on low-dose Lasix only along with goal-directed medical therapy including beta-blocker and Aldactone as she was previously limited by assistant low systolics for ACE inhibitor initiation.  No other symptoms today.  No palpitations no syncope no other concerns.    Review of systems negative x14 point review of systems except as mentioned above    Historical data copied forward from previous encounters and EMR as unchanged    The following portions of the patient's history were reviewed and updated as appropriate: allergies, current medications, past family history, past medical history, past social history, past surgical history and problem list.    Problem List:  Patient Active Problem List   Diagnosis   • Allergic rhinitis   • Cervical disc disease with myelopathy   • Diverticulosis of colon   • Hypertension, benign   • Insomnia, organic   • Obesity (BMI 30-39.9)   • Osteopenia of spine   • Mixed hyperlipidemia   • STEMI involving left anterior descending coronary artery (CMS/HCC)   • Elevated LFTs   • Hypocalcemia   • Normocytic anemia   • Thrombocytopenia (CMS/HCC)   • Cardiac arrest with ventricular fibrillation (CMS/HCC)   • Ischemic cardiomyopathy   • Stented coronary artery   • History of echocardiogram   • Annual visit for general adult medical examination with abnormal findings   • Cervical cancer screening   • Encounter for screening for malignant neoplasm of breast   • Colon cancer screening   • History of tobacco use   • Chest pain   • Generalized anxiety disorder   • Hypokalemia       Past Medical History:  Past Medical History:   Diagnosis Date   • Absence of left thumb     Impression: hx of MRSA, she is signficantly improved She will continue meds and followup if sxs have not  resolved over the next 2 weeks.. She is released from care and will notify us of any problems   • Acute otitis media    • Allergic rhinitis    • Arthritis     neck   • Cancer (CMS/HCC)     skin   • Cervical disc disease with myelopathy    • Contact dermatitis or eczema    • Coronary artery disease    • Disorder of bone and cartilage    • Diverticulitis of colon     Impression: CT confirms in the sigmoid colon 10/2011   • Diverticulosis of colon     Impression: No sxs 02/13/2013   • Elevated cholesterol    • Elevated temperature    • External otitis of left ear    • Hearing loss due to cerumen impaction, left    • Hemangioma of liver    • History of echocardiogram 10/03/2019    Severely reduced LV systolic function. EF 34% Distribution indicative of LAD territory injury. Akinesis of the apex as well as apical lateral and mid to apical septal walls. Preservation of posterior inferior lateral walls. Mild AI, mild TR. Normal RV function.    • Hordeolum externum of left lower eyelid     Impression: She was started on Bactrim DS for her infection. She was also advised to use warm compression. She will followup should sxs not improve over the next 48 hrs and resolve over the next 1 weeek.   • Hyperlipidemia     Impression: Diet controled. She should see improvement with her successful wt loss. We discussed her lipid panel.   • Hypertension, benign     Impression: new onset Low salt low calorie diet and regular exercise and followup in 1 mo She will monitor her pressures and notify us of her pressures over the next 1 week.   • Inclusion cyst of right breast     Impression: We will follow for now. She is encouraged to have Mammography. She is presently without insurance and reluctant. She bobby consider. She will notify us of any change at all in the lesion for the only way to be certain of it's etilogy is to remove it. She understands.   • Insomnia, organic    • Menopausal syndrome    • Overweight (BMI 25.0-29.9)    • RUQ  abdominal pain     Impression: Resolving, negative GB u/s, HIDA EF 77%, we will follow   • Stented coronary artery 09/29/2019   • Tobacco use     Impression: She is strongly encouraged to stop smoking. Cessation techniques discussed and encouraged. The consequences of not stopping discussed, ie, CAD, PVD, Stroke, Lung and other cancers.       Past Surgical History:  Past Surgical History:   Procedure Laterality Date   • BIVENTRICULAR ASSIST DEVICE/LEFT VENTRICULAR ASSIST DEVICE INSERTION N/A 9/29/2019    Procedure: Left Ventricular Assist Device;  Surgeon: Brant Martinez MD;  Location: [facility] CATH INVASIVE LOCATION;  Service: Cardiovascular   • CARDIAC CATHETERIZATION N/A 9/29/2019    Procedure: Left Heart Cath;  Surgeon: Brant Martinez MD;  Location: [facility] CATH INVASIVE LOCATION;  Service: Cardiovascular   • CARDIAC CATHETERIZATION N/A 9/29/2019    Procedure: Coronary angiography;  Surgeon: Brant Martinez MD;  Location: [facility] CATH INVASIVE LOCATION;  Service: Cardiovascular   • CARDIAC CATHETERIZATION N/A 9/29/2019    Procedure: Left ventriculography;  Surgeon: Brant Martinez MD;  Location: [facility] CATH INVASIVE LOCATION;  Service: Cardiovascular   • CARDIAC CATHETERIZATION N/A 9/29/2019    Procedure: Stent SERENITY coronary;  Surgeon: Brant Martinez MD;  Location: [facility] CATH INVASIVE LOCATION;  Service: Cardiovascular   • CARDIAC ELECTROPHYSIOLOGY PROCEDURE  9/29/2019    Procedure: Impella Insertion;  Surgeon: Brant Martinez MD;  Location: [facility] CATH INVASIVE LOCATION;  Service: Cardiovascular   • SD RT/LT HEART CATHETERS N/A 9/29/2019    Procedure: Percutaneous Coronary Intervention;  Surgeon: Brant Martinez MD;  Location: [facility] CATH INVASIVE LOCATION;  Service: Cardiovascular   • TOTAL ABDOMINAL HYSTERECTOMY WITH SALPINGO OOPHORECTOMY  1995    Endometriosis       Social History:  Social History     Socioeconomic History   • Marital status:  "     Spouse name: Not on file   • Number of children: Not on file   • Years of education: Not on file   • Highest education level: Not on file   Tobacco Use   • Smoking status: Former Smoker     Packs/day: 1.00     Years: 34.00     Pack years: 34.00     Types: Cigarettes     Start date:      Last attempt to quit: 2019     Years since quittin.2   • Smokeless tobacco: Never Used   • Tobacco comment: States she is quitting as of 2019.   Substance and Sexual Activity   • Alcohol use: Yes     Alcohol/week: 4.0 standard drinks     Types: 4 Cans of beer per week     Frequency: Monthly or less     Drinks per session: 3 or 4     Binge frequency: Less than monthly   • Drug use: Yes     Types: Marijuana     Comment: occasional   • Sexual activity: Defer       Allergies:  Allergies   Allergen Reactions   • Penicillins Rash   • Ciprofloxacin Rash       Immunizations:  Immunization History   Administered Date(s) Administered   • DT 2004   • FLUARIX/FLUZONE/AFLURIA/FLULAVAL QUAD 10/01/2019   • Tdap 2011       ROS:  ROS  Negative as above     Objective:         /78 (BP Location: Left arm, Patient Position: Sitting)   Pulse 59   Resp 18   Ht 162.6 cm (64\")   Wt 83 kg (183 lb)   LMP 1996 (Approximate)   BMI 31.41 kg/m²     Physical Exam  General Appearance:    Alert, cooperative, in no acute distress, AAOx3, on RA               Mouth /throat:   Poor dentition tobacco discoloration, no JVD no ulcers   Neck:   supple,   minimal JVD/HJR+ still but better   Lungs:     Relatively clear, non labored, room air, even respirations    Heart:    Regular rhythm and normal rate, normal S1 and S2, no new murmurs, no S3-S4 no heave no lift   Abdomen:     Normal bowel sounds, no masses, no organomegaly, soft        non-tender, non-distended, no guarding, no rebound                Tenderness   Extremities:   Moves all extremities well, no edema, no cyanosis, bilat groin with slight bruising, no " hematoma, groins stable   Pulses:   Pulses palpable and equal bilaterally 2+ bilaterally, cap refill less than 2 seconds         Neurologic:   Awake, alert, oriented x3, no dificits grossly bilaterally      Unchanged from prior encounter    In-Office Procedure(s):  Procedures    ASCVD RIsk Score::  The ASCVD Risk score (Marielena SALOMON Jr., et al., 2013) failed to calculate for the following reasons:    The patient has a prior MI or stroke diagnosis    Recent Radiology:  Imaging Results (Most Recent)     None          Lab Review:   Admission on 11/26/2019, Discharged on 11/27/2019   Component Date Value   • Glucose 11/26/2019 130*   • BUN 11/26/2019 16    • Creatinine 11/26/2019 0.96    • Sodium 11/26/2019 139    • Potassium 11/26/2019 3.3*   • Chloride 11/26/2019 96*   • CO2 11/26/2019 27.0    • Calcium 11/26/2019 9.5    • eGFR Non  Amer 11/26/2019 62    • BUN/Creatinine Ratio 11/26/2019 16.7    • Anion Gap 11/26/2019 16.0*   • proBNP 11/26/2019 258.9    • Troponin T 11/26/2019 <0.010    • D-Dimer, Quantitative 11/26/2019 0.56    • WBC 11/26/2019 7.60    • RBC 11/26/2019 3.89    • Hemoglobin 11/26/2019 12.1    • Hematocrit 11/26/2019 35.5    • MCV 11/26/2019 91.1    • MCH 11/26/2019 31.2    • MCHC 11/26/2019 34.3    • RDW 11/26/2019 12.9    • RDW-SD 11/26/2019 41.1    • MPV 11/26/2019 9.4    • Platelets 11/26/2019 184    • Neutrophil % 11/26/2019 62.4    • Lymphocyte % 11/26/2019 26.9    • Monocyte % 11/26/2019 8.6    • Eosinophil % 11/26/2019 1.7    • Basophil % 11/26/2019 0.4    • Neutrophils, Absolute 11/26/2019 4.70    • Lymphocytes, Absolute 11/26/2019 2.00    • Monocytes, Absolute 11/26/2019 0.70    • Eosinophils, Absolute 11/26/2019 0.10    • Basophils, Absolute 11/26/2019 0.00    • nRBC 11/26/2019 0.0    • Protime 11/26/2019 10.8    • INR 11/26/2019 1.03    • Troponin T 11/26/2019 <0.010    • Troponin T 11/27/2019 <0.010    • Potassium 11/26/2019 3.3*   • proBNP 11/27/2019 401.3    • WBC 11/27/2019 8.70    •  RBC 11/27/2019 3.88    • Hemoglobin 11/27/2019 12.1    • Hematocrit 11/27/2019 35.5    • MCV 11/27/2019 91.4    • MCH 11/27/2019 31.2    • MCHC 11/27/2019 34.2    • RDW 11/27/2019 13.1    • RDW-SD 11/27/2019 42.0    • MPV 11/27/2019 9.3    • Platelets 11/27/2019 185    • Neutrophil % 11/27/2019 54.1    • Lymphocyte % 11/27/2019 34.1    • Monocyte % 11/27/2019 9.0    • Eosinophil % 11/27/2019 2.4    • Basophil % 11/27/2019 0.4    • Neutrophils, Absolute 11/27/2019 4.70    • Lymphocytes, Absolute 11/27/2019 3.00    • Monocytes, Absolute 11/27/2019 0.80    • Eosinophils, Absolute 11/27/2019 0.20    • Basophils, Absolute 11/27/2019 0.00    • nRBC 11/27/2019 0.0    • Creatine Kinase 11/27/2019 121    • Glucose 11/27/2019 112*   • BUN 11/27/2019 16    • Creatinine 11/27/2019 0.99    • Sodium 11/27/2019 139    • Potassium 11/27/2019 3.5    • Chloride 11/27/2019 97*   • CO2 11/27/2019 30.0*   • Calcium 11/27/2019 9.4    • Total Protein 11/27/2019 7.1    • Albumin 11/27/2019 4.10    • ALT (SGPT) 11/27/2019 22    • AST (SGOT) 11/27/2019 16    • Alkaline Phosphatase 11/27/2019 109    • Total Bilirubin 11/27/2019 0.3    • eGFR Non African Amer 11/27/2019 59*   • Globulin 11/27/2019 3.0    • A/G Ratio 11/27/2019 1.4    • BUN/Creatinine Ratio 11/27/2019 16.2    • Anion Gap 11/27/2019 12.0    • Ferritin 11/27/2019 173.90*   • Hemoglobin A1C 11/27/2019 5.2    • Total Cholesterol 11/27/2019 145    • Triglycerides 11/27/2019 212*   • HDL Cholesterol 11/27/2019 52    • LDL Cholesterol  11/27/2019 51    • VLDL Cholesterol 11/27/2019 42.4    • LDL/HDL Ratio 11/27/2019 0.97    • Magnesium 11/27/2019 1.9    • Phosphorus 11/27/2019 5.2*   • TSH 11/27/2019 2.310    • Troponin T 11/27/2019 <0.010    Hospital Outpatient Visit on 10/31/2019   Component Date Value   • BSA 10/31/2019 1.8    • BH CV ECHO PERICO - RVDD 10/31/2019 1.7    • IVSd 10/31/2019 1.3    • LVIDd 10/31/2019 5.1    • LVIDs 10/31/2019 3.5    • LVPWd 10/31/2019 1.2    • IVS/LVPW  10/31/2019 1.1    • FS 10/31/2019 31.3    • EDV(Teich) 10/31/2019 122.9    • ESV(Teich) 10/31/2019 50.7    • EF(Teich) 10/31/2019 58.8    • EDV(cubed) 10/31/2019 131.4    • ESV(cubed) 10/31/2019 42.7    • EF(cubed) 10/31/2019 67.5    • LV mass(C)d 10/31/2019 247.3    • LV mass(C)dI 10/31/2019 134.5    • SV(Teich) 10/31/2019 72.2    • SI(Teich) 10/31/2019 39.3    • SV(cubed) 10/31/2019 88.7    • SI(cubed) 10/31/2019 48.2    • Ao root diam 10/31/2019 2.9    • Ao root area 10/31/2019 6.8    • ACS 10/31/2019 1.9    • LA dimension 10/31/2019 3.1    • LA/Ao 10/31/2019 1.0    • LVOT diam 10/31/2019 2.2    • LVOT area 10/31/2019 3.8    • EDV(MOD-sp4) 10/31/2019 111.9    • ESV(MOD-sp4) 10/31/2019 47.0    • EF(MOD-sp4) 10/31/2019 58.0    • SV(MOD-sp4) 10/31/2019 64.9    • SI(MOD-sp4) 10/31/2019 35.3    • Ao root area (BSA correc* 10/31/2019 1.6    • LV Amin Vol (BSA correct* 10/31/2019 60.9    • LV Sys Vol (BSA correcte* 10/31/2019 25.6    • MV E max bernie 10/31/2019 55.2    • MV A max bernie 10/31/2019 70.5    • MV E/A 10/31/2019 0.78    • MV V2 max 10/31/2019 76.4    • MV max PG 10/31/2019 2.3    • MV V2 mean 10/31/2019 36.5    • MV mean PG 10/31/2019 0.64    • MV V2 VTI 10/31/2019 25.2    • MVA(VTI) 10/31/2019 3.5    • Ao pk bernie 10/31/2019 147.0    • Ao max PG 10/31/2019 8.6    • Ao max PG (full) 10/31/2019 3.5    • Ao V2 mean 10/31/2019 95.1    • Ao mean PG 10/31/2019 4.2    • Ao mean PG (full) 10/31/2019 2.1    • Ao V2 VTI 10/31/2019 28.2    • HERNANDEZ(I,A) 10/31/2019 3.2    • HERNANDEZ(I,D) 10/31/2019 3.2    • HERNANDEZ(V,A) 10/31/2019 2.9    • HERNANDEZ(V,D) 10/31/2019 2.9    • AI max bernie 10/31/2019 482.5    • AI max PG 10/31/2019 93.2    • AI dec slope 10/31/2019 163.8    • AI dec time 10/31/2019 3.0    • AI P1/2t 10/31/2019 862.9    • LV V1 max PG 10/31/2019 5.1    • LV V1 mean PG 10/31/2019 2.1    • LV V1 max 10/31/2019 113.1    • LV V1 mean 10/31/2019 65.8    • LV V1 VTI 10/31/2019 23.7    • MR max bernie 10/31/2019 379.0    • MR max PG  10/31/2019 57.5    • SV(Ao) 10/31/2019 192.3    • SI(Ao) 10/31/2019 104.6    • SV(LVOT) 10/31/2019 89.0    • SI(LVOT) 10/31/2019 48.4    • PA V2 max 10/31/2019 89.3    • PA max PG 10/31/2019 3.2    • PA acc time 10/31/2019 0.11    • PI end-d bernie 10/31/2019 104.9    • TR max bernie 10/31/2019 309.8    • RVSP(TR) 10/31/2019 55.1    • RAP systole 10/31/2019 10.0    • PA pr(Accel) 10/31/2019 27.6    • BH CV ECHO PERICO - BZI_BMI 10/31/2019 29.7    • BH CV ECHO PERICO - BSA(HA* 10/31/2019 1.9    • BH CV ECHO PERICO - BZI_ME* 10/31/2019 78.5    • BH CV ECHO PERICO - BZI_ME* 10/31/2019 162.6    • Target HR (85%) 10/31/2019 145    • Max. Pred. HR (100%) 10/31/2019 170    • EF(MOD-bp) 10/31/2019 55.0    • RVDd(2D) 10/31/2019 1.7    No results displayed because visit has over 200 results.      Office Visit on 09/16/2019   Component Date Value   • Color 09/16/2019 Za    • Clarity, UA 09/16/2019 Clear    • Glucose, UA 09/16/2019 Negative    • Bilirubin 09/16/2019 Negative    • Ketones, UA 09/16/2019 Negative    • Specific Gravity  09/16/2019 1.015    • Blood, UA 09/16/2019 Negative    • pH, Urine 09/16/2019 5.0    • Protein, POC 09/16/2019 Negative    • Urobilinogen, UA 09/16/2019 Normal    • Leukocytes 09/16/2019 Negative    • Nitrite, UA 09/16/2019 Negative    • Urine Culture 09/16/2019 Final report    • Result 1 09/16/2019 No growth    • WBC 09/16/2019 9.6    • RBC 09/16/2019 4.65    • Hemoglobin 09/16/2019 14.8    • Hematocrit 09/16/2019 41.9    • MCV 09/16/2019 90    • MCH 09/16/2019 31.8    • MCHC 09/16/2019 35.3    • RDW 09/16/2019 13.0    • Platelets 09/16/2019 225    • Neutrophil Rel % 09/16/2019 64    • Lymphocyte Rel % 09/16/2019 26    • Monocyte Rel % 09/16/2019 7    • Eosinophil Rel % 09/16/2019 2    • Basophil Rel % 09/16/2019 0    • Neutrophils Absolute 09/16/2019 6.1    • Lymphocytes Absolute 09/16/2019 2.5    • Monocytes Absolute 09/16/2019 0.7    • Eosinophils Absolute 09/16/2019 0.2    • Basophils Absolute 09/16/2019  0.0    • Immature Granulocyte Rel* 09/16/2019 1    • Immature Grans Absolute 09/16/2019 0.1    • Total Cholesterol 09/16/2019 231*   • Triglycerides 09/16/2019 117    • HDL Cholesterol 09/16/2019 56    • VLDL Cholesterol 09/16/2019 23    • LDL Cholesterol  09/16/2019 152*   • Chol/HDL Ratio 09/16/2019 4.1    • TSH 09/16/2019 2.070    • Written Authorization 09/16/2019 Comment    • Glucose 09/16/2019 147*   • BUN 09/16/2019 20    • Creatinine 09/16/2019 1.09*   • eGFR Non African Am 09/16/2019 60    • eGFR  Am 09/16/2019 69    • BUN/Creatinine Ratio 09/16/2019 18    • Sodium 09/16/2019 131*   • Potassium 09/16/2019 4.0    • Chloride 09/16/2019 87*   • Total CO2 09/16/2019 19*   • Calcium 09/16/2019 9.5    • Total Protein 09/16/2019 7.1    • Albumin 09/16/2019 4.5    • Globulin 09/16/2019 2.6    • A/G Ratio 09/16/2019 1.7    • Total Bilirubin 09/16/2019 CANCELED    • Alkaline Phosphatase 09/16/2019 96    • AST (SGOT) 09/16/2019 15    • ALT (SGPT) 09/16/2019 12    • Written Authorization 09/16/2019 Comment    Abstract on 09/03/2019   Component Date Value   •  Mammogram 11/23/2007 see report    •  Dexa Scan 09/30/2005 see report                 Assessment:          Diagnosis Plan   1. ASD (atrial septal defect)  Adult Transesophageal Echo (NAFISA) W/ Cont if Necessary Per Protocol   2. Bicuspid aortic valve  Adult Transesophageal Echo (NAFISA) W/ Cont if Necessary Per Protocol   3. STEMI involving left anterior descending coronary artery (CMS/HCC)     4. Ischemic cardiomyopathy     5. Stented coronary artery     6. Mixed hyperlipidemia     7. Hypertension, benign            Plan:        1. LAD STEMI/CAD  - s/p PCI to the proximal / ostial to mid LAD with Xience 3.5 x 33 mm drug-eluting stent postdilated to 4.5 mm at 18 familia with good angiographic results.  - residual borderline lesions in the distal LAD which will be staged for FFR in future if she has chest pain. Nonobstructive disease in the circumflex and  RCA  -Dual platelet therapy aspirin and Effient uninterrupted 1 year, consider longer-term Plavix therapy with benefits up to 30 months per DAPT trial with low bleeding risk and residual lesions possible further unstable plaque.  -High intensity statin therapy per guidelines   - on spironolactone, continue 25 daily  -Continue metop XL 25 daily   -Continue Lasix 20 daily, additional twice daily as needed  Continue lisinopril to 5 twice daily  Continue atorvastatin to 80 daily  Asymptomatic presently, clinically silent     2. acute systolic HF   - improved, impella removed 9/29  -Estimated EF 35 ,  38% biplane initially and with max medical therapy improved greater than 50% with mild anterior wall hypokinesis regionally, continue max medical therapy  Metoprolol as above   ACE inhibitor as above   statin as above  Avoid hypotension systolic less than 90     3. Cardiac Arrest with  ROSC, VF arrest, none since reperfusion or greater than 48 hours out from initial insult.  No indication for antiarrhythmic or ICD acutely.  No reperfusion arrhythmia outpatient     4. H/o Essential hypertension however borderline b/p currently  -Stable at goal     4. Hyperlipidemia  -High intensity statin therapy on board     5. Elevated LFTs  -Resolved     6.  Anxiety  per primary care     7. Hypokalemia / Hypomagnesium  Stable     8. Acute bronchitis and Rhinovirus, stable and improving     New problem finding suggesting PFO versus ASD and left right interatrial shunt with positive bubble study  Refer to Dr. Amos Olivia for evaluation of closure    NAFISA for examination of bicuspid aortic valve, ascending aorta examination, ASD versus PFO size and morphology and candidacy for closure morphologically.      It is a pleasure to be involved in her cardiovascular care.  Please call with any questions or concerns.  Sincerely,  Brant Martinez MD, PhD     Greater than 25 minutes spent face-to-face with the patient today with greater than 50% of  time spent on echo findings of possible PFO versus ASD, indications for closure, therapeutic options, plan of care, need for NAFISA with explanation of procedure as well as other questions answered.    Level of Care:                 Brant Martinez MD  01/02/20  .

## 2020-01-03 ENCOUNTER — TREATMENT (OUTPATIENT)
Dept: CARDIAC REHAB | Facility: HOSPITAL | Age: 50
End: 2020-01-03

## 2020-01-03 ENCOUNTER — APPOINTMENT (OUTPATIENT)
Dept: CARDIAC REHAB | Facility: HOSPITAL | Age: 50
End: 2020-01-03

## 2020-01-03 DIAGNOSIS — I21.02 ST ELEVATION MYOCARDIAL INFARCTION INVOLVING LEFT ANTERIOR DESCENDING (LAD) CORONARY ARTERY (HCC): Primary | ICD-10-CM

## 2020-01-03 PROCEDURE — 93798 PHYS/QHP OP CAR RHAB W/ECG: CPT

## 2020-01-06 ENCOUNTER — ANESTHESIA EVENT (OUTPATIENT)
Dept: CARDIOLOGY | Facility: HOSPITAL | Age: 50
End: 2020-01-06

## 2020-01-06 ENCOUNTER — TELEPHONE (OUTPATIENT)
Dept: CARDIAC REHAB | Facility: HOSPITAL | Age: 50
End: 2020-01-06

## 2020-01-06 ENCOUNTER — APPOINTMENT (OUTPATIENT)
Dept: CARDIAC REHAB | Facility: HOSPITAL | Age: 50
End: 2020-01-06

## 2020-01-06 RX ORDER — SODIUM CHLORIDE 0.9 % (FLUSH) 0.9 %
10 SYRINGE (ML) INJECTION AS NEEDED
Status: CANCELLED | OUTPATIENT
Start: 2020-01-06

## 2020-01-06 RX ORDER — SODIUM CHLORIDE 9 MG/ML
9 INJECTION, SOLUTION INTRAVENOUS CONTINUOUS PRN
Status: CANCELLED | OUTPATIENT
Start: 2020-01-06

## 2020-01-06 RX ORDER — CHOLECALCIFEROL (VITAMIN D3) 125 MCG
10 CAPSULE ORAL NIGHTLY PRN
COMMUNITY
End: 2020-01-08

## 2020-01-06 RX ORDER — UBIDECARENONE 100 MG
200 CAPSULE ORAL 2 TIMES DAILY
COMMUNITY
End: 2022-03-18

## 2020-01-06 RX ORDER — SODIUM CHLORIDE 0.9 % (FLUSH) 0.9 %
10 SYRINGE (ML) INJECTION EVERY 12 HOURS SCHEDULED
Status: CANCELLED | OUTPATIENT
Start: 2020-01-06

## 2020-01-06 RX ORDER — CHOLECALCIFEROL (VITAMIN D3) 125 MCG
500 CAPSULE ORAL DAILY
COMMUNITY

## 2020-01-06 NOTE — TELEPHONE ENCOUNTER
LVM attempting to notify of appointment cancellation due to computer monitoring system down at [facility].

## 2020-01-07 ENCOUNTER — HOSPITAL ENCOUNTER (OUTPATIENT)
Dept: CARDIOLOGY | Facility: HOSPITAL | Age: 50
Discharge: HOME | End: 2020-01-07
Admitting: INTERNAL MEDICINE

## 2020-01-07 ENCOUNTER — ANESTHESIA (OUTPATIENT)
Dept: CARDIOLOGY | Facility: HOSPITAL | Age: 50
End: 2020-01-07

## 2020-01-07 VITALS
WEIGHT: 188.49 LBS | HEIGHT: 65 IN | SYSTOLIC BLOOD PRESSURE: 115 MMHG | RESPIRATION RATE: 14 BRPM | BODY MASS INDEX: 31.4 KG/M2 | TEMPERATURE: 97.6 F | HEART RATE: 55 BPM | OXYGEN SATURATION: 100 % | DIASTOLIC BLOOD PRESSURE: 72 MMHG

## 2020-01-07 DIAGNOSIS — Q23.1 BICUSPID AORTIC VALVE: ICD-10-CM

## 2020-01-07 DIAGNOSIS — Q21.10 ASD (ATRIAL SEPTAL DEFECT): ICD-10-CM

## 2020-01-07 LAB
ANION GAP SERPL CALCULATED.3IONS-SCNC: 13 MMOL/L (ref 5–15)
BUN BLD-MCNC: 16 MG/DL (ref 6–20)
BUN/CREAT SERPL: 17.8 (ref 7–25)
CALCIUM SPEC-SCNC: 8.9 MG/DL (ref 8.6–10.5)
CHLORIDE SERPL-SCNC: 102 MMOL/L (ref 98–107)
CO2 SERPL-SCNC: 26 MMOL/L (ref 22–29)
CREAT BLD-MCNC: 0.9 MG/DL (ref 0.57–1)
DEPRECATED RDW RBC AUTO: 41.1 FL (ref 37–54)
ERYTHROCYTE [DISTWIDTH] IN BLOOD BY AUTOMATED COUNT: 12.8 % (ref 12.3–15.4)
GFR SERPL CREATININE-BSD FRML MDRD: 66 ML/MIN/1.73
GLUCOSE BLD-MCNC: 103 MG/DL (ref 65–99)
HCT VFR BLD AUTO: 38.3 % (ref 34–46.6)
HGB BLD-MCNC: 13 G/DL (ref 12–15.9)
MCH RBC QN AUTO: 30.5 PG (ref 26.6–33)
MCHC RBC AUTO-ENTMCNC: 34 G/DL (ref 31.5–35.7)
MCV RBC AUTO: 89.7 FL (ref 79–97)
PLATELET # BLD AUTO: 169 10*3/MM3 (ref 140–450)
PMV BLD AUTO: 8.7 FL (ref 6–12)
POTASSIUM BLD-SCNC: 4.1 MMOL/L (ref 3.5–5.2)
RBC # BLD AUTO: 4.27 10*6/MM3 (ref 3.77–5.28)
SODIUM BLD-SCNC: 141 MMOL/L (ref 136–145)
WBC NRBC COR # BLD: 6.5 10*3/MM3 (ref 3.4–10.8)

## 2020-01-07 PROCEDURE — 93325 DOPPLER ECHO COLOR FLOW MAPG: CPT

## 2020-01-07 PROCEDURE — 93320 DOPPLER ECHO COMPLETE: CPT

## 2020-01-07 PROCEDURE — 80048 BASIC METABOLIC PNL TOTAL CA: CPT | Performed by: INTERNAL MEDICINE

## 2020-01-07 PROCEDURE — 93312 ECHO TRANSESOPHAGEAL: CPT

## 2020-01-07 PROCEDURE — 25010000002 PROPOFOL 10 MG/ML EMULSION: Performed by: ANESTHESIOLOGY

## 2020-01-07 PROCEDURE — 85027 COMPLETE CBC AUTOMATED: CPT | Performed by: INTERNAL MEDICINE

## 2020-01-07 RX ORDER — LIDOCAINE HYDROCHLORIDE 10 MG/ML
INJECTION, SOLUTION EPIDURAL; INFILTRATION; INTRACAUDAL; PERINEURAL AS NEEDED
Status: DISCONTINUED | OUTPATIENT
Start: 2020-01-07 | End: 2020-01-07

## 2020-01-07 RX ORDER — SODIUM CHLORIDE 9 MG/ML
INJECTION, SOLUTION INTRAVENOUS CONTINUOUS PRN
Status: DISCONTINUED | OUTPATIENT
Start: 2020-01-07 | End: 2020-01-07 | Stop reason: SURG

## 2020-01-07 RX ORDER — PROPOFOL 10 MG/ML
VIAL (ML) INTRAVENOUS AS NEEDED
Status: DISCONTINUED | OUTPATIENT
Start: 2020-01-07 | End: 2020-01-07 | Stop reason: SURG

## 2020-01-07 RX ADMIN — PROPOFOL 30 MG: 10 INJECTION, EMULSION INTRAVENOUS at 10:59

## 2020-01-07 RX ADMIN — PROPOFOL 30 MG: 10 INJECTION, EMULSION INTRAVENOUS at 10:45

## 2020-01-07 RX ADMIN — PROPOFOL 30 MG: 10 INJECTION, EMULSION INTRAVENOUS at 10:41

## 2020-01-07 RX ADMIN — PROPOFOL 30 MG: 10 INJECTION, EMULSION INTRAVENOUS at 10:47

## 2020-01-07 RX ADMIN — SODIUM CHLORIDE: 0.9 INJECTION, SOLUTION INTRAVENOUS at 10:25

## 2020-01-07 RX ADMIN — PROPOFOL 30 MG: 10 INJECTION, EMULSION INTRAVENOUS at 10:56

## 2020-01-07 RX ADMIN — PROPOFOL 100 MG: 10 INJECTION, EMULSION INTRAVENOUS at 10:35

## 2020-01-07 RX ADMIN — PROPOFOL 30 MG: 10 INJECTION, EMULSION INTRAVENOUS at 10:43

## 2020-01-07 RX ADMIN — PROPOFOL 50 MG: 10 INJECTION, EMULSION INTRAVENOUS at 10:37

## 2020-01-07 RX ADMIN — PROPOFOL 30 MG: 10 INJECTION, EMULSION INTRAVENOUS at 10:54

## 2020-01-07 RX ADMIN — PROPOFOL 30 MG: 10 INJECTION, EMULSION INTRAVENOUS at 11:03

## 2020-01-07 RX ADMIN — PROPOFOL 30 MG: 10 INJECTION, EMULSION INTRAVENOUS at 10:49

## 2020-01-07 RX ADMIN — PROPOFOL 30 MG: 10 INJECTION, EMULSION INTRAVENOUS at 10:51

## 2020-01-07 RX ADMIN — PROPOFOL 30 MG: 10 INJECTION, EMULSION INTRAVENOUS at 10:39

## 2020-01-07 NOTE — ANESTHESIA PREPROCEDURE EVALUATION
Anesthesia Evaluation     Patient summary reviewed   NPO Solid Status: > 8 hours  NPO Liquid Status: > 8 hours           Airway   Mallampati: II  TM distance: >3 FB  Neck ROM: full  No difficulty expected  Dental - normal exam     Pulmonary - normal exam   Cardiovascular - normal exam  Exercise tolerance: good (4-7 METS)    (+) hypertension, past MI  3-6 months, CAD, cardiac stents Drug eluting stent within the past 12 months hyperlipidemia,       Neuro/Psych  GI/Hepatic/Renal/Endo    (+) obesity,   liver disease,     Musculoskeletal     Abdominal  - normal exam    Bowel sounds: normal.   Substance History      OB/GYN          Other   arthritis,                      Anesthesia Plan    ASA 3     MAC     intravenous induction     Anesthetic plan, all risks, benefits, and alternatives have been provided, discussed and informed consent has been obtained with: patient.

## 2020-01-07 NOTE — ANESTHESIA POSTPROCEDURE EVALUATION
Patient: Sada Le    Procedure Summary     Date:  01/07/20 Room / Location:  [facility]    Anesthesia Start:  1025 Anesthesia Stop:  1106    Procedure:  ADULT TRANSESOPHAGEAL ECHO (NAFISA) W/ CONT IF NECESSARY PER PROTOCOL Diagnosis:       ASD (atrial septal defect)      Bicuspid aortic valve      (Valvular Disease)    Scheduled Providers:  Tha Woo MD Provider:  Stephen Trejo MD    Anesthesia Type:  MAC ASA Status:  3          Anesthesia Type: MAC    Vitals  Vitals Value Taken Time   /66 1/7/2020 11:16 AM   Temp     Pulse 63 1/7/2020 11:17 AM   Resp     SpO2 99 % 1/7/2020 11:17 AM   Vitals shown include unvalidated device data.        Post Anesthesia Care and Evaluation    Patient location during evaluation: bedside  Patient participation: complete - patient participated  Level of consciousness: awake  Pain scale: See nurse's notes for pain score.  Pain management: adequate  Airway patency: patent  Anesthetic complications: No anesthetic complications  PONV Status: none  Cardiovascular status: acceptable  Respiratory status: acceptable  Hydration status: acceptable    Comments: Patient seen and examined postoperatively; vital signs stable; SpO2 greater than or equal to 90%; cardiopulmonary status stable; nausea/vomiting adequately controlled; pain adequately controlled; no apparent anesthesia complications; patient discharged from anesthesia care when discharge criteria were met

## 2020-01-08 ENCOUNTER — OFFICE VISIT (OUTPATIENT)
Dept: CARDIOLOGY | Facility: CLINIC | Age: 50
End: 2020-01-08

## 2020-01-08 VITALS
HEIGHT: 64 IN | OXYGEN SATURATION: 98 % | WEIGHT: 192 LBS | SYSTOLIC BLOOD PRESSURE: 108 MMHG | BODY MASS INDEX: 32.78 KG/M2 | DIASTOLIC BLOOD PRESSURE: 62 MMHG | HEART RATE: 62 BPM | RESPIRATION RATE: 18 BRPM

## 2020-01-08 DIAGNOSIS — Z01.818 PRE-OP TESTING: ICD-10-CM

## 2020-01-08 DIAGNOSIS — Q21.10 ASD (ATRIAL SEPTAL DEFECT): Primary | ICD-10-CM

## 2020-01-08 PROCEDURE — 99214 OFFICE O/P EST MOD 30 MIN: CPT | Performed by: INTERNAL MEDICINE

## 2020-01-08 RX ORDER — LISINOPRIL 5 MG/1
5 TABLET ORAL DAILY
COMMUNITY
End: 2020-02-17

## 2020-01-10 ENCOUNTER — APPOINTMENT (OUTPATIENT)
Dept: CARDIAC REHAB | Facility: HOSPITAL | Age: 50
End: 2020-01-10

## 2020-01-11 NOTE — PROGRESS NOTES
Subjective:     Encounter Date:01/08/2020      Patient ID: Sada Le is a 49 y.o. female.    Chief Complaint:  Chief Complaint   Patient presents with   • Heart Problem     PFO/ASD   • Coronary Artery Disease     Hx of STEMI, PCI to LAD   • Hypertension   • Hyperlipidemia       HPI:  This is a very pleasant 49-year-old female patient of Dr. Martinez.  Dr. Martinez performed life saving Impella supported PCI and stenting in the setting of cardiogenic shock due to anterior ST segment elevation 9/29/2019.  She has since recovered significant LV function and underwent recent echocardiogram images of which I personally reviewed from 10/31/2019 with an ejection fraction of 50%.  Of note during her index hospitalization and event she had observation of redundant and aneurysmal interatrial septum and bubble study did reveal positivity suggesting PFO and left right interatrial shunt.  She does have a mildly enlarged right ventricle as well as recent demonstration of moderately dilated ascending aorta measuring 4.6 cm.  The aortic valve is not well seen but cannot exclude a bicuspid aortic valve.  We suggested a transesophageal echo which is been ordered and the patient agrees with for possible evaluation of candidacy of closure of her PFO/small additional ASD as well as better visualization of her aortic valve.  No other acute signs or symptoms today.  NYHA class I-II, CCS class I.      She is referred to me as a new patient for evaluation for transcatheter closure of her PFO and ASD.  Of note she does have exertional dyspnea that is persisted and notes that when she was 20s she had similar symptoms; these are new symptoms to me.  We a long discussion regarding the risks and benefits of transcatheter PFO ASD closure including discussion of her lack of allergy to metal specifically to nickel.  She is in agreement with transcatheter closure.    The following portions of the patient's history were reviewed and updated as  appropriate: allergies, current medications, past family history, past medical history, past social history, past surgical history and problem list.    Problem List:  Patient Active Problem List   Diagnosis   • Allergic rhinitis   • Cervical disc disease with myelopathy   • Diverticulosis of colon   • Hypertension, benign   • Insomnia, organic   • Obesity (BMI 30-39.9)   • Osteopenia of spine   • Mixed hyperlipidemia   • STEMI involving left anterior descending coronary artery (CMS/HCC)   • Elevated LFTs   • Hypocalcemia   • Normocytic anemia   • Thrombocytopenia (CMS/HCC)   • Cardiac arrest with ventricular fibrillation (CMS/HCC)   • Ischemic cardiomyopathy   • Stented coronary artery   • History of echocardiogram   • Annual visit for general adult medical examination with abnormal findings   • Cervical cancer screening   • Encounter for screening for malignant neoplasm of breast   • Colon cancer screening   • History of tobacco use   • Chest pain   • Generalized anxiety disorder   • Hypokalemia       Past Medical History:  Past Medical History:   Diagnosis Date   • Absence of left thumb     Impression: hx of MRSA, she is signficantly improved She will continue meds and followup if sxs have not resolved over the next 2 weeks.. She is released from care and will notify us of any problems   • Acute otitis media    • Allergic rhinitis    • Arthritis     neck   • Cancer (CMS/HCC)     skin   • Cervical disc disease with myelopathy    • Contact dermatitis or eczema    • Coronary artery disease    • Disorder of bone and cartilage    • Diverticulitis of colon     Impression: CT confirms in the sigmoid colon 10/2011   • Diverticulosis of colon     Impression: No sxs 02/13/2013   • Elevated cholesterol    • Elevated temperature    • External otitis of left ear    • Hearing loss due to cerumen impaction, left    • Hemangioma of liver    • History of echocardiogram 10/03/2019    Severely reduced LV systolic function. EF 34%  Distribution indicative of LAD territory injury. Akinesis of the apex as well as apical lateral and mid to apical septal walls. Preservation of posterior inferior lateral walls. Mild AI, mild TR. Normal RV function.    • Hordeolum externum of left lower eyelid     Impression: She was started on Bactrim DS for her infection. She was also advised to use warm compression. She will followup should sxs not improve over the next 48 hrs and resolve over the next 1 weeek.   • Hyperlipidemia     Impression: Diet controled. She should see improvement with her successful wt loss. We discussed her lipid panel.   • Hypertension, benign     Impression: new onset Low salt low calorie diet and regular exercise and followup in 1 mo She will monitor her pressures and notify us of her pressures over the next 1 week.   • Inclusion cyst of right breast     Impression: We will follow for now. She is encouraged to have Mammography. She is presently without insurance and reluctant. She bobby consider. She will notify us of any change at all in the lesion for the only way to be certain of it's etilogy is to remove it. She understands.   • Insomnia, organic    • Menopausal syndrome    • Overweight (BMI 25.0-29.9)    • RUQ abdominal pain     Impression: Resolving, negative GB u/s, HIDA EF 77%, we will follow   • Stented coronary artery 09/29/2019   • Tobacco use     Impression: She is strongly encouraged to stop smoking. Cessation techniques discussed and encouraged. The consequences of not stopping discussed, ie, CAD, PVD, Stroke, Lung and other cancers.       Past Surgical History:  Past Surgical History:   Procedure Laterality Date   • BIVENTRICULAR ASSIST DEVICE/LEFT VENTRICULAR ASSIST DEVICE INSERTION N/A 9/29/2019    Procedure: Left Ventricular Assist Device;  Surgeon: Brant Martinez MD;  Location: [facility] CATH INVASIVE LOCATION;  Service: Cardiovascular   • CARDIAC CATHETERIZATION N/A 9/29/2019    Procedure: Left Heart Cath;   Surgeon: Brant Martinez MD;  Location: [facility] CATH INVASIVE LOCATION;  Service: Cardiovascular   • CARDIAC CATHETERIZATION N/A 2019    Procedure: Coronary angiography;  Surgeon: Brant Martinez MD;  Location: [facility] CATH INVASIVE LOCATION;  Service: Cardiovascular   • CARDIAC CATHETERIZATION N/A 2019    Procedure: Left ventriculography;  Surgeon: Brant Martinez MD;  Location: [facility] CATH INVASIVE LOCATION;  Service: Cardiovascular   • CARDIAC CATHETERIZATION N/A 2019    Procedure: Stent SERENITY coronary;  Surgeon: Brant Martinez MD;  Location: [facility] CATH INVASIVE LOCATION;  Service: Cardiovascular   • CARDIAC ELECTROPHYSIOLOGY PROCEDURE  2019    Procedure: Impella Insertion;  Surgeon: Bratn Martinez MD;  Location: [facility] CATH INVASIVE LOCATION;  Service: Cardiovascular   • IA RT/LT HEART CATHETERS N/A 2019    Procedure: Percutaneous Coronary Intervention;  Surgeon: Brant Martinez MD;  Location: [facility] CATH INVASIVE LOCATION;  Service: Cardiovascular   • TOTAL ABDOMINAL HYSTERECTOMY WITH SALPINGO OOPHORECTOMY      Endometriosis       Social History:  Social History     Socioeconomic History   • Marital status:      Spouse name: Not on file   • Number of children: Not on file   • Years of education: Not on file   • Highest education level: Not on file   Tobacco Use   • Smoking status: Former Smoker     Packs/day: 1.00     Years: 34.00     Pack years: 34.00     Types: Cigarettes     Start date:      Last attempt to quit: 2019     Years since quittin.2   • Smokeless tobacco: Never Used   • Tobacco comment: States she is quitting as of 2019.   Substance and Sexual Activity   • Alcohol use: Yes     Alcohol/week: 4.0 standard drinks     Types: 4 Cans of beer per week     Frequency: Monthly or less     Drinks per session: 3 or 4     Binge frequency: Less than monthly   • Drug use: Yes     Types: Marijuana      "Comment: occasional   • Sexual activity: Defer       Allergies:  Allergies   Allergen Reactions   • Penicillins Rash   • Ciprofloxacin Rash         Review of Symptoms:  Constitutional: Patient afebrile no chills or unexpected weight changes  Respiratory: No cough, no wheezing but with dyspnea  Cardiovascular: No chest pain, palpitations, still with dyspnea, but no orthopnea and no edema  Gastrointestinal: No nausea, vomiting, constipation or diarrhea.  No melena or dark stools    All other systems reviewed and are negative       Objective:         /62 (BP Location: Left arm, Patient Position: Sitting, Cuff Size: Large Adult)   Pulse 62   Resp 18   Ht 162.6 cm (64\")   Wt 87.1 kg (192 lb)   LMP 01/01/1996 (Approximate)   SpO2 98%   BMI 32.96 kg/m²     Physical exam  Constitutional: well-nourished, and appears stated age in no acute distress  PERRL: Conjunctiva clear, no pallor, anicteric  HENMT: normocephalic, normal dentition, no cyanosis or pallor  Neck:no bruits, or thrills and bilateral normal carotid upstroke. Normal jugular venous pressure  Cardiovascular: No parasternal heaves an non-displaced focal PMI. Normal rate and rhythm: no rub, gallop, murmur or click and normal S1 and fixed split S2; no lower or upper extremity edema.   Lungs: unlabored, no wheezing with no rales or rhonchi on auscultation.  Extremities: Warm, no clubbing, cyanosis. Full and equal peripheral pulses in extremities with no bruits appreciated.   Abdomen: soft, non-tender, non-distended  Musculoskeletal: no joint tenderness or swelling and no erythema  Skin: Warm and dry, non-erythematous   Neuro:alert and normal affect. Oriented to time, place and person.           In-Office Procedure(s):  Procedures    ASCVD RIsk Score::  The ASCVD Risk score (Eden AIME Jr., et al., 2013) failed to calculate for the following reasons:    The patient has a prior MI or stroke diagnosis    Recent Radiology:  Imaging Results (Most Recent)     None "          Lab Review:   not applicable     Results from last 7 days   Lab Units 01/07/20  0857   SODIUM mmol/L 141   POTASSIUM mmol/L 4.1   CHLORIDE mmol/L 102   CO2 mmol/L 26.0   BUN mg/dL 16   CREATININE mg/dL 0.90   GLUCOSE mg/dL 103*   CALCIUM mg/dL 8.9         Results from last 7 days   Lab Units 01/07/20  0857   WBC 10*3/mm3 6.50   HEMOGLOBIN g/dL 13.0   HEMATOCRIT % 38.3   PLATELETS 10*3/mm3 169                   Invalid input(s): LDLCALC                Assessment:          Diagnosis Plan   1. ASD (atrial septal defect)  Case Request Cath Lab: PFO/ASD Closure    Protime-INR   2. Pre-op testing  CBC & Differential    Comprehensive Metabolic Panel    Protime-INR          Plan:         1. ASD (atrial septal defect)  We will plan for transcatheter closure of her ASD.  - Case Request Cath Lab: PFO/ASD Closure  - Protime-INR; Future    2. Pre-op testing  See above.  - CBC & Differential; Future  - Comprehensive Metabolic Panel; Future  - Protime-INR; Future    3.  Dyspnea, a new complaint to me  Likely secondary to atrial septal defect at least in part.    Greater than 40 minutes of face-to-face time was spent with the patient and family, of which greater than 50% was spent counseling specifically regarding the risks and benefits of transcatheter ASD PFO closure.    Level of Care:                 Amos Olivia MD  01/11/20  .

## 2020-01-12 LAB — LV EF 2D ECHO EST: 55 %

## 2020-01-12 PROCEDURE — 93312 ECHO TRANSESOPHAGEAL: CPT | Performed by: INTERNAL MEDICINE

## 2020-01-12 PROCEDURE — 93320 DOPPLER ECHO COMPLETE: CPT | Performed by: INTERNAL MEDICINE

## 2020-01-12 PROCEDURE — 93325 DOPPLER ECHO COLOR FLOW MAPG: CPT | Performed by: INTERNAL MEDICINE

## 2020-01-13 ENCOUNTER — APPOINTMENT (OUTPATIENT)
Dept: CARDIAC REHAB | Facility: HOSPITAL | Age: 50
End: 2020-01-13

## 2020-01-14 PROBLEM — Q21.10 ASD (ATRIAL SEPTAL DEFECT): Status: ACTIVE | Noted: 2020-01-14

## 2020-01-15 ENCOUNTER — APPOINTMENT (OUTPATIENT)
Dept: CARDIAC REHAB | Facility: HOSPITAL | Age: 50
End: 2020-01-15

## 2020-01-17 ENCOUNTER — APPOINTMENT (OUTPATIENT)
Dept: CARDIAC REHAB | Facility: HOSPITAL | Age: 50
End: 2020-01-17

## 2020-01-20 ENCOUNTER — TREATMENT (OUTPATIENT)
Dept: CARDIAC REHAB | Facility: HOSPITAL | Age: 50
End: 2020-01-20

## 2020-01-20 DIAGNOSIS — I21.02 ST ELEVATION MYOCARDIAL INFARCTION INVOLVING LEFT ANTERIOR DESCENDING (LAD) CORONARY ARTERY (HCC): Primary | ICD-10-CM

## 2020-01-20 PROCEDURE — 93798 PHYS/QHP OP CAR RHAB W/ECG: CPT

## 2020-01-21 ENCOUNTER — TELEPHONE (OUTPATIENT)
Dept: FAMILY MEDICINE CLINIC | Facility: CLINIC | Age: 50
End: 2020-01-21

## 2020-01-21 NOTE — TELEPHONE ENCOUNTER
Spoke with Sada  for status check      Echo/NAFISA  Showed  Aneurysm at 4.5 and whole in heart. They are going to watch the aneurysm, and repair whole  Next week.

## 2020-01-22 ENCOUNTER — TREATMENT (OUTPATIENT)
Dept: CARDIAC REHAB | Facility: HOSPITAL | Age: 50
End: 2020-01-22

## 2020-01-22 DIAGNOSIS — I21.02 ST ELEVATION MYOCARDIAL INFARCTION INVOLVING LEFT ANTERIOR DESCENDING (LAD) CORONARY ARTERY (HCC): Primary | ICD-10-CM

## 2020-01-22 PROCEDURE — 93798 PHYS/QHP OP CAR RHAB W/ECG: CPT

## 2020-01-24 ENCOUNTER — TREATMENT (OUTPATIENT)
Dept: CARDIAC REHAB | Facility: HOSPITAL | Age: 50
End: 2020-01-24

## 2020-01-24 DIAGNOSIS — I21.02 ST ELEVATION MYOCARDIAL INFARCTION INVOLVING LEFT ANTERIOR DESCENDING (LAD) CORONARY ARTERY (HCC): Primary | ICD-10-CM

## 2020-01-24 PROCEDURE — 93798 PHYS/QHP OP CAR RHAB W/ECG: CPT

## 2020-01-27 ENCOUNTER — TREATMENT (OUTPATIENT)
Dept: CARDIAC REHAB | Facility: HOSPITAL | Age: 50
End: 2020-01-27

## 2020-01-27 DIAGNOSIS — I21.02 ST ELEVATION MYOCARDIAL INFARCTION INVOLVING LEFT ANTERIOR DESCENDING (LAD) CORONARY ARTERY (HCC): Primary | ICD-10-CM

## 2020-01-27 PROCEDURE — 93798 PHYS/QHP OP CAR RHAB W/ECG: CPT

## 2020-01-30 ENCOUNTER — LAB (OUTPATIENT)
Dept: LAB | Facility: HOSPITAL | Age: 50
End: 2020-01-30

## 2020-01-30 DIAGNOSIS — Q21.10 ASD (ATRIAL SEPTAL DEFECT): ICD-10-CM

## 2020-01-30 DIAGNOSIS — Z01.818 PRE-OP TESTING: ICD-10-CM

## 2020-01-30 LAB
ALBUMIN SERPL-MCNC: 4.8 G/DL (ref 3.5–5.2)
ALBUMIN/GLOB SERPL: 2 G/DL
ALP SERPL-CCNC: 113 U/L (ref 39–117)
ALT SERPL W P-5'-P-CCNC: 37 U/L (ref 1–33)
ANION GAP SERPL CALCULATED.3IONS-SCNC: 13.3 MMOL/L (ref 5–15)
AST SERPL-CCNC: 27 U/L (ref 1–32)
BASOPHILS # BLD AUTO: 0.03 10*3/MM3 (ref 0–0.2)
BASOPHILS NFR BLD AUTO: 0.4 % (ref 0–1.5)
BILIRUB SERPL-MCNC: 0.3 MG/DL (ref 0.2–1.2)
BUN BLD-MCNC: 16 MG/DL (ref 6–20)
BUN/CREAT SERPL: 18.2 (ref 7–25)
CALCIUM SPEC-SCNC: 10 MG/DL (ref 8.6–10.5)
CHLORIDE SERPL-SCNC: 99 MMOL/L (ref 98–107)
CO2 SERPL-SCNC: 29.7 MMOL/L (ref 22–29)
CREAT BLD-MCNC: 0.88 MG/DL (ref 0.57–1)
DEPRECATED RDW RBC AUTO: 38.8 FL (ref 37–54)
EOSINOPHIL # BLD AUTO: 0.14 10*3/MM3 (ref 0–0.4)
EOSINOPHIL NFR BLD AUTO: 2.1 % (ref 0.3–6.2)
ERYTHROCYTE [DISTWIDTH] IN BLOOD BY AUTOMATED COUNT: 11.8 % (ref 12.3–15.4)
GFR SERPL CREATININE-BSD FRML MDRD: 68 ML/MIN/1.73
GLOBULIN UR ELPH-MCNC: 2.4 GM/DL
GLUCOSE BLD-MCNC: 51 MG/DL (ref 65–99)
HCT VFR BLD AUTO: 41 % (ref 34–46.6)
HGB BLD-MCNC: 13.7 G/DL (ref 12–15.9)
IMM GRANULOCYTES # BLD AUTO: 0.03 10*3/MM3 (ref 0–0.05)
IMM GRANULOCYTES NFR BLD AUTO: 0.4 % (ref 0–0.5)
INR PPP: 1.02 (ref 0.9–1.1)
LYMPHOCYTES # BLD AUTO: 2.22 10*3/MM3 (ref 0.7–3.1)
LYMPHOCYTES NFR BLD AUTO: 33.3 % (ref 19.6–45.3)
MCH RBC QN AUTO: 30.2 PG (ref 26.6–33)
MCHC RBC AUTO-ENTMCNC: 33.4 G/DL (ref 31.5–35.7)
MCV RBC AUTO: 90.3 FL (ref 79–97)
MONOCYTES # BLD AUTO: 0.71 10*3/MM3 (ref 0.1–0.9)
MONOCYTES NFR BLD AUTO: 10.6 % (ref 5–12)
NEUTROPHILS # BLD AUTO: 3.54 10*3/MM3 (ref 1.7–7)
NEUTROPHILS NFR BLD AUTO: 53.2 % (ref 42.7–76)
NRBC BLD AUTO-RTO: 0 /100 WBC (ref 0–0.2)
PLATELET # BLD AUTO: 181 10*3/MM3 (ref 140–450)
PMV BLD AUTO: 11.7 FL (ref 6–12)
POTASSIUM BLD-SCNC: 4.3 MMOL/L (ref 3.5–5.2)
PROT SERPL-MCNC: 7.2 G/DL (ref 6–8.5)
PROTHROMBIN TIME: 10.7 SECONDS (ref 9.6–11.7)
RBC # BLD AUTO: 4.54 10*6/MM3 (ref 3.77–5.28)
SODIUM BLD-SCNC: 142 MMOL/L (ref 136–145)
WBC NRBC COR # BLD: 6.67 10*3/MM3 (ref 3.4–10.8)

## 2020-01-30 PROCEDURE — 85025 COMPLETE CBC W/AUTO DIFF WBC: CPT

## 2020-01-30 PROCEDURE — 36415 COLL VENOUS BLD VENIPUNCTURE: CPT

## 2020-01-30 PROCEDURE — 80053 COMPREHEN METABOLIC PANEL: CPT

## 2020-01-30 PROCEDURE — 85610 PROTHROMBIN TIME: CPT

## 2020-02-17 ENCOUNTER — TELEPHONE (OUTPATIENT)
Dept: CARDIOLOGY | Facility: CLINIC | Age: 50
End: 2020-02-17

## 2020-02-17 RX ORDER — ATORVASTATIN CALCIUM 80 MG/1
TABLET, FILM COATED ORAL
Qty: 90 TABLET | Refills: 1 | Status: SHIPPED | OUTPATIENT
Start: 2020-02-17 | End: 2020-06-05

## 2020-02-17 RX ORDER — LISINOPRIL 5 MG/1
TABLET ORAL
Qty: 180 TABLET | Refills: 1 | Status: SHIPPED | OUTPATIENT
Start: 2020-02-17 | End: 2020-08-06

## 2020-02-17 NOTE — TELEPHONE ENCOUNTER
DR. NICHOLAS FROM [employer] CALLED, STATES SHE COULD NOT FIND ACCE GUIDELINES. WOULD LIKE TO KNOW IF DR. DAILEY HAS INFO. ON THIS?         FAX#[phone]  TEL.#[phone]

## 2020-02-18 PROBLEM — Z92.89 HISTORY OF TRANSESOPHAGEAL ECHOCARDIOGRAPHY (TEE): Status: ACTIVE | Noted: 2020-01-07

## 2020-02-19 ENCOUNTER — OFFICE VISIT (OUTPATIENT)
Dept: CARDIOLOGY | Facility: CLINIC | Age: 50
End: 2020-02-19

## 2020-02-19 VITALS
WEIGHT: 198.8 LBS | HEIGHT: 64 IN | RESPIRATION RATE: 18 BRPM | SYSTOLIC BLOOD PRESSURE: 146 MMHG | BODY MASS INDEX: 33.94 KG/M2 | DIASTOLIC BLOOD PRESSURE: 98 MMHG | HEART RATE: 69 BPM

## 2020-02-19 DIAGNOSIS — I25.5 ISCHEMIC CARDIOMYOPATHY: ICD-10-CM

## 2020-02-19 DIAGNOSIS — Z01.818 PRE-OP TESTING: ICD-10-CM

## 2020-02-19 DIAGNOSIS — E78.2 MIXED HYPERLIPIDEMIA: Chronic | ICD-10-CM

## 2020-02-19 DIAGNOSIS — Z95.5 STENTED CORONARY ARTERY: ICD-10-CM

## 2020-02-19 DIAGNOSIS — Q21.10 ASD (ATRIAL SEPTAL DEFECT): ICD-10-CM

## 2020-02-19 DIAGNOSIS — R06.02 SHORTNESS OF BREATH: Primary | ICD-10-CM

## 2020-02-19 DIAGNOSIS — I10 HYPERTENSION, BENIGN: Chronic | ICD-10-CM

## 2020-02-19 DIAGNOSIS — I21.02 STEMI INVOLVING LEFT ANTERIOR DESCENDING CORONARY ARTERY (HCC): ICD-10-CM

## 2020-02-19 PROCEDURE — 99214 OFFICE O/P EST MOD 30 MIN: CPT | Performed by: INTERNAL MEDICINE

## 2020-02-19 RX ORDER — SPIRONOLACTONE 25 MG/1
25 TABLET ORAL DAILY
Qty: 30 TABLET | Refills: 5 | Status: SHIPPED | OUTPATIENT
Start: 2020-02-19 | End: 2020-08-06

## 2020-02-20 PROBLEM — R06.02 SHORTNESS OF BREATH: Status: ACTIVE | Noted: 2020-02-20

## 2020-02-20 NOTE — H&P (VIEW-ONLY)
Cardiology clinic note  Brant Martinez MD, PhD  [facility] cardiology  Subjective:     Encounter Date:02/19/2020      Patient ID: Sada Le is a 49 y.o. female.    Chief Complaint:  Chief Complaint   Patient presents with   • Follow-up       HPI:  History of Present Illness  Per prior encounter  Per prior encounter  I had the pleasure of seeing this very pleasant 49-year-old female who is well-known to me from recent hospitalization with LAD ST elevation myocardial infarction complicated by cardiogenic shock and VF arrest prior to revascularization.  She underwent located high risk PCI with Impella support with 3.5 x 33 mm Xience drug-eluting stent to the proximal LAD postdilated to 4.5 mm with good angiographic results.  She is maintained on dual antiplatelet therapy with aspirin and ticagrelor.  Ultimately she had continued hemodynamic support with Impella left ventricular unloading out to 24 to 36 hours with uncomplicated removal of Impella support.  She was observed in the hospital for 7 days to rule out any postinfarction mechanical complications.  She was initiated on goal-directed medical therapy as her hemodynamics allowed with Aldactone 25 daily as well as low-dose metoprolol on discharge 12.5 mg p.o. daily.  She has known essential hypertension which was treated previously with lisinopril which currently she is off secondary to lower blood pressures during her hospitalization.  She presents doing well today with stable bilateral groins with mild bruising but patent distal pulses and no claudication symptoms.  No hematoma no pseudoaneurysms or other peripheral vascular complications.  She does have occasional atypical chest discomfort as shooting pains but she does not know whether she is just hypersensitive to these feelings but nothing feels like the pressure she experienced with her MI.  She is done well and she is now 2 weeks post infarction in her underlying ejection fraction  was 38% biplane with mid to apical anteroseptal and anterior wall and apical akinesis.  Peak troponin was greater than 72 and treated down throughout her hospitalization.  She presents today with no heart failure signs or symptoms and is on Lasix 40 mg p.o. daily in addition to Aldactone and metoprolol.  She presents today for medication optimization and blood pressure today is 112/66 but at home has been ranging up to 130 systolic.  She has stopped smoking since her MI and continues to be abstinent which I encouraged.  Her renal function is normal per last labs.  No syncope or palpitations reported.  No PND orthopnea.  She does have NYHA class II dyspnea on exertion and is trying to work with cardiac rehab recently with very light home physical therapy.  I advised her to take the next 2 weeks easy to encourage stable scar formation as well as up titrate medicines as able.  She says she is not taking any salt intake significantly and is doing well with her diet.  No other concerning signs or symptoms last visit.     On today's encounter she is doing very well and euvolemic.  She previously had a hospitalization for some shortness of air and likely anxiety.  She was discharged shortly thereafter.  No EKG changes or chest pain or troponin elevation.  Follow-up 2D echo revealed good recovery of LV systolic function to greater than 50% with mild residual anterior wall abnormality with mild hypokinesis in the infarcted territory.  Notably she had observation of redundant and aneurysmal interatrial septum and bubble study did reveal positivity lakhani representative of significant interatrial shunt and likely bidirectional interatrial shunt.  She does have a enlarged right ventricle as well as recent demonstration of moderately dilated ascending aorta measuring 4.6 cm.  She does have significant dyspnea on exertion which is persistent.   NAFISA was performed with visualization of large PFO, tricuspid aortic valve.  RV  enlargement, low normal LV systolic function with very mild hypokinesis of the anteroapical wall.  EF was 50%.  She continues to have shortness of breath.  She was changed off ticagrelor to Effient in order to see if this was contributory.  She has had some degree of weight gain of approximately 15 pounds since her initial encounter.  She continues to avoid smoking and she has not had any recurrent relapses to tobacco use.  We discussed closure of interatrial shunt which appears significant by bubble study as well as transesophageal echo and discussed her insurance and combating closure.  She has continued symptoms of shortness of breath which cannot be explained by systolic dysfunction or significant diastolic dysfunction.  She has no history of stroke and is continued on antiplatelet therapy.  We do not have a good idea what her pulmonary pressures are invasively or shunt fraction and we discussed today right heart catheterization in congenital fashion to establish shunt fraction to see whether this more typical of the volume of interatrial shunt were consistent with ASD which should be closed if present.  She is amenable to this approach as she does not know what to do about continued dyspnea on exertion.  She is compliant with ACE inhibitor therapy, Aldactone therapy, Lasix twice daily and she does not appear overloaded.  High intensity statin therapy is on board and compliant, beta-1 selective beta-blocker with metoprolol succinate on board.  No other complaints today other than dyspnea.     Review of systems negative x14 point review of systems except as mentioned above     Historical data copied forward from previous encounters and EMR as unchanged  The following portions of the patient's history were reviewed and updated as appropriate: allergies, current medications, past family history, past medical history, past social history, past surgical history and problem list.    Problem List:  Patient Active  Problem List   Diagnosis   • Allergic rhinitis   • Cervical disc disease with myelopathy   • Diverticulosis of colon   • Hypertension, benign   • Insomnia, organic   • Obesity (BMI 30-39.9)   • Osteopenia of spine   • Mixed hyperlipidemia   • STEMI involving left anterior descending coronary artery (CMS/HCC)   • Elevated LFTs   • Hypocalcemia   • Normocytic anemia   • Thrombocytopenia (CMS/HCC)   • Cardiac arrest with ventricular fibrillation (CMS/HCC)   • Ischemic cardiomyopathy   • Stented coronary artery   • History of echocardiogram   • Annual visit for general adult medical examination with abnormal findings   • Cervical cancer screening   • Encounter for screening for malignant neoplasm of breast   • Colon cancer screening   • History of tobacco use   • Chest pain   • Generalized anxiety disorder   • Hypokalemia   • ASD (atrial septal defect)   • History of transesophageal echocardiography (NAFISA)   • Shortness of breath       Past Medical History:  Past Medical History:   Diagnosis Date   • Absence of left thumb     Impression: hx of MRSA, she is signficantly improved She will continue meds and followup if sxs have not resolved over the next 2 weeks.. She is released from care and will notify us of any problems   • Acute otitis media    • Allergic rhinitis    • Arthritis     neck   • Cancer (CMS/HCC)     skin   • Cervical disc disease with myelopathy    • Contact dermatitis or eczema    • Coronary artery disease    • Disorder of bone and cartilage    • Diverticulitis of colon     Impression: CT confirms in the sigmoid colon 10/2011   • Diverticulosis of colon     Impression: No sxs 02/13/2013   • Elevated cholesterol    • Elevated temperature    • External otitis of left ear    • Hearing loss due to cerumen impaction, left    • Hemangioma of liver    • History of echocardiogram 10/03/2019    Severely reduced LV systolic function. EF 34% Distribution indicative of LAD territory injury. Akinesis of the apex as  well as apical lateral and mid to apical septal walls. Preservation of posterior inferior lateral walls. Mild AI, mild TR. Normal RV function.    • History of transesophageal echocardiography (NAFISA) 01/07/2020    EF 55% LA cavity is mild to moderately dilated. Mild MR. Interatrial septum appears redundant. Interatrial hypermobile c/w aneurysm. Large PFO is noted with right to left shunting on bubble study. PFO tunnel appears to be short.    • Hordeolum externum of left lower eyelid     Impression: She was started on Bactrim DS for her infection. She was also advised to use warm compression. She will followup should sxs not improve over the next 48 hrs and resolve over the next 1 weeek.   • Hyperlipidemia     Impression: Diet controled. She should see improvement with her successful wt loss. We discussed her lipid panel.   • Hypertension, benign     Impression: new onset Low salt low calorie diet and regular exercise and followup in 1 mo She will monitor her pressures and notify us of her pressures over the next 1 week.   • Inclusion cyst of right breast     Impression: We will follow for now. She is encouraged to have Mammography. She is presently without insurance and reluctant. She bobby consider. She will notify us of any change at all in the lesion for the only way to be certain of it's etilogy is to remove it. She understands.   • Insomnia, organic    • Menopausal syndrome    • Overweight (BMI 25.0-29.9)    • RUQ abdominal pain     Impression: Resolving, negative GB u/s, HIDA EF 77%, we will follow   • Stented coronary artery 09/29/2019   • Tobacco use     Impression: She is strongly encouraged to stop smoking. Cessation techniques discussed and encouraged. The consequences of not stopping discussed, ie, CAD, PVD, Stroke, Lung and other cancers.       Past Surgical History:  Past Surgical History:   Procedure Laterality Date   • BIVENTRICULAR ASSIST DEVICE/LEFT VENTRICULAR ASSIST DEVICE INSERTION N/A 9/29/2019     Procedure: Left Ventricular Assist Device;  Surgeon: Brant Martinez MD;  Location: [facility] CATH INVASIVE LOCATION;  Service: Cardiovascular   • CARDIAC CATHETERIZATION N/A 2019    Procedure: Left Heart Cath;  Surgeon: Brant Martinez MD;  Location: [facility] CATH INVASIVE LOCATION;  Service: Cardiovascular   • CARDIAC CATHETERIZATION N/A 2019    Procedure: Coronary angiography;  Surgeon: Brant Martinez MD;  Location: [facility] CATH INVASIVE LOCATION;  Service: Cardiovascular   • CARDIAC CATHETERIZATION N/A 2019    Procedure: Left ventriculography;  Surgeon: Brant Martinez MD;  Location: [facility] CATH INVASIVE LOCATION;  Service: Cardiovascular   • CARDIAC CATHETERIZATION N/A 2019    Procedure: Stent SERENITY coronary;  Surgeon: Brant Martinez MD;  Location: [facility] CATH INVASIVE LOCATION;  Service: Cardiovascular   • CARDIAC ELECTROPHYSIOLOGY PROCEDURE  2019    Procedure: Impella Insertion;  Surgeon: Brant Martinez MD;  Location: [facility] CATH INVASIVE LOCATION;  Service: Cardiovascular   • MT RT/LT HEART CATHETERS N/A 2019    Procedure: Percutaneous Coronary Intervention;  Surgeon: Brant Martinez MD;  Location: [facility] CATH INVASIVE LOCATION;  Service: Cardiovascular   • TOTAL ABDOMINAL HYSTERECTOMY WITH SALPINGO OOPHORECTOMY  1995    Endometriosis       Social History:  Social History     Socioeconomic History   • Marital status:      Spouse name: Not on file   • Number of children: Not on file   • Years of education: Not on file   • Highest education level: Not on file   Tobacco Use   • Smoking status: Former Smoker     Packs/day: 1.00     Years: 34.00     Pack years: 34.00     Types: Cigarettes     Start date:      Last attempt to quit: 2019     Years since quittin.3   • Smokeless tobacco: Never Used   • Tobacco comment: States she is quitting as of 2019.   Substance and Sexual Activity   • Alcohol use: Yes  "    Alcohol/week: 4.0 standard drinks     Types: 4 Cans of beer per week     Frequency: Monthly or less     Drinks per session: 3 or 4     Binge frequency: Less than monthly   • Drug use: Yes     Types: Marijuana     Comment: occasional   • Sexual activity: Defer       Allergies:  Allergies   Allergen Reactions   • Penicillins Rash   • Ciprofloxacin Rash       Immunizations:  Immunization History   Administered Date(s) Administered   • DT 01/01/2004   • FLUARIX/FLUZONE/AFLURIA/FLULAVAL QUAD 10/01/2019   • Tdap 01/01/2011       ROS:  ROS       Objective:         /98 (BP Location: Left arm, Patient Position: Sitting)   Pulse 69   Resp 18   Ht 162.6 cm (64\")   Wt 90.2 kg (198 lb 12.8 oz)   LMP 01/01/1996 (Approximate)   BMI 34.12 kg/m²     Physical Exam  Vitals reviewed  Normocephalic atraumatic pupils equal round extraocular movements intact bilaterally  Trachea midline neck supple no carotid bruits, no significant H JR JVD  Abdomen obese soft nontender nondistended bowel sounds are positive  No clubbing cyanosis or significant edema  Regular rate and rhythm no rubs murmurs or gallops  No acute distress on encounter  Neurologic exam grossly intact bilaterally  Pulses 2+ in all distributions  Extremities warm and dry  Normal mood and affect  No bony abnormalities grossly  In-Office Procedure(s):  Procedures    ASCVD RIsk Score::  The ASCVD Risk score (Marielena SALOMON Jr., et al., 2013) failed to calculate for the following reasons:    The patient has a prior MI or stroke diagnosis    Recent Radiology:  Imaging Results (Most Recent)     None          Lab Review:   Lab on 01/30/2020   Component Date Value   • Glucose 01/30/2020 51*   • BUN 01/30/2020 16    • Creatinine 01/30/2020 0.88    • Sodium 01/30/2020 142    • Potassium 01/30/2020 4.3    • Chloride 01/30/2020 99    • CO2 01/30/2020 29.7*   • Calcium 01/30/2020 10.0    • Total Protein 01/30/2020 7.2    • Albumin 01/30/2020 4.80    • ALT (SGPT) 01/30/2020 37*   • " AST (SGOT) 01/30/2020 27    • Alkaline Phosphatase 01/30/2020 113    • Total Bilirubin 01/30/2020 0.3    • eGFR Non  Amer 01/30/2020 68    • Globulin 01/30/2020 2.4    • A/G Ratio 01/30/2020 2.0    • BUN/Creatinine Ratio 01/30/2020 18.2    • Anion Gap 01/30/2020 13.3    • Protime 01/30/2020 10.7    • INR 01/30/2020 1.02    • WBC 01/30/2020 6.67    • RBC 01/30/2020 4.54    • Hemoglobin 01/30/2020 13.7    • Hematocrit 01/30/2020 41.0    • MCV 01/30/2020 90.3    • MCH 01/30/2020 30.2    • MCHC 01/30/2020 33.4    • RDW 01/30/2020 11.8*   • RDW-SD 01/30/2020 38.8    • MPV 01/30/2020 11.7    • Platelets 01/30/2020 181    • Neutrophil % 01/30/2020 53.2    • Lymphocyte % 01/30/2020 33.3    • Monocyte % 01/30/2020 10.6    • Eosinophil % 01/30/2020 2.1    • Basophil % 01/30/2020 0.4    • Immature Grans % 01/30/2020 0.4    • Neutrophils, Absolute 01/30/2020 3.54    • Lymphocytes, Absolute 01/30/2020 2.22    • Monocytes, Absolute 01/30/2020 0.71    • Eosinophils, Absolute 01/30/2020 0.14    • Basophils, Absolute 01/30/2020 0.03    • Immature Grans, Absolute 01/30/2020 0.03    • nRBC 01/30/2020 0.0    Hospital Outpatient Visit on 01/07/2020   Component Date Value   • WBC 01/07/2020 6.50    • RBC 01/07/2020 4.27    • Hemoglobin 01/07/2020 13.0    • Hematocrit 01/07/2020 38.3    • MCV 01/07/2020 89.7    • MCH 01/07/2020 30.5    • MCHC 01/07/2020 34.0    • RDW 01/07/2020 12.8    • RDW-SD 01/07/2020 41.1    • MPV 01/07/2020 8.7    • Platelets 01/07/2020 169    • Glucose 01/07/2020 103*   • BUN 01/07/2020 16    • Creatinine 01/07/2020 0.90    • Sodium 01/07/2020 141    • Potassium 01/07/2020 4.1    • Chloride 01/07/2020 102    • CO2 01/07/2020 26.0    • Calcium 01/07/2020 8.9    • eGFR Non  Amer 01/07/2020 66    • BUN/Creatinine Ratio 01/07/2020 17.8    • Anion Gap 01/07/2020 13.0    • Echo EF Estimated 01/07/2020 55    Admission on 11/26/2019, Discharged on 11/27/2019   Component Date Value   • Glucose 11/26/2019 130*    • BUN 11/26/2019 16    • Creatinine 11/26/2019 0.96    • Sodium 11/26/2019 139    • Potassium 11/26/2019 3.3*   • Chloride 11/26/2019 96*   • CO2 11/26/2019 27.0    • Calcium 11/26/2019 9.5    • eGFR Non  Amer 11/26/2019 62    • BUN/Creatinine Ratio 11/26/2019 16.7    • Anion Gap 11/26/2019 16.0*   • proBNP 11/26/2019 258.9    • Troponin T 11/26/2019 <0.010    • D-Dimer, Quantitative 11/26/2019 0.56    • WBC 11/26/2019 7.60    • RBC 11/26/2019 3.89    • Hemoglobin 11/26/2019 12.1    • Hematocrit 11/26/2019 35.5    • MCV 11/26/2019 91.1    • MCH 11/26/2019 31.2    • MCHC 11/26/2019 34.3    • RDW 11/26/2019 12.9    • RDW-SD 11/26/2019 41.1    • MPV 11/26/2019 9.4    • Platelets 11/26/2019 184    • Neutrophil % 11/26/2019 62.4    • Lymphocyte % 11/26/2019 26.9    • Monocyte % 11/26/2019 8.6    • Eosinophil % 11/26/2019 1.7    • Basophil % 11/26/2019 0.4    • Neutrophils, Absolute 11/26/2019 4.70    • Lymphocytes, Absolute 11/26/2019 2.00    • Monocytes, Absolute 11/26/2019 0.70    • Eosinophils, Absolute 11/26/2019 0.10    • Basophils, Absolute 11/26/2019 0.00    • nRBC 11/26/2019 0.0    • Protime 11/26/2019 10.8    • INR 11/26/2019 1.03    • Troponin T 11/26/2019 <0.010    • Troponin T 11/27/2019 <0.010    • Potassium 11/26/2019 3.3*   • proBNP 11/27/2019 401.3    • WBC 11/27/2019 8.70    • RBC 11/27/2019 3.88    • Hemoglobin 11/27/2019 12.1    • Hematocrit 11/27/2019 35.5    • MCV 11/27/2019 91.4    • MCH 11/27/2019 31.2    • MCHC 11/27/2019 34.2    • RDW 11/27/2019 13.1    • RDW-SD 11/27/2019 42.0    • MPV 11/27/2019 9.3    • Platelets 11/27/2019 185    • Neutrophil % 11/27/2019 54.1    • Lymphocyte % 11/27/2019 34.1    • Monocyte % 11/27/2019 9.0    • Eosinophil % 11/27/2019 2.4    • Basophil % 11/27/2019 0.4    • Neutrophils, Absolute 11/27/2019 4.70    • Lymphocytes, Absolute 11/27/2019 3.00    • Monocytes, Absolute 11/27/2019 0.80    • Eosinophils, Absolute 11/27/2019 0.20    • Basophils, Absolute  11/27/2019 0.00    • nRBC 11/27/2019 0.0    • Creatine Kinase 11/27/2019 121    • Glucose 11/27/2019 112*   • BUN 11/27/2019 16    • Creatinine 11/27/2019 0.99    • Sodium 11/27/2019 139    • Potassium 11/27/2019 3.5    • Chloride 11/27/2019 97*   • CO2 11/27/2019 30.0*   • Calcium 11/27/2019 9.4    • Total Protein 11/27/2019 7.1    • Albumin 11/27/2019 4.10    • ALT (SGPT) 11/27/2019 22    • AST (SGOT) 11/27/2019 16    • Alkaline Phosphatase 11/27/2019 109    • Total Bilirubin 11/27/2019 0.3    • eGFR Non African Amer 11/27/2019 59*   • Globulin 11/27/2019 3.0    • A/G Ratio 11/27/2019 1.4    • BUN/Creatinine Ratio 11/27/2019 16.2    • Anion Gap 11/27/2019 12.0    • Ferritin 11/27/2019 173.90*   • Hemoglobin A1C 11/27/2019 5.2    • Total Cholesterol 11/27/2019 145    • Triglycerides 11/27/2019 212*   • HDL Cholesterol 11/27/2019 52    • LDL Cholesterol  11/27/2019 51    • VLDL Cholesterol 11/27/2019 42.4    • LDL/HDL Ratio 11/27/2019 0.97    • Magnesium 11/27/2019 1.9    • Phosphorus 11/27/2019 5.2*   • TSH 11/27/2019 2.310    • Troponin T 11/27/2019 <0.010    Hospital Outpatient Visit on 10/31/2019   Component Date Value   • BSA 10/31/2019 1.8    • BH CV ECHO PERICO - RVDD 10/31/2019 1.7    • IVSd 10/31/2019 1.3    • LVIDd 10/31/2019 5.1    • LVIDs 10/31/2019 3.5    • LVPWd 10/31/2019 1.2    • IVS/LVPW 10/31/2019 1.1    • FS 10/31/2019 31.3    • EDV(Teich) 10/31/2019 122.9    • ESV(Teich) 10/31/2019 50.7    • EF(Teich) 10/31/2019 58.8    • EDV(cubed) 10/31/2019 131.4    • ESV(cubed) 10/31/2019 42.7    • EF(cubed) 10/31/2019 67.5    • LV mass(C)d 10/31/2019 247.3    • LV mass(C)dI 10/31/2019 134.5    • SV(Teich) 10/31/2019 72.2    • SI(Teich) 10/31/2019 39.3    • SV(cubed) 10/31/2019 88.7    • SI(cubed) 10/31/2019 48.2    • Ao root diam 10/31/2019 2.9    • Ao root area 10/31/2019 6.8    • ACS 10/31/2019 1.9    • LA dimension 10/31/2019 3.1    • LA/Ao 10/31/2019 1.0    • LVOT diam 10/31/2019 2.2    • LVOT area  10/31/2019 3.8    • EDV(MOD-sp4) 10/31/2019 111.9    • ESV(MOD-sp4) 10/31/2019 47.0    • EF(MOD-sp4) 10/31/2019 58.0    • SV(MOD-sp4) 10/31/2019 64.9    • SI(MOD-sp4) 10/31/2019 35.3    • Ao root area (BSA correc* 10/31/2019 1.6    • LV Amin Vol (BSA correct* 10/31/2019 60.9    • LV Sys Vol (BSA correcte* 10/31/2019 25.6    • MV E max bernie 10/31/2019 55.2    • MV A max bernie 10/31/2019 70.5    • MV E/A 10/31/2019 0.78    • MV V2 max 10/31/2019 76.4    • MV max PG 10/31/2019 2.3    • MV V2 mean 10/31/2019 36.5    • MV mean PG 10/31/2019 0.64    • MV V2 VTI 10/31/2019 25.2    • MVA(VTI) 10/31/2019 3.5    • Ao pk bernie 10/31/2019 147.0    • Ao max PG 10/31/2019 8.6    • Ao max PG (full) 10/31/2019 3.5    • Ao V2 mean 10/31/2019 95.1    • Ao mean PG 10/31/2019 4.2    • Ao mean PG (full) 10/31/2019 2.1    • Ao V2 VTI 10/31/2019 28.2    • HERNANDEZ(I,A) 10/31/2019 3.2    • HERNANDEZ(I,D) 10/31/2019 3.2    • HERNANDEZ(V,A) 10/31/2019 2.9    • HERNANDEZ(V,D) 10/31/2019 2.9    • AI max bernie 10/31/2019 482.5    • AI max PG 10/31/2019 93.2    • AI dec slope 10/31/2019 163.8    • AI dec time 10/31/2019 3.0    • AI P1/2t 10/31/2019 862.9    • LV V1 max PG 10/31/2019 5.1    • LV V1 mean PG 10/31/2019 2.1    • LV V1 max 10/31/2019 113.1    • LV V1 mean 10/31/2019 65.8    • LV V1 VTI 10/31/2019 23.7    • MR max bernie 10/31/2019 379.0    • MR max PG 10/31/2019 57.5    • SV(Ao) 10/31/2019 192.3    • SI(Ao) 10/31/2019 104.6    • SV(LVOT) 10/31/2019 89.0    • SI(LVOT) 10/31/2019 48.4    • PA V2 max 10/31/2019 89.3    • PA max PG 10/31/2019 3.2    • PA acc time 10/31/2019 0.11    • PI end-d bernie 10/31/2019 104.9    • TR max bernie 10/31/2019 309.8    • RVSP(TR) 10/31/2019 55.1    • RAP systole 10/31/2019 10.0    • PA pr(Accel) 10/31/2019 27.6    • BH CV ECHO PERIOC - BZI_BMI 10/31/2019 29.7    • BH CV ECHO PERICO - BSA(HA* 10/31/2019 1.9    • BH CV ECHO PERICO - BZI_ME* 10/31/2019 78.5    • BH CV ECHO PERICO - BZI_ME* 10/31/2019 162.6    • Target HR (85%) 10/31/2019 145    • Max.  Pred. HR (100%) 10/31/2019 170    • EF(MOD-bp) 10/31/2019 55.0    • RVDd(2D) 10/31/2019 1.7    No results displayed because visit has over 200 results.      Office Visit on 09/16/2019   Component Date Value   • Color 09/16/2019 Za    • Clarity, UA 09/16/2019 Clear    • Glucose, UA 09/16/2019 Negative    • Bilirubin 09/16/2019 Negative    • Ketones, UA 09/16/2019 Negative    • Specific Gravity  09/16/2019 1.015    • Blood, UA 09/16/2019 Negative    • pH, Urine 09/16/2019 5.0    • Protein, POC 09/16/2019 Negative    • Urobilinogen, UA 09/16/2019 Normal    • Leukocytes 09/16/2019 Negative    • Nitrite, UA 09/16/2019 Negative    • Urine Culture 09/16/2019 Final report    • Result 1 09/16/2019 No growth    • WBC 09/16/2019 9.6    • RBC 09/16/2019 4.65    • Hemoglobin 09/16/2019 14.8    • Hematocrit 09/16/2019 41.9    • MCV 09/16/2019 90    • MCH 09/16/2019 31.8    • MCHC 09/16/2019 35.3    • RDW 09/16/2019 13.0    • Platelets 09/16/2019 225    • Neutrophil Rel % 09/16/2019 64    • Lymphocyte Rel % 09/16/2019 26    • Monocyte Rel % 09/16/2019 7    • Eosinophil Rel % 09/16/2019 2    • Basophil Rel % 09/16/2019 0    • Neutrophils Absolute 09/16/2019 6.1    • Lymphocytes Absolute 09/16/2019 2.5    • Monocytes Absolute 09/16/2019 0.7    • Eosinophils Absolute 09/16/2019 0.2    • Basophils Absolute 09/16/2019 0.0    • Immature Granulocyte Rel* 09/16/2019 1    • Immature Grans Absolute 09/16/2019 0.1    • Total Cholesterol 09/16/2019 231*   • Triglycerides 09/16/2019 117    • HDL Cholesterol 09/16/2019 56    • VLDL Cholesterol 09/16/2019 23    • LDL Cholesterol  09/16/2019 152*   • Chol/HDL Ratio 09/16/2019 4.1    • TSH 09/16/2019 2.070    • Written Authorization 09/16/2019 Comment    • Glucose 09/16/2019 147*   • BUN 09/16/2019 20    • Creatinine 09/16/2019 1.09*   • eGFR Non African Am 09/16/2019 60    • eGFR  Am 09/16/2019 69    • BUN/Creatinine Ratio 09/16/2019 18    • Sodium 09/16/2019 131*   • Potassium  09/16/2019 4.0    • Chloride 09/16/2019 87*   • Total CO2 09/16/2019 19*   • Calcium 09/16/2019 9.5    • Total Protein 09/16/2019 7.1    • Albumin 09/16/2019 4.5    • Globulin 09/16/2019 2.6    • A/G Ratio 09/16/2019 1.7    • Total Bilirubin 09/16/2019 CANCELED    • Alkaline Phosphatase 09/16/2019 96    • AST (SGOT) 09/16/2019 15    • ALT (SGPT) 09/16/2019 12    • Written Authorization 09/16/2019 Comment    Abstract on 09/03/2019   Component Date Value   • HM Mammogram 11/23/2007 see report    • HM Dexa Scan 09/30/2005 see report                 Assessment:          Diagnosis Plan   1. Shortness of breath  Case Request Cath Lab: Right Heart Cath   2. Pre-op testing  CBC & Differential    Comprehensive Metabolic Panel    Protime-INR   3. STEMI involving left anterior descending coronary artery (CMS/HCC)     4. Mixed hyperlipidemia     5. Hypertension, benign     6. Ischemic cardiomyopathy     7. Stented coronary artery     8. ASD (atrial septal defect)            Plan:      1. LAD STEMI/CAD  - s/p PCI to the proximal / ostial to mid LAD with Xience 3.5 x 33 mm drug-eluting stent postdilated to 4.5 mm at 18 familia with good angiographic results.  - residual borderline lesions in the distal LAD which will be staged for FFR in future if she has chest pain. Nonobstructive disease in the circumflex and RCA  -Dual platelet therapy aspirin and Effient uninterrupted 1 year, consider longer-term Plavix therapy with benefits up to 30 months per DAPT trial with low bleeding risk and residual lesions possible further unstable plaque.  -High intensity statin therapy per guidelines   - on spironolactone, continue 25 daily  -Continue metop XL 25 daily   -Continue Lasix 40 bid  Continue lisinopril to 20 daily  Continue atorvastatin to 80 daily      Dyspnea on exertion  Cannot rule out significant left to right shunt  Congenital right heart catheterization will be performed to establish on fraction and significant interatrial shunt  volume and assess indications for closure if more consistent physiologically with ASD    We will consider noninvasive ischemic evaluation with Lexiscan stress given nonobstructive residual lesions and dyspnea on exertion, low likelihood of in-stent restenosis given diameter of large caliber stent.     2. acute systolic HF   - improved, impella removed 9/29  -Estimated EF  greater than 50% with trace to mild anterior wall hypokinesis regionally, continue max medical therapy  Metoprolol as above   ACE inhibitor as above   statin as above     4. H/o Essential hypertension, continue lisinopril, diuresis, recheck at home which is been less than 135 systolic.    4. Hyperlipidemia  -High intensity statin therapy on board     5. Elevated LFTs  -Resolved     6.  Anxiety  per primary care     7. Hypokalemia / Hypomagnesium  Stable    Schedule congenital right heart cath for evaluation of shunt fraction of interatrial shunt.    Return to clinic and further recommendations to follow this assessment    Return to clinic in 3 months    Brant Martinez MD, PhD     greater than 25 minutes spent face-to-face with the patient greater 50% time discussing congenital right heart cath, dyspnea on exertion work-up, therapeutic options, plan of care, medications and questions answered by the patient.           Brant Martinez MD  02/20/20  .

## 2020-02-20 NOTE — PROGRESS NOTES
Cardiology clinic note  Brant Martinez MD, PhD  [facility] cardiology  Subjective:     Encounter Date:02/19/2020      Patient ID: Sada Le is a 49 y.o. female.    Chief Complaint:  Chief Complaint   Patient presents with   • Follow-up       HPI:  History of Present Illness  Per prior encounter  Per prior encounter  I had the pleasure of seeing this very pleasant 49-year-old female who is well-known to me from recent hospitalization with LAD ST elevation myocardial infarction complicated by cardiogenic shock and VF arrest prior to revascularization.  She underwent located high risk PCI with Impella support with 3.5 x 33 mm Xience drug-eluting stent to the proximal LAD postdilated to 4.5 mm with good angiographic results.  She is maintained on dual antiplatelet therapy with aspirin and ticagrelor.  Ultimately she had continued hemodynamic support with Impella left ventricular unloading out to 24 to 36 hours with uncomplicated removal of Impella support.  She was observed in the hospital for 7 days to rule out any postinfarction mechanical complications.  She was initiated on goal-directed medical therapy as her hemodynamics allowed with Aldactone 25 daily as well as low-dose metoprolol on discharge 12.5 mg p.o. daily.  She has known essential hypertension which was treated previously with lisinopril which currently she is off secondary to lower blood pressures during her hospitalization.  She presents doing well today with stable bilateral groins with mild bruising but patent distal pulses and no claudication symptoms.  No hematoma no pseudoaneurysms or other peripheral vascular complications.  She does have occasional atypical chest discomfort as shooting pains but she does not know whether she is just hypersensitive to these feelings but nothing feels like the pressure she experienced with her MI.  She is done well and she is now 2 weeks post infarction in her underlying ejection fraction  was 38% biplane with mid to apical anteroseptal and anterior wall and apical akinesis.  Peak troponin was greater than 72 and treated down throughout her hospitalization.  She presents today with no heart failure signs or symptoms and is on Lasix 40 mg p.o. daily in addition to Aldactone and metoprolol.  She presents today for medication optimization and blood pressure today is 112/66 but at home has been ranging up to 130 systolic.  She has stopped smoking since her MI and continues to be abstinent which I encouraged.  Her renal function is normal per last labs.  No syncope or palpitations reported.  No PND orthopnea.  She does have NYHA class II dyspnea on exertion and is trying to work with cardiac rehab recently with very light home physical therapy.  I advised her to take the next 2 weeks easy to encourage stable scar formation as well as up titrate medicines as able.  She says she is not taking any salt intake significantly and is doing well with her diet.  No other concerning signs or symptoms last visit.     On today's encounter she is doing very well and euvolemic.  She previously had a hospitalization for some shortness of air and likely anxiety.  She was discharged shortly thereafter.  No EKG changes or chest pain or troponin elevation.  Follow-up 2D echo revealed good recovery of LV systolic function to greater than 50% with mild residual anterior wall abnormality with mild hypokinesis in the infarcted territory.  Notably she had observation of redundant and aneurysmal interatrial septum and bubble study did reveal positivity lakhani representative of significant interatrial shunt and likely bidirectional interatrial shunt.  She does have a enlarged right ventricle as well as recent demonstration of moderately dilated ascending aorta measuring 4.6 cm.  She does have significant dyspnea on exertion which is persistent.   NAFISA was performed with visualization of large PFO, tricuspid aortic valve.  RV  enlargement, low normal LV systolic function with very mild hypokinesis of the anteroapical wall.  EF was 50%.  She continues to have shortness of breath.  She was changed off ticagrelor to Effient in order to see if this was contributory.  She has had some degree of weight gain of approximately 15 pounds since her initial encounter.  She continues to avoid smoking and she has not had any recurrent relapses to tobacco use.  We discussed closure of interatrial shunt which appears significant by bubble study as well as transesophageal echo and discussed her insurance and combating closure.  She has continued symptoms of shortness of breath which cannot be explained by systolic dysfunction or significant diastolic dysfunction.  She has no history of stroke and is continued on antiplatelet therapy.  We do not have a good idea what her pulmonary pressures are invasively or shunt fraction and we discussed today right heart catheterization in congenital fashion to establish shunt fraction to see whether this more typical of the volume of interatrial shunt were consistent with ASD which should be closed if present.  She is amenable to this approach as she does not know what to do about continued dyspnea on exertion.  She is compliant with ACE inhibitor therapy, Aldactone therapy, Lasix twice daily and she does not appear overloaded.  High intensity statin therapy is on board and compliant, beta-1 selective beta-blocker with metoprolol succinate on board.  No other complaints today other than dyspnea.     Review of systems negative x14 point review of systems except as mentioned above     Historical data copied forward from previous encounters and EMR as unchanged  The following portions of the patient's history were reviewed and updated as appropriate: allergies, current medications, past family history, past medical history, past social history, past surgical history and problem list.    Problem List:  Patient Active  Problem List   Diagnosis   • Allergic rhinitis   • Cervical disc disease with myelopathy   • Diverticulosis of colon   • Hypertension, benign   • Insomnia, organic   • Obesity (BMI 30-39.9)   • Osteopenia of spine   • Mixed hyperlipidemia   • STEMI involving left anterior descending coronary artery (CMS/HCC)   • Elevated LFTs   • Hypocalcemia   • Normocytic anemia   • Thrombocytopenia (CMS/HCC)   • Cardiac arrest with ventricular fibrillation (CMS/HCC)   • Ischemic cardiomyopathy   • Stented coronary artery   • History of echocardiogram   • Annual visit for general adult medical examination with abnormal findings   • Cervical cancer screening   • Encounter for screening for malignant neoplasm of breast   • Colon cancer screening   • History of tobacco use   • Chest pain   • Generalized anxiety disorder   • Hypokalemia   • ASD (atrial septal defect)   • History of transesophageal echocardiography (NAFISA)   • Shortness of breath       Past Medical History:  Past Medical History:   Diagnosis Date   • Absence of left thumb     Impression: hx of MRSA, she is signficantly improved She will continue meds and followup if sxs have not resolved over the next 2 weeks.. She is released from care and will notify us of any problems   • Acute otitis media    • Allergic rhinitis    • Arthritis     neck   • Cancer (CMS/HCC)     skin   • Cervical disc disease with myelopathy    • Contact dermatitis or eczema    • Coronary artery disease    • Disorder of bone and cartilage    • Diverticulitis of colon     Impression: CT confirms in the sigmoid colon 10/2011   • Diverticulosis of colon     Impression: No sxs 02/13/2013   • Elevated cholesterol    • Elevated temperature    • External otitis of left ear    • Hearing loss due to cerumen impaction, left    • Hemangioma of liver    • History of echocardiogram 10/03/2019    Severely reduced LV systolic function. EF 34% Distribution indicative of LAD territory injury. Akinesis of the apex as  well as apical lateral and mid to apical septal walls. Preservation of posterior inferior lateral walls. Mild AI, mild TR. Normal RV function.    • History of transesophageal echocardiography (NAFISA) 01/07/2020    EF 55% LA cavity is mild to moderately dilated. Mild MR. Interatrial septum appears redundant. Interatrial hypermobile c/w aneurysm. Large PFO is noted with right to left shunting on bubble study. PFO tunnel appears to be short.    • Hordeolum externum of left lower eyelid     Impression: She was started on Bactrim DS for her infection. She was also advised to use warm compression. She will followup should sxs not improve over the next 48 hrs and resolve over the next 1 weeek.   • Hyperlipidemia     Impression: Diet controled. She should see improvement with her successful wt loss. We discussed her lipid panel.   • Hypertension, benign     Impression: new onset Low salt low calorie diet and regular exercise and followup in 1 mo She will monitor her pressures and notify us of her pressures over the next 1 week.   • Inclusion cyst of right breast     Impression: We will follow for now. She is encouraged to have Mammography. She is presently without insurance and reluctant. She bobby consider. She will notify us of any change at all in the lesion for the only way to be certain of it's etilogy is to remove it. She understands.   • Insomnia, organic    • Menopausal syndrome    • Overweight (BMI 25.0-29.9)    • RUQ abdominal pain     Impression: Resolving, negative GB u/s, HIDA EF 77%, we will follow   • Stented coronary artery 09/29/2019   • Tobacco use     Impression: She is strongly encouraged to stop smoking. Cessation techniques discussed and encouraged. The consequences of not stopping discussed, ie, CAD, PVD, Stroke, Lung and other cancers.       Past Surgical History:  Past Surgical History:   Procedure Laterality Date   • BIVENTRICULAR ASSIST DEVICE/LEFT VENTRICULAR ASSIST DEVICE INSERTION N/A 9/29/2019     Procedure: Left Ventricular Assist Device;  Surgeon: Barnt Martinez MD;  Location: [facility] CATH INVASIVE LOCATION;  Service: Cardiovascular   • CARDIAC CATHETERIZATION N/A 2019    Procedure: Left Heart Cath;  Surgeon: Brant Martinez MD;  Location: [facility] CATH INVASIVE LOCATION;  Service: Cardiovascular   • CARDIAC CATHETERIZATION N/A 2019    Procedure: Coronary angiography;  Surgeon: Brant Martinez MD;  Location: [facility] CATH INVASIVE LOCATION;  Service: Cardiovascular   • CARDIAC CATHETERIZATION N/A 2019    Procedure: Left ventriculography;  Surgeon: Brant Martinez MD;  Location: [facility] CATH INVASIVE LOCATION;  Service: Cardiovascular   • CARDIAC CATHETERIZATION N/A 2019    Procedure: Stent SERENITY coronary;  Surgeon: Brant Martinez MD;  Location: [facility] CATH INVASIVE LOCATION;  Service: Cardiovascular   • CARDIAC ELECTROPHYSIOLOGY PROCEDURE  2019    Procedure: Impella Insertion;  Surgeon: Brant Martinez MD;  Location: [facility] CATH INVASIVE LOCATION;  Service: Cardiovascular   • OK RT/LT HEART CATHETERS N/A 2019    Procedure: Percutaneous Coronary Intervention;  Surgeon: Brant Martinez MD;  Location: [facility] CATH INVASIVE LOCATION;  Service: Cardiovascular   • TOTAL ABDOMINAL HYSTERECTOMY WITH SALPINGO OOPHORECTOMY  1995    Endometriosis       Social History:  Social History     Socioeconomic History   • Marital status:      Spouse name: Not on file   • Number of children: Not on file   • Years of education: Not on file   • Highest education level: Not on file   Tobacco Use   • Smoking status: Former Smoker     Packs/day: 1.00     Years: 34.00     Pack years: 34.00     Types: Cigarettes     Start date:      Last attempt to quit: 2019     Years since quittin.3   • Smokeless tobacco: Never Used   • Tobacco comment: States she is quitting as of 2019.   Substance and Sexual Activity   • Alcohol use: Yes  "    Alcohol/week: 4.0 standard drinks     Types: 4 Cans of beer per week     Frequency: Monthly or less     Drinks per session: 3 or 4     Binge frequency: Less than monthly   • Drug use: Yes     Types: Marijuana     Comment: occasional   • Sexual activity: Defer       Allergies:  Allergies   Allergen Reactions   • Penicillins Rash   • Ciprofloxacin Rash       Immunizations:  Immunization History   Administered Date(s) Administered   • DT 01/01/2004   • FLUARIX/FLUZONE/AFLURIA/FLULAVAL QUAD 10/01/2019   • Tdap 01/01/2011       ROS:  ROS       Objective:         /98 (BP Location: Left arm, Patient Position: Sitting)   Pulse 69   Resp 18   Ht 162.6 cm (64\")   Wt 90.2 kg (198 lb 12.8 oz)   LMP 01/01/1996 (Approximate)   BMI 34.12 kg/m²     Physical Exam  Vitals reviewed  Normocephalic atraumatic pupils equal round extraocular movements intact bilaterally  Trachea midline neck supple no carotid bruits, no significant H JR JVD  Abdomen obese soft nontender nondistended bowel sounds are positive  No clubbing cyanosis or significant edema  Regular rate and rhythm no rubs murmurs or gallops  No acute distress on encounter  Neurologic exam grossly intact bilaterally  Pulses 2+ in all distributions  Extremities warm and dry  Normal mood and affect  No bony abnormalities grossly  In-Office Procedure(s):  Procedures    ASCVD RIsk Score::  The ASCVD Risk score (Marielena SALOMON Jr., et al., 2013) failed to calculate for the following reasons:    The patient has a prior MI or stroke diagnosis    Recent Radiology:  Imaging Results (Most Recent)     None          Lab Review:   Lab on 01/30/2020   Component Date Value   • Glucose 01/30/2020 51*   • BUN 01/30/2020 16    • Creatinine 01/30/2020 0.88    • Sodium 01/30/2020 142    • Potassium 01/30/2020 4.3    • Chloride 01/30/2020 99    • CO2 01/30/2020 29.7*   • Calcium 01/30/2020 10.0    • Total Protein 01/30/2020 7.2    • Albumin 01/30/2020 4.80    • ALT (SGPT) 01/30/2020 37*   • " AST (SGOT) 01/30/2020 27    • Alkaline Phosphatase 01/30/2020 113    • Total Bilirubin 01/30/2020 0.3    • eGFR Non  Amer 01/30/2020 68    • Globulin 01/30/2020 2.4    • A/G Ratio 01/30/2020 2.0    • BUN/Creatinine Ratio 01/30/2020 18.2    • Anion Gap 01/30/2020 13.3    • Protime 01/30/2020 10.7    • INR 01/30/2020 1.02    • WBC 01/30/2020 6.67    • RBC 01/30/2020 4.54    • Hemoglobin 01/30/2020 13.7    • Hematocrit 01/30/2020 41.0    • MCV 01/30/2020 90.3    • MCH 01/30/2020 30.2    • MCHC 01/30/2020 33.4    • RDW 01/30/2020 11.8*   • RDW-SD 01/30/2020 38.8    • MPV 01/30/2020 11.7    • Platelets 01/30/2020 181    • Neutrophil % 01/30/2020 53.2    • Lymphocyte % 01/30/2020 33.3    • Monocyte % 01/30/2020 10.6    • Eosinophil % 01/30/2020 2.1    • Basophil % 01/30/2020 0.4    • Immature Grans % 01/30/2020 0.4    • Neutrophils, Absolute 01/30/2020 3.54    • Lymphocytes, Absolute 01/30/2020 2.22    • Monocytes, Absolute 01/30/2020 0.71    • Eosinophils, Absolute 01/30/2020 0.14    • Basophils, Absolute 01/30/2020 0.03    • Immature Grans, Absolute 01/30/2020 0.03    • nRBC 01/30/2020 0.0    Hospital Outpatient Visit on 01/07/2020   Component Date Value   • WBC 01/07/2020 6.50    • RBC 01/07/2020 4.27    • Hemoglobin 01/07/2020 13.0    • Hematocrit 01/07/2020 38.3    • MCV 01/07/2020 89.7    • MCH 01/07/2020 30.5    • MCHC 01/07/2020 34.0    • RDW 01/07/2020 12.8    • RDW-SD 01/07/2020 41.1    • MPV 01/07/2020 8.7    • Platelets 01/07/2020 169    • Glucose 01/07/2020 103*   • BUN 01/07/2020 16    • Creatinine 01/07/2020 0.90    • Sodium 01/07/2020 141    • Potassium 01/07/2020 4.1    • Chloride 01/07/2020 102    • CO2 01/07/2020 26.0    • Calcium 01/07/2020 8.9    • eGFR Non  Amer 01/07/2020 66    • BUN/Creatinine Ratio 01/07/2020 17.8    • Anion Gap 01/07/2020 13.0    • Echo EF Estimated 01/07/2020 55    Admission on 11/26/2019, Discharged on 11/27/2019   Component Date Value   • Glucose 11/26/2019 130*    • BUN 11/26/2019 16    • Creatinine 11/26/2019 0.96    • Sodium 11/26/2019 139    • Potassium 11/26/2019 3.3*   • Chloride 11/26/2019 96*   • CO2 11/26/2019 27.0    • Calcium 11/26/2019 9.5    • eGFR Non  Amer 11/26/2019 62    • BUN/Creatinine Ratio 11/26/2019 16.7    • Anion Gap 11/26/2019 16.0*   • proBNP 11/26/2019 258.9    • Troponin T 11/26/2019 <0.010    • D-Dimer, Quantitative 11/26/2019 0.56    • WBC 11/26/2019 7.60    • RBC 11/26/2019 3.89    • Hemoglobin 11/26/2019 12.1    • Hematocrit 11/26/2019 35.5    • MCV 11/26/2019 91.1    • MCH 11/26/2019 31.2    • MCHC 11/26/2019 34.3    • RDW 11/26/2019 12.9    • RDW-SD 11/26/2019 41.1    • MPV 11/26/2019 9.4    • Platelets 11/26/2019 184    • Neutrophil % 11/26/2019 62.4    • Lymphocyte % 11/26/2019 26.9    • Monocyte % 11/26/2019 8.6    • Eosinophil % 11/26/2019 1.7    • Basophil % 11/26/2019 0.4    • Neutrophils, Absolute 11/26/2019 4.70    • Lymphocytes, Absolute 11/26/2019 2.00    • Monocytes, Absolute 11/26/2019 0.70    • Eosinophils, Absolute 11/26/2019 0.10    • Basophils, Absolute 11/26/2019 0.00    • nRBC 11/26/2019 0.0    • Protime 11/26/2019 10.8    • INR 11/26/2019 1.03    • Troponin T 11/26/2019 <0.010    • Troponin T 11/27/2019 <0.010    • Potassium 11/26/2019 3.3*   • proBNP 11/27/2019 401.3    • WBC 11/27/2019 8.70    • RBC 11/27/2019 3.88    • Hemoglobin 11/27/2019 12.1    • Hematocrit 11/27/2019 35.5    • MCV 11/27/2019 91.4    • MCH 11/27/2019 31.2    • MCHC 11/27/2019 34.2    • RDW 11/27/2019 13.1    • RDW-SD 11/27/2019 42.0    • MPV 11/27/2019 9.3    • Platelets 11/27/2019 185    • Neutrophil % 11/27/2019 54.1    • Lymphocyte % 11/27/2019 34.1    • Monocyte % 11/27/2019 9.0    • Eosinophil % 11/27/2019 2.4    • Basophil % 11/27/2019 0.4    • Neutrophils, Absolute 11/27/2019 4.70    • Lymphocytes, Absolute 11/27/2019 3.00    • Monocytes, Absolute 11/27/2019 0.80    • Eosinophils, Absolute 11/27/2019 0.20    • Basophils, Absolute  11/27/2019 0.00    • nRBC 11/27/2019 0.0    • Creatine Kinase 11/27/2019 121    • Glucose 11/27/2019 112*   • BUN 11/27/2019 16    • Creatinine 11/27/2019 0.99    • Sodium 11/27/2019 139    • Potassium 11/27/2019 3.5    • Chloride 11/27/2019 97*   • CO2 11/27/2019 30.0*   • Calcium 11/27/2019 9.4    • Total Protein 11/27/2019 7.1    • Albumin 11/27/2019 4.10    • ALT (SGPT) 11/27/2019 22    • AST (SGOT) 11/27/2019 16    • Alkaline Phosphatase 11/27/2019 109    • Total Bilirubin 11/27/2019 0.3    • eGFR Non African Amer 11/27/2019 59*   • Globulin 11/27/2019 3.0    • A/G Ratio 11/27/2019 1.4    • BUN/Creatinine Ratio 11/27/2019 16.2    • Anion Gap 11/27/2019 12.0    • Ferritin 11/27/2019 173.90*   • Hemoglobin A1C 11/27/2019 5.2    • Total Cholesterol 11/27/2019 145    • Triglycerides 11/27/2019 212*   • HDL Cholesterol 11/27/2019 52    • LDL Cholesterol  11/27/2019 51    • VLDL Cholesterol 11/27/2019 42.4    • LDL/HDL Ratio 11/27/2019 0.97    • Magnesium 11/27/2019 1.9    • Phosphorus 11/27/2019 5.2*   • TSH 11/27/2019 2.310    • Troponin T 11/27/2019 <0.010    Hospital Outpatient Visit on 10/31/2019   Component Date Value   • BSA 10/31/2019 1.8    • BH CV ECHO PERICO - RVDD 10/31/2019 1.7    • IVSd 10/31/2019 1.3    • LVIDd 10/31/2019 5.1    • LVIDs 10/31/2019 3.5    • LVPWd 10/31/2019 1.2    • IVS/LVPW 10/31/2019 1.1    • FS 10/31/2019 31.3    • EDV(Teich) 10/31/2019 122.9    • ESV(Teich) 10/31/2019 50.7    • EF(Teich) 10/31/2019 58.8    • EDV(cubed) 10/31/2019 131.4    • ESV(cubed) 10/31/2019 42.7    • EF(cubed) 10/31/2019 67.5    • LV mass(C)d 10/31/2019 247.3    • LV mass(C)dI 10/31/2019 134.5    • SV(Teich) 10/31/2019 72.2    • SI(Teich) 10/31/2019 39.3    • SV(cubed) 10/31/2019 88.7    • SI(cubed) 10/31/2019 48.2    • Ao root diam 10/31/2019 2.9    • Ao root area 10/31/2019 6.8    • ACS 10/31/2019 1.9    • LA dimension 10/31/2019 3.1    • LA/Ao 10/31/2019 1.0    • LVOT diam 10/31/2019 2.2    • LVOT area  10/31/2019 3.8    • EDV(MOD-sp4) 10/31/2019 111.9    • ESV(MOD-sp4) 10/31/2019 47.0    • EF(MOD-sp4) 10/31/2019 58.0    • SV(MOD-sp4) 10/31/2019 64.9    • SI(MOD-sp4) 10/31/2019 35.3    • Ao root area (BSA correc* 10/31/2019 1.6    • LV Amin Vol (BSA correct* 10/31/2019 60.9    • LV Sys Vol (BSA correcte* 10/31/2019 25.6    • MV E max bernie 10/31/2019 55.2    • MV A max bernie 10/31/2019 70.5    • MV E/A 10/31/2019 0.78    • MV V2 max 10/31/2019 76.4    • MV max PG 10/31/2019 2.3    • MV V2 mean 10/31/2019 36.5    • MV mean PG 10/31/2019 0.64    • MV V2 VTI 10/31/2019 25.2    • MVA(VTI) 10/31/2019 3.5    • Ao pk bernie 10/31/2019 147.0    • Ao max PG 10/31/2019 8.6    • Ao max PG (full) 10/31/2019 3.5    • Ao V2 mean 10/31/2019 95.1    • Ao mean PG 10/31/2019 4.2    • Ao mean PG (full) 10/31/2019 2.1    • Ao V2 VTI 10/31/2019 28.2    • HERNANDEZ(I,A) 10/31/2019 3.2    • HERNANDEZ(I,D) 10/31/2019 3.2    • HERNANDEZ(V,A) 10/31/2019 2.9    • HERNANDEZ(V,D) 10/31/2019 2.9    • AI max bernie 10/31/2019 482.5    • AI max PG 10/31/2019 93.2    • AI dec slope 10/31/2019 163.8    • AI dec time 10/31/2019 3.0    • AI P1/2t 10/31/2019 862.9    • LV V1 max PG 10/31/2019 5.1    • LV V1 mean PG 10/31/2019 2.1    • LV V1 max 10/31/2019 113.1    • LV V1 mean 10/31/2019 65.8    • LV V1 VTI 10/31/2019 23.7    • MR max bernie 10/31/2019 379.0    • MR max PG 10/31/2019 57.5    • SV(Ao) 10/31/2019 192.3    • SI(Ao) 10/31/2019 104.6    • SV(LVOT) 10/31/2019 89.0    • SI(LVOT) 10/31/2019 48.4    • PA V2 max 10/31/2019 89.3    • PA max PG 10/31/2019 3.2    • PA acc time 10/31/2019 0.11    • PI end-d bernie 10/31/2019 104.9    • TR max bernie 10/31/2019 309.8    • RVSP(TR) 10/31/2019 55.1    • RAP systole 10/31/2019 10.0    • PA pr(Accel) 10/31/2019 27.6    • BH CV ECHO PERICO - BZI_BMI 10/31/2019 29.7    • BH CV ECHO PERICO - BSA(HA* 10/31/2019 1.9    • BH CV ECHO PERICO - BZI_ME* 10/31/2019 78.5    • BH CV ECHO PERICO - BZI_ME* 10/31/2019 162.6    • Target HR (85%) 10/31/2019 145    • Max.  Pred. HR (100%) 10/31/2019 170    • EF(MOD-bp) 10/31/2019 55.0    • RVDd(2D) 10/31/2019 1.7    No results displayed because visit has over 200 results.      Office Visit on 09/16/2019   Component Date Value   • Color 09/16/2019 Za    • Clarity, UA 09/16/2019 Clear    • Glucose, UA 09/16/2019 Negative    • Bilirubin 09/16/2019 Negative    • Ketones, UA 09/16/2019 Negative    • Specific Gravity  09/16/2019 1.015    • Blood, UA 09/16/2019 Negative    • pH, Urine 09/16/2019 5.0    • Protein, POC 09/16/2019 Negative    • Urobilinogen, UA 09/16/2019 Normal    • Leukocytes 09/16/2019 Negative    • Nitrite, UA 09/16/2019 Negative    • Urine Culture 09/16/2019 Final report    • Result 1 09/16/2019 No growth    • WBC 09/16/2019 9.6    • RBC 09/16/2019 4.65    • Hemoglobin 09/16/2019 14.8    • Hematocrit 09/16/2019 41.9    • MCV 09/16/2019 90    • MCH 09/16/2019 31.8    • MCHC 09/16/2019 35.3    • RDW 09/16/2019 13.0    • Platelets 09/16/2019 225    • Neutrophil Rel % 09/16/2019 64    • Lymphocyte Rel % 09/16/2019 26    • Monocyte Rel % 09/16/2019 7    • Eosinophil Rel % 09/16/2019 2    • Basophil Rel % 09/16/2019 0    • Neutrophils Absolute 09/16/2019 6.1    • Lymphocytes Absolute 09/16/2019 2.5    • Monocytes Absolute 09/16/2019 0.7    • Eosinophils Absolute 09/16/2019 0.2    • Basophils Absolute 09/16/2019 0.0    • Immature Granulocyte Rel* 09/16/2019 1    • Immature Grans Absolute 09/16/2019 0.1    • Total Cholesterol 09/16/2019 231*   • Triglycerides 09/16/2019 117    • HDL Cholesterol 09/16/2019 56    • VLDL Cholesterol 09/16/2019 23    • LDL Cholesterol  09/16/2019 152*   • Chol/HDL Ratio 09/16/2019 4.1    • TSH 09/16/2019 2.070    • Written Authorization 09/16/2019 Comment    • Glucose 09/16/2019 147*   • BUN 09/16/2019 20    • Creatinine 09/16/2019 1.09*   • eGFR Non African Am 09/16/2019 60    • eGFR  Am 09/16/2019 69    • BUN/Creatinine Ratio 09/16/2019 18    • Sodium 09/16/2019 131*   • Potassium  09/16/2019 4.0    • Chloride 09/16/2019 87*   • Total CO2 09/16/2019 19*   • Calcium 09/16/2019 9.5    • Total Protein 09/16/2019 7.1    • Albumin 09/16/2019 4.5    • Globulin 09/16/2019 2.6    • A/G Ratio 09/16/2019 1.7    • Total Bilirubin 09/16/2019 CANCELED    • Alkaline Phosphatase 09/16/2019 96    • AST (SGOT) 09/16/2019 15    • ALT (SGPT) 09/16/2019 12    • Written Authorization 09/16/2019 Comment    Abstract on 09/03/2019   Component Date Value   • HM Mammogram 11/23/2007 see report    • [facility] Dexa Scan 09/30/2005 see report                 Assessment:          Diagnosis Plan   1. Shortness of breath  Case Request Cath Lab: Right Heart Cath   2. Pre-op testing  CBC & Differential    Comprehensive Metabolic Panel    Protime-INR   3. STEMI involving left anterior descending coronary artery (CMS/HCC)     4. Mixed hyperlipidemia     5. Hypertension, benign     6. Ischemic cardiomyopathy     7. Stented coronary artery     8. ASD (atrial septal defect)            Plan:      1. LAD STEMI/CAD  - s/p PCI to the proximal / ostial to mid LAD with Xience 3.5 x 33 mm drug-eluting stent postdilated to 4.5 mm at 18 familia with good angiographic results.  - residual borderline lesions in the distal LAD which will be staged for FFR in future if she has chest pain. Nonobstructive disease in the circumflex and RCA  -Dual platelet therapy aspirin and Effient uninterrupted 1 year, consider longer-term Plavix therapy with benefits up to 30 months per DAPT trial with low bleeding risk and residual lesions possible further unstable plaque.  -High intensity statin therapy per guidelines   - on spironolactone, continue 25 daily  -Continue metop XL 25 daily   -Continue Lasix 40 bid  Continue lisinopril to 20 daily  Continue atorvastatin to 80 daily      Dyspnea on exertion  Cannot rule out significant left to right shunt  Congenital right heart catheterization will be performed to establish on fraction and significant interatrial shunt  volume and assess indications for closure if more consistent physiologically with ASD    We will consider noninvasive ischemic evaluation with Lexiscan stress given nonobstructive residual lesions and dyspnea on exertion, low likelihood of in-stent restenosis given diameter of large caliber stent.     2. acute systolic HF   - improved, impella removed 9/29  -Estimated EF  greater than 50% with trace to mild anterior wall hypokinesis regionally, continue max medical therapy  Metoprolol as above   ACE inhibitor as above   statin as above     4. H/o Essential hypertension, continue lisinopril, diuresis, recheck at home which is been less than 135 systolic.    4. Hyperlipidemia  -High intensity statin therapy on board     5. Elevated LFTs  -Resolved     6.  Anxiety  per primary care     7. Hypokalemia / Hypomagnesium  Stable    Schedule congenital right heart cath for evaluation of shunt fraction of interatrial shunt.    Return to clinic and further recommendations to follow this assessment    Return to clinic in 3 months    Brant Martinez MD, PhD     greater than 25 minutes spent face-to-face with the patient greater 50% time discussing congenital right heart cath, dyspnea on exertion work-up, therapeutic options, plan of care, medications and questions answered by the patient.           Brant Martinez MD  02/20/20  .

## 2020-02-21 ENCOUNTER — APPOINTMENT (OUTPATIENT)
Dept: LAB | Facility: HOSPITAL | Age: 50
End: 2020-02-21

## 2020-02-21 LAB
ALBUMIN SERPL-MCNC: 4.7 G/DL (ref 3.5–5.2)
ALBUMIN/GLOB SERPL: 1.6 G/DL
ALP SERPL-CCNC: 133 U/L (ref 39–117)
ALT SERPL W P-5'-P-CCNC: 31 U/L (ref 1–33)
ANION GAP SERPL CALCULATED.3IONS-SCNC: 14.2 MMOL/L (ref 5–15)
AST SERPL-CCNC: 29 U/L (ref 1–32)
BASOPHILS # BLD AUTO: 0.05 10*3/MM3 (ref 0–0.2)
BASOPHILS NFR BLD AUTO: 0.6 % (ref 0–1.5)
BILIRUB SERPL-MCNC: 0.3 MG/DL (ref 0.2–1.2)
BUN BLD-MCNC: 23 MG/DL (ref 6–20)
BUN/CREAT SERPL: 20.7 (ref 7–25)
CALCIUM SPEC-SCNC: 9.3 MG/DL (ref 8.6–10.5)
CHLORIDE SERPL-SCNC: 98 MMOL/L (ref 98–107)
CO2 SERPL-SCNC: 26.8 MMOL/L (ref 22–29)
CREAT BLD-MCNC: 1.11 MG/DL (ref 0.57–1)
DEPRECATED RDW RBC AUTO: 39.5 FL (ref 37–54)
EOSINOPHIL # BLD AUTO: 0.13 10*3/MM3 (ref 0–0.4)
EOSINOPHIL NFR BLD AUTO: 1.6 % (ref 0.3–6.2)
ERYTHROCYTE [DISTWIDTH] IN BLOOD BY AUTOMATED COUNT: 12 % (ref 12.3–15.4)
GFR SERPL CREATININE-BSD FRML MDRD: 52 ML/MIN/1.73
GLOBULIN UR ELPH-MCNC: 2.9 GM/DL
GLUCOSE BLD-MCNC: 102 MG/DL (ref 65–99)
HCT VFR BLD AUTO: 41.1 % (ref 34–46.6)
HGB BLD-MCNC: 14.1 G/DL (ref 12–15.9)
IMM GRANULOCYTES # BLD AUTO: 0.07 10*3/MM3 (ref 0–0.05)
IMM GRANULOCYTES NFR BLD AUTO: 0.9 % (ref 0–0.5)
INR PPP: 0.98 (ref 0.9–1.1)
LYMPHOCYTES # BLD AUTO: 2.14 10*3/MM3 (ref 0.7–3.1)
LYMPHOCYTES NFR BLD AUTO: 27.2 % (ref 19.6–45.3)
MCH RBC QN AUTO: 31.1 PG (ref 26.6–33)
MCHC RBC AUTO-ENTMCNC: 34.3 G/DL (ref 31.5–35.7)
MCV RBC AUTO: 90.7 FL (ref 79–97)
MONOCYTES # BLD AUTO: 0.62 10*3/MM3 (ref 0.1–0.9)
MONOCYTES NFR BLD AUTO: 7.9 % (ref 5–12)
NEUTROPHILS # BLD AUTO: 4.87 10*3/MM3 (ref 1.7–7)
NEUTROPHILS NFR BLD AUTO: 61.8 % (ref 42.7–76)
NRBC BLD AUTO-RTO: 0 /100 WBC (ref 0–0.2)
PLATELET # BLD AUTO: 180 10*3/MM3 (ref 140–450)
PMV BLD AUTO: 11.9 FL (ref 6–12)
POTASSIUM BLD-SCNC: 4.1 MMOL/L (ref 3.5–5.2)
PROT SERPL-MCNC: 7.6 G/DL (ref 6–8.5)
PROTHROMBIN TIME: 10.3 SECONDS (ref 9.6–11.7)
RBC # BLD AUTO: 4.53 10*6/MM3 (ref 3.77–5.28)
SODIUM BLD-SCNC: 139 MMOL/L (ref 136–145)
WBC NRBC COR # BLD: 7.88 10*3/MM3 (ref 3.4–10.8)

## 2020-02-21 PROCEDURE — 36415 COLL VENOUS BLD VENIPUNCTURE: CPT | Performed by: INTERNAL MEDICINE

## 2020-02-21 PROCEDURE — 85025 COMPLETE CBC W/AUTO DIFF WBC: CPT | Performed by: INTERNAL MEDICINE

## 2020-02-21 PROCEDURE — 80053 COMPREHEN METABOLIC PANEL: CPT | Performed by: INTERNAL MEDICINE

## 2020-02-21 PROCEDURE — 85610 PROTHROMBIN TIME: CPT | Performed by: INTERNAL MEDICINE

## 2020-02-24 RX ORDER — CHOLECALCIFEROL (VITAMIN D3) 125 MCG
10 CAPSULE ORAL NIGHTLY PRN
COMMUNITY
End: 2020-09-02

## 2020-02-25 ENCOUNTER — HOSPITAL ENCOUNTER (OUTPATIENT)
Facility: HOSPITAL | Age: 50
Setting detail: HOSPITAL OUTPATIENT SURGERY
Discharge: HOME | End: 2020-02-25
Attending: INTERNAL MEDICINE | Admitting: INTERNAL MEDICINE

## 2020-02-25 VITALS
WEIGHT: 195.11 LBS | OXYGEN SATURATION: 95 % | SYSTOLIC BLOOD PRESSURE: 135 MMHG | BODY MASS INDEX: 32.51 KG/M2 | DIASTOLIC BLOOD PRESSURE: 64 MMHG | HEART RATE: 66 BPM | HEIGHT: 65 IN | TEMPERATURE: 97.8 F | RESPIRATION RATE: 16 BRPM

## 2020-02-25 DIAGNOSIS — I10 HYPERTENSION, BENIGN: Chronic | ICD-10-CM

## 2020-02-25 DIAGNOSIS — R06.02 SHORTNESS OF BREATH: ICD-10-CM

## 2020-02-25 LAB
ATMOSPHERIC PRESS: ABNORMAL MM[HG]
ATMOSPHERIC PRESS: NORMAL MM[HG]
BASE EXCESS BLDV CALC-SCNC: 0.2 MMOL/L
BASE EXCESS BLDV CALC-SCNC: 0.7 MMOL/L
BASE EXCESS BLDV CALC-SCNC: 0.8 MMOL/L
BASE EXCESS BLDV CALC-SCNC: 2.1 MMOL/L
BASE EXCESS BLDV CALC-SCNC: 2.2 MMOL/L
BDY SITE: ABNORMAL
BDY SITE: NORMAL
CO2 BLDA-SCNC: 26.9 MMOL/L (ref 22–29)
CO2 BLDA-SCNC: 27.4 MMOL/L (ref 22–29)
CO2 BLDA-SCNC: 27.6 MMOL/L (ref 22–29)
CO2 BLDA-SCNC: 29 MMOL/L (ref 22–29)
CO2 BLDA-SCNC: 29 MMOL/L (ref 22–29)
HCO3 BLDV-SCNC: 25.5 MMOL/L
HCO3 BLDV-SCNC: 26 MMOL/L
HCO3 BLDV-SCNC: 26.3 MMOL/L
HCO3 BLDV-SCNC: 27.6 MMOL/L
HCO3 BLDV-SCNC: 27.6 MMOL/L
HOROWITZ INDEX BLD+IHG-RTO: 21 %
MODALITY: ABNORMAL
MODALITY: NORMAL
PCO2 BLDV: 43.1 MM HG (ref 42–51)
PCO2 BLDV: 43.7 MM HG (ref 42–51)
PCO2 BLDV: 44.1 MM HG (ref 42–51)
PCO2 BLDV: 45.3 MM HG (ref 42–51)
PCO2 BLDV: 45.7 MM HG (ref 42–51)
PH BLDV: 7.38 PH UNITS (ref 7.32–7.43)
PH BLDV: 7.39 PH UNITS (ref 7.32–7.43)
PH BLDV: 7.39 PH UNITS (ref 7.32–7.43)
PO2 BLDV: 36.7 MM HG
PO2 BLDV: 37.7 MM HG
PO2 BLDV: 38 MM HG
PO2 BLDV: 38 MM HG
PO2 BLDV: 40.8 MM HG
SAO2 % BLDCOV: 69 %
SAO2 % BLDCOV: 70.4 %
SAO2 % BLDCOV: 70.8 %
SAO2 % BLDCOV: 71.3 %
SAO2 % BLDCOV: 74.8 %

## 2020-02-25 PROCEDURE — C1760 CLOSURE DEV, VASC: HCPCS | Performed by: INTERNAL MEDICINE

## 2020-02-25 PROCEDURE — C1769 GUIDE WIRE: HCPCS | Performed by: INTERNAL MEDICINE

## 2020-02-25 PROCEDURE — 25010000002 MIDAZOLAM PER 1 MG: Performed by: INTERNAL MEDICINE

## 2020-02-25 PROCEDURE — 25010000002 FENTANYL CITRATE (PF) 100 MCG/2ML SOLUTION: Performed by: INTERNAL MEDICINE

## 2020-02-25 PROCEDURE — 93451 RIGHT HEART CATH: CPT | Performed by: INTERNAL MEDICINE

## 2020-02-25 PROCEDURE — 82803 BLOOD GASES ANY COMBINATION: CPT

## 2020-02-25 PROCEDURE — 99153 MOD SED SAME PHYS/QHP EA: CPT | Performed by: INTERNAL MEDICINE

## 2020-02-25 PROCEDURE — C1725 CATH, TRANSLUMIN NON-LASER: HCPCS | Performed by: INTERNAL MEDICINE

## 2020-02-25 PROCEDURE — C1894 INTRO/SHEATH, NON-LASER: HCPCS | Performed by: INTERNAL MEDICINE

## 2020-02-25 PROCEDURE — 99152 MOD SED SAME PHYS/QHP 5/>YRS: CPT | Performed by: INTERNAL MEDICINE

## 2020-02-25 RX ORDER — FUROSEMIDE 40 MG/1
40 TABLET ORAL DAILY
Qty: 60 TABLET | Refills: 12 | Status: SHIPPED | OUTPATIENT
Start: 2020-02-25 | End: 2020-11-02

## 2020-02-25 RX ORDER — LIDOCAINE HYDROCHLORIDE 20 MG/ML
INJECTION, SOLUTION INFILTRATION; PERINEURAL AS NEEDED
Status: DISCONTINUED | OUTPATIENT
Start: 2020-02-25 | End: 2020-02-25 | Stop reason: HOSPADM

## 2020-02-25 RX ORDER — SODIUM CHLORIDE 9 MG/ML
30 INJECTION, SOLUTION INTRAVENOUS CONTINUOUS
Status: DISCONTINUED | OUTPATIENT
Start: 2020-02-25 | End: 2020-02-25 | Stop reason: HOSPADM

## 2020-02-25 RX ORDER — MIDAZOLAM HYDROCHLORIDE 1 MG/ML
INJECTION INTRAMUSCULAR; INTRAVENOUS AS NEEDED
Status: DISCONTINUED | OUTPATIENT
Start: 2020-02-25 | End: 2020-02-25 | Stop reason: HOSPADM

## 2020-02-25 RX ORDER — FENTANYL CITRATE 50 UG/ML
INJECTION, SOLUTION INTRAMUSCULAR; INTRAVENOUS AS NEEDED
Status: DISCONTINUED | OUTPATIENT
Start: 2020-02-25 | End: 2020-02-25 | Stop reason: HOSPADM

## 2020-02-25 RX ADMIN — SODIUM CHLORIDE 30 ML/HR: 900 INJECTION, SOLUTION INTRAVENOUS at 08:00

## 2020-02-25 NOTE — DISCHARGE INSTRUCTIONS
Post Cath Instructions    Prescriptions given?  ***   Admission Meds Returned? ***  Medication Sheets given? ***   Smoking Cessation Info Given ***  Education Booklet *** given? ***    Call  ***’s office to schedule a follow up appointment in *** days/weeks at ***.  Specific Physician Instructions: ***    1) Drink plenty of fluids for the next 24 hours.  This helps to eliminate the dye used in your procedure through urination.  You may resume a normal diet; however, try to avoid foods that would cause gas or constipation.    2) Sedative medication given to you during your catheterization may decrease your judgement and reaction time for up to 24-48 hours.  Therefore:  a. DO NOT drive or operate hazardous machinery (48 hours)  b. DO NOT consume alcoholic beverages  c. DO NOT make any important/legal decisions  d. Have someone stay with you for at least 24 hours    3) To allow proper healing and prevent bleeding, the following activities are to be strictly avoided for the next 24-48 hours:  a. Excessive bending at wound site  b. Straining (anything that would tense up muscles around the affected puncture site)  c. Lifting objects greater than 5 pounds, pushing, or pulling for 5 days  i. For Arm Cases:  1. No flexing at the puncture site, such as hammering, golfing, bowling, or swinging any objects  ii. For Groin Cases:  1. Refrain from sexual activity  2. Refrain from running or vigorous walking  3. No prolonged sitting or standing  4. Limit stair climbing as much as possible    4) Keep the puncture site clean and dry.  You may remove the dressing tomorrow and replace it with a band-aid for at least one additional day.  Gently clean the site with mild soap and water.  No scrubbing/rubbing and lightly pat the area dry.  Showers are acceptable; however, avoid submerging in water (tub baths, hot tubs, swimming pools, dishwater, etc…) for at least one week.  The site should be completely healed before resuming these  activities to reduce the risk of infection.  Check the site often.  Watch for signs and symptoms of infection and notify your physician if any of the following occur:  a. Bleeding or an increase in swelling at the puncture site  b. Fever  c. Increased soreness around puncture site  d. Foul odor or significant drainage from the puncture site  e. Swelling, redness, or warmth at the puncture site    **A bruise or small “pea sized” lump under the skin at the puncture site is not unusual.  This should disappear within 3-4 weeks.**  5) CONTACT YOUR PHYSICIAN OR CALL 911 IF YOU EXPERIENCE ANY OF THE FOLLOWING:  a. Increased angina (chest pain) or frequent sensations of pressure, burning, pain, or other discomfort in the chest, arm, jaws, or stomach  b. Lightheadedness, dizziness, faint feeling, sweating, or difficulty breathing  c. Odd sensation changes like numbness, tingling, coldness, or pain in the arm or leg in which the catheter was inserted  d. Limb in which the catheter was inserted becomes pale/bluish in color    IMPORTANT:  Although this occurs very rarely, if you should develop bright red or excessive bleeding, feel a “pop” inside at the insertion site, or notice a sudden increase in swelling larger than a walnut, you should call 911.  Hold continuous firm pressure to the access site until emergency personnel arrive.  It is best if someone else can do this for you.

## 2020-03-11 ENCOUNTER — TELEPHONE (OUTPATIENT)
Dept: FAMILY MEDICINE CLINIC | Facility: CLINIC | Age: 50
End: 2020-03-11

## 2020-03-23 ENCOUNTER — TELEPHONE (OUTPATIENT)
Dept: FAMILY MEDICINE CLINIC | Facility: CLINIC | Age: 50
End: 2020-03-23

## 2020-03-23 DIAGNOSIS — R35.0 URINARY FREQUENCY: Primary | ICD-10-CM

## 2020-03-23 RX ORDER — SULFAMETHOXAZOLE AND TRIMETHOPRIM 800; 160 MG/1; MG/1
1 TABLET ORAL 2 TIMES DAILY
Qty: 20 TABLET | Refills: 0 | Status: ON HOLD | OUTPATIENT
Start: 2020-03-23 | End: 2020-04-28

## 2020-03-23 NOTE — TELEPHONE ENCOUNTER
Patient called requesting Bactrim for a UTI, please send to [facility] and contact her at . Thanks Teresa

## 2020-03-23 NOTE — TELEPHONE ENCOUNTER
Spoke with Sada she has had urinary frequency and burning  X 24 hors , no fever, does not want to come in due to heart condition requested medication.        Per Dr Johnston  Bactrim was sent if symptoms do not improve or become worse to call.

## 2020-04-02 DIAGNOSIS — I10 ESSENTIAL HYPERTENSION: ICD-10-CM

## 2020-04-02 RX ORDER — METOPROLOL SUCCINATE 25 MG/1
TABLET, EXTENDED RELEASE ORAL
Qty: 90 TABLET | Refills: 1 | Status: SHIPPED | OUTPATIENT
Start: 2020-04-02 | End: 2020-09-24

## 2020-04-28 ENCOUNTER — HOSPITAL ENCOUNTER (OUTPATIENT)
Facility: HOSPITAL | Age: 50
Setting detail: OBSERVATION
Discharge: HOME | End: 2020-04-29
Attending: EMERGENCY MEDICINE | Admitting: INTERNAL MEDICINE

## 2020-04-28 ENCOUNTER — APPOINTMENT (OUTPATIENT)
Dept: GENERAL RADIOLOGY | Facility: HOSPITAL | Age: 50
End: 2020-04-28

## 2020-04-28 DIAGNOSIS — Z86.79 HISTORY OF CORONARY ARTERY DISEASE: ICD-10-CM

## 2020-04-28 DIAGNOSIS — R79.89 ELEVATED SERUM CREATININE: ICD-10-CM

## 2020-04-28 DIAGNOSIS — I25.9 CHEST PAIN DUE TO MYOCARDIAL ISCHEMIA, UNSPECIFIED ISCHEMIC CHEST PAIN TYPE: Primary | ICD-10-CM

## 2020-04-28 LAB
ANION GAP SERPL CALCULATED.3IONS-SCNC: 14 MMOL/L (ref 5–15)
BASOPHILS # BLD AUTO: 0 10*3/MM3 (ref 0–0.2)
BASOPHILS NFR BLD AUTO: 0.4 % (ref 0–1.5)
BUN BLD-MCNC: 24 MG/DL (ref 6–20)
BUN/CREAT SERPL: 22 (ref 7–25)
CALCIUM SPEC-SCNC: 9.5 MG/DL (ref 8.6–10.5)
CHLORIDE SERPL-SCNC: 99 MMOL/L (ref 98–107)
CO2 SERPL-SCNC: 24 MMOL/L (ref 22–29)
CREAT BLD-MCNC: 1.09 MG/DL (ref 0.57–1)
DEPRECATED RDW RBC AUTO: 43.3 FL (ref 37–54)
EOSINOPHIL # BLD AUTO: 0.1 10*3/MM3 (ref 0–0.4)
EOSINOPHIL NFR BLD AUTO: 1.2 % (ref 0.3–6.2)
ERYTHROCYTE [DISTWIDTH] IN BLOOD BY AUTOMATED COUNT: 13.8 % (ref 12.3–15.4)
GFR SERPL CREATININE-BSD FRML MDRD: 53 ML/MIN/1.73
GLUCOSE BLD-MCNC: 117 MG/DL (ref 65–99)
HCT VFR BLD AUTO: 39.4 % (ref 34–46.6)
HGB BLD-MCNC: 13.8 G/DL (ref 12–15.9)
LYMPHOCYTES # BLD AUTO: 2.3 10*3/MM3 (ref 0.7–3.1)
LYMPHOCYTES NFR BLD AUTO: 25.3 % (ref 19.6–45.3)
MCH RBC QN AUTO: 31.7 PG (ref 26.6–33)
MCHC RBC AUTO-ENTMCNC: 35.1 G/DL (ref 31.5–35.7)
MCV RBC AUTO: 90.1 FL (ref 79–97)
MONOCYTES # BLD AUTO: 0.6 10*3/MM3 (ref 0.1–0.9)
MONOCYTES NFR BLD AUTO: 7.2 % (ref 5–12)
NEUTROPHILS # BLD AUTO: 6 10*3/MM3 (ref 1.7–7)
NEUTROPHILS NFR BLD AUTO: 65.9 % (ref 42.7–76)
NRBC BLD AUTO-RTO: 0.1 /100 WBC (ref 0–0.2)
PLATELET # BLD AUTO: 185 10*3/MM3 (ref 140–450)
PMV BLD AUTO: 9.6 FL (ref 6–12)
POTASSIUM BLD-SCNC: 3.9 MMOL/L (ref 3.5–5.2)
RBC # BLD AUTO: 4.37 10*6/MM3 (ref 3.77–5.28)
SODIUM BLD-SCNC: 137 MMOL/L (ref 136–145)
TROPONIN T SERPL-MCNC: <0.01 NG/ML (ref 0–0.03)
WBC NRBC COR # BLD: 9 10*3/MM3 (ref 3.4–10.8)

## 2020-04-28 PROCEDURE — 71045 X-RAY EXAM CHEST 1 VIEW: CPT

## 2020-04-28 PROCEDURE — G0378 HOSPITAL OBSERVATION PER HR: HCPCS

## 2020-04-28 PROCEDURE — 99219 PR INITIAL OBSERVATION CARE/DAY 50 MINUTES: CPT | Performed by: NURSE PRACTITIONER

## 2020-04-28 PROCEDURE — 84484 ASSAY OF TROPONIN QUANT: CPT | Performed by: EMERGENCY MEDICINE

## 2020-04-28 PROCEDURE — 80048 BASIC METABOLIC PNL TOTAL CA: CPT | Performed by: EMERGENCY MEDICINE

## 2020-04-28 PROCEDURE — 85025 COMPLETE CBC W/AUTO DIFF WBC: CPT | Performed by: EMERGENCY MEDICINE

## 2020-04-28 PROCEDURE — 93005 ELECTROCARDIOGRAM TRACING: CPT | Performed by: EMERGENCY MEDICINE

## 2020-04-28 PROCEDURE — 99284 EMERGENCY DEPT VISIT MOD MDM: CPT

## 2020-04-28 RX ORDER — LISINOPRIL 5 MG/1
5 TABLET ORAL 2 TIMES DAILY
Status: DISCONTINUED | OUTPATIENT
Start: 2020-04-29 | End: 2020-04-29 | Stop reason: HOSPADM

## 2020-04-28 RX ORDER — BUPROPION HYDROCHLORIDE 150 MG/1
150 TABLET ORAL DAILY
Status: DISCONTINUED | OUTPATIENT
Start: 2020-04-29 | End: 2020-04-29 | Stop reason: HOSPADM

## 2020-04-28 RX ORDER — POLYETHYLENE GLYCOL 3350 17 G/17G
17 POWDER, FOR SOLUTION ORAL DAILY PRN
Status: DISCONTINUED | OUTPATIENT
Start: 2020-04-28 | End: 2020-04-29 | Stop reason: HOSPADM

## 2020-04-28 RX ORDER — FUROSEMIDE 40 MG/1
40 TABLET ORAL DAILY
Status: DISCONTINUED | OUTPATIENT
Start: 2020-04-29 | End: 2020-04-29 | Stop reason: HOSPADM

## 2020-04-28 RX ORDER — ACETAMINOPHEN 325 MG/1
650 TABLET ORAL EVERY 4 HOURS PRN
Status: DISCONTINUED | OUTPATIENT
Start: 2020-04-28 | End: 2020-04-29 | Stop reason: HOSPADM

## 2020-04-28 RX ORDER — PRASUGREL 10 MG/1
10 TABLET, FILM COATED ORAL DAILY
Status: DISCONTINUED | OUTPATIENT
Start: 2020-04-29 | End: 2020-04-29 | Stop reason: HOSPADM

## 2020-04-28 RX ORDER — ACETAMINOPHEN 650 MG/1
650 SUPPOSITORY RECTAL EVERY 4 HOURS PRN
Status: DISCONTINUED | OUTPATIENT
Start: 2020-04-28 | End: 2020-04-29 | Stop reason: HOSPADM

## 2020-04-28 RX ORDER — ASPIRIN 81 MG/1
81 TABLET, CHEWABLE ORAL DAILY
Status: DISCONTINUED | OUTPATIENT
Start: 2020-04-29 | End: 2020-04-29 | Stop reason: HOSPADM

## 2020-04-28 RX ORDER — NITROGLYCERIN 0.4 MG/1
0.4 TABLET SUBLINGUAL
Status: DISCONTINUED | OUTPATIENT
Start: 2020-04-28 | End: 2020-04-29 | Stop reason: HOSPADM

## 2020-04-28 RX ORDER — SODIUM CHLORIDE 0.9 % (FLUSH) 0.9 %
10 SYRINGE (ML) INJECTION AS NEEDED
Status: DISCONTINUED | OUTPATIENT
Start: 2020-04-28 | End: 2020-04-29 | Stop reason: HOSPADM

## 2020-04-28 RX ORDER — ACETAMINOPHEN 160 MG/5ML
650 SOLUTION ORAL EVERY 4 HOURS PRN
Status: DISCONTINUED | OUTPATIENT
Start: 2020-04-28 | End: 2020-04-29 | Stop reason: HOSPADM

## 2020-04-28 RX ORDER — ATORVASTATIN CALCIUM 40 MG/1
80 TABLET, FILM COATED ORAL DAILY
Status: DISCONTINUED | OUTPATIENT
Start: 2020-04-29 | End: 2020-04-29 | Stop reason: HOSPADM

## 2020-04-28 RX ORDER — ONDANSETRON 2 MG/ML
4 INJECTION INTRAMUSCULAR; INTRAVENOUS EVERY 6 HOURS PRN
Status: DISCONTINUED | OUTPATIENT
Start: 2020-04-28 | End: 2020-04-29 | Stop reason: HOSPADM

## 2020-04-28 RX ORDER — ONDANSETRON 4 MG/1
4 TABLET, FILM COATED ORAL EVERY 6 HOURS PRN
Status: DISCONTINUED | OUTPATIENT
Start: 2020-04-28 | End: 2020-04-29 | Stop reason: HOSPADM

## 2020-04-28 RX ORDER — SODIUM CHLORIDE 0.9 % (FLUSH) 0.9 %
10 SYRINGE (ML) INJECTION EVERY 12 HOURS SCHEDULED
Status: DISCONTINUED | OUTPATIENT
Start: 2020-04-28 | End: 2020-04-29 | Stop reason: HOSPADM

## 2020-04-28 RX ORDER — CHOLECALCIFEROL (VITAMIN D3) 125 MCG
10 CAPSULE ORAL NIGHTLY PRN
Status: DISCONTINUED | OUTPATIENT
Start: 2020-04-28 | End: 2020-04-29 | Stop reason: HOSPADM

## 2020-04-28 RX ORDER — METOPROLOL SUCCINATE 25 MG/1
25 TABLET, EXTENDED RELEASE ORAL DAILY
Status: DISCONTINUED | OUTPATIENT
Start: 2020-04-29 | End: 2020-04-29 | Stop reason: HOSPADM

## 2020-04-28 RX ORDER — SPIRONOLACTONE 25 MG/1
25 TABLET ORAL DAILY
Status: DISCONTINUED | OUTPATIENT
Start: 2020-04-29 | End: 2020-04-29 | Stop reason: HOSPADM

## 2020-04-28 RX ADMIN — Medication 10 ML: at 22:05

## 2020-04-29 ENCOUNTER — READMISSION MANAGEMENT (OUTPATIENT)
Dept: CALL CENTER | Facility: HOSPITAL | Age: 50
End: 2020-04-29

## 2020-04-29 ENCOUNTER — APPOINTMENT (OUTPATIENT)
Dept: NUCLEAR MEDICINE | Facility: HOSPITAL | Age: 50
End: 2020-04-29

## 2020-04-29 ENCOUNTER — APPOINTMENT (OUTPATIENT)
Dept: CARDIOLOGY | Facility: HOSPITAL | Age: 50
End: 2020-04-29

## 2020-04-29 VITALS
SYSTOLIC BLOOD PRESSURE: 111 MMHG | OXYGEN SATURATION: 98 % | WEIGHT: 182 LBS | BODY MASS INDEX: 30.32 KG/M2 | HEART RATE: 64 BPM | DIASTOLIC BLOOD PRESSURE: 75 MMHG | TEMPERATURE: 97.4 F | HEIGHT: 65 IN | RESPIRATION RATE: 16 BRPM

## 2020-04-29 LAB
BH CV ECHO MEAS - AI DEC SLOPE: 201.1 CM/SEC^2
BH CV ECHO MEAS - AI DEC TIME: 2.3 SEC
BH CV ECHO MEAS - AI MAX PG: 88 MMHG
BH CV ECHO MEAS - AI MAX VEL: 469.1 CM/SEC
BH CV ECHO MEAS - AI P1/2T: 683 MSEC
BH CV ECHO MEAS - AO MAX PG (FULL): 0.25 MMHG
BH CV ECHO MEAS - AO MAX PG: 4.3 MMHG
BH CV ECHO MEAS - AO MEAN PG (FULL): 0.07 MMHG
BH CV ECHO MEAS - AO MEAN PG: 1.9 MMHG
BH CV ECHO MEAS - AO ROOT AREA (BSA CORRECTED): 2.3
BH CV ECHO MEAS - AO ROOT AREA: 14.8 CM^2
BH CV ECHO MEAS - AO ROOT DIAM: 4.3 CM
BH CV ECHO MEAS - AO V2 MAX: 103.4 CM/SEC
BH CV ECHO MEAS - AO V2 MEAN: 62.8 CM/SEC
BH CV ECHO MEAS - AO V2 VTI: 17.7 CM
BH CV ECHO MEAS - ASC AORTA: 3.5 CM
BH CV ECHO MEAS - AVA(I,A): 5.6 CM^2
BH CV ECHO MEAS - AVA(I,D): 5.6 CM^2
BH CV ECHO MEAS - AVA(V,A): 5.6 CM^2
BH CV ECHO MEAS - AVA(V,D): 5.6 CM^2
BH CV ECHO MEAS - BSA(HAYCOCK): 2 M^2
BH CV ECHO MEAS - BSA: 1.9 M^2
BH CV ECHO MEAS - BZI_BMI: 30 KILOGRAMS/M^2
BH CV ECHO MEAS - BZI_METRIC_HEIGHT: 165.1 CM
BH CV ECHO MEAS - BZI_METRIC_WEIGHT: 81.6 KG
BH CV ECHO MEAS - EDV(CUBED): 80.5 ML
BH CV ECHO MEAS - EDV(MOD-SP4): 63.3 ML
BH CV ECHO MEAS - EDV(TEICH): 83.9 ML
BH CV ECHO MEAS - EF(CUBED): 72.2 %
BH CV ECHO MEAS - EF(MOD-BP): 60 %
BH CV ECHO MEAS - EF(MOD-SP4): 59.5 %
BH CV ECHO MEAS - EF(TEICH): 64.3 %
BH CV ECHO MEAS - ESV(CUBED): 22.3 ML
BH CV ECHO MEAS - ESV(MOD-SP4): 25.6 ML
BH CV ECHO MEAS - ESV(TEICH): 30 ML
BH CV ECHO MEAS - FS: 34.8 %
BH CV ECHO MEAS - IVS/LVPW: 1.3
BH CV ECHO MEAS - IVSD: 1.3 CM
BH CV ECHO MEAS - LA DIMENSION(2D): 3.2 CM
BH CV ECHO MEAS - LV DIASTOLIC VOL/BSA (35-75): 33.5 ML/M^2
BH CV ECHO MEAS - LV MASS(C)D: 169.2 GRAMS
BH CV ECHO MEAS - LV MASS(C)DI: 89.5 GRAMS/M^2
BH CV ECHO MEAS - LV MAX PG: 4 MMHG
BH CV ECHO MEAS - LV MEAN PG: 1.9 MMHG
BH CV ECHO MEAS - LV SYSTOLIC VOL/BSA (12-30): 13.6 ML/M^2
BH CV ECHO MEAS - LV V1 MAX: 100.2 CM/SEC
BH CV ECHO MEAS - LV V1 MEAN: 62.1 CM/SEC
BH CV ECHO MEAS - LV V1 VTI: 17.2 CM
BH CV ECHO MEAS - LVIDD: 4.3 CM
BH CV ECHO MEAS - LVIDS: 2.8 CM
BH CV ECHO MEAS - LVOT AREA: 5.8 CM^2
BH CV ECHO MEAS - LVOT DIAM: 2.7 CM
BH CV ECHO MEAS - LVPWD: 0.99 CM
BH CV ECHO MEAS - MV A MAX VEL: 55.1 CM/SEC
BH CV ECHO MEAS - MV DEC SLOPE: 242.7 CM/SEC^2
BH CV ECHO MEAS - MV DEC TIME: 0.18 SEC
BH CV ECHO MEAS - MV E MAX VEL: 43.2 CM/SEC
BH CV ECHO MEAS - MV E/A: 0.78
BH CV ECHO MEAS - MV MAX PG: 1.6 MMHG
BH CV ECHO MEAS - MV MEAN PG: 0.56 MMHG
BH CV ECHO MEAS - MV V2 MAX: 64 CM/SEC
BH CV ECHO MEAS - MV V2 MEAN: 36 CM/SEC
BH CV ECHO MEAS - MV V2 VTI: 15.3 CM
BH CV ECHO MEAS - MVA(VTI): 6.5 CM^2
BH CV ECHO MEAS - PA ACC TIME: 0.09 SEC
BH CV ECHO MEAS - PA MAX PG (FULL): 0.4 MMHG
BH CV ECHO MEAS - PA MAX PG: 2 MMHG
BH CV ECHO MEAS - PA MEAN PG (FULL): 0.41 MMHG
BH CV ECHO MEAS - PA MEAN PG: 1.2 MMHG
BH CV ECHO MEAS - PA PR(ACCEL): 39.6 MMHG
BH CV ECHO MEAS - PA V2 MAX: 71.4 CM/SEC
BH CV ECHO MEAS - PA V2 MEAN: 52.8 CM/SEC
BH CV ECHO MEAS - PA V2 VTI: 15.6 CM
BH CV ECHO MEAS - RAP SYSTOLE: 3 MMHG
BH CV ECHO MEAS - RV MAX PG: 1.6 MMHG
BH CV ECHO MEAS - RV MEAN PG: 0.83 MMHG
BH CV ECHO MEAS - RV V1 MAX: 64.1 CM/SEC
BH CV ECHO MEAS - RV V1 MEAN: 41.6 CM/SEC
BH CV ECHO MEAS - RV V1 VTI: 11.7 CM
BH CV ECHO MEAS - RVDD: 2.3 CM
BH CV ECHO MEAS - RVSP: 8.4 MMHG
BH CV ECHO MEAS - SI(AO): 138 ML/M^2
BH CV ECHO MEAS - SI(CUBED): 30.7 ML/M^2
BH CV ECHO MEAS - SI(LVOT): 52.4 ML/M^2
BH CV ECHO MEAS - SI(MOD-SP4): 19.9 ML/M^2
BH CV ECHO MEAS - SI(TEICH): 28.5 ML/M^2
BH CV ECHO MEAS - SV(AO): 261 ML
BH CV ECHO MEAS - SV(CUBED): 58.2 ML
BH CV ECHO MEAS - SV(LVOT): 99.1 ML
BH CV ECHO MEAS - SV(MOD-SP4): 37.6 ML
BH CV ECHO MEAS - SV(TEICH): 53.9 ML
BH CV ECHO MEAS - TR MAX VEL: 116.3 CM/SEC
CHOLEST SERPL-MCNC: 137 MG/DL (ref 0–200)
HDLC SERPL-MCNC: 52 MG/DL (ref 40–60)
LDLC SERPL CALC-MCNC: 60 MG/DL (ref 0–100)
LDLC/HDLC SERPL: 1.16 {RATIO}
MAGNESIUM SERPL-MCNC: 2.2 MG/DL (ref 1.6–2.6)
TRIGL SERPL-MCNC: 124 MG/DL (ref 0–150)
TROPONIN T SERPL-MCNC: <0.01 NG/ML (ref 0–0.03)
TSH SERPL DL<=0.05 MIU/L-ACNC: 1.6 UIU/ML (ref 0.27–4.2)
VLDLC SERPL-MCNC: 24.8 MG/DL

## 2020-04-29 PROCEDURE — A9500 TC99M SESTAMIBI: HCPCS | Performed by: INTERNAL MEDICINE

## 2020-04-29 PROCEDURE — 84484 ASSAY OF TROPONIN QUANT: CPT | Performed by: NURSE PRACTITIONER

## 2020-04-29 PROCEDURE — 83735 ASSAY OF MAGNESIUM: CPT | Performed by: NURSE PRACTITIONER

## 2020-04-29 PROCEDURE — 93306 TTE W/DOPPLER COMPLETE: CPT

## 2020-04-29 PROCEDURE — 99214 OFFICE O/P EST MOD 30 MIN: CPT | Performed by: INTERNAL MEDICINE

## 2020-04-29 PROCEDURE — 93017 CV STRESS TEST TRACING ONLY: CPT

## 2020-04-29 PROCEDURE — 0 TECHNETIUM SESTAMIBI: Performed by: INTERNAL MEDICINE

## 2020-04-29 PROCEDURE — 80061 LIPID PANEL: CPT | Performed by: NURSE PRACTITIONER

## 2020-04-29 PROCEDURE — G0378 HOSPITAL OBSERVATION PER HR: HCPCS

## 2020-04-29 PROCEDURE — 93306 TTE W/DOPPLER COMPLETE: CPT | Performed by: INTERNAL MEDICINE

## 2020-04-29 PROCEDURE — A9500 TC99M SESTAMIBI: HCPCS | Performed by: NURSE PRACTITIONER

## 2020-04-29 PROCEDURE — 84443 ASSAY THYROID STIM HORMONE: CPT | Performed by: NURSE PRACTITIONER

## 2020-04-29 PROCEDURE — 0 TECHNETIUM SESTAMIBI: Performed by: NURSE PRACTITIONER

## 2020-04-29 PROCEDURE — 93005 ELECTROCARDIOGRAM TRACING: CPT | Performed by: NURSE PRACTITIONER

## 2020-04-29 PROCEDURE — 78452 HT MUSCLE IMAGE SPECT MULT: CPT

## 2020-04-29 PROCEDURE — 99217 PR OBSERVATION CARE DISCHARGE MANAGEMENT: CPT | Performed by: INTERNAL MEDICINE

## 2020-04-29 RX ADMIN — FUROSEMIDE 40 MG: 40 TABLET ORAL at 11:52

## 2020-04-29 RX ADMIN — SPIRONOLACTONE 25 MG: 25 TABLET, FILM COATED ORAL at 11:53

## 2020-04-29 RX ADMIN — ATORVASTATIN CALCIUM 80 MG: 40 TABLET, FILM COATED ORAL at 08:09

## 2020-04-29 RX ADMIN — LISINOPRIL 5 MG: 5 TABLET ORAL at 08:09

## 2020-04-29 RX ADMIN — Medication 10 ML: at 08:09

## 2020-04-29 RX ADMIN — BUPROPION HYDROCHLORIDE 150 MG: 150 TABLET, EXTENDED RELEASE ORAL at 08:09

## 2020-04-29 RX ADMIN — METOPROLOL SUCCINATE 25 MG: 25 TABLET, EXTENDED RELEASE ORAL at 11:53

## 2020-04-29 RX ADMIN — CYANOCOBALAMIN TAB 250 MCG 500 MCG: 250 TAB at 08:09

## 2020-04-29 RX ADMIN — TECHNETIUM TC 99M SESTAMIBI 1 DOSE: 1 INJECTION INTRAVENOUS at 10:15

## 2020-04-29 RX ADMIN — ASPIRIN 81 MG 81 MG: 81 TABLET ORAL at 08:09

## 2020-04-29 RX ADMIN — TECHNETIUM TC 99M SESTAMIBI 1 DOSE: 1 INJECTION INTRAVENOUS at 08:00

## 2020-04-29 RX ADMIN — PRASUGREL 10 MG: 10 TABLET, FILM COATED ORAL at 08:09

## 2020-04-30 ENCOUNTER — TRANSITIONAL CARE MANAGEMENT TELEPHONE ENCOUNTER (OUTPATIENT)
Dept: CALL CENTER | Facility: HOSPITAL | Age: 50
End: 2020-04-30

## 2020-04-30 ENCOUNTER — TELEMEDICINE (OUTPATIENT)
Dept: CARDIOLOGY | Facility: CLINIC | Age: 50
End: 2020-04-30

## 2020-04-30 VITALS
HEART RATE: 62 BPM | BODY MASS INDEX: 29.95 KG/M2 | WEIGHT: 180 LBS | SYSTOLIC BLOOD PRESSURE: 115 MMHG | DIASTOLIC BLOOD PRESSURE: 82 MMHG

## 2020-04-30 DIAGNOSIS — I25.5 ISCHEMIC CARDIOMYOPATHY: ICD-10-CM

## 2020-04-30 DIAGNOSIS — E78.41 ELEVATED LIPOPROTEIN(A): ICD-10-CM

## 2020-04-30 DIAGNOSIS — I25.118 CORONARY ARTERY DISEASE OF NATIVE ARTERY OF NATIVE HEART WITH STABLE ANGINA PECTORIS (HCC): Primary | ICD-10-CM

## 2020-04-30 DIAGNOSIS — I10 ESSENTIAL HYPERTENSION: ICD-10-CM

## 2020-04-30 PROCEDURE — 99214 OFFICE O/P EST MOD 30 MIN: CPT | Performed by: INTERNAL MEDICINE

## 2020-04-30 NOTE — PROGRESS NOTES
Cardiology clinic note  Brant Martinez MD, PhD  [facility] cardiology  Subjective:     Encounter Date:04/30/2020      Patient ID: Sada Le is a 49 y.o. female.    Chief Complaint:  No chief complaint on file.      HPI:  History of Present Illness  I the pleasure to see this patient who is a 49-year-old female who is well-known to me from prior hospitalization and clinic notes.  She has a history of anterior ST elevation myocardial infarction with complete occlusion of the proximal LAD under going revascularization with Xience 3.5 x 33 mm drug-eluting stent postdilated to 4.5 mm at 18 familia with good results.  This was an Impella supported procedure given under  Cardiogenic shock in the setting of acute MI which was complicated by VF arrest with successful defibrillation.  She did well with weaning Impella support over 2 days and institution of max goal-directed medical therapy ultimately being discharged to home.  We were very aggressive with her medical therapy as well as antiplatelets and her EF improved from around 28 to 30% to 55% with only ever so slight anterior wall very mild hypokinesis residually.  She has done well since this time and is completely quit smoking.  She had nonobstructive disease in the circumflex and RCA as well as mid to distal LAD.  She was changed secondary to shortness of breath from Brilinta to Effient which she is maintained on aspirin and Effient, she is on high intensity statin therapy as well as Aldactone and long-acting beta-blocker and loop diuretics 40 twice daily of Lasix.  She is on ACE inhibitor at moderate to high intensity as well tolerating this just fine without cough.  She has had a recent hospitalization for shortness of breath and underwent right heart catheterization with no evidence of pulmonary hypertension despite volume loading during the procedure with evidence of volume depletion initially with right atrial pressure of 2 to 3 mmHg.  Results  of this are below.  She had recent hospitalization day before yesterday for substernal chest pain also with jaw pain.  Troponins were unremarkable, EKG was unchanged, blood pressure has been somewhat labile at home recently she says and when high causes some degree of chest discomfort.  She underwent noninvasive evaluation with treadmill stress test and walked greater than 9 minutes with no EKG changes and she had normal myocardial perfusion at rest and stress.  There was no indication of balanced ischemia that would be suspected that may give false negative results relative to perfusion imaging.  Her echo showed normal preserved LV systolic function with ever so slight mild anterior wall hypokinesis compared to thickening of the posterior lateral walls.  EF was 55 to 60%.  Normal atrial sizes, no evidence of pulmonary hypertension, IVC diameter was normal.  She was reassured with these findings and discharged yesterday.  Today we have discussed these findings as above and she will do twice daily blood pressure monitoring at home for further titration.  We did discuss diet and exercise as she has gained weight after her hospitalization.  No other questions or concerns.  She is obeying social distancing guidelines per coronavirus concerns.      Stress test results 4-  Interpretation Summary        · Findings consistent with a normal ECG stress test.  · Left ventricular ejection fraction is hyperdynamic (Calculated EF > 70%).  · Myocardial perfusion imaging indicates a normal myocardial perfusion study with no evidence of ischemia.  · There is no prior study available for comparison.     Normal stress myocardial perfusion imaging  Normal stress ECG portion of the study with no ischemic ST-T wave changes  Sinus rhythm increase to sinus tachycardia with target heart rate achieved with good predictability and sensitivity by criteria  Myocardial perfusion imaging was normal at rest as well as at peak stress with no  reversibility preserved LV ejection fraction 66%     Low risk study for significant ischemia, do not suspect balanced ischemia         2D echo results 4-  Echocardiogram Findings     Left Ventricle Left ventricular systolic function is normal. Calculated EF = 60.0%. Estimated EF was in agreement with the calculated EF. Estimated EF appears to be in the range of 56 - 60%. Normal left ventricular cavity size noted. All left ventricular wall segments contract normally. Left ventricular wall thickness is consistent with mild concentric hypertrophy. Left ventricular mass is increased. Left ventricular mass index is increased. Septal wall motion is normal. Left ventricular diastolic dysfunction is noted (grade I a w/high LAP) consistent with impaired relaxation. There is no evidence of a left ventricular mass or thrombus present. There is no evidence of spontaneous contrast. No false tendon noted.   Right Ventricle Normal right ventricular cavity size, wall thickness, systolic function and septal motion noted. No evidence of a right ventricular mass present.   Left Atrium Normal left atrial size and volume noted. No evidence of a left atrial mass present. The interatrial septum does not appear to be redundant. No interatrial septal aneurysm present. No lipomatous hypertrophy of the interatrial septum present. Cannot exclude the presence of a patent foramen ovale. Saline test for shunting not performed.   Right Atrium Normal right atrial size noted. The inferior vena cava is normally sized. Normal IVC inspiratory collapse of greater than 50% noted.   Aortic Valve The aortic valve is grossly normal in structure. Mild aortic valve regurgitation is present. No hemodynamically significant aortic valve stenosis is present.   Mitral Valve The mitral valve is grossly normal in structure. Trace mitral valve regurgitation is present. No significant mitral valve stenosis is present.   Tricuspid Valve The tricuspid valve is  grossly normal. No evidence of tricuspid valve stenosis is present. Trace tricuspid valve regurgitation is present. Estimated right ventricular systolic pressure from tricuspid regurgitation is normal (<35 mmHg). No evidence of pulmonary hypertension is present.   Pulmonic Valve The pulmonic valve is grossly normal in structure. There is no significant pulmonic valve stenosis present. There is no significant pulmonic valve regurgitation present.   Greater Vessels Mild dilation of the aortic root is present. Mild dilation of the sinuses of Valsalva is present.   Pericardium There is no evidence of pericardial effusion. There is no evidence of ascites present.         February 2020 right heart catheterization  Results  Wedge pressure 3 mmHg  PA pressure 19/6 with mean of 11 to 13 mmHg  RV 20/1 with end-diastolic pressure of 2 mmHg  Right atrial pressure of 2 to 3 mmHg  PA saturation 71%  SVC 69%  RA 70%  IVC 71%  FA sat 98%  Dragan cardiac output 4.8  Dragan cardiac index 2.4  Dragan stroke-volume 79 cc  No indication of significant step up between the right atrium and pulmonary artery or in SVC versus IVC and right atrial saturation, no indication of significant resting interatrial shunt.  Conclusions and recommendations  Expected PA sat by flam calculation 70.5%, actual 71%  Qp/Qs: 0.98 insignificant left to right interatrial shunt resting    Left heart catheterization results September 2019  Pre-procedure Diagnosis     STEMI       Conclusion      Brant Martinez MD, PhD  Date of service 9-29-19     Procedure  1.  Left heart catheterization with coronary angiography and left ventricular atrophy in ANTON position  2.  Impella CP insertion via left groin for the indication of cardiogenic shock in the setting of ST elevation myocardial infarction in the LAD territory  3.  Percutaneous coronary intervention to the proximal / ostial to mid LAD with Xience 3.5 x 33 mm drug-eluting stent postdilated to 4.5 mm at 18  familia with good angiographic results.  4.  Placement of balloon tip [place]-Venkat catheter to the main pulmonary artery via right common femoral vein with pressure assessment and cardiac output calculations and right heart catheterization, left in place for Impella weaning parameters.  5.  Antegrade selective left common iliac angiography with femoral runoff  6.  Retrograde right common femoral angiography        Indication  Left anterior descending artery/LAD ST elevation myocardial infarction with superimposed acute cardiogenic shock.     Technique  After informed consent the patient was brought to the catheterization lab emergently sterilely prepped and draped exposure bilateral groins for right common femoral arterial access via micropuncture and modified Seldinger technique with initial placement of a 6 [place] sheath to the right common femoral artery under fluoroscopic guidance.  1% lidocaine analgesia was used for local anesthesia.  Next secondary to findings of severe anterior ST elevations with marginal blood pressures access was also obtained via micropuncture and modified center technique with placement of a 6 [place] sheath to the left common femoral artery under fluoroscopic guidance after selective left iliac angiography with iliofemoral runoff via a JR4 catheter from the right groin with left-sided imaging accordingly.  Next a pigtail catheter was advanced from the right groin and across the aortic valve followed by left ventricular end-diastolic pressure measurements and left ventricular atrophy in ANTON position revealing severely reduced LV systolic function estimated 20% with regional abnormalities severely in the anterior anterolateral and apical inferior wall with severe akinesis and mild to moderately dilated LV.  This was exchanged over an 035 guidewire for a diagnostic JL4 catheter to perform left sided angiography which was selectively engaged with the left main.  Multiple  angiographic views orthogonal angiographic views demonstrated ostial proximal occlusion of the % with JANE 0 flow.  This catheter was removed followed by removal of the left 6 [place] sheath, Perclose device placement to the left common femoral arteriotomy and pre-close fashion via standard technique, and upsizing ultimately with placement of an Impella CP device via left common femoral approach via standard technique using 5 [place] pigtail catheter to cross the aortic valve, Impella wire placement to the LV, over the wire placement into the ventricle of the Impella CP device which was started and titrated up to P8 for LV ventricular unloading.  Throughout this process the patient had ischemic arrhythmias with runs of nonsustained ventricular tachycardia and short runs of ventricular fibrillation however patient had sustained ventricular fibrillation requiring defibrillation with 1 successful shock as well as loading with IV amiodarone.  Patient was ultimately extremely hypotensive and required recurrent IV pushes of 300 mcg of [place]-Synephrine as well as Levophed drip at max dose throughout the procedure.  After successful Impella placement and securing of the device via right common femoral approach a 7 [place] sheath was placed followed by 7 [place] EBU 4.0 guide to successfully engage the left main followed by run-through wire to the distal LAD over which lesion was predilated with a 2.5 x 12 mm NC balloon with restoration of flow demonstrating tandem lesions in the LAD in the proximal ostial location with thrombotic occlusion followed by proximal to mid 80 to 90% eccentric narrowing immediately prior to the bifurcation of the LAD and dominant diagonal branch.  This balloon was exchanged for a 3.0 x 15 mm NC balloon which was used to predilate both the distal and further predilate the proximal lesion successfully.  This balloon was used for sizing and length followed by placement of a 3.5 x 33 mm Xience  drug-eluting stent which was deployed at 12 familia with pullback inside the stent 1 strut up to 18 familia with the stent balloon.  This was then exchanged for a 4.0 x 15 mm NC balloon which was used to post dilate and distal to proximal fashion up to 20 familia.  Despite 4.0 post there was still significant mall apposition in this very large caliber proximal LAD.  A 4.5 x 15 mm balloon was then used to further post dilate the stent distal to proximal fashion resulting in much improved stent apposition and distal proximal fashion all the way to the ostial segment.  With the balloon inflated at the ostial segment focal angiogram revealed patent flow to the circumflex ensuring that this segment was not jailed by proximal strut.  After 4.5 mm post dilation up to 18 familia proximally the balloon was deflated and removed followed by angiography and multiple views revealing JANE-3 flow to the distal apical LAD as well as diagonal branch as well as to the circumflex distribution.  No distal wire trauma, no edge dissection, reduction of the stenosis from 100% proximally and 80% in the proximal to midportion to 0% residual.  There was residual distal LAD diffuse disease up to 60 possibly 70% however there was JANE-3 flow and these were left to be staged with FFR at later date as well as borderline lesions in the circumflex with JANE-3 flow as well as nonobstructive disease in the RCA with JANE-3 flow in this distribution.  At this point, Levophed drip at max dose was titrated down slowly with careful observation of Impella support pressures.  During hemodynamic weaning, right common femoral venous access was obtained with 7 [place] sheath placement to the right common femoral vein followed by balloon tip [place]-Venkat catheter facilitated right heart catheterization and placement to the main pulmonary artery followed by PA pressure assessment, right atrial pressure assessment, PA saturations as compared to femoral artery saturations with Dragan  cardiac output calculation.  FA saturation was 92% and at P8 hemodynamic support the PA saturation was 51%.  With further titration down ultimately off levo fed and attempts at weaning the Impella support device PA saturations decreased to the mid 40s and the pump was then re-uptitrated for flows of 2.8 to 3 L/min.  This resulted in better arrhythmogenic profile with less ectopy.  CVP seem to fluctuate from 8-15 and was stabilized at 12 to 14 mmHg after IV fluid boluses for adequate preload of the Impella device.  After reduction continued with ultimately weaning completely off levophed support only on hemodynamic support.  Patient continued to have multiple reperfusion arrhythmias including junctional and idioventricular escape rhythms as well as bigeminal rhythms and nonsustained VT.  With continued hemodynamic support and careful careful observation the seem to decrease in frequency and severity.  The decision was made to leave the Impella support device in for LV continued unloading with observation of fall and PA saturations indicative of worsening cardiac output with weaning of hemogenic support.  Selective left iliac angiography was performed with a JR4 catheter from the right femoral with imaging of patent flow to the left lower extremity and left superficial femoral and left profunda around the Impella CP sheath.  The device was then secured in place via standard protocol and the patient transferred to CVICU for intensive care measures and refractory cardiogenic shock needing hemodynamic support.  Patient left the Cath Lab chest pain-free, alert talking to staff neurologically intact.     Medications administered during the case:  Heparin to maintain ACT greater than 250 at all times  IV Versed and fentanyl administered by RN with continuous ECG, pulse oximetry, ECG and hemodynamic monitoring by myself throughout the case  IV Chadwick-Synephrine  IV levophed  Oral loading with ticagrelor and aspirin on the  table  Intracoronary nitroglycerin  Contrast usage  380-400 cc     Moderate conscious sedation was used with IV Versed and fentanyl given via registered nurse with all supervision of ECG continuously, pulse oximetry and hemodynamic monitoring by myself through and during and throughout the case.  Conscious sedation time 3-1/2 hours for this complicated case.     Complications  No complications     Blood loss: 20 cc     Lesion data  Type C, 100% thrombotic proximal ostial LAD occlusion with tandem 80% eccentric proximal LAD stenosis, reduced to 0% residual after intervention     Results  1.  Opening aortic pressure 89/62  2.  Pressure during the case as low as 72/48, lowest map 51  3.  LVEDP 18-20  4.  No significant transaortic valve gradient  5.  RA pressure 12 mmHg  6.  PA pressure 32/12 with a mean of 25 to 27 mmHg  7.  PA saturation 50% with full him dynamic support, 44% at P2  8.  FA saturation 92%  9.  LVEF 20% estimated by LV gram     Angiography  1.  Left main coronary arteries a large-caliber vessel giving rise to LAD and nondominant circumflex there is no intragraft disease left main  2.  The circumflex is a nondominant vessel with single large caliber obtuse marginal branch and continuation circumflex into OM 2.  There is mild diffuse disease of the second distribution as well as ostial 60 to 70% OM1 stenosis but only mild luminal irregularities distally thereafter as well as only mild luminal irregularities of the distal circumflex and OM 2.  No significant obstructive disease seen  3.  The RCA is a medium caliber dominant artery with PLV and PDA distally.  There is mild diffuse disease not greater than 30% diffusely throughout the RCA system  4.  The LAD is a large-caliber vessel coursing to and around the apex with one large caliber bifurcating diagonal branch in the midportion.  This was seen after revascularization.  Initial angiography revealed proximal ostial 100% thrombotic occlusion.  There was a  tandem 80 to 85% eccentric plaque immediately prior to the takeoff of the dominant bifurcating diagonal branch in the midportion.  The distal LAD also has diffuse disease approximately 70% most severely distal to the takeoff of the diagonal branch with diffuse 40 to 50% disease thereafter and tapering caliber of the LAD to and around the apex.  Caliber this portion probably 2 to 2.5 mm.  5.  LVEF 20% estimated by LV gram     Intervention  Successful placement of a Xience 3.5 x 33 mm drug-eluting stent postdilated 4.5 mm at high pressure with reduction of thrombotic occlusion and tandem lesion to 0% residual with restoration of JANE-3 flow to the LAD and diagonal branch.     Successful Impella CP placement with hemodynamic unloading and support of the LV function in the setting of cardiogenic shock     Successful balloon tip [place]-Venkat catheter placement to the main pulmonary artery via right common femoral vein for indication of titration of hemodynamic support and cardiac output assessments and CVP assessments.     Conclusions  1.  Thrombotic ostial proximal LAD occlusion with ST elevation microinfarction  2.  Cardiogenic shock  3.  Nonobstructive disease in the circumflex and RCA  4.  Residual borderline lesions in the distal LAD which will be staged for FFR in the future  5.  Ongoing inability to wean hemogenic support with indications for continued Impella support post procedurally in the CCU.  6.  Patent left lower extremity flow around the Impella CP sheath imaged via left iliac selective angiography with runoff.  7.  Severely reduced LV systolic function as a complication of anterior STEMI     Recommendations:  She will be transferred to the ICU for further hemodynamic support and monitoring in intensive care measures  Dual antiplatelet therapy with aspirin and ticagrelor uninterrupted  High intensity statin therapy  Afterload reduction as indicated  ACE inhibitor or Aldactone therapy for LVEF less than 35% in  the setting of acute MI per guidelines     It is a pleasure to be involved in her cardiac care.  I will continue to manage in the CCU as well as hemogenic support in ICU core measures.  Brant Martinez MD, PhD       .   The following portions of the patient's history were reviewed and updated as appropriate: allergies, current medications, past family history, past medical history, past social history, past surgical history and problem list.    Problem List:  Patient Active Problem List   Diagnosis   • Allergic rhinitis   • Cervical disc disease with myelopathy   • Diverticulosis of colon   • Hypertension, benign   • Insomnia, organic   • Obesity (BMI 30-39.9)   • Osteopenia of spine   • Mixed hyperlipidemia   • STEMI involving left anterior descending coronary artery (CMS/HCC)   • Elevated LFTs   • Hypocalcemia   • Normocytic anemia   • Thrombocytopenia (CMS/HCC)   • Cardiac arrest with ventricular fibrillation (CMS/HCC)   • Ischemic cardiomyopathy   • Stented coronary artery   • History of echocardiogram   • Annual visit for general adult medical examination with abnormal findings   • Cervical cancer screening   • Encounter for screening for malignant neoplasm of breast   • Colon cancer screening   • History of tobacco use   • Chest pain   • Generalized anxiety disorder   • Hypokalemia   • ASD (atrial septal defect)   • History of transesophageal echocardiography (NAFISA)   • Shortness of breath       Past Medical History:  Past Medical History:   Diagnosis Date   • Absence of left thumb     Impression: hx of MRSA, she is signficantly improved She will continue meds and followup if sxs have not resolved over the next 2 weeks.. She is released from care and will notify us of any problems   • Acute otitis media    • Allergic rhinitis    • Arthritis     neck   • Cancer (CMS/HCC)     skin   • Cervical disc disease with myelopathy    • Contact dermatitis or eczema    • Coronary artery disease    • Disorder of bone and  cartilage    • Diverticulitis of colon     Impression: CT confirms in the sigmoid colon 10/2011   • Diverticulosis of colon     Impression: No sxs 02/13/2013   • Elevated cholesterol    • Elevated temperature    • External otitis of left ear    • Hearing loss due to cerumen impaction, left    • Hemangioma of liver    • History of echocardiogram 10/03/2019    Severely reduced LV systolic function. EF 34% Distribution indicative of LAD territory injury. Akinesis of the apex as well as apical lateral and mid to apical septal walls. Preservation of posterior inferior lateral walls. Mild AI, mild TR. Normal RV function.    • History of transesophageal echocardiography (NAFISA) 01/07/2020    EF 55% LA cavity is mild to moderately dilated. Mild MR. Interatrial septum appears redundant. Interatrial hypermobile c/w aneurysm. Large PFO is noted with right to left shunting on bubble study. PFO tunnel appears to be short.    • Hordeolum externum of left lower eyelid     Impression: She was started on Bactrim DS for her infection. She was also advised to use warm compression. She will followup should sxs not improve over the next 48 hrs and resolve over the next 1 weeek.   • Hyperlipidemia     Impression: Diet controled. She should see improvement with her successful wt loss. We discussed her lipid panel.   • Hypertension, benign     Impression: new onset Low salt low calorie diet and regular exercise and followup in 1 mo She will monitor her pressures and notify us of her pressures over the next 1 week.   • Inclusion cyst of right breast     Impression: We will follow for now. She is encouraged to have Mammography. She is presently without insurance and reluctant. She bobby consider. She will notify us of any change at all in the lesion for the only way to be certain of it's etilogy is to remove it. She understands.   • Insomnia, organic    • Menopausal syndrome    • Overweight (BMI 25.0-29.9)    • RUQ abdominal pain     Impression:  Resolving, negative GB u/s, HIDA EF 77%, we will follow   • Stented coronary artery 09/29/2019   • Tobacco use     Impression: She is strongly encouraged to stop smoking. Cessation techniques discussed and encouraged. The consequences of not stopping discussed, ie, CAD, PVD, Stroke, Lung and other cancers.       Past Surgical History:  Past Surgical History:   Procedure Laterality Date   • BIVENTRICULAR ASSIST DEVICE/LEFT VENTRICULAR ASSIST DEVICE INSERTION N/A 9/29/2019    Procedure: Left Ventricular Assist Device;  Surgeon: Brant Martinez MD;  Location: [facility] CATH INVASIVE LOCATION;  Service: Cardiovascular   • CARDIAC CATHETERIZATION N/A 9/29/2019    Procedure: Left Heart Cath;  Surgeon: Brant Martinez MD;  Location: [facility] CATH INVASIVE LOCATION;  Service: Cardiovascular   • CARDIAC CATHETERIZATION N/A 9/29/2019    Procedure: Coronary angiography;  Surgeon: Brant Martinez MD;  Location: [facility] CATH INVASIVE LOCATION;  Service: Cardiovascular   • CARDIAC CATHETERIZATION N/A 9/29/2019    Procedure: Left ventriculography;  Surgeon: Brant Martinez MD;  Location: [facility] CATH INVASIVE LOCATION;  Service: Cardiovascular   • CARDIAC CATHETERIZATION N/A 9/29/2019    Procedure: Stent SERENITY coronary;  Surgeon: Brant Martinez MD;  Location: [facility] CATH INVASIVE LOCATION;  Service: Cardiovascular   • CARDIAC CATHETERIZATION N/A 2/25/2020    Procedure: Right Heart Cath;  Surgeon: Brant Martinez MD;  Location: [facility] CATH INVASIVE LOCATION;  Service: Cardiology;  Laterality: N/A;   • CARDIAC ELECTROPHYSIOLOGY PROCEDURE  9/29/2019    Procedure: Impella Insertion;  Surgeon: Brant Martinez MD;  Location: [facility] CATH INVASIVE LOCATION;  Service: Cardiovascular   • AL RT/LT HEART CATHETERS N/A 9/29/2019    Procedure: Percutaneous Coronary Intervention;  Surgeon: Brant Martinez MD;  Location: [facility] CATH INVASIVE LOCATION;  Service: Cardiovascular   •  TOTAL ABDOMINAL HYSTERECTOMY WITH SALPINGO OOPHORECTOMY      Endometriosis       Social History:  Social History     Socioeconomic History   • Marital status:      Spouse name: Not on file   • Number of children: Not on file   • Years of education: Not on file   • Highest education level: Not on file   Tobacco Use   • Smoking status: Former Smoker     Packs/day: 1.00     Years: 34.00     Pack years: 34.00     Types: Cigarettes     Start date:      Last attempt to quit: 2019     Years since quittin.5   • Smokeless tobacco: Never Used   • Tobacco comment: States she is quitting as of 2019.   Substance and Sexual Activity   • Alcohol use: Yes     Frequency: Monthly or less     Drinks per session: 3 or 4     Binge frequency: Less than monthly     Comment: occassionally    • Drug use: Not Currently     Comment: occasional   • Sexual activity: Defer       Allergies:  Allergies   Allergen Reactions   • Penicillins Rash   • Ciprofloxacin Rash       Immunizations:  Immunization History   Administered Date(s) Administered   • DT 2004   • FLUARIX/FLUZONE/AFLURIA/FLULAVAL QUAD 10/01/2019   • Tdap 2011       ROS:  ROS       Objective:         LMP 1996 (Approximate)     Physical Exam  No acute distress alert and oriented x3 afebrile vital signs stable  Vitals reviewed blood pressure 115/82 with heart rate 62 weight 180 pounds  Normocephalic atraumatic pupils equal round extraocular muscles intact  Trachea midline neck appears supple carotid pulsations are regular  Respiratory distress stridor or wheezing  No clubbing cyanosis or edema  Skin warm and dry appearing  Neurologically grossly intact bilateral  Normal mood and affect  No bony abnormalities grossly  Note unable to glean further physical exam finding secondary to video visit today  In-Office Procedure(s):  Procedures    ASCVD RIsk Score::  The ASCVD Risk score (Marielena AIME JrSin, et al., 2013) failed to calculate for the following  reasons:    The patient has a prior MI or stroke diagnosis    Recent Radiology:  Imaging Results (Most Recent)     None          Lab Review:   Admission on 04/28/2020, Discharged on 04/29/2020   Component Date Value   • Glucose 04/28/2020 117*   • BUN 04/28/2020 24*   • Creatinine 04/28/2020 1.09*   • Sodium 04/28/2020 137    • Potassium 04/28/2020 3.9    • Chloride 04/28/2020 99    • CO2 04/28/2020 24.0    • Calcium 04/28/2020 9.5    • eGFR Non African Amer 04/28/2020 53*   • BUN/Creatinine Ratio 04/28/2020 22.0    • Anion Gap 04/28/2020 14.0    • Troponin T 04/28/2020 <0.010    • WBC 04/28/2020 9.00    • RBC 04/28/2020 4.37    • Hemoglobin 04/28/2020 13.8    • Hematocrit 04/28/2020 39.4    • MCV 04/28/2020 90.1    • MCH 04/28/2020 31.7    • MCHC 04/28/2020 35.1    • RDW 04/28/2020 13.8    • RDW-SD 04/28/2020 43.3    • MPV 04/28/2020 9.6    • Platelets 04/28/2020 185    • Neutrophil % 04/28/2020 65.9    • Lymphocyte % 04/28/2020 25.3    • Monocyte % 04/28/2020 7.2    • Eosinophil % 04/28/2020 1.2    • Basophil % 04/28/2020 0.4    • Neutrophils, Absolute 04/28/2020 6.00    • Lymphocytes, Absolute 04/28/2020 2.30    • Monocytes, Absolute 04/28/2020 0.60    • Eosinophils, Absolute 04/28/2020 0.10    • Basophils, Absolute 04/28/2020 0.00    • nRBC 04/28/2020 0.1    • Troponin T 04/29/2020 <0.010    • Troponin T 04/29/2020 <0.010    • TSH 04/29/2020 1.600    • Total Cholesterol 04/29/2020 137    • Triglycerides 04/29/2020 124    • HDL Cholesterol 04/29/2020 52    • LDL Cholesterol  04/29/2020 60    • VLDL Cholesterol 04/29/2020 24.8    • LDL/HDL Ratio 04/29/2020 1.16    • Troponin T 04/29/2020 <0.010    • BH CV STRESS PROTOCOL 1 04/29/2020 Thad    • Stage 1 04/29/2020 1    • HR Stage 1 04/29/2020 94    • BP Stage 1 04/29/2020 114/80    • Duration Min Stage 1 04/29/2020 3    • Duration Sec Stage 1 04/29/2020 0    • Grade Stage 1 04/29/2020 10    • Speed Stage 1 04/29/2020 1.7    • BH CV STRESS METS STAGE 1 04/29/2020 5     • Stage 2 04/29/2020 2    • HR Stage 2 04/29/2020 112    • BP Stage 2 04/29/2020 132/84    • Duration Min Stage 2 04/29/2020 3    • Duration Sec Stage 2 04/29/2020 0    • Grade Stage 2 04/29/2020 12    • Speed Stage 2 04/29/2020 2.5    • BH CV STRESS METS STAGE 2 04/29/2020 7.5    • Stage 3 04/29/2020 3    • HR Stage 3 04/29/2020 144    • BP Stage 3 04/29/2020 152/80    • Duration Min Stage 3 04/29/2020 3    • Duration Sec Stage 3 04/29/2020 0    • Grade Stage 3 04/29/2020 14    • Speed Stage 3 04/29/2020 3.4    • BH CV STRESS METS STAGE 3 04/29/2020 10.0    • Baseline HR 04/29/2020 72    • Baseline BP 04/29/2020 110/80    • Peak HR 04/29/2020 144    • Percent Max Pred HR 04/29/2020 84.71    • Percent Target HR 04/29/2020 100    • Peak BP 04/29/2020 152/80    • Recovery HR 04/29/2020 98    • Recovery BP 04/29/2020 120/70    • Target HR (85%) 04/29/2020 145    • Max. Pred. HR (100%) 04/29/2020 170    • Nuclear Prior Study 04/29/2020 3    • BSA 04/29/2020 1.9    • RVIDd 04/29/2020 2.3    • IVSd 04/29/2020 1.3    • LVIDd 04/29/2020 4.3    • LVIDs 04/29/2020 2.8    • LVPWd 04/29/2020 0.99    • IVS/LVPW 04/29/2020 1.3    • FS 04/29/2020 34.8    • EDV(Teich) 04/29/2020 83.9    • ESV(Teich) 04/29/2020 30.0    • EF(Teich) 04/29/2020 64.3    • EDV(cubed) 04/29/2020 80.5    • ESV(cubed) 04/29/2020 22.3    • EF(cubed) 04/29/2020 72.2    • LV mass(C)d 04/29/2020 169.2    • LV mass(C)dI 04/29/2020 89.5    • SV(Teich) 04/29/2020 53.9    • SI(Teich) 04/29/2020 28.5    • SV(cubed) 04/29/2020 58.2    • SI(cubed) 04/29/2020 30.7    • Ao root diam 04/29/2020 4.3    • Ao root area 04/29/2020 14.8    • asc Aorta Diam 04/29/2020 3.5    • LVOT diam 04/29/2020 2.7    • LVOT area 04/29/2020 5.8    • EDV(MOD-sp4) 04/29/2020 63.3    • ESV(MOD-sp4) 04/29/2020 25.6    • EF(MOD-sp4) 04/29/2020 59.5    • SV(MOD-sp4) 04/29/2020 37.6    • SI(MOD-sp4) 04/29/2020 19.9    • Ao root area (BSA correc* 04/29/2020 2.3    • LV Amin Vol (BSA correct*  04/29/2020 33.5    • LV Sys Vol (BSA correcte* 04/29/2020 13.6    • MV E max bernie 04/29/2020 43.2    • MV A max bernie 04/29/2020 55.1    • MV E/A 04/29/2020 0.78    • MV V2 max 04/29/2020 64.0    • MV max PG 04/29/2020 1.6    • MV V2 mean 04/29/2020 36.0    • MV mean PG 04/29/2020 0.56    • MV V2 VTI 04/29/2020 15.3    • MVA(VTI) 04/29/2020 6.5    • MV dec slope 04/29/2020 242.7    • MV dec time 04/29/2020 0.18    • Ao pk bernie 04/29/2020 103.4    • Ao max PG 04/29/2020 4.3    • Ao max PG (full) 04/29/2020 0.25    • Ao V2 mean 04/29/2020 62.8    • Ao mean PG 04/29/2020 1.9    • Ao mean PG (full) 04/29/2020 0.07    • Ao V2 VTI 04/29/2020 17.7    • HERNANDEZ(I,A) 04/29/2020 5.6    • HERNANDEZ(I,D) 04/29/2020 5.6    • HERNANDEZ(V,A) 04/29/2020 5.6    • HERNANDEZ(V,D) 04/29/2020 5.6    • AI max bernie 04/29/2020 469.1    • AI max PG 04/29/2020 88.0    • AI dec slope 04/29/2020 201.1    • AI dec time 04/29/2020 2.3    • AI P1/2t 04/29/2020 683.0    • LV V1 max PG 04/29/2020 4.0    • LV V1 mean PG 04/29/2020 1.9    • LV V1 max 04/29/2020 100.2    • LV V1 mean 04/29/2020 62.1    • LV V1 VTI 04/29/2020 17.2    • SV(Ao) 04/29/2020 261.0    • SI(Ao) 04/29/2020 138.0    • SV(LVOT) 04/29/2020 99.1    • SI(LVOT) 04/29/2020 52.4    • PA V2 max 04/29/2020 71.4    • PA max PG 04/29/2020 2.0    • PA max PG (full) 04/29/2020 0.4    • PA V2 mean 04/29/2020 52.8    • PA mean PG 04/29/2020 1.2    • PA mean PG (full) 04/29/2020 0.41    • PA V2 VTI 04/29/2020 15.6    • PA acc time 04/29/2020 0.09    • RV V1 max PG 04/29/2020 1.6    • RV V1 mean PG 04/29/2020 0.83    • RV V1 max 04/29/2020 64.1    • RV V1 mean 04/29/2020 41.6    • RV V1 VTI 04/29/2020 11.7    • TR max bernie 04/29/2020 116.3    • RVSP(TR) 04/29/2020 8.4    • RAP systole 04/29/2020 3.0    • PA pr(Accel) 04/29/2020 39.6    • BH CV ECHO PERICO - BZI_BMI 04/29/2020 30.0    • BH CV ECHO PERICO - BSA(HA* 04/29/2020 2.0    • BH CV ECHO PERICO - BZI_ME* 04/29/2020 81.6    • BH CV ECHO PERICO - BZI_ME* 04/29/2020 165.1    •  EF(MOD-bp) 04/29/2020 60.0    • LA dimension(2D) 04/29/2020 3.2    • Magnesium 04/29/2020 2.2    Admission on 02/25/2020, Discharged on 02/25/2020   Component Date Value   • Site 02/25/2020 PA    • pH, Venous 02/25/2020 7.393    • pCO2, Venous 02/25/2020 45.3    • pO2, Venous 02/25/2020 38.0*   • HCO3, Venous 02/25/2020 27.6    • Base Excess, Venous 02/25/2020 2.2    • O2 Saturation, Venous 02/25/2020 71.3    • CO2 Content 02/25/2020 29.0    • Barometric Pressure for * 02/25/2020     • Modality 02/25/2020 Room Air    • FIO2 02/25/2020 21    • Site 02/25/2020 RA    • pH, Venous 02/25/2020 7.389    • pCO2, Venous 02/25/2020 45.7    • pO2, Venous 02/25/2020 36.7*   • HCO3, Venous 02/25/2020 27.6    • Base Excess, Venous 02/25/2020 2.1    • O2 Saturation, Venous 02/25/2020 69.0    • CO2 Content 02/25/2020 29.0    • Barometric Pressure for * 02/25/2020     • Modality 02/25/2020 Room Air    • FIO2 02/25/2020 21    • Site 02/25/2020 RA    • pH, Venous 02/25/2020 7.383    • pCO2, Venous 02/25/2020 43.7    • pO2, Venous 02/25/2020 37.7*   • HCO3, Venous 02/25/2020 26.0    • Base Excess, Venous 02/25/2020 0.7    • O2 Saturation, Venous 02/25/2020 70.4    • CO2 Content 02/25/2020 27.4    • Barometric Pressure for * 02/25/2020     • Modality 02/25/2020 Room Air    • FIO2 02/25/2020 21    • Site 02/25/2020 RA    • pH, Venous 02/25/2020 7.383    • pCO2, Venous 02/25/2020 44.1    • pO2, Venous 02/25/2020 40.8    • HCO3, Venous 02/25/2020 26.3    • Base Excess, Venous 02/25/2020 0.8    • O2 Saturation, Venous 02/25/2020 74.8    • CO2 Content 02/25/2020 27.6    • Barometric Pressure for * 02/25/2020     • Modality 02/25/2020 Room Air    • FIO2 02/25/2020 21    • Site 02/25/2020 RA    • pH, Venous 02/25/2020 7.381    • pCO2, Venous 02/25/2020 43.1    • pO2, Venous 02/25/2020 38.0*   • HCO3, Venous 02/25/2020 25.5    • Base Excess, Venous 02/25/2020 0.2    • O2 Saturation, Venous 02/25/2020 70.8    • CO2 Content 02/25/2020 26.9    •  Barometric Pressure for * 02/25/2020     • Modality 02/25/2020 Room Air    • FIO2 02/25/2020 21    Office Visit on 02/19/2020   Component Date Value   • Glucose 02/21/2020 102*   • BUN 02/21/2020 23*   • Creatinine 02/21/2020 1.11*   • Sodium 02/21/2020 139    • Potassium 02/21/2020 4.1    • Chloride 02/21/2020 98    • CO2 02/21/2020 26.8    • Calcium 02/21/2020 9.3    • Total Protein 02/21/2020 7.6    • Albumin 02/21/2020 4.70    • ALT (SGPT) 02/21/2020 31    • AST (SGOT) 02/21/2020 29    • Alkaline Phosphatase 02/21/2020 133*   • Total Bilirubin 02/21/2020 0.3    • eGFR Non African Amer 02/21/2020 52*   • Globulin 02/21/2020 2.9    • A/G Ratio 02/21/2020 1.6    • BUN/Creatinine Ratio 02/21/2020 20.7    • Anion Gap 02/21/2020 14.2    • Protime 02/21/2020 10.3    • INR 02/21/2020 0.98    • WBC 02/21/2020 7.88    • RBC 02/21/2020 4.53    • Hemoglobin 02/21/2020 14.1    • Hematocrit 02/21/2020 41.1    • MCV 02/21/2020 90.7    • MCH 02/21/2020 31.1    • MCHC 02/21/2020 34.3    • RDW 02/21/2020 12.0*   • RDW-SD 02/21/2020 39.5    • MPV 02/21/2020 11.9    • Platelets 02/21/2020 180    • Neutrophil % 02/21/2020 61.8    • Lymphocyte % 02/21/2020 27.2    • Monocyte % 02/21/2020 7.9    • Eosinophil % 02/21/2020 1.6    • Basophil % 02/21/2020 0.6    • Immature Grans % 02/21/2020 0.9*   • Neutrophils, Absolute 02/21/2020 4.87    • Lymphocytes, Absolute 02/21/2020 2.14    • Monocytes, Absolute 02/21/2020 0.62    • Eosinophils, Absolute 02/21/2020 0.13    • Basophils, Absolute 02/21/2020 0.05    • Immature Grans, Absolute 02/21/2020 0.07*   • nRBC 02/21/2020 0.0    Lab on 01/30/2020   Component Date Value   • Glucose 01/30/2020 51*   • BUN 01/30/2020 16    • Creatinine 01/30/2020 0.88    • Sodium 01/30/2020 142    • Potassium 01/30/2020 4.3    • Chloride 01/30/2020 99    • CO2 01/30/2020 29.7*   • Calcium 01/30/2020 10.0    • Total Protein 01/30/2020 7.2    • Albumin 01/30/2020 4.80    • ALT (SGPT) 01/30/2020 37*   • AST (SGOT)  01/30/2020 27    • Alkaline Phosphatase 01/30/2020 113    • Total Bilirubin 01/30/2020 0.3    • eGFR Non  Amer 01/30/2020 68    • Globulin 01/30/2020 2.4    • A/G Ratio 01/30/2020 2.0    • BUN/Creatinine Ratio 01/30/2020 18.2    • Anion Gap 01/30/2020 13.3    • Protime 01/30/2020 10.7    • INR 01/30/2020 1.02    • WBC 01/30/2020 6.67    • RBC 01/30/2020 4.54    • Hemoglobin 01/30/2020 13.7    • Hematocrit 01/30/2020 41.0    • MCV 01/30/2020 90.3    • MCH 01/30/2020 30.2    • MCHC 01/30/2020 33.4    • RDW 01/30/2020 11.8*   • RDW-SD 01/30/2020 38.8    • MPV 01/30/2020 11.7    • Platelets 01/30/2020 181    • Neutrophil % 01/30/2020 53.2    • Lymphocyte % 01/30/2020 33.3    • Monocyte % 01/30/2020 10.6    • Eosinophil % 01/30/2020 2.1    • Basophil % 01/30/2020 0.4    • Immature Grans % 01/30/2020 0.4    • Neutrophils, Absolute 01/30/2020 3.54    • Lymphocytes, Absolute 01/30/2020 2.22    • Monocytes, Absolute 01/30/2020 0.71    • Eosinophils, Absolute 01/30/2020 0.14    • Basophils, Absolute 01/30/2020 0.03    • Immature Grans, Absolute 01/30/2020 0.03    • nRBC 01/30/2020 0.0    Hospital Outpatient Visit on 01/07/2020   Component Date Value   • WBC 01/07/2020 6.50    • RBC 01/07/2020 4.27    • Hemoglobin 01/07/2020 13.0    • Hematocrit 01/07/2020 38.3    • MCV 01/07/2020 89.7    • MCH 01/07/2020 30.5    • MCHC 01/07/2020 34.0    • RDW 01/07/2020 12.8    • RDW-SD 01/07/2020 41.1    • MPV 01/07/2020 8.7    • Platelets 01/07/2020 169    • Glucose 01/07/2020 103*   • BUN 01/07/2020 16    • Creatinine 01/07/2020 0.90    • Sodium 01/07/2020 141    • Potassium 01/07/2020 4.1    • Chloride 01/07/2020 102    • CO2 01/07/2020 26.0    • Calcium 01/07/2020 8.9    • eGFR Non  Amer 01/07/2020 66    • BUN/Creatinine Ratio 01/07/2020 17.8    • Anion Gap 01/07/2020 13.0    • Echo EF Estimated 01/07/2020 55    Admission on 11/26/2019, Discharged on 11/27/2019   Component Date Value   • Glucose 11/26/2019 130*   • BUN  11/26/2019 16    • Creatinine 11/26/2019 0.96    • Sodium 11/26/2019 139    • Potassium 11/26/2019 3.3*   • Chloride 11/26/2019 96*   • CO2 11/26/2019 27.0    • Calcium 11/26/2019 9.5    • eGFR Non  Amer 11/26/2019 62    • BUN/Creatinine Ratio 11/26/2019 16.7    • Anion Gap 11/26/2019 16.0*   • proBNP 11/26/2019 258.9    • Troponin T 11/26/2019 <0.010    • D-Dimer, Quantitative 11/26/2019 0.56    • WBC 11/26/2019 7.60    • RBC 11/26/2019 3.89    • Hemoglobin 11/26/2019 12.1    • Hematocrit 11/26/2019 35.5    • MCV 11/26/2019 91.1    • MCH 11/26/2019 31.2    • MCHC 11/26/2019 34.3    • RDW 11/26/2019 12.9    • RDW-SD 11/26/2019 41.1    • MPV 11/26/2019 9.4    • Platelets 11/26/2019 184    • Neutrophil % 11/26/2019 62.4    • Lymphocyte % 11/26/2019 26.9    • Monocyte % 11/26/2019 8.6    • Eosinophil % 11/26/2019 1.7    • Basophil % 11/26/2019 0.4    • Neutrophils, Absolute 11/26/2019 4.70    • Lymphocytes, Absolute 11/26/2019 2.00    • Monocytes, Absolute 11/26/2019 0.70    • Eosinophils, Absolute 11/26/2019 0.10    • Basophils, Absolute 11/26/2019 0.00    • nRBC 11/26/2019 0.0    • Protime 11/26/2019 10.8    • INR 11/26/2019 1.03    • Troponin T 11/26/2019 <0.010    • Troponin T 11/27/2019 <0.010    • Potassium 11/26/2019 3.3*   • proBNP 11/27/2019 401.3    • WBC 11/27/2019 8.70    • RBC 11/27/2019 3.88    • Hemoglobin 11/27/2019 12.1    • Hematocrit 11/27/2019 35.5    • MCV 11/27/2019 91.4    • MCH 11/27/2019 31.2    • MCHC 11/27/2019 34.2    • RDW 11/27/2019 13.1    • RDW-SD 11/27/2019 42.0    • MPV 11/27/2019 9.3    • Platelets 11/27/2019 185    • Neutrophil % 11/27/2019 54.1    • Lymphocyte % 11/27/2019 34.1    • Monocyte % 11/27/2019 9.0    • Eosinophil % 11/27/2019 2.4    • Basophil % 11/27/2019 0.4    • Neutrophils, Absolute 11/27/2019 4.70    • Lymphocytes, Absolute 11/27/2019 3.00    • Monocytes, Absolute 11/27/2019 0.80    • Eosinophils, Absolute 11/27/2019 0.20    • Basophils, Absolute 11/27/2019  0.00    • nRBC 11/27/2019 0.0    • Creatine Kinase 11/27/2019 121    • Glucose 11/27/2019 112*   • BUN 11/27/2019 16    • Creatinine 11/27/2019 0.99    • Sodium 11/27/2019 139    • Potassium 11/27/2019 3.5    • Chloride 11/27/2019 97*   • CO2 11/27/2019 30.0*   • Calcium 11/27/2019 9.4    • Total Protein 11/27/2019 7.1    • Albumin 11/27/2019 4.10    • ALT (SGPT) 11/27/2019 22    • AST (SGOT) 11/27/2019 16    • Alkaline Phosphatase 11/27/2019 109    • Total Bilirubin 11/27/2019 0.3    • eGFR Non African Amer 11/27/2019 59*   • Globulin 11/27/2019 3.0    • A/G Ratio 11/27/2019 1.4    • BUN/Creatinine Ratio 11/27/2019 16.2    • Anion Gap 11/27/2019 12.0    • Ferritin 11/27/2019 173.90*   • Hemoglobin A1C 11/27/2019 5.2    • Total Cholesterol 11/27/2019 145    • Triglycerides 11/27/2019 212*   • HDL Cholesterol 11/27/2019 52    • LDL Cholesterol  11/27/2019 51    • VLDL Cholesterol 11/27/2019 42.4    • LDL/HDL Ratio 11/27/2019 0.97    • Magnesium 11/27/2019 1.9    • Phosphorus 11/27/2019 5.2*   • TSH 11/27/2019 2.310    • Troponin T 11/27/2019 <0.010    Hospital Outpatient Visit on 10/31/2019   Component Date Value   • BSA 10/31/2019 1.8    • RVIDd 10/31/2019 1.7    • IVSd 10/31/2019 1.3    • LVIDd 10/31/2019 5.1    • LVIDs 10/31/2019 3.5    • LVPWd 10/31/2019 1.2    • IVS/LVPW 10/31/2019 1.1    • FS 10/31/2019 31.3    • EDV(Teich) 10/31/2019 122.9    • ESV(Teich) 10/31/2019 50.7    • EF(Teich) 10/31/2019 58.8    • EDV(cubed) 10/31/2019 131.4    • ESV(cubed) 10/31/2019 42.7    • EF(cubed) 10/31/2019 67.5    • LV mass(C)d 10/31/2019 247.3    • LV mass(C)dI 10/31/2019 134.5    • SV(Teich) 10/31/2019 72.2    • SI(Teich) 10/31/2019 39.3    • SV(cubed) 10/31/2019 88.7    • SI(cubed) 10/31/2019 48.2    • Ao root diam 10/31/2019 2.9    • Ao root area 10/31/2019 6.8    • ACS 10/31/2019 1.9    • LA dimension 10/31/2019 3.1    • LA/Ao 10/31/2019 1.0    • LVOT diam 10/31/2019 2.2    • LVOT area 10/31/2019 3.8    • EDV(MOD-sp4)  10/31/2019 111.9    • ESV(MOD-sp4) 10/31/2019 47.0    • EF(MOD-sp4) 10/31/2019 58.0    • SV(MOD-sp4) 10/31/2019 64.9    • SI(MOD-sp4) 10/31/2019 35.3    • Ao root area (BSA correc* 10/31/2019 1.6    • LV Amin Vol (BSA correct* 10/31/2019 60.9    • LV Sys Vol (BSA correcte* 10/31/2019 25.6    • MV E max bernie 10/31/2019 55.2    • MV A max bernie 10/31/2019 70.5    • MV E/A 10/31/2019 0.78    • MV V2 max 10/31/2019 76.4    • MV max PG 10/31/2019 2.3    • MV V2 mean 10/31/2019 36.5    • MV mean PG 10/31/2019 0.64    • MV V2 VTI 10/31/2019 25.2    • MVA(VTI) 10/31/2019 3.5    • Ao pk bernie 10/31/2019 147.0    • Ao max PG 10/31/2019 8.6    • Ao max PG (full) 10/31/2019 3.5    • Ao V2 mean 10/31/2019 95.1    • Ao mean PG 10/31/2019 4.2    • Ao mean PG (full) 10/31/2019 2.1    • Ao V2 VTI 10/31/2019 28.2    • HERNANDEZ(I,A) 10/31/2019 3.2    • HERNANDEZ(I,D) 10/31/2019 3.2    • HERNANDEZ(V,A) 10/31/2019 2.9    • HERNANDEZ(V,D) 10/31/2019 2.9    • AI max bernie 10/31/2019 482.5    • AI max PG 10/31/2019 93.2    • AI dec slope 10/31/2019 163.8    • AI dec time 10/31/2019 3.0    • AI P1/2t 10/31/2019 862.9    • LV V1 max PG 10/31/2019 5.1    • LV V1 mean PG 10/31/2019 2.1    • LV V1 max 10/31/2019 113.1    • LV V1 mean 10/31/2019 65.8    • LV V1 VTI 10/31/2019 23.7    • MR max bernie 10/31/2019 379.0    • MR max PG 10/31/2019 57.5    • SV(Ao) 10/31/2019 192.3    • SI(Ao) 10/31/2019 104.6    • SV(LVOT) 10/31/2019 89.0    • SI(LVOT) 10/31/2019 48.4    • PA V2 max 10/31/2019 89.3    • PA max PG 10/31/2019 3.2    • PA acc time 10/31/2019 0.11    • PI end-d bernie 10/31/2019 104.9    • TR max bernie 10/31/2019 309.8    • RVSP(TR) 10/31/2019 55.1    • RAP systole 10/31/2019 10.0    • PA pr(Accel) 10/31/2019 27.6    • BH CV ECHO PERICO - BZI_BMI 10/31/2019 29.7    • BH CV ECHO PERICO - BSA(HA* 10/31/2019 1.9    • BH CV ECHO PERICO - BZI_ME* 10/31/2019 78.5    • BH CV ECHO PERICO - BZI_ME* 10/31/2019 162.6    • Target HR (85%) 10/31/2019 145    • Max. Pred. HR (100%) 10/31/2019 170     • EF(MOD-bp) 10/31/2019 55.0    • RVDd(2D) 10/31/2019 1.7                 Assessment and plan:         1. LAD STEMI/CAD, September 2019  - s/p PCI to the proximal / ostial to mid LAD with Xience 3.5 x 33 mm drug-eluting stent postdilated to 4.5 mm at 18 familia with good angiographic results.  - residual borderline lesions in the distal LAD, asymptomatic, no ischemia seen on noninvasive stress test.  Nonobstructive disease in the circumflex and RCA.  -Dual platelet therapy aspirin and Effient uninterrupted 1 year, consider longer-term Plavix therapy with benefits up to 30 months per DAPT trial with low bleeding risk and residual lesions possible further unstable plaque.  -High intensity statin therapy per guidelines   - on spironolactone, continue 25 daily  -Continue metop XL 25 daily   -Continue Lasix 40 daily  Continue lisinopril 5 mg daily  Continue atorvastatin to 80 daily        Dyspnea on exertion  Possibly related to chronic smoking and physical deconditioning  No evidence of pulmonary hypertension  Continue exercise per AHA guidelines  Heart catheterization results not consistent with interatrial shunt or congenital heart disease or ASD PFO significantly     Noninvasive stress with walking greater than 9 minutes by Thad protocol with no evidence of perfusion abnormality at rest or stress imaging.  Low risk study for significant ischemia.     2. acute systolic HF, much improved EF now greater than 55%  - improved, impella removed 9/29  -Estimated EF  greater than 50% with trace to  very mild anterior wall hypokinesis regionally, continue max medical therapy  Metoprolol as above   ACE inhibitor as above, blood pressure goal preventing further titration   statin as above     4. H/o Essential hypertension, continue lisinopril, diuresis, recheck at home which is been less than 135 systolic.,  115/82 today     4. Hyperlipidemia  -High intensity statin therapy on board     5. Elevated  LFTs  -Resolved     6.  Anxiety  per primary care     7. Hypokalemia / Hypomagnesium  Stable previously       Return to clinic in 3 months via video visit     Brant Martinez MD, PhD      greater than 25 minutes spent face-to-face with the patient greater 50% time discussing congenital right heart cath results, stress test results, 2D echo results, medications, dyspnea on exertion work-up to possibly include PFTs, therapeutic options, plan of care, advice for weight loss medications and questions answered by the patient.         Level of Care:                 Brant Martinez MD  04/30/20  .

## 2020-04-30 NOTE — OUTREACH NOTE
Prep Survey      Responses   [facility] patient discharged from?  [place]   Is LACE score < 7 ?  Yes   Eligibility  [facility]   Date of Admission  04/28/20   Date of Discharge  04/29/20   Discharge Disposition  Home or Self Care   Discharge diagnosis  Chest pain    COVID-19 Test Status  Not tested   Does the patient have one of the following disease processes/diagnoses(primary or secondary)?  Other   Does the patient have Home health ordered?  No   Is there a DME ordered?  No   Prep survey completed?  Yes          Ginny Varma RN

## 2020-04-30 NOTE — OUTREACH NOTE
Call Center TCM Note      Responses   [facility] patient discharged from?  [place]   COVID-19 Test Status  Not tested   Does the patient have one of the following disease processes/diagnoses(primary or secondary)?  Other   TCM attempt successful?  Yes   Call start time  1407   Call end time  1413   Discharge diagnosis  Chest pain    Meds reviewed with patient/caregiver?  Yes   Is the patient having any side effects they believe may be caused by any medication additions or changes?  No   Does the patient have all medications ordered at discharge?  Yes   Is the patient taking all medications as directed (includes completed medication regime)?  Yes   Does the patient have a primary care provider?   Yes   Does the patient have an appointment with their PCP within 7 days of discharge?  No   What is preventing the patient from scheduling follow up appointments within 7 days of discharge?  Haven't had time   Nursing Interventions  Advised patient to make appointment   Urgent appointment interventions  Facilitated patient appointment   Has the patient kept scheduled appointments due by today?  N/A   Has home health visited the patient within 72 hours of discharge?  N/A   What DME was ordered?  -- [none ordered ]   Psychosocial issues?  Yes   Nursing interventions  Notified PCP/Provider   Psychosocial comments  -- [Pt has a lot of anxiety about recent friends passing away ]   Did the patient receive a copy of their discharge instructions?  Yes   Nursing interventions  Reviewed instructions with patient   What is the patient's perception of their health status since discharge?  Improving [No chest pain, feeling fine. ]   Is the patient/caregiver able to teach back signs and symptoms related to disease process for when to call PCP?  Yes   Is the patient/caregiver able to teach back signs and symptoms related to disease process for when to call 911?  Yes   Is the patient/caregiver able to teach back the hierarchy of who to  call/visit for symptoms/problems? PCP, Specialist, Home health nurse, Urgent Care, ED, 911  Yes   TCM call completed?  Yes          Miryam Quan RN    4/30/2020, 14:14

## 2020-05-01 ENCOUNTER — TELEMEDICINE (OUTPATIENT)
Dept: FAMILY MEDICINE CLINIC | Facility: CLINIC | Age: 50
End: 2020-05-01

## 2020-05-01 VITALS — SYSTOLIC BLOOD PRESSURE: 112 MMHG | DIASTOLIC BLOOD PRESSURE: 74 MMHG

## 2020-05-01 DIAGNOSIS — F41.1 GENERALIZED ANXIETY DISORDER: Chronic | ICD-10-CM

## 2020-05-01 DIAGNOSIS — N28.9 RENAL INSUFFICIENCY: ICD-10-CM

## 2020-05-01 DIAGNOSIS — I25.2 HISTORY OF ST ELEVATION MYOCARDIAL INFARCTION (STEMI): Primary | ICD-10-CM

## 2020-05-01 DIAGNOSIS — I10 HYPERTENSION, BENIGN: Chronic | ICD-10-CM

## 2020-05-01 DIAGNOSIS — I25.118 CORONARY ARTERY DISEASE OF NATIVE ARTERY OF NATIVE HEART WITH STABLE ANGINA PECTORIS (HCC): ICD-10-CM

## 2020-05-01 DIAGNOSIS — Z83.3 FAMILY HISTORY OF DIABETES MELLITUS TYPE II: ICD-10-CM

## 2020-05-01 DIAGNOSIS — Z87.891 HISTORY OF TOBACCO USE: ICD-10-CM

## 2020-05-01 DIAGNOSIS — E66.9 OBESITY (BMI 30-39.9): Chronic | ICD-10-CM

## 2020-05-01 PROBLEM — E87.6 HYPOKALEMIA: Status: RESOLVED | Noted: 2019-11-26 | Resolved: 2020-05-01

## 2020-05-01 PROBLEM — R07.9 CHEST PAIN: Status: RESOLVED | Noted: 2019-11-26 | Resolved: 2020-05-01

## 2020-05-01 PROBLEM — D64.9 NORMOCYTIC ANEMIA: Status: RESOLVED | Noted: 2019-10-01 | Resolved: 2020-05-01

## 2020-05-01 PROBLEM — E83.51 HYPOCALCEMIA: Status: RESOLVED | Noted: 2019-10-01 | Resolved: 2020-05-01

## 2020-05-01 PROBLEM — R06.02 SHORTNESS OF BREATH: Status: RESOLVED | Noted: 2020-02-20 | Resolved: 2020-05-01

## 2020-05-01 PROBLEM — D69.6 THROMBOCYTOPENIA: Status: RESOLVED | Noted: 2019-10-01 | Resolved: 2020-05-01

## 2020-05-01 PROCEDURE — 99214 OFFICE O/P EST MOD 30 MIN: CPT | Performed by: FAMILY MEDICINE

## 2020-05-01 NOTE — PROGRESS NOTES
Transitional Care Follow Up Visit  Subjective      This is a video visit.    Sada Le is a 49 y.o. female who presents for a transitional care management visit.    Sada was seen at [facility]. She was admitted on 04/28/2020 for hypertension, chest pain.She has a history of CAD with STEMI in 09/2019 s/p stenting and chronic systolic heart failure. She was discharged on 04/29/2020. Discharge diagnosis was chest pain due to myocardial ischemia, renal insufficiency. Labs that were performed during the encounter included: CMP-elevated creatinine and BUN, decreased eGFR, normal liver, sugar and electrolytes, CBC-normal and Cardiac-troponin negative. Diagnostic studies that were performed included: EKG-no acute ST/T wave abnormailites, stress test- left ventricular EF >70%, normal myocardial perfusion study with no evidence of ischemia. and Echo-Left ventricular mass index is increased, left ventricular wall thickness is consistent with mild concentric hypertrophy, left ventricular systolic function normal and diastolic dysfunction grade 1 a consistent with impaired relaxation, mild aortic valve regurgitation is present, mild dilation of the aortic root is present, mild dilation of the sinuses of Valsalva is present. Sada had a video visit with Dr. Martinez-cardiology for a follow up on CAD on 04/30/2020. She was instructed to continue current medications and monitor blood pressure twice daily. Currently Sada receives care at home. Complications from the hospital stay include none. The patient stated that they do not need help with their daily life and activities. The patient stated that they do have emotional support at home.      Chest Pain    This is a new problem. The current episode started in the past 7 days. The onset quality is sudden. The problem has been gradually improving. The pain is present in the substernal region. The pain is mild. The quality of the pain is described as pressure.  Associated symptoms include headaches, palpitations (occasional) and shortness of breath. Pertinent negatives include no abdominal pain, cough, dizziness, fever, irregular heartbeat, nausea, near-syncope, numbness, orthopnea, PND, syncope, vomiting or weakness.   Her past medical history is significant for CAD. Prior diagnostic workup includes chest x-ray and stress echo.   Coronary Artery Disease   Presents for follow-up visit. Symptoms include chest pressure, palpitations (occasional) and shortness of breath. Pertinent negatives include no chest pain, chest tightness, dizziness or leg swelling. The symptoms have been stable. Compliance with diet is good. Compliance with exercise is good. Compliance with medications is good.   Hypertension   This is a chronic problem. The current episode started more than 1 year ago. The problem has been gradually improving since onset. The problem is uncontrolled. Associated symptoms include headaches, palpitations (occasional) and shortness of breath. Pertinent negatives include no chest pain, orthopnea or PND. Risk factors for coronary artery disease include dyslipidemia, obesity and post-menopausal state. Current antihypertension treatment includes diuretics, beta blockers and ACE inhibitors. The current treatment provides moderate improvement. Hypertensive end-organ damage includes CAD/MI.       Within 48 business hours after discharge our office contacted her via telephone to coordinate her care and needs.      I reviewed and discussed the details of that call along with the discharge summary, hospital problems, inpatient lab results, inpatient diagnostic studies, and consultation reports with Sada.     Current outpatient and discharge medications have been reconciled for the patient.    Date of TCM Phone Call 4/29/2020   [facility]   Date of Admission 4/28/2020   Date of Discharge 4/29/2020   Discharge Disposition Home or Self Care     Risk for  Readmission (LACE) Score: 3 (4/29/2020  6:00 AM)      Course During Hospital Stay: Sada presented to ER on 04/28/2020 with chest pain. She has a history of CAD with STEMI in 09/2019 s/p stenting and chronic systolic heart failure. EKG showed no acute ST/T wave abnormalities. Serial troponin was negative. Stress test was unremarkable with no evidence of ischemia. She was discharged on 04/29/2020.     The following portions of the patient's history were reviewed and updated as appropriate: allergies, current medications, past family history, past medical history, past social history, past surgical history and problem list.    Visit Vitals  /74   LMP 01/01/1996 (Approximate)         Current Outpatient Medications:   •  aspirin 81 MG chewable tablet, Chew 1 tablet Daily., Disp: 30 tablet, Rfl: 6  •  atorvastatin (LIPITOR) 80 MG tablet, TAKE 1 TABLET BY MOUTH EVERY DAY, Disp: 90 tablet, Rfl: 1  •  buPROPion XL (WELLBUTRIN XL) 150 MG 24 hr tablet, Take 1 tablet by mouth Daily., Disp: 30 tablet, Rfl: 12  •  coenzyme Q10 100 MG capsule, Take 200 mg by mouth 2 (Two) Times a Day., Disp: , Rfl:   •  furosemide (LASIX) 40 MG tablet, Take 1 tablet by mouth Daily., Disp: 60 tablet, Rfl: 12  •  lisinopril (PRINIVIL,ZESTRIL) 5 MG tablet, TAKE 1 TABLET BY MOUTH TWICE A DAY, Disp: 180 tablet, Rfl: 1  •  melatonin 5 MG tablet tablet, Take 10 mg by mouth At Night As Needed., Disp: , Rfl:   •  metoprolol succinate XL (TOPROL-XL) 25 MG 24 hr tablet, TAKE 1 TABLET BY MOUTH EVERY DAY, Disp: 90 tablet, Rfl: 1  •  nitroglycerin (NITROSTAT) 0.4 MG SL tablet, Place 1 tablet under the tongue Every 5 (Five) Minutes As Needed for Chest Pain (Only if SBP Greater Than 100)., Disp: 100 tablet, Rfl: 12  •  polyethylene glycol (MIRALAX) packet, Take 17 g by mouth Daily As Needed., Disp: , Rfl:   •  prasugrel (EFFIENT) 10 MG tablet, Take 1 tablet by mouth Daily., Disp: 30 tablet, Rfl: 6  •  spironolactone (ALDACTONE) 25 MG tablet, Take 1  tablet by mouth Daily., Disp: 30 tablet, Rfl: 5  •  vitamin B-12 (CYANOCOBALAMIN) 500 MCG tablet, Take 500 mcg by mouth Daily., Disp: , Rfl:     Review of Systems   Constitutional: Negative for activity change, appetite change, fatigue and fever.   HENT: Negative for congestion and sore throat.    Eyes: Negative for visual disturbance.   Respiratory: Positive for shortness of breath. Negative for cough, chest tightness and wheezing.    Cardiovascular: Positive for palpitations (occasional). Negative for chest pain, orthopnea, leg swelling, syncope, PND and near-syncope.   Gastrointestinal: Negative for abdominal pain, blood in stool, constipation, diarrhea, nausea and vomiting.   Endocrine: Negative for cold intolerance, heat intolerance, polydipsia and polyuria.   Genitourinary: Negative for difficulty urinating, dysuria, flank pain and frequency.   Musculoskeletal: Negative for arthralgias and myalgias.   Skin: Negative for color change and rash.   Allergic/Immunologic: Negative for immunocompromised state.   Neurological: Positive for headaches. Negative for dizziness, syncope, weakness and numbness.   Hematological: Negative for adenopathy. Does not bruise/bleed easily.   Psychiatric/Behavioral: Negative for confusion, dysphoric mood and suicidal ideas. The patient is not nervous/anxious.        I have reviewed and confirmed the accuracy of the ROS as documented by the MA/LPN/RN Yodit Johnston MD      Objective   Physical Exam   Constitutional: She appears well-developed and well-nourished. No distress.   Pulmonary/Chest: Effort normal.   No SOA to observation.    Skin: No pallor.       Assessment/Plan   Problem List Items Addressed This Visit        High    Hypertension, benign (Chronic)    Overview     Controlled.   Low salt low calorie diet and regular exercise and followup in 1 mo  She will monitor her pressures and notify us of her pressures          Coronary artery disease of native artery of native  heart with stable angina pectoris (CMS/[facility])    Overview     No sxs at present, previous angina resolved.          Renal insufficiency    Overview     BUN 24, Creatinine 1.09 and eGFR 53 04/28/2020  BUN 23, Creatinine 1.11 and eGFR 52 02/21/2020  BUN 16, Creatinine 0.88 and eGFR 68 01/30/2020         History of ST elevation myocardial infarction (STEMI) - Primary    Overview     09/2019 S/p stenting pf LAD ewith  chronic systolic heart failure EF 04/28/2020 60% she is doing well.             Low    Obesity (BMI 30-39.9) (Chronic)    Overview     She has lost 40 lb over the last yr. She continues follow a good diet. She will increase exercise.          Generalized anxiety disorder (Chronic)    Overview     Continue Wellbutrin and follow         History of tobacco use    Overview     1 PPD X 34 years, she is abstaining though it is difficult.   We discussed techniques and the importance of continuing to abstain.   CT of chest for lung cancer screening will be arranged.          Family history of diabetes mellitus type II    Overview     Father and MGM  Importance of diet and exercise discussed, prediabetes understood.                       There are no Patient Instructions on file for this visit.

## 2020-05-04 RX ORDER — ASPIRIN 81 MG/1
81 TABLET, CHEWABLE ORAL DAILY
Qty: 30 TABLET | Refills: 11 | Status: SHIPPED | OUTPATIENT
Start: 2020-05-04 | End: 2021-02-11

## 2020-05-06 LAB
BH CV NUCLEAR PRIOR STUDY: 3
BH CV STRESS BP STAGE 1: NORMAL
BH CV STRESS BP STAGE 2: NORMAL
BH CV STRESS BP STAGE 3: NORMAL
BH CV STRESS DURATION MIN STAGE 1: 3
BH CV STRESS DURATION MIN STAGE 2: 3
BH CV STRESS DURATION MIN STAGE 3: 3
BH CV STRESS DURATION SEC STAGE 1: 0
BH CV STRESS DURATION SEC STAGE 2: 0
BH CV STRESS DURATION SEC STAGE 3: 0
BH CV STRESS GRADE STAGE 1: 10
BH CV STRESS GRADE STAGE 2: 12
BH CV STRESS GRADE STAGE 3: 14
BH CV STRESS HR STAGE 1: 94
BH CV STRESS HR STAGE 2: 112
BH CV STRESS HR STAGE 3: 144
BH CV STRESS METS STAGE 1: 5
BH CV STRESS METS STAGE 2: 7.5
BH CV STRESS METS STAGE 3: 10
BH CV STRESS PROTOCOL 1: NORMAL
BH CV STRESS RECOVERY BP: NORMAL MMHG
BH CV STRESS RECOVERY HR: 98 BPM
BH CV STRESS SPEED STAGE 1: 1.7
BH CV STRESS SPEED STAGE 2: 2.5
BH CV STRESS SPEED STAGE 3: 3.4
BH CV STRESS STAGE 1: 1
BH CV STRESS STAGE 2: 2
BH CV STRESS STAGE 3: 3
MAXIMAL PREDICTED HEART RATE: 170 BPM
PERCENT MAX PREDICTED HR: 84.71 %
STRESS BASELINE BP: NORMAL MMHG
STRESS BASELINE HR: 72 BPM
STRESS PERCENT HR: 100 %
STRESS POST PEAK BP: NORMAL MMHG
STRESS POST PEAK HR: 144 BPM
STRESS TARGET HR: 145 BPM

## 2020-05-06 PROCEDURE — 78452 HT MUSCLE IMAGE SPECT MULT: CPT | Performed by: INTERNAL MEDICINE

## 2020-05-06 PROCEDURE — 93018 CV STRESS TEST I&R ONLY: CPT | Performed by: NURSE PRACTITIONER

## 2020-05-23 PROCEDURE — 93010 ELECTROCARDIOGRAM REPORT: CPT | Performed by: INTERNAL MEDICINE

## 2020-06-05 DIAGNOSIS — E78.2 MIXED HYPERLIPIDEMIA: Primary | Chronic | ICD-10-CM

## 2020-06-05 RX ORDER — ROSUVASTATIN CALCIUM 20 MG/1
20 TABLET, COATED ORAL DAILY
Qty: 30 TABLET | Refills: 12 | Status: SHIPPED | OUTPATIENT
Start: 2020-06-05 | End: 2020-06-30

## 2020-06-05 NOTE — TELEPHONE ENCOUNTER
Sada rollins stated she is on atorvastatin  80 mg daily, having muscle ache.She question if she can start another medication.    Per Dr Johnston , we can try crestor 20 mg daily, it is  better than atorvastatin, we will start with 20 mg daily , there is  large dose if we need it. We will repeat fasting lipids in 4-6 weeks.

## 2020-06-29 ENCOUNTER — TELEPHONE (OUTPATIENT)
Dept: CARDIOLOGY | Facility: CLINIC | Age: 50
End: 2020-06-29

## 2020-06-29 DIAGNOSIS — I25.118 CORONARY ARTERY DISEASE OF NATIVE ARTERY OF NATIVE HEART WITH STABLE ANGINA PECTORIS (HCC): Primary | ICD-10-CM

## 2020-06-29 RX ORDER — PRASUGREL 10 MG/1
10 TABLET, FILM COATED ORAL DAILY
Qty: 30 TABLET | Refills: 11 | Status: SHIPPED | OUTPATIENT
Start: 2020-06-29 | End: 2021-02-11

## 2020-06-29 NOTE — TELEPHONE ENCOUNTER
20.--Dr Martinez  Mrs. Le called, she needs a refill called in to her Pharmacy for her Effient 10mg, #30, 1 tab daily. I verified her pharmacy she still uses the [facility] that is in her chart. She is also on Crestor and she states that she is having bad muscle pain. She is wondering if we could talk with Dr Martinez regarding this. Her  is 1970, and her # is

## 2020-06-30 RX ORDER — ATORVASTATIN CALCIUM 40 MG/1
40 TABLET, FILM COATED ORAL DAILY
COMMUNITY
End: 2020-08-06 | Stop reason: SDUPTHER

## 2020-08-06 DIAGNOSIS — I25.118 CORONARY ARTERY DISEASE OF NATIVE ARTERY OF NATIVE HEART WITH STABLE ANGINA PECTORIS (HCC): Primary | ICD-10-CM

## 2020-08-06 RX ORDER — ATORVASTATIN CALCIUM 40 MG/1
40 TABLET, FILM COATED ORAL DAILY
Qty: 90 TABLET | Refills: 1 | Status: SHIPPED | OUTPATIENT
Start: 2020-08-06 | End: 2020-09-02 | Stop reason: SDUPTHER

## 2020-08-06 RX ORDER — ATORVASTATIN CALCIUM 80 MG/1
TABLET, FILM COATED ORAL
Qty: 90 TABLET | Refills: 1 | OUTPATIENT
Start: 2020-08-06

## 2020-08-06 RX ORDER — SPIRONOLACTONE 25 MG/1
TABLET ORAL
Qty: 90 TABLET | Refills: 1 | Status: SHIPPED | OUTPATIENT
Start: 2020-08-06 | End: 2021-01-28

## 2020-08-06 RX ORDER — LISINOPRIL 5 MG/1
TABLET ORAL
Qty: 180 TABLET | Refills: 1 | Status: SHIPPED | OUTPATIENT
Start: 2020-08-06 | End: 2021-01-28

## 2020-09-02 ENCOUNTER — TELEMEDICINE (OUTPATIENT)
Dept: CARDIOLOGY | Facility: CLINIC | Age: 50
End: 2020-09-02

## 2020-09-02 VITALS — SYSTOLIC BLOOD PRESSURE: 113 MMHG | DIASTOLIC BLOOD PRESSURE: 76 MMHG | HEART RATE: 55 BPM

## 2020-09-02 DIAGNOSIS — I25.118 CORONARY ARTERY DISEASE OF NATIVE ARTERY OF NATIVE HEART WITH STABLE ANGINA PECTORIS (HCC): ICD-10-CM

## 2020-09-02 DIAGNOSIS — I71.20 THORACIC AORTIC ANEURYSM WITHOUT RUPTURE (HCC): Primary | ICD-10-CM

## 2020-09-02 PROCEDURE — 99214 OFFICE O/P EST MOD 30 MIN: CPT | Performed by: INTERNAL MEDICINE

## 2020-09-02 RX ORDER — FOLIC ACID 0.8 MG
1 TABLET ORAL DAILY
COMMUNITY
End: 2021-03-03

## 2020-09-02 RX ORDER — ATORVASTATIN CALCIUM 20 MG/1
20 TABLET, FILM COATED ORAL DAILY
Qty: 90 TABLET | Refills: 1 | Status: SHIPPED | OUTPATIENT
Start: 2020-09-02 | End: 2021-02-11

## 2020-09-02 NOTE — PROGRESS NOTES
Subjective:     Encounter Date:09/02/2020      Patient ID: Sada Le is a 49 y.o. female.    Chief Complaint:  Chief Complaint   Patient presents with   • Follow-up       HPI:  History of Present Illness  I the pleasure to see this patient who is a 49-year-old female who is well-known to me from prior hospitalization and clinic notes.  She has a history of anterior ST elevation myocardial infarction with complete occlusion of the proximal LAD under going revascularization with Xience 3.5 x 33 mm drug-eluting stent postdilated to 4.5 mm at 18 familia with good results.  This was an Impella supported procedure given under  Cardiogenic shock in the setting of acute MI which was complicated by VF arrest with successful defibrillation.  She did well with weaning Impella support over 2 days and institution of max goal-directed medical therapy ultimately being discharged to home.  We were very aggressive with her medical therapy as well as antiplatelets and her EF improved from around 28 to 30% to 55% with only ever so slight anterior wall very mild hypokinesis residually.  She has done well since this time and is completely quit smoking.  She had nonobstructive disease in the circumflex and RCA as well as mid to distal LAD.  She was changed secondary to shortness of breath from Brilinta to Effient which she is maintained on aspirin and Effient, she is on high intensity statin therapy as well as Aldactone and long-acting beta-blocker and loop diuretics 40 twice daily of Lasix.  She is on ACE inhibitor at moderate to high intensity as well tolerating this just fine without cough.  She has had a recent hospitalization for shortness of breath and underwent right heart catheterization with no evidence of pulmonary hypertension despite volume loading during the procedure with evidence of volume depletion initially with right atrial pressure of 2 to 3 mmHg.  Results of this are below.  She had recent hospitalization  for  atypical substernal chest pain also with jaw pain.  Troponins were unremarkable, EKG was unchanged, blood pressure has been somewhat labile at home recently she says and when high causes some degree of chest discomfort.  She underwent noninvasive evaluation with treadmill stress test and walked greater than 9 minutes with no EKG changes and she had normal myocardial perfusion at rest and stress.  There was no indication of balanced ischemia that would be suspected that may give false negative results relative to perfusion imaging.  Her echo showed normal preserved LV systolic function with ever so slight mild anterior wall hypokinesis compared to thickening of the posterior lateral walls.  EF was 55 to 60%.  Normal atrial sizes, no evidence of pulmonary hypertension, IVC diameter was normal.  She was discharged shortly thereafter.  She has been followed up now 3 to 4 months later with no recurrence of symptoms, stable NYHA class I-II symptoms, no anginal chest pain although she does have intermittent dizziness with standing her blood pressure is slightly low and we have titrated down her beta-blocker secondary to low resting heart rates in the 60s, she is maintained on low-dose twice daily lisinopril as well as Aldactone given her previous ischemic cardiomyopathy which is now recovered to near normal at EF of 50% with very mild residual anterior hypokinesis as stated by 2D echo.  Otherwise she is doing well with no medicine changes today needed.    Review of systems x14 point review of systems is negative except was mentioned above    Historical data copied forward from previous encounters and EMR is unchanged  Unable to complete visit using a video connection to the patient. A phone visit was used to complete this visits. Total time of discussion and encounter was 26 minutes.    Stress test results 4-  Interpretation Summary         · Findings consistent with a normal ECG stress test.  · Left ventricular  ejection fraction is hyperdynamic (Calculated EF > 70%).  · Myocardial perfusion imaging indicates a normal myocardial perfusion study with no evidence of ischemia.  · There is no prior study available for comparison.     Normal stress myocardial perfusion imaging  Normal stress ECG portion of the study with no ischemic ST-T wave changes  Sinus rhythm increase to sinus tachycardia with target heart rate achieved with good predictability and sensitivity by criteria  Myocardial perfusion imaging was normal at rest as well as at peak stress with no reversibility preserved LV ejection fraction 66%     Low risk study for significant ischemia, do not suspect balanced ischemia            2D echo results 4-  Echocardiogram Findings      Left Ventricle Left ventricular systolic function is normal. Calculated EF = 60.0%. Estimated EF was in agreement with the calculated EF. Estimated EF appears to be in the range of 56 - 60%. Normal left ventricular cavity size noted. All left ventricular wall segments contract normally. Left ventricular wall thickness is consistent with mild concentric hypertrophy. Left ventricular mass is increased. Left ventricular mass index is increased. Septal wall motion is normal. Left ventricular diastolic dysfunction is noted (grade I a w/high LAP) consistent with impaired relaxation. There is no evidence of a left ventricular mass or thrombus present. There is no evidence of spontaneous contrast. No false tendon noted.   Right Ventricle Normal right ventricular cavity size, wall thickness, systolic function and septal motion noted. No evidence of a right ventricular mass present.   Left Atrium Normal left atrial size and volume noted. No evidence of a left atrial mass present. The interatrial septum does not appear to be redundant. No interatrial septal aneurysm present. No lipomatous hypertrophy of the interatrial septum present. Cannot exclude the presence of a patent foramen ovale. Saline  test for shunting not performed.   Right Atrium Normal right atrial size noted. The inferior vena cava is normally sized. Normal IVC inspiratory collapse of greater than 50% noted.   Aortic Valve The aortic valve is grossly normal in structure. Mild aortic valve regurgitation is present. No hemodynamically significant aortic valve stenosis is present.   Mitral Valve The mitral valve is grossly normal in structure. Trace mitral valve regurgitation is present. No significant mitral valve stenosis is present.   Tricuspid Valve The tricuspid valve is grossly normal. No evidence of tricuspid valve stenosis is present. Trace tricuspid valve regurgitation is present. Estimated right ventricular systolic pressure from tricuspid regurgitation is normal (<35 mmHg). No evidence of pulmonary hypertension is present.   Pulmonic Valve The pulmonic valve is grossly normal in structure. There is no significant pulmonic valve stenosis present. There is no significant pulmonic valve regurgitation present.   Greater Vessels Mild dilation of the aortic root is present. Mild dilation of the sinuses of Valsalva is present.   Pericardium There is no evidence of pericardial effusion. There is no evidence of ascites present.            February 2020 right heart catheterization  Results  Wedge pressure 3 mmHg  PA pressure 19/6 with mean of 11 to 13 mmHg  RV 20/1 with end-diastolic pressure of 2 mmHg  Right atrial pressure of 2 to 3 mmHg  PA saturation 71%  SVC 69%  RA 70%  IVC 71%  FA sat 98%  Dragan cardiac output 4.8  Dragan cardiac index 2.4  Dragan stroke-volume 79 cc  No indication of significant step up between the right atrium and pulmonary artery or in SVC versus IVC and right atrial saturation, no indication of significant resting interatrial shunt.  Conclusions and recommendations  Expected PA sat by flam calculation 70.5%, actual 71%  Qp/Qs: 0.98 insignificant left to right interatrial shunt resting     Left heart catheterization  results September 2019  Pre-procedure Diagnosis      STEMI        Conclusion       Brant Martinez MD, PhD  Date of service 9-29-19     Procedure  1.  Left heart catheterization with coronary angiography and left ventricular atrophy in ANTON position  2.  Impella CP insertion via left groin for the indication of cardiogenic shock in the setting of ST elevation myocardial infarction in the LAD territory  3.  Percutaneous coronary intervention to the proximal / ostial to mid LAD with Xience 3.5 x 33 mm drug-eluting stent postdilated to 4.5 mm at 18 familia with good angiographic results.  4.  Placement of balloon tip [place]-Venkat catheter to the main pulmonary artery via right common femoral vein with pressure assessment and cardiac output calculations and right heart catheterization, left in place for Impella weaning parameters.  5.  Antegrade selective left common iliac angiography with femoral runoff  6.  Retrograde right common femoral angiography        Indication  Left anterior descending artery/LAD ST elevation myocardial infarction with superimposed acute cardiogenic shock.     Technique  After informed consent the patient was brought to the catheterization lab emergently sterilely prepped and draped exposure bilateral groins for right common femoral arterial access via micropuncture and modified Seldinger technique with initial placement of a 6 [place] sheath to the right common femoral artery under fluoroscopic guidance.  1% lidocaine analgesia was used for local anesthesia.  Next secondary to findings of severe anterior ST elevations with marginal blood pressures access was also obtained via micropuncture and modified center technique with placement of a 6 [place] sheath to the left common femoral artery under fluoroscopic guidance after selective left iliac angiography with iliofemoral runoff via a JR4 catheter from the right groin with left-sided imaging accordingly.  Next a pigtail  catheter was advanced from the right groin and across the aortic valve followed by left ventricular end-diastolic pressure measurements and left ventricular atrophy in ANTON position revealing severely reduced LV systolic function estimated 20% with regional abnormalities severely in the anterior anterolateral and apical inferior wall with severe akinesis and mild to moderately dilated LV.  This was exchanged over an 035 guidewire for a diagnostic JL4 catheter to perform left sided angiography which was selectively engaged with the left main.  Multiple angiographic views orthogonal angiographic views demonstrated ostial proximal occlusion of the % with JANE 0 flow.  This catheter was removed followed by removal of the left 6 [place] sheath, Perclose device placement to the left common femoral arteriotomy and pre-close fashion via standard technique, and upsizing ultimately with placement of an Impella CP device via left common femoral approach via standard technique using 5 [place] pigtail catheter to cross the aortic valve, Impella wire placement to the LV, over the wire placement into the ventricle of the Impella CP device which was started and titrated up to P8 for LV ventricular unloading.  Throughout this process the patient had ischemic arrhythmias with runs of nonsustained ventricular tachycardia and short runs of ventricular fibrillation however patient had sustained ventricular fibrillation requiring defibrillation with 1 successful shock as well as loading with IV amiodarone.  Patient was ultimately extremely hypotensive and required recurrent IV pushes of 300 mcg of [place]wick-Synephrine as well as Levophed drip at max dose throughout the procedure.  After successful Impella placement and securing of the device via right common femoral approach a 7 [place] sheath was placed followed by 7 Danish EBU 4.0 guide to successfully engage the left main followed by run-through wire to the distal LAD over which lesion  was predilated with a 2.5 x 12 mm NC balloon with restoration of flow demonstrating tandem lesions in the LAD in the proximal ostial location with thrombotic occlusion followed by proximal to mid 80 to 90% eccentric narrowing immediately prior to the bifurcation of the LAD and dominant diagonal branch.  This balloon was exchanged for a 3.0 x 15 mm NC balloon which was used to predilate both the distal and further predilate the proximal lesion successfully.  This balloon was used for sizing and length followed by placement of a 3.5 x 33 mm Xience drug-eluting stent which was deployed at 12 familia with pullback inside the stent 1 strut up to 18 familia with the stent balloon.  This was then exchanged for a 4.0 x 15 mm NC balloon which was used to post dilate and distal to proximal fashion up to 20 familia.  Despite 4.0 post there was still significant mall apposition in this very large caliber proximal LAD.  A 4.5 x 15 mm balloon was then used to further post dilate the stent distal to proximal fashion resulting in much improved stent apposition and distal proximal fashion all the way to the ostial segment.  With the balloon inflated at the ostial segment focal angiogram revealed patent flow to the circumflex ensuring that this segment was not jailed by proximal strut.  After 4.5 mm post dilation up to 18 familia proximally the balloon was deflated and removed followed by angiography and multiple views revealing JANE-3 flow to the distal apical LAD as well as diagonal branch as well as to the circumflex distribution.  No distal wire trauma, no edge dissection, reduction of the stenosis from 100% proximally and 80% in the proximal to midportion to 0% residual.  There was residual distal LAD diffuse disease up to 60 possibly 70% however there was JANE-3 flow and these were left to be staged with FFR at later date as well as borderline lesions in the circumflex with JANE-3 flow as well as nonobstructive disease in the RCA with JANE-3  flow in this distribution.  At this point, Levophed drip at max dose was titrated down slowly with careful observation of Impella support pressures.  During hemodynamic weaning, right common femoral venous access was obtained with 7 [place] sheath placement to the right common femoral vein followed by balloon tip [place]-Venkat catheter facilitated right heart catheterization and placement to the main pulmonary artery followed by PA pressure assessment, right atrial pressure assessment, PA saturations as compared to femoral artery saturations with Dragan cardiac output calculation.  FA saturation was 92% and at P8 hemodynamic support the PA saturation was 51%.  With further titration down ultimately off levo fed and attempts at weaning the Impella support device PA saturations decreased to the mid 40s and the pump was then re-uptitrated for flows of 2.8 to 3 L/min.  This resulted in better arrhythmogenic profile with less ectopy.  CVP seem to fluctuate from 8-15 and was stabilized at 12 to 14 mmHg after IV fluid boluses for adequate preload of the Impella device.  After reduction continued with ultimately weaning completely off levophed support only on hemodynamic support.  Patient continued to have multiple reperfusion arrhythmias including junctional and idioventricular escape rhythms as well as bigeminal rhythms and nonsustained VT.  With continued hemodynamic support and careful careful observation the seem to decrease in frequency and severity.  The decision was made to leave the Impella support device in for LV continued unloading with observation of fall and PA saturations indicative of worsening cardiac output with weaning of hemogenic support.  Selective left iliac angiography was performed with a JR4 catheter from the right femoral with imaging of patent flow to the left lower extremity and left superficial femoral and left profunda around the Impella CP sheath.  The device was then secured in place via standard  protocol and the patient transferred to CVICU for intensive care measures and refractory cardiogenic shock needing hemodynamic support.  Patient left the Cath Lab chest pain-free, alert talking to staff neurologically intact.     Medications administered during the case:  Heparin to maintain ACT greater than 250 at all times  IV Versed and fentanyl administered by RN with continuous ECG, pulse oximetry, ECG and hemodynamic monitoring by myself throughout the case  IV Chadwick-Synephrine  IV levophed  Oral loading with ticagrelor and aspirin on the table  Intracoronary nitroglycerin  Contrast usage  380-400 cc     Moderate conscious sedation was used with IV Versed and fentanyl given via registered nurse with all supervision of ECG continuously, pulse oximetry and hemodynamic monitoring by myself through and during and throughout the case.  Conscious sedation time 3-1/2 hours for this complicated case.     Complications  No complications     Blood loss: 20 cc     Lesion data  Type C, 100% thrombotic proximal ostial LAD occlusion with tandem 80% eccentric proximal LAD stenosis, reduced to 0% residual after intervention     Results  1.  Opening aortic pressure 89/62  2.  Pressure during the case as low as 72/48, lowest map 51  3.  LVEDP 18-20  4.  No significant transaortic valve gradient  5.  RA pressure 12 mmHg  6.  PA pressure 32/12 with a mean of 25 to 27 mmHg  7.  PA saturation 50% with full him dynamic support, 44% at P2  8.  FA saturation 92%  9.  LVEF 20% estimated by LV gram     Angiography  1.  Left main coronary arteries a large-caliber vessel giving rise to LAD and nondominant circumflex there is no intragraft disease left main  2.  The circumflex is a nondominant vessel with single large caliber obtuse marginal branch and continuation circumflex into OM 2.  There is mild diffuse disease of the second distribution as well as ostial 60 to 70% OM1 stenosis but only mild luminal irregularities distally thereafter  as well as only mild luminal irregularities of the distal circumflex and OM 2.  No significant obstructive disease seen  3.  The RCA is a medium caliber dominant artery with PLV and PDA distally.  There is mild diffuse disease not greater than 30% diffusely throughout the RCA system  4.  The LAD is a large-caliber vessel coursing to and around the apex with one large caliber bifurcating diagonal branch in the midportion.  This was seen after revascularization.  Initial angiography revealed proximal ostial 100% thrombotic occlusion.  There was a tandem 80 to 85% eccentric plaque immediately prior to the takeoff of the dominant bifurcating diagonal branch in the midportion.  The distal LAD also has diffuse disease approximately 70% most severely distal to the takeoff of the diagonal branch with diffuse 40 to 50% disease thereafter and tapering caliber of the LAD to and around the apex.  Caliber this portion probably 2 to 2.5 mm.  5.  LVEF 20% estimated by LV gram     Intervention  Successful placement of a Xience 3.5 x 33 mm drug-eluting stent postdilated 4.5 mm at high pressure with reduction of thrombotic occlusion and tandem lesion to 0% residual with restoration of JANE-3 flow to the LAD and diagonal branch.     Successful Impella CP placement with hemodynamic unloading and support of the LV function in the setting of cardiogenic shock     Successful balloon tip [place]-Venkat catheter placement to the main pulmonary artery via right common femoral vein for indication of titration of hemodynamic support and cardiac output assessments and CVP assessments.     Conclusions  1.  Thrombotic ostial proximal LAD occlusion with ST elevation microinfarction  2.  Cardiogenic shock  3.  Nonobstructive disease in the circumflex and RCA  4.  Residual borderline lesions in the distal LAD which will be staged for FFR in the future  5.  Ongoing inability to wean hemogenic support with indications for continued Impella support post  procedurally in the CCU.  6.  Patent left lower extremity flow around the Impella CP sheath imaged via left iliac selective angiography with runoff.  7.  Severely reduced LV systolic function as a complication of anterior STEMI     Recommendations:  She will be transferred to the ICU for further hemodynamic support and monitoring in intensive care measures  Dual antiplatelet therapy with aspirin and ticagrelor uninterrupted  High intensity statin therapy  Afterload reduction as indicated  ACE inhibitor or Aldactone therapy for LVEF less than 35% in the setting of acute MI per guidelines     It is a pleasure to be involved in her cardiac care.  I will continue to manage in the CCU as well as hemogenic support in ICU core measures.  Brant Martinez MD, PhD        The following portions of the patient's history were reviewed and updated as appropriate: allergies, current medications, past family history, past medical history, past social history, past surgical history and problem list.    Problem List:  Patient Active Problem List   Diagnosis   • Allergic rhinitis   • Cervical disc disease with myelopathy   • Diverticulosis of colon   • Hypertension, benign   • Insomnia, organic   • Obesity (BMI 30-39.9)   • Osteopenia of spine   • Mixed hyperlipidemia   • STEMI involving left anterior descending coronary artery (CMS/HCC)   • Cardiac arrest with ventricular fibrillation (CMS/HCC)   • Ischemic cardiomyopathy   • Stented coronary artery   • History of echocardiogram   • Annual visit for general adult medical examination with abnormal findings   • Cervical cancer screening   • Encounter for screening for malignant neoplasm of breast   • Colon cancer screening   • History of tobacco use   • Generalized anxiety disorder   • ASD (atrial septal defect)   • History of transesophageal echocardiography (NAFISA)   • Elevated lipoprotein(a)   • Coronary artery disease of native artery of native heart with stable angina pectoris  (CMS/HCC)   • Renal insufficiency   • History of ST elevation myocardial infarction (STEMI)   • Family history of diabetes mellitus type II       Past Medical History:  Past Medical History:   Diagnosis Date   • Absence of left thumb     Impression: hx of MRSA, she is signficantly improved She will continue meds and followup if sxs have not resolved over the next 2 weeks.. She is released from care and will notify us of any problems   • Acute otitis media    • Allergic rhinitis    • Arthritis     neck   • Cancer (CMS/HCC)     skin   • Cervical disc disease with myelopathy    • Contact dermatitis or eczema    • Coronary artery disease    • Disorder of bone and cartilage    • Diverticulitis of colon     Impression: CT confirms in the sigmoid colon 10/2011   • Diverticulosis of colon     Impression: No sxs 02/13/2013   • Elevated cholesterol    • Elevated temperature    • External otitis of left ear    • Hearing loss due to cerumen impaction, left    • Hemangioma of liver    • History of echocardiogram 10/03/2019    Severely reduced LV systolic function. EF 34% Distribution indicative of LAD territory injury. Akinesis of the apex as well as apical lateral and mid to apical septal walls. Preservation of posterior inferior lateral walls. Mild AI, mild TR. Normal RV function.    • History of transesophageal echocardiography (NAFISA) 01/07/2020    EF 55% LA cavity is mild to moderately dilated. Mild MR. Interatrial septum appears redundant. Interatrial hypermobile c/w aneurysm. Large PFO is noted with right to left shunting on bubble study. PFO tunnel appears to be short.    • Hordeolum externum of left lower eyelid     Impression: She was started on Bactrim DS for her infection. She was also advised to use warm compression. She will followup should sxs not improve over the next 48 hrs and resolve over the next 1 weeek.   • Hyperlipidemia     Impression: Diet controled. She should see improvement with her successful wt loss.  We discussed her lipid panel.   • Hypertension, benign     Impression: new onset Low salt low calorie diet and regular exercise and followup in 1 mo She will monitor her pressures and notify us of her pressures over the next 1 week.   • Inclusion cyst of right breast     Impression: We will follow for now. She is encouraged to have Mammography. She is presently without insurance and reluctant. She bobby consider. She will notify us of any change at all in the lesion for the only way to be certain of it's etilogy is to remove it. She understands.   • Insomnia, organic    • Menopausal syndrome    • Overweight (BMI 25.0-29.9)    • RUQ abdominal pain     Impression: Resolving, negative GB u/s, HIDA EF 77%, we will follow   • Stented coronary artery 09/29/2019   • Tobacco use     Impression: She is strongly encouraged to stop smoking. Cessation techniques discussed and encouraged. The consequences of not stopping discussed, ie, CAD, PVD, Stroke, Lung and other cancers.       Past Surgical History:  Past Surgical History:   Procedure Laterality Date   • BIVENTRICULAR ASSIST DEVICE/LEFT VENTRICULAR ASSIST DEVICE INSERTION N/A 9/29/2019    Procedure: Left Ventricular Assist Device;  Surgeon: Brant Martinez MD;  Location: [facility] CATH INVASIVE LOCATION;  Service: Cardiovascular   • CARDIAC CATHETERIZATION N/A 9/29/2019    Procedure: Left Heart Cath;  Surgeon: Brant Martinez MD;  Location: [facility] CATH INVASIVE LOCATION;  Service: Cardiovascular   • CARDIAC CATHETERIZATION N/A 9/29/2019    Procedure: Coronary angiography;  Surgeon: Brant Martinez MD;  Location: [facility] CATH INVASIVE LOCATION;  Service: Cardiovascular   • CARDIAC CATHETERIZATION N/A 9/29/2019    Procedure: Left ventriculography;  Surgeon: Brant Martinez MD;  Location: [facility] CATH INVASIVE LOCATION;  Service: Cardiovascular   • CARDIAC CATHETERIZATION N/A 9/29/2019    Procedure: Stent SERENITY coronary;  Surgeon: Brant Martinez  Jose Fong MD;  Location: [facility] CATH INVASIVE LOCATION;  Service: Cardiovascular   • CARDIAC CATHETERIZATION N/A 2020    Procedure: Right Heart Cath;  Surgeon: Brant Martinez MD;  Location: [facility] CATH INVASIVE LOCATION;  Service: Cardiology;  Laterality: N/A;   • CARDIAC ELECTROPHYSIOLOGY PROCEDURE  2019    Procedure: Impella Insertion;  Surgeon: Brant Martinez MD;  Location: [facility] CATH INVASIVE LOCATION;  Service: Cardiovascular   • NM RT/LT HEART CATHETERS N/A 2019    Procedure: Percutaneous Coronary Intervention;  Surgeon: Brant Martinez MD;  Location: [facility] CATH INVASIVE LOCATION;  Service: Cardiovascular   • TOTAL ABDOMINAL HYSTERECTOMY WITH SALPINGO OOPHORECTOMY      Endometriosis       Social History:  Social History     Socioeconomic History   • Marital status:      Spouse name: Not on file   • Number of children: Not on file   • Years of education: Not on file   • Highest education level: Not on file   Tobacco Use   • Smoking status: Former Smoker     Packs/day: 1.00     Years: 34.00     Pack years: 34.00     Types: Cigarettes     Start date:      Last attempt to quit: 2019     Years since quittin.9   • Smokeless tobacco: Never Used   • Tobacco comment: States she is quitting as of 2019.   Substance and Sexual Activity   • Alcohol use: Yes     Frequency: Monthly or less     Drinks per session: 3 or 4     Binge frequency: Less than monthly     Comment: occassionally    • Drug use: Not Currently     Comment: occasional   • Sexual activity: Defer       Allergies:  Allergies   Allergen Reactions   • Penicillins Rash   • Ciprofloxacin Rash       Immunizations:  Immunization History   Administered Date(s) Administered   • DT 2004   • Flulaval/Fluarix Quad 10/01/2019   • Tdap 2011       ROS:  ROS       Objective:         /76   Pulse 55   LMP 1996 (Approximate)     Physical Exam  Unable to get physical exam  findings other than vitals secondary to video visit converted to phone visit today secondary to technical difficulties  In-Office Procedure(s):  Procedures    ASCVD RIsk Score::  The ASCVD Risk score (Marielenamalaika SALOMON Jr., et al., 2013) failed to calculate for the following reasons:    The patient has a prior MI or stroke diagnosis    Recent Radiology:  Imaging Results (Most Recent)     None          Lab Review:   Admission on 04/28/2020, Discharged on 04/29/2020   Component Date Value   • Glucose 04/28/2020 117*   • BUN 04/28/2020 24*   • Creatinine 04/28/2020 1.09*   • Sodium 04/28/2020 137    • Potassium 04/28/2020 3.9    • Chloride 04/28/2020 99    • CO2 04/28/2020 24.0    • Calcium 04/28/2020 9.5    • eGFR Non African Amer 04/28/2020 53*   • BUN/Creatinine Ratio 04/28/2020 22.0    • Anion Gap 04/28/2020 14.0    • Troponin T 04/28/2020 <0.010    • WBC 04/28/2020 9.00    • RBC 04/28/2020 4.37    • Hemoglobin 04/28/2020 13.8    • Hematocrit 04/28/2020 39.4    • MCV 04/28/2020 90.1    • MCH 04/28/2020 31.7    • MCHC 04/28/2020 35.1    • RDW 04/28/2020 13.8    • RDW-SD 04/28/2020 43.3    • MPV 04/28/2020 9.6    • Platelets 04/28/2020 185    • Neutrophil % 04/28/2020 65.9    • Lymphocyte % 04/28/2020 25.3    • Monocyte % 04/28/2020 7.2    • Eosinophil % 04/28/2020 1.2    • Basophil % 04/28/2020 0.4    • Neutrophils, Absolute 04/28/2020 6.00    • Lymphocytes, Absolute 04/28/2020 2.30    • Monocytes, Absolute 04/28/2020 0.60    • Eosinophils, Absolute 04/28/2020 0.10    • Basophils, Absolute 04/28/2020 0.00    • nRBC 04/28/2020 0.1    • Troponin T 04/29/2020 <0.010    • Troponin T 04/29/2020 <0.010    • TSH 04/29/2020 1.600    • Total Cholesterol 04/29/2020 137    • Triglycerides 04/29/2020 124    • HDL Cholesterol 04/29/2020 52    • LDL Cholesterol  04/29/2020 60    • VLDL Cholesterol 04/29/2020 24.8    • LDL/HDL Ratio 04/29/2020 1.16    • Troponin T 04/29/2020 <0.010    •  CV STRESS PROTOCOL 1 04/29/2020 Thad    • Stage 1  04/29/2020 1    • HR Stage 1 04/29/2020 94    • BP Stage 1 04/29/2020 114/80    • Duration Min Stage 1 04/29/2020 3    • Duration Sec Stage 1 04/29/2020 0    • Grade Stage 1 04/29/2020 10    • Speed Stage 1 04/29/2020 1.7    • BH CV STRESS METS STAGE 1 04/29/2020 5    • Stage 2 04/29/2020 2    • HR Stage 2 04/29/2020 112    • BP Stage 2 04/29/2020 132/84    • Duration Min Stage 2 04/29/2020 3    • Duration Sec Stage 2 04/29/2020 0    • Grade Stage 2 04/29/2020 12    • Speed Stage 2 04/29/2020 2.5    • BH CV STRESS METS STAGE 2 04/29/2020 7.5    • Stage 3 04/29/2020 3    • HR Stage 3 04/29/2020 144    • BP Stage 3 04/29/2020 152/80    • Duration Min Stage 3 04/29/2020 3    • Duration Sec Stage 3 04/29/2020 0    • Grade Stage 3 04/29/2020 14    • Speed Stage 3 04/29/2020 3.4    • BH CV STRESS METS STAGE 3 04/29/2020 10.0    • Baseline HR 04/29/2020 72    • Baseline BP 04/29/2020 110/80    • Peak HR 04/29/2020 144    • Percent Max Pred HR 04/29/2020 84.71    • Percent Target HR 04/29/2020 100    • Peak BP 04/29/2020 152/80    • Recovery HR 04/29/2020 98    • Recovery BP 04/29/2020 120/70    • Target HR (85%) 04/29/2020 145    • Max. Pred. HR (100%) 04/29/2020 170    • Nuclear Prior Study 04/29/2020 3    • BSA 04/29/2020 1.9    • RVIDd 04/29/2020 2.3    • IVSd 04/29/2020 1.3    • LVIDd 04/29/2020 4.3    • LVIDs 04/29/2020 2.8    • LVPWd 04/29/2020 0.99    • IVS/LVPW 04/29/2020 1.3    • FS 04/29/2020 34.8    • EDV(Teich) 04/29/2020 83.9    • ESV(Teich) 04/29/2020 30.0    • EF(Teich) 04/29/2020 64.3    • EDV(cubed) 04/29/2020 80.5    • ESV(cubed) 04/29/2020 22.3    • EF(cubed) 04/29/2020 72.2    • LV mass(C)d 04/29/2020 169.2    • LV mass(C)dI 04/29/2020 89.5    • SV(Teich) 04/29/2020 53.9    • SI(Teich) 04/29/2020 28.5    • SV(cubed) 04/29/2020 58.2    • SI(cubed) 04/29/2020 30.7    • Ao root diam 04/29/2020 4.3    • Ao root area 04/29/2020 14.8    • asc Aorta Diam 04/29/2020 3.5    • LVOT diam 04/29/2020 2.7    • LVOT  area 04/29/2020 5.8    • EDV(MOD-sp4) 04/29/2020 63.3    • ESV(MOD-sp4) 04/29/2020 25.6    • EF(MOD-sp4) 04/29/2020 59.5    • SV(MOD-sp4) 04/29/2020 37.6    • SI(MOD-sp4) 04/29/2020 19.9    • Ao root area (BSA correc* 04/29/2020 2.3    • LV Amin Vol (BSA correct* 04/29/2020 33.5    • LV Sys Vol (BSA correcte* 04/29/2020 13.6    • MV E max bernie 04/29/2020 43.2    • MV A max bernie 04/29/2020 55.1    • MV E/A 04/29/2020 0.78    • MV V2 max 04/29/2020 64.0    • MV max PG 04/29/2020 1.6    • MV V2 mean 04/29/2020 36.0    • MV mean PG 04/29/2020 0.56    • MV V2 VTI 04/29/2020 15.3    • MVA(VTI) 04/29/2020 6.5    • MV dec slope 04/29/2020 242.7    • MV dec time 04/29/2020 0.18    • Ao pk bernie 04/29/2020 103.4    • Ao max PG 04/29/2020 4.3    • Ao max PG (full) 04/29/2020 0.25    • Ao V2 mean 04/29/2020 62.8    • Ao mean PG 04/29/2020 1.9    • Ao mean PG (full) 04/29/2020 0.07    • Ao V2 VTI 04/29/2020 17.7    • HERNANDEZ(I,A) 04/29/2020 5.6    • HERNANDEZ(I,D) 04/29/2020 5.6    • HERNANDEZ(V,A) 04/29/2020 5.6    • HERNANDEZ(V,D) 04/29/2020 5.6    • AI max bernie 04/29/2020 469.1    • AI max PG 04/29/2020 88.0    • AI dec slope 04/29/2020 201.1    • AI dec time 04/29/2020 2.3    • AI P1/2t 04/29/2020 683.0    • LV V1 max PG 04/29/2020 4.0    • LV V1 mean PG 04/29/2020 1.9    • LV V1 max 04/29/2020 100.2    • LV V1 mean 04/29/2020 62.1    • LV V1 VTI 04/29/2020 17.2    • SV(Ao) 04/29/2020 261.0    • SI(Ao) 04/29/2020 138.0    • SV(LVOT) 04/29/2020 99.1    • SI(LVOT) 04/29/2020 52.4    • PA V2 max 04/29/2020 71.4    • PA max PG 04/29/2020 2.0    • PA max PG (full) 04/29/2020 0.4    • PA V2 mean 04/29/2020 52.8    • PA mean PG 04/29/2020 1.2    • PA mean PG (full) 04/29/2020 0.41    • PA V2 VTI 04/29/2020 15.6    • PA acc time 04/29/2020 0.09    • RV V1 max PG 04/29/2020 1.6    • RV V1 mean PG 04/29/2020 0.83    • RV V1 max 04/29/2020 64.1    • RV V1 mean 04/29/2020 41.6    • RV V1 VTI 04/29/2020 11.7    • TR max bernie 04/29/2020 116.3    • RVSP(TR)  04/29/2020 8.4    • RAP systole 04/29/2020 3.0    • PA pr(Accel) 04/29/2020 39.6    • BH CV ECHO PERICO - BZI_BMI 04/29/2020 30.0    • BH CV ECHO PERICO - BSA(HA* 04/29/2020 2.0    • BH CV ECHO PERICO - BZI_ME* 04/29/2020 81.6    • BH CV ECHO PERICO - BZI_ME* 04/29/2020 165.1    • EF(MOD-bp) 04/29/2020 60.0    • LA dimension(2D) 04/29/2020 3.2    • Magnesium 04/29/2020 2.2      Lab Results   Component Value Date    WBC 9.00 04/28/2020    WBC 9.6 09/16/2019    HGB 13.8 04/28/2020    HCT 39.4 04/28/2020    MCV 90.1 04/28/2020     04/28/2020                Assessment:          Diagnosis Plan   1. Thoracic aortic aneurysm without rupture (CMS/HCC)  CT Angiogram Chest With & Without Contrast   2. Coronary artery disease of native artery of native heart with stable angina pectoris (CMS/HCC)  atorvastatin (LIPITOR) 20 MG tablet          Plan:      1. LAD STEMI/CAD, September 2019  - s/p PCI to the proximal / ostial to mid LAD with Xience 3.5 x 33 mm drug-eluting stent postdilated to 4.5 mm at 18 familia with good angiographic results.  - residual borderline lesions in the distal LAD, asymptomatic, no ischemia seen on noninvasive stress test.  Nonobstructive disease in the circumflex and RCA.  -Dual platelet therapy aspirin and Effient uninterrupted 1 year, consider longer-term Plavix therapy with benefits up to 30 months per DAPT trial with low bleeding risk and high risk initial presentation with STEMI, residual lesions possible further unstable plaque.  -High intensity statin therapy per guidelines   - on spironolactone, continue 25 daily  -Continue metop XL 25 daily   -Continue Lasix 40 daily  Continue lisinopril 5 mg twice daily, decrease to daily if she is orthostatic  With myalgias and significant joint pains decrease atorvastatin to 20 daily        Dyspnea on exertion  Possibly related to chronic smoking and physical deconditioning  No evidence of pulmonary hypertension  Continue exercise per AHA guidelines  Heart  catheterization results not consistent with interatrial shunt or congenital heart disease or ASD PFO significantly     Noninvasive stress with walking greater than 9 minutes by Thad protocol with no evidence of perfusion abnormality at rest or stress imaging.  Low risk study for significant ischemia.     2. acute systolic HF, much improved EF now greater than 55%  - improved, impella removed 9/29  -Estimated EF  greater than 50% with trace to  very mild anterior wall hypokinesis regionally, continue max medical therapy  Metoprolol as above  ACE inhibitor as above, blood pressure goal preventing further titration  statin as above     4. H/o Essential hypertension, continue lisinopril, diuresis, recheck at home which is been less than 135 systolic.,  115/82 today     4. Hyperlipidemia  -High intensity statin therapy on board     5. Elevated LFTs  -Resolved     6.  Anxiety  per primary care     7. Hypokalemia / Hypomagnesium  Stable previously        Return to clinic in  6 months     Brant Martinez MD, PhD                  Brant Martinez MD  09/02/20  .

## 2020-09-24 DIAGNOSIS — I10 ESSENTIAL HYPERTENSION: ICD-10-CM

## 2020-09-24 RX ORDER — METOPROLOL SUCCINATE 25 MG/1
TABLET, EXTENDED RELEASE ORAL
Qty: 90 TABLET | Refills: 3 | Status: SHIPPED | OUTPATIENT
Start: 2020-09-24 | End: 2021-02-11

## 2020-10-06 ENCOUNTER — HOSPITAL ENCOUNTER (OUTPATIENT)
Dept: GENERAL RADIOLOGY | Facility: HOSPITAL | Age: 50
Discharge: HOME | End: 2020-10-06
Admitting: FAMILY MEDICINE

## 2020-10-06 ENCOUNTER — OFFICE VISIT (OUTPATIENT)
Dept: FAMILY MEDICINE CLINIC | Facility: CLINIC | Age: 50
End: 2020-10-06

## 2020-10-06 VITALS
WEIGHT: 203.6 LBS | HEART RATE: 86 BPM | OXYGEN SATURATION: 97 % | TEMPERATURE: 98.6 F | BODY MASS INDEX: 33.92 KG/M2 | HEIGHT: 65 IN | RESPIRATION RATE: 18 BRPM | DIASTOLIC BLOOD PRESSURE: 70 MMHG | SYSTOLIC BLOOD PRESSURE: 120 MMHG

## 2020-10-06 DIAGNOSIS — J40 BRONCHITIS: ICD-10-CM

## 2020-10-06 DIAGNOSIS — E66.9 OBESITY (BMI 30-39.9): Chronic | ICD-10-CM

## 2020-10-06 DIAGNOSIS — R05.9 COUGH IN ADULT: Primary | ICD-10-CM

## 2020-10-06 DIAGNOSIS — Z87.891 HISTORY OF TOBACCO USE: ICD-10-CM

## 2020-10-06 DIAGNOSIS — E78.2 MIXED HYPERLIPIDEMIA: Chronic | ICD-10-CM

## 2020-10-06 DIAGNOSIS — I10 HYPERTENSION, BENIGN: Chronic | ICD-10-CM

## 2020-10-06 DIAGNOSIS — N28.9 RENAL INSUFFICIENCY: ICD-10-CM

## 2020-10-06 DIAGNOSIS — J30.1 SEASONAL ALLERGIC RHINITIS DUE TO POLLEN: ICD-10-CM

## 2020-10-06 DIAGNOSIS — I25.118 CORONARY ARTERY DISEASE OF NATIVE ARTERY OF NATIVE HEART WITH STABLE ANGINA PECTORIS (HCC): ICD-10-CM

## 2020-10-06 DIAGNOSIS — F41.9 ANXIETY: ICD-10-CM

## 2020-10-06 PROCEDURE — 71046 X-RAY EXAM CHEST 2 VIEWS: CPT

## 2020-10-06 PROCEDURE — 99214 OFFICE O/P EST MOD 30 MIN: CPT | Performed by: FAMILY MEDICINE

## 2020-10-06 RX ORDER — ESCITALOPRAM OXALATE 10 MG/1
10 TABLET ORAL DAILY
Qty: 30 TABLET | Refills: 12 | Status: SHIPPED | OUTPATIENT
Start: 2020-10-06 | End: 2021-02-11

## 2020-10-06 RX ORDER — DOXYCYCLINE 100 MG/1
100 CAPSULE ORAL 2 TIMES DAILY
Qty: 20 CAPSULE | Refills: 0 | Status: SHIPPED | OUTPATIENT
Start: 2020-10-06 | End: 2021-02-10

## 2020-10-06 RX ORDER — ALPRAZOLAM 0.25 MG/1
0.25 TABLET ORAL EVERY 6 HOURS PRN
Qty: 30 TABLET | Refills: 0 | Status: SHIPPED | OUTPATIENT
Start: 2020-10-06 | End: 2021-02-11

## 2020-10-06 RX ORDER — PREDNISONE 10 MG/1
10 TABLET ORAL TAKE AS DIRECTED
Qty: 35 TABLET | Refills: 0 | Status: SHIPPED | OUTPATIENT
Start: 2020-10-06 | End: 2020-10-21

## 2020-10-06 NOTE — PROGRESS NOTES
Subjective   Sada Le is a 50 y.o. female.     Chief Complaint   Patient presents with   • Cough   • Hypertension   • Hyperlipidemia       Cough  This is a new problem. The current episode started in the past 7 days (sunday). The problem has been gradually worsening. Associated symptoms include headaches, nasal congestion, rhinorrhea and a sore throat. Pertinent negatives include no chest pain, ear congestion, ear pain, fever, myalgias, rash, shortness of breath or wheezing. The symptoms are aggravated by lying down. She has tried nothing for the symptoms. The treatment provided no relief. Her past medical history is significant for environmental allergies.   Coronary Artery Disease  Symptoms include weight gain (diet and exercise discussed). Pertinent negatives include no chest pain, leg swelling, palpitations or shortness of breath. Risk factors include hyperlipidemia and obesity.   Hypertension  This is a chronic problem. The current episode started more than 1 year ago. Associated symptoms include headaches. Pertinent negatives include no chest pain, palpitations or shortness of breath. Risk factors for coronary artery disease include family history, dyslipidemia, post-menopausal state and obesity. Current antihypertension treatment includes diuretics and beta blockers.   Hyperlipidemia  This is a chronic problem. The current episode started more than 1 year ago. The problem is controlled. Recent lipid tests were reviewed and are normal. Exacerbating diseases include obesity. She has no history of diabetes. Pertinent negatives include no chest pain, myalgias or shortness of breath. Risk factors for coronary artery disease include family history, dyslipidemia, obesity, hypertension and post-menopausal.        The following portions of the patient's history were reviewed and updated as appropriate: allergies, current medications, past family history, past medical history, past social history, past surgical  history and problem list.    Allergies:  Allergies   Allergen Reactions   • Penicillins Rash   • Ciprofloxacin Rash   • Other Rash       Social History:  Social History     Socioeconomic History   • Marital status:      Spouse name: Not on file   • Number of children: Not on file   • Years of education: Not on file   • Highest education level: Not on file   Tobacco Use   • Smoking status: Former Smoker     Packs/day: 1.00     Years: 34.00     Pack years: 34.00     Types: Cigarettes     Start date:      Quit date: 2019     Years since quittin.0   • Smokeless tobacco: Never Used   • Tobacco comment: States she is quitting as of 2019.   Substance and Sexual Activity   • Alcohol use: Yes     Frequency: Monthly or less     Drinks per session: 3 or 4     Binge frequency: Less than monthly     Comment: occassionally    • Drug use: Not Currently     Comment: occasional   • Sexual activity: Defer       Family History:  Family History   Problem Relation Age of Onset   • Alzheimer's disease Mother    • Heart disease Mother         cardiovascular   • Kidney disease Mother    • Throat cancer Mother    • Thyroid disease Mother    • Diabetes Father         mellitus   • Heart attack Father    • Thyroid disease Father    • Heart disease Sister         cardiovascular   • Diabetes Maternal Aunt         mellitus   • Heart disease Maternal Aunt         cardiovascular   • Breast cancer Maternal Aunt    • Cancer Maternal Aunt         breast   • Diabetes Maternal Uncle         mellitus   • Alzheimer's disease Maternal Uncle    • Diabetes Maternal Grandmother         mellitus   • Pancreatic cancer Maternal Grandmother    • Kidney disease Paternal Uncle        Past Medical History :  Patient Active Problem List   Diagnosis   • Allergic rhinitis   • Cervical disc disease with myelopathy   • Diverticulosis of colon   • Hypertension, benign   • Insomnia, organic   • Obesity (BMI 30-39.9)   • Osteopenia of spine   •  Mixed hyperlipidemia   • Cardiac arrest with ventricular fibrillation (CMS/HCC)   • Ischemic cardiomyopathy   • Stented coronary artery   • History of echocardiogram   • Annual visit for general adult medical examination with abnormal findings   • Cervical cancer screening   • Encounter for screening for malignant neoplasm of breast   • Colon cancer screening   • History of tobacco use   • Generalized anxiety disorder   • ASD (atrial septal defect)   • History of transesophageal echocardiography (NAFISA)   • Coronary artery disease of native artery of native heart with stable angina pectoris (CMS/HCC)   • Renal insufficiency   • History of MI (myocardial infarction)   • Family history of diabetes mellitus type II   • Anxiety       Medication List:  Outpatient Encounter Medications as of 10/6/2020   Medication Sig Dispense Refill   • aspirin 81 MG chewable tablet Chew 1 tablet Daily. 30 tablet 11   • atorvastatin (LIPITOR) 20 MG tablet Take 1 tablet by mouth Daily. 90 tablet 1   • buPROPion XL (WELLBUTRIN XL) 150 MG 24 hr tablet Take 1 tablet by mouth Daily. 30 tablet 12   • Cholecalciferol (VITAMIN D3) 125 MCG (5000 UT) capsule capsule Take 5,000 Units by mouth Daily.     • coenzyme Q10 100 MG capsule Take 200 mg by mouth 2 (Two) Times a Day.     • furosemide (LASIX) 40 MG tablet Take 1 tablet by mouth Daily. 60 tablet 12   • lisinopril (PRINIVIL,ZESTRIL) 5 MG tablet TAKE 1 TABLET BY MOUTH TWICE A  tablet 1   • Magnesium 500 MG capsule Take  by mouth.     • metoprolol succinate XL (TOPROL-XL) 25 MG 24 hr tablet TAKE 1 TABLET BY MOUTH EVERY DAY 90 tablet 3   • nitroglycerin (NITROSTAT) 0.4 MG SL tablet Place 1 tablet under the tongue Every 5 (Five) Minutes As Needed for Chest Pain (Only if SBP Greater Than 100). 100 tablet 12   • prasugrel (EFFIENT) 10 MG tablet Take 1 tablet by mouth Daily. 30 tablet 11   • spironolactone (ALDACTONE) 25 MG tablet TAKE 1 TABLET BY MOUTH EVERY DAY 90 tablet 1   • vitamin B-12  (CYANOCOBALAMIN) 500 MCG tablet Take 500 mcg by mouth Daily.     • ALPRAZolam (XANAX) 0.25 MG tablet Take 1 tablet by mouth Every 6 (Six) Hours As Needed for Anxiety. Use sparingly. 30 tablet 0   • doxycycline (MONODOX) 100 MG capsule Take 1 capsule by mouth 2 (Two) Times a Day. 20 capsule 0   • escitalopram (LEXAPRO) 10 MG tablet Take 1 tablet by mouth Daily. 30 tablet 12   • predniSONE (DELTASONE) 10 MG tablet Take 1 tablet by mouth Take As Directed for 15 days. 5 tab x 1day, 4 tab x 2 day, 3 tab x 3 day, 2 tab x 4 day, 1 tab x 5 day 35 tablet 0     No facility-administered encounter medications on file as of 10/6/2020.        Past Surgical History:  Past Surgical History:   Procedure Laterality Date   • BIVENTRICULAR ASSIST DEVICE/LEFT VENTRICULAR ASSIST DEVICE INSERTION N/A 9/29/2019    Procedure: Left Ventricular Assist Device;  Surgeon: Brant Martinez MD;  Location: [facility] CATH INVASIVE LOCATION;  Service: Cardiovascular   • CARDIAC CATHETERIZATION N/A 9/29/2019    Procedure: Left Heart Cath;  Surgeon: Brant Martinez MD;  Location: [facility] CATH INVASIVE LOCATION;  Service: Cardiovascular   • CARDIAC CATHETERIZATION N/A 9/29/2019    Procedure: Coronary angiography;  Surgeon: Brant Martinez MD;  Location: [facility] CATH INVASIVE LOCATION;  Service: Cardiovascular   • CARDIAC CATHETERIZATION N/A 9/29/2019    Procedure: Left ventriculography;  Surgeon: Brant Martinez MD;  Location: [facility] CATH INVASIVE LOCATION;  Service: Cardiovascular   • CARDIAC CATHETERIZATION N/A 9/29/2019    Procedure: Stent SERENITY coronary;  Surgeon: Brant Martinez MD;  Location: [facility] CATH INVASIVE LOCATION;  Service: Cardiovascular   • CARDIAC CATHETERIZATION N/A 2/25/2020    Procedure: Right Heart Cath;  Surgeon: Brant Martinez MD;  Location: [facility] CATH INVASIVE LOCATION;  Service: Cardiology;  Laterality: N/A;   • CARDIAC ELECTROPHYSIOLOGY PROCEDURE  9/29/2019    Procedure: Impella  "Insertion;  Surgeon: Brant Martinez MD;  Location: [facility] CATH INVASIVE LOCATION;  Service: Cardiovascular   • DE RT/LT HEART CATHETERS N/A 9/29/2019    Procedure: Percutaneous Coronary Intervention;  Surgeon: Brant Martinez MD;  Location: [facility] CATH INVASIVE LOCATION;  Service: Cardiovascular   • TOTAL ABDOMINAL HYSTERECTOMY WITH SALPINGO OOPHORECTOMY  1995    Endometriosis       Review of Systems:  Review of Systems   Constitutional: Positive for unexpected weight gain (diet and exercise discussed). Negative for fatigue and fever.   HENT: Positive for rhinorrhea and sore throat. Negative for congestion, ear pain, sinus pressure and tinnitus.    Eyes: Negative for visual disturbance.   Respiratory: Positive for cough ( no sputum production. ). Negative for shortness of breath and wheezing.    Cardiovascular: Negative for chest pain, palpitations and leg swelling.   Gastrointestinal: Negative for abdominal pain, constipation, diarrhea, nausea and vomiting.   Endocrine: Negative.  Negative for cold intolerance, heat intolerance, polydipsia and polyuria.   Genitourinary: Negative for dysuria, frequency and hematuria.   Musculoskeletal: Negative for arthralgias, joint swelling and myalgias.   Skin: Negative for rash.   Allergic/Immunologic: Positive for environmental allergies.   Neurological: Positive for headache. Negative for weakness.   Hematological: Negative for adenopathy. Does not bruise/bleed easily.   Psychiatric/Behavioral: Negative for suicidal ideas and depressed mood. Nervous/anxious: She is having moderate difficulty getting her life back post MI, she has a good support network         I have reviewed and confirmed the accuracy of the HPI and ROS as documented by the MA/LPN/RN Yodit Johnston MD    Vital Signs:  Visit Vitals  /70 (BP Location: Right arm, Patient Position: Sitting, Cuff Size: Adult)   Pulse 86   Temp 98.6 °F (37 °C) (Temporal)   Resp 18   Ht 165.1 cm (65\") "   Wt 92.4 kg (203 lb 9.6 oz)   LMP 01/01/1996 (Approximate)   SpO2 97% Comment: room air   BMI 33.88 kg/m²       Physical Exam  Vitals signs reviewed.   Constitutional:       General: She is not in acute distress.     Appearance: She is well-developed.   HENT:      Right Ear: Tympanic membrane and external ear normal.      Left Ear: Tympanic membrane and external ear normal.   Eyes:      Conjunctiva/sclera: Conjunctivae normal.   Neck:      Musculoskeletal: Neck supple.      Thyroid: No thyromegaly.      Vascular: No JVD.   Cardiovascular:      Rate and Rhythm: Normal rate and regular rhythm.      Heart sounds: Normal heart sounds. No murmur. No friction rub. No gallop.    Pulmonary:      Effort: Pulmonary effort is normal. No respiratory distress.      Breath sounds: Wheezing (mild expiratory clears with coughing) present. No rales.   Abdominal:      General: Bowel sounds are normal. There is no distension.      Palpations: Abdomen is soft. There is no mass.      Tenderness: There is no abdominal tenderness.   Lymphadenopathy:      Cervical: No cervical adenopathy.   Skin:     General: Skin is warm.      Findings: No erythema or rash.   Neurological:      Mental Status: She is alert and oriented to person, place, and time.      Coordination: Coordination normal.      Deep Tendon Reflexes: Reflexes normal.         Assessment and Plan:  Problem List Items Addressed This Visit        High    Hypertension, benign (Chronic)    Overview     Controlled.   Low salt low calorie diet and regular exercise   She will continue to  monitor her pressures          Current Assessment & Plan     Hypertension is improving with treatment.  Continue current treatment regimen.  Dietary sodium restriction.  Weight loss.  Regular aerobic exercise.  Blood pressure will be reassessed in 3 months.         Coronary artery disease of native artery of native heart with stable angina pectoris (CMS/HCC)    Overview     No sxs at present, previous  angina resolved.   She has been seen by Cardiology since her last visit. She is doing well  Risk factors modified         Renal insufficiency    Overview     BUN 24, Creatinine 1.09 and eGFR 53 04/28/2020 she will follow up for lab soon.   BUN 23, Creatinine 1.11 and eGFR 52 02/2//2020  BUN 16, Creatinine 0.88 and eGFR 68 01/30/2020            Medium    Mixed hyperlipidemia (Chronic)    Overview     She is compliant with meds  Goal of LDL 70 understood.          Current Assessment & Plan     Lipid abnormalities are improving with treatment.  Nutritional counseling was provided. and Pharmacotherapy as ordered.  Lipids will be reassessed in 3 months.            Low    Obesity (BMI 30-39.9) (Chronic)    Overview     She has regained wt.  Prediabetes is understood. Low calorie diet discussed. She will increase exercise.          Current Assessment & Plan     Obesity is worsening.  Discussed the patient's BMI.  The BMI is above average; BMI management plan is completed.  General weight loss/lifestyle modification strategies discussed (elicit support from others; identify saboteurs; non-food rewards, etc).         Allergic rhinitis    Overview     Continue maximum meds. Her allergies are likely contributing to her cough         Relevant Medications    predniSONE (DELTASONE) 10 MG tablet    History of tobacco use    Overview     1 PPD X 34 years, she is abstaining though it is difficult.   We discussed techniques and the importance of continuing to abstain.               Unprioritized    Anxiety    Overview     Exacerbation of sxs.   Difficult given she has stopped smoking.   Begin Lexapro and follow         Relevant Medications    escitalopram (LEXAPRO) 10 MG tablet    ALPRAZolam (XANAX) 0.25 MG tablet      Other Visit Diagnoses     Cough in adult    -  Primary    Negative exam negative CXR    Relevant Orders    XR Chest PA & Lateral (Completed)    Bronchitis        Relevant Medications    doxycycline (MONODOX) 100 MG  capsule    predniSONE (DELTASONE) 10 MG tablet          An After Visit Summary and PPPS were given to the patient.       I wore protective equipment throughout this patient encounter to include mask and eye protection. Hand hygiene was performed before donning protective equipment and after removal when leaving the room.

## 2020-10-17 PROBLEM — Z72.0 TOBACCO USE: Status: ACTIVE | Noted: 2019-10-25

## 2020-10-18 PROBLEM — I25.2 HISTORY OF ST ELEVATION MYOCARDIAL INFARCTION (STEMI): Status: RESOLVED | Noted: 2020-05-01 | Resolved: 2020-10-18

## 2020-10-18 PROBLEM — I21.02 STEMI INVOLVING LEFT ANTERIOR DESCENDING CORONARY ARTERY: Status: RESOLVED | Noted: 2019-09-29 | Resolved: 2020-10-18

## 2020-10-18 PROBLEM — E78.41 ELEVATED LIPOPROTEIN(A): Status: RESOLVED | Noted: 2020-04-30 | Resolved: 2020-10-18

## 2020-10-20 DIAGNOSIS — Z11.59 NEED FOR HEPATITIS C SCREENING TEST: ICD-10-CM

## 2020-10-20 DIAGNOSIS — E78.2 MIXED HYPERLIPIDEMIA: Chronic | ICD-10-CM

## 2020-10-20 DIAGNOSIS — I10 HYPERTENSION, BENIGN: Primary | Chronic | ICD-10-CM

## 2020-10-21 LAB
ALBUMIN SERPL-MCNC: 4.4 G/DL (ref 3.8–4.9)
ALBUMIN/GLOB SERPL: 1.8 {RATIO} (ref 1.2–2.2)
ALP SERPL-CCNC: 92 IU/L (ref 39–117)
ALT SERPL-CCNC: 21 IU/L (ref 0–32)
AST SERPL-CCNC: 18 IU/L (ref 0–40)
BASOPHILS # BLD AUTO: 0.1 X10E3/UL (ref 0–0.2)
BASOPHILS NFR BLD AUTO: 0 %
BILIRUB SERPL-MCNC: 0.3 MG/DL (ref 0–1.2)
BUN SERPL-MCNC: 18 MG/DL (ref 6–24)
BUN/CREAT SERPL: 16 (ref 9–23)
CALCIUM SERPL-MCNC: 9.1 MG/DL (ref 8.7–10.2)
CHLORIDE SERPL-SCNC: 101 MMOL/L (ref 96–106)
CHOLEST SERPL-MCNC: 152 MG/DL (ref 100–199)
CHOLEST/HDLC SERPL: 2.5 RATIO (ref 0–4.4)
CO2 SERPL-SCNC: 26 MMOL/L (ref 20–29)
CREAT SERPL-MCNC: 1.12 MG/DL (ref 0.57–1)
EOSINOPHIL # BLD AUTO: 0.2 X10E3/UL (ref 0–0.4)
EOSINOPHIL NFR BLD AUTO: 1 %
ERYTHROCYTE [DISTWIDTH] IN BLOOD BY AUTOMATED COUNT: 12.1 % (ref 11.7–15.4)
GLOBULIN SER CALC-MCNC: 2.4 G/DL (ref 1.5–4.5)
GLUCOSE SERPL-MCNC: 94 MG/DL (ref 65–99)
HCT VFR BLD AUTO: 40.4 % (ref 34–46.6)
HCV AB S/CO SERPL IA: <0.1 S/CO RATIO (ref 0–0.9)
HDLC SERPL-MCNC: 60 MG/DL
HGB BLD-MCNC: 13.6 G/DL (ref 11.1–15.9)
IMM GRANULOCYTES # BLD AUTO: 0.1 X10E3/UL (ref 0–0.1)
IMM GRANULOCYTES NFR BLD AUTO: 1 %
LDLC SERPL CALC-MCNC: 69 MG/DL (ref 0–99)
LYMPHOCYTES # BLD AUTO: 4.1 X10E3/UL (ref 0.7–3.1)
LYMPHOCYTES NFR BLD AUTO: 30 %
MCH RBC QN AUTO: 30.3 PG (ref 26.6–33)
MCHC RBC AUTO-ENTMCNC: 33.7 G/DL (ref 31.5–35.7)
MCV RBC AUTO: 90 FL (ref 79–97)
MONOCYTES # BLD AUTO: 1 X10E3/UL (ref 0.1–0.9)
MONOCYTES NFR BLD AUTO: 7 %
NEUTROPHILS # BLD AUTO: 8.1 X10E3/UL (ref 1.4–7)
NEUTROPHILS NFR BLD AUTO: 61 %
PLATELET # BLD AUTO: 206 X10E3/UL (ref 150–450)
POTASSIUM SERPL-SCNC: 4.5 MMOL/L (ref 3.5–5.2)
PROT SERPL-MCNC: 6.8 G/DL (ref 6–8.5)
RBC # BLD AUTO: 4.49 X10E6/UL (ref 3.77–5.28)
SODIUM SERPL-SCNC: 140 MMOL/L (ref 134–144)
TRIGL SERPL-MCNC: 134 MG/DL (ref 0–149)
TSH SERPL DL<=0.005 MIU/L-ACNC: 2.1 UIU/ML (ref 0.45–4.5)
VLDLC SERPL CALC-MCNC: 23 MG/DL (ref 5–40)
WBC # BLD AUTO: 13.5 X10E3/UL (ref 3.4–10.8)

## 2020-10-30 DIAGNOSIS — I10 HYPERTENSION, BENIGN: Chronic | ICD-10-CM

## 2020-10-30 DIAGNOSIS — F41.9 ANXIETY: ICD-10-CM

## 2020-11-02 RX ORDER — FUROSEMIDE 40 MG/1
TABLET ORAL
Qty: 180 TABLET | Refills: 4 | Status: SHIPPED | OUTPATIENT
Start: 2020-11-02 | End: 2021-02-11

## 2020-11-02 RX ORDER — BUPROPION HYDROCHLORIDE 150 MG/1
TABLET ORAL
Qty: 90 TABLET | Refills: 4 | Status: SHIPPED | OUTPATIENT
Start: 2020-11-02 | End: 2021-02-11

## 2021-01-04 ENCOUNTER — APPOINTMENT (OUTPATIENT)
Dept: CT IMAGING | Facility: HOSPITAL | Age: 51
End: 2021-01-04

## 2021-01-28 RX ORDER — SPIRONOLACTONE 25 MG/1
TABLET ORAL
Qty: 90 TABLET | Refills: 1 | Status: SHIPPED | OUTPATIENT
Start: 2021-01-28 | End: 2021-02-11

## 2021-01-28 RX ORDER — LISINOPRIL 5 MG/1
TABLET ORAL
Qty: 180 TABLET | Refills: 1 | Status: SHIPPED | OUTPATIENT
Start: 2021-01-28 | End: 2021-02-11

## 2021-02-08 ENCOUNTER — TELEPHONE (OUTPATIENT)
Dept: CARDIOLOGY | Facility: CLINIC | Age: 51
End: 2021-02-08

## 2021-02-10 ENCOUNTER — LAB (OUTPATIENT)
Dept: LAB | Facility: HOSPITAL | Age: 51
End: 2021-02-10

## 2021-02-10 ENCOUNTER — OFFICE VISIT (OUTPATIENT)
Dept: CARDIOLOGY | Facility: CLINIC | Age: 51
End: 2021-02-10

## 2021-02-10 VITALS
SYSTOLIC BLOOD PRESSURE: 102 MMHG | WEIGHT: 204.8 LBS | DIASTOLIC BLOOD PRESSURE: 58 MMHG | BODY MASS INDEX: 34.12 KG/M2 | RESPIRATION RATE: 18 BRPM | HEIGHT: 65 IN | HEART RATE: 77 BPM

## 2021-02-10 DIAGNOSIS — Z01.818 PRE-OP TESTING: ICD-10-CM

## 2021-02-10 DIAGNOSIS — I25.110 CORONARY ARTERY DISEASE INVOLVING NATIVE CORONARY ARTERY OF NATIVE HEART WITH UNSTABLE ANGINA PECTORIS (HCC): Primary | ICD-10-CM

## 2021-02-10 LAB
ALBUMIN SERPL-MCNC: 3.8 G/DL (ref 3.5–5.2)
ALBUMIN/GLOB SERPL: 1.3 G/DL
ALP SERPL-CCNC: 118 U/L (ref 39–117)
ALT SERPL W P-5'-P-CCNC: 26 U/L (ref 1–33)
ANION GAP SERPL CALCULATED.3IONS-SCNC: 6.3 MMOL/L (ref 5–15)
AST SERPL-CCNC: 19 U/L (ref 1–32)
BASOPHILS # BLD AUTO: 0.06 10*3/MM3 (ref 0–0.2)
BASOPHILS NFR BLD AUTO: 0.7 % (ref 0–1.5)
BILIRUB SERPL-MCNC: 0.2 MG/DL (ref 0–1.2)
BUN SERPL-MCNC: 14 MG/DL (ref 6–20)
BUN/CREAT SERPL: 14.4 (ref 7–25)
CALCIUM SPEC-SCNC: 8.7 MG/DL (ref 8.6–10.5)
CHLORIDE SERPL-SCNC: 104 MMOL/L (ref 98–107)
CO2 SERPL-SCNC: 27.7 MMOL/L (ref 22–29)
CREAT SERPL-MCNC: 0.97 MG/DL (ref 0.57–1)
DEPRECATED RDW RBC AUTO: 39.6 FL (ref 37–54)
EOSINOPHIL # BLD AUTO: 0.18 10*3/MM3 (ref 0–0.4)
EOSINOPHIL NFR BLD AUTO: 2.1 % (ref 0.3–6.2)
ERYTHROCYTE [DISTWIDTH] IN BLOOD BY AUTOMATED COUNT: 11.9 % (ref 12.3–15.4)
GFR SERPL CREATININE-BSD FRML MDRD: 61 ML/MIN/1.73
GLOBULIN UR ELPH-MCNC: 2.9 GM/DL
GLUCOSE SERPL-MCNC: 114 MG/DL (ref 65–99)
HCT VFR BLD AUTO: 37.5 % (ref 34–46.6)
HGB BLD-MCNC: 12.9 G/DL (ref 12–15.9)
IMM GRANULOCYTES # BLD AUTO: 0.06 10*3/MM3 (ref 0–0.05)
IMM GRANULOCYTES NFR BLD AUTO: 0.7 % (ref 0–0.5)
INR PPP: 1.01 (ref 0.93–1.1)
LYMPHOCYTES # BLD AUTO: 2.12 10*3/MM3 (ref 0.7–3.1)
LYMPHOCYTES NFR BLD AUTO: 24.6 % (ref 19.6–45.3)
MCH RBC QN AUTO: 31.5 PG (ref 26.6–33)
MCHC RBC AUTO-ENTMCNC: 34.4 G/DL (ref 31.5–35.7)
MCV RBC AUTO: 91.7 FL (ref 79–97)
MONOCYTES # BLD AUTO: 0.66 10*3/MM3 (ref 0.1–0.9)
MONOCYTES NFR BLD AUTO: 7.6 % (ref 5–12)
NEUTROPHILS NFR BLD AUTO: 5.55 10*3/MM3 (ref 1.7–7)
NEUTROPHILS NFR BLD AUTO: 64.3 % (ref 42.7–76)
NRBC BLD AUTO-RTO: 0 /100 WBC (ref 0–0.2)
PLATELET # BLD AUTO: 202 10*3/MM3 (ref 140–450)
PMV BLD AUTO: 11.4 FL (ref 6–12)
POTASSIUM SERPL-SCNC: 3.9 MMOL/L (ref 3.5–5.2)
PROT SERPL-MCNC: 6.7 G/DL (ref 6–8.5)
PROTHROMBIN TIME: 11.1 SECONDS (ref 9.6–11.7)
RBC # BLD AUTO: 4.09 10*6/MM3 (ref 3.77–5.28)
SARS-COV-2 ORF1AB RESP QL NAA+PROBE: NOT DETECTED
SODIUM SERPL-SCNC: 138 MMOL/L (ref 136–145)
WBC # BLD AUTO: 8.63 10*3/MM3 (ref 3.4–10.8)

## 2021-02-10 PROCEDURE — U0004 COV-19 TEST NON-CDC HGH THRU: HCPCS

## 2021-02-10 PROCEDURE — 85025 COMPLETE CBC W/AUTO DIFF WBC: CPT | Performed by: INTERNAL MEDICINE

## 2021-02-10 PROCEDURE — C9803 HOPD COVID-19 SPEC COLLECT: HCPCS

## 2021-02-10 PROCEDURE — 85610 PROTHROMBIN TIME: CPT | Performed by: INTERNAL MEDICINE

## 2021-02-10 PROCEDURE — 99214 OFFICE O/P EST MOD 30 MIN: CPT | Performed by: INTERNAL MEDICINE

## 2021-02-10 PROCEDURE — 36415 COLL VENOUS BLD VENIPUNCTURE: CPT | Performed by: INTERNAL MEDICINE

## 2021-02-10 PROCEDURE — 80053 COMPREHEN METABOLIC PANEL: CPT | Performed by: INTERNAL MEDICINE

## 2021-02-10 NOTE — PROGRESS NOTES
Cardiology clinic note  Brant Martinez MD, PhD  Subjective:     Encounter Date:02/10/2021      Patient ID: Sada Le is a 50 y.o. female.    Chief Complaint:  Chief Complaint   Patient presents with   • Chest Pain   • Shortness of Breath   • Arm Pain   • Neck Pain       HPI:  History of Present Illness  I the pleasure to see this patient who is a 49-year-old female who is well-known to me from prior hospitalization and clinic notes.  She has a history of anterior ST elevation myocardial infarction with complete occlusion of the proximal LAD under going revascularization with Xience 3.5 x 33 mm drug-eluting stent postdilated to 4.5 mm at 18 familia with good results.  This was an Impella supported procedure given under  Cardiogenic shock in the setting of acute MI which was complicated by VF arrest with successful defibrillation.  She did well with weaning Impella support over 2 days and institution of max goal-directed medical therapy ultimately being discharged to home.  We were very aggressive with her medical therapy as well as antiplatelets and her EF improved from around 28 to 30% to 55% with only ever so slight anterior wall very mild hypokinesis residually.  She has done well since this time and is completely quit smoking.  She had nonobstructive disease in the circumflex and RCA as well as mid to distal LAD.  She was changed secondary to shortness of breath from Brilinta to Effient which she is maintained on aspirin and Effient, she is on high intensity statin therapy as well as Aldactone and long-acting beta-blocker and loop diuretics 40 twice daily of Lasix.  She is on ACE inhibitor at moderate to high intensity as well tolerating this just fine without cough.  She has had a recent hospitalization for shortness of breath and underwent right heart catheterization with no evidence of pulmonary hypertension despite volume loading during the procedure with evidence of volume depletion initially with right  atrial pressure of 2 to 3 mmHg.  Results of this are below.  She had recent hospitalization  for atypical substernal chest pain also with jaw pain.  Troponins were unremarkable, EKG was unchanged, blood pressure has been somewhat labile at home recently she says and when high causes some degree of chest discomfort.  She underwent noninvasive evaluation with treadmill stress test and walked greater than 9 minutes with no EKG changes and she had normal myocardial perfusion at rest and stress.  There was no indication of balanced ischemia that would be suspected that may give false negative results relative to perfusion imaging.  Her echo showed normal preserved LV systolic function with ever so slight mild anterior wall hypokinesis compared to thickening of the posterior lateral walls.  EF was 55 to 60%.  Normal atrial sizes, no evidence of pulmonary hypertension, IVC diameter was normal.  She was discharged shortly thereafter.  She has been followed up now 3 to 4 months later with no recurrence of symptoms, stable NYHA class I-II symptoms, no anginal chest pain although she does have intermittent dizziness with standing her blood pressure is slightly low and we have titrated down her beta-blocker secondary to low resting heart rates in the 60s, she is maintained on low-dose twice daily lisinopril as well as Aldactone given her previous ischemic cardiomyopathy which is now recovered to near normal at EF of 50% with very mild residual anterior hypokinesis as stated by 2D echo.  Otherwise she is doing well with no medicine changes today needed.      Today she presents with recurrent anginal chest pain increasing in frequency and duration.  She is not present to the hospital.  She recently in the past 6 months had a stress test which did not demonstrate any regional abnormality but she continues to have this increasing frequency and severity and duration anginal chest pain with previous nonobstructive disease has  highlighted below in the noninfarct territory.  She would like definitive evaluation with left heart catheterization given similarity with her prior chest pain just not as severe at the time of STEMI.  No other questions or concerns, no other symptoms, no palpitations PND orthopnea heart failure signs or symptoms or other complaints.     Review of systems x14 point review of systems is negative except was mentioned above     Historical data copied forward from previous encounters and EMR is unchanged  Unable to complete visit using a video connection to the patient. A phone visit was used to complete this visits. Total time of discussion and encounter was 26 minutes.     Stress test results 4-  Interpretation Summary         · Findings consistent with a normal ECG stress test.  · Left ventricular ejection fraction is hyperdynamic (Calculated EF > 70%).  · Myocardial perfusion imaging indicates a normal myocardial perfusion study with no evidence of ischemia.  · There is no prior study available for comparison.     Normal stress myocardial perfusion imaging  Normal stress ECG portion of the study with no ischemic ST-T wave changes  Sinus rhythm increase to sinus tachycardia with target heart rate achieved with good predictability and sensitivity by criteria  Myocardial perfusion imaging was normal at rest as well as at peak stress with no reversibility preserved LV ejection fraction 66%     Low risk study for significant ischemia, do not suspect balanced ischemia            2D echo results 4-  Echocardiogram Findings      Left Ventricle Left ventricular systolic function is normal. Calculated EF = 60.0%. Estimated EF was in agreement with the calculated EF. Estimated EF appears to be in the range of 56 - 60%. Normal left ventricular cavity size noted. All left ventricular wall segments contract normally. Left ventricular wall thickness is consistent with mild concentric hypertrophy. Left ventricular  mass is increased. Left ventricular mass index is increased. Septal wall motion is normal. Left ventricular diastolic dysfunction is noted (grade I a w/high LAP) consistent with impaired relaxation. There is no evidence of a left ventricular mass or thrombus present. There is no evidence of spontaneous contrast. No false tendon noted.   Right Ventricle Normal right ventricular cavity size, wall thickness, systolic function and septal motion noted. No evidence of a right ventricular mass present.   Left Atrium Normal left atrial size and volume noted. No evidence of a left atrial mass present. The interatrial septum does not appear to be redundant. No interatrial septal aneurysm present. No lipomatous hypertrophy of the interatrial septum present. Cannot exclude the presence of a patent foramen ovale. Saline test for shunting not performed.   Right Atrium Normal right atrial size noted. The inferior vena cava is normally sized. Normal IVC inspiratory collapse of greater than 50% noted.   Aortic Valve The aortic valve is grossly normal in structure. Mild aortic valve regurgitation is present. No hemodynamically significant aortic valve stenosis is present.   Mitral Valve The mitral valve is grossly normal in structure. Trace mitral valve regurgitation is present. No significant mitral valve stenosis is present.   Tricuspid Valve The tricuspid valve is grossly normal. No evidence of tricuspid valve stenosis is present. Trace tricuspid valve regurgitation is present. Estimated right ventricular systolic pressure from tricuspid regurgitation is normal (<35 mmHg). No evidence of pulmonary hypertension is present.   Pulmonic Valve The pulmonic valve is grossly normal in structure. There is no significant pulmonic valve stenosis present. There is no significant pulmonic valve regurgitation present.   Greater Vessels Mild dilation of the aortic root is present. Mild dilation of the sinuses of Valsalva is present.    Pericardium There is no evidence of pericardial effusion. There is no evidence of ascites present.            February 2020 right heart catheterization  Results  Wedge pressure 3 mmHg  PA pressure 19/6 with mean of 11 to 13 mmHg  RV 20/1 with end-diastolic pressure of 2 mmHg  Right atrial pressure of 2 to 3 mmHg  PA saturation 71%  SVC 69%  RA 70%  IVC 71%  FA sat 98%  Dragan cardiac output 4.8  Dragan cardiac index 2.4  Dragan stroke-volume 79 cc  No indication of significant step up between the right atrium and pulmonary artery or in SVC versus IVC and right atrial saturation, no indication of significant resting interatrial shunt.  Conclusions and recommendations  Expected PA sat by flam calculation 70.5%, actual 71%  Qp/Qs: 0.98 insignificant left to right interatrial shunt resting     Left heart catheterization results September 2019  Pre-procedure Diagnosis      STEMI        Conclusion       Brant Martinez MD, PhD  Date of service 9-29-19     Procedure  1.  Left heart catheterization with coronary angiography and left ventricular atrophy in ANTON position  2.  Impella CP insertion via left groin for the indication of cardiogenic shock in the setting of ST elevation myocardial infarction in the LAD territory  3.  Percutaneous coronary intervention to the proximal / ostial to mid LAD with Xience 3.5 x 33 mm drug-eluting stent postdilated to 4.5 mm at 18 familia with good angiographic results.  4.  Placement of balloon tip [place]-Venkat catheter to the main pulmonary artery via right common femoral vein with pressure assessment and cardiac output calculations and right heart catheterization, left in place for Impella weaning parameters.  5.  Antegrade selective left common iliac angiography with femoral runoff  6.  Retrograde right common femoral angiography        Indication  Left anterior descending artery/LAD ST elevation myocardial infarction with superimposed acute cardiogenic shock.     Technique  After  informed consent the patient was brought to the catheterization lab emergently sterilely prepped and draped exposure bilateral groins for right common femoral arterial access via micropuncture and modified Seldinger technique with initial placement of a 6 [place] sheath to the right common femoral artery under fluoroscopic guidance.  1% lidocaine analgesia was used for local anesthesia.  Next secondary to findings of severe anterior ST elevations with marginal blood pressures access was also obtained via micropuncture and modified center technique with placement of a 6 [place] sheath to the left common femoral artery under fluoroscopic guidance after selective left iliac angiography with iliofemoral runoff via a JR4 catheter from the right groin with left-sided imaging accordingly.  Next a pigtail catheter was advanced from the right groin and across the aortic valve followed by left ventricular end-diastolic pressure measurements and left ventricular atrophy in ANTON position revealing severely reduced LV systolic function estimated 20% with regional abnormalities severely in the anterior anterolateral and apical inferior wall with severe akinesis and mild to moderately dilated LV.  This was exchanged over an 035 guidewire for a diagnostic JL4 catheter to perform left sided angiography which was selectively engaged with the left main.  Multiple angiographic views orthogonal angiographic views demonstrated ostial proximal occlusion of the % with JANE 0 flow.  This catheter was removed followed by removal of the left 6 [place] sheath, Perclose device placement to the left common femoral arteriotomy and pre-close fashion via standard technique, and upsizing ultimately with placement of an Impella CP device via left common femoral approach via standard technique using 5 [place] pigtail catheter to cross the aortic valve, Impella wire placement to the LV, over the wire placement into the ventricle of  the Impella CP device which was started and titrated up to P8 for LV ventricular unloading.  Throughout this process the patient had ischemic arrhythmias with runs of nonsustained ventricular tachycardia and short runs of ventricular fibrillation however patient had sustained ventricular fibrillation requiring defibrillation with 1 successful shock as well as loading with IV amiodarone.  Patient was ultimately extremely hypotensive and required recurrent IV pushes of 300 mcg of Chadwick-Synephrine as well as Levophed drip at max dose throughout the procedure.  After successful Impella placement and securing of the device via right common femoral approach a 7 [place] sheath was placed followed by 7 [place] EBU 4.0 guide to successfully engage the left main followed by run-through wire to the distal LAD over which lesion was predilated with a 2.5 x 12 mm NC balloon with restoration of flow demonstrating tandem lesions in the LAD in the proximal ostial location with thrombotic occlusion followed by proximal to mid 80 to 90% eccentric narrowing immediately prior to the bifurcation of the LAD and dominant diagonal branch.  This balloon was exchanged for a 3.0 x 15 mm NC balloon which was used to predilate both the distal and further predilate the proximal lesion successfully.  This balloon was used for sizing and length followed by placement of a 3.5 x 33 mm Xience drug-eluting stent which was deployed at 12 familia with pullback inside the stent 1 strut up to 18 familia with the stent balloon.  This was then exchanged for a 4.0 x 15 mm NC balloon which was used to post dilate and distal to proximal fashion up to 20 familia.  Despite 4.0 post there was still significant mall apposition in this very large caliber proximal LAD.  A 4.5 x 15 mm balloon was then used to further post dilate the stent distal to proximal fashion resulting in much improved stent apposition and distal proximal fashion all the way to the ostial segment.  With the  balloon inflated at the ostial segment focal angiogram revealed patent flow to the circumflex ensuring that this segment was not jailed by proximal strut.  After 4.5 mm post dilation up to 18 familia proximally the balloon was deflated and removed followed by angiography and multiple views revealing JANE-3 flow to the distal apical LAD as well as diagonal branch as well as to the circumflex distribution.  No distal wire trauma, no edge dissection, reduction of the stenosis from 100% proximally and 80% in the proximal to midportion to 0% residual.  There was residual distal LAD diffuse disease up to 60 possibly 70% however there was JANE-3 flow and these were left to be staged with FFR at later date as well as borderline lesions in the circumflex with JANE-3 flow as well as nonobstructive disease in the RCA with JANE-3 flow in this distribution.  At this point, Levophed drip at max dose was titrated down slowly with careful observation of Impella support pressures.  During hemodynamic weaning, right common femoral venous access was obtained with 7 [place] sheath placement to the right common femoral vein followed by balloon tip [place]-Venkat catheter facilitated right heart catheterization and placement to the main pulmonary artery followed by PA pressure assessment, right atrial pressure assessment, PA saturations as compared to femoral artery saturations with Dragan cardiac output calculation.  FA saturation was 92% and at P8 hemodynamic support the PA saturation was 51%.  With further titration down ultimately off levo fed and attempts at weaning the Impella support device PA saturations decreased to the mid 40s and the pump was then re-uptitrated for flows of 2.8 to 3 L/min.  This resulted in better arrhythmogenic profile with less ectopy.  CVP seem to fluctuate from 8-15 and was stabilized at 12 to 14 mmHg after IV fluid boluses for adequate preload of the Impella device.  After reduction continued with ultimately weaning  completely off levophed support only on hemodynamic support.  Patient continued to have multiple reperfusion arrhythmias including junctional and idioventricular escape rhythms as well as bigeminal rhythms and nonsustained VT.  With continued hemodynamic support and careful careful observation the seem to decrease in frequency and severity.  The decision was made to leave the Impella support device in for LV continued unloading with observation of fall and PA saturations indicative of worsening cardiac output with weaning of hemogenic support.  Selective left iliac angiography was performed with a JR4 catheter from the right femoral with imaging of patent flow to the left lower extremity and left superficial femoral and left profunda around the Impella CP sheath.  The device was then secured in place via standard protocol and the patient transferred to CVICU for intensive care measures and refractory cardiogenic shock needing hemodynamic support.  Patient left the Cath Lab chest pain-free, alert talking to staff neurologically intact.     Medications administered during the case:  Heparin to maintain ACT greater than 250 at all times  IV Versed and fentanyl administered by RN with continuous ECG, pulse oximetry, ECG and hemodynamic monitoring by myself throughout the case  IV Chadwick-Synephrine  IV levophed  Oral loading with ticagrelor and aspirin on the table  Intracoronary nitroglycerin  Contrast usage  380-400 cc     Moderate conscious sedation was used with IV Versed and fentanyl given via registered nurse with all supervision of ECG continuously, pulse oximetry and hemodynamic monitoring by myself through and during and throughout the case.  Conscious sedation time 3-1/2 hours for this complicated case.     Complications  No complications     Blood loss: 20 cc     Lesion data  Type C, 100% thrombotic proximal ostial LAD occlusion with tandem 80% eccentric proximal LAD stenosis, reduced to 0% residual after  intervention     Results  1.  Opening aortic pressure 89/62  2.  Pressure during the case as low as 72/48, lowest map 51  3.  LVEDP 18-20  4.  No significant transaortic valve gradient  5.  RA pressure 12 mmHg  6.  PA pressure 32/12 with a mean of 25 to 27 mmHg  7.  PA saturation 50% with full him dynamic support, 44% at P2  8.  FA saturation 92%  9.  LVEF 20% estimated by LV gram     Angiography  1.  Left main coronary arteries a large-caliber vessel giving rise to LAD and nondominant circumflex there is no intragraft disease left main  2.  The circumflex is a nondominant vessel with single large caliber obtuse marginal branch and continuation circumflex into OM 2.  There is mild diffuse disease of the second distribution as well as ostial 60 to 70% OM1 stenosis but only mild luminal irregularities distally thereafter as well as only mild luminal irregularities of the distal circumflex and OM 2.  No significant obstructive disease seen  3.  The RCA is a medium caliber dominant artery with PLV and PDA distally.  There is mild diffuse disease not greater than 30% diffusely throughout the RCA system  4.  The LAD is a large-caliber vessel coursing to and around the apex with one large caliber bifurcating diagonal branch in the midportion.  This was seen after revascularization.  Initial angiography revealed proximal ostial 100% thrombotic occlusion.  There was a tandem 80 to 85% eccentric plaque immediately prior to the takeoff of the dominant bifurcating diagonal branch in the midportion.  The distal LAD also has diffuse disease approximately 70% most severely distal to the takeoff of the diagonal branch with diffuse 40 to 50% disease thereafter and tapering caliber of the LAD to and around the apex.  Caliber this portion probably 2 to 2.5 mm.  5.  LVEF 20% estimated by LV gram     Intervention  Successful placement of a Xience 3.5 x 33 mm drug-eluting stent postdilated 4.5 mm at high pressure with reduction of  thrombotic occlusion and tandem lesion to 0% residual with restoration of JANE-3 flow to the LAD and diagonal branch.     Successful Impella CP placement with hemodynamic unloading and support of the LV function in the setting of cardiogenic shock     Successful balloon tip [place]-Venkat catheter placement to the main pulmonary artery via right common femoral vein for indication of titration of hemodynamic support and cardiac output assessments and CVP assessments.     Conclusions  1.  Thrombotic ostial proximal LAD occlusion with ST elevation microinfarction  2.  Cardiogenic shock  3.  Nonobstructive disease in the circumflex and RCA  4.  Residual borderline lesions in the distal LAD which will be staged for FFR in the future  5.  Ongoing inability to wean hemogenic support with indications for continued Impella support post procedurally in the CCU.  6.  Patent left lower extremity flow around the Impella CP sheath imaged via left iliac selective angiography with runoff.  7.  Severely reduced LV systolic function as a complication of anterior STEMI     Recommendations:  She will be transferred to the ICU for further hemodynamic support and monitoring in intensive care measures  Dual antiplatelet therapy with aspirin and ticagrelor uninterrupted  High intensity statin therapy  Afterload reduction as indicated  ACE inhibitor or Aldactone therapy for LVEF less than 35% in the setting of acute MI per guidelines     It is a pleasure to be involved in her cardiac care.  I will continue to manage in the CCU as well as hemogenic support in ICU core measures.  Brant Martinez MD, PhD       The following portions of the patient's history were reviewed and updated as appropriate: allergies, past family history, past medical history, past social history, past surgical history and problem list.    Problem List:  Patient Active Problem List   Diagnosis   • Allergic rhinitis   • Cervical disc disease with myelopathy   • Diverticulosis  of colon   • Hypertension, benign   • Insomnia, organic   • Obesity (BMI 30-39.9)   • Osteopenia of spine   • Mixed hyperlipidemia   • Cardiac arrest with ventricular fibrillation (CMS/HCC)   • Ischemic cardiomyopathy   • Stented coronary artery   • History of echocardiogram   • Annual visit for general adult medical examination with abnormal findings   • Cervical cancer screening   • Encounter for screening for malignant neoplasm of breast   • Colon cancer screening   • History of tobacco use   • Generalized anxiety disorder   • ASD (atrial septal defect)   • History of transesophageal echocardiography (NAFISA)   • Coronary artery disease of native artery of native heart with stable angina pectoris (CMS/HCC)   • Renal insufficiency   • History of MI (myocardial infarction)   • Family history of diabetes mellitus type II   • Anxiety   • Coronary artery disease involving native coronary artery of native heart with unstable angina pectoris (CMS/HCC)       Past Medical History:  Past Medical History:   Diagnosis Date   • Absence of left thumb     Impression: hx of MRSA, she is signficantly improved She will continue meds and followup if sxs have not resolved over the next 2 weeks.. 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 EF 34% Distribution  indicative of LAD territory injury. Akinesis of the apex as well as apical lateral and mid to apical septal walls. Preservation of posterior inferior lateral walls. Mild AI, mild TR. Normal RV function.    • History of transesophageal echocardiography (NAFISA) 01/07/2020    EF 55% LA cavity is mild to moderately dilated. Mild MR. Interatrial septum appears redundant. Interatrial hypermobile c/w aneurysm. Large PFO is noted with right to left shunting on bubble study. PFO tunnel appears to be short.    • Hordeolum externum of left lower eyelid     Impression: She was started on Bactrim DS for her infection. She was also advised to use warm compression. She will followup should sxs not improve over the next 48 hrs and resolve over the next 1 weeek.   • Hyperlipidemia     Impression: Diet controled. She should see improvement with her successful wt loss. We discussed her lipid panel.   • Hypertension, benign     Impression: new onset Low salt low calorie diet and regular exercise and followup in 1 mo She will monitor her pressures and notify us of her pressures over the next 1 week.   • Inclusion cyst of right breast     Impression: We will follow for now. She is encouraged to have Mammography. She is presently without insurance and reluctant. She bobby consider. She will notify us of any change at all in the lesion for the only way to be certain of it's etilogy is to remove it. She understands.   • Insomnia, organic    • Menopausal syndrome    • Overweight (BMI 25.0-29.9)    • RUQ abdominal pain     Impression: Resolving, negative GB u/s, HIDA EF 77%, we will follow   • Stented coronary artery 09/29/2019   • Tobacco use     Impression: She is strongly encouraged to stop smoking. Cessation techniques discussed and encouraged. The consequences of not stopping discussed, ie, CAD, PVD, Stroke, Lung and other cancers.       Past Surgical History:  Past Surgical History:   Procedure Laterality Date   • BIVENTRICULAR ASSIST  DEVICE/LEFT VENTRICULAR ASSIST DEVICE INSERTION N/A 9/29/2019    Procedure: Left Ventricular Assist Device;  Surgeon: Brant Martinez MD;  Location: [facility] CATH INVASIVE LOCATION;  Service: Cardiovascular   • CARDIAC CATHETERIZATION N/A 9/29/2019    Procedure: Left Heart Cath;  Surgeon: Brant Martinez MD;  Location: [facility] CATH INVASIVE LOCATION;  Service: Cardiovascular   • CARDIAC CATHETERIZATION N/A 9/29/2019    Procedure: Coronary angiography;  Surgeon: Brant Martinez MD;  Location: [facility] CATH INVASIVE LOCATION;  Service: Cardiovascular   • CARDIAC CATHETERIZATION N/A 9/29/2019    Procedure: Left ventriculography;  Surgeon: Brant Martinez MD;  Location: [facility] CATH INVASIVE LOCATION;  Service: Cardiovascular   • CARDIAC CATHETERIZATION N/A 9/29/2019    Procedure: Stent SERENITY coronary;  Surgeon: Brant Martinez MD;  Location: [facility] CATH INVASIVE LOCATION;  Service: Cardiovascular   • CARDIAC CATHETERIZATION N/A 2/25/2020    Procedure: Right Heart Cath;  Surgeon: Brant Martinez MD;  Location: [facility] CATH INVASIVE LOCATION;  Service: Cardiology;  Laterality: N/A;   • CARDIAC ELECTROPHYSIOLOGY PROCEDURE  9/29/2019    Procedure: Impella Insertion;  Surgeon: Brant Martinez MD;  Location: [facility] CATH INVASIVE LOCATION;  Service: Cardiovascular   • CT RT/LT HEART CATHETERS N/A 9/29/2019    Procedure: Percutaneous Coronary Intervention;  Surgeon: Brant Martinez MD;  Location: [facility] CATH INVASIVE LOCATION;  Service: Cardiovascular   • TOTAL ABDOMINAL HYSTERECTOMY WITH SALPINGO OOPHORECTOMY  1995    Endometriosis       Social History:  Social History     Socioeconomic History   • Marital status:      Spouse name: Not on file   • Number of children: Not on file   • Years of education: Not on file   • Highest education level: Not on file   Tobacco Use   • Smoking status: Former Smoker     Packs/day: 1.00     Years: 34.00     Pack years:  "34.00     Types: Cigarettes     Start date:      Quit date: 2019     Years since quittin.3   • Smokeless tobacco: Never Used   • Tobacco comment: States she is quitting as of 2019.   Substance and Sexual Activity   • Alcohol use: Yes     Frequency: Monthly or less     Drinks per session: 3 or 4     Binge frequency: Less than monthly     Comment: occassionally    • Drug use: Not Currently     Comment: occasional   • Sexual activity: Defer       Allergies:  Allergies   Allergen Reactions   • Penicillins Rash   • Ciprofloxacin Rash   • Other Rash       Immunizations:  Immunization History   Administered Date(s) Administered   • DT 2004   • Flulaval/Fluarix/Fluzone Quad 10/01/2019   • Tdap 2011       ROS:  ROS       Objective:         /58 (BP Location: Left arm, Patient Position: Sitting)   Pulse 77   Resp 18   Ht 165.1 cm (65\")   Wt 92.9 kg (204 lb 12.8 oz)   LMP 1996 (Approximate)   BMI 34.08 kg/m²     Physical Exam  Regular rate rhythm no rubs murmurs gallops  Obese soft nontender nondistended bowel sounds positive  There is clubbing cyanosis or edema  No bruits no JVD  Pulses 2+  Skin warm and dry  Neurologic deficits grossly    In-Office Procedure(s):  Procedures    ASCVD RIsk Score::  The ASCVD Risk score ([place] AIME Jr., et al., 2013) failed to calculate for the following reasons:    The patient has a prior MI or stroke diagnosis    Recent Radiology:  Imaging Results (Most Recent)     None          Lab Review:   Orders Only on 10/20/2020   Component Date Value   • WBC 10/20/2020 13.5*   • RBC 10/20/2020 4.49    • Hemoglobin 10/20/2020 13.6    • Hematocrit 10/20/2020 40.4    • MCV 10/20/2020 90    • MCH 10/20/2020 30.3    • MCHC 10/20/2020 33.7    • RDW 10/20/2020 12.1    • Platelets 10/20/2020 206    • Neutrophil Rel % 10/20/2020 61    • Lymphocyte Rel % 10/20/2020 30    • Monocyte Rel % 10/20/2020 7    • Eosinophil Rel % 10/20/2020 1    • Basophil Rel % 10/20/2020 0  "   • Neutrophils Absolute 10/20/2020 8.1*   • Lymphocytes Absolute 10/20/2020 4.1*   • Monocytes Absolute 10/20/2020 1.0*   • Eosinophils Absolute 10/20/2020 0.2    • Basophils Absolute 10/20/2020 0.1    • Immature Granulocyte Rel* 10/20/2020 1    • Immature Grans Absolute 10/20/2020 0.1    • Glucose 10/20/2020 94    • BUN 10/20/2020 18    • Creatinine 10/20/2020 1.12*   • eGFR Non African Am 10/20/2020 57*   • eGFR  Am 10/20/2020 66    • BUN/Creatinine Ratio 10/20/2020 16    • Sodium 10/20/2020 140    • Potassium 10/20/2020 4.5    • Chloride 10/20/2020 101    • Total CO2 10/20/2020 26    • Calcium 10/20/2020 9.1    • Total Protein 10/20/2020 6.8    • Albumin 10/20/2020 4.4    • Globulin 10/20/2020 2.4    • A/G Ratio 10/20/2020 1.8    • Total Bilirubin 10/20/2020 0.3    • Alkaline Phosphatase 10/20/2020 92    • AST (SGOT) 10/20/2020 18    • ALT (SGPT) 10/20/2020 21    • TSH 10/20/2020 2.100    • Total Cholesterol 10/20/2020 152    • Triglycerides 10/20/2020 134    • HDL Cholesterol 10/20/2020 60    • VLDL Cholesterol Nam 10/20/2020 23    • LDL Chol Calc (NIH) 10/20/2020 69    • Chol/HDL Ratio 10/20/2020 2.5    • Hep C Virus Ab 10/20/2020 <0.1                 Assessment:          Diagnosis Plan   1. Coronary artery disease involving native coronary artery of native heart with unstable angina pectoris (CMS/HCC)  Case Request Cath Lab: Left Heart Cath    CBC & Differential    Comprehensive Metabolic Panel    Protime-INR    CBC Auto Differential   2. Pre-op testing  CBC & Differential    Comprehensive Metabolic Panel    Protime-INR    COVID PRE-OP / PRE-PROCEDURE SCREENING ORDER (NO ISOLATION) - Swab, Nasopharynx    CBC Auto Differential          Plan:        Recurrent anginal chest pain  Schedule for left heart catheterization given high risk of recurrent disease, nonobstructive disease at the time of STEMI previously as above    Continue antiplatelet therapy  Next and further recommendation to follow  findings    Brant Martinez MD, PhD                   Brant Martinez MD  02/10/21  .

## 2021-02-10 NOTE — H&P (VIEW-ONLY)
Cardiology clinic note  Brant Martinez MD, PhD  Subjective:     Encounter Date:02/10/2021      Patient ID: Sada Le is a 50 y.o. female.    Chief Complaint:  Chief Complaint   Patient presents with   • Chest Pain   • Shortness of Breath   • Arm Pain   • Neck Pain       HPI:  History of Present Illness  I the pleasure to see this patient who is a 49-year-old female who is well-known to me from prior hospitalization and clinic notes.  She has a history of anterior ST elevation myocardial infarction with complete occlusion of the proximal LAD under going revascularization with Xience 3.5 x 33 mm drug-eluting stent postdilated to 4.5 mm at 18 familia with good results.  This was an Impella supported procedure given under  Cardiogenic shock in the setting of acute MI which was complicated by VF arrest with successful defibrillation.  She did well with weaning Impella support over 2 days and institution of max goal-directed medical therapy ultimately being discharged to home.  We were very aggressive with her medical therapy as well as antiplatelets and her EF improved from around 28 to 30% to 55% with only ever so slight anterior wall very mild hypokinesis residually.  She has done well since this time and is completely quit smoking.  She had nonobstructive disease in the circumflex and RCA as well as mid to distal LAD.  She was changed secondary to shortness of breath from Brilinta to Effient which she is maintained on aspirin and Effient, she is on high intensity statin therapy as well as Aldactone and long-acting beta-blocker and loop diuretics 40 twice daily of Lasix.  She is on ACE inhibitor at moderate to high intensity as well tolerating this just fine without cough.  She has had a recent hospitalization for shortness of breath and underwent right heart catheterization with no evidence of pulmonary hypertension despite volume loading during the procedure with evidence of volume depletion initially with right  atrial pressure of 2 to 3 mmHg.  Results of this are below.  She had recent hospitalization  for atypical substernal chest pain also with jaw pain.  Troponins were unremarkable, EKG was unchanged, blood pressure has been somewhat labile at home recently she says and when high causes some degree of chest discomfort.  She underwent noninvasive evaluation with treadmill stress test and walked greater than 9 minutes with no EKG changes and she had normal myocardial perfusion at rest and stress.  There was no indication of balanced ischemia that would be suspected that may give false negative results relative to perfusion imaging.  Her echo showed normal preserved LV systolic function with ever so slight mild anterior wall hypokinesis compared to thickening of the posterior lateral walls.  EF was 55 to 60%.  Normal atrial sizes, no evidence of pulmonary hypertension, IVC diameter was normal.  She was discharged shortly thereafter.  She has been followed up now 3 to 4 months later with no recurrence of symptoms, stable NYHA class I-II symptoms, no anginal chest pain although she does have intermittent dizziness with standing her blood pressure is slightly low and we have titrated down her beta-blocker secondary to low resting heart rates in the 60s, she is maintained on low-dose twice daily lisinopril as well as Aldactone given her previous ischemic cardiomyopathy which is now recovered to near normal at EF of 50% with very mild residual anterior hypokinesis as stated by 2D echo.  Otherwise she is doing well with no medicine changes today needed.      Today she presents with recurrent anginal chest pain increasing in frequency and duration.  She is not present to the hospital.  She recently in the past 6 months had a stress test which did not demonstrate any regional abnormality but she continues to have this increasing frequency and severity and duration anginal chest pain with previous nonobstructive disease has  highlighted below in the noninfarct territory.  She would like definitive evaluation with left heart catheterization given similarity with her prior chest pain just not as severe at the time of STEMI.  No other questions or concerns, no other symptoms, no palpitations PND orthopnea heart failure signs or symptoms or other complaints.     Review of systems x14 point review of systems is negative except was mentioned above     Historical data copied forward from previous encounters and EMR is unchanged  Unable to complete visit using a video connection to the patient. A phone visit was used to complete this visits. Total time of discussion and encounter was 26 minutes.     Stress test results 4-  Interpretation Summary         · Findings consistent with a normal ECG stress test.  · Left ventricular ejection fraction is hyperdynamic (Calculated EF > 70%).  · Myocardial perfusion imaging indicates a normal myocardial perfusion study with no evidence of ischemia.  · There is no prior study available for comparison.     Normal stress myocardial perfusion imaging  Normal stress ECG portion of the study with no ischemic ST-T wave changes  Sinus rhythm increase to sinus tachycardia with target heart rate achieved with good predictability and sensitivity by criteria  Myocardial perfusion imaging was normal at rest as well as at peak stress with no reversibility preserved LV ejection fraction 66%     Low risk study for significant ischemia, do not suspect balanced ischemia            2D echo results 4-  Echocardiogram Findings      Left Ventricle Left ventricular systolic function is normal. Calculated EF = 60.0%. Estimated EF was in agreement with the calculated EF. Estimated EF appears to be in the range of 56 - 60%. Normal left ventricular cavity size noted. All left ventricular wall segments contract normally. Left ventricular wall thickness is consistent with mild concentric hypertrophy. Left ventricular  mass is increased. Left ventricular mass index is increased. Septal wall motion is normal. Left ventricular diastolic dysfunction is noted (grade I a w/high LAP) consistent with impaired relaxation. There is no evidence of a left ventricular mass or thrombus present. There is no evidence of spontaneous contrast. No false tendon noted.   Right Ventricle Normal right ventricular cavity size, wall thickness, systolic function and septal motion noted. No evidence of a right ventricular mass present.   Left Atrium Normal left atrial size and volume noted. No evidence of a left atrial mass present. The interatrial septum does not appear to be redundant. No interatrial septal aneurysm present. No lipomatous hypertrophy of the interatrial septum present. Cannot exclude the presence of a patent foramen ovale. Saline test for shunting not performed.   Right Atrium Normal right atrial size noted. The inferior vena cava is normally sized. Normal IVC inspiratory collapse of greater than 50% noted.   Aortic Valve The aortic valve is grossly normal in structure. Mild aortic valve regurgitation is present. No hemodynamically significant aortic valve stenosis is present.   Mitral Valve The mitral valve is grossly normal in structure. Trace mitral valve regurgitation is present. No significant mitral valve stenosis is present.   Tricuspid Valve The tricuspid valve is grossly normal. No evidence of tricuspid valve stenosis is present. Trace tricuspid valve regurgitation is present. Estimated right ventricular systolic pressure from tricuspid regurgitation is normal (<35 mmHg). No evidence of pulmonary hypertension is present.   Pulmonic Valve The pulmonic valve is grossly normal in structure. There is no significant pulmonic valve stenosis present. There is no significant pulmonic valve regurgitation present.   Greater Vessels Mild dilation of the aortic root is present. Mild dilation of the sinuses of Valsalva is present.    Pericardium There is no evidence of pericardial effusion. There is no evidence of ascites present.            February 2020 right heart catheterization  Results  Wedge pressure 3 mmHg  PA pressure 19/6 with mean of 11 to 13 mmHg  RV 20/1 with end-diastolic pressure of 2 mmHg  Right atrial pressure of 2 to 3 mmHg  PA saturation 71%  SVC 69%  RA 70%  IVC 71%  FA sat 98%  Dragan cardiac output 4.8  Dragan cardiac index 2.4  Dragan stroke-volume 79 cc  No indication of significant step up between the right atrium and pulmonary artery or in SVC versus IVC and right atrial saturation, no indication of significant resting interatrial shunt.  Conclusions and recommendations  Expected PA sat by flam calculation 70.5%, actual 71%  Qp/Qs: 0.98 insignificant left to right interatrial shunt resting     Left heart catheterization results September 2019  Pre-procedure Diagnosis      STEMI        Conclusion       Brant Martinez MD, PhD  Date of service 9-29-19     Procedure  1.  Left heart catheterization with coronary angiography and left ventricular atrophy in ANTON position  2.  Impella CP insertion via left groin for the indication of cardiogenic shock in the setting of ST elevation myocardial infarction in the LAD territory  3.  Percutaneous coronary intervention to the proximal / ostial to mid LAD with Xience 3.5 x 33 mm drug-eluting stent postdilated to 4.5 mm at 18 familia with good angiographic results.  4.  Placement of balloon tip [place]-Venkat catheter to the main pulmonary artery via right common femoral vein with pressure assessment and cardiac output calculations and right heart catheterization, left in place for Impella weaning parameters.  5.  Antegrade selective left common iliac angiography with femoral runoff  6.  Retrograde right common femoral angiography        Indication  Left anterior descending artery/LAD ST elevation myocardial infarction with superimposed acute cardiogenic shock.     Technique  After  informed consent the patient was brought to the catheterization lab emergently sterilely prepped and draped exposure bilateral groins for right common femoral arterial access via micropuncture and modified Seldinger technique with initial placement of a 6 Citizen of [place] sheath to the right common femoral artery under fluoroscopic guidance.  1% lidocaine analgesia was used for local anesthesia.  Next secondary to findings of severe anterior ST elevations with marginal blood pressures access was also obtained via micropuncture and modified center technique with placement of a 6 Citizen of [place] sheath to the left common femoral artery under fluoroscopic guidance after selective left iliac angiography with iliofemoral runoff via a JR4 catheter from the right groin with left-sided imaging accordingly.  Next a pigtail catheter was advanced from the right groin and across the aortic valve followed by left ventricular end-diastolic pressure measurements and left ventricular atrophy in ANTON position revealing severely reduced LV systolic function estimated 20% with regional abnormalities severely in the anterior anterolateral and apical inferior wall with severe akinesis and mild to moderately dilated LV.  This was exchanged over an 035 guidewire for a diagnostic JL4 catheter to perform left sided angiography which was selectively engaged with the left main.  Multiple angiographic views orthogonal angiographic views demonstrated ostial proximal occlusion of the % with JANE 0 flow.  This catheter was removed followed by removal of the left 6 Citizen of [place] sheath, Perclose device placement to the left common femoral arteriotomy and pre-close fashion via standard technique, and upsizing ultimately with placement of an Impella CP device via left common femoral approach via standard technique using 5 Citizen [place] pigtail catheter to cross the aortic valve, Impella wire placement to the LV, over the wire placement into the ventricle of  the Impella CP device which was started and titrated up to P8 for LV ventricular unloading.  Throughout this process the patient had ischemic arrhythmias with runs of nonsustained ventricular tachycardia and short runs of ventricular fibrillation however patient had sustained ventricular fibrillation requiring defibrillation with 1 successful shock as well as loading with IV amiodarone.  Patient was ultimately extremely hypotensive and required recurrent IV pushes of 300 mcg of Chadwick-Synephrine as well as Levophed drip at max dose throughout the procedure.  After successful Impella placement and securing of the device via right common femoral approach a 7 [place] sheath was placed followed by 7 [place] EBU 4.0 guide to successfully engage the left main followed by run-through wire to the distal LAD over which lesion was predilated with a 2.5 x 12 mm NC balloon with restoration of flow demonstrating tandem lesions in the LAD in the proximal ostial location with thrombotic occlusion followed by proximal to mid 80 to 90% eccentric narrowing immediately prior to the bifurcation of the LAD and dominant diagonal branch.  This balloon was exchanged for a 3.0 x 15 mm NC balloon which was used to predilate both the distal and further predilate the proximal lesion successfully.  This balloon was used for sizing and length followed by placement of a 3.5 x 33 mm Xience drug-eluting stent which was deployed at 12 familia with pullback inside the stent 1 strut up to 18 familia with the stent balloon.  This was then exchanged for a 4.0 x 15 mm NC balloon which was used to post dilate and distal to proximal fashion up to 20 familia.  Despite 4.0 post there was still significant mall apposition in this very large caliber proximal LAD.  A 4.5 x 15 mm balloon was then used to further post dilate the stent distal to proximal fashion resulting in much improved stent apposition and distal proximal fashion all the way to the ostial segment.  With the  balloon inflated at the ostial segment focal angiogram revealed patent flow to the circumflex ensuring that this segment was not jailed by proximal strut.  After 4.5 mm post dilation up to 18 familia proximally the balloon was deflated and removed followed by angiography and multiple views revealing JANE-3 flow to the distal apical LAD as well as diagonal branch as well as to the circumflex distribution.  No distal wire trauma, no edge dissection, reduction of the stenosis from 100% proximally and 80% in the proximal to midportion to 0% residual.  There was residual distal LAD diffuse disease up to 60 possibly 70% however there was JANE-3 flow and these were left to be staged with FFR at later date as well as borderline lesions in the circumflex with JANE-3 flow as well as nonobstructive disease in the RCA with JANE-3 flow in this distribution.  At this point, Levophed drip at max dose was titrated down slowly with careful observation of Impella support pressures.  During hemodynamic weaning, right common femoral venous access was obtained with 7 [place] sheath placement to the right common femoral vein followed by balloon tip [place]-Venkat catheter facilitated right heart catheterization and placement to the main pulmonary artery followed by PA pressure assessment, right atrial pressure assessment, PA saturations as compared to femoral artery saturations with Dragan cardiac output calculation.  FA saturation was 92% and at P8 hemodynamic support the PA saturation was 51%.  With further titration down ultimately off levo fed and attempts at weaning the Impella support device PA saturations decreased to the mid 40s and the pump was then re-uptitrated for flows of 2.8 to 3 L/min.  This resulted in better arrhythmogenic profile with less ectopy.  CVP seem to fluctuate from 8-15 and was stabilized at 12 to 14 mmHg after IV fluid boluses for adequate preload of the Impella device.  After reduction continued with ultimately weaning  completely off levophed support only on hemodynamic support.  Patient continued to have multiple reperfusion arrhythmias including junctional and idioventricular escape rhythms as well as bigeminal rhythms and nonsustained VT.  With continued hemodynamic support and careful careful observation the seem to decrease in frequency and severity.  The decision was made to leave the Impella support device in for LV continued unloading with observation of fall and PA saturations indicative of worsening cardiac output with weaning of hemogenic support.  Selective left iliac angiography was performed with a JR4 catheter from the right femoral with imaging of patent flow to the left lower extremity and left superficial femoral and left profunda around the Impella CP sheath.  The device was then secured in place via standard protocol and the patient transferred to CVICU for intensive care measures and refractory cardiogenic shock needing hemodynamic support.  Patient left the Cath Lab chest pain-free, alert talking to staff neurologically intact.     Medications administered during the case:  Heparin to maintain ACT greater than 250 at all times  IV Versed and fentanyl administered by RN with continuous ECG, pulse oximetry, ECG and hemodynamic monitoring by myself throughout the case  IV Chadwick-Synephrine  IV levophed  Oral loading with ticagrelor and aspirin on the table  Intracoronary nitroglycerin  Contrast usage  380-400 cc     Moderate conscious sedation was used with IV Versed and fentanyl given via registered nurse with all supervision of ECG continuously, pulse oximetry and hemodynamic monitoring by myself through and during and throughout the case.  Conscious sedation time 3-1/2 hours for this complicated case.     Complications  No complications     Blood loss: 20 cc     Lesion data  Type C, 100% thrombotic proximal ostial LAD occlusion with tandem 80% eccentric proximal LAD stenosis, reduced to 0% residual after  intervention     Results  1.  Opening aortic pressure 89/62  2.  Pressure during the case as low as 72/48, lowest map 51  3.  LVEDP 18-20  4.  No significant transaortic valve gradient  5.  RA pressure 12 mmHg  6.  PA pressure 32/12 with a mean of 25 to 27 mmHg  7.  PA saturation 50% with full him dynamic support, 44% at P2  8.  FA saturation 92%  9.  LVEF 20% estimated by LV gram     Angiography  1.  Left main coronary arteries a large-caliber vessel giving rise to LAD and nondominant circumflex there is no intragraft disease left main  2.  The circumflex is a nondominant vessel with single large caliber obtuse marginal branch and continuation circumflex into OM 2.  There is mild diffuse disease of the second distribution as well as ostial 60 to 70% OM1 stenosis but only mild luminal irregularities distally thereafter as well as only mild luminal irregularities of the distal circumflex and OM 2.  No significant obstructive disease seen  3.  The RCA is a medium caliber dominant artery with PLV and PDA distally.  There is mild diffuse disease not greater than 30% diffusely throughout the RCA system  4.  The LAD is a large-caliber vessel coursing to and around the apex with one large caliber bifurcating diagonal branch in the midportion.  This was seen after revascularization.  Initial angiography revealed proximal ostial 100% thrombotic occlusion.  There was a tandem 80 to 85% eccentric plaque immediately prior to the takeoff of the dominant bifurcating diagonal branch in the midportion.  The distal LAD also has diffuse disease approximately 70% most severely distal to the takeoff of the diagonal branch with diffuse 40 to 50% disease thereafter and tapering caliber of the LAD to and around the apex.  Caliber this portion probably 2 to 2.5 mm.  5.  LVEF 20% estimated by LV gram     Intervention  Successful placement of a Xience 3.5 x 33 mm drug-eluting stent postdilated 4.5 mm at high pressure with reduction of  thrombotic occlusion and tandem lesion to 0% residual with restoration of JANE-3 flow to the LAD and diagonal branch.     Successful Impella CP placement with hemodynamic unloading and support of the LV function in the setting of cardiogenic shock     Successful balloon tip [place]-Venkat catheter placement to the main pulmonary artery via right common femoral vein for indication of titration of hemodynamic support and cardiac output assessments and CVP assessments.     Conclusions  1.  Thrombotic ostial proximal LAD occlusion with ST elevation microinfarction  2.  Cardiogenic shock  3.  Nonobstructive disease in the circumflex and RCA  4.  Residual borderline lesions in the distal LAD which will be staged for FFR in the future  5.  Ongoing inability to wean hemogenic support with indications for continued Impella support post procedurally in the CCU.  6.  Patent left lower extremity flow around the Impella CP sheath imaged via left iliac selective angiography with runoff.  7.  Severely reduced LV systolic function as a complication of anterior STEMI     Recommendations:  She will be transferred to the ICU for further hemodynamic support and monitoring in intensive care measures  Dual antiplatelet therapy with aspirin and ticagrelor uninterrupted  High intensity statin therapy  Afterload reduction as indicated  ACE inhibitor or Aldactone therapy for LVEF less than 35% in the setting of acute MI per guidelines     It is a pleasure to be involved in her cardiac care.  I will continue to manage in the CCU as well as hemogenic support in ICU core measures.  Brant Martinez MD, PhD       The following portions of the patient's history were reviewed and updated as appropriate: allergies, past family history, past medical history, past social history, past surgical history and problem list.    Problem List:  Patient Active Problem List   Diagnosis   • Allergic rhinitis   • Cervical disc disease with myelopathy   • Diverticulosis  of colon   • Hypertension, benign   • Insomnia, organic   • Obesity (BMI 30-39.9)   • Osteopenia of spine   • Mixed hyperlipidemia   • Cardiac arrest with ventricular fibrillation (CMS/HCC)   • Ischemic cardiomyopathy   • Stented coronary artery   • History of echocardiogram   • Annual visit for general adult medical examination with abnormal findings   • Cervical cancer screening   • Encounter for screening for malignant neoplasm of breast   • Colon cancer screening   • History of tobacco use   • Generalized anxiety disorder   • ASD (atrial septal defect)   • History of transesophageal echocardiography (NAFISA)   • Coronary artery disease of native artery of native heart with stable angina pectoris (CMS/HCC)   • Renal insufficiency   • History of MI (myocardial infarction)   • Family history of diabetes mellitus type II   • Anxiety   • Coronary artery disease involving native coronary artery of native heart with unstable angina pectoris (CMS/HCC)       Past Medical History:  Past Medical History:   Diagnosis Date   • Absence of left thumb     Impression: hx of MRSA, she is signficantly improved She will continue meds and followup if sxs have not resolved over the next 2 weeks.. 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 EF 34% Distribution  indicative of LAD territory injury. Akinesis of the apex as well as apical lateral and mid to apical septal walls. Preservation of posterior inferior lateral walls. Mild AI, mild TR. Normal RV function.    • History of transesophageal echocardiography (NAFISA) 01/07/2020    EF 55% LA cavity is mild to moderately dilated. Mild MR. Interatrial septum appears redundant. Interatrial hypermobile c/w aneurysm. Large PFO is noted with right to left shunting on bubble study. PFO tunnel appears to be short.    • Hordeolum externum of left lower eyelid     Impression: She was started on Bactrim DS for her infection. She was also advised to use warm compression. She will followup should sxs not improve over the next 48 hrs and resolve over the next 1 weeek.   • Hyperlipidemia     Impression: Diet controled. She should see improvement with her successful wt loss. We discussed her lipid panel.   • Hypertension, benign     Impression: new onset Low salt low calorie diet and regular exercise and followup in 1 mo She will monitor her pressures and notify us of her pressures over the next 1 week.   • Inclusion cyst of right breast     Impression: We will follow for now. She is encouraged to have Mammography. She is presently without insurance and reluctant. She bobby consider. She will notify us of any change at all in the lesion for the only way to be certain of it's etilogy is to remove it. She understands.   • Insomnia, organic    • Menopausal syndrome    • Overweight (BMI 25.0-29.9)    • RUQ abdominal pain     Impression: Resolving, negative GB u/s, HIDA EF 77%, we will follow   • Stented coronary artery 09/29/2019   • Tobacco use     Impression: She is strongly encouraged to stop smoking. Cessation techniques discussed and encouraged. The consequences of not stopping discussed, ie, CAD, PVD, Stroke, Lung and other cancers.       Past Surgical History:  Past Surgical History:   Procedure Laterality Date   • BIVENTRICULAR ASSIST  DEVICE/LEFT VENTRICULAR ASSIST DEVICE INSERTION N/A 9/29/2019    Procedure: Left Ventricular Assist Device;  Surgeon: Brant Martinez MD;  Location: [facility] CATH INVASIVE LOCATION;  Service: Cardiovascular   • CARDIAC CATHETERIZATION N/A 9/29/2019    Procedure: Left Heart Cath;  Surgeon: Brant Martinez MD;  Location: [facility] CATH INVASIVE LOCATION;  Service: Cardiovascular   • CARDIAC CATHETERIZATION N/A 9/29/2019    Procedure: Coronary angiography;  Surgeon: Brant Martinez MD;  Location: [facility] CATH INVASIVE LOCATION;  Service: Cardiovascular   • CARDIAC CATHETERIZATION N/A 9/29/2019    Procedure: Left ventriculography;  Surgeon: Brant Martinez MD;  Location: [facility] CATH INVASIVE LOCATION;  Service: Cardiovascular   • CARDIAC CATHETERIZATION N/A 9/29/2019    Procedure: Stent SERENITY coronary;  Surgeon: Brant Martinez MD;  Location: [facility] CATH INVASIVE LOCATION;  Service: Cardiovascular   • CARDIAC CATHETERIZATION N/A 2/25/2020    Procedure: Right Heart Cath;  Surgeon: Brant Martinez MD;  Location: [facility] CATH INVASIVE LOCATION;  Service: Cardiology;  Laterality: N/A;   • CARDIAC ELECTROPHYSIOLOGY PROCEDURE  9/29/2019    Procedure: Impella Insertion;  Surgeon: Brant Martinez MD;  Location: [facility] CATH INVASIVE LOCATION;  Service: Cardiovascular   • TN RT/LT HEART CATHETERS N/A 9/29/2019    Procedure: Percutaneous Coronary Intervention;  Surgeon: Brant Martinez MD;  Location: [facility] CATH INVASIVE LOCATION;  Service: Cardiovascular   • TOTAL ABDOMINAL HYSTERECTOMY WITH SALPINGO OOPHORECTOMY  1995    Endometriosis       Social History:  Social History     Socioeconomic History   • Marital status:      Spouse name: Not on file   • Number of children: Not on file   • Years of education: Not on file   • Highest education level: Not on file   Tobacco Use   • Smoking status: Former Smoker     Packs/day: 1.00     Years: 34.00     Pack years:  "34.00     Types: Cigarettes     Start date:      Quit date: 2019     Years since quittin.3   • Smokeless tobacco: Never Used   • Tobacco comment: States she is quitting as of 2019.   Substance and Sexual Activity   • Alcohol use: Yes     Frequency: Monthly or less     Drinks per session: 3 or 4     Binge frequency: Less than monthly     Comment: occassionally    • Drug use: Not Currently     Comment: occasional   • Sexual activity: Defer       Allergies:  Allergies   Allergen Reactions   • Penicillins Rash   • Ciprofloxacin Rash   • Other Rash       Immunizations:  Immunization History   Administered Date(s) Administered   • DT 2004   • Flulaval/Fluarix/Fluzone Quad 10/01/2019   • Tdap 2011       ROS:  ROS       Objective:         /58 (BP Location: Left arm, Patient Position: Sitting)   Pulse 77   Resp 18   Ht 165.1 cm (65\")   Wt 92.9 kg (204 lb 12.8 oz)   LMP 1996 (Approximate)   BMI 34.08 kg/m²     Physical Exam  Regular rate rhythm no rubs murmurs gallops  Obese soft nontender nondistended bowel sounds positive  There is clubbing cyanosis or edema  No bruits no JVD  Pulses 2+  Skin warm and dry  Neurologic deficits grossly    In-Office Procedure(s):  Procedures    ASCVD RIsk Score::  The ASCVD Risk score (Hazelton AIME Jr., et al., 2013) failed to calculate for the following reasons:    The patient has a prior MI or stroke diagnosis    Recent Radiology:  Imaging Results (Most Recent)     None          Lab Review:   Orders Only on 10/20/2020   Component Date Value   • WBC 10/20/2020 13.5*   • RBC 10/20/2020 4.49    • Hemoglobin 10/20/2020 13.6    • Hematocrit 10/20/2020 40.4    • MCV 10/20/2020 90    • MCH 10/20/2020 30.3    • MCHC 10/20/2020 33.7    • RDW 10/20/2020 12.1    • Platelets 10/20/2020 206    • Neutrophil Rel % 10/20/2020 61    • Lymphocyte Rel % 10/20/2020 30    • Monocyte Rel % 10/20/2020 7    • Eosinophil Rel % 10/20/2020 1    • Basophil Rel % 10/20/2020 0  "   • Neutrophils Absolute 10/20/2020 8.1*   • Lymphocytes Absolute 10/20/2020 4.1*   • Monocytes Absolute 10/20/2020 1.0*   • Eosinophils Absolute 10/20/2020 0.2    • Basophils Absolute 10/20/2020 0.1    • Immature Granulocyte Rel* 10/20/2020 1    • Immature Grans Absolute 10/20/2020 0.1    • Glucose 10/20/2020 94    • BUN 10/20/2020 18    • Creatinine 10/20/2020 1.12*   • eGFR Non African Am 10/20/2020 57*   • eGFR  Am 10/20/2020 66    • BUN/Creatinine Ratio 10/20/2020 16    • Sodium 10/20/2020 140    • Potassium 10/20/2020 4.5    • Chloride 10/20/2020 101    • Total CO2 10/20/2020 26    • Calcium 10/20/2020 9.1    • Total Protein 10/20/2020 6.8    • Albumin 10/20/2020 4.4    • Globulin 10/20/2020 2.4    • A/G Ratio 10/20/2020 1.8    • Total Bilirubin 10/20/2020 0.3    • Alkaline Phosphatase 10/20/2020 92    • AST (SGOT) 10/20/2020 18    • ALT (SGPT) 10/20/2020 21    • TSH 10/20/2020 2.100    • Total Cholesterol 10/20/2020 152    • Triglycerides 10/20/2020 134    • HDL Cholesterol 10/20/2020 60    • VLDL Cholesterol Nam 10/20/2020 23    • LDL Chol Calc (NIH) 10/20/2020 69    • Chol/HDL Ratio 10/20/2020 2.5    • Hep C Virus Ab 10/20/2020 <0.1                 Assessment:          Diagnosis Plan   1. Coronary artery disease involving native coronary artery of native heart with unstable angina pectoris (CMS/HCC)  Case Request Cath Lab: Left Heart Cath    CBC & Differential    Comprehensive Metabolic Panel    Protime-INR    CBC Auto Differential   2. Pre-op testing  CBC & Differential    Comprehensive Metabolic Panel    Protime-INR    COVID PRE-OP / PRE-PROCEDURE SCREENING ORDER (NO ISOLATION) - Swab, Nasopharynx    CBC Auto Differential          Plan:        Recurrent anginal chest pain  Schedule for left heart catheterization given high risk of recurrent disease, nonobstructive disease at the time of STEMI previously as above    Continue antiplatelet therapy  Next and further recommendation to follow  findings    Brant Martinez MD, PhD                   Brant Martinez MD  02/10/21  .

## 2021-02-11 RX ORDER — ESCITALOPRAM OXALATE 10 MG/1
10 TABLET ORAL DAILY
COMMUNITY
End: 2021-10-25

## 2021-02-11 RX ORDER — BUPROPION HYDROCHLORIDE 150 MG/1
150 TABLET ORAL DAILY
COMMUNITY
End: 2022-01-24

## 2021-02-11 RX ORDER — ASPIRIN 81 MG/1
81 TABLET, CHEWABLE ORAL DAILY
COMMUNITY
End: 2021-04-28

## 2021-02-11 RX ORDER — FUROSEMIDE 40 MG/1
40 TABLET ORAL 2 TIMES DAILY
COMMUNITY
End: 2021-03-03 | Stop reason: DRUGHIGH

## 2021-02-11 RX ORDER — METOPROLOL SUCCINATE 25 MG/1
25 TABLET, EXTENDED RELEASE ORAL DAILY
COMMUNITY
End: 2021-10-22

## 2021-02-11 RX ORDER — ATORVASTATIN CALCIUM 20 MG/1
20 TABLET, FILM COATED ORAL EVERY OTHER DAY
COMMUNITY
End: 2022-03-18 | Stop reason: SINTOL

## 2021-02-11 RX ORDER — ALPRAZOLAM 0.25 MG/1
0.25 TABLET ORAL EVERY 6 HOURS PRN
COMMUNITY
End: 2022-12-07

## 2021-02-11 RX ORDER — PRASUGREL 10 MG/1
10 TABLET, FILM COATED ORAL DAILY
COMMUNITY
End: 2021-06-03

## 2021-02-11 RX ORDER — LISINOPRIL 5 MG/1
5 TABLET ORAL 2 TIMES DAILY
COMMUNITY
End: 2021-03-03 | Stop reason: SDUPTHER

## 2021-02-11 RX ORDER — SPIRONOLACTONE 25 MG/1
25 TABLET ORAL DAILY
COMMUNITY
End: 2021-03-03

## 2021-02-11 RX ORDER — NITROGLYCERIN 0.4 MG/1
0.4 TABLET SUBLINGUAL
COMMUNITY
End: 2021-03-03 | Stop reason: SDUPTHER

## 2021-02-12 ENCOUNTER — HOSPITAL ENCOUNTER (OUTPATIENT)
Facility: HOSPITAL | Age: 51
Discharge: HOME | End: 2021-02-13
Attending: INTERNAL MEDICINE | Admitting: INTERNAL MEDICINE

## 2021-02-12 DIAGNOSIS — Z98.61 CAD S/P PERCUTANEOUS CORONARY ANGIOPLASTY: Primary | ICD-10-CM

## 2021-02-12 DIAGNOSIS — I25.110 CORONARY ARTERY DISEASE INVOLVING NATIVE CORONARY ARTERY OF NATIVE HEART WITH UNSTABLE ANGINA PECTORIS (HCC): ICD-10-CM

## 2021-02-12 DIAGNOSIS — I25.10 CAD S/P PERCUTANEOUS CORONARY ANGIOPLASTY: Primary | ICD-10-CM

## 2021-02-12 LAB
ACT BLD: 158 SECONDS (ref 89–137)
ACT BLD: 241 SECONDS (ref 89–137)
ACT BLD: 263 SECONDS (ref 89–137)

## 2021-02-12 PROCEDURE — C1887 CATHETER, GUIDING: HCPCS | Performed by: INTERNAL MEDICINE

## 2021-02-12 PROCEDURE — C9600 PERC DRUG-EL COR STENT SING: HCPCS | Performed by: INTERNAL MEDICINE

## 2021-02-12 PROCEDURE — 99152 MOD SED SAME PHYS/QHP 5/>YRS: CPT | Performed by: INTERNAL MEDICINE

## 2021-02-12 PROCEDURE — C1874 STENT, COATED/COV W/DEL SYS: HCPCS | Performed by: INTERNAL MEDICINE

## 2021-02-12 PROCEDURE — 99153 MOD SED SAME PHYS/QHP EA: CPT | Performed by: INTERNAL MEDICINE

## 2021-02-12 PROCEDURE — C1894 INTRO/SHEATH, NON-LASER: HCPCS | Performed by: INTERNAL MEDICINE

## 2021-02-12 PROCEDURE — 93458 L HRT ARTERY/VENTRICLE ANGIO: CPT | Performed by: INTERNAL MEDICINE

## 2021-02-12 PROCEDURE — C1769 GUIDE WIRE: HCPCS | Performed by: INTERNAL MEDICINE

## 2021-02-12 PROCEDURE — C1725 CATH, TRANSLUMIN NON-LASER: HCPCS | Performed by: INTERNAL MEDICINE

## 2021-02-12 PROCEDURE — 92928 PRQ TCAT PLMT NTRAC ST 1 LES: CPT | Performed by: INTERNAL MEDICINE

## 2021-02-12 PROCEDURE — 25010000002 DIPHENHYDRAMINE PER 50 MG: Performed by: INTERNAL MEDICINE

## 2021-02-12 PROCEDURE — 0 IOPAMIDOL PER 1 ML: Performed by: INTERNAL MEDICINE

## 2021-02-12 PROCEDURE — G0378 HOSPITAL OBSERVATION PER HR: HCPCS

## 2021-02-12 PROCEDURE — 25010000002 PHENYLEPHRINE 10 MG/ML SOLUTION: Performed by: INTERNAL MEDICINE

## 2021-02-12 PROCEDURE — 85347 COAGULATION TIME ACTIVATED: CPT

## 2021-02-12 PROCEDURE — 25010000002 HEPARIN (PORCINE) PER 1000 UNITS: Performed by: INTERNAL MEDICINE

## 2021-02-12 PROCEDURE — 25010000002 FENTANYL CITRATE (PF) 100 MCG/2ML SOLUTION: Performed by: INTERNAL MEDICINE

## 2021-02-12 PROCEDURE — 25010000002 MIDAZOLAM PER 1 MG: Performed by: INTERNAL MEDICINE

## 2021-02-12 DEVICE — XIENCE SIERRA™ EVEROLIMUS ELUTING CORONARY STENT SYSTEM 3.00 MM X 18 MM / RAPID-EXCHANGE
Type: IMPLANTABLE DEVICE | Status: FUNCTIONAL
Brand: XIENCE SIERRA™

## 2021-02-12 RX ORDER — METOPROLOL SUCCINATE 25 MG/1
25 TABLET, EXTENDED RELEASE ORAL DAILY
Status: DISCONTINUED | OUTPATIENT
Start: 2021-02-13 | End: 2021-02-13 | Stop reason: HOSPADM

## 2021-02-12 RX ORDER — HEPARIN SODIUM 1000 [USP'U]/ML
INJECTION, SOLUTION INTRAVENOUS; SUBCUTANEOUS AS NEEDED
Status: DISCONTINUED | OUTPATIENT
Start: 2021-02-12 | End: 2021-02-12 | Stop reason: HOSPADM

## 2021-02-12 RX ORDER — ESCITALOPRAM OXALATE 10 MG/1
10 TABLET ORAL DAILY
Status: DISCONTINUED | OUTPATIENT
Start: 2021-02-13 | End: 2021-02-12

## 2021-02-12 RX ORDER — MIDAZOLAM HYDROCHLORIDE 1 MG/ML
INJECTION INTRAMUSCULAR; INTRAVENOUS AS NEEDED
Status: DISCONTINUED | OUTPATIENT
Start: 2021-02-12 | End: 2021-02-12 | Stop reason: HOSPADM

## 2021-02-12 RX ORDER — PHENYLEPHRINE HYDROCHLORIDE 10 MG/ML
INJECTION INTRAVENOUS AS NEEDED
Status: DISCONTINUED | OUTPATIENT
Start: 2021-02-12 | End: 2021-02-12 | Stop reason: HOSPADM

## 2021-02-12 RX ORDER — SPIRONOLACTONE 25 MG/1
25 TABLET ORAL DAILY
Status: DISCONTINUED | OUTPATIENT
Start: 2021-02-13 | End: 2021-02-13 | Stop reason: HOSPADM

## 2021-02-12 RX ORDER — NITROGLYCERIN 5 MG/ML
INJECTION, SOLUTION INTRAVENOUS AS NEEDED
Status: DISCONTINUED | OUTPATIENT
Start: 2021-02-12 | End: 2021-02-12 | Stop reason: HOSPADM

## 2021-02-12 RX ORDER — SODIUM CHLORIDE 9 MG/ML
30 INJECTION, SOLUTION INTRAVENOUS CONTINUOUS
Status: DISCONTINUED | OUTPATIENT
Start: 2021-02-12 | End: 2021-02-13 | Stop reason: HOSPADM

## 2021-02-12 RX ORDER — NITROGLYCERIN 0.4 MG/1
0.4 TABLET SUBLINGUAL
Status: DISCONTINUED | OUTPATIENT
Start: 2021-02-12 | End: 2021-02-13 | Stop reason: HOSPADM

## 2021-02-12 RX ORDER — SODIUM CHLORIDE 9 MG/ML
250 INJECTION, SOLUTION INTRAVENOUS ONCE AS NEEDED
Status: DISCONTINUED | OUTPATIENT
Start: 2021-02-12 | End: 2021-02-13 | Stop reason: HOSPADM

## 2021-02-12 RX ORDER — SODIUM CHLORIDE 9 MG/ML
3 INJECTION, SOLUTION INTRAVENOUS CONTINUOUS
Status: DISPENSED | OUTPATIENT
Start: 2021-02-12 | End: 2021-02-12

## 2021-02-12 RX ORDER — LIDOCAINE HYDROCHLORIDE 20 MG/ML
INJECTION, SOLUTION INFILTRATION; PERINEURAL AS NEEDED
Status: DISCONTINUED | OUTPATIENT
Start: 2021-02-12 | End: 2021-02-12 | Stop reason: HOSPADM

## 2021-02-12 RX ORDER — PRASUGREL 10 MG/1
TABLET, FILM COATED ORAL AS NEEDED
Status: DISCONTINUED | OUTPATIENT
Start: 2021-02-12 | End: 2021-02-12 | Stop reason: HOSPADM

## 2021-02-12 RX ORDER — ACETAMINOPHEN 325 MG/1
650 TABLET ORAL EVERY 4 HOURS PRN
Status: DISCONTINUED | OUTPATIENT
Start: 2021-02-12 | End: 2021-02-13 | Stop reason: HOSPADM

## 2021-02-12 RX ORDER — DIPHENHYDRAMINE HYDROCHLORIDE 50 MG/ML
INJECTION INTRAMUSCULAR; INTRAVENOUS AS NEEDED
Status: DISCONTINUED | OUTPATIENT
Start: 2021-02-12 | End: 2021-02-12 | Stop reason: HOSPADM

## 2021-02-12 RX ORDER — PRASUGREL 10 MG/1
10 TABLET, FILM COATED ORAL DAILY
Status: DISCONTINUED | OUTPATIENT
Start: 2021-02-12 | End: 2021-02-13 | Stop reason: HOSPADM

## 2021-02-12 RX ORDER — LANOLIN ALCOHOL/MO/W.PET/CERES
500 CREAM (GRAM) TOPICAL DAILY
Status: DISCONTINUED | OUTPATIENT
Start: 2021-02-13 | End: 2021-02-13 | Stop reason: HOSPADM

## 2021-02-12 RX ORDER — BUPROPION HYDROCHLORIDE 150 MG/1
150 TABLET ORAL DAILY
Status: DISCONTINUED | OUTPATIENT
Start: 2021-02-13 | End: 2021-02-13 | Stop reason: HOSPADM

## 2021-02-12 RX ORDER — ESCITALOPRAM OXALATE 10 MG/1
10 TABLET ORAL NIGHTLY
Status: DISCONTINUED | OUTPATIENT
Start: 2021-02-12 | End: 2021-02-13 | Stop reason: HOSPADM

## 2021-02-12 RX ORDER — ATORVASTATIN CALCIUM 20 MG/1
20 TABLET, FILM COATED ORAL NIGHTLY
Status: DISCONTINUED | OUTPATIENT
Start: 2021-02-12 | End: 2021-02-13 | Stop reason: HOSPADM

## 2021-02-12 RX ORDER — FENTANYL CITRATE 50 UG/ML
INJECTION, SOLUTION INTRAMUSCULAR; INTRAVENOUS AS NEEDED
Status: DISCONTINUED | OUTPATIENT
Start: 2021-02-12 | End: 2021-02-12 | Stop reason: HOSPADM

## 2021-02-12 RX ORDER — ASPIRIN 81 MG/1
81 TABLET, CHEWABLE ORAL DAILY
Status: DISCONTINUED | OUTPATIENT
Start: 2021-02-13 | End: 2021-02-13 | Stop reason: HOSPADM

## 2021-02-12 RX ORDER — ALPRAZOLAM 0.25 MG/1
0.25 TABLET ORAL EVERY 6 HOURS PRN
Status: DISCONTINUED | OUTPATIENT
Start: 2021-02-12 | End: 2021-02-13 | Stop reason: HOSPADM

## 2021-02-12 RX ADMIN — ESCITALOPRAM OXALATE 10 MG: 10 TABLET ORAL at 21:39

## 2021-02-12 RX ADMIN — ATORVASTATIN CALCIUM 20 MG: 20 TABLET, FILM COATED ORAL at 21:39

## 2021-02-12 RX ADMIN — SODIUM CHLORIDE 30 ML/HR: 9 INJECTION, SOLUTION INTRAVENOUS at 08:52

## 2021-02-12 RX ADMIN — ACETAMINOPHEN 650 MG: 325 TABLET, FILM COATED ORAL at 15:07

## 2021-02-12 NOTE — PLAN OF CARE
Problem: Adult Inpatient Plan of Care  Goal: Plan of Care Review  Outcome: Ongoing, Progressing  Flowsheets (Taken 2/12/2021 1230)  Progress: improving  Plan of Care Reviewed With: patient  Goal: Patient-Specific Goal (Individualized)  Outcome: Ongoing, Progressing  Goal: Absence of Hospital-Acquired Illness or Injury  Outcome: Ongoing, Progressing  Intervention: Identify and Manage Fall Risk  Recent Flowsheet Documentation  Taken 2/12/2021 1200 by Nicole Barajas RN  Safety Promotion/Fall Prevention: safety round/check completed  Intervention: Prevent Skin Injury  Recent Flowsheet Documentation  Taken 2/12/2021 1215 by Nicole Barajas RN  Body Position: supine  Taken 2/12/2021 1200 by Nicole Barajas RN  Body Position: supine  Intervention: Prevent Infection  Recent Flowsheet Documentation  Taken 2/12/2021 1215 by Nicole Barajas RN  Infection Prevention: personal protective equipment utilized  Taken 2/12/2021 1200 by Nicole Barajas RN  Infection Prevention: personal protective equipment utilized  Goal: Optimal Comfort and Wellbeing  Outcome: Ongoing, Progressing  Goal: Readiness for Transition of Care  Outcome: Ongoing, Progressing  Intervention: Mutually Develop Transition Plan  Recent Flowsheet Documentation  Taken 2/12/2021 1220 by Nicole Barajas RN  Equipment Currently Used at Home: none  Transportation Anticipated: family or friend will provide  Transportation Concerns: car, none  Patient/Family Anticipated Services at Transition: none  Patient/Family Anticipates Transition to: home     Problem: Arrhythmia/Dysrhythmia (Cardiac Catheterization)  Goal: Stable Heart Rate and Rhythm  Outcome: Ongoing, Progressing     Problem: Bleeding (Cardiac Catheterization)  Goal: Absence of Bleeding  Outcome: Ongoing, Progressing     Problem: Contrast-Induced Injury Risk (Cardiac Catheterization)  Goal: Absence of Contrast-Induced Injury  Outcome: Ongoing, Progressing     Problem: Embolism (Cardiac Catheterization)  Goal:  Absence of Embolism Signs and Symptoms  Outcome: Ongoing, Progressing     Problem: Ongoing Anesthesia/Sedation Effects (Cardiac Catheterization)  Goal: Anesthesia/Sedation Recovery  Outcome: Ongoing, Progressing  Intervention: Optimize Anesthesia Recovery  Recent Flowsheet Documentation  Taken 2/12/2021 1200 by Nicole Barajas RN  Safety Promotion/Fall Prevention: safety round/check completed     Problem: Pain (Cardiac Catheterization)  Goal: Acceptable Pain Control  Outcome: Ongoing, Progressing     Problem: Vascular Access Protection (Cardiac Catheterization)  Goal: Absence of Vascular Access Complication  Outcome: Ongoing, Progressing  Intervention: Prevent Vascular Access Site Complication  Recent Flowsheet Documentation  Taken 2/12/2021 1215 by Nicole Barajas, RN  Activity Management: bedrest  Head of Bed (HOB): HOB flat  Taken 2/12/2021 1200 by Nicole Barajas RN  Activity Management: bedrest  Head of Bed (HOB): HOB flat  Bleeding Precautions:   monitored for signs of bleeding   blood pressure closely monitored   Goal Outcome Evaluation:

## 2021-02-12 NOTE — CONSULTS
Cardiac rehab evaluation s/p stent. Patient has participated in cardiac rehab program before. Is interested again. Verified phone number. Will call following discharge.

## 2021-02-12 NOTE — DISCHARGE SUMMARY
[facility]  Brant Martinez MD, PhD  DISCHARGE SUMMARY      Patient Name: Sada Le  :1970  50 y.o.    Date of Admit: 2021  Date of Discharge:  2021    Discharge Diagnosis:  Problems Addressed this Visit        Cardiac and Vasculature    * (Principal) Coronary artery disease involving native coronary artery of native heart with unstable angina pectoris (CMS/HCC)    Relevant Medications    metoprolol succinate XL (TOPROL-XL) 25 MG 24 hr tablet    nitroglycerin (NITROSTAT) 0.4 MG SL tablet    prasugrel (EFFIENT) 10 MG tablet    Other Relevant Orders    Cardiac Catheterization/Vascular Study (Completed)    CAD S/P percutaneous coronary angioplasty - Primary    Relevant Medications    metoprolol succinate XL (TOPROL-XL) 25 MG 24 hr tablet    nitroglycerin (NITROSTAT) 0.4 MG SL tablet    prasugrel (EFFIENT) 10 MG tablet    Other Relevant Orders    Referral to Cardiac Rehab      Diagnoses       Codes Comments    CAD S/P percutaneous coronary angioplasty    -  Primary ICD-10-CM: I25.10, Z98.61  ICD-9-CM: 414.01, V45.82     Coronary artery disease involving native coronary artery of native heart with unstable angina pectoris (CMS/HCC)     ICD-10-CM: I25.110  ICD-9-CM: 414.01, 411.1       Patient seen and examined.  No acute events overnight.  Status post drug-eluting stent to the circumflex ostial proximal location with good angiographic results.  Has residual small vessel disease as well as distal LAD disease which does not appear unchanged from previous.  We did discuss she still needs significant diet and exercise, cholesterol control,  Platelet therapy uninterrupted for another year.  Her EF has recovered since her STEMI is around 55% and she remains on good heart failure therapy.  She was clinically dehydrated we will decrease her Lasix to daily rather than twice daily.  We will consider discontinuation of Aldactone as well in lieu of ACE inhibitor therapy    Home today      1)  CAD with prior STEMI/PCI LAD 2019 s/p PCI with SERENITY to LCx 2/11/21  - continue on uninterrupted aspirin and effient  - continue on BB  - continue on moderate dose statin given LDL < 40    2) ICM with recovered EF (28% to 55%)  - 2D echo 4/2020 showed EF = 60% with grade 1 diastolic dysfunction, and mild AI.  - continue on ACEi, toprol XL, spironolactone  - appears compensated    3) HTN  - controlled    4) HLD  - FLP: TC 79, HDL 27, LDL 29,   - continue on statin      Hospital Course:   49-year-old female who is well-known to me from prior hospitalization and clinic notes.  She has a history of anterior ST elevation myocardial infarction with complete occlusion of the proximal LAD under going revascularization with Xience 3.5 x 33 mm drug-eluting stent postdilated to 4.5 mm at 18 familia with good results.  This was an Impella supported procedure given under  Cardiogenic shock in the setting of acute MI which was complicated by VF arrest with successful defibrillation.  She did well with weaning Impella support over 2 days and institution of max goal-directed medical therapy ultimately being discharged to home.  We were very aggressive with her medical therapy as well as antiplatelets and her EF improved from around 28 to 30% to 55% with only ever so slight anterior wall very mild hypokinesis residually.  She has done well since this time and is completely quit smoking.  She had nonobstructive disease in the circumflex and RCA as well as mid to distal LAD.  She was changed secondary to shortness of breath from Brilinta to Effient which she is maintained on aspirin and Effient, she is on high intensity statin therapy as well as Aldactone and long-acting beta-blocker and loop diuretics 40 twice daily of Lasix.  She is on ACE inhibitor at moderate to high intensity as well tolerating this just fine without cough.  She has had a recent hospitalization for shortness of breath and underwent right heart catheterization  with no evidence of pulmonary hypertension despite volume loading during the procedure with evidence of volume depletion initially with right atrial pressure of 2 to 3 mmHg.  Results of this are below.  She had recent hospitalization  for atypical substernal chest pain also with jaw pain.  Troponins were unremarkable, EKG was unchanged, blood pressure has been somewhat labile at home recently she says and when high causes some degree of chest discomfort.  She underwent noninvasive evaluation with treadmill stress test and walked greater than 9 minutes with no EKG changes and she had normal myocardial perfusion at rest and stress.  There was no indication of balanced ischemia that would be suspected that may give false negative results relative to perfusion imaging.  Her echo showed normal preserved LV systolic function with ever so slight mild anterior wall hypokinesis compared to thickening of the posterior lateral walls.  EF was 55 to 60%.  Normal atrial sizes, no evidence of pulmonary hypertension, IVC diameter was normal.  She was discharged shortly thereafter.  She has been followed up now 3 to 4 months later with no recurrence of symptoms, stable NYHA class I-II symptoms, no anginal chest pain although she does have intermittent dizziness with standing her blood pressure is slightly low and we have titrated down her beta-blocker secondary to low resting heart rates in the 60s, she is maintained on low-dose twice daily lisinopril as well as Aldactone given her previous ischemic cardiomyopathy which is now recovered to near normal at EF of 50% with very mild residual anterior hypokinesis as stated by 2D echo.  Otherwise she is doing well with no medicine changes today needed.        Today she presents with recurrent anginal chest pain increasing in frequency and duration.  She is not present to the hospital.  She recently in the past 6 months had a stress test which did not demonstrate any regional abnormality  but she continues to have this increasing frequency and severity and duration anginal chest pain with previous nonobstructive disease has highlighted below in the noninfarct territory.  She would like definitive evaluation with left heart catheterization given similarity with her prior chest pain just not as severe at the time of STEMI.  No other questions or concerns, no other symptoms, no palpitations PND orthopnea heart failure signs or symptoms or other complaints.    She underwent left heart catheterization with successful SERENITY with PCI to the LCx.  She was observed overnight.  Today patient feels well and has ambulated without difficulty.  She has no groin hematoma and post cath renal function is WNL.  She is anticipated for discharge home if okay with attending cardiologist.      Procedures Performed  Procedure(s):  Left Heart Cath  Coronary angiography  Left ventriculography  Aortic root aortogram  Percutaneous Coronary Intervention  Stent SERENITY coronary    Indications    Coronary artery disease involving native coronary artery of native heart with unstable angina pectoris (CMS/[facility]) [I25.110 (ICD-10-CM)]       Conclusion    Cardiac cath note  Brant Martinez MD, PhD  Date of service 2-  [facility] cardiology     Procedure  1.  Left heart catheterization with coronary angiography left ventriculography in ANTON position  2.  Percutaneous coronary mention to the ostial proximal circumflex with 3.0 x 18 mm Xience drug-eluting stent postdilated to 3.9 mm proximally, 3.75 mm distally with good angiographic results     Lesion information  Ostial 50%, mid 70% with hazy appearance of the proximal circumflex, ulcerated plaque angiographically with daily anginal chest pain  Reduced to 0% residual stenosis  JANE-3 flow pre and post     After informed consent the patient was brought to the catheterization lab sterilely prepped and draped in usual fashion with exposure the right groin for right common femoral  arterial access via micropuncture modified standard technique with placement of a 6 [place] sheath under fluoroscopic guidance which was aspirated flushed with heparinized saline after 1% lidocaine analgesia.  An 035 guidewire was advanced to level aortic valve followed by diagnostic left heart catheterization with JL4 and JR4 6 Kazakh catheters.  A pigtail catheter was used to perform left ventriculography, EDP measurement and pullback assessment the transaortic valve gradient.  Secondary to findings of coronary disease in the ostial proximal circumflex decision was made to intervene given recurrent anginal chest pain, ulcerated appearance of the proximal circumflex.  Lesion was predilated after run-through wire to the obtuse marginal with 2.5 x 20 mm NC at 18 familia followed by stenting with 3.0 x 18 deployed at 18 familia at 3.25 followed by postdilatation with with 3.5 x 15 up to 1618 familia and then 3.75 x 15 NC balloon at the 14 familia at the distal edge and 16 familia at the proximal edge of 3.9 mm with good angiographic results.  There was no compromise of the LAD flow.  A wire directed down the LAD at the conclusion the case had a balloon that freely passed and there was no stent impingement from the left main to the LAD.  JANE-3 flow was maintained pre and post.  Lesion reduced from 70% to 0% residual.  Heparin was used to maintain ACT greater than 250 throughout the entire to the case.  Patient was already on aspirin and Effient but was given additional 10 mg of Effient on the table.  Right common femoral arteriotomy was closed by manual pressure once ACT less than 175.  She had no complications.  She left the Cath Lab chest pain-free hemodynamically electrically stable alert talking to staff neurologically grossly intact bilaterally     Moderate conscious sedation of Versed and fentanyl administered by registered nurse with complete ECG pulse oximetry and hemodynamic monitoring throughout the entirety the case observed by  me  Complications none  Blood loss less than 5 cc  Contrast 180 cc including diagnostic portion     Findings  1.  Opening aortic pressure of 114/66 with a mean of 86  2.  LV pressure 108 with an EDP of 2 to 5 mmHg  Closing pressure 111/66 with a mean of 85  EDP 6  No significant transaortic valve gradient     Angiography  1.  Left main large caliber vessel no angiographic disease  2.  LAD large caliber vessel with widely patent stent in the proximal portion but diffusely diseased mid to distal LAD with eccentric plaque likely 2.5 mm in diameter from the bifurcation of the diagonal all the way around the apex with again 30 to 40% luminal irregularities but no significant flow-limiting stenosis.,  Diagonal branch with no obstructive disease only mild luminal regularities  3.  Circumflex nondominant with large obtuse marginal branch.  There is a luminal filling defect right at the ostium concerning for nonocclusive thrombus versus eccentric plaque at 50%, immediately distal is a hazy eccentric ulcerated plaque 75% followed by bifurcation with continuation circumflex and obtuse marginal branch which has no disease.  There is JANE-3 flow throughout  4.  RCA small nondominant no angiographic disease, small PDA branch distally not amenable to any stents given caliber     Conclusions and recommendations  1 two-vessel coronary disease, widely patent stent in the proximal LAD with diffuse mid to distal LAD stenosis but nonobstructive, ostial proximal circumflex disease of questionable significance however angiographic appearance of significant ulceration, concern for thrombus status post intervention as described above  2.  Successful intervention as described above with balloon angioplasty and stenting of the ostial proximal circumflex postdilated to 3.9 mm at the ostium and 3.75 mm distally with good angiographic results  3.  Continue antiplatelet therapy with aspirin and Effient  4.  Decrease Lasix with low LVEDP, hold ACE  inhibitor  5 LV function appears 50 to 55% by LV gram  6.  No transaortic valve gradient  7.,  Mid to observation, likely home tomorrow which discharge criteria met  8.  After discharge follow-up with cardiology in 2 to 4 weeks  \  It is a pleasure be involved in her cardiovascular care.  Please call with any questions or concerns  Brant Martinez MD, PhD            Consults     No orders found for last 30 day(s).          Pertinent Test Results:   Results from last 7 days   Lab Units 02/13/21  0220 02/10/21  1559   SODIUM mmol/L 142 138   POTASSIUM mmol/L 3.9 3.9   CHLORIDE mmol/L 108* 104   CO2 mmol/L 27.0 27.7   BUN mg/dL 11 14   CREATININE mg/dL 0.86 0.97   CALCIUM mg/dL 8.2* 8.7   BILIRUBIN mg/dL  --  0.2   ALK PHOS U/L  --  118*   ALT (SGPT) U/L  --  26   AST (SGOT) U/L  --  19   GLUCOSE mg/dL 112* 114*         @LABRCNT(bnp)@  Results from last 7 days   Lab Units 02/13/21  0220   WBC 10*3/mm3 8.00   HEMOGLOBIN g/dL 11.8*   HEMATOCRIT % 34.9   PLATELETS 10*3/mm3 184     Results from last 7 days   Lab Units 02/10/21  1559   INR  1.01         Results from last 7 days   Lab Units 02/13/21  0220   CHOLESTEROL mg/dL 79   TRIGLYCERIDES mg/dL 125   HDL CHOL mg/dL 27*   LDL CHOL mg/dL 29       ECHOCARDIOGRAM:    Results for orders placed during the hospital encounter of 04/28/20   Adult Transthoracic Echo Complete W/ Cont if Necessary Per Protocol    Narrative · Left ventricular mass index is increased.  · Left ventricular wall thickness is consistent with mild concentric   hypertrophy.  · Left ventricular systolic function is normal.  · Left ventricular diastolic dysfunction (grade I a) consistent with   impaired relaxation.  · Mild aortic valve regurgitation is present.  · Mild dilation of the aortic root is present. Mild dilation of the   sinuses of Valsalva is present.          Physical Exam  Neuro: AAOx3, no gross deficits  CV: S1S2 RRR, no murmur  Resp: non-labored, CTA  GI: BS+, abd soft  Ext: pedal pulses palp,  no edema, right groin without hematoma  Tele: SR/SB down to 50s    Condition on Discharge: stable    Discharge Medications     Discharge Medications      Continue These Medications      Instructions Start Date   ALPRAZolam 0.25 MG tablet  Commonly known as: XANAX   0.25 mg, Oral, Every 6 Hours PRN      aspirin 81 MG chewable tablet   81 mg, Oral, Daily      atorvastatin 20 MG tablet  Commonly known as: LIPITOR   20 mg, Oral, Daily      buPROPion  MG 24 hr tablet  Commonly known as: WELLBUTRIN XL   150 mg, Oral, Daily      cholecalciferol 1.25 MG (60163 UT) capsule  Commonly known as: VITAMIN D3   50,000 Units, Oral, Daily      coenzyme Q10 100 MG capsule   200 mg, Oral, 2 Times Daily      escitalopram 10 MG tablet  Commonly known as: LEXAPRO   10 mg, Oral, Daily      furosemide 40 MG tablet  Commonly known as: LASIX   40 mg, Oral, 2 Times Daily      lisinopril 5 MG tablet  Commonly known as: PRINIVIL,ZESTRIL   5 mg, Oral, 2 times daily      Magnesium 500 MG capsule   1 capsule, Oral, Daily      metoprolol succinate XL 25 MG 24 hr tablet  Commonly known as: TOPROL-XL   25 mg, Oral, Daily      nitroglycerin 0.4 MG SL tablet  Commonly known as: NITROSTAT   0.4 mg, Sublingual, Every 5 Minutes PRN, Take no more than 3 doses in 15 minutes.       prasugrel 10 MG tablet  Commonly known as: EFFIENT   10 mg, Oral, Daily      spironolactone 25 MG tablet  Commonly known as: ALDACTONE   25 mg, Oral, Daily      vitamin B-12 500 MCG tablet  Commonly known as: CYANOCOBALAMIN   500 mcg, Oral, Daily             Discharge Diet:   Diet Instructions     Diet: Cardiac      Discharge Diet: Cardiac          Activity at Discharge:   Activity Instructions     Bathing Restrictions      For one week    Type of Restriction: Bathing    Bathing Restrictions: No Tub Bath    Driving Restrictions      Type of Restriction: Driving    Driving Restrictions: No Driving (Time Limited)    Length: Other    Indicate Length of Restriction: 3 days post  heart catheterization    Lifting Restrictions      Type of Restriction: Lifting    Lifting Restrictions: Lifting Restriction (Indicate Limit)    Weight Limit (Pounds): 4    Length of Lifting Restriction: 3 days post heart catherization          Discharge disposition: home    Follow-up Appointments  Future Appointments   Date Time Provider Department Center   2/16/2021  9:00 AM [facility] 1 [facility]   3/3/2021  2:00 PM Brant Martinez MD [facility] None     Additional Instructions for the Follow-ups that You Need to Schedule     Referral to Cardiac Rehab   As directed            Test Results Pending at Discharge  Pending Labs     Order Current Status    Hemoglobin A1c In process         Electronically signed by DARIEL Kaba, 02/13/21, 8:33 AM EST.  Time: > 30 minutes spent on discharge, patient encounter, med rec, coordination of care

## 2021-02-13 ENCOUNTER — READMISSION MANAGEMENT (OUTPATIENT)
Dept: CALL CENTER | Facility: HOSPITAL | Age: 51
End: 2021-02-13

## 2021-02-13 VITALS
DIASTOLIC BLOOD PRESSURE: 74 MMHG | TEMPERATURE: 97.5 F | BODY MASS INDEX: 34.16 KG/M2 | SYSTOLIC BLOOD PRESSURE: 118 MMHG | WEIGHT: 205.03 LBS | HEART RATE: 65 BPM | RESPIRATION RATE: 16 BRPM | HEIGHT: 65 IN | OXYGEN SATURATION: 98 %

## 2021-02-13 LAB
ANION GAP SERPL CALCULATED.3IONS-SCNC: 7 MMOL/L (ref 5–15)
BUN SERPL-MCNC: 11 MG/DL (ref 6–20)
BUN/CREAT SERPL: 12.8 (ref 7–25)
CALCIUM SPEC-SCNC: 8.2 MG/DL (ref 8.6–10.5)
CHLORIDE SERPL-SCNC: 108 MMOL/L (ref 98–107)
CHOLEST SERPL-MCNC: 79 MG/DL (ref 0–200)
CO2 SERPL-SCNC: 27 MMOL/L (ref 22–29)
CREAT SERPL-MCNC: 0.86 MG/DL (ref 0.57–1)
DEPRECATED RDW RBC AUTO: 44.6 FL (ref 37–54)
ERYTHROCYTE [DISTWIDTH] IN BLOOD BY AUTOMATED COUNT: 13.5 % (ref 12.3–15.4)
GFR SERPL CREATININE-BSD FRML MDRD: 70 ML/MIN/1.73
GLUCOSE SERPL-MCNC: 112 MG/DL (ref 65–99)
HCT VFR BLD AUTO: 34.9 % (ref 34–46.6)
HDLC SERPL-MCNC: 27 MG/DL (ref 40–60)
HGB BLD-MCNC: 11.8 G/DL (ref 12–15.9)
LDLC SERPL CALC-MCNC: 29 MG/DL (ref 0–100)
LDLC/HDLC SERPL: 1 {RATIO}
MCH RBC QN AUTO: 31.5 PG (ref 26.6–33)
MCHC RBC AUTO-ENTMCNC: 33.8 G/DL (ref 31.5–35.7)
MCV RBC AUTO: 93.3 FL (ref 79–97)
PLATELET # BLD AUTO: 184 10*3/MM3 (ref 140–450)
PMV BLD AUTO: 9.2 FL (ref 6–12)
POTASSIUM SERPL-SCNC: 3.9 MMOL/L (ref 3.5–5.2)
RBC # BLD AUTO: 3.74 10*6/MM3 (ref 3.77–5.28)
SODIUM SERPL-SCNC: 142 MMOL/L (ref 136–145)
TRIGL SERPL-MCNC: 125 MG/DL (ref 0–150)
VLDLC SERPL-MCNC: 23 MG/DL (ref 5–40)
WBC # BLD AUTO: 8 10*3/MM3 (ref 3.4–10.8)

## 2021-02-13 PROCEDURE — 83036 HEMOGLOBIN GLYCOSYLATED A1C: CPT | Performed by: INTERNAL MEDICINE

## 2021-02-13 PROCEDURE — 80048 BASIC METABOLIC PNL TOTAL CA: CPT | Performed by: INTERNAL MEDICINE

## 2021-02-13 PROCEDURE — 85027 COMPLETE CBC AUTOMATED: CPT | Performed by: INTERNAL MEDICINE

## 2021-02-13 PROCEDURE — 99217 PR OBSERVATION CARE DISCHARGE MANAGEMENT: CPT | Performed by: INTERNAL MEDICINE

## 2021-02-13 PROCEDURE — G0378 HOSPITAL OBSERVATION PER HR: HCPCS

## 2021-02-13 PROCEDURE — 80061 LIPID PANEL: CPT | Performed by: INTERNAL MEDICINE

## 2021-02-13 NOTE — DISCHARGE INSTRUCTIONS
Post Cath Instructions    Call Dr. Martinez’s office to schedule a follow up appointment in 4 weeks.    1) Drink plenty of fluids for the next 24 hours.  This helps to eliminate the dye used in your procedure through urination.  You may resume a normal diet; however, try to avoid foods that would cause gas or constipation.    2) Sedative medication given to you during your catheterization may decrease your judgement and reaction time for up to 24-48 hours.  Therefore:  a. DO NOT drive or operate hazardous machinery (48 hours)  b. DO NOT consume alcoholic beverages  c. DO NOT make any important/legal decisions  d. Have someone stay with you for at least 24 hours    3) To allow proper healing and prevent bleeding, the following activities are to be strictly avoided for the next 24-48 hours:  a. Excessive bending at wound site  b. Straining (anything that would tense up muscles around the affected puncture site)  c. Lifting objects greater than 5 pounds, pushing, or pulling for 5 days  i. For Arm Cases:  1. No flexing at the puncture site, such as hammering, golfing, bowling, or swinging any objects  ii. For Groin Cases:  1. Refrain from sexual activity  2. Refrain from running or vigorous walking  3. No prolonged sitting or standing  4. Limit stair climbing as much as possible    4) Keep the puncture site clean and dry.  You may remove the dressing tomorrow and replace it with a band-aid for at least one additional day.  Gently clean the site with mild soap and water.  No scrubbing/rubbing and lightly pat the area dry.  Showers are acceptable; however, avoid submerging in water (tub baths, hot tubs, swimming pools, dishwater, etc…) for at least one week.  The site should be completely healed before resuming these activities to reduce the risk of infection.  Check the site often.  Watch for signs and symptoms of infection and notify your physician if any of the following occur:  a. Bleeding or an increase in swelling at  the puncture site  b. Fever  c. Increased soreness around puncture site  d. Foul odor or significant drainage from the puncture site  e. Swelling, redness, or warmth at the puncture site    **A bruise or small “pea sized” lump under the skin at the puncture site is not unusual.  This should disappear within 3-4 weeks.**  5) CONTACT YOUR PHYSICIAN OR CALL 911 IF YOU EXPERIENCE ANY OF THE FOLLOWING:  a. Increased angina (chest pain) or frequent sensations of pressure, burning, pain, or other discomfort in the chest, arm, jaws, or stomach  b. Lightheadedness, dizziness, faint feeling, sweating, or difficulty breathing  c. Odd sensation changes like numbness, tingling, coldness, or pain in the arm or leg in which the catheter was inserted  d. Limb in which the catheter was inserted becomes pale/bluish in color    IMPORTANT:  Although this occurs very rarely, if you should develop bright red or excessive bleeding, feel a “pop” inside at the insertion site, or notice a sudden increase in swelling larger than a walnut, you should call 911.  Hold continuous firm pressure to the access site until emergency personnel arrive.  It is best if someone else can do this for you.

## 2021-02-13 NOTE — OUTREACH NOTE
Prep Survey      Responses   [facility] patient discharged from?  [facility]   Is LACE score < 7 ?  Yes   Emergency Room discharge w/ pulse ox?  No   Eligibility  [facility]   Date of Admission  02/12/21   Date of Discharge  02/13/21   Discharge Disposition  Home or Self Care   Discharge diagnosis  CAD S/P percutaneous coronary angioplasty   Does the patient have one of the following disease processes/diagnoses(primary or secondary)?  Other   Does the patient have Home health ordered?  No   Is there a DME ordered?  No   Prep survey completed?  Yes          Gillian Campoverde RN

## 2021-02-15 ENCOUNTER — TRANSITIONAL CARE MANAGEMENT TELEPHONE ENCOUNTER (OUTPATIENT)
Dept: CALL CENTER | Facility: HOSPITAL | Age: 51
End: 2021-02-15

## 2021-02-15 LAB — HBA1C MFR BLD: 5.8 % (ref 3.5–5.6)

## 2021-02-15 NOTE — OUTREACH NOTE
Call Center TCM Note      Responses   [facility] patient discharged from?  [facility]   Does the patient have one of the following disease processes/diagnoses(primary or secondary)?  Other   TCM attempt successful?  Yes   Call start time  1608   Call end time  1611   Discharge diagnosis  CAD S/P percutaneous coronary angioplasty   Meds reviewed with patient/caregiver?  Yes   Is the patient having any side effects they believe may be caused by any medication additions or changes?  No   Does the patient have all medications ordered at discharge?  N/A   Is the patient taking all medications as directed (includes completed medication regime)?  Yes   Does the patient have a primary care provider?   Yes   Does the patient have an appointment with their PCP within 7 days of discharge?  Yes   Comments regarding PCP  Hospital d/c f/u video visit is on 2/17/21 at 1:00 pm    Has the patient kept scheduled appointments due by today?  N/A   Psychosocial issues?  No   Did the patient receive a copy of their discharge instructions?  Yes   Nursing interventions  Reviewed instructions with patient   What is the patient's perception of their health status since discharge?  Improving   Is the patient/caregiver able to teach back signs and symptoms related to disease process for when to call PCP?  Yes   Is the patient/caregiver able to teach back signs and symptoms related to disease process for when to call 911?  Yes   Is the patient/caregiver able to teach back the hierarchy of who to call/visit for symptoms/problems? PCP, Specialist, Home health nurse, Urgent Care, ED, 911  Yes   If the patient is a current smoker, are they able to teach back resources for cessation?  Not a smoker   TCM call completed?  Yes          Cecilia Joyce RN    2/15/2021, 16:12 EST

## 2021-02-15 NOTE — OUTREACH NOTE
Call Center TCM Note      Responses   [facility] patient discharged from?  [facility]   Does the patient have one of the following disease processes/diagnoses(primary or secondary)?  Other   TCM attempt successful?  No   Unsuccessful attempts  Attempt 1   Call Status  Left message          Miryam Blackmon MA    2/15/2021, 12:30 EST

## 2021-02-16 ENCOUNTER — HOSPITAL ENCOUNTER (OUTPATIENT)
Dept: CT IMAGING | Facility: HOSPITAL | Age: 51
End: 2021-02-16

## 2021-02-17 ENCOUNTER — OFFICE VISIT (OUTPATIENT)
Dept: FAMILY MEDICINE CLINIC | Facility: CLINIC | Age: 51
End: 2021-02-17

## 2021-02-17 ENCOUNTER — TRANSCRIBE ORDERS (OUTPATIENT)
Dept: CARDIAC REHAB | Facility: HOSPITAL | Age: 51
End: 2021-02-17

## 2021-02-17 VITALS
RESPIRATION RATE: 18 BRPM | HEART RATE: 83 BPM | BODY MASS INDEX: 34.32 KG/M2 | DIASTOLIC BLOOD PRESSURE: 72 MMHG | TEMPERATURE: 97.3 F | HEIGHT: 65 IN | WEIGHT: 206 LBS | SYSTOLIC BLOOD PRESSURE: 100 MMHG | OXYGEN SATURATION: 97 %

## 2021-02-17 DIAGNOSIS — E78.2 MIXED HYPERLIPIDEMIA: Chronic | ICD-10-CM

## 2021-02-17 DIAGNOSIS — I10 HYPERTENSION, BENIGN: Chronic | ICD-10-CM

## 2021-02-17 DIAGNOSIS — I25.118 CORONARY ARTERY DISEASE OF NATIVE ARTERY OF NATIVE HEART WITH STABLE ANGINA PECTORIS (HCC): Primary | ICD-10-CM

## 2021-02-17 DIAGNOSIS — Z95.5 S/P CORONARY ARTERY STENT PLACEMENT: Primary | ICD-10-CM

## 2021-02-17 DIAGNOSIS — J01.00 ACUTE NON-RECURRENT MAXILLARY SINUSITIS: ICD-10-CM

## 2021-02-17 DIAGNOSIS — I25.5 ISCHEMIC CARDIOMYOPATHY: ICD-10-CM

## 2021-02-17 DIAGNOSIS — E66.09 CLASS 1 OBESITY DUE TO EXCESS CALORIES WITH SERIOUS COMORBIDITY AND BODY MASS INDEX (BMI) OF 34.0 TO 34.9 IN ADULT: ICD-10-CM

## 2021-02-17 PROBLEM — E66.811 CLASS 1 OBESITY DUE TO EXCESS CALORIES WITH SERIOUS COMORBIDITY AND BODY MASS INDEX (BMI) OF 34.0 TO 34.9 IN ADULT: Status: ACTIVE | Noted: 2019-09-13

## 2021-02-17 PROCEDURE — 99214 OFFICE O/P EST MOD 30 MIN: CPT | Performed by: FAMILY MEDICINE

## 2021-02-17 RX ORDER — DOXYCYCLINE 100 MG/1
100 CAPSULE ORAL 2 TIMES DAILY
Qty: 20 CAPSULE | Refills: 0 | Status: SHIPPED | OUTPATIENT
Start: 2021-02-17 | End: 2021-03-03

## 2021-02-17 NOTE — PROGRESS NOTES
Transitional Care Follow Up Visit  Subjective     Sada Le is a 50 y.o. female who presents for a transitional care management visit.    Sada was seen at [facility] . She was admitted on 02/12/2021  for recurrent anginal chest pain increasing in frequency and duration. She was discharged on 02/13/2021. Discharge diagnosis was S/P angioplasty proximal circumflex, CAD. Labs that were performed during the encounter included: CMP-elevated glucose, normal Kidney function, liver function and electrolytes, CBC-normal, INR-normal, Lipid panel-normal, and A1C-5.8. Diagnostic studies that were performed included: cardiac catherization-angioplasty to LAD proximal circumflex. Currently Sada receives care at home. Complications from the hospital stay include none. The patient stated that they do need help with their daily life and activities. The patient stated that they do have emotional support at home.      Coronary Artery Disease  Presents for follow-up visit. Pertinent negatives include no chest pain, leg swelling, palpitations or shortness of breath. Risk factors include hyperlipidemia and obesity.   Hypertension  This is a chronic problem. The current episode started more than 1 year ago. The problem is controlled. Associated symptoms include headaches. Pertinent negatives include no chest pain, orthopnea, palpitations, peripheral edema, PND, shortness of breath or sweats. Risk factors for coronary artery disease include dyslipidemia, post-menopausal state, family history and obesity. Current antihypertension treatment includes ACE inhibitors, diuretics and beta blockers. The current treatment provides moderate improvement. Hypertensive end-organ damage includes CAD/MI.   Hyperlipidemia  This is a chronic problem. The current episode started more than 1 year ago. The problem is controlled. Recent lipid tests were reviewed and are normal. Exacerbating diseases include obesity. She has no history of  "diabetes. Pertinent negatives include no chest pain or shortness of breath. Current antihyperlipidemic treatment includes statins. The current treatment provides significant improvement of lipids. Risk factors for coronary artery disease include dyslipidemia, hypertension, post-menopausal, family history and obesity.       Within 48 business hours after discharge our office contacted her via telephone to coordinate her care and needs.      I reviewed and discussed the details of that call along with the discharge summary, hospital problems, inpatient lab results, inpatient diagnostic studies, and consultation reports with Sada.     Current outpatient and discharge medications have been reconciled for the patient.    Date of TCM Phone Call 4/29/2020 2/13/2021   [facility]   Date of Admission 4/28/2020 2/12/2021   Date of Discharge 4/29/2020 2/13/2021   Discharge Disposition Home or Self Care Home or Self Care     Risk for Readmission (LACE) Score: 2 (2/13/2021  6:01 AM)       The following portions of the patient's history were reviewed and updated as appropriate: allergies, current medications, past family history, past medical history, past social history, past surgical history and problem list.    Visit Vitals  /72 (BP Location: Right arm, Patient Position: Sitting, Cuff Size: Adult)   Pulse 83   Temp 97.3 °F (36.3 °C) (Temporal)   Resp 18   Ht 165.1 cm (65\")   Wt 93.4 kg (206 lb)   LMP 01/01/1996 (Approximate)   SpO2 97% Comment: room air   BMI 34.28 kg/m²         Current Outpatient Medications:   •  ALPRAZolam (XANAX) 0.25 MG tablet, Take 0.25 mg by mouth Every 6 (Six) Hours As Needed for Anxiety., Disp: , Rfl:   •  aspirin 81 MG chewable tablet, Chew 81 mg Daily., Disp: , Rfl:   •  atorvastatin (LIPITOR) 20 MG tablet, Take 20 mg by mouth Daily., Disp: , Rfl:   •  buPROPion XL (WELLBUTRIN XL) 150 MG 24 hr tablet, Take 150 mg by mouth Daily., Disp: , Rfl:   •  " cholecalciferol (VITAMIN D3) 1.25 MG (44754 UT) capsule, Take 50,000 Units by mouth Daily., Disp: , Rfl:   •  coenzyme Q10 100 MG capsule, Take 200 mg by mouth 2 (Two) Times a Day., Disp: , Rfl:   •  escitalopram (LEXAPRO) 10 MG tablet, Take 10 mg by mouth Daily., Disp: , Rfl:   •  furosemide (LASIX) 40 MG tablet, Take 40 mg by mouth 2 (Two) Times a Day., Disp: , Rfl:   •  lisinopril (PRINIVIL,ZESTRIL) 5 MG tablet, Take 5 mg by mouth 2 (two) times a day., Disp: , Rfl:   •  Magnesium 500 MG capsule, Take 1 capsule by mouth Daily., Disp: , Rfl:   •  metoprolol succinate XL (TOPROL-XL) 25 MG 24 hr tablet, Take 25 mg by mouth Daily., Disp: , Rfl:   •  nitroglycerin (NITROSTAT) 0.4 MG SL tablet, Place 0.4 mg under the tongue Every 5 (Five) Minutes As Needed for Chest Pain. Take no more than 3 doses in 15 minutes., Disp: , Rfl:   •  prasugrel (EFFIENT) 10 MG tablet, Take 10 mg by mouth Daily., Disp: , Rfl:   •  spironolactone (ALDACTONE) 25 MG tablet, Take 25 mg by mouth Daily., Disp: , Rfl:   •  vitamin B-12 (CYANOCOBALAMIN) 500 MCG tablet, Take 500 mcg by mouth Daily., Disp: , Rfl:   •  doxycycline (MONODOX) 100 MG capsule, Take 1 capsule by mouth 2 (Two) Times a Day., Disp: 20 capsule, Rfl: 0    Review of Systems   Constitutional: Negative for fever and unexpected weight change.   HENT: Positive for congestion (of 3 days duration moder and worsening) and sinus pressure.    Respiratory: Negative for shortness of breath and wheezing.    Cardiovascular: Negative for chest pain, palpitations, orthopnea, leg swelling and PND.   Gastrointestinal: Negative for abdominal pain, constipation, diarrhea, nausea and vomiting.   Endocrine: Negative for cold intolerance, heat intolerance, polydipsia and polyuria.   Genitourinary: Negative for dysuria.   Neurological: Positive for headaches. Negative for weakness and numbness.   Hematological: Negative for adenopathy. Does not bruise/bleed easily.       I have reviewed and confirmed  the accuracy of the HPI and ROS as documented by the MA/LPN/RN Yodit Johnston MD      Objective   Physical Exam  Constitutional:       General: She is not in acute distress.     Appearance: She is well-developed.   HENT:      Right Ear: Tympanic membrane and external ear normal.      Left Ear: Tympanic membrane and external ear normal.      Nose:      Right Sinus: Maxillary sinus tenderness present.      Left Sinus: Maxillary sinus tenderness present.      Mouth/Throat:      Pharynx: No oropharyngeal exudate or posterior oropharyngeal erythema (moderate post nasal secretions).   Neck:      Musculoskeletal: Neck supple.      Thyroid: No thyromegaly.      Vascular: No JVD.   Cardiovascular:      Rate and Rhythm: Normal rate and regular rhythm.      Heart sounds: Normal heart sounds. No murmur. No friction rub. No gallop.    Pulmonary:      Effort: Pulmonary effort is normal. No respiratory distress.      Breath sounds: Normal breath sounds. No wheezing or rales.   Abdominal:      General: There is no distension.      Palpations: There is no mass.      Tenderness: There is no abdominal tenderness.   Lymphadenopathy:      Cervical: No cervical adenopathy.   Skin:     Findings: No rash.   Neurological:      Mental Status: She is alert and oriented to person, place, and time.      Coordination: Coordination normal.         Assessment/Plan   Problem List Items Addressed This Visit        High    Hypertension, benign (Chronic)    Overview     Controlled.   Low salt low calorie diet and regular exercise   She will continue to  monitor her pressures.         Current Assessment & Plan     Hypertension is improving with treatment.  Continue current treatment regimen.  Blood pressure will be reassessed in 3 months.         Ischemic cardiomyopathy    Overview     Ef 55 % 02/2021  No sxs         Coronary artery disease of native artery of native heart with stable angina pectoris (CMS/HCC) - Primary    Overview     Two-vessel  coronary disease, widely patent stent in the proximal LAD 02/12/2021 w angioplasty to the proximal circumflex. She is doing well. Risk factors modified.   S/P Acute Myocardial Infarct on 09/29/2019  S/P left cardiac cath 02/12/2021-left main no disease, RCA no disease, disease mid to distal LAD, Angioplasty proximal circumflex, EF 55-60%  S/P Rt Cardiac Cath-02/25/2020  S/P Left Cardiac Cath-09/29/2019            Medium    Mixed hyperlipidemia (Chronic)    Overview     She is compliant with meds  Goal of LDL 70 understood   LDL  2/13/2021 29 but her LDL decreased to 27  Her atorvastatin was decreased to 20mg   Diet exercise and continued wt loss encouraged.   Repeat lab within 3 mos.          Current Assessment & Plan     Lipid abnormalities are improving with treatment.  Pharmacotherapy as ordered.  Lipids will be reassessed in 3 months.            Low    Class 1 obesity due to excess calories with serious comorbidity and body mass index (BMI) of 34.0 to 34.9 in adult    Overview     She has regained wt.  Prediabetes is understood.There is strong family hx.  Low calorie diet discussed. She will increase exercise..          Current Assessment & Plan     Patient's (Body mass index is 34.28 kg/m².) indicates that they are obese (BMI >30) with obesity-related health conditions that include hypertension, coronary heart disease and dyslipidemias . Obesity is improving with treatment. BMI is is above average; BMI management plan is completed. We discussed portion control and increasing exercise.            Other Visit Diagnoses     Acute non-recurrent maxillary sinusitis        Abx fluids saline nose drops humidity and follow up should sxs not improve and resolve over 1 week.             There are no Patient Instructions on file for this visit.    I wore protective equipment throughout this patient encounter to include mask and eye protection. Hand hygiene was performed before donning protective equipment and after  removal when leaving the room.

## 2021-02-19 ENCOUNTER — HOSPITAL ENCOUNTER (OUTPATIENT)
Dept: CT IMAGING | Facility: HOSPITAL | Age: 51
Discharge: HOME | End: 2021-02-19
Admitting: INTERNAL MEDICINE

## 2021-02-19 PROCEDURE — 71275 CT ANGIOGRAPHY CHEST: CPT

## 2021-02-19 PROCEDURE — 0 IOPAMIDOL PER 1 ML: Performed by: INTERNAL MEDICINE

## 2021-02-19 RX ADMIN — IOPAMIDOL 100 ML: 755 INJECTION, SOLUTION INTRAVENOUS at 08:46

## 2021-02-21 PROBLEM — N28.9 RENAL INSUFFICIENCY: Status: RESOLVED | Noted: 2020-05-01 | Resolved: 2021-02-21

## 2021-02-21 NOTE — ASSESSMENT & PLAN NOTE
Patient's (Body mass index is 34.28 kg/m².) indicates that they are obese (BMI >30) with obesity-related health conditions that include hypertension, coronary heart disease and dyslipidemias . Obesity is improving with treatment. BMI is is above average; BMI management plan is completed. We discussed portion control and increasing exercise.

## 2021-02-24 ENCOUNTER — TELEPHONE (OUTPATIENT)
Dept: CARDIAC REHAB | Facility: HOSPITAL | Age: 51
End: 2021-02-24

## 2021-02-24 NOTE — TELEPHONE ENCOUNTER
Called to inform patient of insurance verification and get scheduled for initial visit. No answer. Voicemail left.

## 2021-03-02 ENCOUNTER — TELEPHONE (OUTPATIENT)
Dept: FAMILY MEDICINE CLINIC | Facility: CLINIC | Age: 51
End: 2021-03-02

## 2021-03-02 DIAGNOSIS — Q21.10 ASD (ATRIAL SEPTAL DEFECT): Primary | ICD-10-CM

## 2021-03-02 DIAGNOSIS — I25.5 ISCHEMIC CARDIOMYOPATHY: Primary | ICD-10-CM

## 2021-03-02 NOTE — TELEPHONE ENCOUNTER
Caller: Sada Le    Relationship to patient: Self    Best call back number: [phone]    Patient is needing: PATIENT CALLED TO PROVIDE CARDIOLOGIST INFORMATION FOR REFERRAL.    DR. JOSE DAILEY   [phone]  [facility]

## 2021-03-03 ENCOUNTER — OFFICE VISIT (OUTPATIENT)
Dept: CARDIOLOGY | Facility: CLINIC | Age: 51
End: 2021-03-03

## 2021-03-03 VITALS
BODY MASS INDEX: 34.19 KG/M2 | HEIGHT: 65 IN | RESPIRATION RATE: 18 BRPM | WEIGHT: 205.2 LBS | DIASTOLIC BLOOD PRESSURE: 66 MMHG | SYSTOLIC BLOOD PRESSURE: 118 MMHG | HEART RATE: 68 BPM

## 2021-03-03 DIAGNOSIS — I10 HYPERTENSION, BENIGN: ICD-10-CM

## 2021-03-03 DIAGNOSIS — I25.110 CORONARY ARTERY DISEASE INVOLVING NATIVE CORONARY ARTERY OF NATIVE HEART WITH UNSTABLE ANGINA PECTORIS (HCC): Primary | ICD-10-CM

## 2021-03-03 DIAGNOSIS — I25.5 ISCHEMIC CARDIOMYOPATHY: ICD-10-CM

## 2021-03-03 DIAGNOSIS — I25.10 CAD S/P PERCUTANEOUS CORONARY ANGIOPLASTY: ICD-10-CM

## 2021-03-03 DIAGNOSIS — I10 ESSENTIAL HYPERTENSION: ICD-10-CM

## 2021-03-03 DIAGNOSIS — E78.41 ELEVATED LIPOPROTEIN(A): ICD-10-CM

## 2021-03-03 DIAGNOSIS — E78.2 MIXED HYPERLIPIDEMIA: ICD-10-CM

## 2021-03-03 DIAGNOSIS — Z98.61 CAD S/P PERCUTANEOUS CORONARY ANGIOPLASTY: ICD-10-CM

## 2021-03-03 PROCEDURE — 99214 OFFICE O/P EST MOD 30 MIN: CPT | Performed by: INTERNAL MEDICINE

## 2021-03-03 RX ORDER — NITROGLYCERIN 0.4 MG/1
0.4 TABLET SUBLINGUAL
Qty: 26 TABLET | Refills: 2 | Status: SHIPPED | OUTPATIENT
Start: 2021-03-03 | End: 2021-04-23

## 2021-03-03 RX ORDER — LISINOPRIL 5 MG/1
5 TABLET ORAL 2 TIMES DAILY
Qty: 180 TABLET | Refills: 1 | Status: SHIPPED | OUTPATIENT
Start: 2021-03-03 | End: 2022-01-10

## 2021-03-03 RX ORDER — FUROSEMIDE 20 MG/1
20 TABLET ORAL DAILY
Qty: 30 TABLET | Refills: 5 | Status: SHIPPED | OUTPATIENT
Start: 2021-03-03 | End: 2021-06-06

## 2021-03-04 NOTE — PROGRESS NOTES
Cardiology clinic note  Brant Martinez MD, PhD  Subjective:     Encounter Date:03/03/2021      Patient ID: Sada Le is a 50 y.o. female.    Chief Complaint:  Chief Complaint   Patient presents with   • Follow-up       HPI:  History of Present Illness  PreviouslyI the pleasure to see this patient who is a 49-year-old female who is well-known to me from prior hospitalization and clinic notes.  She has a history of anterior ST elevation myocardial infarction with complete occlusion of the proximal LAD under going revascularization with Xience 3.5 x 33 mm drug-eluting stent postdilated to 4.5 mm at 18 familia with good results.  This was an Impella supported procedure given under  Cardiogenic shock in the setting of acute MI which was complicated by VF arrest with successful defibrillation.  She did well with weaning Impella support over 2 days and institution of max goal-directed medical therapy ultimately being discharged to home.  We were very aggressive with her medical therapy as well as antiplatelets and her EF improved from around 28 to 30% to 55% with only ever so slight anterior wall very mild hypokinesis residually.  She has done well since this time and is completely quit smoking.  She had nonobstructive disease in the circumflex and RCA as well as mid to distal LAD.  She was changed secondary to shortness of breath from Brilinta to Effient which she is maintained on aspirin and Effient, she is on high intensity statin therapy as well as Aldactone and long-acting beta-blocker and loop diuretics 40 twice daily of Lasix.  She is on ACE inhibitor at moderate to high intensity as well tolerating this just fine without cough.  She has had a recent hospitalization for shortness of breath and underwent right heart catheterization with no evidence of pulmonary hypertension despite volume loading during the procedure with evidence of volume depletion initially with right atrial pressure of 2 to 3 mmHg.  Results of  this are below.  She had recent hospitalization  for atypical substernal chest pain also with jaw pain.  Troponins were unremarkable, EKG was unchanged, blood pressure has been somewhat labile at home recently she says and when high causes some degree of chest discomfort.  She underwent noninvasive evaluation with treadmill stress test and walked greater than 9 minutes with no EKG changes and she had normal myocardial perfusion at rest and stress.  There was no indication of balanced ischemia that would be suspected that may give false negative results relative to perfusion imaging.  Her echo showed normal preserved LV systolic function with ever so slight mild anterior wall hypokinesis compared to thickening of the posterior lateral walls.  EF was 55 to 60%.  Normal atrial sizes, no evidence of pulmonary hypertension, IVC diameter was normal.  She was discharged shortly thereafter.  She did continue to been followed peripherally in the clinic as an outpatient with optimization of antianginals however she continued ultimately to have some chest discomfort which she expressed along with more fatigue and ultimately we made a decision to proceed with diagnostic left heart catheterization for revisualization of LAD stents as well as other residual nonobstructive disease.  She underwent diagnostic left heart catheterization getting PCI to the ostial proximal circumflex, 3.0 x 18 Xience postdilated to 3.9 mm in the proximal ostial portion and 3.75 mm distally with good angiographic results and reduction of stenosis from 80% ulcerated appearing plaque to 0% residual.  Since this time and after discharge she has felt much better.  Her EDP was also very low indicative of volume depletion and her EF was normal at 55% with only very mild anterior wall hypokinesis from prior ischemic injury.  With improvement of her LV CV systolic function, low LVEDP and new complaints of dizziness with standing and orthostasis we decided to  decrease her Lasix as well as stop her Aldactone today and lower her lisinopril and continue her beta-blocker.  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    Widely patent stents to the LAD  Obstructive disease in circumflex status post revascularization and stenting as above and below  Continue dual antiplatelet therapy with aspirin and Effient  High intensity statin therapy as tolerated  Beta-blocker on board heart rate 60s  Decrease lisinopril to 5 daily  Decrease Lasix to 20 daily  Stop Aldactone  Diet and exercise per AHA guidelines  Goal blood pressure simply less than 135 systolic    Return to clinic 6 months    Cath films reviewed today extensively  All questions as proposed by the patient were answered today    Greater than 25 minutes of face-to-face with the patient with additional 10 minutes and charting for total encounter time greater than 35 minutes    Brant Martinez MD, PhD      Review of systems x14 point review of systems is negative except was mentioned above     Historical data copied forward from previous encounters and EMR is unchanged,    Conclusion of heart catheterization February 12, 2021    Cardiac cath note  Brant Martinez MD, PhD  Date of service 2-  [facility] cardiology     Procedure  1.  Left heart catheterization with coronary angiography left ventriculography in ANTON position  2.  Percutaneous coronary mention to the ostial proximal circumflex with 3.0 x 18 mm Xience drug-eluting stent postdilated to 3.9 mm proximally, 3.75 mm distally with good angiographic results     Lesion information  Ostial 50%, mid 70% with hazy appearance of the proximal  circumflex, ulcerated plaque angiographically with daily anginal chest pain  Reduced to 0% residual stenosis  JANE-3 flow pre and post     After informed consent the patient was brought to the catheterization lab sterilely prepped and draped in usual fashion with exposure the right groin for right common femoral arterial access via micropuncture modified standard technique with placement of a 6 Nepali sheath under fluoroscopic guidance which was aspirated flushed with heparinized saline after 1% lidocaine analgesia.  An 035 guidewire was advanced to level aortic valve followed by diagnostic left heart catheterization with JL4 and JR4 6 Nepali catheters.  A pigtail catheter was used to perform left ventriculography, EDP measurement and pullback assessment the transaortic valve gradient.  Secondary to findings of coronary disease in the ostial proximal circumflex decision was made to intervene given recurrent anginal chest pain, ulcerated appearance of the proximal circumflex.  Lesion was predilated after run-through wire to the obtuse marginal with 2.5 x 20 mm NC at 18 familia followed by stenting with 3.0 x 18 deployed at 18 familia at 3.25 followed by postdilatation with with 3.5 x 15 up to 1618 familia and then 3.75 x 15 NC balloon at the 14 familia at the distal edge and 16 familia at the proximal edge of 3.9 mm with good angiographic results.  There was no compromise of the LAD flow.  A wire directed down the LAD at the conclusion the case had a balloon that freely passed and there was no stent impingement from the left main to the LAD.  JANE-3 flow was maintained pre and post.  Lesion reduced from 70% to 0% residual.  Heparin was used to maintain ACT greater than 250 throughout the entire to the case.  Patient was already on aspirin and Effient but was given additional 10 mg of Effient on the table.  Right common femoral arteriotomy was closed by manual pressure once ACT less than 175.  She had no complications.  She left the Cath  Lab chest pain-free hemodynamically electrically stable alert talking to staff neurologically grossly intact bilaterally     Moderate conscious sedation of Versed and fentanyl administered by registered nurse with complete ECG pulse oximetry and hemodynamic monitoring throughout the entirety the case observed by me  Complications none  Blood loss less than 5 cc  Contrast 180 cc including diagnostic portion     Findings  1.  Opening aortic pressure of 114/66 with a mean of 86  2.  LV pressure 108 with an EDP of 2 to 5 mmHg  Closing pressure 111/66 with a mean of 85  EDP 6  No significant transaortic valve gradient     Angiography  1.  Left main large caliber vessel no angiographic disease  2.  LAD large caliber vessel with widely patent stent in the proximal portion but diffusely diseased mid to distal LAD with eccentric plaque likely 2.5 mm in diameter from the bifurcation of the diagonal all the way around the apex with again 30 to 40% luminal irregularities but no significant flow-limiting stenosis.,  Diagonal branch with no obstructive disease only mild luminal regularities  3.  Circumflex nondominant with large obtuse marginal branch.  There is a luminal filling defect right at the ostium concerning for nonocclusive thrombus versus eccentric plaque at 50%, immediately distal is a hazy eccentric ulcerated plaque 75% followed by bifurcation with continuation circumflex and obtuse marginal branch which has no disease.  There is JANE-3 flow throughout  4.  RCA small nondominant no angiographic disease, small PDA branch distally not amenable to any stents given caliber     Conclusions and recommendations  1 two-vessel coronary disease, widely patent stent in the proximal LAD with diffuse mid to distal LAD stenosis but nonobstructive, ostial proximal circumflex disease of questionable significance however angiographic appearance of significant ulceration, concern for thrombus status post intervention as described  above  2.  Successful intervention as described above with balloon angioplasty and stenting of the ostial proximal circumflex postdilated to 3.9 mm at the ostium and 3.75 mm distally with good angiographic results  3.  Continue antiplatelet therapy with aspirin and Effient  4.  Decrease Lasix with low LVEDP, hold ACE inhibitor  5 LV function appears 50 to 55% by LV gram  6.  No transaortic valve gradient  7.,  Mid to observation, likely home tomorrow which discharge criteria met  8.  After discharge follow-up with cardiology in 2 to 4 weeks  \  It is a pleasure be involved in her cardiovascular care.  Please call with any questions or concerns  Brant Martinez MD, PhD       The following portions of the patient's history were reviewed and updated as appropriate: allergies, current medications, past family history, past medical history, past social history, past surgical history and problem list.    Problem List:  Patient Active Problem List   Diagnosis   • Allergic rhinitis   • Cervical disc disease with myelopathy   • Diverticulosis of colon   • Hypertension, benign   • Insomnia, organic   • Class 1 obesity due to excess calories with serious comorbidity and body mass index (BMI) of 34.0 to 34.9 in adult   • Osteopenia of spine   • Mixed hyperlipidemia   • Cardiac arrest with ventricular fibrillation (CMS/HCC)   • Ischemic cardiomyopathy   • Stented coronary artery   • History of echocardiogram   • Annual visit for general adult medical examination with abnormal findings   • Cervical cancer screening   • Encounter for screening for malignant neoplasm of breast   • Colon cancer screening   • History of tobacco use   • Generalized anxiety disorder   • ASD (atrial septal defect)   • History of transesophageal echocardiography (NAFISA)   • Coronary artery disease of native artery of native heart with stable angina pectoris (CMS/HCC)   • History of MI (myocardial infarction)   • Family history of diabetes mellitus type II    • Anxiety   • Coronary artery disease involving native coronary artery of native heart with unstable angina pectoris (CMS/HCC)   • CAD S/P percutaneous coronary angioplasty       Past Medical History:  Past Medical History:   Diagnosis Date   • Absence of left thumb     Impression: hx of MRSA, she is signficantly improved She will continue meds and followup if sxs have not resolved over the next 2 weeks.. 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 EF 34% Distribution indicative of LAD territory injury. Akinesis of the apex as well as apical lateral and mid to apical septal walls. Preservation of posterior inferior lateral walls. Mild AI, mild TR. Normal RV function.    • History of transesophageal echocardiography (NAFISA) 01/07/2020    EF 55% LA cavity is mild to moderately dilated. Mild MR. Interatrial septum appears redundant. Interatrial hypermobile c/w aneurysm. Large PFO is noted with right to left shunting on bubble study. PFO tunnel appears to be short.    • Hordeolum externum of left lower eyelid     Impression: She was started on Bactrim DS for her infection. She was also advised to use warm compression. She will followup should sxs not improve over the next 48 hrs and resolve over the next 1 weeek.   • Hyperlipidemia     Impression:  Diet controled. She should see improvement with her successful wt loss. We discussed her lipid panel.   • Hypertension, benign     Impression: new onset Low salt low calorie diet and regular exercise and followup in 1 mo She will monitor her pressures and notify us of her pressures over the next 1 week.   • Inclusion cyst of right breast     Impression: We will follow for now. She is encouraged to have Mammography. She is presently without insurance and reluctant. She bobby consider. She will notify us of any change at all in the lesion for the only way to be certain of it's etilogy is to remove it. She understands.   • Insomnia, organic    • Menopausal syndrome    • Overweight (BMI 25.0-29.9)    • RUQ abdominal pain     Impression: Resolving, negative GB u/s, HIDA EF 77%, we will follow   • Stented coronary artery 09/29/2019   • Tobacco use     Impression: She is strongly encouraged to stop smoking. Cessation techniques discussed and encouraged. The consequences of not stopping discussed, ie, CAD, PVD, Stroke, Lung and other cancers.       Past Surgical History:  Past Surgical History:   Procedure Laterality Date   • BIVENTRICULAR ASSIST DEVICE/LEFT VENTRICULAR ASSIST DEVICE INSERTION N/A 9/29/2019    Procedure: Left Ventricular Assist Device;  Surgeon: Brant Martinez MD;  Location: [facility] CATH INVASIVE LOCATION;  Service: Cardiovascular   • CARDIAC CATHETERIZATION N/A 9/29/2019    Procedure: Left Heart Cath;  Surgeon: Brant Martinez MD;  Location: [facility] CATH INVASIVE LOCATION;  Service: Cardiovascular   • CARDIAC CATHETERIZATION N/A 9/29/2019    Procedure: Coronary angiography;  Surgeon: Brant Martinez MD;  Location: [facility] CATH INVASIVE LOCATION;  Service: Cardiovascular   • CARDIAC CATHETERIZATION N/A 9/29/2019    Procedure: Left ventriculography;  Surgeon: Brant Martinez MD;  Location: [facility] CATH INVASIVE LOCATION;  Service: Cardiovascular   • CARDIAC CATHETERIZATION N/A  9/29/2019    Procedure: Stent SERENITY coronary;  Surgeon: Brant Martinez MD;  Location: [facility] CATH INVASIVE LOCATION;  Service: Cardiovascular   • CARDIAC CATHETERIZATION N/A 2/25/2020    Procedure: Right Heart Cath;  Surgeon: Brant Martinez MD;  Location: [facility] CATH INVASIVE LOCATION;  Service: Cardiology;  Laterality: N/A;   • CARDIAC CATHETERIZATION N/A 2/12/2021    Procedure: Left Heart Cath;  Surgeon: Brant Martinez MD;  Location: [facility] CATH INVASIVE LOCATION;  Service: Cardiology;  Laterality: N/A;   • CARDIAC CATHETERIZATION N/A 2/12/2021    Procedure: Coronary angiography;  Surgeon: Brant Martinez MD;  Location: [facility] CATH INVASIVE LOCATION;  Service: Cardiology;  Laterality: N/A;   • CARDIAC CATHETERIZATION N/A 2/12/2021    Procedure: Left ventriculography;  Surgeon: Brant Martinez MD;  Location: [facility] CATH INVASIVE LOCATION;  Service: Cardiology;  Laterality: N/A;   • CARDIAC CATHETERIZATION N/A 2/12/2021    Procedure: Aortic root aortogram;  Surgeon: Brant Martinez MD;  Location: [facility] CATH INVASIVE LOCATION;  Service: Cardiology;  Laterality: N/A;   • CARDIAC CATHETERIZATION N/A 2/12/2021    Procedure: Percutaneous Coronary Intervention;  Surgeon: Brant Martinez MD;  Location: [facility] CATH INVASIVE LOCATION;  Service: Cardiology;  Laterality: N/A;   • CARDIAC CATHETERIZATION N/A 2/12/2021    Procedure: Stent SERENITY coronary;  Surgeon: Brant Martinez MD;  Location: [facility] CATH INVASIVE LOCATION;  Service: Cardiology;  Laterality: N/A;   • CARDIAC ELECTROPHYSIOLOGY PROCEDURE  9/29/2019    Procedure: Impella Insertion;  Surgeon: Brant Martinez MD;  Location: [facility] CATH INVASIVE LOCATION;  Service: Cardiovascular   • CORONARY ANGIOPLASTY  02/12/2021    1x stent to Circ   • VA RT/LT HEART CATHETERS N/A 9/29/2019    Procedure: Percutaneous Coronary Intervention;  Surgeon: Brant Martinez MD;  Location: [facility] CATH  "INVASIVE LOCATION;  Service: Cardiovascular   • TOTAL ABDOMINAL HYSTERECTOMY WITH SALPINGO OOPHORECTOMY      Endometriosis       Social History:  Social History     Socioeconomic History   • Marital status:      Spouse name: Not on file   • Number of children: Not on file   • Years of education: Not on file   • Highest education level: Not on file   Tobacco Use   • Smoking status: Former Smoker     Packs/day: 1.00     Years: 34.00     Pack years: 34.00     Types: Cigarettes     Start date:      Quit date: 2019     Years since quittin.4   • Smokeless tobacco: Never Used   • Tobacco comment: States she is quitting as of 2019.   Substance and Sexual Activity   • Alcohol use: Yes     Frequency: Monthly or less     Drinks per session: 3 or 4     Binge frequency: Less than monthly     Comment: occassionally    • Drug use: Not Currently     Comment: occasional   • Sexual activity: Defer       Allergies:  Allergies   Allergen Reactions   • Penicillins Rash   • Ciprofloxacin Rash   • Other Rash       Immunizations:  Immunization History   Administered Date(s) Administered   • DT 2004   • Flulaval/Fluarix/Fluzone Quad 10/01/2019   • Tdap 2011       ROS:  ROS       Objective:         /66 (BP Location: Left arm, Patient Position: Sitting)   Pulse 68   Resp 18   Ht 165.1 cm (65\")   Wt 93.1 kg (205 lb 3.2 oz)   LMP 1996 (Approximate)   BMI 34.15 kg/m²     Physical Exam  Regular rate and rhythm no rubs murmurs gallops  Clear to auscultation  No clubbing cyanosis or edema  No carotid bruits no JVD  Neurologic grossly intact bilaterally  Vitals reviewed    In-Office Procedure(s):  Procedures    ASCVD RIsk Score::  The ASCVD Risk score (Gila Bendmalaika SALOMON Jr., et al., 2013) failed to calculate for the following reasons:    The patient has a prior MI or stroke diagnosis    Recent Radiology:  Imaging Results (Most Recent)     None          Lab Review:   Admission on 2021, " Discharged on 02/13/2021   Component Date Value   • Activated Clotting Time  02/12/2021 263*   • Activated Clotting Time  02/12/2021 241*   • Activated Clotting Time  02/12/2021 158*   • WBC 02/13/2021 8.00    • RBC 02/13/2021 3.74*   • Hemoglobin 02/13/2021 11.8*   • Hematocrit 02/13/2021 34.9    • MCV 02/13/2021 93.3    • MCH 02/13/2021 31.5    • MCHC 02/13/2021 33.8    • RDW 02/13/2021 13.5    • RDW-SD 02/13/2021 44.6    • MPV 02/13/2021 9.2    • Platelets 02/13/2021 184    • Glucose 02/13/2021 112*   • BUN 02/13/2021 11    • Creatinine 02/13/2021 0.86    • Sodium 02/13/2021 142    • Potassium 02/13/2021 3.9    • Chloride 02/13/2021 108*   • CO2 02/13/2021 27.0    • Calcium 02/13/2021 8.2*   • eGFR Non  Amer 02/13/2021 70    • BUN/Creatinine Ratio 02/13/2021 12.8    • Anion Gap 02/13/2021 7.0    • Hemoglobin A1C 02/13/2021 5.8*   • Total Cholesterol 02/13/2021 79    • Triglycerides 02/13/2021 125    • HDL Cholesterol 02/13/2021 27*   • LDL Cholesterol  02/13/2021 29    • VLDL Cholesterol 02/13/2021 23    • LDL/HDL Ratio 02/13/2021 1.00    Lab on 02/10/2021   Component Date Value   • COVID19 02/10/2021 Not Detected    Office Visit on 02/10/2021   Component Date Value   • Glucose 02/10/2021 114*   • BUN 02/10/2021 14    • Creatinine 02/10/2021 0.97    • Sodium 02/10/2021 138    • Potassium 02/10/2021 3.9    • Chloride 02/10/2021 104    • CO2 02/10/2021 27.7    • Calcium 02/10/2021 8.7    • Total Protein 02/10/2021 6.7    • Albumin 02/10/2021 3.80    • ALT (SGPT) 02/10/2021 26    • AST (SGOT) 02/10/2021 19    • Alkaline Phosphatase 02/10/2021 118*   • Total Bilirubin 02/10/2021 0.2    • eGFR Non  Amer 02/10/2021 61    • Globulin 02/10/2021 2.9    • A/G Ratio 02/10/2021 1.3    • BUN/Creatinine Ratio 02/10/2021 14.4    • Anion Gap 02/10/2021 6.3    • Protime 02/10/2021 11.1    • INR 02/10/2021 1.01    • WBC 02/10/2021 8.63    • RBC 02/10/2021 4.09    • Hemoglobin 02/10/2021 12.9    • Hematocrit  02/10/2021 37.5    • MCV 02/10/2021 91.7    • MCH 02/10/2021 31.5    • MCHC 02/10/2021 34.4    • RDW 02/10/2021 11.9*   • RDW-SD 02/10/2021 39.6    • MPV 02/10/2021 11.4    • Platelets 02/10/2021 202    • Neutrophil % 02/10/2021 64.3    • Lymphocyte % 02/10/2021 24.6    • Monocyte % 02/10/2021 7.6    • Eosinophil % 02/10/2021 2.1    • Basophil % 02/10/2021 0.7    • Immature Grans % 02/10/2021 0.7*   • Neutrophils, Absolute 02/10/2021 5.55    • Lymphocytes, Absolute 02/10/2021 2.12    • Monocytes, Absolute 02/10/2021 0.66    • Eosinophils, Absolute 02/10/2021 0.18    • Basophils, Absolute 02/10/2021 0.06    • Immature Grans, Absolute 02/10/2021 0.06*   • nRBC 02/10/2021 0.0    Orders Only on 10/20/2020   Component Date Value   • WBC 10/20/2020 13.5*   • RBC 10/20/2020 4.49    • Hemoglobin 10/20/2020 13.6    • Hematocrit 10/20/2020 40.4    • MCV 10/20/2020 90    • MCH 10/20/2020 30.3    • MCHC 10/20/2020 33.7    • RDW 10/20/2020 12.1    • Platelets 10/20/2020 206    • Neutrophil Rel % 10/20/2020 61    • Lymphocyte Rel % 10/20/2020 30    • Monocyte Rel % 10/20/2020 7    • Eosinophil Rel % 10/20/2020 1    • Basophil Rel % 10/20/2020 0    • Neutrophils Absolute 10/20/2020 8.1*   • Lymphocytes Absolute 10/20/2020 4.1*   • Monocytes Absolute 10/20/2020 1.0*   • Eosinophils Absolute 10/20/2020 0.2    • Basophils Absolute 10/20/2020 0.1    • Immature Granulocyte Rel* 10/20/2020 1    • Immature Grans Absolute 10/20/2020 0.1    • Glucose 10/20/2020 94    • BUN 10/20/2020 18    • Creatinine 10/20/2020 1.12*   • eGFR Non African Am 10/20/2020 57*   • eGFR  Am 10/20/2020 66    • BUN/Creatinine Ratio 10/20/2020 16    • Sodium 10/20/2020 140    • Potassium 10/20/2020 4.5    • Chloride 10/20/2020 101    • Total CO2 10/20/2020 26    • Calcium 10/20/2020 9.1    • Total Protein 10/20/2020 6.8    • Albumin 10/20/2020 4.4    • Globulin 10/20/2020 2.4    • A/G Ratio 10/20/2020 1.8    • Total Bilirubin 10/20/2020 0.3    • Alkaline  Phosphatase 10/20/2020 92    • AST (SGOT) 10/20/2020 18    • ALT (SGPT) 10/20/2020 21    • TSH 10/20/2020 2.100    • Total Cholesterol 10/20/2020 152    • Triglycerides 10/20/2020 134    • HDL Cholesterol 10/20/2020 60    • VLDL Cholesterol Nam 10/20/2020 23    • LDL Chol Calc (NIH) 10/20/2020 69    • Chol/HDL Ratio 10/20/2020 2.5    • Hep C Virus Ab 10/20/2020 <0.1                      Brant Martinez MD  03/04/21  .

## 2021-03-05 ENCOUNTER — TELEPHONE (OUTPATIENT)
Dept: CARDIAC REHAB | Facility: HOSPITAL | Age: 51
End: 2021-03-05

## 2021-03-05 NOTE — TELEPHONE ENCOUNTER
Ran insurance and have order for cardiac rehab. Patient says she is not interested this time. Will exercise on her own.

## 2021-04-23 RX ORDER — NITROGLYCERIN 0.4 MG/1
TABLET SUBLINGUAL
Qty: 25 TABLET | Refills: 3 | Status: SHIPPED | OUTPATIENT
Start: 2021-04-23 | End: 2021-06-06

## 2021-04-28 RX ORDER — LORATADINE 10 MG
TABLET ORAL
Qty: 90 TABLET | Refills: 3 | Status: SHIPPED | OUTPATIENT
Start: 2021-04-28 | End: 2022-04-22 | Stop reason: SDUPTHER

## 2021-06-03 DIAGNOSIS — E78.2 MIXED HYPERLIPIDEMIA: Chronic | ICD-10-CM

## 2021-06-03 DIAGNOSIS — I25.118 ATHEROSCLEROTIC HEART DISEASE OF NATIVE CORONARY ARTERY WITH OTHER FORMS OF ANGINA PECTORIS (HCC): ICD-10-CM

## 2021-06-03 RX ORDER — PRASUGREL 10 MG/1
TABLET, FILM COATED ORAL
Qty: 30 TABLET | Refills: 11 | Status: SHIPPED | OUTPATIENT
Start: 2021-06-03 | End: 2021-12-08

## 2021-06-03 RX ORDER — ROSUVASTATIN CALCIUM 20 MG/1
TABLET, COATED ORAL
Qty: 90 TABLET | Refills: 4 | OUTPATIENT
Start: 2021-06-03

## 2021-06-04 ENCOUNTER — OFFICE VISIT (OUTPATIENT)
Dept: FAMILY MEDICINE CLINIC | Facility: CLINIC | Age: 51
End: 2021-06-04

## 2021-06-04 ENCOUNTER — HOSPITAL ENCOUNTER (OUTPATIENT)
Dept: GENERAL RADIOLOGY | Facility: HOSPITAL | Age: 51
Discharge: HOME | End: 2021-06-04
Admitting: FAMILY MEDICINE

## 2021-06-04 ENCOUNTER — TELEPHONE (OUTPATIENT)
Dept: FAMILY MEDICINE CLINIC | Facility: CLINIC | Age: 51
End: 2021-06-04

## 2021-06-04 VITALS
OXYGEN SATURATION: 99 % | TEMPERATURE: 97.7 F | HEART RATE: 75 BPM | SYSTOLIC BLOOD PRESSURE: 127 MMHG | BODY MASS INDEX: 34.42 KG/M2 | WEIGHT: 206.6 LBS | HEIGHT: 65 IN | DIASTOLIC BLOOD PRESSURE: 85 MMHG | RESPIRATION RATE: 18 BRPM

## 2021-06-04 DIAGNOSIS — L30.9 HAND ECZEMA: ICD-10-CM

## 2021-06-04 DIAGNOSIS — G89.29 CHRONIC PAIN OF RIGHT KNEE: ICD-10-CM

## 2021-06-04 DIAGNOSIS — M25.561 CHRONIC PAIN OF RIGHT KNEE: ICD-10-CM

## 2021-06-04 DIAGNOSIS — B02.9 HERPES ZOSTER WITHOUT COMPLICATION: Primary | ICD-10-CM

## 2021-06-04 PROCEDURE — 99214 OFFICE O/P EST MOD 30 MIN: CPT | Performed by: FAMILY MEDICINE

## 2021-06-04 PROCEDURE — 73564 X-RAY EXAM KNEE 4 OR MORE: CPT

## 2021-06-04 RX ORDER — VALACYCLOVIR HYDROCHLORIDE 1 G/1
1000 TABLET, FILM COATED ORAL EVERY 8 HOURS
Qty: 21 TABLET | Refills: 0 | Status: SHIPPED | OUTPATIENT
Start: 2021-06-04 | End: 2021-12-08

## 2021-06-04 RX ORDER — PREDNISONE 20 MG/1
20 TABLET ORAL DAILY
Qty: 20 TABLET | Refills: 0 | Status: SHIPPED | OUTPATIENT
Start: 2021-06-04 | End: 2021-06-09 | Stop reason: HOSPADM

## 2021-06-04 NOTE — PROGRESS NOTES
Subjective   Sada Le is a 50 y.o. female.     Patient is here today complaining of neuropathy. Right arm in strip of pain to light touch. Feels like previous shinges  Knee Pain   There was no injury mechanism. The pain is present in the right knee. The quality of the pain is described as aching. The pain is moderate. The pain has been constant since onset. Pertinent negatives include no numbness. The symptoms are aggravated by movement. She has tried nothing for the symptoms. The treatment provided no relief.   Rash  This is a recurrent problem. The problem has been gradually improving since onset. The affected locations include the left hand and right hand. The rash is characterized by dryness, redness and peeling. She was exposed to nothing. Pertinent negatives include no diarrhea, fatigue, fever, shortness of breath or vomiting. Past treatments include nothing. Her past medical history is significant for eczema.        The following portions of the patient's history were reviewed and updated as appropriate: allergies, current medications, past family history, past medical history, past social history, past surgical history and problem list.    Patient Active Problem List   Diagnosis   • Allergic rhinitis   • Cervical disc disease with myelopathy   • Diverticulosis of colon   • Hypertension, benign   • Insomnia, organic   • Class 1 obesity due to excess calories with serious comorbidity and body mass index (BMI) of 34.0 to 34.9 in adult   • Osteopenia of spine   • Mixed hyperlipidemia   • Cardiac arrest with ventricular fibrillation (CMS/HCC)   • Ischemic cardiomyopathy   • Stented coronary artery   • History of echocardiogram   • Annual visit for general adult medical examination with abnormal findings   • Cervical cancer screening   • Encounter for screening for malignant neoplasm of breast   • Colon cancer screening   • History of tobacco use   • Generalized anxiety disorder   • ASD (atrial septal  defect)   • History of transesophageal echocardiography (NAFISA)   • Coronary artery disease of native artery of native heart with stable angina pectoris (CMS/[facility])   • History of MI (myocardial infarction)   • Family history of diabetes mellitus type II   • Anxiety   • Coronary artery disease involving native coronary artery of native heart with unstable angina pectoris (CMS/[facility])   • CAD S/P percutaneous coronary angioplasty       Current Outpatient Medications on File Prior to Visit   Medication Sig Dispense Refill   • ALPRAZolam (XANAX) 0.25 MG tablet Take 0.25 mg by mouth Every 6 (Six) Hours As Needed for Anxiety.     • atorvastatin (LIPITOR) 20 MG tablet Take 20 mg by mouth Daily.     • buPROPion XL (WELLBUTRIN XL) 150 MG 24 hr tablet Take 150 mg by mouth Daily.     • cholecalciferol (VITAMIN D3) 1.25 MG (61233 UT) capsule Take 50,000 Units by mouth Daily.     • coenzyme Q10 100 MG capsule Take 200 mg by mouth 2 (Two) Times a Day.     • [facility] Aspirin Adult Low Dose 81 MG chewable tablet CHEW 1 TABLET BY MOUTH EVERY DAY 90 tablet 3   • escitalopram (LEXAPRO) 10 MG tablet Take 10 mg by mouth Daily.     • furosemide (LASIX) 20 MG tablet Take 1 tablet by mouth Daily. 30 tablet 5   • lisinopril (PRINIVIL,ZESTRIL) 5 MG tablet Take 1 tablet by mouth 2 (two) times a day. 180 tablet 1   • metoprolol succinate XL (TOPROL-XL) 25 MG 24 hr tablet Take 25 mg by mouth Daily.     • nitroglycerin (NITROSTAT) 0.4 MG SL tablet PLACE 1 TABLET UNDER THE TONGUE EVERY 5 MINUTES AS NEEDED FOR CHEST PAIN. TAKE NO MORE THAN 3 DOSES IN 15 MINUTES. 25 tablet 3   • prasugrel (EFFIENT) 10 MG tablet TAKE 1 TABLET BY MOUTH EVERY DAY 30 tablet 11   • vitamin B-12 (CYANOCOBALAMIN) 500 MCG tablet Take 500 mcg by mouth Daily.       No current facility-administered medications on file prior to visit.     Current outpatient and discharge medications have been reconciled for the patient.  Reviewed by: Ilia Nolan MD      Allergies   Allergen  "Reactions   • Penicillins Rash   • Ciprofloxacin Rash       Review of Systems   Constitutional: Negative for activity change, appetite change, fatigue and fever.   HENT: Negative for ear pain, swollen glands and voice change.    Eyes: Negative for visual disturbance.   Respiratory: Negative for shortness of breath and wheezing.    Cardiovascular: Negative for chest pain and leg swelling.   Gastrointestinal: Negative for abdominal pain, blood in stool, constipation, diarrhea, nausea and vomiting.   Endocrine: Negative for polydipsia and polyuria.   Genitourinary: Negative for dysuria, frequency and hematuria.   Musculoskeletal: Negative for joint swelling, neck pain and neck stiffness.   Skin: Negative for rash and bruise.   Neurological: Negative for weakness, numbness and headache.   Psychiatric/Behavioral: Negative for suicidal ideas and depressed mood.     I have reviewed and confirmed the accuracy of the ROS as documented by the MA/LPN/RN Ilia Nolan MD    Objective   Visit Vitals  /85 (BP Location: Right arm, Patient Position: Sitting, Cuff Size: Adult)   Pulse 75   Temp 97.7 °F (36.5 °C)   Resp 18   Ht 165.1 cm (65\")   Wt 93.7 kg (206 lb 9.6 oz)   LMP 01/01/1996 (Approximate)   SpO2 99%   BMI 34.38 kg/m²       Physical Exam  Constitutional:       Appearance: She is well-developed.   HENT:      Head: Normocephalic and atraumatic.      Right Ear: External ear normal.      Left Ear: External ear normal.      Nose: Nose normal.   Eyes:      Pupils: Pupils are equal, round, and reactive to light.   Cardiovascular:      Rate and Rhythm: Normal rate and regular rhythm.      Heart sounds: Normal heart sounds.   Pulmonary:      Effort: Pulmonary effort is normal.      Breath sounds: Normal breath sounds.   Abdominal:      General: Bowel sounds are normal.      Palpations: Abdomen is soft.   Musculoskeletal:         General: Normal range of motion.      Cervical back: Normal range of motion and neck " supple.   Skin:     General: Skin is warm and dry.   Neurological:      Mental Status: She is alert and oriented to person, place, and time.      Comments: Hyperesthesia/dysesthesia C6 dermatome distribution   Psychiatric:         Behavior: Behavior normal.         Thought Content: Thought content normal.         Judgment: Judgment normal.         Assessment/Plan .    Diagnoses and all orders for this visit:    1. Herpes zoster without complication (Primary)  -     valACYclovir (VALTREX) 1000 MG tablet; Take 1 tablet by mouth Every 8 (Eight) Hours.  Dispense: 21 tablet; Refill: 0  -     predniSONE (DELTASONE) 20 MG tablet; Take 1 tablet by mouth Daily for 13 days. TID x 3 days, BID x 3 days, QD x 3 days, 1/2 tab daily x 4 days  Dispense: 20 tablet; Refill: 0    2. Chronic pain of right knee  -     XR Knee 4+ View Right    3. Hand eczema  Comments:  likely.      Findings discussed. All questions answered.  Differential diagnosis discussed.    Will see how her knee and hands du with the steroids. May benefit from topical such as elidel if sx persist on hands. May benefit from ortho eval if knee pain persists.       I wore protective equipment throughout this patient encounter to include mask and gloves. Hand hygiene was performed before donning protective equipment and after removal when leaving the room

## 2021-06-06 ENCOUNTER — HOSPITAL ENCOUNTER (INPATIENT)
Facility: HOSPITAL | Age: 51
LOS: 3 days | Discharge: HOME | End: 2021-06-09
Attending: EMERGENCY MEDICINE | Admitting: HOSPITALIST

## 2021-06-06 DIAGNOSIS — L03.114 LEFT ARM CELLULITIS: Primary | ICD-10-CM

## 2021-06-06 DIAGNOSIS — R50.9 FEVER AND CHILLS: ICD-10-CM

## 2021-06-06 DIAGNOSIS — M79.602 LEFT ARM PAIN: ICD-10-CM

## 2021-06-06 LAB
ALBUMIN SERPL-MCNC: 4 G/DL (ref 3.5–5.2)
ALBUMIN/GLOB SERPL: 1.3 G/DL
ALP SERPL-CCNC: 111 U/L (ref 39–117)
ALT SERPL W P-5'-P-CCNC: 35 U/L (ref 1–33)
ANION GAP SERPL CALCULATED.3IONS-SCNC: 14 MMOL/L (ref 5–15)
AST SERPL-CCNC: 28 U/L (ref 1–32)
BASOPHILS # BLD AUTO: 0 10*3/MM3 (ref 0–0.2)
BASOPHILS NFR BLD AUTO: 0.6 % (ref 0–1.5)
BILIRUB SERPL-MCNC: 0.4 MG/DL (ref 0–1.2)
BUN SERPL-MCNC: 21 MG/DL (ref 6–20)
BUN/CREAT SERPL: 17.8 (ref 7–25)
CALCIUM SPEC-SCNC: 9.3 MG/DL (ref 8.6–10.5)
CHLORIDE SERPL-SCNC: 98 MMOL/L (ref 98–107)
CO2 SERPL-SCNC: 22 MMOL/L (ref 22–29)
CREAT SERPL-MCNC: 1.18 MG/DL (ref 0.57–1)
D-LACTATE SERPL-SCNC: 1.7 MMOL/L (ref 0.5–2)
DEPRECATED RDW RBC AUTO: 44.6 FL (ref 37–54)
EOSINOPHIL # BLD AUTO: 0.1 10*3/MM3 (ref 0–0.4)
EOSINOPHIL NFR BLD AUTO: 1.8 % (ref 0.3–6.2)
ERYTHROCYTE [DISTWIDTH] IN BLOOD BY AUTOMATED COUNT: 14 % (ref 12.3–15.4)
GFR SERPL CREATININE-BSD FRML MDRD: 48 ML/MIN/1.73
GLOBULIN UR ELPH-MCNC: 3.1 GM/DL
GLUCOSE SERPL-MCNC: 161 MG/DL (ref 65–99)
HCT VFR BLD AUTO: 40.9 % (ref 34–46.6)
HGB BLD-MCNC: 13.9 G/DL (ref 12–15.9)
HOLD SPECIMEN: NORMAL
LYMPHOCYTES # BLD AUTO: 1.3 10*3/MM3 (ref 0.7–3.1)
LYMPHOCYTES NFR BLD AUTO: 20.5 % (ref 19.6–45.3)
MCH RBC QN AUTO: 30.6 PG (ref 26.6–33)
MCHC RBC AUTO-ENTMCNC: 34.1 G/DL (ref 31.5–35.7)
MCV RBC AUTO: 89.9 FL (ref 79–97)
MONOCYTES # BLD AUTO: 0.7 10*3/MM3 (ref 0.1–0.9)
MONOCYTES NFR BLD AUTO: 10.9 % (ref 5–12)
NEUTROPHILS NFR BLD AUTO: 4.2 10*3/MM3 (ref 1.7–7)
NEUTROPHILS NFR BLD AUTO: 66.2 % (ref 42.7–76)
NRBC BLD AUTO-RTO: 0.3 /100 WBC (ref 0–0.2)
PLATELET # BLD AUTO: 151 10*3/MM3 (ref 140–450)
PMV BLD AUTO: 8.6 FL (ref 6–12)
POTASSIUM SERPL-SCNC: 3.1 MMOL/L (ref 3.5–5.2)
PROT SERPL-MCNC: 7.1 G/DL (ref 6–8.5)
RBC # BLD AUTO: 4.55 10*6/MM3 (ref 3.77–5.28)
SARS-COV-2 RNA PNL SPEC NAA+PROBE: NOT DETECTED
SODIUM SERPL-SCNC: 134 MMOL/L (ref 136–145)
WBC # BLD AUTO: 6.3 10*3/MM3 (ref 3.4–10.8)

## 2021-06-06 PROCEDURE — 99283 EMERGENCY DEPT VISIT LOW MDM: CPT

## 2021-06-06 PROCEDURE — G0378 HOSPITAL OBSERVATION PER HR: HCPCS

## 2021-06-06 PROCEDURE — 83605 ASSAY OF LACTIC ACID: CPT

## 2021-06-06 PROCEDURE — 80053 COMPREHEN METABOLIC PANEL: CPT | Performed by: NURSE PRACTITIONER

## 2021-06-06 PROCEDURE — 85025 COMPLETE CBC W/AUTO DIFF WBC: CPT | Performed by: NURSE PRACTITIONER

## 2021-06-06 PROCEDURE — 25010000002 CEFEPIME PER 500 MG: Performed by: NURSE PRACTITIONER

## 2021-06-06 PROCEDURE — 25010000002 VANCOMYCIN 10 G RECONSTITUTED SOLUTION: Performed by: NURSE PRACTITIONER

## 2021-06-06 PROCEDURE — 87040 BLOOD CULTURE FOR BACTERIA: CPT | Performed by: NURSE PRACTITIONER

## 2021-06-06 PROCEDURE — 36415 COLL VENOUS BLD VENIPUNCTURE: CPT

## 2021-06-06 PROCEDURE — 25010000002 VANCOMYCIN PER 500 MG: Performed by: NURSE PRACTITIONER

## 2021-06-06 PROCEDURE — U0003 INFECTIOUS AGENT DETECTION BY NUCLEIC ACID (DNA OR RNA); SEVERE ACUTE RESPIRATORY SYNDROME CORONAVIRUS 2 (SARS-COV-2) (CORONAVIRUS DISEASE [COVID-19]), AMPLIFIED PROBE TECHNIQUE, MAKING USE OF HIGH THROUGHPUT TECHNOLOGIES AS DESCRIBED BY CMS-2020-01-R: HCPCS | Performed by: EMERGENCY MEDICINE

## 2021-06-06 RX ORDER — FUROSEMIDE 20 MG/1
20 TABLET ORAL DAILY PRN
Status: DISCONTINUED | OUTPATIENT
Start: 2021-06-06 | End: 2021-06-07

## 2021-06-06 RX ORDER — KETOROLAC TROMETHAMINE 30 MG/ML
30 INJECTION, SOLUTION INTRAMUSCULAR; INTRAVENOUS EVERY 6 HOURS PRN
Status: DISPENSED | OUTPATIENT
Start: 2021-06-06 | End: 2021-06-07

## 2021-06-06 RX ORDER — ACETAMINOPHEN 325 MG/1
650 TABLET ORAL EVERY 4 HOURS PRN
Status: DISCONTINUED | OUTPATIENT
Start: 2021-06-06 | End: 2021-06-09 | Stop reason: HOSPADM

## 2021-06-06 RX ORDER — SODIUM CHLORIDE 0.9 % (FLUSH) 0.9 %
10 SYRINGE (ML) INJECTION EVERY 12 HOURS SCHEDULED
Status: DISCONTINUED | OUTPATIENT
Start: 2021-06-06 | End: 2021-06-09 | Stop reason: HOSPADM

## 2021-06-06 RX ORDER — PRASUGREL 10 MG/1
10 TABLET, FILM COATED ORAL DAILY
Status: DISCONTINUED | OUTPATIENT
Start: 2021-06-07 | End: 2021-06-09 | Stop reason: HOSPADM

## 2021-06-06 RX ORDER — ONDANSETRON 2 MG/ML
4 INJECTION INTRAMUSCULAR; INTRAVENOUS EVERY 6 HOURS PRN
Status: DISCONTINUED | OUTPATIENT
Start: 2021-06-06 | End: 2021-06-09 | Stop reason: HOSPADM

## 2021-06-06 RX ORDER — FUROSEMIDE 20 MG/1
20 TABLET ORAL DAILY PRN
COMMUNITY
End: 2021-06-23

## 2021-06-06 RX ORDER — ESCITALOPRAM OXALATE 10 MG/1
10 TABLET ORAL DAILY
Status: DISCONTINUED | OUTPATIENT
Start: 2021-06-07 | End: 2021-06-06

## 2021-06-06 RX ORDER — ESCITALOPRAM OXALATE 10 MG/1
10 TABLET ORAL DAILY
Status: DISCONTINUED | OUTPATIENT
Start: 2021-06-06 | End: 2021-06-09 | Stop reason: HOSPADM

## 2021-06-06 RX ORDER — METOPROLOL SUCCINATE 25 MG/1
25 TABLET, EXTENDED RELEASE ORAL DAILY
Status: DISCONTINUED | OUTPATIENT
Start: 2021-06-07 | End: 2021-06-09 | Stop reason: HOSPADM

## 2021-06-06 RX ORDER — BUPROPION HYDROCHLORIDE 150 MG/1
150 TABLET ORAL DAILY
Status: DISCONTINUED | OUTPATIENT
Start: 2021-06-07 | End: 2021-06-09 | Stop reason: HOSPADM

## 2021-06-06 RX ORDER — ALPRAZOLAM 0.25 MG/1
0.25 TABLET ORAL EVERY 6 HOURS PRN
Status: DISCONTINUED | OUTPATIENT
Start: 2021-06-06 | End: 2021-06-09 | Stop reason: HOSPADM

## 2021-06-06 RX ORDER — ATORVASTATIN CALCIUM 20 MG/1
20 TABLET, FILM COATED ORAL NIGHTLY
Status: DISCONTINUED | OUTPATIENT
Start: 2021-06-06 | End: 2021-06-09 | Stop reason: HOSPADM

## 2021-06-06 RX ORDER — LISINOPRIL 5 MG/1
5 TABLET ORAL
Status: DISCONTINUED | OUTPATIENT
Start: 2021-06-07 | End: 2021-06-06

## 2021-06-06 RX ORDER — SODIUM CHLORIDE 0.9 % (FLUSH) 0.9 %
10 SYRINGE (ML) INJECTION AS NEEDED
Status: DISCONTINUED | OUTPATIENT
Start: 2021-06-06 | End: 2021-06-09 | Stop reason: HOSPADM

## 2021-06-06 RX ORDER — CHOLECALCIFEROL (VITAMIN D3) 125 MCG
5 CAPSULE ORAL NIGHTLY PRN
Status: DISCONTINUED | OUTPATIENT
Start: 2021-06-06 | End: 2021-06-09 | Stop reason: HOSPADM

## 2021-06-06 RX ORDER — NITROGLYCERIN 0.4 MG/1
0.4 TABLET SUBLINGUAL
COMMUNITY
End: 2022-09-23 | Stop reason: SDUPTHER

## 2021-06-06 RX ORDER — HYDROCODONE BITARTRATE AND ACETAMINOPHEN 7.5; 325 MG/1; MG/1
1 TABLET ORAL EVERY 6 HOURS PRN
Status: DISCONTINUED | OUTPATIENT
Start: 2021-06-06 | End: 2021-06-09 | Stop reason: HOSPADM

## 2021-06-06 RX ORDER — ASPIRIN 81 MG/1
81 TABLET, CHEWABLE ORAL DAILY
Status: DISCONTINUED | OUTPATIENT
Start: 2021-06-07 | End: 2021-06-09 | Stop reason: HOSPADM

## 2021-06-06 RX ORDER — LISINOPRIL 5 MG/1
5 TABLET ORAL
Status: DISCONTINUED | OUTPATIENT
Start: 2021-06-06 | End: 2021-06-09 | Stop reason: HOSPADM

## 2021-06-06 RX ORDER — CETIRIZINE HYDROCHLORIDE 10 MG/1
10 TABLET ORAL NIGHTLY
COMMUNITY

## 2021-06-06 RX ADMIN — Medication 10 ML: at 19:20

## 2021-06-06 RX ADMIN — ATORVASTATIN CALCIUM 20 MG: 20 TABLET, FILM COATED ORAL at 20:10

## 2021-06-06 RX ADMIN — LISINOPRIL 5 MG: 5 TABLET ORAL at 20:10

## 2021-06-06 RX ADMIN — CEFEPIME HYDROCHLORIDE 1 G: 1 INJECTION, POWDER, FOR SOLUTION INTRAMUSCULAR; INTRAVENOUS at 19:20

## 2021-06-06 RX ADMIN — ESCITALOPRAM OXALATE 10 MG: 10 TABLET ORAL at 20:10

## 2021-06-06 RX ADMIN — VANCOMYCIN HYDROCHLORIDE 1500 MG: 10 INJECTION, POWDER, LYOPHILIZED, FOR SOLUTION INTRAVENOUS at 20:09

## 2021-06-06 NOTE — PLAN OF CARE
Goal Outcome Evaluation:  Plan of Care Reviewed With: patient     Outcome Summary: admitting patient

## 2021-06-06 NOTE — ED PROVIDER NOTES
Arlet   Is a 50-year-old female that states she has a history of MRSA on Friday 2 days ago she noticed that she had some pain in her left thumb she states that since that time she has had a fever of 102 -103 pain has developed an there is a blister on her left thumb over the base and there is erythema extending over the volar aspect up to the AC space.  She rates her pain a 6/10.  She states that she began having the pain a few days ago and she went to see her primary care provider and she described the pain at that time as tingling but at that time she had no redness or blister on the thumb and was given a prescription for prednisone and valacyclovir but she states she has not picked them up.          Review of Systems   Constitutional: Negative for chills, fatigue and fever.   HENT: Negative for congestion, tinnitus and trouble swallowing.    Eyes: Negative for photophobia, discharge and redness.   Respiratory: Negative for cough and shortness of breath.    Cardiovascular: Negative for chest pain and palpitations.   Gastrointestinal: Negative for abdominal pain, diarrhea, nausea and vomiting.   Genitourinary: Negative for dysuria, frequency and urgency.   Musculoskeletal: Negative for back pain, joint swelling and myalgias.        Left hand and left arm redness and swelling   Skin: Positive for rash.   Neurological: Negative for dizziness and headaches.   Psychiatric/Behavioral: Negative for confusion.   All other systems reviewed and are negative.      Past Medical History:   Diagnosis Date   • Absence of left thumb     Impression: hx of MRSA, she is signficantly improved She will continue meds and followup if sxs have not resolved over the next 2 weeks.. She is released from care and will notify us of any problems   • Acute otitis media    • Allergic rhinitis    • Arthritis     neck   • Cancer (CMS/Former[facility])     skin   • Cervical disc disease with myelopathy    • Contact dermatitis or eczema    • Coronary artery  disease    • Disorder of bone and cartilage    • Diverticulitis of colon     Impression: CT confirms in the sigmoid colon 10/2011   • Diverticulosis of colon     Impression: No sxs 02/13/2013   • Elevated cholesterol    • Elevated lipoprotein(a) 4/30/2020   • Elevated temperature    • External otitis of left ear    • Hearing loss due to cerumen impaction, left    • Hemangioma of liver    • History of echocardiogram 10/03/2019    Severely reduced LV systolic function. EF 34% Distribution indicative of LAD territory injury. Akinesis of the apex as well as apical lateral and mid to apical septal walls. Preservation of posterior inferior lateral walls. Mild AI, mild TR. Normal RV function.    • History of transesophageal echocardiography (NAFISA) 01/07/2020    EF 55% LA cavity is mild to moderately dilated. Mild MR. Interatrial septum appears redundant. Interatrial hypermobile c/w aneurysm. Large PFO is noted with right to left shunting on bubble study. PFO tunnel appears to be short.    • Hordeolum externum of left lower eyelid     Impression: She was started on Bactrim DS for her infection. She was also advised to use warm compression. She will followup should sxs not improve over the next 48 hrs and resolve over the next 1 weeek.   • Hyperlipidemia     Impression: Diet controled. She should see improvement with her successful wt loss. We discussed her lipid panel.   • Hypertension, benign     Impression: new onset Low salt low calorie diet and regular exercise and followup in 1 mo She will monitor her pressures and notify us of her pressures over the next 1 week.   • Inclusion cyst of right breast     Impression: We will follow for now. She is encouraged to have Mammography. She is presently without insurance and reluctant. She bobby consider. She will notify us of any change at all in the lesion for the only way to be certain of it's etilogy is to remove it. She understands.   • Insomnia, organic    • Menopausal syndrome     • Overweight (BMI 25.0-29.9)    • RUQ abdominal pain     Impression: Resolving, negative GB u/s, HIDA EF 77%, we will follow   • Stented coronary artery 09/29/2019   • Tobacco use     Impression: She is strongly encouraged to stop smoking. Cessation techniques discussed and encouraged. The consequences of not stopping discussed, ie, CAD, PVD, Stroke, Lung and other cancers.       Allergies   Allergen Reactions   • Penicillins Rash   • Ciprofloxacin Rash   • Penicillin G Rash       Past Surgical History:   Procedure Laterality Date   • BIVENTRICULAR ASSIST DEVICE/LEFT VENTRICULAR ASSIST DEVICE INSERTION N/A 9/29/2019    Procedure: Left Ventricular Assist Device;  Surgeon: Brant Martinez MD;  Location: [facility] CATH INVASIVE LOCATION;  Service: Cardiovascular   • CARDIAC CATHETERIZATION N/A 9/29/2019    Procedure: Left Heart Cath;  Surgeon: Brant Martinez MD;  Location: [facility] CATH INVASIVE LOCATION;  Service: Cardiovascular   • CARDIAC CATHETERIZATION N/A 9/29/2019    Procedure: Coronary angiography;  Surgeon: Brant Martinez MD;  Location: [facility] CATH INVASIVE LOCATION;  Service: Cardiovascular   • CARDIAC CATHETERIZATION N/A 9/29/2019    Procedure: Left ventriculography;  Surgeon: Brant Martinez MD;  Location: [facility] CATH INVASIVE LOCATION;  Service: Cardiovascular   • CARDIAC CATHETERIZATION N/A 9/29/2019    Procedure: Stent SERENITY coronary;  Surgeon: Brant Martinez MD;  Location: [facility] CATH INVASIVE LOCATION;  Service: Cardiovascular   • CARDIAC CATHETERIZATION N/A 2/25/2020    Procedure: Right Heart Cath;  Surgeon: Brant Martinez MD;  Location: [facility] CATH INVASIVE LOCATION;  Service: Cardiology;  Laterality: N/A;   • CARDIAC CATHETERIZATION N/A 2/12/2021    Procedure: Left Heart Cath;  Surgeon: Brant Martinez MD;  Location: [facility] CATH INVASIVE LOCATION;  Service: Cardiology;  Laterality: N/A;   • CARDIAC CATHETERIZATION N/A 2/12/2021     Procedure: Coronary angiography;  Surgeon: Brant Martinez MD;  Location: [facility] CATH INVASIVE LOCATION;  Service: Cardiology;  Laterality: N/A;   • CARDIAC CATHETERIZATION N/A 2/12/2021    Procedure: Left ventriculography;  Surgeon: Brant Martinez MD;  Location: [facility] CATH INVASIVE LOCATION;  Service: Cardiology;  Laterality: N/A;   • CARDIAC CATHETERIZATION N/A 2/12/2021    Procedure: Aortic root aortogram;  Surgeon: Brant Martinez MD;  Location: [facility] CATH INVASIVE LOCATION;  Service: Cardiology;  Laterality: N/A;   • CARDIAC CATHETERIZATION N/A 2/12/2021    Procedure: Percutaneous Coronary Intervention;  Surgeon: Brant Martinez MD;  Location: [facility] CATH INVASIVE LOCATION;  Service: Cardiology;  Laterality: N/A;   • CARDIAC CATHETERIZATION N/A 2/12/2021    Procedure: Stent SERENITY coronary;  Surgeon: Brant Martinez MD;  Location: [facility] CATH INVASIVE LOCATION;  Service: Cardiology;  Laterality: N/A;   • CARDIAC ELECTROPHYSIOLOGY PROCEDURE  9/29/2019    Procedure: Impella Insertion;  Surgeon: Brant Martinez MD;  Location: [facility] CATH INVASIVE LOCATION;  Service: Cardiovascular   • CORONARY ANGIOPLASTY  02/12/2021    1x stent to Circ   • CORONARY ANGIOPLASTY WITH STENT PLACEMENT  02/2021   • NC RT/LT HEART CATHETERS N/A 9/29/2019    Procedure: Percutaneous Coronary Intervention;  Surgeon: Brant Martinez MD;  Location: [facility] CATH INVASIVE LOCATION;  Service: Cardiovascular   • TOTAL ABDOMINAL HYSTERECTOMY WITH SALPINGO OOPHORECTOMY  1995    Endometriosis       Family History   Problem Relation Age of Onset   • Alzheimer's disease Mother    • Heart disease Mother         cardiovascular   • Kidney disease Mother    • Throat cancer Mother    • Thyroid disease Mother    • Diabetes Father         mellitus   • Heart attack Father    • Thyroid disease Father    • Heart disease Sister         cardiovascular   • Diabetes Maternal Aunt         mellitus   •  Heart disease Maternal Aunt         cardiovascular   • Breast cancer Maternal Aunt    • Cancer Maternal Aunt         breast   • Diabetes Maternal Uncle         mellitus   • Alzheimer's disease Maternal Uncle    • Diabetes Maternal Grandmother         mellitus   • Pancreatic cancer Maternal Grandmother    • Kidney disease Paternal Uncle        Social History     Socioeconomic History   • Marital status:      Spouse name: Not on file   • Number of children: Not on file   • Years of education: Not on file   • Highest education level: Not on file   Tobacco Use   • Smoking status: Former Smoker     Packs/day: 1.00     Years: 34.00     Pack years: 34.00     Types: Cigarettes     Start date:      Quit date: 2019     Years since quittin.6   • Smokeless tobacco: Never Used   • Tobacco comment: States she is quitting as of 2019.   Substance and Sexual Activity   • Alcohol use: Yes     Comment: occassionally    • Drug use: Not Currently     Comment: occasional   • Sexual activity: Defer           Objective   Physical Exam  Vitals reviewed.   Constitutional:       Appearance: She is well-developed.   HENT:      Head: Normocephalic and atraumatic.      Right Ear: External ear normal.      Left Ear: External ear normal.      Nose: Nose normal.      Mouth/Throat:      Mouth: Mucous membranes are moist.      Pharynx: Oropharynx is clear.   Eyes:      Conjunctiva/sclera: Conjunctivae normal.      Pupils: Pupils are equal, round, and reactive to light.   Cardiovascular:      Rate and Rhythm: Normal rate and regular rhythm.      Pulses: Normal pulses.      Heart sounds: Normal heart sounds.   Pulmonary:      Effort: Pulmonary effort is normal. No respiratory distress.      Breath sounds: Normal breath sounds. No wheezing.   Abdominal:      General: Bowel sounds are normal. There is no distension.      Palpations: Abdomen is soft. There is no mass.      Tenderness: There is no abdominal tenderness. There is no  "guarding or rebound.   Musculoskeletal:         General: No deformity. Normal range of motion.        Arms:       Cervical back: Normal range of motion and neck supple.   Skin:     General: Skin is warm and dry.      Capillary Refill: Capillary refill takes less than 2 seconds.   Neurological:      Mental Status: She is alert and oriented to person, place, and time.      GCS: GCS eye subscore is 4. GCS verbal subscore is 5. GCS motor subscore is 6.      Cranial Nerves: No cranial nerve deficit.      Sensory: No sensory deficit.      Deep Tendon Reflexes: Reflexes normal.         Procedures           ED Course      /87 (BP Location: Right arm, Patient Position: Sitting)   Pulse 81   Temp 97.5 °F (36.4 °C) (Oral)   Resp 18   Ht 165.1 cm (65\")   Wt 92.8 kg (204 lb 11.2 oz)   LMP 01/01/1996 (Approximate)   SpO2 99%   Breastfeeding No   BMI 34.06 kg/m²   Labs Reviewed   COMPREHENSIVE METABOLIC PANEL - Abnormal; Notable for the following components:       Result Value    Glucose 161 (*)     BUN 21 (*)     Creatinine 1.18 (*)     Sodium 134 (*)     Potassium 3.1 (*)     ALT (SGPT) 35 (*)     eGFR Non  Amer 48 (*)     All other components within normal limits    Narrative:     GFR Normal >60  Chronic Kidney Disease <60  Kidney Failure <15     CBC WITH AUTO DIFFERENTIAL - Abnormal; Notable for the following components:    nRBC 0.3 (*)     All other components within normal limits   POC LACTATE - Normal   POC LACTATE - Normal   BLOOD CULTURE   BLOOD CULTURE   CBC AND DIFFERENTIAL    Narrative:     The following orders were created for panel order CBC & Differential.  Procedure                               Abnormality         Status                     ---------                               -----------         ------                     CBC Auto Differential[[id number]]        Abnormal            Final result                 Please view results for these tests on the individual orders.   EXTRA TUBES    " Narrative:     The following orders were created for panel order Extra Tubes.  Procedure                               Abnormality         Status                     ---------                               -----------         ------                     Green Top (Gel)[[id number]]                                  In process                   Please view results for these tests on the individual orders.   GREEN TOP     Medications   sodium chloride 0.9 % flush 10 mL (has no administration in time range)   vancomycin 1500 mg/500 mL 0.9% NS IVPB ([facility]) (has no administration in time range)   cefepime 1 gm IVPB in 100 mL NS ([facility]BP) (has no administration in time range)   cefepime 1 gm IVPB in 100 mL NS (MBP) (has no administration in time range)   Pharmacy to dose vancomycin (has no administration in time range)   vancomycin 1500 mg/500 mL 0.9% NS IVPB ([facility]) (has no administration in time range)     No radiology results for the last day                                       MDM  Number of Diagnoses or Management Options  Fever and chills  Left arm cellulitis  Left arm pain  Diagnosis management comments: Patient had IV established and blood work was obtained as well as blood cultures.  A lactic acid was obtained and found to be normal at 1.7 patient will be started on IV antibiotics and will be started on cefepime and vancomycin due to penicillin allergy.  She will be admitted overnight for IV antibiotics in the ED observation unit she was agreeable to this plan of care.       Amount and/or Complexity of Data Reviewed  Clinical lab tests: reviewed    Risk of Complications, Morbidity, and/or Mortality  Presenting problems: minimal  Diagnostic procedures: minimal  Management options: low    Patient Progress  Patient progress: improved      Final diagnoses:   Left arm cellulitis   Fever and chills   Left arm pain       ED Disposition  ED Disposition     ED Disposition Condition Comment    Decision to Admit            No  follow-up provider specified.       Medication List      ASK your doctor about these medications    furosemide 20 MG tablet  Commonly known as: LASIX  Ask about: Which instructions should I use?     nitroglycerin 0.4 MG SL tablet  Commonly known as: NITROSTAT  Ask about: Which instructions should I use?             Marianela Manzano, APRN  06/06/21 [id number]

## 2021-06-06 NOTE — PROGRESS NOTES
"Pharmacy Antimicrobial Dosing Service    Subjective:  Sada Le is a 50 y.o.female admitted with LUE cellulitis. Pharmacy has been consulted to dose Vancomycin for possible SSTI.    PMH: T2DM      Assessment/Plan    1. Day #1 Vancomycin: Goal trough 10-20 mcg/mL for uncomplicated SSTI. Vancomycin 1500 mg (20 mg/kg-DBW) loading dose ordered x 1 in ED; 1500 mg q24h maintenance regimen ordered. No labs ordered at this time due to anticipated short duration of therapy.    2. Day #1 Cefepime: 1 g IV q8h for estCrCl > 60 mL/min.    Will continue to monitor drug levels, renal function, culture and sensitivities, and patient clinical status.       Objective:  Relevant clinical data and objective history reviewed:  165.1 cm (65\")   92.8 kg (204 lb 11.2 oz)   Ideal body weight: 57 kg (125 lb 10.6 oz)  Adjusted ideal body weight: 71.3 kg (157 lb 4.4 oz)  Body mass index is 34.06 kg/m².        Results from last 7 days   Lab Units 06/06/21  1707   CREATININE mg/dL 1.18*     Estimated Creatinine Clearance: 63.6 mL/min (A) (by C-G formula based on SCr of 1.18 mg/dL (H)).  No intake/output data recorded.    Results from last 7 days   Lab Units 06/06/21  1707   WBC 10*3/mm3 6.30     Temperature    06/06/21 1627   Temp: 97.5 °F (36.4 °C)     Baseline culture/source/susceptibility:  Microbiology Results (last 10 days)       ** No results found for the last 240 hours. **              Anti-Infectives (From admission, onward)      Ordered     Dose/Rate Route Frequency Start Stop    06/06/21 1749  vancomycin 1500 mg/500 mL 0.9% NS IVPB ([facility])     Ordering Provider: Marianela Manzano APRN    15 mg/kg × 92.9 kg Intravenous Every 24 Hours 06/07/21 1800 06/08/21 1759 06/06/21 1740  cefepime 1 gm IVPB in 100 mL NS ([facility])     Ordering Provider: Marianela Manzano APRN    1 g  over 4 Hours Intravenous Every 8 Hours 06/07/21 0200 06/08/21 0159    06/06/21 1740  Pharmacy to dose vancomycin     Ordering Provider: Marianela Manzano, " APRN     Does not apply Continuous PRN 06/06/21 1739 06/07/21 1738    06/06/21 1702  cefepime 1 gm IVPB in 100 mL NS ([facility])     Ordering Provider: Marianela Manzano APRN    1 g  over 30 Minutes Intravenous Once 06/06/21 1704      06/06/21 1657  vancomycin 1500 mg/500 mL 0.9% NS IVPB ([facility])     Ordering Provider: Marianela Manzano APRN    20 mg/kg × 71.4 kg (Adjusted) Intravenous Once 06/06/21 1659              Ever Miles, Braydon  06/06/21 17:50 EDT

## 2021-06-07 ENCOUNTER — APPOINTMENT (OUTPATIENT)
Dept: MRI IMAGING | Facility: HOSPITAL | Age: 51
End: 2021-06-07

## 2021-06-07 ENCOUNTER — APPOINTMENT (OUTPATIENT)
Dept: CARDIOLOGY | Facility: HOSPITAL | Age: 51
End: 2021-06-07

## 2021-06-07 LAB
ANION GAP SERPL CALCULATED.3IONS-SCNC: 10 MMOL/L (ref 5–15)
BASOPHILS # BLD AUTO: 0 10*3/MM3 (ref 0–0.2)
BASOPHILS NFR BLD AUTO: 0.4 % (ref 0–1.5)
BH CV UPPER VENOUS LEFT AXILLARY AUGMENT: NORMAL
BH CV UPPER VENOUS LEFT AXILLARY COMPETENT: NORMAL
BH CV UPPER VENOUS LEFT AXILLARY COMPRESS: NORMAL
BH CV UPPER VENOUS LEFT AXILLARY PHASIC: NORMAL
BH CV UPPER VENOUS LEFT AXILLARY SPONT: NORMAL
BH CV UPPER VENOUS LEFT BASILIC FOREARM COMPRESS: NORMAL
BH CV UPPER VENOUS LEFT BASILIC UPPER COMPRESS: NORMAL
BH CV UPPER VENOUS LEFT BRACHIAL COMPRESS: NORMAL
BH CV UPPER VENOUS LEFT CEPHALIC FOREARM COMPRESS: NORMAL
BH CV UPPER VENOUS LEFT CEPHALIC UPPER COMPRESS: NORMAL
BH CV UPPER VENOUS LEFT INTERNAL JUGULAR AUGMENT: NORMAL
BH CV UPPER VENOUS LEFT INTERNAL JUGULAR COMPETENT: NORMAL
BH CV UPPER VENOUS LEFT INTERNAL JUGULAR COMPRESS: NORMAL
BH CV UPPER VENOUS LEFT INTERNAL JUGULAR PHASIC: NORMAL
BH CV UPPER VENOUS LEFT INTERNAL JUGULAR SPONT: NORMAL
BH CV UPPER VENOUS LEFT RADIAL COMPRESS: NORMAL
BH CV UPPER VENOUS LEFT SUBCLAVIAN AUGMENT: NORMAL
BH CV UPPER VENOUS LEFT SUBCLAVIAN COMPETENT: NORMAL
BH CV UPPER VENOUS LEFT SUBCLAVIAN COMPRESS: NORMAL
BH CV UPPER VENOUS LEFT SUBCLAVIAN PHASIC: NORMAL
BH CV UPPER VENOUS LEFT SUBCLAVIAN SPONT: NORMAL
BH CV UPPER VENOUS LEFT ULNAR COMPRESS: NORMAL
BH CV UPPER VENOUS RIGHT INTERNAL JUGULAR AUGMENT: NORMAL
BH CV UPPER VENOUS RIGHT INTERNAL JUGULAR COMPETENT: NORMAL
BH CV UPPER VENOUS RIGHT INTERNAL JUGULAR COMPRESS: NORMAL
BH CV UPPER VENOUS RIGHT INTERNAL JUGULAR PHASIC: NORMAL
BH CV UPPER VENOUS RIGHT INTERNAL JUGULAR SPONT: NORMAL
BH CV UPPER VENOUS RIGHT SUBCLAVIAN AUGMENT: NORMAL
BH CV UPPER VENOUS RIGHT SUBCLAVIAN COMPETENT: NORMAL
BH CV UPPER VENOUS RIGHT SUBCLAVIAN COMPRESS: NORMAL
BH CV UPPER VENOUS RIGHT SUBCLAVIAN PHASIC: NORMAL
BH CV UPPER VENOUS RIGHT SUBCLAVIAN SPONT: NORMAL
BUN SERPL-MCNC: 15 MG/DL (ref 6–20)
BUN/CREAT SERPL: 17.6 (ref 7–25)
CALCIUM SPEC-SCNC: 8.3 MG/DL (ref 8.6–10.5)
CHLORIDE SERPL-SCNC: 103 MMOL/L (ref 98–107)
CO2 SERPL-SCNC: 23 MMOL/L (ref 22–29)
CREAT SERPL-MCNC: 0.85 MG/DL (ref 0.57–1)
CRP SERPL-MCNC: 9.36 MG/DL (ref 0–0.5)
DEPRECATED RDW RBC AUTO: 44.2 FL (ref 37–54)
EOSINOPHIL # BLD AUTO: 0.1 10*3/MM3 (ref 0–0.4)
EOSINOPHIL NFR BLD AUTO: 2.6 % (ref 0.3–6.2)
ERYTHROCYTE [DISTWIDTH] IN BLOOD BY AUTOMATED COUNT: 13.9 % (ref 12.3–15.4)
ERYTHROCYTE [SEDIMENTATION RATE] IN BLOOD: 30 MM/HR (ref 0–30)
GFR SERPL CREATININE-BSD FRML MDRD: 71 ML/MIN/1.73
GLUCOSE SERPL-MCNC: 110 MG/DL (ref 65–99)
HCT VFR BLD AUTO: 37.7 % (ref 34–46.6)
HGB BLD-MCNC: 12.6 G/DL (ref 12–15.9)
LYMPHOCYTES # BLD AUTO: 1.4 10*3/MM3 (ref 0.7–3.1)
LYMPHOCYTES NFR BLD AUTO: 24.7 % (ref 19.6–45.3)
MAGNESIUM SERPL-MCNC: 2 MG/DL (ref 1.6–2.6)
MAXIMAL PREDICTED HEART RATE: 169 BPM
MCH RBC QN AUTO: 30.2 PG (ref 26.6–33)
MCHC RBC AUTO-ENTMCNC: 33.4 G/DL (ref 31.5–35.7)
MCV RBC AUTO: 90.3 FL (ref 79–97)
MONOCYTES # BLD AUTO: 0.8 10*3/MM3 (ref 0.1–0.9)
MONOCYTES NFR BLD AUTO: 14 % (ref 5–12)
NEUTROPHILS NFR BLD AUTO: 3.3 10*3/MM3 (ref 1.7–7)
NEUTROPHILS NFR BLD AUTO: 58.3 % (ref 42.7–76)
NRBC BLD AUTO-RTO: 0 /100 WBC (ref 0–0.2)
PLATELET # BLD AUTO: 129 10*3/MM3 (ref 140–450)
PMV BLD AUTO: 8.8 FL (ref 6–12)
POTASSIUM SERPL-SCNC: 3.3 MMOL/L (ref 3.5–5.2)
POTASSIUM SERPL-SCNC: 4.7 MMOL/L (ref 3.5–5.2)
RBC # BLD AUTO: 4.17 10*6/MM3 (ref 3.77–5.28)
SODIUM SERPL-SCNC: 136 MMOL/L (ref 136–145)
STRESS TARGET HR: 144 BPM
WBC # BLD AUTO: 5.7 10*3/MM3 (ref 3.4–10.8)

## 2021-06-07 PROCEDURE — 80048 BASIC METABOLIC PNL TOTAL CA: CPT | Performed by: NURSE PRACTITIONER

## 2021-06-07 PROCEDURE — 25010000002 VANCOMYCIN 10 G RECONSTITUTED SOLUTION: Performed by: NURSE PRACTITIONER

## 2021-06-07 PROCEDURE — 85652 RBC SED RATE AUTOMATED: CPT | Performed by: PHYSICIAN ASSISTANT

## 2021-06-07 PROCEDURE — 86140 C-REACTIVE PROTEIN: CPT | Performed by: PHYSICIAN ASSISTANT

## 2021-06-07 PROCEDURE — 84132 ASSAY OF SERUM POTASSIUM: CPT | Performed by: HOSPITALIST

## 2021-06-07 PROCEDURE — 25010000002 VANCOMYCIN PER 500 MG: Performed by: NURSE PRACTITIONER

## 2021-06-07 PROCEDURE — 73220 MRI UPPR EXTREMITY W/O&W/DYE: CPT

## 2021-06-07 PROCEDURE — 99222 1ST HOSP IP/OBS MODERATE 55: CPT | Performed by: HOSPITALIST

## 2021-06-07 PROCEDURE — 25010000002 GADOTERIDOL PER 1 ML: Performed by: HOSPITALIST

## 2021-06-07 PROCEDURE — 85025 COMPLETE CBC W/AUTO DIFF WBC: CPT | Performed by: NURSE PRACTITIONER

## 2021-06-07 PROCEDURE — A9579 GAD-BASE MR CONTRAST NOS,1ML: HCPCS | Performed by: HOSPITALIST

## 2021-06-07 PROCEDURE — 25010000002 CEFEPIME PER 500 MG: Performed by: NURSE PRACTITIONER

## 2021-06-07 PROCEDURE — 93971 EXTREMITY STUDY: CPT

## 2021-06-07 PROCEDURE — 83735 ASSAY OF MAGNESIUM: CPT | Performed by: PHYSICIAN ASSISTANT

## 2021-06-07 RX ORDER — POTASSIUM CHLORIDE 1.5 G/1.77G
40 POWDER, FOR SOLUTION ORAL AS NEEDED
Status: DISCONTINUED | OUTPATIENT
Start: 2021-06-07 | End: 2021-06-09 | Stop reason: HOSPADM

## 2021-06-07 RX ORDER — POTASSIUM CHLORIDE 20 MEQ/1
40 TABLET, EXTENDED RELEASE ORAL AS NEEDED
Status: DISCONTINUED | OUTPATIENT
Start: 2021-06-07 | End: 2021-06-09 | Stop reason: HOSPADM

## 2021-06-07 RX ORDER — POTASSIUM CHLORIDE 7.45 MG/ML
10 INJECTION INTRAVENOUS
Status: DISCONTINUED | OUTPATIENT
Start: 2021-06-07 | End: 2021-06-09 | Stop reason: HOSPADM

## 2021-06-07 RX ORDER — CLINDAMYCIN PHOSPHATE 900 MG/50ML
900 INJECTION, SOLUTION INTRAVENOUS EVERY 8 HOURS
Status: DISCONTINUED | OUTPATIENT
Start: 2021-06-07 | End: 2021-06-09

## 2021-06-07 RX ORDER — FUROSEMIDE 20 MG/1
20 TABLET ORAL DAILY
Status: DISCONTINUED | OUTPATIENT
Start: 2021-06-07 | End: 2021-06-09 | Stop reason: HOSPADM

## 2021-06-07 RX ADMIN — Medication 10 ML: at 08:33

## 2021-06-07 RX ADMIN — CLINDAMYCIN PHOSPHATE 900 MG: 900 INJECTION, SOLUTION INTRAVENOUS at 22:47

## 2021-06-07 RX ADMIN — LISINOPRIL 5 MG: 5 TABLET ORAL at 08:32

## 2021-06-07 RX ADMIN — CEFEPIME HYDROCHLORIDE 1 G: 1 INJECTION, POWDER, FOR SOLUTION INTRAMUSCULAR; INTRAVENOUS at 02:47

## 2021-06-07 RX ADMIN — ASPIRIN 81 MG: 81 TABLET, CHEWABLE ORAL at 08:32

## 2021-06-07 RX ADMIN — ATORVASTATIN CALCIUM 20 MG: 20 TABLET, FILM COATED ORAL at 20:55

## 2021-06-07 RX ADMIN — HYDROCODONE BITARTRATE AND ACETAMINOPHEN 1 TABLET: 7.5; 325 TABLET ORAL at 12:57

## 2021-06-07 RX ADMIN — HYDROCODONE BITARTRATE AND ACETAMINOPHEN 1 TABLET: 7.5; 325 TABLET ORAL at 19:35

## 2021-06-07 RX ADMIN — POTASSIUM CHLORIDE 40 MEQ: 1500 TABLET, EXTENDED RELEASE ORAL at 10:13

## 2021-06-07 RX ADMIN — POTASSIUM CHLORIDE 40 MEQ: 1500 TABLET, EXTENDED RELEASE ORAL at 14:46

## 2021-06-07 RX ADMIN — FUROSEMIDE 20 MG: 20 TABLET ORAL at 10:13

## 2021-06-07 RX ADMIN — VANCOMYCIN HYDROCHLORIDE 1500 MG: 10 INJECTION, POWDER, LYOPHILIZED, FOR SOLUTION INTRAVENOUS at 18:34

## 2021-06-07 RX ADMIN — HYDROCODONE BITARTRATE AND ACETAMINOPHEN 1 TABLET: 7.5; 325 TABLET ORAL at 07:12

## 2021-06-07 RX ADMIN — METOPROLOL SUCCINATE 25 MG: 25 TABLET, EXTENDED RELEASE ORAL at 08:32

## 2021-06-07 RX ADMIN — BUPROPION HYDROCHLORIDE 150 MG: 150 TABLET, EXTENDED RELEASE ORAL at 08:32

## 2021-06-07 RX ADMIN — Medication 10 ML: at 20:55

## 2021-06-07 RX ADMIN — CLINDAMYCIN PHOSPHATE 900 MG: 900 INJECTION, SOLUTION INTRAVENOUS at 15:43

## 2021-06-07 RX ADMIN — PRASUGREL 10 MG: 10 TABLET, FILM COATED ORAL at 08:32

## 2021-06-07 RX ADMIN — GADOTERIDOL 20 ML: 279.3 INJECTION, SOLUTION INTRAVENOUS at 17:45

## 2021-06-07 RX ADMIN — CEFEPIME HYDROCHLORIDE 1 G: 1 INJECTION, POWDER, FOR SOLUTION INTRAMUSCULAR; INTRAVENOUS at 12:57

## 2021-06-07 NOTE — CONSULTS
Infectious Diseases Consult Note    Referring Provider: Mike Uribe MD    Reason for Consultation: Left hand infection    Patient Care Team:  Yodit Johnston MD as PCP - General (Family Medicine)  Amos Olivia MD as Consulting Physician (Cardiology)  Brant Martinez MD as Consulting Physician (Cardiology)  Chong Golden MD as Consulting Physician (Ophthalmology)    Chief complaint left hand swelling and left arm redness    Subjective     History of present illness:      This is a 50-year-old white female who was hospitalized [facility] on June 6, 2021.  The patient started noticing swelling and pain at the base of her left thumb.  She had history of MRSA infection in that location multiple times in the past and last time was 7 years ago.  The patient came to the emergency room since there was redness and pain in the left forearm.  She was started on IV vancomycin and apparently was given 1 dose and cefepime.  She was admitted to observation unit.  The patient denied having fever.  The patient is hemodynamically stable.    Review of Systems   Review of Systems   Constitutional: Negative.    HENT: Negative.    Eyes: Negative.    Respiratory: Negative.    Cardiovascular: Negative.    Gastrointestinal: Negative.    Genitourinary: Negative.    Musculoskeletal:        Left hand pain and swelling and left arm redness   Skin: Negative.    Neurological: Negative.    Hematological: Negative.    Psychiatric/Behavioral: Negative.        Medications  Medications Prior to Admission   Medication Sig Dispense Refill Last Dose   • atorvastatin (LIPITOR) 20 MG tablet Take 20 mg by mouth Every Night.   6/5/2021 at Unknown time   • buPROPion XL (WELLBUTRIN XL) 150 MG 24 hr tablet Take 150 mg by mouth Daily.   6/6/2021 at Unknown time   • cetirizine (zyrTEC) 10 MG tablet Take 10 mg by mouth Every Night.   6/5/2021 at Unknown time   • cholecalciferol (VITAMIN D3) 1.25 MG (00303 UT) capsule  Take 50,000 Units by mouth Daily.   6/6/2021 at Unknown time   • coenzyme Q10 100 MG capsule Take 200 mg by mouth 2 (Two) Times a Day.   6/6/2021 at Unknown time   • [employer] Aspirin Adult Low Dose 81 MG chewable tablet CHEW 1 TABLET BY MOUTH EVERY DAY 90 tablet 3 6/6/2021 at Unknown time   • escitalopram (LEXAPRO) 10 MG tablet Take 10 mg by mouth Daily.   6/6/2021 at Unknown time   • furosemide (LASIX) 20 MG tablet Take 20 mg by mouth Daily As Needed.      • lisinopril (PRINIVIL,ZESTRIL) 5 MG tablet Take 1 tablet by mouth 2 (two) times a day. 180 tablet 1 6/6/2021 at Unknown time   • metoprolol succinate XL (TOPROL-XL) 25 MG 24 hr tablet Take 25 mg by mouth Daily.   6/6/2021 at Unknown time   • nitroglycerin (NITROSTAT) 0.4 MG SL tablet Place 0.4 mg under the tongue Every 5 (Five) Minutes As Needed for Chest Pain. Take no more than 3 doses in 15 minutes.      • Potassium 99 MG tablet Take 1 tablet by mouth Daily.   6/6/2021 at Unknown time   • prasugrel (EFFIENT) 10 MG tablet TAKE 1 TABLET BY MOUTH EVERY DAY 30 tablet 11 6/6/2021 at Unknown time   • vitamin B-12 (CYANOCOBALAMIN) 500 MCG tablet Take 500 mcg by mouth Daily.   6/6/2021 at Unknown time   • ALPRAZolam (XANAX) 0.25 MG tablet Take 0.25 mg by mouth Every 6 (Six) Hours As Needed for Anxiety.      • predniSONE (DELTASONE) 20 MG tablet Take 1 tablet by mouth Daily for 13 days. TID x 3 days, BID x 3 days, QD x 3 days, 1/2 tab daily x 4 days 20 tablet 0    • valACYclovir (VALTREX) 1000 MG tablet Take 1 tablet by mouth Every 8 (Eight) Hours. 21 tablet 0        History  Past Medical History:   Diagnosis Date   • Absence of left thumb     Impression: hx of MRSA, she is signficantly improved She will continue meds and followup if sxs have not resolved over the next 2 weeks.. She is released from care and will notify us of any problems   • Acute otitis media    • Allergic rhinitis    • Arthritis     neck   • Cancer (CMS/[facility])     skin   • Cervical disc disease with  myelopathy    • Contact dermatitis or eczema    • Coronary artery disease    • Disorder of bone and cartilage    • Diverticulitis of colon     Impression: CT confirms in the sigmoid colon 10/2011   • Diverticulosis of colon     Impression: No sxs 02/13/2013   • Elevated cholesterol    • Elevated lipoprotein(a) 4/30/2020   • Elevated temperature    • External otitis of left ear    • Hearing loss due to cerumen impaction, left    • Hemangioma of liver    • History of echocardiogram 10/03/2019    Severely reduced LV systolic function. EF 34% Distribution indicative of LAD territory injury. Akinesis of the apex as well as apical lateral and mid to apical septal walls. Preservation of posterior inferior lateral walls. Mild AI, mild TR. Normal RV function.    • History of transesophageal echocardiography (NAFISA) 01/07/2020    EF 55% LA cavity is mild to moderately dilated. Mild MR. Interatrial septum appears redundant. Interatrial hypermobile c/w aneurysm. Large PFO is noted with right to left shunting on bubble study. PFO tunnel appears to be short.    • Hordeolum externum of left lower eyelid     Impression: She was started on Bactrim DS for her infection. She was also advised to use warm compression. She will followup should sxs not improve over the next 48 hrs and resolve over the next 1 weeek.   • Hyperlipidemia     Impression: Diet controled. She should see improvement with her successful wt loss. We discussed her lipid panel.   • Hypertension, benign     Impression: new onset Low salt low calorie diet and regular exercise and followup in 1 mo She will monitor her pressures and notify us of her pressures over the next 1 week.   • Inclusion cyst of right breast     Impression: We will follow for now. She is encouraged to have Mammography. She is presently without insurance and reluctant. She bobby consider. She will notify us of any change at all in the lesion for the only way to be certain of it's etilogy is to remove  it. She understands.   • Insomnia, organic    • Menopausal syndrome    • Overweight (BMI 25.0-29.9)    • RUQ abdominal pain     Impression: Resolving, negative GB u/s, HIDA EF 77%, we will follow   • Stented coronary artery 09/29/2019   • Tobacco use     Impression: She is strongly encouraged to stop smoking. Cessation techniques discussed and encouraged. The consequences of not stopping discussed, ie, CAD, PVD, Stroke, Lung and other cancers.     Past Surgical History:   Procedure Laterality Date   • BIVENTRICULAR ASSIST DEVICE/LEFT VENTRICULAR ASSIST DEVICE INSERTION N/A 9/29/2019    Procedure: Left Ventricular Assist Device;  Surgeon: Brant Martinez MD;  Location: [facility] CATH INVASIVE LOCATION;  Service: Cardiovascular   • CARDIAC CATHETERIZATION N/A 9/29/2019    Procedure: Left Heart Cath;  Surgeon: Brant Martinez MD;  Location: [facility] CATH INVASIVE LOCATION;  Service: Cardiovascular   • CARDIAC CATHETERIZATION N/A 9/29/2019    Procedure: Coronary angiography;  Surgeon: Brant Martinez MD;  Location: [facility] CATH INVASIVE LOCATION;  Service: Cardiovascular   • CARDIAC CATHETERIZATION N/A 9/29/2019    Procedure: Left ventriculography;  Surgeon: Brant Martinez MD;  Location: [facility] CATH INVASIVE LOCATION;  Service: Cardiovascular   • CARDIAC CATHETERIZATION N/A 9/29/2019    Procedure: Stent SERENITY coronary;  Surgeon: Brant Martinez MD;  Location: [facility] CATH INVASIVE LOCATION;  Service: Cardiovascular   • CARDIAC CATHETERIZATION N/A 2/25/2020    Procedure: Right Heart Cath;  Surgeon: Brant Martinez MD;  Location: [facility] CATH INVASIVE LOCATION;  Service: Cardiology;  Laterality: N/A;   • CARDIAC CATHETERIZATION N/A 2/12/2021    Procedure: Left Heart Cath;  Surgeon: Brant Martinez MD;  Location: [facility] CATH INVASIVE LOCATION;  Service: Cardiology;  Laterality: N/A;   • CARDIAC CATHETERIZATION N/A 2/12/2021    Procedure: Coronary angiography;  Surgeon:  Brant Martinez MD;  Location: [facility] CATH INVASIVE LOCATION;  Service: Cardiology;  Laterality: N/A;   • CARDIAC CATHETERIZATION N/A 2/12/2021    Procedure: Left ventriculography;  Surgeon: Brant Martinez MD;  Location: [facility] CATH INVASIVE LOCATION;  Service: Cardiology;  Laterality: N/A;   • CARDIAC CATHETERIZATION N/A 2/12/2021    Procedure: Aortic root aortogram;  Surgeon: Brant Martinez MD;  Location: [facility] CATH INVASIVE LOCATION;  Service: Cardiology;  Laterality: N/A;   • CARDIAC CATHETERIZATION N/A 2/12/2021    Procedure: Percutaneous Coronary Intervention;  Surgeon: Brant Martinez MD;  Location: [facility] CATH INVASIVE LOCATION;  Service: Cardiology;  Laterality: N/A;   • CARDIAC CATHETERIZATION N/A 2/12/2021    Procedure: Stent SERENITY coronary;  Surgeon: Brant Martinez MD;  Location: [facility] CATH INVASIVE LOCATION;  Service: Cardiology;  Laterality: N/A;   • CARDIAC ELECTROPHYSIOLOGY PROCEDURE  9/29/2019    Procedure: Impella Insertion;  Surgeon: Brant Martinez MD;  Location: [facility] CATH INVASIVE LOCATION;  Service: Cardiovascular   • CORONARY ANGIOPLASTY  02/12/2021    1x stent to Circ   • CORONARY ANGIOPLASTY WITH STENT PLACEMENT  02/2021   • NC RT/LT HEART CATHETERS N/A 9/29/2019    Procedure: Percutaneous Coronary Intervention;  Surgeon: Brant Martinez MD;  Location: [facility] CATH INVASIVE LOCATION;  Service: Cardiovascular   • TOTAL ABDOMINAL HYSTERECTOMY WITH SALPINGO OOPHORECTOMY  1995    Endometriosis       Family History  Family History   Problem Relation Age of Onset   • Alzheimer's disease Mother    • Heart disease Mother         cardiovascular   • Kidney disease Mother    • Throat cancer Mother    • Thyroid disease Mother    • Diabetes Father         mellitus   • Heart attack Father    • Thyroid disease Father    • Heart disease Sister         cardiovascular   • Diabetes Maternal Aunt         mellitus   • Heart disease Maternal Aunt          cardiovascular   • Breast cancer Maternal Aunt    • Cancer Maternal Aunt         breast   • Diabetes Maternal Uncle         mellitus   • Alzheimer's disease Maternal Uncle    • Diabetes Maternal Grandmother         mellitus   • Pancreatic cancer Maternal Grandmother    • Kidney disease Paternal Uncle        Social History   reports that she quit smoking about 20 months ago. Her smoking use included cigarettes. She started smoking about 36 years ago. She has a 34.00 pack-year smoking history. She has never used smokeless tobacco. She reports current alcohol use. She reports previous drug use.    Allergies  Penicillins, Ciprofloxacin, and Penicillin g    Objective     Vital Signs   Vital Signs (last 24 hours)       06/06 0700  -  06/07 0659 06/07 0700  -  06/07 1318   Most Recent    Temp (°F) 97.5 -  98.4      98.1     98.1 (36.7)    Heart Rate 61 -  81      59     59    Resp 16 -  18      14     14    /69 -  138/87      98/62     98/62    SpO2 (%) 97 -  99      14     14          Physical Exam:  Physical Exam  Vitals and nursing note reviewed.   Constitutional:       Appearance: She is well-developed.   HENT:      Head: Normocephalic and atraumatic.   Eyes:      Pupils: Pupils are equal, round, and reactive to light.   Cardiovascular:      Rate and Rhythm: Normal rate and regular rhythm.      Heart sounds: Normal heart sounds.   Pulmonary:      Effort: Pulmonary effort is normal. No respiratory distress.      Breath sounds: Normal breath sounds. No wheezing or rales.   Abdominal:      General: Bowel sounds are normal. There is no distension.      Palpations: Abdomen is soft. There is no mass.      Tenderness: There is no abdominal tenderness. There is no guarding or rebound.   Musculoskeletal:         General: No deformity. Normal range of motion.      Cervical back: Normal range of motion and neck supple.      Comments: Induration at the base of the left thumb with maybe some fluctuance.  There is  streak of erythema all the way to the elbow and the area was slightly warm to touch.   Skin:     General: Skin is warm.      Findings: No erythema or rash.   Neurological:      Mental Status: She is alert and oriented to person, place, and time.      Cranial Nerves: No cranial nerve deficit.         Microbiology  Microbiology Results (last 10 days)     Procedure Component Value - Date/Time    COVID PRE-OP / PRE-PROCEDURE SCREENING ORDER (NO ISOLATION) - Swab, Nasopharynx [[id number]]  (Normal) Collected: 06/06/21 2050    Lab Status: Final result Specimen: Swab from Nasopharynx Updated: 06/06/21 2214    Narrative:      The following orders were created for panel order COVID PRE-OP / PRE-PROCEDURE SCREENING ORDER (NO ISOLATION) - Swab, Nasopharynx.  Procedure                               Abnormality         Status                     ---------                               -----------         ------                     COVID-19,CEPHEID,COR/NORIS...[[id number]]  Normal              Final result                 Please view results for these tests on the individual orders.    COVID-19,CEPHEID,COR/NORIS/PAD/[facility] IN-HOUSE(OR EMERGENT/ADD-ON),NP SWAB IN TRANSPORT MEDIA 3-4 HR TAT, RT-PCR - Swab, Nasopharynx [[id number]]  (Normal) Collected: 06/06/21 2050    Lab Status: Final result Specimen: Swab from Nasopharynx Updated: 06/06/21 2214     COVID19 Not Detected    Narrative:      Fact sheet for providers: https://www.fda.gov/media/015934/download     Fact sheet for patients: https://www.fda.gov/media/368645/download  Fact sheet for providers: https://www.fda.gov/media/128473/download     Fact sheet for patients: https://www.fda.gov/media/301826/download          Laboratory  Results from last 7 days   Lab Units 06/07/21  0428   WBC 10*3/mm3 5.70   HEMOGLOBIN g/dL 12.6   HEMATOCRIT % 37.7   PLATELETS 10*3/mm3 129*     Results from last 7 days   Lab Units 06/07/21  0428   SODIUM mmol/L 136   POTASSIUM mmol/L 3.3*   CHLORIDE mmol/L 103    CO2 mmol/L 23.0   BUN mg/dL 15   CREATININE mg/dL 0.85   GLUCOSE mg/dL 110*   CALCIUM mg/dL 8.3*     Results from last 7 days   Lab Units 06/07/21  0428   SODIUM mmol/L 136   POTASSIUM mmol/L 3.3*   CHLORIDE mmol/L 103   CO2 mmol/L 23.0   BUN mg/dL 15   CREATININE mg/dL 0.85   GLUCOSE mg/dL 110*   CALCIUM mg/dL 8.3*         Results from last 7 days   Lab Units 06/07/21  0428   SED RATE mm/hr 30           Radiology  Imaging Results (Last 72 Hours)     ** No results found for the last 72 hours. **          Cardiology      Results Review:  I have reviewed all clinical data, test, lab, and imaging results.       Schedule Meds  aspirin, 81 mg, Oral, Daily  atorvastatin, 20 mg, Oral, Nightly  buPROPion XL, 150 mg, Oral, Daily  clindamycin, 900 mg, Intravenous, Q8H  escitalopram, 10 mg, Oral, Daily  furosemide, 20 mg, Oral, Daily  lisinopril, 5 mg, Oral, Q24H  metoprolol succinate XL, 25 mg, Oral, Daily  prasugrel, 10 mg, Oral, Daily  sodium chloride, 10 mL, Intravenous, Q12H  vancomycin, 15 mg/kg, Intravenous, Q24H        Infusion Meds  Pharmacy to dose vancomycin,   Pharmacy to dose vancomycin,         PRN Meds  •  acetaminophen  •  ALPRAZolam  •  HYDROcodone-acetaminophen  •  ketorolac  •  melatonin  •  ondansetron  •  Pharmacy to dose vancomycin  •  Pharmacy to dose vancomycin  •  potassium chloride **OR** potassium chloride **OR** potassium chloride  •  [COMPLETED] Insert peripheral IV **AND** sodium chloride  •  sodium chloride      Assessment/Plan       Assessment    Left thumb infection.  Concerning for underlying abscess.  Patient had history of MRSA infection in the past.    Left arm cellulitis with a streaking all the way to the elbow.  Secondary to above.  I am suspecting toxin producing pathogen such as Staphylococcus aureus or beta-hemolytic Streptococcus    Coronary artery disease s/p stent in the past    Plan    Continue IV vancomycin and ask pharmacy to follow and dose  Discontinue IV  cefepime  Clindamycin 900 mg IV every 8 hours for short course  MRI of the left hand with and without contrast  A.m. labs    Fabiola Eli MD  06/07/21  13:18 EDT      Note is dictated utilizing voice recognition software/Dragon

## 2021-06-07 NOTE — H&P
[facility] H&P    Patient Name: Sada Le  : 1970  MRN: [id number]  Primary Care Physician: Yodit Johnston MD  Date of admission: 2021     Patient Care Team:  Yodit Johnston MD as PCP - General (Family Medicine)  Amos Olivia MD as Consulting Physician (Cardiology)  Brant Martinez MD as Consulting Physician (Cardiology)  Chong Golden MD as Consulting Physician (Ophthalmology)          Subjective   History Present Illness     Chief Complaint:   Chief Complaint   Patient presents with   • Rash     Left arm pain and swelling    Ms. Le is a 50 y.o. female past medical history of CAD, depression/anxiety, hypertension, hyperlipidemia and obesity who presents to [facility] complaining of left arm pain and swelling    Obtained from admitting provider note on 2021:  50-year-old female that states she has a history of MRSA on Friday 2 days ago she noticed that she had some pain in her left thumb she states that since that time she has had a fever of 102 -103 pain has developed an there is a blister on her left thumb over the base and there is erythema extending over the volar aspect up to the AC space.  She rates her pain a 6/10.  She states that she began having the pain a few days ago and she went to see her primary care provider and she described the pain at that time as tingling but at that time she had no redness or blister on the thumb and was given a prescription for prednisone and valacyclovir but she states she has not picked them up.    In the ED patient had labs notable for potassium: 3.1, creatinine: 1.18 with a BUN of 21 and a BUN/creatinine ratio of 17.8, remainder of CMP as well as CBC was generally unremarkable.  Lactate was 1.7.    2021.  Patient reports she did generally well throughout the night however swelling and pain has remained constant.  She denies any dyspnea       History of Present Illness    Review of  Systems   Constitutional: Positive for fever and malaise/fatigue.   HENT: Negative.    Eyes: Negative.    Cardiovascular: Negative.    Respiratory: Negative.    Endocrine: Negative.    Skin: Positive for poor wound healing and suspicious lesions.   Musculoskeletal: Negative.    Gastrointestinal: Negative.    Genitourinary: Negative.    Neurological: Negative.    Psychiatric/Behavioral: Negative.            Personal History     Past Medical History:   Past Medical History:   Diagnosis Date   • Absence of left thumb     Impression: hx of MRSA, she is signficantly improved She will continue meds and followup if sxs have not resolved over the next 2 weeks.. 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 EF 34% Distribution indicative of LAD territory injury. Akinesis of the apex as well as apical lateral and mid to apical septal walls. Preservation of posterior inferior lateral walls. Mild AI, mild TR. Normal RV function.    • History of transesophageal echocardiography (NAFISA) 01/07/2020    EF 55% LA cavity is mild to moderately dilated. Mild MR. Interatrial septum appears redundant. Interatrial hypermobile c/w aneurysm. Large PFO is noted with right to left shunting on bubble study. PFO tunnel appears to be short.    • Hordeolum externum of left  lower eyelid     Impression: She was started on Bactrim DS for her infection. She was also advised to use warm compression. She will followup should sxs not improve over the next 48 hrs and resolve over the next 1 weeek.   • Hyperlipidemia     Impression: Diet controled. She should see improvement with her successful wt loss. We discussed her lipid panel.   • Hypertension, benign     Impression: new onset Low salt low calorie diet and regular exercise and followup in 1 mo She will monitor her pressures and notify us of her pressures over the next 1 week.   • Inclusion cyst of right breast     Impression: We will follow for now. She is encouraged to have Mammography. She is presently without insurance and reluctant. She bobby consider. She will notify us of any change at all in the lesion for the only way to be certain of it's etilogy is to remove it. She understands.   • Insomnia, organic    • Menopausal syndrome    • Overweight (BMI 25.0-29.9)    • RUQ abdominal pain     Impression: Resolving, negative GB u/s, HIDA EF 77%, we will follow   • Stented coronary artery 09/29/2019   • Tobacco use     Impression: She is strongly encouraged to stop smoking. Cessation techniques discussed and encouraged. The consequences of not stopping discussed, ie, CAD, PVD, Stroke, Lung and other cancers.       Surgical History:      Past Surgical History:   Procedure Laterality Date   • BIVENTRICULAR ASSIST DEVICE/LEFT VENTRICULAR ASSIST DEVICE INSERTION N/A 9/29/2019    Procedure: Left Ventricular Assist Device;  Surgeon: Brant Martinez MD;  Location: [facility] CATH INVASIVE LOCATION;  Service: Cardiovascular   • CARDIAC CATHETERIZATION N/A 9/29/2019    Procedure: Left Heart Cath;  Surgeon: Brant Martinez MD;  Location: [facility] CATH INVASIVE LOCATION;  Service: Cardiovascular   • CARDIAC CATHETERIZATION N/A 9/29/2019    Procedure: Coronary angiography;  Surgeon: Brant Martinez MD;  Location: [facility] CATH  INVASIVE LOCATION;  Service: Cardiovascular   • CARDIAC CATHETERIZATION N/A 9/29/2019    Procedure: Left ventriculography;  Surgeon: Brant Martinez MD;  Location: [facility] CATH INVASIVE LOCATION;  Service: Cardiovascular   • CARDIAC CATHETERIZATION N/A 9/29/2019    Procedure: Stent SERENITY coronary;  Surgeon: Brant Martinez MD;  Location:  [facility] CATH INVASIVE LOCATION;  Service: Cardiovascular   • CARDIAC CATHETERIZATION N/A 2/25/2020    Procedure: Right Heart Cath;  Surgeon: Brant Martinez MD;  Location: [facility] CATH INVASIVE LOCATION;  Service: Cardiology;  Laterality: N/A;   • CARDIAC CATHETERIZATION N/A 2/12/2021    Procedure: Left Heart Cath;  Surgeon: Brant Martinez MD;  Location: [facility] CATH INVASIVE LOCATION;  Service: Cardiology;  Laterality: N/A;   • CARDIAC CATHETERIZATION N/A 2/12/2021    Procedure: Coronary angiography;  Surgeon: Brant Martinez MD;  Location: [facility] CATH INVASIVE LOCATION;  Service: Cardiology;  Laterality: N/A;   • CARDIAC CATHETERIZATION N/A 2/12/2021    Procedure: Left ventriculography;  Surgeon: Brant Martinez MD;  Location: [facility] CATH INVASIVE LOCATION;  Service: Cardiology;  Laterality: N/A;   • CARDIAC CATHETERIZATION N/A 2/12/2021    Procedure: Aortic root aortogram;  Surgeon: Brant Martinez MD;  Location: [facility] CATH INVASIVE LOCATION;  Service: Cardiology;  Laterality: N/A;   • CARDIAC CATHETERIZATION N/A 2/12/2021    Procedure: Percutaneous Coronary Intervention;  Surgeon: Brant Martinez MD;  Location: [facility] CATH INVASIVE LOCATION;  Service: Cardiology;  Laterality: N/A;   • CARDIAC CATHETERIZATION N/A 2/12/2021    Procedure: Stent SERENITY coronary;  Surgeon: Brant Martinez MD;  Location: [facility] CATH INVASIVE LOCATION;  Service: Cardiology;  Laterality: N/A;   • CARDIAC ELECTROPHYSIOLOGY PROCEDURE  9/29/2019    Procedure: Impella Insertion;  Surgeon: Brant Martinez MD;  Location: [facility]  CATH INVASIVE LOCATION;  Service: Cardiovascular   • CORONARY ANGIOPLASTY  02/12/2021    1x stent to Circ   • CORONARY ANGIOPLASTY WITH STENT PLACEMENT  02/2021   • UT RT/LT HEART CATHETERS N/A 9/29/2019    Procedure: Percutaneous Coronary Intervention;  Surgeon: Brant Martinez MD;  Location: [facility] CATH INVASIVE LOCATION;  Service: Cardiovascular   • TOTAL ABDOMINAL HYSTERECTOMY WITH SALPINGO OOPHORECTOMY  1995    Endometriosis           Family History: family history includes Alzheimer's disease in her maternal uncle and mother; Breast cancer in her maternal aunt; Cancer in her maternal aunt; Diabetes in her father, maternal aunt, maternal grandmother, and maternal uncle; Heart attack in her father; Heart disease in her maternal aunt, mother, and sister; Kidney disease in her mother and paternal uncle; Pancreatic cancer in her maternal grandmother; Throat cancer in her mother; Thyroid disease in her father and mother. Otherwise pertinent FHx was reviewed and unremarkable.     Social History:  reports that she quit smoking about 20 months ago. Her smoking use included cigarettes. She started smoking about 36 years ago. She has a 34.00 pack-year smoking history. She has never used smokeless tobacco. She reports current alcohol use. She reports previous drug use.      Medications:  Prior to Admission medications    Medication Sig Start Date End Date Taking? Authorizing Provider   atorvastatin (LIPITOR) 20 MG tablet Take 20 mg by mouth Every Night.   Yes Al Shannon MD   buPROPion XL (WELLBUTRIN XL) 150 MG 24 hr tablet Take 150 mg by mouth Daily.   Yes Al Shannon MD   cetirizine (zyrTEC) 10 MG tablet Take 10 mg by mouth Every Night.   Yes Al Shannon MD   cholecalciferol (VITAMIN D3) 1.25 MG (06201 UT) capsule Take 50,000 Units by mouth Daily.   Yes Al Shannon MD   coenzyme Q10 100 MG capsule Take 200 mg by mouth 2 (Two) Times a Day.   Yes Al Shannon MD    [facility] Aspirin Adult Low Dose 81 MG chewable tablet CHEW 1 TABLET BY MOUTH EVERY DAY 4/28/21  Yes Brant Martinez MD   escitalopram (LEXAPRO) 10 MG tablet Take 10 mg by mouth Daily.   Yes Al Shannon MD   furosemide (LASIX) 20 MG tablet Take 20 mg by mouth Daily As Needed.   Yes Al Shannon MD   lisinopril (PRINIVIL,ZESTRIL) 5 MG tablet Take 1 tablet by mouth 2 (two) times a day. 3/3/21  Yes Brant Martinez MD   metoprolol succinate XL (TOPROL-XL) 25 MG 24 hr tablet Take 25 mg by mouth Daily.   Yes Al Shannon MD   nitroglycerin (NITROSTAT) 0.4 MG SL tablet Place 0.4 mg under the tongue Every 5 (Five) Minutes As Needed for Chest Pain. Take no more than 3 doses in 15 minutes.   Yes Al Shannon MD   Potassium 99 MG tablet Take 1 tablet by mouth Daily.   Yes Al Shannon MD   prasugrel (EFFIENT) 10 MG tablet TAKE 1 TABLET BY MOUTH EVERY DAY 6/3/21  Yes Brant Martinez MD   vitamin B-12 (CYANOCOBALAMIN) 500 MCG tablet Take 500 mcg by mouth Daily.   Yes ProviderAl MD   ALPRAZolam (XANAX) 0.25 MG tablet Take 0.25 mg by mouth Every 6 (Six) Hours As Needed for Anxiety.    ProviderAl MD   predniSONE (DELTASONE) 20 MG tablet Take 1 tablet by mouth Daily for 13 days. TID x 3 days, BID x 3 days, QD x 3 days, 1/2 tab daily x 4 days 6/4/21 6/17/21  Ilia Nolan MD   valACYclovir (VALTREX) 1000 MG tablet Take 1 tablet by mouth Every 8 (Eight) Hours. 6/4/21   Ilia Nolan MD       Allergies:    Allergies   Allergen Reactions   • Penicillins Rash   • Ciprofloxacin Rash   • Penicillin G Rash       Objective   Objective     Vital Signs  Temp:  [97.5 °F (36.4 °C)-98.4 °F (36.9 °C)] 98.4 °F (36.9 °C)  Heart Rate:  [61-81] 64  Resp:  [16-18] 16  BP: (111-138)/(69-87) 111/69  SpO2:  [97 %-99 %] 97 %  on   ;   Device (Oxygen Therapy): room air  Body mass index is 33.97 kg/m².    Physical Exam  Vitals reviewed.    Constitutional:       General: She is not in acute distress.     Appearance: Normal appearance. She is obese. She is not ill-appearing, toxic-appearing or diaphoretic.   HENT:      Head: Normocephalic and atraumatic.      Right Ear: External ear normal.      Left Ear: External ear normal.      Mouth/Throat:      Mouth: Mucous membranes are moist.   Eyes:      Extraocular Movements: Extraocular movements intact.   Cardiovascular:      Rate and Rhythm: Normal rate and regular rhythm.      Pulses: Normal pulses.      Heart sounds: Normal heart sounds.   Pulmonary:      Effort: Pulmonary effort is normal.      Breath sounds: Normal breath sounds.   Abdominal:      General: Bowel sounds are normal. There is no distension.      Tenderness: There is no abdominal tenderness.   Musculoskeletal:         General: Swelling present.      Cervical back: Normal range of motion.   Skin:     General: Skin is warm and dry.      Findings: Erythema present.      Comments: Large unruptured blister noted at the base of left thumb with swelling of the left upper extremity and tenderness in the left axilla   Neurological:      General: No focal deficit present.      Mental Status: She is alert and oriented to person, place, and time.   Psychiatric:         Mood and Affect: Mood normal.         Behavior: Behavior normal.         Thought Content: Thought content normal.         Judgment: Judgment normal.           Results Review:  I have personally reviewed most recent lab results and agree with findings, most notably: CBC, CMP, magnesium, lactate and CRP.    Results from last 7 days   Lab Units 06/07/21  0428   WBC 10*3/mm3 5.70   HEMOGLOBIN g/dL 12.6   HEMATOCRIT % 37.7   PLATELETS 10*3/mm3 129*     Results from last 7 days   Lab Units 06/07/21  0428 06/06/21  1711 06/06/21  1707   SODIUM mmol/L 136  --  134*   POTASSIUM mmol/L 3.3*  --  3.1*   CHLORIDE mmol/L 103  --  98   CO2 mmol/L 23.0  --  22.0   BUN mg/dL 15  --  21*   CREATININE  mg/dL 0.85  --  1.18*   GLUCOSE mg/dL 110*  --  161*   CALCIUM mg/dL 8.3*  --  9.3   ALT (SGPT) U/L  --   --  35*   AST (SGOT) U/L  --   --  28   LACTATE mmol/L  --  1.7  --      Estimated Creatinine Clearance: 88 mL/min (by C-G formula based on SCr of 0.85 mg/dL).  Brief Urine Lab Results     None          Microbiology Results (last 10 days)     Procedure Component Value - Date/Time    COVID PRE-OP / PRE-PROCEDURE SCREENING ORDER (NO ISOLATION) - Swab, Nasopharynx [[id number]]  (Normal) Collected: 06/06/21 2050    Lab Status: Final result Specimen: Swab from Nasopharynx Updated: 06/06/21 2214    Narrative:      The following orders were created for panel order COVID PRE-OP / PRE-PROCEDURE SCREENING ORDER (NO ISOLATION) - Swab, Nasopharynx.  Procedure                               Abnormality         Status                     ---------                               -----------         ------                     COVID-19,CEPHEID,COR/[facility]...[[id number]]  Normal              Final result                 Please view results for these tests on the individual orders.    COVID-19,CEPHEID,COR/[facility]/PAD/[facility] IN-HOUSE(OR EMERGENT/ADD-ON),NP SWAB IN TRANSPORT MEDIA 3-4 HR TAT, RT-PCR - Swab, Nasopharynx [[id number]]  (Normal) Collected: 06/06/21 2050    Lab Status: Final result Specimen: Swab from Nasopharynx Updated: 06/06/21 2214     COVID19 Not Detected    Narrative:      Fact sheet for providers: https://www.fda.gov/media/037259/download     Fact sheet for patients: https://www.fda.gov/media/426766/download  Fact sheet for providers: https://www.fda.gov/media/241200/download     Fact sheet for patients: https://www.fda.gov/media/676171/download          ECG/EMG Results (most recent)     None              Results for orders placed during the hospital encounter of 04/28/20    Adult Transthoracic Echo Complete W/ Cont if Necessary Per Protocol    Interpretation Summary  · Left ventricular mass index is increased.  · Left  ventricular wall thickness is consistent with mild concentric hypertrophy.  · Left ventricular systolic function is normal.  · Left ventricular diastolic dysfunction (grade I a) consistent with impaired relaxation.  · Mild aortic valve regurgitation is present.  · Mild dilation of the aortic root is present. Mild dilation of the sinuses of Valsalva is present.      XR Knee 4+ View Right    Result Date: 6/4/2021  Essentially negative exam.  Electronically Signed By-Jammie Sena MD On:6/4/2021 4:04 PM This report was finalized on [id number] by  Jammie Sena MD.        Estimated Creatinine Clearance: 88 mL/min (by C-G formula based on SCr of 0.85 mg/dL).    Assessment/Plan   Assessment/Plan       Active Hospital Problems    Diagnosis  POA   • **Left arm cellulitis [L03.114]  Yes     Priority: High   • Coronary artery disease involving native coronary artery of native heart with unstable angina pectoris (CMS/HCC) [I25.110]  Yes     Priority: Medium   • History of MI (myocardial infarction) [I25.2]  Not Applicable     Priority: Medium   • Class 1 obesity due to excess calories with serious comorbidity and body mass index (BMI) of 34.0 to 34.9 in adult [E66.09, Z68.34]  Not Applicable     Priority: Medium   • Generalized anxiety disorder [F41.1]  Yes     Priority: Low   • Hypokalemia [E87.6]  Yes     Priority: Low   • Mixed hyperlipidemia [E78.2]  Yes     Priority: Low   • Hypertension, benign [I10]  Yes     Priority: Low      Resolved Hospital Problems   No resolved problems to display.     Left arm cellulitis  -WBCs within normal limits at presentation and on the morning of 6/7/2021  -Lactate: 1.7  -CRP: 9.36, sed rate: 30  -Vancomycin and cefepime started in the ED  -Tylenol as needed for fever  -Check venous Doppler ultrasound left upper extremity  -Infectious disease consulted recommending DC cefepime with continued IV vancomycin as well as initiation of IV clindamycin  -Hospitalist consulted for further  management    Elevated serum creatinine  -Mild elevation in serum creatinine noted at 1.18 with a baseline of 0.86 and a BUN/creatinine ratio of 17.8  -Lisinopril was initially held and creatinine has continued to trend to within normal limits  -Blood pressure has remained normotensive and will continue to hold lisinopril with monitoring of BMP and blood pressure for now  -Lasix continued at patient request    Hypokalemia  -Potassium: 3.1 with repeat of 3.3  -Magnesium 2.0  -Replacement protocol ordered, monitor while admitted    CAD status post stent placement with history of cardiac arrest  -Continue cardiac monitoring, statin and beta-blocker    Hypertension  -Well controlled with a blood pressure admission of 138/87  -Continue metoprolol and Lasix  -Hold lisinopril temporarily secondary to initially increased serum creatinine    Heart failure with preserved ejection fraction  -Echocardiogram from April 2020 showed a normal left ventricular systolic function with a grade 1 diastolic dysfunction noted  -Continue beta-blocker and Lasix  -Monitor I's and O's and daily weights while admitted  -2 g sodium diet  -Lisinopril held temporarily secondary to initially elevated serum creatinine    Hyperlipidemia  -Continue statin    Anxiety/depression  -Continue BuSpar     Obesity (BMI: 33.97)  -Encourage diet lifestyle modifications                  VTE Prophylaxis -   Mechanical Order History:      Ordered        06/06/21 1841  Place Sequential Compression Device  Once         06/06/21 1841  Maintain Sequential Compression Device  Continuous                 Pharmalogical Order History:     None          CODE STATUS:    Code Status and Medical Interventions:   Ordered at: 06/07/21 0629     Code Status:    CPR     Medical Interventions (Level of Support Prior to Arrest):    Full         I discussed the patient's findings and my recommendations with patient and nursing staff.      Signature:Electronically signed by  Sebastián Del Rio PA-C, 06/07/21, 9:24 AM EDT.

## 2021-06-07 NOTE — CONSULTS
[facility]      Patient Name: Sada Le  : 1970  MRN: [id number]  Primary Care Physician: Yodit Johnston MD  Date of admission: 2021    Patient Care Team:  Yodit Johnston MD as PCP - General (Family Medicine)  Amos Olivia MD as Consulting Physician (Cardiology)  Brant Martinez MD as Consulting Physician (Cardiology)  Chong Golden MD as Consulting Physician (Ophthalmology)      Reason for consultation: Medical management    Subjective   History Present Illness     Chief Complaint:   Chief Complaint   Patient presents with   • Rash     The patient is a pleasant 50-year-old female with history of CAD s/p stent and MRSA that presented to the emergency room on 2021 complaining of left thumb ulcer, erythema and warmth of her left hand extending into her left axilla.  The patient thinks that she had fever at home and denies trauma to the hand.  The patient admits to having MRSA infection in multiple locations in the last 7 years.    The patient was originally placed on observation and was given cefepime and vancomycin but continued to have the erythema of arm thus ID was consulted.  She is currently on vancomycin and clindamycin and will be slated for admission due to need for continued IV antibiotics.      Review of Systems   All other systems reviewed and are negative.          Personal History     Past Medical History:   Past Medical History:   Diagnosis Date   • Absence of left thumb     Impression: hx of MRSA, she is signficantly improved She will continue meds and followup if sxs have not resolved over the next 2 weeks.. She is released from care and will notify us of any problems   • Acute otitis media    • Allergic rhinitis    • Arthritis     neck   • Cancer (CMS/[facility])     skin   • Cervical disc disease with myelopathy    • Contact dermatitis or eczema    • Coronary artery disease    • Disorder of bone and  cartilage    • Diverticulitis of colon     Impression: CT confirms in the sigmoid colon 10/2011   • Diverticulosis of colon     Impression: No sxs 02/13/2013   • Elevated cholesterol    • Elevated lipoprotein(a) 4/30/2020   • Elevated temperature    • External otitis of left ear    • Hearing loss due to cerumen impaction, left    • Hemangioma of liver    • History of echocardiogram 10/03/2019    Severely reduced LV systolic function. EF 34% Distribution indicative of LAD territory injury. Akinesis of the apex as well as apical lateral and mid to apical septal walls. Preservation of posterior inferior lateral walls. Mild AI, mild TR. Normal RV function.    • History of transesophageal echocardiography (NAFISA) 01/07/2020    EF 55% LA cavity is mild to moderately dilated. Mild MR. Interatrial septum appears redundant. Interatrial hypermobile c/w aneurysm. Large PFO is noted with right to left shunting on bubble study. PFO tunnel appears to be short.    • Hordeolum externum of left lower eyelid     Impression: She was started on Bactrim DS for her infection. She was also advised to use warm compression. She will followup should sxs not improve over the next 48 hrs and resolve over the next 1 weeek.   • Hyperlipidemia     Impression: Diet controled. She should see improvement with her successful wt loss. We discussed her lipid panel.   • Hypertension, benign     Impression: new onset Low salt low calorie diet and regular exercise and followup in 1 mo She will monitor her pressures and notify us of her pressures over the next 1 week.   • Inclusion cyst of right breast     Impression: We will follow for now. She is encouraged to have Mammography. She is presently without insurance and reluctant. She bobby consider. She will notify us of any change at all in the lesion for the only way to be certain of it's etilogy is to remove it. She understands.   • Insomnia, organic    • Menopausal syndrome    • Overweight (BMI 25.0-29.9)     • RUQ abdominal pain     Impression: Resolving, negative GB u/s, HIDA EF 77%, we will follow   • Stented coronary artery 09/29/2019   • Tobacco use     Impression: She is strongly encouraged to stop smoking. Cessation techniques discussed and encouraged. The consequences of not stopping discussed, ie, CAD, PVD, Stroke, Lung and other cancers.       Surgical History:      Past Surgical History:   Procedure Laterality Date   • BIVENTRICULAR ASSIST DEVICE/LEFT VENTRICULAR ASSIST DEVICE INSERTION N/A 9/29/2019    Procedure: Left Ventricular Assist Device;  Surgeon: Brant Martinez MD;  Location: [facility] CATH INVASIVE LOCATION;  Service: Cardiovascular   • CARDIAC CATHETERIZATION N/A 9/29/2019    Procedure: Left Heart Cath;  Surgeon: Brant Martinez MD;  Location: [facility] CATH INVASIVE LOCATION;  Service: Cardiovascular   • CARDIAC CATHETERIZATION N/A 9/29/2019    Procedure: Coronary angiography;  Surgeon: Brant Martinez MD;  Location: [facility] CATH INVASIVE LOCATION;  Service: Cardiovascular   • CARDIAC CATHETERIZATION N/A 9/29/2019    Procedure: Left ventriculography;  Surgeon: Brant Martinez MD;  Location: [facility] CATH INVASIVE LOCATION;  Service: Cardiovascular   • CARDIAC CATHETERIZATION N/A 9/29/2019    Procedure: Stent SERENITY coronary;  Surgeon: Brant Martinez MD;  Location: [facility] CATH INVASIVE LOCATION;  Service: Cardiovascular   • CARDIAC CATHETERIZATION N/A 2/25/2020    Procedure: Right Heart Cath;  Surgeon: Brant Martinez MD;  Location: [facility] CATH INVASIVE LOCATION;  Service: Cardiology;  Laterality: N/A;   • CARDIAC CATHETERIZATION N/A 2/12/2021    Procedure: Left Heart Cath;  Surgeon: Brant Martinez MD;  Location: [facility] CATH INVASIVE LOCATION;  Service: Cardiology;  Laterality: N/A;   • CARDIAC CATHETERIZATION N/A 2/12/2021    Procedure: Coronary angiography;  Surgeon: Brant Martinez MD;  Location: [facility] CATH INVASIVE LOCATION;   Service: Cardiology;  Laterality: N/A;   • CARDIAC CATHETERIZATION N/A 2/12/2021    Procedure: Left ventriculography;  Surgeon: Brant Martinez MD;  Location: [facility] CATH INVASIVE LOCATION;  Service: Cardiology;  Laterality: N/A;   • CARDIAC CATHETERIZATION N/A 2/12/2021    Procedure: Aortic root aortogram;  Surgeon: Brant Martinez MD;  Location: [facility] CATH INVASIVE LOCATION;  Service: Cardiology;  Laterality: N/A;   • CARDIAC CATHETERIZATION N/A 2/12/2021    Procedure: Percutaneous Coronary Intervention;  Surgeon: Brant Martinez MD;  Location: [facility] CATH INVASIVE LOCATION;  Service: Cardiology;  Laterality: N/A;   • CARDIAC CATHETERIZATION N/A 2/12/2021    Procedure: Stent SERENITY coronary;  Surgeon: Brant Martinez MD;  Location: [facility] CATH INVASIVE LOCATION;  Service: Cardiology;  Laterality: N/A;   • CARDIAC ELECTROPHYSIOLOGY PROCEDURE  9/29/2019    Procedure: Impella Insertion;  Surgeon: Brant Martinez MD;  Location: [facility] CATH INVASIVE LOCATION;  Service: Cardiovascular   • CORONARY ANGIOPLASTY  02/12/2021    1x stent to Circ   • CORONARY ANGIOPLASTY WITH STENT PLACEMENT  02/2021   • MT RT/LT HEART CATHETERS N/A 9/29/2019    Procedure: Percutaneous Coronary Intervention;  Surgeon: Brant Martinez MD;  Location: [facility] CATH INVASIVE LOCATION;  Service: Cardiovascular   • TOTAL ABDOMINAL HYSTERECTOMY WITH SALPINGO OOPHORECTOMY  1995    Endometriosis           Family History: family history includes Alzheimer's disease in her maternal uncle and mother; Breast cancer in her maternal aunt; Cancer in her maternal aunt; Diabetes in her father, maternal aunt, maternal grandmother, and maternal uncle; Heart attack in her father; Heart disease in her maternal aunt, mother, and sister; Kidney disease in her mother and paternal uncle; Pancreatic cancer in her maternal grandmother; Throat cancer in her mother; Thyroid disease in her father and mother. Family History  was reviewed.     Social History:  reports that she quit smoking about 20 months ago. Her smoking use included cigarettes. She started smoking about 36 years ago. She has a 34.00 pack-year smoking history. She has never used smokeless tobacco. She reports current alcohol use. She reports previous drug use.      Medications:  Prior to Admission medications    Medication Sig Start Date End Date Taking? Authorizing Provider   atorvastatin (LIPITOR) 20 MG tablet Take 20 mg by mouth Every Night.   Yes Al Shannon MD   buPROPion XL (WELLBUTRIN XL) 150 MG 24 hr tablet Take 150 mg by mouth Daily.   Yes Al Shannon MD   cetirizine (zyrTEC) 10 MG tablet Take 10 mg by mouth Every Night.   Yes Al Shannon MD   cholecalciferol (VITAMIN D3) 1.25 MG (23918 UT) capsule Take 50,000 Units by mouth Daily.   Yes Al Shannon MD   coenzyme Q10 100 MG capsule Take 200 mg by mouth 2 (Two) Times a Day.   Yes Provider, Historical, MD   [facility] Aspirin Adult Low Dose 81 MG chewable tablet CHEW 1 TABLET BY MOUTH EVERY DAY 4/28/21  Yes Brant Martinez MD   escitalopram (LEXAPRO) 10 MG tablet Take 10 mg by mouth Daily.   Yes Al Shannon MD   furosemide (LASIX) 20 MG tablet Take 20 mg by mouth Daily As Needed.   Yes Al Shannon MD   lisinopril (PRINIVIL,ZESTRIL) 5 MG tablet Take 1 tablet by mouth 2 (two) times a day. 3/3/21  Yes Brant Martinez MD   metoprolol succinate XL (TOPROL-XL) 25 MG 24 hr tablet Take 25 mg by mouth Daily.   Yes Al Shannon MD   nitroglycerin (NITROSTAT) 0.4 MG SL tablet Place 0.4 mg under the tongue Every 5 (Five) Minutes As Needed for Chest Pain. Take no more than 3 doses in 15 minutes.   Yes Al Shannon MD   Potassium 99 MG tablet Take 1 tablet by mouth Daily.   Yes Al Shannon MD   prasugrel (EFFIENT) 10 MG tablet TAKE 1 TABLET BY MOUTH EVERY DAY 6/3/21  Yes Brant Martinez MD   vitamin B-12 (CYANOCOBALAMIN)  500 MCG tablet Take 500 mcg by mouth Daily.   Yes Provider, MD Al   ALPRAZolam (XANAX) 0.25 MG tablet Take 0.25 mg by mouth Every 6 (Six) Hours As Needed for Anxiety.    Provider, MD Al   predniSONE (DELTASONE) 20 MG tablet Take 1 tablet by mouth Daily for 13 days. TID x 3 days, BID x 3 days, QD x 3 days, 1/2 tab daily x 4 days 6/4/21 6/17/21  Ilia Nolan MD   valACYclovir (VALTREX) 1000 MG tablet Take 1 tablet by mouth Every 8 (Eight) Hours. 6/4/21   Ilia Nolan MD       Allergies:    Allergies   Allergen Reactions   • Penicillins Rash   • Ciprofloxacin Rash   • Penicillin G Rash       Objective   Objective     Vital Signs  Temp:  [97.5 °F (36.4 °C)-98.4 °F (36.9 °C)] 98.4 °F (36.9 °C)  Heart Rate:  [59-81] 61  Resp:  [14-18] 14  BP: ()/(62-87) 99/65  SpO2:  [14 %-99 %] 96 %  on   ;   Device (Oxygen Therapy): room air  Body mass index is 33.97 kg/m².    Physical Exam  HENT:      Head: Normocephalic.      Nose: Nose normal.   Eyes:      Extraocular Movements: Extraocular movements intact.      Pupils: Pupils are equal, round, and reactive to light.   Cardiovascular:      Rate and Rhythm: Normal rate.   Pulmonary:      Effort: Pulmonary effort is normal.   Abdominal:      General: Bowel sounds are normal.   Musculoskeletal:      Cervical back: Normal range of motion.      Comments: Left thumb MCP joint ulceration with erythema extending into the left medial axilla   Skin:     General: Skin is warm.   Neurological:      Mental Status: She is alert and oriented to person, place, and time. Mental status is at baseline.   Psychiatric:         Mood and Affect: Mood normal.         Results Review:  I have personally reviewed most recent lab results       Results from last 7 days   Lab Units 06/07/21  0428   WBC 10*3/mm3 5.70   HEMOGLOBIN g/dL 12.6   HEMATOCRIT % 37.7   PLATELETS 10*3/mm3 129*     Results from last 7 days   Lab Units 06/07/21  0428 06/06/21  8861  06/06/21  1707   SODIUM mmol/L 136  --  134*   POTASSIUM mmol/L 3.3*  --  3.1*   CHLORIDE mmol/L 103  --  98   CO2 mmol/L 23.0  --  22.0   BUN mg/dL 15  --  21*   CREATININE mg/dL 0.85  --  1.18*   GLUCOSE mg/dL 110*  --  161*   CALCIUM mg/dL 8.3*  --  9.3   ALT (SGPT) U/L  --   --  35*   AST (SGOT) U/L  --   --  28   LACTATE mmol/L  --  1.7  --      Estimated Creatinine Clearance: 88 mL/min (by C-G formula based on SCr of 0.85 mg/dL).  Brief Urine Lab Results     None          Microbiology Results (last 10 days)     Procedure Component Value - Date/Time    COVID PRE-OP / PRE-PROCEDURE SCREENING ORDER (NO ISOLATION) - Swab, Nasopharynx [[id number]]  (Normal) Collected: 06/06/21 2050    Lab Status: Final result Specimen: Swab from Nasopharynx Updated: 06/06/21 2214    Narrative:      The following orders were created for panel order COVID PRE-OP / PRE-PROCEDURE SCREENING ORDER (NO ISOLATION) - Swab, Nasopharynx.  Procedure                               Abnormality         Status                     ---------                               -----------         ------                     COVID-19,CEPHEID,COR/NORIS...[[id number]]  Normal              Final result                 Please view results for these tests on the individual orders.    COVID-19,CEPHEID,COR/NORIS/PAD/[facility] IN-HOUSE(OR EMERGENT/ADD-ON),NP SWAB IN TRANSPORT MEDIA 3-4 HR TAT, RT-PCR - Swab, Nasopharynx [[id number]]  (Normal) Collected: 06/06/21 2050    Lab Status: Final result Specimen: Swab from Nasopharynx Updated: 06/06/21 2214     COVID19 Not Detected    Narrative:      Fact sheet for providers: https://www.fda.gov/media/093409/download     Fact sheet for patients: https://www.fda.gov/media/094012/download  Fact sheet for providers: https://www.fda.gov/media/239642/download     Fact sheet for patients: https://www.fda.gov/media/619571/download          ECG/EMG Results (most recent)     None          Results for orders placed during the hospital encounter of  06/06/21    Duplex Venous Upper Extremity - Left CAR    Interpretation Summary  · Normal left upper extremity venous duplex scan.      Results for orders placed during the hospital encounter of 04/28/20    Adult Transthoracic Echo Complete W/ Cont if Necessary Per Protocol    Interpretation Summary  · Left ventricular mass index is increased.  · Left ventricular wall thickness is consistent with mild concentric hypertrophy.  · Left ventricular systolic function is normal.  · Left ventricular diastolic dysfunction (grade I a) consistent with impaired relaxation.  · Mild aortic valve regurgitation is present.  · Mild dilation of the aortic root is present. Mild dilation of the sinuses of Valsalva is present.      XR Knee 4+ View Right    Result Date: 6/4/2021  Essentially negative exam.  Electronically Signed By-Jammie Sena MD On:6/4/2021 4:04 PM This report was finalized on [id number] by  Jammie Sena MD.        Estimated Creatinine Clearance: 88 mL/min (by C-G formula based on SCr of 0.85 mg/dL).    Assessment/Plan   Assessment/Plan       Active Hospital Problems    Diagnosis  POA   • **Left arm cellulitis [L03.114]  Yes   • Coronary artery disease involving native coronary artery of native heart with unstable angina pectoris (CMS/HCC) [I25.110]  Yes   • History of MI (myocardial infarction) [I25.2]  Not Applicable   • Generalized anxiety disorder [F41.1]  Yes   • Hypokalemia [E87.6]  Yes   • Mixed hyperlipidemia [E78.2]  Yes   • Hypertension, benign [I10]  Yes   • Class 1 obesity due to excess calories with serious comorbidity and body mass index (BMI) of 34.0 to 34.9 in adult [E66.09, Z68.34]  Not Applicable      Resolved Hospital Problems   No resolved problems to display.       Right arm cellulitis and left thumb infection:  -History of MRSA  -Continue clindamycin and vancomycin per ID  -Trend inflammatory markers    CAD s/p stent:  -Denies chest pain  -Continue aspirin, Effient, metoprolol and  statin    History of ischemic cardiomyopathy:  -EF improved on last TTE on 4/29/2020    Depression/anxiety:   -Controlled with Wellbutrin    Hypertension:  -On lisinopril and metoprolol at home    VTE Prophylaxis -   Mechanical Order History:      Ordered        06/06/21 1841  Place Sequential Compression Device  Once         06/06/21 1841  Maintain Sequential Compression Device  Continuous                 Pharmalogical Order History:     None          CODE STATUS:    Code Status and Medical Interventions:   Ordered at: 06/07/21 0629     Code Status:    CPR     Medical Interventions (Level of Support Prior to Arrest):    Full       This patient has been examined wearing appropriate Personal Protective Equipment and discussed with Nursing. 06/07/21      I discussed the patient's findings and my recommendations with patient and family.      Signature: Electronically signed by Nate Jorge DO, 06/07/21, 4:06 PM EDT.    [facility] Hospitalist Team

## 2021-06-07 NOTE — PLAN OF CARE
Goal Outcome Evaluation:  Plan of Care Reviewed With: patient        Progress: improving  Outcome Summary: on IV abx, Dr Eli consulted today, pt currently getting MRI, cont course

## 2021-06-07 NOTE — PAYOR COMM NOTE
"Verified obs order 6/6  Verified inpt order 6/7  Dx: cellulitis l arm  CRP 9.36  ID CONSULT 6/7- CONTINUE IV ABX, MRI ARM PENDING  ---------------------------------     CELLULITIS m-70:  Admission is indicated for 1 or more of the following(1)(2)(3)(4)(5)(6)(7):     Limb-threatening infection (eg, possible neurovascular compromise, deep hand space infection).(12)   Clinical presentation (eg, acuity of infection, rapidity of progression) is judged to require intensity of patient monitoring (eg, vital sign measurement, checks for infection progression) that cannot be provided at other than inpatient level of care.         Sada Le (50 y.o. Female)     Date of Birth Social Security Number Address Home Phone MRN    1970  [address] [phone] [id number]    Restorationist Marital Status          None        Admission Date Admission Type Admitting Provider Attending Provider Department, Room/Bed    6/6/21 Emergency Nate Jorge, Nate Booth DO [facility] OBSERVATION, 105/1    Discharge Date Discharge Disposition Discharge Destination                       Attending Provider: Nate Jorge DO    Allergies: Penicillins, Ciprofloxacin, Penicillin G    Isolation: None   Infection: None   Code Status: CPR    Ht: 165.1 cm (65\")   Wt: 92.6 kg (204 lb 2.3 oz)    Admission Cmt: None   Principal Problem: Left arm cellulitis [L03.114]                 Active Insurance as of 6/6/2021     Primary Coverage     Payor Plan Insurance Group Employer/Plan Group    [employer] MEDICA[employer] [facility] [id number]     Payor Plan Address Payor Plan Phone Number Payor Plan Fax Number Effective Dates    MAIL STOP:   8/1/2019 - None Entered    [address] [address]       [facility] [id number]       Subscriber Name Subscriber Birth Date Member ID       SADA LE 1970 [id number]                 Emergency Contacts      (Rel.) Home " Phone Work Phone Mobile Phone    Cb Atkinson (Significant Other) [phone] -- [phone]    Helen Burris (Sister) [phone] -- [phone]    ARTHUR CORBIN (Daughter) -- -- [phone]               History & Physical      Sebastián Del Rio PA-C at 21 0905          [facility] H&P    Patient Name: Sada Le  : 1970  MRN: [id number]  Primary Care Physician: Yodit Johnston MD  Date of admission: 2021     Patient Care Team:  Yodit Johnston MD as PCP - General (Family Medicine)  Amos Olivia MD as Consulting Physician (Cardiology)  Brant Martinez MD as Consulting Physician (Cardiology)  Chong Golden MD as Consulting Physician (Ophthalmology)          Subjective   History Present Illness     Chief Complaint:   Chief Complaint   Patient presents with   • Rash     Left arm pain and swelling    Ms. Le is a 50 y.o. female past medical history of CAD, depression/anxiety, hypertension, hyperlipidemia and obesity who presents to [facility] complaining of left arm pain and swelling    Obtained from admitting provider note on 2021:  50-year-old female that states she has a history of MRSA on Friday 2 days ago she noticed that she had some pain in her left thumb she states that since that time she has had a fever of 102 -103 pain has developed an there is a blister on her left thumb over the base and there is erythema extending over the volar aspect up to the AC space.  She rates her pain a 6/10.  She states that she began having the pain a few days ago and she went to see her primary care provider and she described the pain at that time as tingling but at that time she had no redness or blister on the thumb and was given a prescription for prednisone and valacyclovir but she states she has not picked them up.    In the ED patient had labs notable for potassium: 3.1, creatinine: 1.18 with a BUN of 21 and a  BUN/creatinine ratio of 17.8, remainder of CMP as well as CBC was generally unremarkable.  Lactate was 1.7.    6/7/2021.  Patient reports she did generally well throughout the night however swelling and pain has remained constant.  She denies any dyspnea       History of Present Illness    Review of Systems   Constitutional: Positive for fever and malaise/fatigue.   HENT: Negative.    Eyes: Negative.    Cardiovascular: Negative.    Respiratory: Negative.    Endocrine: Negative.    Skin: Positive for poor wound healing and suspicious lesions.   Musculoskeletal: Negative.    Gastrointestinal: Negative.    Genitourinary: Negative.    Neurological: Negative.    Psychiatric/Behavioral: Negative.            Personal History     Past Medical History:   Past Medical History:   Diagnosis Date   • Absence of left thumb     Impression: hx of MRSA, she is signficantly improved She will continue meds and followup if sxs have not resolved over the next 2 weeks.. 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 EF 34% Distribution indicative of LAD territory injury. Akinesis of the apex as well as apical lateral and mid to apical septal walls. Preservation of posterior inferior lateral walls. Mild AI, mild TR. Normal RV function.    • History of transesophageal  echocardiography (NAFISA) 01/07/2020    EF 55% LA cavity is mild to moderately dilated. Mild MR. Interatrial septum appears redundant. Interatrial hypermobile c/w aneurysm. Large PFO is noted with right to left shunting on bubble study. PFO tunnel appears to be short.    • Hordeolum externum of left lower eyelid     Impression: She was started on Bactrim DS for her infection. She was also advised to use warm compression. She will followup should sxs not improve over the next 48 hrs and resolve over the next 1 weeek.   • Hyperlipidemia     Impression: Diet controled. She should see improvement with her successful wt loss. We discussed her lipid panel.   • Hypertension, benign     Impression: new onset Low salt low calorie diet and regular exercise and followup in 1 mo She will monitor her pressures and notify us of her pressures over the next 1 week.   • Inclusion cyst of right breast     Impression: We will follow for now. She is encouraged to have Mammography. She is presently without insurance and reluctant. She bobby consider. She will notify us of any change at all in the lesion for the only way to be certain of it's etilogy is to remove it. She understands.   • Insomnia, organic    • Menopausal syndrome    • Overweight (BMI 25.0-29.9)    • RUQ abdominal pain     Impression: Resolving, negative GB u/s, HIDA EF 77%, we will follow   • Stented coronary artery 09/29/2019   • Tobacco use     Impression: She is strongly encouraged to stop smoking. Cessation techniques discussed and encouraged. The consequences of not stopping discussed, ie, CAD, PVD, Stroke, Lung and other cancers.       Surgical History:      Past Surgical History:   Procedure Laterality Date   • BIVENTRICULAR ASSIST DEVICE/LEFT VENTRICULAR ASSIST DEVICE INSERTION N/A 9/29/2019    Procedure: Left Ventricular Assist Device;  Surgeon: Brant Martinez MD;  Location: [facility] CATH INVASIVE LOCATION;  Service: Cardiovascular   • CARDIAC CATHETERIZATION  N/A 9/29/2019    Procedure: Left Heart Cath;  Surgeon: Brant Martinez MD;  Location:  [facility] CATH INVASIVE LOCATION;  Service: Cardiovascular   • CARDIAC CATHETERIZATION N/A 9/29/2019    Procedure: Coronary angiography;  Surgeon: Brant Martinez MD;  Location:  [facility] CATH INVASIVE LOCATION;  Service: Cardiovascular   • CARDIAC CATHETERIZATION N/A 9/29/2019    Procedure: Left ventriculography;  Surgeon: Brant Martinez MD;  Location:  [facility] CATH INVASIVE LOCATION;  Service: Cardiovascular   • CARDIAC CATHETERIZATION N/A 9/29/2019    Procedure: Stent SERENITY coronary;  Surgeon: Brant Martinez MD;  Location:  [facility] CATH INVASIVE LOCATION;  Service: Cardiovascular   • CARDIAC CATHETERIZATION N/A 2/25/2020    Procedure: Right Heart Cath;  Surgeon: Brant Martinez MD;  Location: [facility] CATH INVASIVE LOCATION;  Service: Cardiology;  Laterality: N/A;   • CARDIAC CATHETERIZATION N/A 2/12/2021    Procedure: Left Heart Cath;  Surgeon: Brant Martinez MD;  Location: [facility] CATH INVASIVE LOCATION;  Service: Cardiology;  Laterality: N/A;   • CARDIAC CATHETERIZATION N/A 2/12/2021    Procedure: Coronary angiography;  Surgeon: Brant Martinez MD;  Location: [facility] CATH INVASIVE LOCATION;  Service: Cardiology;  Laterality: N/A;   • CARDIAC CATHETERIZATION N/A 2/12/2021    Procedure: Left ventriculography;  Surgeon: Brant Martinez MD;  Location: [facility] CATH INVASIVE LOCATION;  Service: Cardiology;  Laterality: N/A;   • CARDIAC CATHETERIZATION N/A 2/12/2021    Procedure: Aortic root aortogram;  Surgeon: Brant Martinez MD;  Location:  [facility] CATH INVASIVE LOCATION;  Service: Cardiology;  Laterality: N/A;   • CARDIAC CATHETERIZATION N/A 2/12/2021    Procedure: Percutaneous Coronary Intervention;  Surgeon: Brant Martinez MD;  Location:  [facility] CATH INVASIVE LOCATION;  Service: Cardiology;  Laterality: N/A;   • CARDIAC CATHETERIZATION N/A 2/12/2021     Procedure: Stent SERENITY coronary;  Surgeon: Brant Martinez MD;  Location: [facility] CATH INVASIVE LOCATION;  Service: Cardiology;  Laterality: N/A;   • CARDIAC ELECTROPHYSIOLOGY PROCEDURE  9/29/2019    Procedure: Impella Insertion;  Surgeon: Brant Martinez MD;  Location: [facility] CATH INVASIVE LOCATION;  Service: Cardiovascular   • CORONARY ANGIOPLASTY  02/12/2021    1x stent to Circ   • CORONARY ANGIOPLASTY WITH STENT PLACEMENT  02/2021   • CT RT/LT HEART CATHETERS N/A 9/29/2019    Procedure: Percutaneous Coronary Intervention;  Surgeon: Brant Martinez MD;  Location: [facility] CATH INVASIVE LOCATION;  Service: Cardiovascular   • TOTAL ABDOMINAL HYSTERECTOMY WITH SALPINGO OOPHORECTOMY  1995    Endometriosis           Family History: family history includes Alzheimer's disease in her maternal uncle and mother; Breast cancer in her maternal aunt; Cancer in her maternal aunt; Diabetes in her father, maternal aunt, maternal grandmother, and maternal uncle; Heart attack in her father; Heart disease in her maternal aunt, mother, and sister; Kidney disease in her mother and paternal uncle; Pancreatic cancer in her maternal grandmother; Throat cancer in her mother; Thyroid disease in her father and mother. Otherwise pertinent FHx was reviewed and unremarkable.     Social History:  reports that she quit smoking about 20 months ago. Her smoking use included cigarettes. She started smoking about 36 years ago. She has a 34.00 pack-year smoking history. She has never used smokeless tobacco. She reports current alcohol use. She reports previous drug use.      Medications:  Prior to Admission medications    Medication Sig Start Date End Date Taking? Authorizing Provider   atorvastatin (LIPITOR) 20 MG tablet Take 20 mg by mouth Every Night.   Yes ProviderAl MD   buPROPion XL (WELLBUTRIN XL) 150 MG 24 hr tablet Take 150 mg by mouth Daily.   Yes ProviderAl MD   cetirizine (zyrTEC) 10 MG tablet  Take 10 mg by mouth Every Night.   Yes Al Shannon MD   cholecalciferol (VITAMIN D3) 1.25 MG (77677 UT) capsule Take 50,000 Units by mouth Daily.   Yes lA Shannon MD   coenzyme Q10 100 MG capsule Take 200 mg by mouth 2 (Two) Times a Day.   Yes Al Shannon MD   [facility] Aspirin Adult Low Dose 81 MG chewable tablet CHEW 1 TABLET BY MOUTH EVERY DAY 4/28/21  Yes Brant Martinez MD   escitalopram (LEXAPRO) 10 MG tablet Take 10 mg by mouth Daily.   Yes Al Shannon MD   furosemide (LASIX) 20 MG tablet Take 20 mg by mouth Daily As Needed.   Yes Al Shannon MD   lisinopril (PRINIVIL,ZESTRIL) 5 MG tablet Take 1 tablet by mouth 2 (two) times a day. 3/3/21  Yes Brant Martinez MD   metoprolol succinate XL (TOPROL-XL) 25 MG 24 hr tablet Take 25 mg by mouth Daily.   Yes Al Shannon MD   nitroglycerin (NITROSTAT) 0.4 MG SL tablet Place 0.4 mg under the tongue Every 5 (Five) Minutes As Needed for Chest Pain. Take no more than 3 doses in 15 minutes.   Yes Al Shannon MD   Potassium 99 MG tablet Take 1 tablet by mouth Daily.   Yes Al Shannon MD   prasugrel (EFFIENT) 10 MG tablet TAKE 1 TABLET BY MOUTH EVERY DAY 6/3/21  Yes Brant Martinez MD   vitamin B-12 (CYANOCOBALAMIN) 500 MCG tablet Take 500 mcg by mouth Daily.   Yes Al Shannon MD   ALPRAZolam (XANAX) 0.25 MG tablet Take 0.25 mg by mouth Every 6 (Six) Hours As Needed for Anxiety.    Al Shannon MD   predniSONE (DELTASONE) 20 MG tablet Take 1 tablet by mouth Daily for 13 days. TID x 3 days, BID x 3 days, QD x 3 days, 1/2 tab daily x 4 days 6/4/21 6/17/21  Ilia Nolan MD   valACYclovir (VALTREX) 1000 MG tablet Take 1 tablet by mouth Every 8 (Eight) Hours. 6/4/21   Ilia Nolan MD       Allergies:    Allergies   Allergen Reactions   • Penicillins Rash   • Ciprofloxacin Rash   • Penicillin G Rash       Objective   Objective     Vital  Signs  Temp:  [97.5 °F (36.4 °C)-98.4 °F (36.9 °C)] 98.4 °F (36.9 °C)  Heart Rate:  [61-81] 64  Resp:  [16-18] 16  BP: (111-138)/(69-87) 111/69  SpO2:  [97 %-99 %] 97 %  on   ;   Device (Oxygen Therapy): room air  Body mass index is 33.97 kg/m².    Physical Exam  Vitals reviewed.   Constitutional:       General: She is not in acute distress.     Appearance: Normal appearance. She is obese. She is not ill-appearing, toxic-appearing or diaphoretic.   HENT:      Head: Normocephalic and atraumatic.      Right Ear: External ear normal.      Left Ear: External ear normal.      Mouth/Throat:      Mouth: Mucous membranes are moist.   Eyes:      Extraocular Movements: Extraocular movements intact.   Cardiovascular:      Rate and Rhythm: Normal rate and regular rhythm.      Pulses: Normal pulses.      Heart sounds: Normal heart sounds.   Pulmonary:      Effort: Pulmonary effort is normal.      Breath sounds: Normal breath sounds.   Abdominal:      General: Bowel sounds are normal. There is no distension.      Tenderness: There is no abdominal tenderness.   Musculoskeletal:         General: Swelling present.      Cervical back: Normal range of motion.   Skin:     General: Skin is warm and dry.      Findings: Erythema present.      Comments: Large unruptured blister noted at the base of left thumb with swelling of the left upper extremity and tenderness in the left axilla   Neurological:      General: No focal deficit present.      Mental Status: She is alert and oriented to person, place, and time.   Psychiatric:         Mood and Affect: Mood normal.         Behavior: Behavior normal.         Thought Content: Thought content normal.         Judgment: Judgment normal.           Results Review:  I have personally reviewed most recent lab results and agree with findings, most notably: CBC, CMP, magnesium, lactate and CRP.    Results from last 7 days   Lab Units 06/07/21  0428   WBC 10*3/mm3 5.70   HEMOGLOBIN g/dL 12.6    HEMATOCRIT % 37.7   PLATELETS 10*3/mm3 129*     Results from last 7 days   Lab Units 06/07/21  0428 06/06/21  1711 06/06/21  1707   SODIUM mmol/L 136  --  134*   POTASSIUM mmol/L 3.3*  --  3.1*   CHLORIDE mmol/L 103  --  98   CO2 mmol/L 23.0  --  22.0   BUN mg/dL 15  --  21*   CREATININE mg/dL 0.85  --  1.18*   GLUCOSE mg/dL 110*  --  161*   CALCIUM mg/dL 8.3*  --  9.3   ALT (SGPT) U/L  --   --  35*   AST (SGOT) U/L  --   --  28   LACTATE mmol/L  --  1.7  --      Estimated Creatinine Clearance: 88 mL/min (by C-G formula based on SCr of 0.85 mg/dL).  Brief Urine Lab Results     None          Microbiology Results (last 10 days)     Procedure Component Value - Date/Time    COVID PRE-OP / PRE-PROCEDURE SCREENING ORDER (NO ISOLATION) - Swab, Nasopharynx [[id number]]  (Normal) Collected: 06/06/21 2050    Lab Status: Final result Specimen: Swab from Nasopharynx Updated: 06/06/21 2214    Narrative:      The following orders were created for panel order COVID PRE-OP / PRE-PROCEDURE SCREENING ORDER (NO ISOLATION) - Swab, Nasopharynx.  Procedure                               Abnormality         Status                     ---------                               -----------         ------                     COVID-19,CEPHEID,COR/[facility]...[[id number]]  Normal              Final result                 Please view results for these tests on the individual orders.    COVID-19,CEPHEID,COR/[facility]/PAD/[facility] IN-HOUSE(OR EMERGENT/ADD-ON),NP SWAB IN TRANSPORT MEDIA 3-4 HR TAT, RT-PCR - Swab, Nasopharynx [[id number]]  (Normal) Collected: 06/06/21 2050    Lab Status: Final result Specimen: Swab from Nasopharynx Updated: 06/06/21 2214     COVID19 Not Detected    Narrative:      Fact sheet for providers: https://www.fda.gov/media/520510/download     Fact sheet for patients: https://www.fda.gov/media/597345/download  Fact sheet for providers: https://www.fda.gov/media/524885/download     Fact sheet for patients:  https://www.fda.gov/media/913297/download          ECG/EMG Results (most recent)     None              Results for orders placed during the hospital encounter of 04/28/20    Adult Transthoracic Echo Complete W/ Cont if Necessary Per Protocol    Interpretation Summary  · Left ventricular mass index is increased.  · Left ventricular wall thickness is consistent with mild concentric hypertrophy.  · Left ventricular systolic function is normal.  · Left ventricular diastolic dysfunction (grade I a) consistent with impaired relaxation.  · Mild aortic valve regurgitation is present.  · Mild dilation of the aortic root is present. Mild dilation of the sinuses of Valsalva is present.      XR Knee 4+ View Right    Result Date: 6/4/2021  Essentially negative exam.  Electronically Signed By-Jammie Sena MD On:6/4/2021 4:04 PM This report was finalized on [id number] by  Jammie Sena MD.        Estimated Creatinine Clearance: 88 mL/min (by C-G formula based on SCr of 0.85 mg/dL).    Assessment/Plan   Assessment/Plan       Active Hospital Problems    Diagnosis  POA   • **Left arm cellulitis [L03.114]  Yes     Priority: High   • Coronary artery disease involving native coronary artery of native heart with unstable angina pectoris (CMS/HCC) [I25.110]  Yes     Priority: Medium   • History of MI (myocardial infarction) [I25.2]  Not Applicable     Priority: Medium   • Class 1 obesity due to excess calories with serious comorbidity and body mass index (BMI) of 34.0 to 34.9 in adult [E66.09, Z68.34]  Not Applicable     Priority: Medium   • Generalized anxiety disorder [F41.1]  Yes     Priority: Low   • Hypokalemia [E87.6]  Yes     Priority: Low   • Mixed hyperlipidemia [E78.2]  Yes     Priority: Low   • Hypertension, benign [I10]  Yes     Priority: Low      Resolved Hospital Problems   No resolved problems to display.     Left arm cellulitis  -WBCs within normal limits at presentation and on the morning of 6/7/2021  -Lactate:  1.7  -CRP: 9.36, sed rate: 30  -Vancomycin and cefepime started in the ED  -Tylenol as needed for fever  -Check venous Doppler ultrasound left upper extremity  -Infectious disease consulted recommending DC cefepime with continued IV vancomycin as well as initiation of IV clindamycin  -Hospitalist consulted for further management    Elevated serum creatinine  -Mild elevation in serum creatinine noted at 1.18 with a baseline of 0.86 and a BUN/creatinine ratio of 17.8  -Lisinopril was initially held and creatinine has continued to trend to within normal limits  -Blood pressure has remained normotensive and will continue to hold lisinopril with monitoring of BMP and blood pressure for now  -Lasix continued at patient request    Hypokalemia  -Potassium: 3.1 with repeat of 3.3  -Magnesium 2.0  -Replacement protocol ordered, monitor while admitted    CAD status post stent placement with history of cardiac arrest  -Continue cardiac monitoring, statin and beta-blocker    Hypertension  -Well controlled with a blood pressure admission of 138/87  -Continue metoprolol and Lasix  -Hold lisinopril temporarily secondary to initially increased serum creatinine    Heart failure with preserved ejection fraction  -Echocardiogram from April 2020 showed a normal left ventricular systolic function with a grade 1 diastolic dysfunction noted  -Continue beta-blocker and Lasix  -Monitor I's and O's and daily weights while admitted  -2 g sodium diet  -Lisinopril held temporarily secondary to initially elevated serum creatinine    Hyperlipidemia  -Continue statin    Anxiety/depression  -Continue BuSpar     Obesity (BMI: 33.97)  -Encourage diet lifestyle modifications                  VTE Prophylaxis -   Mechanical Order History:      Ordered        06/06/21 1841  Place Sequential Compression Device  Once         06/06/21 1841  Maintain Sequential Compression Device  Continuous                 Pharmalogical Order History:     None          CODE  STATUS:    Code Status and Medical Interventions:   Ordered at: 06/07/21 0629     Code Status:    CPR     Medical Interventions (Level of Support Prior to Arrest):    Full         I discussed the patient's findings and my recommendations with patient and nursing staff.      Signature:Electronically signed by Sebastián Del Rio PA-C, 06/07/21, 9:24 AM EDT.            Electronically signed by Sebastián Del Rio PA-C at 06/07/21 1418          Emergency Department Notes      Marianela Manzano, DARIEL at 06/06/21 1748     Attestation signed by Mike Uribe MD at 06/06/21 1813          For this patient encounter, I reviewed the NP or PA documentation, treatment plan, and medical decision making. Mike Uribe MD 6/6/2021 18:13 EDT                  Arlet   Is a 50-year-old female that states she has a history of MRSA on Friday 2 days ago she noticed that she had some pain in her left thumb she states that since that time she has had a fever of 102 -103 pain has developed an there is a blister on her left thumb over the base and there is erythema extending over the volar aspect up to the AC space.  She rates her pain a 6/10.  She states that she began having the pain a few days ago and she went to see her primary care provider and she described the pain at that time as tingling but at that time she had no redness or blister on the thumb and was given a prescription for prednisone and valacyclovir but she states she has not picked them up.          Review of Systems   Constitutional: Negative for chills, fatigue and fever.   HENT: Negative for congestion, tinnitus and trouble swallowing.    Eyes: Negative for photophobia, discharge and redness.   Respiratory: Negative for cough and shortness of breath.    Cardiovascular: Negative for chest pain and palpitations.   Gastrointestinal: Negative for abdominal pain, diarrhea, nausea and vomiting.   Genitourinary: Negative for dysuria, frequency and urgency.    Musculoskeletal: Negative for back pain, joint swelling and myalgias.        Left hand and left arm redness and swelling   Skin: Positive for rash.   Neurological: Negative for dizziness and headaches.   Psychiatric/Behavioral: Negative for confusion.   All other systems reviewed and are negative.      Past Medical History:   Diagnosis Date   • Absence of left thumb     Impression: hx of MRSA, she is signficantly improved She will continue meds and followup if sxs have not resolved over the next 2 weeks.. 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 EF 34% Distribution indicative of LAD territory injury. Akinesis of the apex as well as apical lateral and mid to apical septal walls. Preservation of posterior inferior lateral walls. Mild AI, mild TR. Normal RV function.    • History of transesophageal echocardiography (NAFISA) 01/07/2020    EF 55% LA cavity is mild to moderately dilated. Mild MR. Interatrial septum appears redundant. Interatrial hypermobile c/w aneurysm. Large PFO is noted with right to left shunting on bubble study. PFO tunnel appears to be short.    • Hordeolum externum of left lower eyelid     Impression: She was started on Bactrim DS for her infection. She was also advised to use warm compression. She will followup should sxs  not improve over the next 48 hrs and resolve over the next 1 weeek.   • Hyperlipidemia     Impression: Diet controled. She should see improvement with her successful wt loss. We discussed her lipid panel.   • Hypertension, benign     Impression: new onset Low salt low calorie diet and regular exercise and followup in 1 mo She will monitor her pressures and notify us of her pressures over the next 1 week.   • Inclusion cyst of right breast     Impression: We will follow for now. She is encouraged to have Mammography. She is presently without insurance and reluctant. She bobby consider. She will notify us of any change at all in the lesion for the only way to be certain of it's etilogy is to remove it. She understands.   • Insomnia, organic    • Menopausal syndrome    • Overweight (BMI 25.0-29.9)    • RUQ abdominal pain     Impression: Resolving, negative GB u/s, HIDA EF 77%, we will follow   • Stented coronary artery 09/29/2019   • Tobacco use     Impression: She is strongly encouraged to stop smoking. Cessation techniques discussed and encouraged. The consequences of not stopping discussed, ie, CAD, PVD, Stroke, Lung and other cancers.       Allergies   Allergen Reactions   • Penicillins Rash   • Ciprofloxacin Rash   • Penicillin G Rash       Past Surgical History:   Procedure Laterality Date   • BIVENTRICULAR ASSIST DEVICE/LEFT VENTRICULAR ASSIST DEVICE INSERTION N/A 9/29/2019    Procedure: Left Ventricular Assist Device;  Surgeon: Brant Martinez MD;  Location: [facility] CATH INVASIVE LOCATION;  Service: Cardiovascular   • CARDIAC CATHETERIZATION N/A 9/29/2019    Procedure: Left Heart Cath;  Surgeon: Brant Martinez MD;  Location: [facility] CATH INVASIVE LOCATION;  Service: Cardiovascular   • CARDIAC CATHETERIZATION N/A 9/29/2019    Procedure: Coronary angiography;  Surgeon: Brant Martinez MD;  Location: [facility] CATH INVASIVE LOCATION;  Service: Cardiovascular   • CARDIAC CATHETERIZATION N/A  9/29/2019    Procedure: Left ventriculography;  Surgeon: Brant Martinez MD;  Location:  [facility] CATH INVASIVE LOCATION;  Service: Cardiovascular   • CARDIAC CATHETERIZATION N/A 9/29/2019    Procedure: Stent SERENITY coronary;  Surgeon: Brant Martinez MD;  Location:  [facility] CATH INVASIVE LOCATION;  Service: Cardiovascular   • CARDIAC CATHETERIZATION N/A 2/25/2020    Procedure: Right Heart Cath;  Surgeon: Brant Martinez MD;  Location:  [facility] CATH INVASIVE LOCATION;  Service: Cardiology;  Laterality: N/A;   • CARDIAC CATHETERIZATION N/A 2/12/2021    Procedure: Left Heart Cath;  Surgeon: Brant Martinez MD;  Location:  [facility] CATH INVASIVE LOCATION;  Service: Cardiology;  Laterality: N/A;   • CARDIAC CATHETERIZATION N/A 2/12/2021    Procedure: Coronary angiography;  Surgeon: Brant Martinez MD;  Location:  [facility] CATH INVASIVE LOCATION;  Service: Cardiology;  Laterality: N/A;   • CARDIAC CATHETERIZATION N/A 2/12/2021    Procedure: Left ventriculography;  Surgeon: Brant Martinez MD;  Location:  [facility] CATH INVASIVE LOCATION;  Service: Cardiology;  Laterality: N/A;   • CARDIAC CATHETERIZATION N/A 2/12/2021    Procedure: Aortic root aortogram;  Surgeon: Brant Martinez MD;  Location:  [facility] CATH INVASIVE LOCATION;  Service: Cardiology;  Laterality: N/A;   • CARDIAC CATHETERIZATION N/A 2/12/2021    Procedure: Percutaneous Coronary Intervention;  Surgeon: Brant Martinez MD;  Location: [facility] CATH INVASIVE LOCATION;  Service: Cardiology;  Laterality: N/A;   • CARDIAC CATHETERIZATION N/A 2/12/2021    Procedure: Stent SERENITY coronary;  Surgeon: Brant Martinez MD;  Location:  [facility] CATH INVASIVE LOCATION;  Service: Cardiology;  Laterality: N/A;   • CARDIAC ELECTROPHYSIOLOGY PROCEDURE  9/29/2019    Procedure: Impella Insertion;  Surgeon: Brant Martinez MD;  Location:  [facility] CATH INVASIVE LOCATION;  Service: Cardiovascular   • CORONARY ANGIOPLASTY   2021    1x stent to Circ   • CORONARY ANGIOPLASTY WITH STENT PLACEMENT  2021   • [place] RT/LT HEART CATHETERS N/A 2019    Procedure: Percutaneous Coronary Intervention;  Surgeon: Brant Martinez MD;  Location: [facility] INVASIVE LOCATION;  Service: Cardiovascular   • TOTAL ABDOMINAL HYSTERECTOMY WITH SALPINGO OOPHORECTOMY      Endometriosis       Family History   Problem Relation Age of Onset   • Alzheimer's disease Mother    • Heart disease Mother         cardiovascular   • Kidney disease Mother    • Throat cancer Mother    • Thyroid disease Mother    • Diabetes Father         mellitus   • Heart attack Father    • Thyroid disease Father    • Heart disease Sister         cardiovascular   • Diabetes Maternal Aunt         mellitus   • Heart disease Maternal Aunt         cardiovascular   • Breast cancer Maternal Aunt    • Cancer Maternal Aunt         breast   • Diabetes Maternal Uncle         mellitus   • Alzheimer's disease Maternal Uncle    • Diabetes Maternal Grandmother         mellitus   • Pancreatic cancer Maternal Grandmother    • Kidney disease Paternal Uncle        Social History     Socioeconomic History   • Marital status:      Spouse name: Not on file   • Number of children: Not on file   • Years of education: Not on file   • Highest education level: Not on file   Tobacco Use   • Smoking status: Former Smoker     Packs/day: 1.00     Years: 34.00     Pack years: 34.00     Types: Cigarettes     Start date:      Quit date: 2019     Years since quittin.6   • Smokeless tobacco: Never Used   • Tobacco comment: States she is quitting as of 2019.   Substance and Sexual Activity   • Alcohol use: Yes     Comment: occassionally    • Drug use: Not Currently     Comment: occasional   • Sexual activity: Defer           Objective   Physical Exam  Vitals reviewed.   Constitutional:       Appearance: She is well-developed.   HENT:      Head: Normocephalic and atraumatic.     "  Right Ear: External ear normal.      Left Ear: External ear normal.      Nose: Nose normal.      Mouth/Throat:      Mouth: Mucous membranes are moist.      Pharynx: Oropharynx is clear.   Eyes:      Conjunctiva/sclera: Conjunctivae normal.      Pupils: Pupils are equal, round, and reactive to light.   Cardiovascular:      Rate and Rhythm: Normal rate and regular rhythm.      Pulses: Normal pulses.      Heart sounds: Normal heart sounds.   Pulmonary:      Effort: Pulmonary effort is normal. No respiratory distress.      Breath sounds: Normal breath sounds. No wheezing.   Abdominal:      General: Bowel sounds are normal. There is no distension.      Palpations: Abdomen is soft. There is no mass.      Tenderness: There is no abdominal tenderness. There is no guarding or rebound.   Musculoskeletal:         General: No deformity. Normal range of motion.        Arms:       Cervical back: Normal range of motion and neck supple.   Skin:     General: Skin is warm and dry.      Capillary Refill: Capillary refill takes less than 2 seconds.   Neurological:      Mental Status: She is alert and oriented to person, place, and time.      GCS: GCS eye subscore is 4. GCS verbal subscore is 5. GCS motor subscore is 6.      Cranial Nerves: No cranial nerve deficit.      Sensory: No sensory deficit.      Deep Tendon Reflexes: Reflexes normal.         Procedures          ED Course      /87 (BP Location: Right arm, Patient Position: Sitting)   Pulse 81   Temp 97.5 °F (36.4 °C) (Oral)   Resp 18   Ht 165.1 cm (65\")   Wt 92.8 kg (204 lb 11.2 oz)   LMP 01/01/1996 (Approximate)   SpO2 99%   Breastfeeding No   BMI 34.06 kg/m²   Labs Reviewed   COMPREHENSIVE METABOLIC PANEL - Abnormal; Notable for the following components:       Result Value    Glucose 161 (*)     BUN 21 (*)     Creatinine 1.18 (*)     Sodium 134 (*)     Potassium 3.1 (*)     ALT (SGPT) 35 (*)     eGFR Non  Amer 48 (*)     All other components within " normal limits    Narrative:     GFR Normal >60  Chronic Kidney Disease <60  Kidney Failure <15     CBC WITH AUTO DIFFERENTIAL - Abnormal; Notable for the following components:    nRBC 0.3 (*)     All other components within normal limits   POC LACTATE - Normal   POC LACTATE - Normal   BLOOD CULTURE   BLOOD CULTURE   CBC AND DIFFERENTIAL    Narrative:     The following orders were created for panel order CBC & Differential.  Procedure                               Abnormality         Status                     ---------                               -----------         ------                     CBC Auto Differential[[id number]]        Abnormal            Final result                 Please view results for these tests on the individual orders.   EXTRA TUBES    Narrative:     The following orders were created for panel order Extra Tubes.  Procedure                               Abnormality         Status                     ---------                               -----------         ------                     Green Top (Gel)[[id number]]                                  In process                   Please view results for these tests on the individual orders.   GREEN TOP     Medications   sodium chloride 0.9 % flush 10 mL (has no administration in time range)   vancomycin 1500 mg/500 mL 0.9% NS IVPB ([facility]) (has no administration in time range)   cefepime 1 gm IVPB in 100 mL NS ([facility]BP) (has no administration in time range)   cefepime 1 gm IVPB in 100 mL NS (MBP) (has no administration in time range)   Pharmacy to dose vancomycin (has no administration in time range)   vancomycin 1500 mg/500 mL 0.9% NS IVPB ([facility]) (has no administration in time range)     No radiology results for the last day                                       MDM  Number of Diagnoses or Management Options  Fever and chills  Left arm cellulitis  Left arm pain  Diagnosis management comments: Patient had IV established and blood work was obtained as well as  blood cultures.  A lactic acid was obtained and found to be normal at 1.7 patient will be started on IV antibiotics and will be started on cefepime and vancomycin due to penicillin allergy.  She will be admitted overnight for IV antibiotics in the ED observation unit she was agreeable to this plan of care.       Amount and/or Complexity of Data Reviewed  Clinical lab tests: reviewed    Risk of Complications, Morbidity, and/or Mortality  Presenting problems: minimal  Diagnostic procedures: minimal  Management options: low    Patient Progress  Patient progress: improved      Final diagnoses:   Left arm cellulitis   Fever and chills   Left arm pain       ED Disposition  ED Disposition     ED Disposition Condition Comment    Decision to Admit            No follow-up provider specified.       Medication List      ASK your doctor about these medications    furosemide 20 MG tablet  Commonly known as: LASIX  Ask about: Which instructions should I use?     nitroglycerin 0.4 MG SL tablet  Commonly known as: NITROSTAT  Ask about: Which instructions should I use?             Marianela Manzano, APRANDREW  06/06/21 1756      Electronically signed by Mike Uribe MD at 06/06/21 1813       Physician Progress Notes (last 24 hours) (Notes from 06/06/21 1609 through 06/07/21 1609)    No notes of this type exist for this encounter.            Consult Notes (last 24 hours) (Notes from 06/06/21 1610 through 06/07/21 1610)      Fabiola Eli MD at 06/07/21 1318      Consult Orders    1. Inpatient Infectious Diseases Consult [[id number]] ordered by Sebastián Del Rio PA-C at 06/07/21 0729               Infectious Diseases Consult Note    Referring Provider: Mike Uribe MD    Reason for Consultation: Left hand infection    Patient Care Team:  Yodit Johnston MD as PCP - General (Family Medicine)  Amos Olivia MD as Consulting Physician (Cardiology)  Brant Martinez MD as Consulting Physician  (Cardiology)  Chong Golden MD as Consulting Physician (Ophthalmology)    Chief complaint left hand swelling and left arm redness    Subjective     History of present illness:      This is a 50-year-old white female who was hospitalized [facility] on June 6, 2021.  The patient started noticing swelling and pain at the base of her left thumb.  She had history of MRSA infection in that location multiple times in the past and last time was 7 years ago.  The patient came to the emergency room since there was redness and pain in the left forearm.  She was started on IV vancomycin and apparently was given 1 dose and cefepime.  She was admitted to observation unit.  The patient denied having fever.  The patient is hemodynamically stable.    Review of Systems   Review of Systems   Constitutional: Negative.    HENT: Negative.    Eyes: Negative.    Respiratory: Negative.    Cardiovascular: Negative.    Gastrointestinal: Negative.    Genitourinary: Negative.    Musculoskeletal:        Left hand pain and swelling and left arm redness   Skin: Negative.    Neurological: Negative.    Hematological: Negative.    Psychiatric/Behavioral: Negative.        Medications  Medications Prior to Admission   Medication Sig Dispense Refill Last Dose   • atorvastatin (LIPITOR) 20 MG tablet Take 20 mg by mouth Every Night.   6/5/2021 at Unknown time   • buPROPion XL (WELLBUTRIN XL) 150 MG 24 hr tablet Take 150 mg by mouth Daily.   6/6/2021 at Unknown time   • cetirizine (zyrTEC) 10 MG tablet Take 10 mg by mouth Every Night.   6/5/2021 at Unknown time   • cholecalciferol (VITAMIN D3) 1.25 MG (37232 UT) capsule Take 50,000 Units by mouth Daily.   6/6/2021 at Unknown time   • coenzyme Q10 100 MG capsule Take 200 mg by mouth 2 (Two) Times a Day.   6/6/2021 at Unknown time   • [employer] Aspirin Adult Low Dose 81 MG chewable tablet CHEW 1 TABLET BY MOUTH EVERY DAY 90 tablet 3 6/6/2021 at Unknown time   • escitalopram (LEXAPRO) 10 MG tablet  Take 10 mg by mouth Daily.   6/6/2021 at Unknown time   • furosemide (LASIX) 20 MG tablet Take 20 mg by mouth Daily As Needed.      • lisinopril (PRINIVIL,ZESTRIL) 5 MG tablet Take 1 tablet by mouth 2 (two) times a day. 180 tablet 1 6/6/2021 at Unknown time   • metoprolol succinate XL (TOPROL-XL) 25 MG 24 hr tablet Take 25 mg by mouth Daily.   6/6/2021 at Unknown time   • nitroglycerin (NITROSTAT) 0.4 MG SL tablet Place 0.4 mg under the tongue Every 5 (Five) Minutes As Needed for Chest Pain. Take no more than 3 doses in 15 minutes.      • Potassium 99 MG tablet Take 1 tablet by mouth Daily.   6/6/2021 at Unknown time   • prasugrel (EFFIENT) 10 MG tablet TAKE 1 TABLET BY MOUTH EVERY DAY 30 tablet 11 6/6/2021 at Unknown time   • vitamin B-12 (CYANOCOBALAMIN) 500 MCG tablet Take 500 mcg by mouth Daily.   6/6/2021 at Unknown time   • ALPRAZolam (XANAX) 0.25 MG tablet Take 0.25 mg by mouth Every 6 (Six) Hours As Needed for Anxiety.      • predniSONE (DELTASONE) 20 MG tablet Take 1 tablet by mouth Daily for 13 days. TID x 3 days, BID x 3 days, QD x 3 days, 1/2 tab daily x 4 days 20 tablet 0    • valACYclovir (VALTREX) 1000 MG tablet Take 1 tablet by mouth Every 8 (Eight) Hours. 21 tablet 0        History  Past Medical History:   Diagnosis Date   • Absence of left thumb     Impression: hx of MRSA, she is signficantly improved She will continue meds and followup if sxs have not resolved over the next 2 weeks.. She is released from care and will notify us of any problems   • Acute otitis media    • Allergic rhinitis    • Arthritis     neck   • Cancer (CMS/HCC)     skin   • Cervical disc disease with myelopathy    • Contact dermatitis or eczema    • Coronary artery disease    • Disorder of bone and cartilage    • Diverticulitis of colon     Impression: CT confirms in the sigmoid colon 10/2011   • Diverticulosis of colon     Impression: No sxs 02/13/2013   • Elevated cholesterol    • Elevated lipoprotein(a) 4/30/2020   •  Elevated temperature    • External otitis of left ear    • Hearing loss due to cerumen impaction, left    • Hemangioma of liver    • History of echocardiogram 10/03/2019    Severely reduced LV systolic function. EF 34% Distribution indicative of LAD territory injury. Akinesis of the apex as well as apical lateral and mid to apical septal walls. Preservation of posterior inferior lateral walls. Mild AI, mild TR. Normal RV function.    • History of transesophageal echocardiography (NAFISA) 01/07/2020    EF 55% LA cavity is mild to moderately dilated. Mild MR. Interatrial septum appears redundant. Interatrial hypermobile c/w aneurysm. Large PFO is noted with right to left shunting on bubble study. PFO tunnel appears to be short.    • Hordeolum externum of left lower eyelid     Impression: She was started on Bactrim DS for her infection. She was also advised to use warm compression. She will followup should sxs not improve over the next 48 hrs and resolve over the next 1 weeek.   • Hyperlipidemia     Impression: Diet controled. She should see improvement with her successful wt loss. We discussed her lipid panel.   • Hypertension, benign     Impression: new onset Low salt low calorie diet and regular exercise and followup in 1 mo She will monitor her pressures and notify us of her pressures over the next 1 week.   • Inclusion cyst of right breast     Impression: We will follow for now. She is encouraged to have Mammography. She is presently without insurance and reluctant. She bobby consider. She will notify us of any change at all in the lesion for the only way to be certain of it's etilogy is to remove it. She understands.   • Insomnia, organic    • Menopausal syndrome    • Overweight (BMI 25.0-29.9)    • RUQ abdominal pain     Impression: Resolving, negative GB u/s, HIDA EF 77%, we will follow   • Stented coronary artery 09/29/2019   • Tobacco use     Impression: She is strongly encouraged to stop smoking. Cessation  techniques discussed and encouraged. The consequences of not stopping discussed, ie, CAD, PVD, Stroke, Lung and other cancers.     Past Surgical History:   Procedure Laterality Date   • BIVENTRICULAR ASSIST DEVICE/LEFT VENTRICULAR ASSIST DEVICE INSERTION N/A 9/29/2019    Procedure: Left Ventricular Assist Device;  Surgeon: Brant Martinez MD;  Location: [facility] CATH INVASIVE LOCATION;  Service: Cardiovascular   • CARDIAC CATHETERIZATION N/A 9/29/2019    Procedure: Left Heart Cath;  Surgeon: Brant Martinez MD;  Location: [facility] CATH INVASIVE LOCATION;  Service: Cardiovascular   • CARDIAC CATHETERIZATION N/A 9/29/2019    Procedure: Coronary angiography;  Surgeon: Brant Martinez MD;  Location: [facility] CATH INVASIVE LOCATION;  Service: Cardiovascular   • CARDIAC CATHETERIZATION N/A 9/29/2019    Procedure: Left ventriculography;  Surgeon: Brant Martinez MD;  Location: [facility] CATH INVASIVE LOCATION;  Service: Cardiovascular   • CARDIAC CATHETERIZATION N/A 9/29/2019    Procedure: Stent SERENITY coronary;  Surgeon: Brant Martinez MD;  Location: [facility] CATH INVASIVE LOCATION;  Service: Cardiovascular   • CARDIAC CATHETERIZATION N/A 2/25/2020    Procedure: Right Heart Cath;  Surgeon: Brant Martinez MD;  Location: [facility] CATH INVASIVE LOCATION;  Service: Cardiology;  Laterality: N/A;   • CARDIAC CATHETERIZATION N/A 2/12/2021    Procedure: Left Heart Cath;  Surgeon: Brant Martinez MD;  Location: [facility] CATH INVASIVE LOCATION;  Service: Cardiology;  Laterality: N/A;   • CARDIAC CATHETERIZATION N/A 2/12/2021    Procedure: Coronary angiography;  Surgeon: Brant Martinez MD;  Location: [facility] CATH INVASIVE LOCATION;  Service: Cardiology;  Laterality: N/A;   • CARDIAC CATHETERIZATION N/A 2/12/2021    Procedure: Left ventriculography;  Surgeon: Brant Martinez MD;  Location: [facility] CATH INVASIVE LOCATION;  Service: Cardiology;  Laterality: N/A;   •  CARDIAC CATHETERIZATION N/A 2/12/2021    Procedure: Aortic root aortogram;  Surgeon: Brant Martinez MD;  Location: [facility] CATH INVASIVE LOCATION;  Service: Cardiology;  Laterality: N/A;   • CARDIAC CATHETERIZATION N/A 2/12/2021    Procedure: Percutaneous Coronary Intervention;  Surgeon: Brant Martinez MD;  Location: [facility] CATH INVASIVE LOCATION;  Service: Cardiology;  Laterality: N/A;   • CARDIAC CATHETERIZATION N/A 2/12/2021    Procedure: Stent SERENITY coronary;  Surgeon: Brant Martinez MD;  Location: [facility] CATH INVASIVE LOCATION;  Service: Cardiology;  Laterality: N/A;   • CARDIAC ELECTROPHYSIOLOGY PROCEDURE  9/29/2019    Procedure: Impella Insertion;  Surgeon: Brant Martinez MD;  Location: [facility] CATH INVASIVE LOCATION;  Service: Cardiovascular   • CORONARY ANGIOPLASTY  02/12/2021    1x stent to Circ   • CORONARY ANGIOPLASTY WITH STENT PLACEMENT  02/2021   • [place] RT/LT HEART CATHETERS N/A 9/29/2019    Procedure: Percutaneous Coronary Intervention;  Surgeon: Brant Martinez MD;  Location: [facility] CATH INVASIVE LOCATION;  Service: Cardiovascular   • TOTAL ABDOMINAL HYSTERECTOMY WITH SALPINGO OOPHORECTOMY  1995    Endometriosis       Family History  Family History   Problem Relation Age of Onset   • Alzheimer's disease Mother    • Heart disease Mother         cardiovascular   • Kidney disease Mother    • Throat cancer Mother    • Thyroid disease Mother    • Diabetes Father         mellitus   • Heart attack Father    • Thyroid disease Father    • Heart disease Sister         cardiovascular   • Diabetes Maternal Aunt         mellitus   • Heart disease Maternal Aunt         cardiovascular   • Breast cancer Maternal Aunt    • Cancer Maternal Aunt         breast   • Diabetes Maternal Uncle         mellitus   • Alzheimer's disease Maternal Uncle    • Diabetes Maternal Grandmother         mellitus   • Pancreatic cancer Maternal Grandmother    • Kidney disease Paternal Uncle         Social History   reports that she quit smoking about 20 months ago. Her smoking use included cigarettes. She started smoking about 36 years ago. She has a 34.00 pack-year smoking history. She has never used smokeless tobacco. She reports current alcohol use. She reports previous drug use.    Allergies  Penicillins, Ciprofloxacin, and Penicillin g    Objective     Vital Signs   Vital Signs (last 24 hours)       06/06 0700  -  06/07 0659 06/07 0700  -  06/07 1318   Most Recent    Temp (°F) 97.5 -  98.4      98.1     98.1 (36.7)    Heart Rate 61 -  81      59     59    Resp 16 -  18      14     14    /69 -  138/87      98/62     98/62    SpO2 (%) 97 -  99      14     14          Physical Exam:  Physical Exam  Vitals and nursing note reviewed.   Constitutional:       Appearance: She is well-developed.   HENT:      Head: Normocephalic and atraumatic.   Eyes:      Pupils: Pupils are equal, round, and reactive to light.   Cardiovascular:      Rate and Rhythm: Normal rate and regular rhythm.      Heart sounds: Normal heart sounds.   Pulmonary:      Effort: Pulmonary effort is normal. No respiratory distress.      Breath sounds: Normal breath sounds. No wheezing or rales.   Abdominal:      General: Bowel sounds are normal. There is no distension.      Palpations: Abdomen is soft. There is no mass.      Tenderness: There is no abdominal tenderness. There is no guarding or rebound.   Musculoskeletal:         General: No deformity. Normal range of motion.      Cervical back: Normal range of motion and neck supple.      Comments: Induration at the base of the left thumb with maybe some fluctuance.  There is streak of erythema all the way to the elbow and the area was slightly warm to touch.   Skin:     General: Skin is warm.      Findings: No erythema or rash.   Neurological:      Mental Status: She is alert and oriented to person, place, and time.      Cranial Nerves: No cranial nerve deficit.          Microbiology  Microbiology Results (last 10 days)     Procedure Component Value - Date/Time    COVID PRE-OP / PRE-PROCEDURE SCREENING ORDER (NO ISOLATION) - Swab, Nasopharynx [[id number]]  (Normal) Collected: 06/06/21 2050    Lab Status: Final result Specimen: Swab from Nasopharynx Updated: 06/06/21 2214    Narrative:      The following orders were created for panel order COVID PRE-OP / PRE-PROCEDURE SCREENING ORDER (NO ISOLATION) - Swab, Nasopharynx.  Procedure                               Abnormality         Status                     ---------                               -----------         ------                     COVID-19,CEPHEID,COR/NORIS...[[id number]]  Normal              Final result                 Please view results for these tests on the individual orders.    COVID-19,CEPHEID,COR/NORIS/PAD/ANTONIO IN-HOUSE(OR EMERGENT/ADD-ON),NP SWAB IN TRANSPORT MEDIA 3-4 HR TAT, RT-PCR - Swab, Nasopharynx [[id number]]  (Normal) Collected: 06/06/21 2050    Lab Status: Final result Specimen: Swab from Nasopharynx Updated: 06/06/21 2214     COVID19 Not Detected    Narrative:      Fact sheet for providers: https://www.fda.gov/media/310025/download     Fact sheet for patients: https://www.fda.gov/media/987040/download  Fact sheet for providers: https://www.fda.gov/media/309867/download     Fact sheet for patients: https://www.fda.gov/media/505955/download          Laboratory  Results from last 7 days   Lab Units 06/07/21 0428   WBC 10*3/mm3 5.70   HEMOGLOBIN g/dL 12.6   HEMATOCRIT % 37.7   PLATELETS 10*3/mm3 129*     Results from last 7 days   Lab Units 06/07/21 0428   SODIUM mmol/L 136   POTASSIUM mmol/L 3.3*   CHLORIDE mmol/L 103   CO2 mmol/L 23.0   BUN mg/dL 15   CREATININE mg/dL 0.85   GLUCOSE mg/dL 110*   CALCIUM mg/dL 8.3*     Results from last 7 days   Lab Units 06/07/21  0428   SODIUM mmol/L 136   POTASSIUM mmol/L 3.3*   CHLORIDE mmol/L 103   CO2 mmol/L 23.0   BUN mg/dL 15   CREATININE mg/dL 0.85   GLUCOSE  mg/dL 110*   CALCIUM mg/dL 8.3*         Results from last 7 days   Lab Units 06/07/21  0428   SED RATE mm/hr 30           Radiology  Imaging Results (Last 72 Hours)     ** No results found for the last 72 hours. **          Cardiology      Results Review:  I have reviewed all clinical data, test, lab, and imaging results.       Schedule Meds  aspirin, 81 mg, Oral, Daily  atorvastatin, 20 mg, Oral, Nightly  buPROPion XL, 150 mg, Oral, Daily  clindamycin, 900 mg, Intravenous, Q8H  escitalopram, 10 mg, Oral, Daily  furosemide, 20 mg, Oral, Daily  lisinopril, 5 mg, Oral, Q24H  metoprolol succinate XL, 25 mg, Oral, Daily  prasugrel, 10 mg, Oral, Daily  sodium chloride, 10 mL, Intravenous, Q12H  vancomycin, 15 mg/kg, Intravenous, Q24H        Infusion Meds  Pharmacy to dose vancomycin,   Pharmacy to dose vancomycin,         PRN Meds  •  acetaminophen  •  ALPRAZolam  •  HYDROcodone-acetaminophen  •  ketorolac  •  melatonin  •  ondansetron  •  Pharmacy to dose vancomycin  •  Pharmacy to dose vancomycin  •  potassium chloride **OR** potassium chloride **OR** potassium chloride  •  [COMPLETED] Insert peripheral IV **AND** sodium chloride  •  sodium chloride      Assessment/Plan       Assessment    Left thumb infection.  Concerning for underlying abscess.  Patient had history of MRSA infection in the past.    Left arm cellulitis with a streaking all the way to the elbow.  Secondary to above.  I am suspecting toxin producing pathogen such as Staphylococcus aureus or beta-hemolytic Streptococcus    Coronary artery disease s/p stent in the past    Plan    Continue IV vancomycin and ask pharmacy to follow and dose  Discontinue IV cefepime  Clindamycin 900 mg IV every 8 hours for short course  MRI of the left hand with and without contrast  A.m. labs    Fabiola Eli MD  06/07/21  13:18 EDT      Note is dictated utilizing voice recognition software/Dragon    Electronically signed by Fabiola Eli MD at 06/07/21 1568

## 2021-06-07 NOTE — PROGRESS NOTES
"Pharmacy Antimicrobial Dosing Service    Subjective:  Sada Le is a 50 y.o.female admitted with swelling and pain of her left thumb. Patient has a h/o MRSA. Pharmacy has been consulted to dose Vancomycin for left arm cellulitis. ID following.    PMH: T2DM      Assessment/Plan    1. Day #2 Vancomycin: Goal trough 10-20 mcg/mL for uncomplicated SSTI. Vancomycin 1500 mg (20 mg/kg-DBW) loading dose x 1 given x 1 in ED; 1500 mg q24h maintenance regimen ordered. No labs ordered at this time due to anticipated short duration of therapy.    2. Day #1 Clindamycin: 900 mg IV q8h for 3 days per ID.    3. Patient received cefepime 1 gm x 2 given.    Will continue to monitor drug levels, renal function, culture and sensitivities, and patient clinical status.       Objective:  Relevant clinical data and objective history reviewed:  165.1 cm (65\")   92.6 kg (204 lb 2.3 oz)   Ideal body weight: 57 kg (125 lb 10.6 oz)  Adjusted ideal body weight: 71.2 kg (157 lb 0.9 oz)  Body mass index is 33.97 kg/m².        Results from last 7 days   Lab Units 06/07/21  0428 06/06/21  1707   CREATININE mg/dL 0.85 1.18*     Estimated Creatinine Clearance: 88 mL/min (by C-G formula based on SCr of 0.85 mg/dL).  I/O last 3 completed shifts:  In: 240 [P.O.:240]  Out: -     Results from last 7 days   Lab Units 06/07/21  0428 06/06/21  1707   WBC 10*3/mm3 5.70 6.30     Temperature    06/06/21 2233 06/07/21 0601 06/07/21 1030   Temp: 98 °F (36.7 °C) 98.4 °F (36.9 °C) 98.1 °F (36.7 °C)     Baseline culture/source/susceptibility:  Microbiology Results (last 10 days)       Procedure Component Value - Date/Time    COVID PRE-OP / PRE-PROCEDURE SCREENING ORDER (NO ISOLATION) - Swab, Nasopharynx [[id number]]  (Normal) Collected: 06/06/21 2050    Lab Status: Final result Specimen: Swab from Nasopharynx Updated: 06/06/21 2214    Narrative:      The following orders were created for panel order COVID PRE-OP / PRE-PROCEDURE SCREENING ORDER (NO ISOLATION) - " Swab, Nasopharynx.  Procedure                               Abnormality         Status                     ---------                               -----------         ------                     COVID-19,CEPHEID,COR/NORIS...[[id number]]  Normal              Final result                 Please view results for these tests on the individual orders.    COVID-19,CEPHEID,COR/NORIS/PAD/ANTONIO IN-HOUSE(OR EMERGENT/ADD-ON),NP SWAB IN TRANSPORT MEDIA 3-4 HR TAT, RT-PCR - Swab, Nasopharynx [[id number]]  (Normal) Collected: 06/06/21 2050    Lab Status: Final result Specimen: Swab from Nasopharynx Updated: 06/06/21 2214     COVID19 Not Detected    Narrative:      Fact sheet for providers: https://www.fda.gov/media/867966/download     Fact sheet for patients: https://www.fda.gov/media/890584/download  Fact sheet for providers: https://www.fda.gov/media/178644/download     Fact sheet for patients: https://www.fda.gov/media/328036/download              Anti-Infectives (From admission, onward)      Ordered     Dose/Rate Route Frequency Start Stop    06/06/21 1749  vancomycin 1500 mg/500 mL 0.9% NS IVPB ([facility])     Ordering Provider: Marianela Manzano APRN    15 mg/kg × 92.9 kg Intravenous Every 24 Hours 06/07/21 1800 06/08/21 1759    06/07/21 1317  clindamycin (CLEOCIN) 900 mg in sodium chloride 0.9% 50 mL IVPB ([facility])     Ordering Provider: Fabiola Eli MD    900 mg  50 mL/hr over 60 Minutes Intravenous Every 8 Hours 06/07/21 1500 06/10/21 1459    06/06/21 1740  Pharmacy to dose vancomycin     Ordering Provider: Marianela Manzano APRN     Does not apply Continuous PRN 06/06/21 1739 06/07/21 1738    06/06/21 1702  cefepime 1 gm IVPB in 100 mL NS ([facility])     Ordering Provider: Marianela Manzano APRN    1 g  over 30 Minutes Intravenous Once 06/06/21 1704 06/06/21 1958    06/06/21 1657  vancomycin 1500 mg/500 mL 0.9% NS IVPB ([facility])     Ordering Provider: Marianela Manzano APRN    20 mg/kg × 71.4 kg (Adjusted) Intravenous Once  06/06/21 1659 06/06/21 2233            Bethany Bear, PharmD  06/07/21 13:38 EDT

## 2021-06-07 NOTE — PLAN OF CARE
Problem: Adult Inpatient Plan of Care  Goal: Plan of Care Review  Outcome: Ongoing, Progressing  Goal: Patient-Specific Goal (Individualized)  Outcome: Ongoing, Progressing  Goal: Absence of Hospital-Acquired Illness or Injury  Outcome: Ongoing, Progressing  Intervention: Identify and Manage Fall Risk  Recent Flowsheet Documentation  Taken 6/7/2021 0341 by Jammie Allen RN  Safety Promotion/Fall Prevention:   safety round/check completed   room organization consistent   nonskid shoes/slippers when out of bed   lighting adjusted   clutter free environment maintained   assistive device/personal items within reach   activity supervised  Taken 6/6/2021 2332 by Jammie Allen RN  Safety Promotion/Fall Prevention:   safety round/check completed   room organization consistent   nonskid shoes/slippers when out of bed   lighting adjusted   clutter free environment maintained   assistive device/personal items within reach   activity supervised  Taken 6/6/2021 1905 by Jammie Allen RN  Safety Promotion/Fall Prevention:   safety round/check completed   room organization consistent   nonskid shoes/slippers when out of bed   lighting adjusted   clutter free environment maintained   assistive device/personal items within reach   activity supervised  Intervention: Prevent Skin Injury  Recent Flowsheet Documentation  Taken 6/7/2021 0341 by Jammie Allen RN  Body Position: position changed independently  Taken 6/6/2021 2332 by Jammie Allen RN  Body Position: position changed independently  Taken 6/6/2021 1905 by Jammie Allen RN  Body Position: position changed independently  Intervention: Prevent Infection  Recent Flowsheet Documentation  Taken 6/7/2021 0341 by Jammie Allen RN  Infection Prevention:   visitors restricted/screened   single patient room provided   rest/sleep promoted   personal protective equipment utilized   hand hygiene promoted   equipment surfaces  disinfected  Taken 6/6/2021 2332 by Jammie Allen RN  Infection Prevention:   visitors restricted/screened   single patient room provided   rest/sleep promoted   personal protective equipment utilized   hand hygiene promoted   equipment surfaces disinfected  Taken 6/6/2021 1905 by Jammie Allen RN  Infection Prevention:   visitors restricted/screened   single patient room provided   rest/sleep promoted   personal protective equipment utilized   hand hygiene promoted   equipment surfaces disinfected  Goal: Optimal Comfort and Wellbeing  Outcome: Ongoing, Progressing  Intervention: Provide Person-Centered Care  Recent Flowsheet Documentation  Taken 6/6/2021 1905 by Jammie Allen RN  Trust Relationship/Rapport:   care explained   questions answered   questions encouraged   reassurance provided   thoughts/feelings acknowledged  Goal: Readiness for Transition of Care  Outcome: Ongoing, Progressing     Problem: Skin or Soft Tissue Infection  Goal: Infection Symptom Resolution  Outcome: Ongoing, Progressing  Intervention: Provide Meticulous Infection Site Care  Recent Flowsheet Documentation  Taken 6/7/2021 0341 by Jammie Allen RN  Infection Prevention:   visitors restricted/screened   single patient room provided   rest/sleep promoted   personal protective equipment utilized   hand hygiene promoted   equipment surfaces disinfected  Taken 6/6/2021 2332 by Jammie Allen RN  Infection Prevention:   visitors restricted/screened   single patient room provided   rest/sleep promoted   personal protective equipment utilized   hand hygiene promoted   equipment surfaces disinfected  Taken 6/6/2021 1905 by Jammie Allen RN  Infection Prevention:   visitors restricted/screened   single patient room provided   rest/sleep promoted   personal protective equipment utilized   hand hygiene promoted   equipment surfaces disinfected     Problem: Pain Acute  Goal: Optimal Pain Control  Outcome:  Ongoing, Progressing  Intervention: Prevent or Manage Pain  Recent Flowsheet Documentation  Taken 6/6/2021 1905 by Jammie Allen, RN  Sensory Stimulation Regulation:   auditory stimulation minimized   care clustered   lighting decreased   quiet environment promoted  Sleep/Rest Enhancement:   awakenings minimized   noise level reduced   regular sleep/rest pattern promoted  Intervention: Optimize Psychosocial Wellbeing  Recent Flowsheet Documentation  Taken 6/6/2021 1905 by Jammie Allen, RN  Supportive Measures: active listening utilized  Diversional Activities:   smartphone   television   Goal Outcome Evaluation:

## 2021-06-08 LAB
ALBUMIN SERPL-MCNC: 3.2 G/DL (ref 3.5–5.2)
ALBUMIN/GLOB SERPL: 1.3 G/DL
ALP SERPL-CCNC: 90 U/L (ref 39–117)
ALT SERPL W P-5'-P-CCNC: 22 U/L (ref 1–33)
ANION GAP SERPL CALCULATED.3IONS-SCNC: 8 MMOL/L (ref 5–15)
AST SERPL-CCNC: 15 U/L (ref 1–32)
BASOPHILS # BLD AUTO: 0 10*3/MM3 (ref 0–0.2)
BASOPHILS NFR BLD AUTO: 0.5 % (ref 0–1.5)
BILIRUB SERPL-MCNC: <0.2 MG/DL (ref 0–1.2)
BUN SERPL-MCNC: 12 MG/DL (ref 6–20)
BUN/CREAT SERPL: 14 (ref 7–25)
CALCIUM SPEC-SCNC: 8.2 MG/DL (ref 8.6–10.5)
CHLORIDE SERPL-SCNC: 107 MMOL/L (ref 98–107)
CO2 SERPL-SCNC: 24 MMOL/L (ref 22–29)
CREAT SERPL-MCNC: 0.86 MG/DL (ref 0.57–1)
CRP SERPL-MCNC: 4.7 MG/DL (ref 0–0.5)
DEPRECATED RDW RBC AUTO: 44.2 FL (ref 37–54)
EOSINOPHIL # BLD AUTO: 0.2 10*3/MM3 (ref 0–0.4)
EOSINOPHIL NFR BLD AUTO: 4 % (ref 0.3–6.2)
ERYTHROCYTE [DISTWIDTH] IN BLOOD BY AUTOMATED COUNT: 13.7 % (ref 12.3–15.4)
GFR SERPL CREATININE-BSD FRML MDRD: 70 ML/MIN/1.73
GLOBULIN UR ELPH-MCNC: 2.4 GM/DL
GLUCOSE SERPL-MCNC: 121 MG/DL (ref 65–99)
HCT VFR BLD AUTO: 34.7 % (ref 34–46.6)
HGB BLD-MCNC: 11.7 G/DL (ref 12–15.9)
LYMPHOCYTES # BLD AUTO: 1.7 10*3/MM3 (ref 0.7–3.1)
LYMPHOCYTES NFR BLD AUTO: 39.9 % (ref 19.6–45.3)
MCH RBC QN AUTO: 30.5 PG (ref 26.6–33)
MCHC RBC AUTO-ENTMCNC: 33.7 G/DL (ref 31.5–35.7)
MCV RBC AUTO: 90.6 FL (ref 79–97)
MONOCYTES # BLD AUTO: 0.9 10*3/MM3 (ref 0.1–0.9)
MONOCYTES NFR BLD AUTO: 19.5 % (ref 5–12)
NEUTROPHILS NFR BLD AUTO: 1.6 10*3/MM3 (ref 1.7–7)
NEUTROPHILS NFR BLD AUTO: 36.1 % (ref 42.7–76)
NRBC BLD AUTO-RTO: 0.1 /100 WBC (ref 0–0.2)
PLATELET # BLD AUTO: 135 10*3/MM3 (ref 140–450)
PMV BLD AUTO: 8.5 FL (ref 6–12)
POTASSIUM SERPL-SCNC: 4.2 MMOL/L (ref 3.5–5.2)
PROT SERPL-MCNC: 5.6 G/DL (ref 6–8.5)
RBC # BLD AUTO: 3.82 10*6/MM3 (ref 3.77–5.28)
SODIUM SERPL-SCNC: 139 MMOL/L (ref 136–145)
WBC # BLD AUTO: 4.4 10*3/MM3 (ref 3.4–10.8)

## 2021-06-08 PROCEDURE — 80053 COMPREHEN METABOLIC PANEL: CPT | Performed by: INTERNAL MEDICINE

## 2021-06-08 PROCEDURE — 86140 C-REACTIVE PROTEIN: CPT | Performed by: HOSPITALIST

## 2021-06-08 PROCEDURE — 25010000002 VANCOMYCIN 10 G RECONSTITUTED SOLUTION: Performed by: INTERNAL MEDICINE

## 2021-06-08 PROCEDURE — 99232 SBSQ HOSP IP/OBS MODERATE 35: CPT | Performed by: HOSPITALIST

## 2021-06-08 PROCEDURE — 85025 COMPLETE CBC W/AUTO DIFF WBC: CPT | Performed by: INTERNAL MEDICINE

## 2021-06-08 RX ADMIN — BUPROPION HYDROCHLORIDE 150 MG: 150 TABLET, EXTENDED RELEASE ORAL at 09:30

## 2021-06-08 RX ADMIN — CLINDAMYCIN PHOSPHATE 900 MG: 900 INJECTION, SOLUTION INTRAVENOUS at 06:08

## 2021-06-08 RX ADMIN — CLINDAMYCIN PHOSPHATE 900 MG: 900 INJECTION, SOLUTION INTRAVENOUS at 23:49

## 2021-06-08 RX ADMIN — ATORVASTATIN CALCIUM 20 MG: 20 TABLET, FILM COATED ORAL at 20:50

## 2021-06-08 RX ADMIN — ASPIRIN 81 MG: 81 TABLET, CHEWABLE ORAL at 09:30

## 2021-06-08 RX ADMIN — CLINDAMYCIN PHOSPHATE 900 MG: 900 INJECTION, SOLUTION INTRAVENOUS at 14:22

## 2021-06-08 RX ADMIN — PRASUGREL 10 MG: 10 TABLET, FILM COATED ORAL at 09:30

## 2021-06-08 RX ADMIN — LISINOPRIL 5 MG: 5 TABLET ORAL at 09:30

## 2021-06-08 RX ADMIN — HYDROCODONE BITARTRATE AND ACETAMINOPHEN 1 TABLET: 7.5; 325 TABLET ORAL at 07:31

## 2021-06-08 RX ADMIN — Medication 10 ML: at 09:30

## 2021-06-08 RX ADMIN — METOPROLOL SUCCINATE 25 MG: 25 TABLET, EXTENDED RELEASE ORAL at 09:30

## 2021-06-08 RX ADMIN — FUROSEMIDE 20 MG: 20 TABLET ORAL at 09:30

## 2021-06-08 RX ADMIN — VANCOMYCIN HYDROCHLORIDE 1500 MG: 10 INJECTION, POWDER, LYOPHILIZED, FOR SOLUTION INTRAVENOUS at 17:59

## 2021-06-08 RX ADMIN — Medication 10 ML: at 20:50

## 2021-06-08 RX ADMIN — ESCITALOPRAM OXALATE 10 MG: 10 TABLET ORAL at 09:30

## 2021-06-08 NOTE — PROGRESS NOTES
"      [facility] Services Daily Progress Note      Hospitalist Team  LOS 1 days      Patient Care Team:  Yodit Johnston MD as PCP - General (Family Medicine)  Amos Olivia MD as Consulting Physician (Cardiology)  Brant Martinez MD as Consulting Physician (Cardiology)  Chong Golden MD as Consulting Physician (Ophthalmology)    Patient Location: [facility]      Subjective   Subjective     Chief Complaint / Subjective  Chief Complaint   Patient presents with   • Rash         Brief Synopsis of Hospital Course/HPI    Date::    6/8/21: c/o left hand edema. afebrile      Review of Systems   All other systems reviewed and are negative.        Objective   Objective      Vital Signs  Temp:  [98 °F (36.7 °C)-98.4 °F (36.9 °C)] 98.3 °F (36.8 °C)  Heart Rate:  [54-61] 61  Resp:  [14-16] 14  BP: ()/(65-85) 99/66  Oxygen Therapy  SpO2: 97 %  Pulse Oximetry Type: Continuous  Device (Oxygen Therapy): room air  Flowsheet Rows      First Filed Value   Admission Height  165.1 cm (65\") Documented at 06/06/2021 1627   Admission Weight  92.8 kg (204 lb 11.2 oz) Documented at 06/06/2021 1627        Intake & Output (last 3 days)       06/05 0701 - 06/06 0700 06/06 0701 - 06/07 0700 06/07 0701 - 06/08 0700 06/08 0701 - 06/09 0700    P.O.  240 840     IV Piggyback   100     Total Intake(mL/kg)  240 (2.6) 940 (10.2)     Net  +240 +940                 Lines, Drains & Airways    Active LDAs     Name:   Placement date:   Placement time:   Site:   Days:    Peripheral IV 06/07/21 1210 Posterior;Right;Lateral Wrist   06/07/21    1210    Wrist   less than 1                  Physical Exam:    Physical Exam  HENT:      Head: Normocephalic.      Nose: Nose normal.   Eyes:      Extraocular Movements: Extraocular movements intact.      Pupils: Pupils are equal, round, and reactive to light.   Cardiovascular:      Rate and Rhythm: Normal rate and regular rhythm.      Pulses: Normal pulses. "   Pulmonary:      Effort: Pulmonary effort is normal.   Abdominal:      General: Bowel sounds are normal.      Palpations: Abdomen is soft.   Musculoskeletal:      Cervical back: Normal range of motion.      Comments: Left arm erythema and left thumb ucer   Skin:     General: Skin is warm.   Neurological:      Mental Status: She is alert and oriented to person, place, and time. Mental status is at baseline.   Psychiatric:         Mood and Affect: Mood normal.         Procedures:      Results Review:     I reviewed the patient's new clinical results.      Lab Results (last 24 hours)     Procedure Component Value Units Date/Time    Comprehensive Metabolic Panel [[id number]]  (Abnormal) Collected: 06/08/21 0352    Specimen: Blood Updated: 06/08/21 0447     Glucose 121 mg/dL      BUN 12 mg/dL      Creatinine 0.86 mg/dL      Sodium 139 mmol/L      Potassium 4.2 mmol/L      Chloride 107 mmol/L      CO2 24.0 mmol/L      Calcium 8.2 mg/dL      Total Protein 5.6 g/dL      Albumin 3.20 g/dL      ALT (SGPT) 22 U/L      AST (SGOT) 15 U/L      Alkaline Phosphatase 90 U/L      Total Bilirubin <0.2 mg/dL      eGFR Non African Amer 70 mL/min/1.73      Globulin 2.4 gm/dL      A/G Ratio 1.3 g/dL      BUN/Creatinine Ratio 14.0     Anion Gap 8.0 mmol/L     Narrative:      GFR Normal >60  Chronic Kidney Disease <60  Kidney Failure <15      C-reactive Protein [[id number]]  (Abnormal) Collected: 06/08/21 0352    Specimen: Blood Updated: 06/08/21 0447     C-Reactive Protein 4.70 mg/dL     CBC & Differential [[id number]]  (Abnormal) Collected: 06/08/21 0352    Specimen: Blood Updated: 06/08/21 0422    Narrative:      The following orders were created for panel order CBC & Differential.  Procedure                               Abnormality         Status                     ---------                               -----------         ------                     CBC Auto Differential[[id number]]        Abnormal            Final result                  Please view results for these tests on the individual orders.    CBC Auto Differential [[id number]]  (Abnormal) Collected: 06/08/21 0352    Specimen: Blood Updated: 06/08/21 0422     WBC 4.40 10*3/mm3      RBC 3.82 10*6/mm3      Hemoglobin 11.7 g/dL      Hematocrit 34.7 %      MCV 90.6 fL      MCH 30.5 pg      MCHC 33.7 g/dL      RDW 13.7 %      RDW-SD 44.2 fl      MPV 8.5 fL      Platelets 135 10*3/mm3      Neutrophil % 36.1 %      Lymphocyte % 39.9 %      Monocyte % 19.5 %      Eosinophil % 4.0 %      Basophil % 0.5 %      Neutrophils, Absolute 1.60 10*3/mm3      Lymphocytes, Absolute 1.70 10*3/mm3      Monocytes, Absolute 0.90 10*3/mm3      Eosinophils, Absolute 0.20 10*3/mm3      Basophils, Absolute 0.00 10*3/mm3      nRBC 0.1 /100 WBC     Potassium [[id number]]  (Normal) Collected: 06/07/21 1915    Specimen: Blood Updated: 06/07/21 1945     Potassium 4.7 mmol/L     Blood Culture - Blood, Arm, Left [[id number]] Collected: 06/06/21 1736    Specimen: Blood from Arm, Left Updated: 06/07/21 1745     Blood Culture No growth at 24 hours    Blood Culture - Blood, Arm, Right [[id number]] Collected: 06/06/21 1707    Specimen: Blood from Arm, Right Updated: 06/07/21 1715     Blood Culture No growth at 24 hours        No results found for: HGBA1C            No results found for: LIPASE  Lab Results   Component Value Date    CHOL 79 02/13/2021    CHLPL 152 10/20/2020    TRIG 125 02/13/2021    HDL 27 (L) 02/13/2021    LDL 29 02/13/2021       No results found for: INTRAOP, PREDX, FINALDX, COMDX    Microbiology Results (last 10 days)     Procedure Component Value - Date/Time    COVID PRE-OP / PRE-PROCEDURE SCREENING ORDER (NO ISOLATION) - Swab, Nasopharynx [[id number]]  (Normal) Collected: 06/06/21 2050    Lab Status: Final result Specimen: Swab from Nasopharynx Updated: 06/06/21 2214    Narrative:      The following orders were created for panel order COVID PRE-OP / PRE-PROCEDURE SCREENING ORDER (NO ISOLATION) - Swab,  Nasopharynx.  Procedure                               Abnormality         Status                     ---------                               -----------         ------                     COVID-19,CEPHEID,COR/NORIS...[[id number]]  Normal              Final result                 Please view results for these tests on the individual orders.    COVID-19,CEPHEID,COR/NORIS/PAD/ANTONIO IN-HOUSE(OR EMERGENT/ADD-ON),NP SWAB IN TRANSPORT MEDIA 3-4 HR TAT, RT-PCR - Swab, Nasopharynx [[id number]]  (Normal) Collected: 06/06/21 2050    Lab Status: Final result Specimen: Swab from Nasopharynx Updated: 06/06/21 2214     COVID19 Not Detected    Narrative:      Fact sheet for providers: https://www.fda.gov/media/009605/download     Fact sheet for patients: https://www.fda.gov/media/649470/download  Fact sheet for providers: https://www.fda.gov/media/575441/download     Fact sheet for patients: https://www.fda.gov/media/985493/download    Blood Culture - Blood, Arm, Left [[id number]] Collected: 06/06/21 1736    Lab Status: Preliminary result Specimen: Blood from Arm, Left Updated: 06/07/21 1745     Blood Culture No growth at 24 hours    Blood Culture - Blood, Arm, Right [[id number]] Collected: 06/06/21 1707    Lab Status: Preliminary result Specimen: Blood from Arm, Right Updated: 06/07/21 1715     Blood Culture No growth at 24 hours          ECG/EMG Results (most recent)     None          Results for orders placed during the hospital encounter of 06/06/21    Duplex Venous Upper Extremity - Left CAR    Interpretation Summary  · Normal left upper extremity venous duplex scan.      Results for orders placed during the hospital encounter of 04/28/20    Adult Transthoracic Echo Complete W/ Cont if Necessary Per Protocol    Interpretation Summary  · Left ventricular mass index is increased.  · Left ventricular wall thickness is consistent with mild concentric hypertrophy.  · Left ventricular systolic function is normal.  · Left ventricular diastolic  dysfunction (grade I a) consistent with impaired relaxation.  · Mild aortic valve regurgitation is present.  · Mild dilation of the aortic root is present. Mild dilation of the sinuses of Valsalva is present.      XR Knee 4+ View Right    Result Date: 6/4/2021  Essentially negative exam.  Electronically Signed By-Jammie Sena MD On:6/4/2021 4:04 PM This report was finalized on [id number] by  Jammie Sena MD.    MRI Hand Left With & Without Contrast    Result Date: 6/7/2021   \Cellulitis of the first digit as well as the radial aspect of the base of the second digit extending along the soft tissues between the first and second digits and along the volar soft tissues within this region. No drainable collections identified. No evidence of osteomyelitis. No evidence of significant tenosynovitis.   Electronically Signed By-Solange Canales MD On:6/7/2021 6:15 PM This report was finalized on [id number] by  Solange Canales MD.          Xrays, labs reviewed personally by physician.    Medication Review:   I have reviewed the patient's current medication list      Scheduled Meds  aspirin, 81 mg, Oral, Daily  atorvastatin, 20 mg, Oral, Nightly  buPROPion XL, 150 mg, Oral, Daily  clindamycin, 900 mg, Intravenous, Q8H  escitalopram, 10 mg, Oral, Daily  furosemide, 20 mg, Oral, Daily  lisinopril, 5 mg, Oral, Q24H  metoprolol succinate XL, 25 mg, Oral, Daily  prasugrel, 10 mg, Oral, Daily  sodium chloride, 10 mL, Intravenous, Q12H        Meds Infusions       Meds PRN  •  acetaminophen  •  ALPRAZolam  •  HYDROcodone-acetaminophen  •  melatonin  •  ondansetron  •  potassium chloride **OR** potassium chloride **OR** potassium chloride  •  [COMPLETED] Insert peripheral IV **AND** sodium chloride  •  sodium chloride        Assessment/Plan   Assessment/Plan     Active Hospital Problems    Diagnosis  POA   • **Left arm cellulitis [L03.114]  Yes   • Coronary artery disease involving native coronary artery of native heart with unstable  angina pectoris (CMS/HCC) [I25.110]  Yes   • History of MI (myocardial infarction) [I25.2]  Not Applicable   • Generalized anxiety disorder [F41.1]  Yes   • Hypokalemia [E87.6]  Yes   • Mixed hyperlipidemia [E78.2]  Yes   • Hypertension, benign [I10]  Yes   • Class 1 obesity due to excess calories with serious comorbidity and body mass index (BMI) of 34.0 to 34.9 in adult [E66.09, Z68.34]  Not Applicable      Resolved Hospital Problems   No resolved problems to display.       MEDICAL DECISION MAKING COMPLEXITY BY PROBLEM:       Right arm cellulitis and left thumb infection:  -History of MRSA  -Denies trauma  -Continue clindamycin and vancomycin per ID  -s/p MRI of the head 6/7/2021  -s/p venous duplex 6/7/2021--> ruled out DVT  -Trend inflammatory markers     CAD s/p stent:  -Denies chest pain  -Continue aspirin, Effient, metoprolol and statin     History of ischemic cardiomyopathy:  -EF improved on last TTE on 4/29/2020     Depression/anxiety:   -Controlled with Wellbutrin     Hypertension:  -On lisinopril and metoprolol at home    VTE Prophylaxis -   Mechanical Order History:      Ordered        06/06/21 1841  Place Sequential Compression Device  Once         06/06/21 1841  Maintain Sequential Compression Device  Continuous                 Pharmalogical Order History:     None            Code Status -   Code Status and Medical Interventions:   Ordered at: 06/07/21 0629     Code Status:    CPR     Medical Interventions (Level of Support Prior to Arrest):    Full       This patient has been examined wearing appropriate Personal Protective Equipment and discussed with nursing. 06/08/21        Discharge Planning  defer        Electronically signed by Nate Jorge DO, 06/08/21, 11:37 EDT.  [facility] Hospitalist Team

## 2021-06-08 NOTE — PLAN OF CARE
Goal Outcome Evaluation:  Plan of Care Reviewed With: patient        Progress: no change  Outcome Summary: Pt is on room air, VSS, IV antibiotics, waiting on room in [facility], will continue to monitor.

## 2021-06-08 NOTE — PROGRESS NOTES
Infectious Diseases Progress Note      LOS: 1 day   Patient Care Team:  Yodit Johnston MD as PCP - General (Family Medicine)  Amos Olivia MD as Consulting Physician (Cardiology)  Brant Martinez MD as Consulting Physician (Cardiology)  Chong Golden MD as Consulting Physician (Ophthalmology)    Chief Complaint: Left hand pain    Subjective       The patient had no fever during the last 24 hours.  The patient remained hemodynamically stable.  The patient denied having any new complaints today.  Review of Systems:   Review of Systems   Constitutional: Negative.    HENT: Negative.    Eyes: Negative.    Respiratory: Negative.    Cardiovascular: Negative.    Gastrointestinal: Negative.    Genitourinary: Negative.    Musculoskeletal:        Left hand pain   Skin: Negative.    Neurological: Negative.    Hematological: Negative.    Psychiatric/Behavioral: Negative.         Objective     Vital Signs  Temp:  [98 °F (36.7 °C)-98.4 °F (36.9 °C)] 98.3 °F (36.8 °C)  Heart Rate:  [54-61] 61  Resp:  [14-16] 14  BP: ()/(65-85) 99/66    Physical Exam:  Physical Exam  Vitals and nursing note reviewed.   Constitutional:       Appearance: She is well-developed.   HENT:      Head: Normocephalic and atraumatic.   Eyes:      Pupils: Pupils are equal, round, and reactive to light.   Cardiovascular:      Rate and Rhythm: Normal rate and regular rhythm.      Heart sounds: Normal heart sounds.   Pulmonary:      Effort: Pulmonary effort is normal. No respiratory distress.      Breath sounds: Normal breath sounds. No wheezing or rales.   Abdominal:      General: Bowel sounds are normal. There is no distension.      Palpations: Abdomen is soft. There is no mass.      Tenderness: There is no abdominal tenderness. There is no guarding or rebound.   Musculoskeletal:         General: No deformity. Normal range of motion.      Cervical back: Normal range of motion and neck supple.      Comments: Significant  improvement of the left forearm erythema.  Patient has no residual erythema.  She continued to have edema of the left hand with no significant erythema.  She continued to have tenderness at the base of the thumb.   Skin:     General: Skin is warm.      Findings: No erythema or rash.   Neurological:      Mental Status: She is alert and oriented to person, place, and time.      Cranial Nerves: No cranial nerve deficit.          Results Review:    I have reviewed all clinical data, test, lab, and imaging results.     Radiology  MRI Hand Left With & Without Contrast    Result Date: 6/7/2021  MRI HAND LEFT W WO CONTRAST-  Date of Exam: 6/7/2021 5:15 PM  Indication: Possible left thumb base abscess; L03.114-Cellulitis of left upper limb; R50.9-Fever, unspecified; M79.602-Pain in left arm.  Wound between the first and second digits.  Comparison: None available.  Technique: Multiplanar multisequence images of the hand were performed according to routine hand MRI protocol with and without administration of 20 mL ProHance.  FINDINGS:  Bone: Bone marrow signal intensity is normal for age.  Anatomic alignment. Suspicious osseous edema identified. No evidence of significant joint effusion.  Ligaments: The visualized ligaments are intact.  Tendons: The extensor tendons are intact.  The flexor tendons are intact.  No signficant tenosynovitis. No evidence of significant enhancement along the tendon structures. The flexor tunnel appears unremarkable. The median nerve appears within normal limits.  Cartilage: No significant cartilage defects. No erosions. No focal cartilage defects identified.  Other Intraarticular: No significant joint effusion.  No loose bodies identified.  Extra-articular: Diffuse soft tissue swelling is seen about the first digit and the between the first and second digits extending along the proximal portion of the second digit (series 8 image 12). No focal drainable fluid collections identified. Evaluation is  limited due to lack of intravenous contrast. Fluid is seen extending along the volar aspect of the soft tissues within this region (series 11 image 17) with diffuse enhancement present consistent with cellulitis. No drainable collection identified. No evidence of deep fascial enhancement. No significant susceptibility artifact identified to suggest air.       \Cellulitis of the first digit as well as the radial aspect of the base of the second digit extending along the soft tissues between the first and second digits and along the volar soft tissues within this region. No drainable collections identified. No evidence of osteomyelitis. No evidence of significant tenosynovitis.   Electronically Signed By-Solange Canales MD On:6/7/2021 6:15 PM This report was finalized on [id number] by  Solange Canales MD.      Cardiology    Laboratory    Results from last 7 days   Lab Units 06/08/21  0352 06/07/21 0428 06/06/21  1707   WBC 10*3/mm3 4.40 5.70 6.30   HEMOGLOBIN g/dL 11.7* 12.6 13.9   HEMATOCRIT % 34.7 37.7 40.9   PLATELETS 10*3/mm3 135* 129* 151     Results from last 7 days   Lab Units 06/08/21  0352 06/07/21  1915 06/07/21  0428 06/06/21  1707   SODIUM mmol/L 139  --  136 134*   POTASSIUM mmol/L 4.2 4.7 3.3* 3.1*   CHLORIDE mmol/L 107  --  103 98   CO2 mmol/L 24.0  --  23.0 22.0   BUN mg/dL 12  --  15 21*   CREATININE mg/dL 0.86  --  0.85 1.18*   GLUCOSE mg/dL 121*  --  110* 161*   ALBUMIN g/dL 3.20*  --   --  4.00   BILIRUBIN mg/dL <0.2  --   --  0.4   ALK PHOS U/L 90  --   --  111   AST (SGOT) U/L 15  --   --  28   ALT (SGPT) U/L 22  --   --  35*   CALCIUM mg/dL 8.2*  --  8.3* 9.3         Results from last 7 days   Lab Units 06/07/21  0428   SED RATE mm/hr 30         Microbiology   Microbiology Results (last 10 days)     Procedure Component Value - Date/Time    COVID PRE-OP / PRE-PROCEDURE SCREENING ORDER (NO ISOLATION) - Swab, Nasopharynx [[id number]]  (Normal) Collected: 06/06/21 2050    Lab Status: Final result  Specimen: Swab from Nasopharynx Updated: 06/06/21 2214    Narrative:      The following orders were created for panel order COVID PRE-OP / PRE-PROCEDURE SCREENING ORDER (NO ISOLATION) - Swab, Nasopharynx.  Procedure                               Abnormality         Status                     ---------                               -----------         ------                     COVID-19,CEPHEID,COR/NORIS...[[id number]]  Normal              Final result                 Please view results for these tests on the individual orders.    COVID-19,CEPHEID,COR/NORIS/PAD/ANTONIO IN-HOUSE(OR EMERGENT/ADD-ON),NP SWAB IN TRANSPORT MEDIA 3-4 HR TAT, RT-PCR - Swab, Nasopharynx [[id number]]  (Normal) Collected: 06/06/21 2050    Lab Status: Final result Specimen: Swab from Nasopharynx Updated: 06/06/21 2214     COVID19 Not Detected    Narrative:      Fact sheet for providers: https://www.fda.gov/media/736772/download     Fact sheet for patients: https://www.fda.gov/media/765797/download  Fact sheet for providers: https://www.fda.gov/media/255189/download     Fact sheet for patients: https://www.fda.gov/media/983131/download    Blood Culture - Blood, Arm, Left [[id number]] Collected: 06/06/21 1736    Lab Status: Preliminary result Specimen: Blood from Arm, Left Updated: 06/07/21 1745     Blood Culture No growth at 24 hours    Blood Culture - Blood, Arm, Right [[id number]] Collected: 06/06/21 1707    Lab Status: Preliminary result Specimen: Blood from Arm, Right Updated: 06/07/21 1715     Blood Culture No growth at 24 hours          Medication Review:       Schedule Meds  aspirin, 81 mg, Oral, Daily  atorvastatin, 20 mg, Oral, Nightly  buPROPion XL, 150 mg, Oral, Daily  clindamycin, 900 mg, Intravenous, Q8H  escitalopram, 10 mg, Oral, Daily  furosemide, 20 mg, Oral, Daily  lisinopril, 5 mg, Oral, Q24H  metoprolol succinate XL, 25 mg, Oral, Daily  prasugrel, 10 mg, Oral, Daily  sodium chloride, 10 mL, Intravenous, Q12H        Infusion Meds        PRN Meds  •  acetaminophen  •  ALPRAZolam  •  HYDROcodone-acetaminophen  •  melatonin  •  ondansetron  •  potassium chloride **OR** potassium chloride **OR** potassium chloride  •  [COMPLETED] Insert peripheral IV **AND** sodium chloride  •  sodium chloride        Assessment/Plan       Antimicrobial Therapy   1.       Day  2.      Day  3.      Day  4.      Day  5.      Day    Assessment     Left thumb infection/cellulitis.    MRI was negative for deep infection such as osteomyelitis or tenosynovitis or drainable fluid collection.  I believe patient might have an early phlegmon     Left arm cellulitis with a streaking all the way to the elbow.  Secondary to above.  I am suspecting toxin producing pathogen such as Staphylococcus aureus or beta-hemolytic Streptococcus.  Left arm cellulitis had resolved     Coronary artery disease s/p stent in the past     Plan     Continue IV vancomycin, apparently pharmacy did not schedule dose today.  I reached out to pharmacy department regarding continuation of IV vancomycin  Continue clindamycin 900 mg IV every 8 hours for short course  A.m. labs  May switch to p.o. antibiotics and discharge home tomorrow.  Probably Keflex and doxycycline for 2 weeks           Fabiola Eli MD  06/08/21  13:32 EDT      Note is dictated utilizing voice recognition software/Dragon

## 2021-06-08 NOTE — PLAN OF CARE
Goal Outcome Evaluation:  Plan of Care Reviewed With: patient        Progress: improving  Outcome Summary: admitting patient    Left forearm and hand swollen and painful.

## 2021-06-09 ENCOUNTER — READMISSION MANAGEMENT (OUTPATIENT)
Dept: CALL CENTER | Facility: HOSPITAL | Age: 51
End: 2021-06-09

## 2021-06-09 VITALS
HEART RATE: 54 BPM | WEIGHT: 205.03 LBS | RESPIRATION RATE: 20 BRPM | OXYGEN SATURATION: 96 % | BODY MASS INDEX: 34.16 KG/M2 | HEIGHT: 65 IN | TEMPERATURE: 98.6 F | DIASTOLIC BLOOD PRESSURE: 88 MMHG | SYSTOLIC BLOOD PRESSURE: 142 MMHG

## 2021-06-09 PROBLEM — E87.6 HYPOKALEMIA: Status: RESOLVED | Noted: 2019-11-26 | Resolved: 2021-06-09

## 2021-06-09 LAB
ALBUMIN SERPL-MCNC: 3.5 G/DL (ref 3.5–5.2)
ALBUMIN/GLOB SERPL: 1.5 G/DL
ALP SERPL-CCNC: 93 U/L (ref 39–117)
ALT SERPL W P-5'-P-CCNC: 26 U/L (ref 1–33)
ANION GAP SERPL CALCULATED.3IONS-SCNC: 10 MMOL/L (ref 5–15)
AST SERPL-CCNC: 19 U/L (ref 1–32)
BASOPHILS # BLD AUTO: 0 10*3/MM3 (ref 0–0.2)
BASOPHILS NFR BLD AUTO: 0.6 % (ref 0–1.5)
BILIRUB SERPL-MCNC: 0.2 MG/DL (ref 0–1.2)
BUN SERPL-MCNC: 11 MG/DL (ref 6–20)
BUN/CREAT SERPL: 14.5 (ref 7–25)
CALCIUM SPEC-SCNC: 8.5 MG/DL (ref 8.6–10.5)
CHLORIDE SERPL-SCNC: 104 MMOL/L (ref 98–107)
CO2 SERPL-SCNC: 25 MMOL/L (ref 22–29)
CREAT SERPL-MCNC: 0.76 MG/DL (ref 0.57–1)
DEPRECATED RDW RBC AUTO: 44.2 FL (ref 37–54)
EOSINOPHIL # BLD AUTO: 0.2 10*3/MM3 (ref 0–0.4)
EOSINOPHIL NFR BLD AUTO: 4.5 % (ref 0.3–6.2)
ERYTHROCYTE [DISTWIDTH] IN BLOOD BY AUTOMATED COUNT: 13.8 % (ref 12.3–15.4)
GFR SERPL CREATININE-BSD FRML MDRD: 80 ML/MIN/1.73
GLOBULIN UR ELPH-MCNC: 2.3 GM/DL
GLUCOSE SERPL-MCNC: 106 MG/DL (ref 65–99)
HCT VFR BLD AUTO: 34.9 % (ref 34–46.6)
HGB BLD-MCNC: 11.8 G/DL (ref 12–15.9)
LYMPHOCYTES # BLD AUTO: 2 10*3/MM3 (ref 0.7–3.1)
LYMPHOCYTES NFR BLD AUTO: 39.2 % (ref 19.6–45.3)
MCH RBC QN AUTO: 30.6 PG (ref 26.6–33)
MCHC RBC AUTO-ENTMCNC: 33.7 G/DL (ref 31.5–35.7)
MCV RBC AUTO: 90.7 FL (ref 79–97)
MONOCYTES # BLD AUTO: 0.7 10*3/MM3 (ref 0.1–0.9)
MONOCYTES NFR BLD AUTO: 13.2 % (ref 5–12)
NEUTROPHILS NFR BLD AUTO: 2.2 10*3/MM3 (ref 1.7–7)
NEUTROPHILS NFR BLD AUTO: 42.5 % (ref 42.7–76)
NRBC BLD AUTO-RTO: 0.2 /100 WBC (ref 0–0.2)
PLATELET # BLD AUTO: 159 10*3/MM3 (ref 140–450)
PMV BLD AUTO: 9 FL (ref 6–12)
POTASSIUM SERPL-SCNC: 4 MMOL/L (ref 3.5–5.2)
PROT SERPL-MCNC: 5.8 G/DL (ref 6–8.5)
RBC # BLD AUTO: 3.85 10*6/MM3 (ref 3.77–5.28)
SODIUM SERPL-SCNC: 139 MMOL/L (ref 136–145)
WBC # BLD AUTO: 5.2 10*3/MM3 (ref 3.4–10.8)

## 2021-06-09 PROCEDURE — 99239 HOSP IP/OBS DSCHRG MGMT >30: CPT | Performed by: HOSPITALIST

## 2021-06-09 PROCEDURE — 85025 COMPLETE CBC W/AUTO DIFF WBC: CPT | Performed by: INTERNAL MEDICINE

## 2021-06-09 PROCEDURE — 80053 COMPREHEN METABOLIC PANEL: CPT | Performed by: INTERNAL MEDICINE

## 2021-06-09 RX ORDER — DOXYCYCLINE 100 MG/1
100 TABLET ORAL EVERY 12 HOURS SCHEDULED
Status: DISCONTINUED | OUTPATIENT
Start: 2021-06-09 | End: 2021-06-09 | Stop reason: HOSPADM

## 2021-06-09 RX ORDER — CEPHALEXIN 500 MG/1
500 CAPSULE ORAL EVERY 6 HOURS SCHEDULED
Qty: 44 CAPSULE | Refills: 0 | Status: SHIPPED | OUTPATIENT
Start: 2021-06-09 | End: 2021-06-20

## 2021-06-09 RX ORDER — CEPHALEXIN 500 MG/1
500 CAPSULE ORAL EVERY 6 HOURS SCHEDULED
Status: DISCONTINUED | OUTPATIENT
Start: 2021-06-09 | End: 2021-06-09 | Stop reason: HOSPADM

## 2021-06-09 RX ORDER — DOXYCYCLINE 100 MG/1
100 TABLET ORAL EVERY 12 HOURS SCHEDULED
Qty: 22 TABLET | Refills: 0 | Status: SHIPPED | OUTPATIENT
Start: 2021-06-09 | End: 2021-06-20

## 2021-06-09 RX ADMIN — LISINOPRIL 5 MG: 5 TABLET ORAL at 09:03

## 2021-06-09 RX ADMIN — HYDROCODONE BITARTRATE AND ACETAMINOPHEN 1 TABLET: 7.5; 325 TABLET ORAL at 09:03

## 2021-06-09 RX ADMIN — PRASUGREL 10 MG: 10 TABLET, FILM COATED ORAL at 09:05

## 2021-06-09 RX ADMIN — CEPHALEXIN 500 MG: 500 CAPSULE ORAL at 13:35

## 2021-06-09 RX ADMIN — Medication 10 ML: at 09:03

## 2021-06-09 RX ADMIN — ESCITALOPRAM OXALATE 10 MG: 10 TABLET ORAL at 09:03

## 2021-06-09 RX ADMIN — ASPIRIN 81 MG: 81 TABLET, CHEWABLE ORAL at 09:03

## 2021-06-09 RX ADMIN — FUROSEMIDE 20 MG: 20 TABLET ORAL at 09:03

## 2021-06-09 RX ADMIN — BUPROPION HYDROCHLORIDE 150 MG: 150 TABLET, EXTENDED RELEASE ORAL at 09:03

## 2021-06-09 RX ADMIN — METOPROLOL SUCCINATE 25 MG: 25 TABLET, EXTENDED RELEASE ORAL at 09:03

## 2021-06-09 RX ADMIN — CLINDAMYCIN PHOSPHATE 900 MG: 900 INJECTION, SOLUTION INTRAVENOUS at 06:10

## 2021-06-09 RX ADMIN — DOXYCYCLINE 100 MG: 100 TABLET, FILM COATED ORAL at 13:35

## 2021-06-09 NOTE — CASE MANAGEMENT/SOCIAL WORK
Discharge Planning Assessment   Just[facility]     Patient Name: Sada Le  MRN: [id number]  Today's Date: 6/9/2021    Admit Date: 6/6/2021    Discharge Needs Assessment     Row Name 06/09/21 1320       Living Environment    Lives With  child(bing), adult    Current Living Arrangements  home/apartment/condo    Primary Care Provided by  self    Provides Primary Care For  no one    Family Caregiver if Needed  child(bing), adult    Able to Return to Prior Arrangements  yes       Resource/Environmental Concerns    Resource/Environmental Concerns  none       Transition Planning    Patient/Family Anticipates Transition to  home with family    Patient/Family Anticipated Services at Transition  none    Transportation Anticipated  car, drives self       Discharge Needs Assessment    Equipment Currently Used at Home  none         Plan    Plan  anticipate return home    Patient/Family in Agreement with Plan  yes         General Information    Admission Type  inpatient    Arrived From  emergency department    Preferred Language  English     Used During This Interaction  no         Functional Status    Usual Activity Tolerance  good    Current Activity Tolerance  good       Functional Status, IADL    Medications  independent    Meal Preparation  independent       Carol naegele rn  Case management  Office number [phone]  Cell phone [phone]

## 2021-06-09 NOTE — PROGRESS NOTES
Infectious Diseases Progress Note      LOS: 2 days   Patient Care Team:  Yodit Johnston MD as PCP - General (Family Medicine)  Amos Olivia MD as Consulting Physician (Cardiology)  Brant Martinez MD as Consulting Physician (Cardiology)  Chong Golden MD as Consulting Physician (Ophthalmology)    Chief Complaint: Left hand pain    Subjective       The patient had no fever during the last 24 hours.  The patient remained hemodynamically stable.  The patient denied having any new complaints today.  Significant improvement in left forearm erythema      Review of Systems:   Review of Systems   Constitutional: Negative.    HENT: Negative.    Eyes: Negative.    Respiratory: Negative.    Cardiovascular: Negative.    Gastrointestinal: Negative.    Genitourinary: Negative.    Musculoskeletal:        Left hand pain   Skin: Negative.    Neurological: Negative.    Hematological: Negative.    Psychiatric/Behavioral: Negative.         Objective     Vital Signs  Temp:  [97.8 °F (36.6 °C)-98.6 °F (37 °C)] 98.6 °F (37 °C)  Heart Rate:  [52-66] 54  Resp:  [14-20] 20  BP: (103-142)/(61-88) 142/88    Physical Exam:  Physical Exam  Vitals and nursing note reviewed.   Constitutional:       Appearance: She is well-developed.   HENT:      Head: Normocephalic and atraumatic.   Eyes:      Pupils: Pupils are equal, round, and reactive to light.   Cardiovascular:      Rate and Rhythm: Normal rate and regular rhythm.      Heart sounds: Normal heart sounds.   Pulmonary:      Effort: Pulmonary effort is normal. No respiratory distress.      Breath sounds: Normal breath sounds. No wheezing or rales.   Abdominal:      General: Bowel sounds are normal. There is no distension.      Palpations: Abdomen is soft. There is no mass.      Tenderness: There is no abdominal tenderness. There is no guarding or rebound.   Musculoskeletal:         General: No deformity. Normal range of motion.      Cervical back: Normal range of  motion and neck supple.      Comments: Significant improvement of the left forearm erythema.  Patient has no residual erythema.  She continued to have edema of the left hand with no significant erythema.  She continued to have tenderness at the base of the thumb.   Skin:     General: Skin is warm.      Findings: No erythema or rash.   Neurological:      Mental Status: She is alert and oriented to person, place, and time.      Cranial Nerves: No cranial nerve deficit.          Results Review:    I have reviewed all clinical data, test, lab, and imaging results.     Radiology  No Radiology Exams Resulted Within Past 24 Hours    Cardiology    Laboratory    Results from last 7 days   Lab Units 06/09/21 0340 06/08/21  0352 06/07/21  0428 06/06/21  1707   WBC 10*3/mm3 5.20 4.40 5.70 6.30   HEMOGLOBIN g/dL 11.8* 11.7* 12.6 13.9   HEMATOCRIT % 34.9 34.7 37.7 40.9   PLATELETS 10*3/mm3 159 135* 129* 151     Results from last 7 days   Lab Units 06/09/21 0340 06/08/21  0352 06/07/21  1915 06/07/21 0428 06/06/21  1707   SODIUM mmol/L 139 139  --  136 134*   POTASSIUM mmol/L 4.0 4.2 4.7 3.3* 3.1*   CHLORIDE mmol/L 104 107  --  103 98   CO2 mmol/L 25.0 24.0  --  23.0 22.0   BUN mg/dL 11 12  --  15 21*   CREATININE mg/dL 0.76 0.86  --  0.85 1.18*   GLUCOSE mg/dL 106* 121*  --  110* 161*   ALBUMIN g/dL 3.50 3.20*  --   --  4.00   BILIRUBIN mg/dL 0.2 <0.2  --   --  0.4   ALK PHOS U/L 93 90  --   --  111   AST (SGOT) U/L 19 15  --   --  28   ALT (SGPT) U/L 26 22  --   --  35*   CALCIUM mg/dL 8.5* 8.2*  --  8.3* 9.3         Results from last 7 days   Lab Units 06/07/21  0428   SED RATE mm/hr 30         Microbiology   Microbiology Results (last 10 days)     Procedure Component Value - Date/Time    COVID PRE-OP / PRE-PROCEDURE SCREENING ORDER (NO ISOLATION) - Swab, Nasopharynx [[id number]]  (Normal) Collected: 06/06/21 2050    Lab Status: Final result Specimen: Swab from Nasopharynx Updated: 06/06/21 2214    Narrative:      The  following orders were created for panel order COVID PRE-OP / PRE-PROCEDURE SCREENING ORDER (NO ISOLATION) - Swab, Nasopharynx.  Procedure                               Abnormality         Status                     ---------                               -----------         ------                     COVID-19,CEPHEID,COR/NORIS...[[id number]]  Normal              Final result                 Please view results for these tests on the individual orders.    COVID-19,CEPHEID,COR/NORIS/PAD/[facility] IN-HOUSE(OR EMERGENT/ADD-ON),NP SWAB IN TRANSPORT MEDIA 3-4 HR TAT, RT-PCR - Swab, Nasopharynx [[id number]]  (Normal) Collected: 06/06/21 2050    Lab Status: Final result Specimen: Swab from Nasopharynx Updated: 06/06/21 2214     COVID19 Not Detected    Narrative:      Fact sheet for providers: https://www.fda.gov/media/448390/download     Fact sheet for patients: https://www.fda.gov/media/072342/download  Fact sheet for providers: https://www.fda.gov/media/654468/download     Fact sheet for patients: https://www.fda.gov/media/306854/download    Blood Culture - Blood, Arm, Left [[id number]] Collected: 06/06/21 1736    Lab Status: Preliminary result Specimen: Blood from Arm, Left Updated: 06/08/21 1745     Blood Culture No growth at 2 days    Blood Culture - Blood, Arm, Right [[id number]] Collected: 06/06/21 1707    Lab Status: Preliminary result Specimen: Blood from Arm, Right Updated: 06/08/21 1715     Blood Culture No growth at 2 days          Medication Review:       Schedule Meds  aspirin, 81 mg, Oral, Daily  atorvastatin, 20 mg, Oral, Nightly  buPROPion XL, 150 mg, Oral, Daily  cephalexin, 500 mg, Oral, Q6H  doxycycline, 100 mg, Oral, Q12H  escitalopram, 10 mg, Oral, Daily  furosemide, 20 mg, Oral, Daily  lisinopril, 5 mg, Oral, Q24H  metoprolol succinate XL, 25 mg, Oral, Daily  prasugrel, 10 mg, Oral, Daily  sodium chloride, 10 mL, Intravenous, Q12H        Infusion Meds       PRN Meds  •  acetaminophen  •  ALPRAZolam  •   HYDROcodone-acetaminophen  •  melatonin  •  ondansetron  •  potassium chloride **OR** potassium chloride **OR** potassium chloride  •  [COMPLETED] Insert peripheral IV **AND** sodium chloride  •  sodium chloride        Assessment/Plan       Antimicrobial Therapy   1.  Vancomycin      day  2.  Clindamycin      day  3.  Keflex      day  4.  Doxycycline      day  5.      Day    Assessment     Left thumb infection/cellulitis.    MRI was negative for deep infection such as osteomyelitis or tenosynovitis or drainable fluid collection.  I believe patient might have an early phlegmon  -Swelling and pain improved     Left arm cellulitis with a streaking all the way to the elbow.  Secondary to above.  I am suspecting toxin producing pathogen such as Staphylococcus aureus or beta-hemolytic Streptococcus.  Left arm cellulitis had resolved     Coronary artery disease s/p stent in the past     Plan     Discontinue IV vancomycin  Discontinue clindamycin   Start p.o. Keflex 500 mg 3 times daily for 10 days  Start p.o. doxycycline 100 mg twice daily for 10 days  Continue supportive care  Okay to discharge from Infectious Disease standpoint             DARIEL Rodriguez  06/09/21  14:39 EDT      Note is dictated utilizing voice recognition software/Dragon

## 2021-06-09 NOTE — DISCHARGE SUMMARY
[facility] Medicine Services  DISCHARGE SUMMARY        Prepared For PCP:  Yodit Johnston MD    Patient Name: Sada Le  : 1970  MRN: [id number]      Date of Admission:   2021    Date of Discharge:  2021    Length of stay:  LOS: 2 days     Hospital Course     Presenting Problem:   Fever and chills [R50.9]  Left arm pain [M79.602]  Left arm cellulitis [L03.114]      Active Hospital Problems    Diagnosis  POA   • **Left arm cellulitis [L03.114]  Yes     Priority: High   • Coronary artery disease involving native coronary artery of native heart with unstable angina pectoris (CMS/HCC) [I25.110]  Yes     Priority: Medium   • History of MI (myocardial infarction) [I25.2]  Not Applicable     Priority: Medium   • Mixed hyperlipidemia [E78.2]  Yes     Priority: Medium   • Hypertension, benign [I10]  Yes     Priority: Medium   • Class 1 obesity due to excess calories with serious comorbidity and body mass index (BMI) of 34.0 to 34.9 in adult [E66.09, Z68.34]  Not Applicable     Priority: Medium   • Generalized anxiety disorder [F41.1]  Yes      Resolved Hospital Problems    Diagnosis Date Resolved POA   • Hypokalemia [E87.6] 2021 Yes     Priority: High           Hospital Course:    The patient was continued on vancomycin and clindamycin which improved the edema and erythema of left hand.  She underwent MRI of the left hand on 2021 which ruled out osteomyelitis.  Venous duplex ruled out DVT of the left arm.  She will be discharged home on Keflex and doxycycline per ID recommendations.    Problem list addressed during the hospitalization:    Right arm cellulitis and left thumb infection:  -History of MRSA  -Denies trauma  -Continue clindamycin and vancomycin per ID  -s/p MRI of the hand 2021  -s/p venous duplex 2021--> ruled out DVT  -Discharged home on 10 days of Keflex and doxycycline.        CAD s/p stent:  -Denied chest pain  -Continue aspirin, Effient,  metoprolol and statin     History of ischemic cardiomyopathy:  -EF improved on last TTE on 4/29/2020     Depression/anxiety:   -Controlled with Wellbutrin     Hypertension:  -On lisinopril and metoprolol at home        Recommendation for Outpatient Providers:             Reasons For Change In Medications and Indications for New Medications:        Day of Discharge     HPI:     The patient is a pleasant 50-year-old female with history of CAD s/p stent and MRSA that presented to the emergency room on 6/6/2021 complaining of left thumb ulcer, erythema and warmth of her left hand extending into her left axilla.  The patient thinks that she had fever at home and denies trauma to the hand.  The patient admits to having MRSA infection in multiple locations in the last 7 years.     The patient was originally placed on observation and was given cefepime and vancomycin but continued to have the erythema of arm thus ID was consulted.  She is currently on vancomycin and clindamycin and will be slated for admission due to need for continued IV antibiotics.       Vital Signs:   Temp:  [97.8 °F (36.6 °C)-98.6 °F (37 °C)] 98.6 °F (37 °C)  Heart Rate:  [52-66] 54  Resp:  [14-20] 20  BP: (103-142)/(61-88) 142/88     Physical Exam:  Physical Exam  HENT:      Head: Normocephalic.      Nose: Nose normal.   Eyes:      General: No scleral icterus.     Extraocular Movements: Extraocular movements intact.      Pupils: Pupils are equal, round, and reactive to light.   Cardiovascular:      Rate and Rhythm: Normal rate.   Pulmonary:      Effort: Pulmonary effort is normal.   Abdominal:      General: Bowel sounds are normal.   Musculoskeletal:         General: Normal range of motion.      Cervical back: Normal range of motion.      Comments: Left thumb ulcer   Skin:     General: Skin is warm.   Neurological:      Mental Status: She is alert and oriented to person, place, and time. Mental status is at baseline.   Psychiatric:         Mood and  Affect: Mood normal.         Pertinent  and/or Most Recent Results     Results from last 7 days   Lab Units 06/09/21  0340 06/08/21  0352 06/07/21  1915 06/07/21  0428 06/06/21  1707   WBC 10*3/mm3 5.20 4.40  --  5.70 6.30   HEMOGLOBIN g/dL 11.8* 11.7*  --  12.6 13.9   HEMATOCRIT % 34.9 34.7  --  37.7 40.9   PLATELETS 10*3/mm3 159 135*  --  129* 151   SODIUM mmol/L 139 139  --  136 134*   POTASSIUM mmol/L 4.0 4.2 4.7 3.3* 3.1*   CHLORIDE mmol/L 104 107  --  103 98   CO2 mmol/L 25.0 24.0  --  23.0 22.0   BUN mg/dL 11 12  --  15 21*   CREATININE mg/dL 0.76 0.86  --  0.85 1.18*   GLUCOSE mg/dL 106* 121*  --  110* 161*   CALCIUM mg/dL 8.5* 8.2*  --  8.3* 9.3     Results from last 7 days   Lab Units 06/09/21  0340 06/08/21  0352 06/06/21  1707   BILIRUBIN mg/dL 0.2 <0.2 0.4   ALK PHOS U/L 93 90 111   ALT (SGPT) U/L 26 22 35*   AST (SGOT) U/L 19 15 28           Invalid input(s): TG, LDLCALC, LDLREALC  Results from last 7 days   Lab Units 06/06/21  1711   LACTATE mmol/L 1.7       Brief Urine Lab Results     None          Microbiology Results Abnormal     Procedure Component Value - Date/Time    Blood Culture - Blood, Arm, Left [[id number]] Collected: 06/06/21 1736    Lab Status: Preliminary result Specimen: Blood from Arm, Left Updated: 06/08/21 1745     Blood Culture No growth at 2 days    Blood Culture - Blood, Arm, Right [[id number]] Collected: 06/06/21 1707    Lab Status: Preliminary result Specimen: Blood from Arm, Right Updated: 06/08/21 1715     Blood Culture No growth at 2 days    COVID PRE-OP / PRE-PROCEDURE SCREENING ORDER (NO ISOLATION) - Swab, Nasopharynx [[id number]]  (Normal) Collected: 06/06/21 2050    Lab Status: Final result Specimen: Swab from Nasopharynx Updated: 06/06/21 2214    Narrative:      The following orders were created for panel order COVID PRE-OP / PRE-PROCEDURE SCREENING ORDER (NO ISOLATION) - Swab, Nasopharynx.  Procedure                               Abnormality         Status                      ---------                               -----------         ------                     COVID-19,CEPHEID,COR/NORIS...[[id number]]  Normal              Final result                 Please view results for these tests on the individual orders.    COVID-19,CEPHEID,COR/NORIS/PAD/[facility]ONIO IN-HOUSE(OR EMERGENT/ADD-ON),NP SWAB IN TRANSPORT MEDIA 3-4 HR TAT, RT-PCR - Swab, Nasopharynx [[id number]]  (Normal) Collected: 06/06/21 2050    Lab Status: Final result Specimen: Swab from Nasopharynx Updated: 06/06/21 2214     COVID19 Not Detected    Narrative:      Fact sheet for providers: https://www.fda.gov/media/533440/download     Fact sheet for patients: https://www.fda.gov/media/959539/download  Fact sheet for providers: https://www.fda.gov/media/351464/download     Fact sheet for patients: https://www.fda.gov/media/542960/download          XR Knee 4+ View Right    Result Date: 6/4/2021  Impression: Essentially negative exam.  Electronically Signed By-Jammie Sena MD On:6/4/2021 4:04 PM This report was finalized on [id number] by  Jammie Sena MD.    MRI Hand Left With & Without Contrast    Result Date: 6/7/2021  Impression:  \Cellulitis of the first digit as well as the radial aspect of the base of the second digit extending along the soft tissues between the first and second digits and along the volar soft tissues within this region. No drainable collections identified. No evidence of osteomyelitis. No evidence of significant tenosynovitis.   Electronically Signed By-Solange Canales MD On:6/7/2021 6:15 PM This report was finalized on [id number] by  Solange Canales MD.      Results for orders placed during the hospital encounter of 06/06/21    Duplex Venous Upper Extremity - Left CAR    Interpretation Summary  · Normal left upper extremity venous duplex scan.      Results for orders placed during the hospital encounter of 06/06/21    Duplex Venous Upper Extremity - Left CAR    Interpretation Summary  · Normal left upper extremity  venous duplex scan.      Results for orders placed during the hospital encounter of 04/28/20    Adult Transthoracic Echo Complete W/ Cont if Necessary Per Protocol    Interpretation Summary  · Left ventricular mass index is increased.  · Left ventricular wall thickness is consistent with mild concentric hypertrophy.  · Left ventricular systolic function is normal.  · Left ventricular diastolic dysfunction (grade I a) consistent with impaired relaxation.  · Mild aortic valve regurgitation is present.  · Mild dilation of the aortic root is present. Mild dilation of the sinuses of Valsalva is present.              Test Results Pending at Discharge  Pending Labs     Order Current Status    Blood Culture - Blood, Arm, Left Preliminary result    Blood Culture - Blood, Arm, Right Preliminary result            Procedures Performed           Consults:   Consults     Date and Time Order Name Status Description    6/7/2021  2:15 PM Inpatient Hospitalist Consult Completed     6/7/2021  7:29 AM Inpatient Infectious Diseases Consult Completed             Discharge Details        Discharge Medications      New Medications      Instructions Start Date   cephalexin 500 MG capsule  Commonly known as: KEFLEX   500 mg, Oral, Every 6 Hours Scheduled      doxycycline 100 MG tablet  Commonly known as: ADOXA   100 mg, Oral, Every 12 Hours Scheduled         Continue These Medications      Instructions Start Date   ALPRAZolam 0.25 MG tablet  Commonly known as: XANAX   0.25 mg, Oral, Every 6 Hours PRN      atorvastatin 20 MG tablet  Commonly known as: LIPITOR   20 mg, Oral, Nightly      buPROPion  MG 24 hr tablet  Commonly known as: WELLBUTRIN XL   150 mg, Oral, Daily      cetirizine 10 MG tablet  Commonly known as: zyrTEC   10 mg, Oral, Nightly      cholecalciferol 1.25 MG (24651 UT) capsule  Commonly known as: VITAMIN D3   50,000 Units, Oral, Daily      coenzyme Q10 100 MG capsule   200 mg, Oral, 2 Times Daily      [employer] Aspirin Adult  Low Dose 81 MG chewable tablet  Generic drug: aspirin   CHEW 1 TABLET BY MOUTH EVERY DAY      escitalopram 10 MG tablet  Commonly known as: LEXAPRO   10 mg, Oral, Daily      furosemide 20 MG tablet  Commonly known as: LASIX   20 mg, Oral, Daily PRN      lisinopril 5 MG tablet  Commonly known as: PRINIVIL,ZESTRIL   5 mg, Oral, 2 times daily      metoprolol succinate XL 25 MG 24 hr tablet  Commonly known as: TOPROL-XL   25 mg, Oral, Daily      nitroglycerin 0.4 MG SL tablet  Commonly known as: NITROSTAT   0.4 mg, Sublingual, Every 5 Minutes PRN, Take no more than 3 doses in 15 minutes.       Potassium 99 MG tablet   1 tablet, Oral, Daily      prasugrel 10 MG tablet  Commonly known as: EFFIENT   TAKE 1 TABLET BY MOUTH EVERY DAY      valACYclovir 1000 MG tablet  Commonly known as: VALTREX   1,000 mg, Oral, Every 8 Hours      vitamin B-12 500 MCG tablet  Commonly known as: CYANOCOBALAMIN   500 mcg, Oral, Daily         Stop These Medications    predniSONE 20 MG tablet  Commonly known as: DELTASONE            Allergies   Allergen Reactions   • Penicillins Rash   • Ciprofloxacin Rash   • Penicillin G Rash         Discharge Disposition:  Home or Self Care    Diet:  Hospital:  Diet Order   Procedures   • Diet Cardiac; Healthy Heart         Discharge Activity: as tolerated        CODE STATUS:    Code Status and Medical Interventions:   Ordered at: 06/07/21 0629     Code Status:    CPR     Medical Interventions (Level of Support Prior to Arrest):    Full         Follow-up Appointments  Future Appointments   Date Time Provider Department Center   9/8/2021  1:15 PM Brant Martinez MD [facility]       Additional Instructions for the Follow-ups that You Need to Schedule     Discharge Follow-up with PCP   As directed       Currently Documented PCP:    Yodit Johnston MD    PCP Phone Number:    [phone]     Follow Up Details: 2 weeks                 Condition on Discharge:      Stable      This patient has been  examined wearing appropriate Personal Protective Equipment and discussed with nursing. 06/09/21      Electronically signed by Nate Jorge DO, 06/09/21, 1:22 PM EDT.      Time: I spent  15  minutes on this discharge activity which included face-to-face encounter with the patient/reviewing the data in the system/coordination of the care with the nursing staff as well as consultants/documentation/entering orders.

## 2021-06-09 NOTE — PLAN OF CARE
Goal Outcome Evaluation:            Patient resting abed, no distress noted. Patient states that she is feeling much better. Her hand continues to have some edema. The redness appears to be going down from where it was marked. Patient is hopeful to be discharged soon. We talked about her possibly getting discharged but on oral antibiotics, she was agreeable to that. Patient stated that she knows it is up to the doctor. Will continue to monitor.

## 2021-06-10 ENCOUNTER — TRANSITIONAL CARE MANAGEMENT TELEPHONE ENCOUNTER (OUTPATIENT)
Dept: CALL CENTER | Facility: HOSPITAL | Age: 51
End: 2021-06-10

## 2021-06-10 NOTE — OUTREACH NOTE
Prep Survey      Responses   [facility] facility patient discharged from?  [facility]   Is LACE score < 7 ?  No   Emergency Room discharge w/ pulse ox?  No   Eligibility  [facility]   Date of Admission  06/06/21   Date of Discharge  06/09/21   Discharge Disposition  Home or Self Care   Discharge diagnosis  Left arm cellulitis   Does the patient have one of the following disease processes/diagnoses(primary or secondary)?  Other   Does the patient have Home health ordered?  No   Is there a DME ordered?  No   Prep survey completed?  Yes          Day Siegel RN

## 2021-06-10 NOTE — OUTREACH NOTE
Call Center TCM Note      Responses   [facility] patient discharged from?  [facility]   Does the patient have one of the following disease processes/diagnoses(primary or secondary)?  Other   TCM attempt successful?  No   Unsuccessful attempts  Attempt 1          Miryam Olivia MA    6/10/2021, 11:56 EDT

## 2021-06-10 NOTE — CASE MANAGEMENT/SOCIAL WORK
Discharge Planning Assessment   [facility]     Patient Name: Sada Le  MRN: [id number]  Today's Date: 6/10/2021    Admit Date: 6/6/2021          Plan    Final Discharge Disposition Code  01 - home or self-care    Final Note  return home       Carol naegele rn  Case management  Office number [phone]  Cell phone [phone]

## 2021-06-10 NOTE — OUTREACH NOTE
Call Center TCM Note      Responses   [facility] [place] patient discharged from?  [facility]   Does the patient have one of the following disease processes/diagnoses(primary or secondary)?  Other   TCM attempt successful?  Yes   Discharge diagnosis  Left arm cellulitis   Meds reviewed with patient/caregiver?  Yes   Is the patient having any side effects they believe may be caused by any medication additions or changes?  No   Does the patient have all medications ordered at discharge?  Yes   Is the patient taking all medications as directed (includes completed medication regime)?  Yes   Does the patient have a primary care provider?   Yes   Does the patient have an appointment with their PCP within 7 days of discharge?  No   Comments regarding PCP  Pt states she is out of town all next week and will call to sched fwp with PCP    Has the patient kept scheduled appointments due by today?  N/A   Has home health visited the patient within 72 hours of discharge?  N/A   Psychosocial issues?  No   Did the patient receive a copy of their discharge instructions?  Yes   Nursing interventions  Reviewed instructions with patient   What is the patient's perception of their health status since discharge?  Improving   Is the patient/caregiver able to teach back signs and symptoms related to disease process for when to call PCP?  Yes   Is the patient/caregiver able to teach back signs and symptoms related to disease process for when to call 911?  Yes   Is the patient/caregiver able to teach back the hierarchy of who to call/visit for symptoms/problems? PCP, Specialist, Home health nurse, Urgent Care, ED, 911  Yes   If the patient is a current smoker, are they able to teach back resources for cessation?  Not a smoker   TCM call completed?  Yes   Wrap up additional comments  Pt states red streaks are gone from forearm, hand still has some edema but much better. Pt has both antibiotics in place and will complete 10 day regimen. No fever,  lizy. Pt states she is out of town all next week and will call to sched fwp with PCP           Miryam Olivia MA    6/10/2021, 16:23 EDT

## 2021-06-10 NOTE — PAYOR COMM NOTE
"NOTIFICATION OF DISCHARGE:        Magdalena Le (50 y.o. Female) 1970  AUTH # [id number]        DISCHARGED TO HOME ON 06/09/2021.        AUTHORIZATION PENDING:   PLEASE CALL OR FAX DETERMINATION TO CONTACT BELOW. THANK YOU.        Maggy Roa, RN MSN  /UR  [facility]  [phone] office  [phone] fax  [email or web address]    [facility]  NPI: [id number]  Tax: [id number]        Magdalena Le (50 y.o. Female)     Date of Birth Social Security Number Address Home Phone MRN    1970  [address] [id number] [phone] [id number]    Religious Marital Status          None        Admission Date Admission Type Admitting Provider Attending Provider Department, Room/Bed    6/6/21 Emergency Nate Jorge DO  [facility] 3C MEDICAL INPATIENT, 377/1    Discharge Date Discharge Disposition Discharge Destination        6/9/2021 Home or Self Care              Attending Provider: (none)   Allergies: Penicillins, Ciprofloxacin, Penicillin G    Isolation: None   Infection: None   Code Status: Prior    Ht: 165.1 cm (65\")   Wt: 93 kg (205 lb 0.4 oz)    Admission Cmt: None   Principal Problem: Left arm cellulitis [L03.114]                 Active Insurance as of 6/6/2021     Primary Coverage     Payor Plan Insurance Group Employer/Plan Group    [id number]     Payor Plan Address Payor Plan Phone Number Payor Plan Fax Number Effective Dates    MAIL STOP:   8/1/2019 - None Entered    [address] [address]       [facility] [id number]       Subscriber Name Subscriber Birth Date Member ID       MAGDALENA LE 1970 [id number]                 Emergency Contacts      (Rel.) Home Phone Work Phone Mobile Phone    Cb Atkinson (Significant Other) [phone] -- [phone]    Helen Burris (Sister) [phone] -- [phone]    ARTHUR CORBIN (Daughter) -- -- [phone]    "            Discharge Summary      Nate Jorge DO at 21 1322                [facility] Medicine Services  DISCHARGE SUMMARY        Prepared For PCP:  Yodit Johnston MD    Patient Name: Sada Le  : 1970  MRN: [id number]      Date of Admission:   2021    Date of Discharge:  2021    Length of stay:  LOS: 2 days     Hospital Course     Presenting Problem:   Fever and chills [R50.9]  Left arm pain [M79.602]  Left arm cellulitis [L03.114]      Active Hospital Problems    Diagnosis  POA   • **Left arm cellulitis [L03.114]  Yes     Priority: High   • Coronary artery disease involving native coronary artery of native heart with unstable angina pectoris (CMS/HCC) [I25.110]  Yes     Priority: Medium   • History of MI (myocardial infarction) [I25.2]  Not Applicable     Priority: Medium   • Mixed hyperlipidemia [E78.2]  Yes     Priority: Medium   • Hypertension, benign [I10]  Yes     Priority: Medium   • Class 1 obesity due to excess calories with serious comorbidity and body mass index (BMI) of 34.0 to 34.9 in adult [E66.09, Z68.34]  Not Applicable     Priority: Medium   • Generalized anxiety disorder [F41.1]  Yes      Resolved Hospital Problems    Diagnosis Date Resolved POA   • Hypokalemia [E87.6] 2021 Yes     Priority: High           Hospital Course:    The patient was continued on vancomycin and clindamycin which improved the edema and erythema of left hand.  She underwent MRI of the left hand on 2021 which ruled out osteomyelitis.  Venous duplex ruled out DVT of the left arm.  She will be discharged home on Keflex and doxycycline per ID recommendations.    Problem list addressed during the hospitalization:    Right arm cellulitis and left thumb infection:  -History of MRSA  -Denies trauma  -Continue clindamycin and vancomycin per ID  -s/p MRI of the hand 2021  -s/p venous duplex 2021--> ruled out DVT  -Discharged home on 10 days of Keflex and  doxycycline.        CAD s/p stent:  -Denied chest pain  -Continue aspirin, Effient, metoprolol and statin     History of ischemic cardiomyopathy:  -EF improved on last TTE on 4/29/2020     Depression/anxiety:   -Controlled with Wellbutrin     Hypertension:  -On lisinopril and metoprolol at home        Recommendation for Outpatient Providers:             Reasons For Change In Medications and Indications for New Medications:        Day of Discharge     HPI:     The patient is a pleasant 50-year-old female with history of CAD s/p stent and MRSA that presented to the emergency room on 6/6/2021 complaining of left thumb ulcer, erythema and warmth of her left hand extending into her left axilla.  The patient thinks that she had fever at home and denies trauma to the hand.  The patient admits to having MRSA infection in multiple locations in the last 7 years.     The patient was originally placed on observation and was given cefepime and vancomycin but continued to have the erythema of arm thus ID was consulted.  She is currently on vancomycin and clindamycin and will be slated for admission due to need for continued IV antibiotics.       Vital Signs:   Temp:  [97.8 °F (36.6 °C)-98.6 °F (37 °C)] 98.6 °F (37 °C)  Heart Rate:  [52-66] 54  Resp:  [14-20] 20  BP: (103-142)/(61-88) 142/88     Physical Exam:  Physical Exam  HENT:      Head: Normocephalic.      Nose: Nose normal.   Eyes:      General: No scleral icterus.     Extraocular Movements: Extraocular movements intact.      Pupils: Pupils are equal, round, and reactive to light.   Cardiovascular:      Rate and Rhythm: Normal rate.   Pulmonary:      Effort: Pulmonary effort is normal.   Abdominal:      General: Bowel sounds are normal.   Musculoskeletal:         General: Normal range of motion.      Cervical back: Normal range of motion.      Comments: Left thumb ulcer   Skin:     General: Skin is warm.   Neurological:      Mental Status: She is alert and oriented to  person, place, and time. Mental status is at baseline.   Psychiatric:         Mood and Affect: Mood normal.         Pertinent  and/or Most Recent Results     Results from last 7 days   Lab Units 06/09/21  0340 06/08/21  0352 06/07/21  1915 06/07/21  0428 06/06/21  1707   WBC 10*3/mm3 5.20 4.40  --  5.70 6.30   HEMOGLOBIN g/dL 11.8* 11.7*  --  12.6 13.9   HEMATOCRIT % 34.9 34.7  --  37.7 40.9   PLATELETS 10*3/mm3 159 135*  --  129* 151   SODIUM mmol/L 139 139  --  136 134*   POTASSIUM mmol/L 4.0 4.2 4.7 3.3* 3.1*   CHLORIDE mmol/L 104 107  --  103 98   CO2 mmol/L 25.0 24.0  --  23.0 22.0   BUN mg/dL 11 12  --  15 21*   CREATININE mg/dL 0.76 0.86  --  0.85 1.18*   GLUCOSE mg/dL 106* 121*  --  110* 161*   CALCIUM mg/dL 8.5* 8.2*  --  8.3* 9.3     Results from last 7 days   Lab Units 06/09/21  0340 06/08/21  0352 06/06/21  1707   BILIRUBIN mg/dL 0.2 <0.2 0.4   ALK PHOS U/L 93 90 111   ALT (SGPT) U/L 26 22 35*   AST (SGOT) U/L 19 15 28           Invalid input(s): TG, LDLCALC, LDLREALC  Results from last 7 days   Lab Units 06/06/21  1711   LACTATE mmol/L 1.7       Brief Urine Lab Results     None          Microbiology Results Abnormal     Procedure Component Value - Date/Time    Blood Culture - Blood, Arm, Left [[id number]] Collected: 06/06/21 1736    Lab Status: Preliminary result Specimen: Blood from Arm, Left Updated: 06/08/21 1745     Blood Culture No growth at 2 days    Blood Culture - Blood, Arm, Right [[id number]] Collected: 06/06/21 1707    Lab Status: Preliminary result Specimen: Blood from Arm, Right Updated: 06/08/21 1715     Blood Culture No growth at 2 days    COVID PRE-OP / PRE-PROCEDURE SCREENING ORDER (NO ISOLATION) - Swab, Nasopharynx [[id number]]  (Normal) Collected: 06/06/21 2050    Lab Status: Final result Specimen: Swab from Nasopharynx Updated: 06/06/21 2214    Narrative:      The following orders were created for panel order COVID PRE-OP / PRE-PROCEDURE SCREENING ORDER (NO ISOLATION) - Swab,  Nasopharynx.  Procedure                               Abnormality         Status                     ---------                               -----------         ------                     COVID-19,CEPHEID,COR/NORIS...[[id number]]  Normal              Final result                 Please view results for these tests on the individual orders.    COVID-19,CEPHEID,COR/NORIS/PAD/[facility] IN-HOUSE(OR EMERGENT/ADD-ON),NP SWAB IN TRANSPORT MEDIA 3-4 HR TAT, RT-PCR - Swab, Nasopharynx [[id number]]  (Normal) Collected: 06/06/21 2050    Lab Status: Final result Specimen: Swab from Nasopharynx Updated: 06/06/21 2214     COVID19 Not Detected    Narrative:      Fact sheet for providers: https://www.fda.gov/media/045783/download     Fact sheet for patients: https://www.fda.gov/media/208012/download  Fact sheet for providers: https://www.fda.gov/media/065491/download     Fact sheet for patients: https://www.fda.gov/media/358150/download          XR Knee 4+ View Right    Result Date: 6/4/2021  Impression: Essentially negative exam.  Electronically Signed By-Jammie Sena MD On:6/4/2021 4:04 PM This report was finalized on [id number] by  Jammie Sena MD.    MRI Hand Left With & Without Contrast    Result Date: 6/7/2021  Impression:  \Cellulitis of the first digit as well as the radial aspect of the base of the second digit extending along the soft tissues between the first and second digits and along the volar soft tissues within this region. No drainable collections identified. No evidence of osteomyelitis. No evidence of significant tenosynovitis.   Electronically Signed By-Solange Canales MD On:6/7/2021 6:15 PM This report was finalized on [id number] by  Solange Canales MD.      Results for orders placed during the hospital encounter of 06/06/21    Duplex Venous Upper Extremity - Left CAR    Interpretation Summary  · Normal left upper extremity venous duplex scan.      Results for orders placed during the hospital encounter of  06/06/21    Duplex Venous Upper Extremity - Left CAR    Interpretation Summary  · Normal left upper extremity venous duplex scan.      Results for orders placed during the hospital encounter of 04/28/20    Adult Transthoracic Echo Complete W/ Cont if Necessary Per Protocol    Interpretation Summary  · Left ventricular mass index is increased.  · Left ventricular wall thickness is consistent with mild concentric hypertrophy.  · Left ventricular systolic function is normal.  · Left ventricular diastolic dysfunction (grade I a) consistent with impaired relaxation.  · Mild aortic valve regurgitation is present.  · Mild dilation of the aortic root is present. Mild dilation of the sinuses of Valsalva is present.              Test Results Pending at Discharge  Pending Labs     Order Current Status    Blood Culture - Blood, Arm, Left Preliminary result    Blood Culture - Blood, Arm, Right Preliminary result            Procedures Performed           Consults:   Consults     Date and Time Order Name Status Description    6/7/2021  2:15 PM Inpatient Hospitalist Consult Completed     6/7/2021  7:29 AM Inpatient Infectious Diseases Consult Completed             Discharge Details        Discharge Medications      New Medications      Instructions Start Date   cephalexin 500 MG capsule  Commonly known as: KEFLEX   500 mg, Oral, Every 6 Hours Scheduled      doxycycline 100 MG tablet  Commonly known as: ADOXA   100 mg, Oral, Every 12 Hours Scheduled         Continue These Medications      Instructions Start Date   ALPRAZolam 0.25 MG tablet  Commonly known as: XANAX   0.25 mg, Oral, Every 6 Hours PRN      atorvastatin 20 MG tablet  Commonly known as: LIPITOR   20 mg, Oral, Nightly      buPROPion  MG 24 hr tablet  Commonly known as: WELLBUTRIN XL   150 mg, Oral, Daily      cetirizine 10 MG tablet  Commonly known as: zyrTEC   10 mg, Oral, Nightly      cholecalciferol 1.25 MG (20072 UT) capsule  Commonly known as: VITAMIN D3    50,000 Units, Oral, Daily      coenzyme Q10 100 MG capsule   200 mg, Oral, 2 Times Daily      [employer] Aspirin Adult Low Dose 81 MG chewable tablet  Generic drug: aspirin   CHEW 1 TABLET BY MOUTH EVERY DAY      escitalopram 10 MG tablet  Commonly known as: LEXAPRO   10 mg, Oral, Daily      furosemide 20 MG tablet  Commonly known as: LASIX   20 mg, Oral, Daily PRN      lisinopril 5 MG tablet  Commonly known as: PRINIVIL,ZESTRIL   5 mg, Oral, 2 times daily      metoprolol succinate XL 25 MG 24 hr tablet  Commonly known as: TOPROL-XL   25 mg, Oral, Daily      nitroglycerin 0.4 MG SL tablet  Commonly known as: NITROSTAT   0.4 mg, Sublingual, Every 5 Minutes PRN, Take no more than 3 doses in 15 minutes.       Potassium 99 MG tablet   1 tablet, Oral, Daily      prasugrel 10 MG tablet  Commonly known as: EFFIENT   TAKE 1 TABLET BY MOUTH EVERY DAY      valACYclovir 1000 MG tablet  Commonly known as: VALTREX   1,000 mg, Oral, Every 8 Hours      vitamin B-12 500 MCG tablet  Commonly known as: CYANOCOBALAMIN   500 mcg, Oral, Daily         Stop These Medications    predniSONE 20 MG tablet  Commonly known as: DELTASONE            Allergies   Allergen Reactions   • Penicillins Rash   • Ciprofloxacin Rash   • Penicillin G Rash         Discharge Disposition:  Home or Self Care    Diet:  Hospital:  Diet Order   Procedures   • Diet Cardiac; Healthy Heart         Discharge Activity: as tolerated        CODE STATUS:    Code Status and Medical Interventions:   Ordered at: 06/07/21 0629     Code Status:    CPR     Medical Interventions (Level of Support Prior to Arrest):    Full         Follow-up Appointments  Future Appointments   Date Time Provider Department Center   9/8/2021  1:15 PM Brant Martinez MD [facility]       Additional Instructions for the Follow-ups that You Need to Schedule     Discharge Follow-up with PCP   As directed       Currently Documented PCP:    Yodit Johnston MD    PCP Phone Number:    [phone]      Follow Up Details: 2 weeks                 Condition on Discharge:      Stable      This patient has been examined wearing appropriate Personal Protective Equipment and discussed with nursing. 06/09/21      Electronically signed by Nate Pierre DO, 06/09/21, 1:22 PM EDT.      Time: I spent  15  minutes on this discharge activity which included face-to-face encounter with the patient/reviewing the data in the system/coordination of the care with the nursing staff as well as consultants/documentation/entering orders.      Electronically signed by Nate Pierre DO at 06/09/21 1326       Discharge Order (From admission, onward)     Start     Ordered    06/09/21 1321  Discharge patient  Once     Expected Discharge Date: 06/09/21    Expected Discharge Time: Midday    Discharge Disposition: Home or Self Care    Physician of Record for Attribution - Please select from Treatment Team: NATE PIERRE [[id number]]    Review needed by CMO to determine Physician of Record: No       Question Answer Comment   Physician of Record for Attribution - Please select from Treatment Team NATE PIERRE    Review needed by CMO to determine Physician of Record No        06/09/21 1321

## 2021-06-11 LAB
BACTERIA SPEC AEROBE CULT: NORMAL
BACTERIA SPEC AEROBE CULT: NORMAL

## 2021-06-23 RX ORDER — FUROSEMIDE 20 MG/1
TABLET ORAL
Qty: 90 TABLET | Refills: 1 | Status: SHIPPED | OUTPATIENT
Start: 2021-06-23 | End: 2022-01-17

## 2021-10-22 DIAGNOSIS — I10 ESSENTIAL (PRIMARY) HYPERTENSION: ICD-10-CM

## 2021-10-22 RX ORDER — METOPROLOL SUCCINATE 25 MG/1
TABLET, EXTENDED RELEASE ORAL
Qty: 90 TABLET | Refills: 0 | Status: SHIPPED | OUTPATIENT
Start: 2021-10-22 | End: 2022-01-18

## 2021-10-24 DIAGNOSIS — F41.9 ANXIETY DISORDER, UNSPECIFIED: ICD-10-CM

## 2021-10-25 RX ORDER — ESCITALOPRAM OXALATE 10 MG/1
TABLET ORAL
Qty: 90 TABLET | Refills: 4 | Status: SHIPPED | OUTPATIENT
Start: 2021-10-25 | End: 2022-12-07

## 2021-12-07 PROBLEM — H34.8390 BRANCH RETINAL VEIN OCCLUSION WITH MACULAR EDEMA: Status: ACTIVE | Noted: 2019-02-12

## 2021-12-07 PROBLEM — H35.039 HYPERTENSIVE RETINOPATHY: Status: ACTIVE | Noted: 2019-02-21

## 2021-12-08 ENCOUNTER — OFFICE VISIT (OUTPATIENT)
Dept: CARDIOLOGY | Facility: CLINIC | Age: 51
End: 2021-12-08

## 2021-12-08 VITALS
HEIGHT: 65 IN | HEART RATE: 68 BPM | WEIGHT: 212.8 LBS | DIASTOLIC BLOOD PRESSURE: 68 MMHG | SYSTOLIC BLOOD PRESSURE: 132 MMHG | BODY MASS INDEX: 35.45 KG/M2 | RESPIRATION RATE: 18 BRPM

## 2021-12-08 DIAGNOSIS — I10 ESSENTIAL (PRIMARY) HYPERTENSION: Primary | ICD-10-CM

## 2021-12-08 DIAGNOSIS — Z98.61 CAD S/P PERCUTANEOUS CORONARY ANGIOPLASTY: ICD-10-CM

## 2021-12-08 DIAGNOSIS — I25.10 CAD S/P PERCUTANEOUS CORONARY ANGIOPLASTY: ICD-10-CM

## 2021-12-08 DIAGNOSIS — I25.118 CORONARY ARTERY DISEASE OF NATIVE ARTERY OF NATIVE HEART WITH STABLE ANGINA PECTORIS (HCC): ICD-10-CM

## 2021-12-08 DIAGNOSIS — E78.41 ELEVATED LIPOPROTEIN(A): ICD-10-CM

## 2021-12-08 DIAGNOSIS — I10 HYPERTENSION, BENIGN: ICD-10-CM

## 2021-12-08 DIAGNOSIS — I25.5 ISCHEMIC CARDIOMYOPATHY: ICD-10-CM

## 2021-12-08 PROCEDURE — 99214 OFFICE O/P EST MOD 30 MIN: CPT | Performed by: INTERNAL MEDICINE

## 2021-12-08 RX ORDER — CLOPIDOGREL BISULFATE 75 MG/1
75 TABLET ORAL DAILY
Qty: 90 TABLET | Refills: 3 | Status: SHIPPED | OUTPATIENT
Start: 2021-12-08 | End: 2022-12-19

## 2021-12-08 RX ORDER — OMEPRAZOLE 20 MG/1
20 CAPSULE, DELAYED RELEASE ORAL DAILY
COMMUNITY
End: 2022-06-10 | Stop reason: SDUPTHER

## 2021-12-08 NOTE — PROGRESS NOTES
Cardiology Clinic Note  Brant Martinez MD, PhD    Subjective:     Encounter Date:12/08/2021      Patient ID: Sada Le is a 51 y.o. female.    Chief Complaint:  Chief Complaint   Patient presents with   • Follow-up       HPI:    PreviouslyI the pleasure to see this patient who is a 49-year-old female who is well-known to me from prior hospitalization and clinic notes.  She has a history of anterior ST elevation myocardial infarction with complete occlusion of the proximal LAD under going revascularization with Xience 3.5 x 33 mm drug-eluting stent postdilated to 4.5 mm at 18 familia with good results.  This was an Impella supported procedure given under  Cardiogenic shock in the setting of acute MI which was complicated by VF arrest with successful defibrillation.  She did well with weaning Impella support over 2 days and institution of max goal-directed medical therapy ultimately being discharged to home.  We were very aggressive with her medical therapy as well as antiplatelets and her EF improved from around 28 to 30% to 55% with only ever so slight anterior wall very mild hypokinesis residually.  She has done well since this time and is completely quit smoking.  She had nonobstructive disease in the circumflex and RCA as well as mid to distal LAD.  She was changed secondary to shortness of breath from Brilinta to Effient which she is maintained on aspirin and Effient, she is on high intensity statin therapy as well as Aldactone and long-acting beta-blocker and loop diuretics 40 twice daily of Lasix.  She is on ACE inhibitor at moderate to high intensity as well tolerating this just fine without cough.  She has had a recent hospitalization for shortness of breath and underwent right heart catheterization with no evidence of pulmonary hypertension despite volume loading during the procedure with evidence of volume depletion initially with right atrial pressure of 2 to 3 mmHg.  Results of this are below.  She had  recent hospitalization  for atypical substernal chest pain also with jaw pain.  Troponins were unremarkable, EKG was unchanged, blood pressure has been somewhat labile at home recently she says and when high causes some degree of chest discomfort.  She underwent noninvasive evaluation with treadmill stress test and walked greater than 9 minutes with no EKG changes and she had normal myocardial perfusion at rest and stress.  There was no indication of balanced ischemia that would be suspected that may give false negative results relative to perfusion imaging.  Her echo showed normal preserved LV systolic function with ever so slight mild anterior wall hypokinesis compared to thickening of the posterior lateral walls.  EF was 55 to 60%.  Normal atrial sizes, no evidence of pulmonary hypertension, IVC diameter was normal.  She was discharged shortly thereafter.  She did continue to been followed peripherally in the clinic as an outpatient with optimization of antianginals however she continued ultimately to have some chest discomfort which she expressed along with more fatigue and ultimately we made a decision to proceed with diagnostic left heart catheterization for revisualization of LAD stents as well as other residual nonobstructive disease.  She underwent diagnostic left heart catheterization getting PCI to the ostial proximal circumflex, 3.0 x 18 Xience postdilated to 3.9 mm in the proximal ostial portion and 3.75 mm distally with good angiographic results and reduction of stenosis from 80% ulcerated appearing plaque to 0% residual.  Since this time and after discharge she has felt much better.  Her EDP was also very low indicative of volume depletion and her EF was normal at 55% with only very mild anterior wall hypokinesis from prior ischemic injury.  With improvement of her LV CV systolic function, low LVEDP and new complaints of dizziness with standing and orthostasis we decided to decrease her Lasix as well  as stop her Aldactone today and lower her lisinopril and continue her beta-blocker.  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     Widely patent stents to the LAD  Obstructive disease in circumflex status post revascularization and stenting as above and below  Continue dual antiplatelet therapy with aspirin and Effient  High intensity statin therapy as tolerated  Beta-blocker on board heart rate 60s  Continue lisinopril to 5 daily  Continue Lasix to 20 daily  Remain off Aldactone, NYHA class I  Diet and exercise per AHA guidelines  Goal blood pressure simply less than 135 systolic  Statin therapy she has tried multiple agents and there are giving her severe myalgias and muscle pains she is down to taking them every other day or even not at all given her otherwise symptomatology.  We will evaluate with PCSK9 inhibitors as alternative therapy if she has tried more than 3 statins with nontolerance to all of them  Return to clinic 6 months to 1 year     Cath films reviewed today extensively  All questions as proposed by the patient were answered today     Greater than 25 minutes of face-to-face with the patient with additional 10 minutes and charting for total encounter time greater than 35 minutes     Brant Martinez MD, PhD    Historical data copied forward from previous encounters in EMR including the history, exam, and assessment/plan has been reviewed and is unchanged unless noted otherwise.    Cardiac medicines reviewed with risk, benefits, and necessity of each discussed.    Risk and benefit of cardiac testing reviewed including death heart attack  "stroke pain bleeding infection need for vascular /cardiovascular surgery were discussed and the patient     Objective:         /68 (BP Location: Left arm, Patient Position: Sitting)   Pulse 68   Resp 18   Ht 165.1 cm (65\")   Wt 96.5 kg (212 lb 12.8 oz)   LMP 01/01/1996 (Approximate)   BMI 35.41 kg/m²     Physical Exam    Assessment:         There are no diagnoses linked to this encounter.       Plan:              The pleasure to be involved in this patient's cardiovascular care.  Please call with any questions or concerns  Brant Martinez MD, PhD    Most recent EKG as reviewed and interpreted by me:  Procedures     Most recent echo as reviewed and interpreted by me:  Results for orders placed during the hospital encounter of 04/28/20    Adult Transthoracic Echo Complete W/ Cont if Necessary Per Protocol    Interpretation Summary  · Left ventricular mass index is increased.  · Left ventricular wall thickness is consistent with mild concentric hypertrophy.  · Left ventricular systolic function is normal.  · Left ventricular diastolic dysfunction (grade I a) consistent with impaired relaxation.  · Mild aortic valve regurgitation is present.  · Mild dilation of the aortic root is present. Mild dilation of the sinuses of Valsalva is present.      Most recent stress test as reviewed and interpreted by me:  Results for orders placed during the hospital encounter of 04/28/20    Stress Test With Myocardial Perfusion One Day    Interpretation Summary  · Findings consistent with a normal ECG stress test.  · Left ventricular ejection fraction is hyperdynamic (Calculated EF > 70%).  · Myocardial perfusion imaging indicates a normal myocardial perfusion study with no evidence of ischemia.  · There is no prior study available for comparison.    Normal stress myocardial perfusion imaging  Normal stress ECG portion of the study with no ischemic ST-T wave changes  Sinus rhythm increase to sinus tachycardia with target " heart rate achieved with good predictability and sensitivity by criteria  Myocardial perfusion imaging was normal at rest as well as at peak stress with no reversibility preserved LV ejection fraction 66%    Low risk study for ischemia  No arrhythmias seen  Preserved LV systolic function      Most recent cardiac catheterization as reviewed interpreted by me:  Results for orders placed during the hospital encounter of 02/12/21    Cardiac Catheterization/Vascular Study    Narrative  Cardiac cath note  Brant Martinez MD, PhD  Date of service 2-  [facility] cardiology    Procedure  1.  Left heart catheterization with coronary angiography left ventriculography in ANTON position  2.  Percutaneous coronary mention to the ostial proximal circumflex with 3.0 x 18 mm Xience drug-eluting stent postdilated to 3.9 mm proximally, 3.75 mm distally with good angiographic results    Lesion information  Ostial 50%, mid 70% with hazy appearance of the proximal circumflex, ulcerated plaque angiographically with daily anginal chest pain  Reduced to 0% residual stenosis  JANE-3 flow pre and post    After informed consent the patient was brought to the catheterization lab sterilely prepped and draped in usual fashion with exposure the right groin for right common femoral arterial access via micropuncture modified standard technique with placement of a 6 [place] sheath under fluoroscopic guidance which was aspirated flushed with heparinized saline after 1% lidocaine analgesia.  An 035 guidewire was advanced to level aortic valve followed by diagnostic left heart catheterization with JL4 and JR4 6 [place] catheters.  A pigtail catheter was used to perform left ventriculography, EDP measurement and pullback assessment the transaortic valve gradient.  Secondary to findings of coronary disease in the ostial proximal circumflex decision was made to intervene given recurrent anginal chest pain, ulcerated appearance of the proximal  circumflex.  Lesion was predilated after run-through wire to the obtuse marginal with 2.5 x 20 mm NC at 18 familia followed by stenting with 3.0 x 18 deployed at 18 familia at 3.25 followed by postdilatation with with 3.5 x 15 up to 1618 familia and then 3.75 x 15 NC balloon at the 14 familia at the distal edge and 16 familia at the proximal edge of 3.9 mm with good angiographic results.  There was no compromise of the LAD flow.  A wire directed down the LAD at the conclusion the case had a balloon that freely passed and there was no stent impingement from the left main to the LAD.  JANE-3 flow was maintained pre and post.  Lesion reduced from 70% to 0% residual.  Heparin was used to maintain ACT greater than 250 throughout the entire to the case.  Patient was already on aspirin and Effient but was given additional 10 mg of Effient on the table.  Right common femoral arteriotomy was closed by manual pressure once ACT less than 175.  She had no complications.  She left the Cath Lab chest pain-free hemodynamically electrically stable alert talking to staff neurologically grossly intact bilaterally    Moderate conscious sedation of Versed and fentanyl administered by registered nurse with complete ECG pulse oximetry and hemodynamic monitoring throughout the entirety the case observed by me  Complications none  Blood loss less than 5 cc  Contrast 180 cc including diagnostic portion    Findings  1.  Opening aortic pressure of 114/66 with a mean of 86  2.  LV pressure 108 with an EDP of 2 to 5 mmHg  Closing pressure 111/66 with a mean of 85  EDP 6  No significant transaortic valve gradient    Angiography  1.  Left main large caliber vessel no angiographic disease  2.  LAD large caliber vessel with widely patent stent in the proximal portion but diffusely diseased mid to distal LAD with eccentric plaque likely 2.5 mm in diameter from the bifurcation of the diagonal all the way around the apex with again 30 to 40% luminal irregularities but no  significant flow-limiting stenosis.,  Diagonal branch with no obstructive disease only mild luminal regularities  3.  Circumflex nondominant with large obtuse marginal branch.  There is a luminal filling defect right at the ostium concerning for nonocclusive thrombus versus eccentric plaque at 50%, immediately distal is a hazy eccentric ulcerated plaque 75% followed by bifurcation with continuation circumflex and obtuse marginal branch which has no disease.  There is JANE-3 flow throughout  4.  RCA small nondominant no angiographic disease, small PDA branch distally not amenable to any stents given caliber    Conclusions and recommendations  1 two-vessel coronary disease, widely patent stent in the proximal LAD with diffuse mid to distal LAD stenosis but nonobstructive, ostial proximal circumflex disease of questionable significance however angiographic appearance of significant ulceration, concern for thrombus status post intervention as described above  2.  Successful intervention as described above with balloon angioplasty and stenting of the ostial proximal circumflex postdilated to 3.9 mm at the ostium and 3.75 mm distally with good angiographic results  3.  Continue antiplatelet therapy with aspirin and Effient  4.  Decrease Lasix with low LVEDP, hold ACE inhibitor  5 LV function appears 50 to 55% by LV gram  6.  No transaortic valve gradient  7.,  Mid to observation, likely home tomorrow which discharge criteria met  8.  After discharge follow-up with cardiology in 2 to 4 weeks  \  It is a pleasure be involved in her cardiovascular care.  Please call with any questions or concerns  Brant Martinez MD, PhD    The following portions of the patient's history were reviewed and updated as appropriate: allergies, current medications, past family history, past medical history, past social history, past surgical history and problem list.      ROS:  14 point review of systems negative except as mentioned  above    Current Outpatient Medications:   •  ALPRAZolam (XANAX) 0.25 MG tablet, Take 0.25 mg by mouth Every 6 (Six) Hours As Needed for Anxiety., Disp: , Rfl:   •  atorvastatin (LIPITOR) 20 MG tablet, Take 20 mg by mouth Every Other Day., Disp: , Rfl:   •  buPROPion XL (WELLBUTRIN XL) 150 MG 24 hr tablet, Take 150 mg by mouth Daily., Disp: , Rfl:   •  cetirizine (zyrTEC) 10 MG tablet, Take 10 mg by mouth Every Night., Disp: , Rfl:   •  cholecalciferol (VITAMIN D3) 1.25 MG (75571 UT) capsule, Take 50,000 Units by mouth Daily., Disp: , Rfl:   •  coenzyme Q10 100 MG capsule, Take 200 mg by mouth 2 (Two) Times a Day., Disp: , Rfl:   •  [employer] Aspirin Adult Low Dose 81 MG chewable tablet, CHEW 1 TABLET BY MOUTH EVERY DAY, Disp: 90 tablet, Rfl: 3  •  escitalopram (LEXAPRO) 10 MG tablet, TAKE 1 TABLET BY MOUTH EVERY DAY, Disp: 90 tablet, Rfl: 4  •  furosemide (LASIX) 20 MG tablet, TAKE 1 TABLET BY MOUTH EVERY DAY (Patient taking differently: 40 mg Daily.), Disp: 90 tablet, Rfl: 1  •  lisinopril (PRINIVIL,ZESTRIL) 5 MG tablet, Take 1 tablet by mouth 2 (two) times a day., Disp: 180 tablet, Rfl: 1  •  metoprolol succinate XL (TOPROL-XL) 25 MG 24 hr tablet, TAKE 1 TABLET BY MOUTH EVERY DAY, Disp: 90 tablet, Rfl: 0  •  nitroglycerin (NITROSTAT) 0.4 MG SL tablet, Place 0.4 mg under the tongue Every 5 (Five) Minutes As Needed for Chest Pain. Take no more than 3 doses in 15 minutes., Disp: , Rfl:   •  omeprazole (priLOSEC) 20 MG capsule, Take 20 mg by mouth Daily., Disp: , Rfl:   •  Potassium 99 MG tablet, Take 1 tablet by mouth Daily., Disp: , Rfl:   •  prasugrel (EFFIENT) 10 MG tablet, TAKE 1 TABLET BY MOUTH EVERY DAY, Disp: 30 tablet, Rfl: 11  •  vitamin B-12 (CYANOCOBALAMIN) 500 MCG tablet, Take 500 mcg by mouth Daily., Disp: , Rfl:     Problem List:  Patient Active Problem List   Diagnosis   • Allergic rhinitis   • Cervical disc disease with myelopathy   • Diverticulosis of colon   • Hypertension, benign   • Insomnia, organic    • Class 1 obesity due to excess calories with serious comorbidity and body mass index (BMI) of 34.0 to 34.9 in adult   • Osteopenia of spine   • Mixed hyperlipidemia   • Cardiac arrest with ventricular fibrillation (HCC)   • Ischemic cardiomyopathy   • Stented coronary artery   • History of echocardiogram   • Annual visit for general adult medical examination with abnormal findings   • Cervical cancer screening   • Encounter for screening for malignant neoplasm of breast   • Colon cancer screening   • History of tobacco use   • Generalized anxiety disorder   • ASD (atrial septal defect)   • History of transesophageal echocardiography (NAFISA)   • Coronary artery disease of native artery of native heart with stable angina pectoris (HCC)   • History of MI (myocardial infarction)   • Family history of diabetes mellitus type II   • Anxiety   • Coronary artery disease involving native coronary artery of native heart with unstable angina pectoris (HCC)   • CAD S/P percutaneous coronary angioplasty   • Left arm cellulitis   • Branch retinal vein occlusion with macular edema   • Hypertensive retinopathy     Past Medical History:  Past Medical History:   Diagnosis Date   • Absence of left thumb     Impression: hx of MRSA, she is signficantly improved She will continue meds and followup if sxs have not resolved over the next 2 weeks.. She is released from care and will notify us of any problems   • Acute otitis media    • Allergic rhinitis    • Arthritis     neck   • Cancer (HCC)     skin   • Cervical disc disease with myelopathy    • Contact dermatitis or eczema    • Coronary artery disease    • Disorder of bone and cartilage    • Diverticulitis of colon     Impression: CT confirms in the sigmoid colon 10/2011   • Diverticulosis of colon     Impression: No sxs 02/13/2013   • Elevated cholesterol    • Elevated lipoprotein(a) 4/30/2020   • Elevated temperature    • External otitis of left ear    • Hearing loss due to cerumen  impaction, left    • Hemangioma of liver    • History of echocardiogram 10/03/2019    Severely reduced LV systolic function. EF 34% Distribution indicative of LAD territory injury. Akinesis of the apex as well as apical lateral and mid to apical septal walls. Preservation of posterior inferior lateral walls. Mild AI, mild TR. Normal RV function.    • History of transesophageal echocardiography (NAFISA) 01/07/2020    EF 55% LA cavity is mild to moderately dilated. Mild MR. Interatrial septum appears redundant. Interatrial hypermobile c/w aneurysm. Large PFO is noted with right to left shunting on bubble study. PFO tunnel appears to be short.    • Hordeolum externum of left lower eyelid     Impression: She was started on Bactrim DS for her infection. She was also advised to use warm compression. She will followup should sxs not improve over the next 48 hrs and resolve over the next 1 weeek.   • Hyperlipidemia     Impression: Diet controled. She should see improvement with her successful wt loss. We discussed her lipid panel.   • Hypertension, benign     Impression: new onset Low salt low calorie diet and regular exercise and followup in 1 mo She will monitor her pressures and notify us of her pressures over the next 1 week.   • Inclusion cyst of right breast     Impression: We will follow for now. She is encouraged to have Mammography. She is presently without insurance and reluctant. She bobby consider. She will notify us of any change at all in the lesion for the only way to be certain of it's etilogy is to remove it. She understands.   • Insomnia, organic    • Menopausal syndrome    • Overweight (BMI 25.0-29.9)    • RUQ abdominal pain     Impression: Resolving, negative GB u/s, HIDA EF 77%, we will follow   • Stented coronary artery 09/29/2019   • Tobacco use     Impression: She is strongly encouraged to stop smoking. Cessation techniques discussed and encouraged. The consequences of not stopping discussed, ie, CAD,  PVD, Stroke, Lung and other cancers.     Past Surgical History:  Past Surgical History:   Procedure Laterality Date   • BIVENTRICULAR ASSIST DEVICE/LEFT VENTRICULAR ASSIST DEVICE INSERTION N/A 9/29/2019    Procedure: Left Ventricular Assist Device;  Surgeon: Brant Martinze MD;  Location: [facility] CATH INVASIVE LOCATION;  Service: Cardiovascular   • CARDIAC CATHETERIZATION N/A 9/29/2019    Procedure: Left Heart Cath;  Surgeon: Brant Martinez MD;  Location: [facility] CATH INVASIVE LOCATION;  Service: Cardiovascular   • CARDIAC CATHETERIZATION N/A 9/29/2019    Procedure: Coronary angiography;  Surgeon: Brant Martinez MD;  Location: [facility] CATH INVASIVE LOCATION;  Service: Cardiovascular   • CARDIAC CATHETERIZATION N/A 9/29/2019    Procedure: Left ventriculography;  Surgeon: Brant Martinez MD;  Location: [facility] CATH INVASIVE LOCATION;  Service: Cardiovascular   • CARDIAC CATHETERIZATION N/A 9/29/2019    Procedure: Stent SERENITY coronary;  Surgeon: Brant Martinez MD;  Location: [facility] CATH INVASIVE LOCATION;  Service: Cardiovascular   • CARDIAC CATHETERIZATION N/A 2/25/2020    Procedure: Right Heart Cath;  Surgeon: Brant Martinez MD;  Location: [facility] CATH INVASIVE LOCATION;  Service: Cardiology;  Laterality: N/A;   • CARDIAC CATHETERIZATION N/A 2/12/2021    Procedure: Left Heart Cath;  Surgeon: Brant Martinez MD;  Location: [facility] CATH INVASIVE LOCATION;  Service: Cardiology;  Laterality: N/A;   • CARDIAC CATHETERIZATION N/A 2/12/2021    Procedure: Coronary angiography;  Surgeon: Brant Martinez MD;  Location: [facility] CATH INVASIVE LOCATION;  Service: Cardiology;  Laterality: N/A;   • CARDIAC CATHETERIZATION N/A 2/12/2021    Procedure: Left ventriculography;  Surgeon: Brant Martinez MD;  Location: [facility] CATH INVASIVE LOCATION;  Service: Cardiology;  Laterality: N/A;   • CARDIAC CATHETERIZATION N/A 2/12/2021    Procedure: Aortic root  aortogram;  Surgeon: Brant Martinez MD;  Location: [facility] CATH INVASIVE LOCATION;  Service: Cardiology;  Laterality: N/A;   • CARDIAC CATHETERIZATION N/A 2021    Procedure: Percutaneous Coronary Intervention;  Surgeon: Brant Martinez MD;  Location: [facility] CATH INVASIVE LOCATION;  Service: Cardiology;  Laterality: N/A;   • CARDIAC CATHETERIZATION N/A 2021    Procedure: Stent SERENITY coronary;  Surgeon: Brant Martinez MD;  Location: [facility] CATH INVASIVE LOCATION;  Service: Cardiology;  Laterality: N/A;   • CARDIAC ELECTROPHYSIOLOGY PROCEDURE  2019    Procedure: Impella Insertion;  Surgeon: Brant Martinez MD;  Location: [facility] CATH INVASIVE LOCATION;  Service: Cardiovascular   • CORONARY ANGIOPLASTY  2021    1x stent to Circ   • CORONARY ANGIOPLASTY WITH STENT PLACEMENT  2021   • SD RT/LT HEART CATHETERS N/A 2019    Procedure: Percutaneous Coronary Intervention;  Surgeon: Brant Martinez MD;  Location: [facility] CATH INVASIVE LOCATION;  Service: Cardiovascular   • TOTAL ABDOMINAL HYSTERECTOMY WITH SALPINGO OOPHORECTOMY      Endometriosis     Social History:  Social History     Socioeconomic History   • Marital status:    Tobacco Use   • Smoking status: Former Smoker     Packs/day: 1.00     Years: 34.00     Pack years: 34.00     Types: Cigarettes     Start date:      Quit date: 2019     Years since quittin.1   • Smokeless tobacco: Never Used   • Tobacco comment: States she is quitting as of 2019.   Substance and Sexual Activity   • Alcohol use: Yes     Comment: occassionally    • Drug use: Not Currently     Comment: occasional   • Sexual activity: Defer     Allergies:  Allergies   Allergen Reactions   • Penicillins Rash   • Ciprofloxacin Rash   • Penicillin G Rash     Immunizations:  Immunization History   Administered Date(s) Administered   • DT 2004   • FluLaval/Fluarix/Fluzone >6 10/01/2019   • Tdap 2011             In-Office Procedure(s):  No orders to display        ASCVD RIsk Score::  The ASCVD Risk score (McGrann AIME Jr., et al., 2013) failed to calculate for the following reasons:    The patient has a prior MI or stroke diagnosis    Imaging:    Results for orders placed in visit on 06/04/21    XR Knee 4+ View Right    Narrative  Examination: XR KNEE 4+ VW RIGHT-    Date of Exam: 6/4/2021 3:36 PM    Indication: right knee pain; M25.561-Pain in right knee; G89.29-Other  chronic pain.    Comparison: None available.    Technique: 4 radiographic views of the knee were obtained.    Findings:  There is no fracture. No periostitis or bone destruction. There is mild  spurring at the attachment of the quadriceps tendon. Knee joint space is  preserved. No soft tissue abnormality. No significant joint effusion.    Impression  Essentially negative exam.    Electronically Signed By-Jammie Sena MD On:6/4/2021 4:04 PM  This report was finalized on [id number] by  Jammie Sena MD.       Results for orders placed in visit on 09/02/20    CT Angiogram Chest With & Without Contrast    Narrative  DATE OF EXAM:  2/19/2021 8:23 AM    PROCEDURE:  CT ANGIOGRAM CHEST W WO CONTRAST-    INDICATIONS:  Thoracic aortic aneurysm (TAA), follow up; I71.2-Thoracic aortic  aneurysm, without rupture    COMPARISON:  No Comparisons Available    TECHNIQUE:  Contiguous axial imaging was obtained from the thoracic inlet through  the upper abdomen following the intravenous administration of 100 mL  Isovue 370. Reconstructed coronal and sagittal images were also  obtained. In addition, a 3 D volume rendered image was obtained after  post processing. Automated exposure control and iterative reconstruction  methods were used.    FINDINGS:  VASCULAR FINDINGS: The transverse thoracic aorta has a maximal luminal  diameter of 3.6 cm. It measures 4.2 cm at the sinus of Valsalva. The  transverse thoracic aorta measures 2.4 cm. In the proximal  descending  thoracic aorta measures 2.2 cm. No focal aneurysmal dilatation is  identified    NONVASCULAR FINDINGS: Scans through the lungs appear normal. No  pulmonary nodules or masses are identified. There is some linear discoid  atelectasis peripherally in the lingula. A coronary stent is in place in  the left anterior descending coronary artery and a second stent is  identified in the circumflex coronary artery. These vessels appear  widely patent following contrast administration. .    Impression  1. 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 Mild to moderate atherosclerotic plaque  deposition primarily in the descending thoracic aorta.  2. Status post coronary stent placement.    Electronically Signed By-Amaury Hendrix MD On:2/19/2021 10:53 AM  This report was finalized on [id number] by  Amaury Hendrix MD.      Results for orders placed in visit on 09/02/20    CT Angiogram Chest With & Without Contrast    Narrative  DATE OF EXAM:  2/19/2021 8:23 AM    PROCEDURE:  CT ANGIOGRAM CHEST W WO CONTRAST-    INDICATIONS:  Thoracic aortic aneurysm (TAA), follow up; I71.2-Thoracic aortic  aneurysm, without rupture    COMPARISON:  No Comparisons Available    TECHNIQUE:  Contiguous axial imaging was obtained from the thoracic inlet through  the upper abdomen following the intravenous administration of 100 mL  Isovue 370. Reconstructed coronal and sagittal images were also  obtained. In addition, a 3 D volume rendered image was obtained after  post processing. Automated exposure control and iterative reconstruction  methods were used.    FINDINGS:  VASCULAR FINDINGS: The transverse thoracic aorta has a maximal luminal  diameter of 3.6 cm. It measures 4.2 cm at the sinus of Valsalva. The  transverse thoracic aorta measures 2.4 cm. In the proximal descending  thoracic aorta measures 2.2 cm. No focal aneurysmal dilatation is  identified    NONVASCULAR FINDINGS: Scans through the  lungs appear normal. No  pulmonary nodules or masses are identified. There is some linear discoid  atelectasis peripherally in the lingula. A coronary stent is in place in  the left anterior descending coronary artery and a second stent is  identified in the circumflex coronary artery. These vessels appear  widely patent following contrast administration. .    Impression  1. 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 Mild to moderate atherosclerotic plaque  deposition primarily in the descending thoracic aorta.  2. Status post coronary stent placement.    Electronically Signed By-Amaury Hendrix MD On:2/19/2021 10:53 AM  This report was finalized on [id number] by  Amaury Hendrix MD.      Lab Review:   Admission on 06/06/2021, Discharged on 06/09/2021   Component Date Value   • Glucose 06/06/2021 161*   • BUN 06/06/2021 21*   • Creatinine 06/06/2021 1.18*   • Sodium 06/06/2021 134*   • Potassium 06/06/2021 3.1*   • Chloride 06/06/2021 98    • CO2 06/06/2021 22.0    • Calcium 06/06/2021 9.3    • Total Protein 06/06/2021 7.1    • Albumin 06/06/2021 4.00    • ALT (SGPT) 06/06/2021 35*   • AST (SGOT) 06/06/2021 28    • Alkaline Phosphatase 06/06/2021 111    • Total Bilirubin 06/06/2021 0.4    • eGFR Non  Amer 06/06/2021 48*   • Globulin 06/06/2021 3.1    • A/G Ratio 06/06/2021 1.3    • BUN/Creatinine Ratio 06/06/2021 17.8    • Anion Gap 06/06/2021 14.0    • Blood Culture 06/06/2021 No growth at 5 days    • Blood Culture 06/06/2021 No growth at 5 days    • WBC 06/06/2021 6.30    • RBC 06/06/2021 4.55    • Hemoglobin 06/06/2021 13.9    • Hematocrit 06/06/2021 40.9    • MCV 06/06/2021 89.9    • MCH 06/06/2021 30.6    • MCHC 06/06/2021 34.1    • RDW 06/06/2021 14.0    • RDW-SD 06/06/2021 44.6    • MPV 06/06/2021 8.6    • Platelets 06/06/2021 151    • Neutrophil % 06/06/2021 66.2    • Lymphocyte % 06/06/2021 20.5    • Monocyte % 06/06/2021 10.9    • Eosinophil %  06/06/2021 1.8    • Basophil % 06/06/2021 0.6    • Neutrophils, Absolute 06/06/2021 4.20    • Lymphocytes, Absolute 06/06/2021 1.30    • Monocytes, Absolute 06/06/2021 0.70    • Eosinophils, Absolute 06/06/2021 0.10    • Basophils, Absolute 06/06/2021 0.00    • nRBC 06/06/2021 0.3*   • Lactate 06/06/2021 1.7    • Extra Tube 06/06/2021 Hold for add-ons.    • COVID19 06/06/2021 Not Detected    • Glucose 06/07/2021 110*   • BUN 06/07/2021 15    • Creatinine 06/07/2021 0.85    • Sodium 06/07/2021 136    • Potassium 06/07/2021 3.3*   • Chloride 06/07/2021 103    • CO2 06/07/2021 23.0    • Calcium 06/07/2021 8.3*   • eGFR Non  Amer 06/07/2021 71    • BUN/Creatinine Ratio 06/07/2021 17.6    • Anion Gap 06/07/2021 10.0    • WBC 06/07/2021 5.70    • RBC 06/07/2021 4.17    • Hemoglobin 06/07/2021 12.6    • Hematocrit 06/07/2021 37.7    • MCV 06/07/2021 90.3    • MCH 06/07/2021 30.2    • MCHC 06/07/2021 33.4    • RDW 06/07/2021 13.9    • RDW-SD 06/07/2021 44.2    • MPV 06/07/2021 8.8    • Platelets 06/07/2021 129*   • Neutrophil % 06/07/2021 58.3    • Lymphocyte % 06/07/2021 24.7    • Monocyte % 06/07/2021 14.0*   • Eosinophil % 06/07/2021 2.6    • Basophil % 06/07/2021 0.4    • Neutrophils, Absolute 06/07/2021 3.30    • Lymphocytes, Absolute 06/07/2021 1.40    • Monocytes, Absolute 06/07/2021 0.80    • Eosinophils, Absolute 06/07/2021 0.10    • Basophils, Absolute 06/07/2021 0.00    • nRBC 06/07/2021 0.0    • Magnesium 06/07/2021 2.0    • C-Reactive Protein 06/07/2021 9.36*   • Sed Rate 06/07/2021 30    • Target HR (85%) 06/07/2021 144    • Max. Pred. HR (100%) 06/07/2021 169    • Right Internal Jugular A* 06/07/2021 Y    • Right Internal Jugular C* 06/07/2021 Y    • Right Internal Jugular C* 06/07/2021 C    • Right Internal Jugular P* 06/07/2021 Y    • Right Internal Jugular S* 06/07/2021 Y    • Left Internal Jugular Au* 06/07/2021 Y    • Left Internal Jugular Co* 06/07/2021 Y    • Left Internal Jugular Co* 06/07/2021  C    • Left Internal Jugular Ph* 06/07/2021 Y    • Left Internal Jugular Sp* 06/07/2021 Y    • Right Subclavian Augment 06/07/2021 Y    • Right Subclavian Compete* 06/07/2021 Y    • Right Subclavian Compress 06/07/2021 C    • Right Subclavian Phasic 06/07/2021 Y    • Right Subclavian Spont 06/07/2021 Y    • Left Subclavian Augment 06/07/2021 Y    • Left Subclavian Competent 06/07/2021 Y    • Left Subclavian Compress 06/07/2021 C    • Left Subclavian Phasic 06/07/2021 Y    • Left Subclavian Spont 06/07/2021 Y    • Left Axillary Augment 06/07/2021 Y    • Left Axillary Competent 06/07/2021 Y    • Left Axillary Compress 06/07/2021 C    • Left Axillary Phasic 06/07/2021 Y    • Left Axillary Spont 06/07/2021 Y    • Left Brachial Compress 06/07/2021 C    • Left Radial Compress 06/07/2021 C    • Left Ulnar Compress 06/07/2021 C    • Left Basilic Upper Compr* 06/07/2021 C    • Left Basilic Forearm Com* 06/07/2021 C    • Left Cephalic Upper Comp* 06/07/2021 C    • Left Cephalic Forearm Co* 06/07/2021 C    • Potassium 06/07/2021 4.7    • Glucose 06/08/2021 121*   • BUN 06/08/2021 12    • Creatinine 06/08/2021 0.86    • Sodium 06/08/2021 139    • Potassium 06/08/2021 4.2    • Chloride 06/08/2021 107    • CO2 06/08/2021 24.0    • Calcium 06/08/2021 8.2*   • Total Protein 06/08/2021 5.6*   • Albumin 06/08/2021 3.20*   • ALT (SGPT) 06/08/2021 22    • AST (SGOT) 06/08/2021 15    • Alkaline Phosphatase 06/08/2021 90    • Total Bilirubin 06/08/2021 <0.2    • eGFR Non  Amer 06/08/2021 70    • Globulin 06/08/2021 2.4    • A/G Ratio 06/08/2021 1.3    • BUN/Creatinine Ratio 06/08/2021 14.0    • Anion Gap 06/08/2021 8.0    • C-Reactive Protein 06/08/2021 4.70*   • WBC 06/08/2021 4.40    • RBC 06/08/2021 3.82    • Hemoglobin 06/08/2021 11.7*   • Hematocrit 06/08/2021 34.7    • MCV 06/08/2021 90.6    • MCH 06/08/2021 30.5    • MCHC 06/08/2021 33.7    • RDW 06/08/2021 13.7    • RDW-SD 06/08/2021 44.2    • MPV 06/08/2021 8.5    •  Platelets 06/08/2021 135*   • Neutrophil % 06/08/2021 36.1*   • Lymphocyte % 06/08/2021 39.9    • Monocyte % 06/08/2021 19.5*   • Eosinophil % 06/08/2021 4.0    • Basophil % 06/08/2021 0.5    • Neutrophils, Absolute 06/08/2021 1.60*   • Lymphocytes, Absolute 06/08/2021 1.70    • Monocytes, Absolute 06/08/2021 0.90    • Eosinophils, Absolute 06/08/2021 0.20    • Basophils, Absolute 06/08/2021 0.00    • nRBC 06/08/2021 0.1    • Glucose 06/09/2021 106*   • BUN 06/09/2021 11    • Creatinine 06/09/2021 0.76    • Sodium 06/09/2021 139    • Potassium 06/09/2021 4.0    • Chloride 06/09/2021 104    • CO2 06/09/2021 25.0    • Calcium 06/09/2021 8.5*   • Total Protein 06/09/2021 5.8*   • Albumin 06/09/2021 3.50    • ALT (SGPT) 06/09/2021 26    • AST (SGOT) 06/09/2021 19    • Alkaline Phosphatase 06/09/2021 93    • Total Bilirubin 06/09/2021 0.2    • eGFR Non  Amer 06/09/2021 80    • Globulin 06/09/2021 2.3    • A/G Ratio 06/09/2021 1.5    • BUN/Creatinine Ratio 06/09/2021 14.5    • Anion Gap 06/09/2021 10.0    • WBC 06/09/2021 5.20    • RBC 06/09/2021 3.85    • Hemoglobin 06/09/2021 11.8*   • Hematocrit 06/09/2021 34.9    • MCV 06/09/2021 90.7    • MCH 06/09/2021 30.6    • MCHC 06/09/2021 33.7    • RDW 06/09/2021 13.8    • RDW-SD 06/09/2021 44.2    • MPV 06/09/2021 9.0    • Platelets 06/09/2021 159    • Neutrophil % 06/09/2021 42.5*   • Lymphocyte % 06/09/2021 39.2    • Monocyte % 06/09/2021 13.2*   • Eosinophil % 06/09/2021 4.5    • Basophil % 06/09/2021 0.6    • Neutrophils, Absolute 06/09/2021 2.20    • Lymphocytes, Absolute 06/09/2021 2.00    • Monocytes, Absolute 06/09/2021 0.70    • Eosinophils, Absolute 06/09/2021 0.20    • Basophils, Absolute 06/09/2021 0.00    • nRBC 06/09/2021 0.2      Recent labs reviewed and interpreted for clinical significance and application            Level of Care:           Brant Martinez MD  12/08/21  .

## 2022-01-10 RX ORDER — LISINOPRIL 5 MG/1
TABLET ORAL
Qty: 180 TABLET | Refills: 1 | Status: SHIPPED | OUTPATIENT
Start: 2022-01-10 | End: 2022-07-11 | Stop reason: SDUPTHER

## 2022-01-17 RX ORDER — FUROSEMIDE 20 MG/1
TABLET ORAL
Qty: 90 TABLET | Refills: 1 | Status: SHIPPED | OUTPATIENT
Start: 2022-01-17 | End: 2022-01-24

## 2022-01-18 DIAGNOSIS — I10 ESSENTIAL (PRIMARY) HYPERTENSION: ICD-10-CM

## 2022-01-18 RX ORDER — METOPROLOL SUCCINATE 25 MG/1
TABLET, EXTENDED RELEASE ORAL
Qty: 90 TABLET | Refills: 0 | Status: SHIPPED | OUTPATIENT
Start: 2022-01-18 | End: 2022-04-11

## 2022-01-21 DIAGNOSIS — F41.9 ANXIETY DISORDER, UNSPECIFIED: ICD-10-CM

## 2022-01-21 DIAGNOSIS — I10 HYPERTENSION, BENIGN: Chronic | ICD-10-CM

## 2022-01-24 RX ORDER — FUROSEMIDE 40 MG/1
40 TABLET ORAL DAILY
Qty: 90 TABLET | Refills: 3 | Status: SHIPPED | OUTPATIENT
Start: 2022-01-24 | End: 2022-09-23 | Stop reason: SDUPTHER

## 2022-01-24 RX ORDER — BUPROPION HYDROCHLORIDE 150 MG/1
TABLET ORAL
Qty: 90 TABLET | Refills: 4 | Status: SHIPPED | OUTPATIENT
Start: 2022-01-24 | End: 2022-12-07

## 2022-02-24 ENCOUNTER — TELEPHONE (OUTPATIENT)
Dept: CARDIOLOGY | Facility: CLINIC | Age: 52
End: 2022-02-24

## 2022-02-24 DIAGNOSIS — R42 LIGHTHEADED: ICD-10-CM

## 2022-02-24 DIAGNOSIS — R06.02 SHORTNESS OF BREATH: Primary | ICD-10-CM

## 2022-02-24 NOTE — TELEPHONE ENCOUNTER
Patient says bottom number on BP is staying around ; top number around 130. She says she's just not feeling well - lightheaded and shortness of breath.

## 2022-02-25 NOTE — TELEPHONE ENCOUNTER
Reviewed with dr eduardo, he would like patient to get an echo and stress and follow up in two weeks. Spoke with patient and she is ok with this.      Diana can you help with getting her scheduled for these? She also needs a follow up within the next two weeks after her testing. She would like us just to set this up for her and give her the details and she will work this around her schedule.

## 2022-03-16 ENCOUNTER — HOSPITAL ENCOUNTER (OUTPATIENT)
Dept: CARDIOLOGY | Facility: HOSPITAL | Age: 52
Discharge: HOME | End: 2022-03-16
Admitting: INTERNAL MEDICINE

## 2022-03-16 ENCOUNTER — HOSPITAL ENCOUNTER (OUTPATIENT)
Dept: NUCLEAR MEDICINE | Facility: HOSPITAL | Age: 52
Discharge: HOME | End: 2022-03-16

## 2022-03-16 VITALS
HEIGHT: 65 IN | SYSTOLIC BLOOD PRESSURE: 154 MMHG | DIASTOLIC BLOOD PRESSURE: 96 MMHG | WEIGHT: 212 LBS | BODY MASS INDEX: 35.32 KG/M2

## 2022-03-16 DIAGNOSIS — R42 LIGHTHEADED: ICD-10-CM

## 2022-03-16 DIAGNOSIS — R06.02 SHORTNESS OF BREATH: ICD-10-CM

## 2022-03-16 PROCEDURE — 25010000002 REGADENOSON 0.4 MG/5ML SOLUTION: Performed by: INTERNAL MEDICINE

## 2022-03-16 PROCEDURE — 93016 CV STRESS TEST SUPVJ ONLY: CPT | Performed by: INTERNAL MEDICINE

## 2022-03-16 PROCEDURE — 93306 TTE W/DOPPLER COMPLETE: CPT | Performed by: INTERNAL MEDICINE

## 2022-03-16 PROCEDURE — 78452 HT MUSCLE IMAGE SPECT MULT: CPT | Performed by: INTERNAL MEDICINE

## 2022-03-16 PROCEDURE — A9502 TC99M TETROFOSMIN: HCPCS | Performed by: INTERNAL MEDICINE

## 2022-03-16 PROCEDURE — 0 TECHNETIUM TETROFOSMIN KIT: Performed by: INTERNAL MEDICINE

## 2022-03-16 PROCEDURE — 78452 HT MUSCLE IMAGE SPECT MULT: CPT

## 2022-03-16 PROCEDURE — 93306 TTE W/DOPPLER COMPLETE: CPT

## 2022-03-16 PROCEDURE — 93017 CV STRESS TEST TRACING ONLY: CPT

## 2022-03-16 PROCEDURE — 93018 CV STRESS TEST I&R ONLY: CPT | Performed by: INTERNAL MEDICINE

## 2022-03-16 RX ADMIN — TETROFOSMIN 1 DOSE: 1.38 INJECTION, POWDER, LYOPHILIZED, FOR SOLUTION INTRAVENOUS at 13:58

## 2022-03-16 RX ADMIN — REGADENOSON 0.4 MG: 0.08 INJECTION, SOLUTION INTRAVENOUS at 13:58

## 2022-03-16 RX ADMIN — TETROFOSMIN 1 DOSE: 1.38 INJECTION, POWDER, LYOPHILIZED, FOR SOLUTION INTRAVENOUS at 12:50

## 2022-03-17 LAB
BH CV ECHO MEAS - ACS: 1.84 CM
BH CV ECHO MEAS - AO MAX PG: 4.8 MMHG
BH CV ECHO MEAS - AO MEAN PG: 2.36 MMHG
BH CV ECHO MEAS - AO ROOT DIAM: 3.9 CM
BH CV ECHO MEAS - AO V2 MAX: 110.1 CM/SEC
BH CV ECHO MEAS - AO V2 VTI: 23 CM
BH CV ECHO MEAS - AVA(I,D): 4.8 CM2
BH CV ECHO MEAS - EDV(CUBED): 123 ML
BH CV ECHO MEAS - EDV(MOD-SP4): 94.7 ML
BH CV ECHO MEAS - EF(MOD-BP): 66 %
BH CV ECHO MEAS - EF(MOD-SP4): 66.3 %
BH CV ECHO MEAS - ESV(CUBED): 33.8 ML
BH CV ECHO MEAS - ESV(MOD-SP4): 31.9 ML
BH CV ECHO MEAS - FS: 35 %
BH CV ECHO MEAS - IVS/LVPW: 0.97 CM
BH CV ECHO MEAS - IVSD: 1.01 CM
BH CV ECHO MEAS - LA DIMENSION(2D): 3 CM
BH CV ECHO MEAS - LV MASS(C)D: 186.8 GRAMS
BH CV ECHO MEAS - LV MAX PG: 5 MMHG
BH CV ECHO MEAS - LV MEAN PG: 2.26 MMHG
BH CV ECHO MEAS - LV V1 MAX: 111.4 CM/SEC
BH CV ECHO MEAS - LV V1 VTI: 23 CM
BH CV ECHO MEAS - LVIDD: 5 CM
BH CV ECHO MEAS - LVIDS: 3.2 CM
BH CV ECHO MEAS - LVOT AREA: 4.8 CM2
BH CV ECHO MEAS - LVOT DIAM: 2.48 CM
BH CV ECHO MEAS - LVPWD: 1.04 CM
BH CV ECHO MEAS - MV A MAX VEL: 71.8 CM/SEC
BH CV ECHO MEAS - MV DEC SLOPE: 210.3 CM/SEC2
BH CV ECHO MEAS - MV DEC TIME: 0.2 MSEC
BH CV ECHO MEAS - MV E MAX VEL: 42.7 CM/SEC
BH CV ECHO MEAS - MV E/A: 0.59
BH CV ECHO MEAS - MV MAX PG: 2.42 MMHG
BH CV ECHO MEAS - MV MEAN PG: 0.76 MMHG
BH CV ECHO MEAS - MV V2 VTI: 21.1 CM
BH CV ECHO MEAS - MVA(VTI): 5.3 CM2
BH CV ECHO MEAS - PA ACC TIME: 0.09 SEC
BH CV ECHO MEAS - PA PR(ACCEL): 37.5 MMHG
BH CV ECHO MEAS - PA V2 MAX: 80.9 CM/SEC
BH CV ECHO MEAS - PULM A REVS DUR: 0.1 SEC
BH CV ECHO MEAS - PULM A REVS VEL: 31.4 CM/SEC
BH CV ECHO MEAS - PULM DIAS VEL: 43.6 CM/SEC
BH CV ECHO MEAS - PULM SYS VEL: 59.7 CM/SEC
BH CV ECHO MEAS - RAP SYSTOLE: 3 MMHG
BH CV ECHO MEAS - RV MAX PG: 1.7 MMHG
BH CV ECHO MEAS - RV V1 MAX: 65.1 CM/SEC
BH CV ECHO MEAS - RV V1 VTI: 13.9 CM
BH CV ECHO MEAS - RVDD: 2.5 CM
BH CV ECHO MEAS - RVOT DIAM: 2.25 CM
BH CV ECHO MEAS - RVSP: 14.4 MMHG
BH CV ECHO MEAS - SV(LVOT): 111.2 ML
BH CV ECHO MEAS - SV(MOD-SP4): 62.8 ML
BH CV ECHO MEAS - SV(RVOT): 55.3 ML
BH CV ECHO MEAS - TR MAX PG: 11.4 MMHG
BH CV ECHO MEAS - TR MAX VEL: 169.1 CM/SEC
BH CV REST NUCLEAR ISOTOPE DOSE: 7.2 MCI
BH CV STRESS BP STAGE 1: NORMAL
BH CV STRESS BP STAGE 2: NORMAL
BH CV STRESS BP STAGE 3: NORMAL
BH CV STRESS BP STAGE 4: NORMAL
BH CV STRESS COMMENTS STAGE 1: NORMAL
BH CV STRESS COMMENTS STAGE 2: NORMAL
BH CV STRESS DOSE REGADENOSON STAGE 1: 0.4
BH CV STRESS DURATION MIN STAGE 1: 0
BH CV STRESS DURATION MIN STAGE 2: 4
BH CV STRESS DURATION SEC STAGE 1: 10
BH CV STRESS DURATION SEC STAGE 2: 0
BH CV STRESS HR STAGE 1: 72
BH CV STRESS HR STAGE 2: 79
BH CV STRESS HR STAGE 3: 77
BH CV STRESS HR STAGE 4: 72
BH CV STRESS NUCLEAR ISOTOPE DOSE: 21.9 MCI
BH CV STRESS PROTOCOL 1: NORMAL
BH CV STRESS RECOVERY BP: NORMAL MMHG
BH CV STRESS RECOVERY HR: 68 BPM
BH CV STRESS STAGE 1: 1
BH CV STRESS STAGE 2: 2
BH CV STRESS STAGE 3: 3
BH CV STRESS STAGE 4: 4
LV EF NUC BP: 68 %
MAXIMAL PREDICTED HEART RATE: 168 BPM
MAXIMAL PREDICTED HEART RATE: 168 BPM
PERCENT MAX PREDICTED HR: 47.02 %
STRESS BASELINE BP: NORMAL MMHG
STRESS BASELINE HR: 59 BPM
STRESS PERCENT HR: 55 %
STRESS POST PEAK BP: NORMAL MMHG
STRESS POST PEAK HR: 79 BPM
STRESS TARGET HR: 143 BPM
STRESS TARGET HR: 143 BPM

## 2022-03-18 ENCOUNTER — OFFICE VISIT (OUTPATIENT)
Dept: CARDIOLOGY | Facility: CLINIC | Age: 52
End: 2022-03-18

## 2022-03-18 VITALS
WEIGHT: 207 LBS | HEIGHT: 65 IN | BODY MASS INDEX: 34.49 KG/M2 | HEART RATE: 74 BPM | SYSTOLIC BLOOD PRESSURE: 138 MMHG | RESPIRATION RATE: 18 BRPM | DIASTOLIC BLOOD PRESSURE: 94 MMHG

## 2022-03-18 DIAGNOSIS — I25.110 CORONARY ARTERY DISEASE INVOLVING NATIVE CORONARY ARTERY OF NATIVE HEART WITH UNSTABLE ANGINA PECTORIS: ICD-10-CM

## 2022-03-18 DIAGNOSIS — E78.41 ELEVATED LIPOPROTEIN(A): ICD-10-CM

## 2022-03-18 DIAGNOSIS — R06.02 SHORTNESS OF BREATH: Primary | ICD-10-CM

## 2022-03-18 DIAGNOSIS — I10 HYPERTENSION, BENIGN: ICD-10-CM

## 2022-03-18 DIAGNOSIS — I25.5 ISCHEMIC CARDIOMYOPATHY: ICD-10-CM

## 2022-03-18 DIAGNOSIS — R42 LIGHTHEADED: ICD-10-CM

## 2022-03-18 PROCEDURE — 99214 OFFICE O/P EST MOD 30 MIN: CPT | Performed by: INTERNAL MEDICINE

## 2022-03-18 RX ORDER — ISOSORBIDE MONONITRATE 30 MG/1
30 TABLET, EXTENDED RELEASE ORAL DAILY
Qty: 90 TABLET | Refills: 1 | Status: SHIPPED | OUTPATIENT
Start: 2022-03-18 | End: 2022-09-23 | Stop reason: SDUPTHER

## 2022-03-18 RX ORDER — PRAVASTATIN SODIUM 20 MG
20 TABLET ORAL DAILY
Qty: 90 TABLET | Refills: 1 | Status: SHIPPED | OUTPATIENT
Start: 2022-03-18 | End: 2022-08-30

## 2022-03-18 NOTE — PROGRESS NOTES
Cardiology Clinic Note  Brant Martinez MD, PhD    Subjective:     Encounter Date:03/18/2022      Patient ID: Sada Le is a 51 y.o. female.    Chief Complaint:  Chief Complaint   Patient presents with   • Follow-up       HPI:      PreviouslyI the pleasure to see this patient who is a 49-year-old female who is well-known to me from prior hospitalization and clinic notes.  She has a history of anterior ST elevation myocardial infarction with complete occlusion of the proximal LAD under going revascularization with Xience 3.5 x 33 mm drug-eluting stent postdilated to 4.5 mm at 18 familia with good results.  This was an Impella supported procedure given under  Cardiogenic shock in the setting of acute MI which was complicated by VF arrest with successful defibrillation.  She did well with weaning Impella support over 2 days and institution of max goal-directed medical therapy ultimately being discharged to home.  We were very aggressive with her medical therapy as well as antiplatelets and her EF improved from around 28 to 30% to 55% with only ever so slight anterior wall very mild hypokinesis residually.  She has done well since this time and is completely quit smoking.  She had nonobstructive disease in the circumflex and RCA as well as mid to distal LAD.  She was changed secondary to shortness of breath from Brilinta to Effient which she is maintained on aspirin and Effient, she is on high intensity statin therapy as well as Aldactone and long-acting beta-blocker and loop diuretics 40 twice daily of Lasix.  She is on ACE inhibitor at moderate to high intensity as well tolerating this just fine without cough.  She has had a recent hospitalization for shortness of breath and underwent right heart catheterization with no evidence of pulmonary hypertension despite volume loading during the procedure with evidence of volume depletion initially with right atrial pressure of 2 to 3 mmHg.  Results of this are below.  She  had recent hospitalization  for atypical substernal chest pain also with jaw pain.  Troponins were unremarkable, EKG was unchanged, blood pressure has been somewhat labile at home recently she says and when high causes some degree of chest discomfort.  She underwent noninvasive evaluation with treadmill stress test and walked greater than 9 minutes with no EKG changes and she had normal myocardial perfusion at rest and stress.  There was no indication of balanced ischemia that would be suspected that may give false negative results relative to perfusion imaging.  Her echo showed normal preserved LV systolic function with ever so slight mild anterior wall hypokinesis compared to thickening of the posterior lateral walls.  EF was 55 to 60%.  Normal atrial sizes, no evidence of pulmonary hypertension, IVC diameter was normal.  She was discharged shortly thereafter.  She did continue to been followed peripherally in the clinic as an outpatient with optimization of antianginals however she continued ultimately to have some chest discomfort which she expressed along with more fatigue and ultimately we made a decision to proceed with diagnostic left heart catheterization for revisualization of LAD stents as well as other residual nonobstructive disease.  She underwent diagnostic left heart catheterization getting PCI to the ostial proximal circumflex, 3.0 x 18 Xience postdilated to 3.9 mm in the proximal ostial portion and 3.75 mm distally with good angiographic results and reduction of stenosis from 80% ulcerated appearing plaque to 0% residual.  Since this time and after discharge she has felt much better.  Her EDP was also very low indicative of volume depletion and her EF was normal at 55% with only very mild anterior wall hypokinesis from prior ischemic injury.  With improvement of her LV CV systolic function, low LVEDP and new complaints of dizziness with standing and orthostasis we decided to decrease her Lasix as  well as stop her Aldactone previously blood pressure has improved 130s over 90s.  Heart rates in the 70s.  She is down 10 pounds.  She continues to complain of some atypical chest discomfort, she is walking on the treadmill but says chest pain does go away.  Stress evaluation recently revealed a small area of mild reversibility in the lateral wall and we did demonstrate this with the images today along with review of her cath films which show a large obtuse marginal branch supplying this with previous PCI to proximal circumflex however I expect the ischemic zone to be much larger.  We will try antianginals given preserved LV systolic function with an EF of 65% and normal filling pressures on a recent 2D echo as well and she can start low-dose nitrate to see if this will help in addition to pravastatin, she previously had problems with atorvastatin which was stopped secondary to severe myalgias     Normal CV exam  Regular rate and rhythm no rubs murmurs gallops  No clubbing cyanosis or edema  Vitals reviewed blood pressure 138/94 with heart rates in 70s    Assessment plan  Obstructive coronary disease status post intervention  Essential hypertension hyperlipidemia  Obesity  History of ischemic cardiomyopathy  History of ST elevation microinfarction  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, 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, repeat CT 1 year  Atypical chest pain     Widely patent stents to the LAD and circumflex 1 year ago  Obstructive disease in circumflex status post revascularization and stenting as above and below  Abnormal stress with small area of mild reversibility in the lateral wall unclear significance or correlation  Try additional antianginal therapy with low-dose nitrate  Continue dual antiplatelet therapy with aspirin and Plavix  High intensity statin therapy as tolerated  Beta-blocker on board heart rate 60s  Continue lisinopril to 5 daily  Continue Lasix to 20 daily  Remain off Aldactone, NYHA class I  Diet and  "exercise per AHA guidelines  Goal blood pressure simply less than 135 systolic  Statin therapy she has tried multiple agents and there are giving her severe myalgias and muscle pains she is down to taking them every other day or even not at all given her otherwise symptomatology.  We will evaluate with PCSK9 inhibitors as alternative therapy if she has tried more than 3 statins with nontolerance to all of them if she fails pravastatin which will be her fourth agent      return to clinic 3 months     Cath films reviewed today extensively, stress images reviewed as well all questions as proposed by the patient were answered today     Greater than 25 minutes of face-to-face with the patient with additional 10 minutes and charting for total encounter time greater than 35 minutes     Brant Martinez MD, PhD    Historical data copied forward from previous encounters in EMR including the history, exam, and assessment/plan has been reviewed and is unchanged unless noted otherwise.    Cardiac medicines reviewed with risk, benefits, and necessity of each discussed.    Risk and benefit of cardiac testing reviewed including death heart attack stroke pain bleeding infection need for vascular /cardiovascular surgery were discussed and the patient     Objective:         /94 (BP Location: Left arm, Patient Position: Sitting)   Pulse 74   Resp 18   Ht 165.1 cm (65\")   Wt 93.9 kg (207 lb)   LMP 01/01/1996 (Approximate)   BMI 34.45 kg/m²     Physical Exam    Assessment:         There are no diagnoses linked to this encounter.       Plan:              The pleasure to be involved in this patient's cardiovascular care.  Please call with any questions or concerns  Brant Martinez MD, PhD    Most recent EKG as reviewed and interpreted by me:  Procedures     Most recent echo as reviewed and interpreted by me:  Results for orders placed during the hospital encounter of 03/16/22    Adult Transthoracic Echo Complete W/ Cont if " Necessary Per Protocol    Interpretation Summary  · Estimated left ventricular EF was in agreement with the calculated left ventricular EF. Left ventricular ejection fraction appears to be 61 - 65%. Left ventricular systolic function is normal.  · Left ventricular diastolic function is consistent with (grade Ia w/high LAP) impaired relaxation.  · Estimated right ventricular systolic pressure from tricuspid regurgitation is normal (<35 mmHg).  · Moderate dilation of the aortic root is present. Moderate dilation of the sinuses of Valsalva is present.      Most recent stress test as reviewed and interpreted by me:  Results for orders placed during the hospital encounter of 03/16/22    Stress Test With Myocardial Perfusion One Day    Interpretation Summary  · Left ventricular ejection fraction is normal. (Calculated EF = 68%).  · Myocardial perfusion imaging indicates a small-sized, mildly severe area of ischemia located in the lateral wall.  · Impressions are consistent with an intermediate risk study.  · There is no prior study available for comparison. Some different score of only 2 Small area of mild reversibility in the apical lateral versus mid lateral segments Normal EF by gated imaging 68%.  · Findings consistent with an equivocal ECG stress test.    Abnormal study  Intermediate risk study  Positive symptoms during the study with some chest heaviness  Biphasic T waves in the anterolateral leads but nonspecific finding from baseline  Normal EF 68%  Small area of mild reversibility in the lateral wall to apical lateral      Most recent cardiac catheterization as reviewed interpreted by me:  Results for orders placed during the hospital encounter of 02/12/21    Cardiac Catheterization/Vascular Study    Narrative  Cardiac cath note  Brant Martinez MD, PhD  Date of service 2-  [facility] cardiology    Procedure  1.  Left heart catheterization with coronary angiography left ventriculography in ANTON  position  2.  Percutaneous coronary mention to the ostial proximal circumflex with 3.0 x 18 mm Xience drug-eluting stent postdilated to 3.9 mm proximally, 3.75 mm distally with good angiographic results    Lesion information  Ostial 50%, mid 70% with hazy appearance of the proximal circumflex, ulcerated plaque angiographically with daily anginal chest pain  Reduced to 0% residual stenosis  JANE-3 flow pre and post    After informed consent the patient was brought to the catheterization lab sterilely prepped and draped in usual fashion with exposure the right groin for right common femoral arterial access via micropuncture modified standard technique with placement of a 6 Bulgarian sheath under fluoroscopic guidance which was aspirated flushed with heparinized saline after 1% lidocaine analgesia.  An 035 guidewire was advanced to level aortic valve followed by diagnostic left heart catheterization with JL4 and JR4 6 Bulgarian catheters.  A pigtail catheter was used to perform left ventriculography, EDP measurement and pullback assessment the transaortic valve gradient.  Secondary to findings of coronary disease in the ostial proximal circumflex decision was made to intervene given recurrent anginal chest pain, ulcerated appearance of the proximal circumflex.  Lesion was predilated after run-through wire to the obtuse marginal with 2.5 x 20 mm NC at 18 familia followed by stenting with 3.0 x 18 deployed at 18 familia at 3.25 followed by postdilatation with with 3.5 x 15 up to 1618 familia and then 3.75 x 15 NC balloon at the 14 familia at the distal edge and 16 familia at the proximal edge of 3.9 mm with good angiographic results.  There was no compromise of the LAD flow.  A wire directed down the LAD at the conclusion the case had a balloon that freely passed and there was no stent impingement from the left main to the LAD.  JANE-3 flow was maintained pre and post.  Lesion reduced from 70% to 0% residual.  Heparin was used to maintain ACT  greater than 250 throughout the entire to the case.  Patient was already on aspirin and Effient but was given additional 10 mg of Effient on the table.  Right common femoral arteriotomy was closed by manual pressure once ACT less than 175.  She had no complications.  She left the Cath Lab chest pain-free hemodynamically electrically stable alert talking to staff neurologically grossly intact bilaterally    Moderate conscious sedation of Versed and fentanyl administered by registered nurse with complete ECG pulse oximetry and hemodynamic monitoring throughout the entirety the case observed by me  Complications none  Blood loss less than 5 cc  Contrast 180 cc including diagnostic portion    Findings  1.  Opening aortic pressure of 114/66 with a mean of 86  2.  LV pressure 108 with an EDP of 2 to 5 mmHg  Closing pressure 111/66 with a mean of 85  EDP 6  No significant transaortic valve gradient    Angiography  1.  Left main large caliber vessel no angiographic disease  2.  LAD large caliber vessel with widely patent stent in the proximal portion but diffusely diseased mid to distal LAD with eccentric plaque likely 2.5 mm in diameter from the bifurcation of the diagonal all the way around the apex with again 30 to 40% luminal irregularities but no significant flow-limiting stenosis.,  Diagonal branch with no obstructive disease only mild luminal regularities  3.  Circumflex nondominant with large obtuse marginal branch.  There is a luminal filling defect right at the ostium concerning for nonocclusive thrombus versus eccentric plaque at 50%, immediately distal is a hazy eccentric ulcerated plaque 75% followed by bifurcation with continuation circumflex and obtuse marginal branch which has no disease.  There is JANE-3 flow throughout  4.  RCA small nondominant no angiographic disease, small PDA branch distally not amenable to any stents given caliber    Conclusions and recommendations  1 two-vessel coronary disease,  widely patent stent in the proximal LAD with diffuse mid to distal LAD stenosis but nonobstructive, ostial proximal circumflex disease of questionable significance however angiographic appearance of significant ulceration, concern for thrombus status post intervention as described above  2.  Successful intervention as described above with balloon angioplasty and stenting of the ostial proximal circumflex postdilated to 3.9 mm at the ostium and 3.75 mm distally with good angiographic results  3.  Continue antiplatelet therapy with aspirin and Effient  4.  Decrease Lasix with low LVEDP, hold ACE inhibitor  5 LV function appears 50 to 55% by LV gram  6.  No transaortic valve gradient  7.,  Mid to observation, likely home tomorrow which discharge criteria met  8.  After discharge follow-up with cardiology in 2 to 4 weeks  \  It is a pleasure be involved in her cardiovascular care.  Please call with any questions or concerns  Brant Martinez MD, PhD    The following portions of the patient's history were reviewed and updated as appropriate: allergies, current medications, past family history, past medical history, past social history, past surgical history and problem list.      ROS:  14 point review of systems negative except as mentioned above    Current Outpatient Medications:   •  ALPRAZolam (XANAX) 0.25 MG tablet, Take 0.25 mg by mouth Every 6 (Six) Hours As Needed for Anxiety., Disp: , Rfl:   •  buPROPion XL (WELLBUTRIN XL) 150 MG 24 hr tablet, TAKE 1 TABLET BY MOUTH EVERY DAY, Disp: 90 tablet, Rfl: 4  •  cetirizine (zyrTEC) 10 MG tablet, Take 10 mg by mouth Every Night., Disp: , Rfl:   •  cholecalciferol (VITAMIN D3) 1.25 MG (82498 UT) capsule, Take 50,000 Units by mouth Daily., Disp: , Rfl:   •  clopidogrel (PLAVIX) 75 MG tablet, Take 1 tablet by mouth Daily., Disp: 90 tablet, Rfl: 3  •  [employer] Aspirin Adult Low Dose 81 MG chewable tablet, CHEW 1 TABLET BY MOUTH EVERY DAY, Disp: 90 tablet, Rfl: 3  •  escitalopram  (LEXAPRO) 10 MG tablet, TAKE 1 TABLET BY MOUTH EVERY DAY, Disp: 90 tablet, Rfl: 4  •  furosemide (LASIX) 40 MG tablet, Take 1 tablet by mouth Daily., Disp: 90 tablet, Rfl: 3  •  lisinopril (PRINIVIL,ZESTRIL) 5 MG tablet, TAKE 1 TABLET BY MOUTH TWICE A DAY, Disp: 180 tablet, Rfl: 1  •  metoprolol succinate XL (TOPROL-XL) 25 MG 24 hr tablet, TAKE 1 TABLET BY MOUTH EVERY DAY, Disp: 90 tablet, Rfl: 0  •  nitroglycerin (NITROSTAT) 0.4 MG SL tablet, Place 0.4 mg under the tongue Every 5 (Five) Minutes As Needed for Chest Pain. Take no more than 3 doses in 15 minutes., Disp: , Rfl:   •  omeprazole (priLOSEC) 20 MG capsule, Take 20 mg by mouth Daily., Disp: , Rfl:   •  Potassium 99 MG tablet, Take 1 tablet by mouth Daily., Disp: , Rfl:   •  vitamin B-12 (CYANOCOBALAMIN) 500 MCG tablet, Take 500 mcg by mouth Daily., Disp: , Rfl:   •  isosorbide mononitrate (IMDUR) 30 MG 24 hr tablet, Take 1 tablet by mouth Daily., Disp: 90 tablet, Rfl: 1  •  pravastatin (Pravachol) 20 MG tablet, Take 1 tablet by mouth Daily., Disp: 90 tablet, Rfl: 1    Problem List:  Patient Active Problem List   Diagnosis   • Allergic rhinitis   • Cervical disc disease with myelopathy   • Diverticulosis of colon   • Hypertension, benign   • Insomnia, organic   • Class 1 obesity due to excess calories with serious comorbidity and body mass index (BMI) of 34.0 to 34.9 in adult   • Osteopenia of spine   • Mixed hyperlipidemia   • Cardiac arrest with ventricular fibrillation (HCC)   • Ischemic cardiomyopathy   • Stented coronary artery   • History of echocardiogram   • Annual visit for general adult medical examination with abnormal findings   • Cervical cancer screening   • Encounter for screening for malignant neoplasm of breast   • Colon cancer screening   • History of tobacco use   • Generalized anxiety disorder   • ASD (atrial septal defect)   • History of transesophageal echocardiography (NAFISA)   • Coronary artery disease of native artery of native heart  with stable angina pectoris (HCC)   • History of MI (myocardial infarction)   • Family history of diabetes mellitus type II   • Anxiety   • Coronary artery disease involving native coronary artery of native heart with unstable angina pectoris (HCC)   • CAD S/P percutaneous coronary angioplasty   • Left arm cellulitis   • Branch retinal vein occlusion with macular edema   • Hypertensive retinopathy     Past Medical History:  Past Medical History:   Diagnosis Date   • Absence of left thumb     Impression: hx of MRSA, she is signficantly improved She will continue meds and followup if sxs have not resolved over the next 2 weeks.. She is released from care and will notify us of any problems   • Acute otitis media    • Allergic rhinitis    • Arthritis     neck   • Cancer (HCC)     skin   • Cervical disc disease with myelopathy    • Contact dermatitis or eczema    • Coronary artery disease    • Disorder of bone and cartilage    • Diverticulitis of colon     Impression: CT confirms in the sigmoid colon 10/2011   • Diverticulosis of colon     Impression: No sxs 02/13/2013   • Elevated cholesterol    • Elevated lipoprotein(a) 4/30/2020   • Elevated temperature    • External otitis of left ear    • Hearing loss due to cerumen impaction, left    • Hemangioma of liver    • History of echocardiogram 10/03/2019    Severely reduced LV systolic function. EF 34% Distribution indicative of LAD territory injury. Akinesis of the apex as well as apical lateral and mid to apical septal walls. Preservation of posterior inferior lateral walls. Mild AI, mild TR. Normal RV function.    • History of transesophageal echocardiography (NAFISA) 01/07/2020    EF 55% LA cavity is mild to moderately dilated. Mild MR. Interatrial septum appears redundant. Interatrial hypermobile c/w aneurysm. Large PFO is noted with right to left shunting on bubble study. PFO tunnel appears to be short.    • Hordeolum externum of left lower eyelid     Impression: She  was started on Bactrim DS for her infection. She was also advised to use warm compression. She will followup should sxs not improve over the next 48 hrs and resolve over the next 1 weeek.   • Hyperlipidemia     Impression: Diet controled. She should see improvement with her successful wt loss. We discussed her lipid panel.   • Hypertension, benign     Impression: new onset Low salt low calorie diet and regular exercise and followup in 1 mo She will monitor her pressures and notify us of her pressures over the next 1 week.   • Inclusion cyst of right breast     Impression: We will follow for now. She is encouraged to have Mammography. She is presently without insurance and reluctant. She bobby consider. She will notify us of any change at all in the lesion for the only way to be certain of it's etilogy is to remove it. She understands.   • Insomnia, organic    • Menopausal syndrome    • Overweight (BMI 25.0-29.9)    • RUQ abdominal pain     Impression: Resolving, negative GB u/s, HIDA EF 77%, we will follow   • Stented coronary artery 09/29/2019   • Tobacco use     Impression: She is strongly encouraged to stop smoking. Cessation techniques discussed and encouraged. The consequences of not stopping discussed, ie, CAD, PVD, Stroke, Lung and other cancers.     Past Surgical History:  Past Surgical History:   Procedure Laterality Date   • BIVENTRICULAR ASSIST DEVICE/LEFT VENTRICULAR ASSIST DEVICE INSERTION N/A 9/29/2019    Procedure: Left Ventricular Assist Device;  Surgeon: Brant Martinez MD;  Location: [facility] CATH INVASIVE LOCATION;  Service: Cardiovascular   • CARDIAC CATHETERIZATION N/A 9/29/2019    Procedure: Left Heart Cath;  Surgeon: Brant Martinez MD;  Location: [facility] CATH INVASIVE LOCATION;  Service: Cardiovascular   • CARDIAC CATHETERIZATION N/A 9/29/2019    Procedure: Coronary angiography;  Surgeon: Brant Martinez MD;  Location: [facility] CATH INVASIVE LOCATION;  Service:  Cardiovascular   • CARDIAC CATHETERIZATION N/A 9/29/2019    Procedure: Left ventriculography;  Surgeon: Brant Martinez MD;  Location:  [facility] CATH INVASIVE LOCATION;  Service: Cardiovascular   • CARDIAC CATHETERIZATION N/A 9/29/2019    Procedure: Stent SERENITY coronary;  Surgeon: Brant Martinez MD;  Location:  [facility] CATH INVASIVE LOCATION;  Service: Cardiovascular   • CARDIAC CATHETERIZATION N/A 2/25/2020    Procedure: Right Heart Cath;  Surgeon: Brant Martinez MD;  Location:  [facility] CATH INVASIVE LOCATION;  Service: Cardiology;  Laterality: N/A;   • CARDIAC CATHETERIZATION N/A 2/12/2021    Procedure: Left Heart Cath;  Surgeon: Brant Martinez MD;  Location: [facility] CATH INVASIVE LOCATION;  Service: Cardiology;  Laterality: N/A;   • CARDIAC CATHETERIZATION N/A 2/12/2021    Procedure: Coronary angiography;  Surgeon: Brant Martinez MD;  Location: [facility] CATH INVASIVE LOCATION;  Service: Cardiology;  Laterality: N/A;   • CARDIAC CATHETERIZATION N/A 2/12/2021    Procedure: Left ventriculography;  Surgeon: Brant Martinez MD;  Location: [facility] CATH INVASIVE LOCATION;  Service: Cardiology;  Laterality: N/A;   • CARDIAC CATHETERIZATION N/A 2/12/2021    Procedure: Aortic root aortogram;  Surgeon: Brant Martinez MD;  Location: [facility] CATH INVASIVE LOCATION;  Service: Cardiology;  Laterality: N/A;   • CARDIAC CATHETERIZATION N/A 2/12/2021    Procedure: Percutaneous Coronary Intervention;  Surgeon: Brant Martinez MD;  Location: [facility] CATH INVASIVE LOCATION;  Service: Cardiology;  Laterality: N/A;   • CARDIAC CATHETERIZATION N/A 2/12/2021    Procedure: Stent SERENITY coronary;  Surgeon: Brant Martinez MD;  Location: [facility] CATH INVASIVE LOCATION;  Service: Cardiology;  Laterality: N/A;   • CARDIAC ELECTROPHYSIOLOGY PROCEDURE  9/29/2019    Procedure: Impella Insertion;  Surgeon: Brant Martinez MD;  Location: [facility] CATH INVASIVE LOCATION;   Service: Cardiovascular   • CORONARY ANGIOPLASTY  2021    1x stent to Circ   • CORONARY ANGIOPLASTY WITH STENT PLACEMENT  2021   • VT RT/LT HEART CATHETERS N/A 2019    Procedure: Percutaneous Coronary Intervention;  Surgeon: Brant Martinez MD;  Location: [facility] INVASIVE LOCATION;  Service: Cardiovascular   • TOTAL ABDOMINAL HYSTERECTOMY WITH SALPINGO OOPHORECTOMY      Endometriosis     Social History:  Social History     Socioeconomic History   • Marital status:    Tobacco Use   • Smoking status: Former Smoker     Packs/day: 1.00     Years: 34.00     Pack years: 34.00     Types: Cigarettes     Start date:      Quit date: 2019     Years since quittin.4   • Smokeless tobacco: Never Used   • Tobacco comment: States she is quitting as of 2019.   Substance and Sexual Activity   • Alcohol use: Yes     Comment: occassionally    • Drug use: Not Currently     Comment: occasional   • Sexual activity: Defer     Allergies:  Allergies   Allergen Reactions   • Penicillins Rash   • Ciprofloxacin Rash   • Penicillin G Rash     Immunizations:  Immunization History   Administered Date(s) Administered   • DT 2004   • FluLaval/Fluarix/Fluzone >6 10/01/2019   • Tdap 2011            In-Office Procedure(s):  No orders to display        ASCVD RIsk Score::  The ASCVD Risk score (Marielena DC Jr., et al., 2013) failed to calculate for the following reasons:    The patient has a prior MI or stroke diagnosis    Imaging:    Results for orders placed in visit on 21    XR Knee 4+ View Right    Narrative  Examination: XR KNEE 4+ VW RIGHT-    Date of Exam: 2021 3:36 PM    Indication: right knee pain; M25.561-Pain in right knee; G89.29-Other  chronic pain.    Comparison: None available.    Technique: 4 radiographic views of the knee were obtained.    Findings:  There is no fracture. No periostitis or bone destruction. There is mild  spurring at the attachment of the  quadriceps tendon. Knee joint space is  preserved. No soft tissue abnormality. No significant joint effusion.    Impression  Essentially negative exam.    Electronically Signed By-Jammie Sena MD On:6/4/2021 4:04 PM  This report was finalized on [id number] by  Jammie Sena MD.       Results for orders placed in visit on 09/02/20    CT Angiogram Chest With & Without Contrast    Narrative  DATE OF EXAM:  2/19/2021 8:23 AM    PROCEDURE:  CT ANGIOGRAM CHEST W WO CONTRAST-    INDICATIONS:  Thoracic aortic aneurysm (TAA), follow up; I71.2-Thoracic aortic  aneurysm, without rupture    COMPARISON:  No Comparisons Available    TECHNIQUE:  Contiguous axial imaging was obtained from the thoracic inlet through  the upper abdomen following the intravenous administration of 100 mL  Isovue 370. Reconstructed coronal and sagittal images were also  obtained. In addition, a 3 D volume rendered image was obtained after  post processing. Automated exposure control and iterative reconstruction  methods were used.    FINDINGS:  VASCULAR FINDINGS: The transverse thoracic aorta has a maximal luminal  diameter of 3.6 cm. It measures 4.2 cm at the sinus of Valsalva. The  transverse thoracic aorta measures 2.4 cm. In the proximal descending  thoracic aorta measures 2.2 cm. No focal aneurysmal dilatation is  identified    NONVASCULAR FINDINGS: Scans through the lungs appear normal. No  pulmonary nodules or masses are identified. There is some linear discoid  atelectasis peripherally in the lingula. A coronary stent is in place in  the left anterior descending coronary artery and a second stent is  identified in the circumflex coronary artery. These vessels appear  widely patent following contrast administration. .    Impression  1. 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 Mild to moderate atherosclerotic plaque  deposition primarily in the descending thoracic aorta.  2. Status post  coronary stent placement.    Electronically Signed By-Amaury Hendrix MD On:2/19/2021 10:53 AM  This report was finalized on [id number] by  Amaury Hendrix MD.      Results for orders placed in visit on 09/02/20    CT Angiogram Chest With & Without Contrast    Narrative  DATE OF EXAM:  2/19/2021 8:23 AM    PROCEDURE:  CT ANGIOGRAM CHEST W WO CONTRAST-    INDICATIONS:  Thoracic aortic aneurysm (TAA), follow up; I71.2-Thoracic aortic  aneurysm, without rupture    COMPARISON:  No Comparisons Available    TECHNIQUE:  Contiguous axial imaging was obtained from the thoracic inlet through  the upper abdomen following the intravenous administration of 100 mL  Isovue 370. Reconstructed coronal and sagittal images were also  obtained. In addition, a 3 D volume rendered image was obtained after  post processing. Automated exposure control and iterative reconstruction  methods were used.    FINDINGS:  VASCULAR FINDINGS: The transverse thoracic aorta has a maximal luminal  diameter of 3.6 cm. It measures 4.2 cm at the sinus of Valsalva. The  transverse thoracic aorta measures 2.4 cm. In the proximal descending  thoracic aorta measures 2.2 cm. No focal aneurysmal dilatation is  identified    NONVASCULAR FINDINGS: Scans through the lungs appear normal. No  pulmonary nodules or masses are identified. There is some linear discoid  atelectasis peripherally in the lingula. A coronary stent is in place in  the left anterior descending coronary artery and a second stent is  identified in the circumflex coronary artery. These vessels appear  widely patent following contrast administration. .    Impression  1. 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 Mild to moderate atherosclerotic plaque  deposition primarily in the descending thoracic aorta.  2. Status post coronary stent placement.    Electronically Signed By-Amaury Hendrix MD On:2/19/2021 10:53 AM  This report was finalized on  [id number] by  Amaury Hendrix MD.      Lab Review:   Hospital Outpatient Visit on 03/16/2022   Component Date Value   • Target HR (85%) 03/16/2022 143    • Max. Pred. HR (100%) 03/16/2022 168    • BH CV STRESS PROTOCOL 1 03/16/2022 Pharmacologic    • Stage 1 03/16/2022 1    • HR Stage 1 03/16/2022 72    • BP Stage 1 03/16/2022 109/70    • Duration Min Stage 1 03/16/2022 0    • Duration Sec Stage 1 03/16/2022 10    • Stress Dose Regadenoson * 03/16/2022 0.4    • Stress Comments Stage 1 03/16/2022 10 sec bolus injection    • Stage 2 03/16/2022 2    • HR Stage 2 03/16/2022 79    • BP Stage 2 03/16/2022 109/70    • Duration Min Stage 2 03/16/2022 4    • Duration Sec Stage 2 03/16/2022 0    • Stress Comments Stage 2 03/16/2022 recovery    • Stage 3 03/16/2022 3    • HR Stage 3 03/16/2022 77    • BP Stage 3 03/16/2022 113/76    • Stage 4 03/16/2022 4    • HR Stage 4 03/16/2022 72    • BP Stage 4 03/16/2022 126/76    • Baseline HR 03/16/2022 59    • Baseline BP 03/16/2022 109/70    • Peak HR 03/16/2022 79    • Percent Max Pred HR 03/16/2022 47.02    • Percent Target HR 03/16/2022 55    • Peak BP 03/16/2022 126/76    • Recovery HR 03/16/2022 68    • Recovery BP 03/16/2022 120/79    • BH CV REST NUCLEAR ISOTO* 03/16/2022 7.2    • BH CV STRESS NUCLEAR ISO* 03/16/2022 21.9    • Nuc Stress EF 03/16/2022 68    Hospital Outpatient Visit on 03/16/2022   Component Date Value   • Target HR (85%) 03/16/2022 143    • Max. Pred. HR (100%) 03/16/2022 168    • ACS 03/16/2022 1.84    • Ao root diam 03/16/2022 3.9    • Ao pk bernie 03/16/2022 110.1    • Ao V2 VTI 03/16/2022 23.0    • HERNANDEZ(I,D) 03/16/2022 4.8    • EDV(cubed) 03/16/2022 123.0    • EDV(MOD-sp4) 03/16/2022 94.7    • EF(MOD-bp) 03/16/2022 66.0    • EF(MOD-sp4) 03/16/2022 66.3    • ESV(cubed) 03/16/2022 33.8    • ESV(MOD-sp4) 03/16/2022 31.9    • IVS/LVPW 03/16/2022 0.97    • LV mass(C)d 03/16/2022 186.8    • LV V1 max PG 03/16/2022 5.0    • LV V1 mean PG 03/16/2022 2.26    • LV V1  max 03/16/2022 111.4    • LVPWd 03/16/2022 1.04    • MV dec slope 03/16/2022 210.3    • MV dec time 03/16/2022 0.20    • MV V2 VTI 03/16/2022 21.1    • MVA(VTI) 03/16/2022 5.3    • PA acc time 03/16/2022 0.09    • PA pr(Accel) 03/16/2022 37.5    • PA V2 max 03/16/2022 80.9    • Pulm A Revs Solomon 03/16/2022 31.4    • RAP systole 03/16/2022 3.0    • RV V1 max PG 03/16/2022 1.70    • RV V1 max 03/16/2022 65.1    • RV V1 VTI 03/16/2022 13.9    • RVIDd 03/16/2022 2.5    • RVSP(TR) 03/16/2022 14.4    • SV(LVOT) 03/16/2022 111.2    • SV(MOD-sp4) 03/16/2022 62.8    • SV(RVOT) 03/16/2022 55.3    • TR max PG 03/16/2022 11.4    • Ao max PG 03/16/2022 4.8    • Ao mean PG 03/16/2022 2.36    • FS 03/16/2022 35.0    • IVSd 03/16/2022 1.01    • LA dimension(2D) 03/16/2022 3.0    • LV V1 VTI 03/16/2022 23.0    • LVIDd 03/16/2022 5.0    • LVIDs 03/16/2022 3.2    • LVOT area 03/16/2022 4.8    • LVOT diam 03/16/2022 2.48    • MV E/A 03/16/2022 0.59    • MV max PG 03/16/2022 2.42    • MV mean PG 03/16/2022 0.76    • RVOT diam 03/16/2022 2.25    • MV A max solomon 03/16/2022 71.8    • MV E max solomon 03/16/2022 42.7    • Pulm A Revs Dur 03/16/2022 0.10    • Pulm Amin Solomon 03/16/2022 43.6    • Pulm Sys Solomon 03/16/2022 59.7    • TR max solomon 03/16/2022 169.1      Recent labs reviewed and interpreted for clinical significance and application            Level of Care:           Brant Martinez MD  03/18/22  .

## 2022-04-11 DIAGNOSIS — I10 ESSENTIAL (PRIMARY) HYPERTENSION: ICD-10-CM

## 2022-04-11 RX ORDER — METOPROLOL SUCCINATE 25 MG/1
TABLET, EXTENDED RELEASE ORAL
Qty: 90 TABLET | Refills: 1 | Status: SHIPPED | OUTPATIENT
Start: 2022-04-11 | End: 2022-10-10

## 2022-04-22 RX ORDER — ASPIRIN 81 MG/1
81 TABLET, CHEWABLE ORAL DAILY
Qty: 90 TABLET | Refills: 3 | Status: SHIPPED | OUTPATIENT
Start: 2022-04-22

## 2022-04-29 ENCOUNTER — TELEPHONE (OUTPATIENT)
Dept: FAMILY MEDICINE CLINIC | Facility: CLINIC | Age: 52
End: 2022-04-29

## 2022-04-29 NOTE — TELEPHONE ENCOUNTER
Spoke with Sada, follow up isaac to see if she is ready to schedule colonoscopy. Sada states she is still thinking about it call her back in a month or so.

## 2022-06-10 ENCOUNTER — OFFICE VISIT (OUTPATIENT)
Dept: FAMILY MEDICINE CLINIC | Facility: CLINIC | Age: 52
End: 2022-06-10

## 2022-06-10 VITALS
HEART RATE: 91 BPM | DIASTOLIC BLOOD PRESSURE: 80 MMHG | RESPIRATION RATE: 16 BRPM | OXYGEN SATURATION: 99 % | WEIGHT: 207.2 LBS | BODY MASS INDEX: 35.37 KG/M2 | TEMPERATURE: 97.3 F | HEIGHT: 64 IN | SYSTOLIC BLOOD PRESSURE: 130 MMHG

## 2022-06-10 DIAGNOSIS — J30.1 SEASONAL ALLERGIC RHINITIS DUE TO POLLEN: ICD-10-CM

## 2022-06-10 DIAGNOSIS — K21.9 GASTROESOPHAGEAL REFLUX DISEASE WITHOUT ESOPHAGITIS: Primary | ICD-10-CM

## 2022-06-10 DIAGNOSIS — J01.00 ACUTE NON-RECURRENT MAXILLARY SINUSITIS: ICD-10-CM

## 2022-06-10 DIAGNOSIS — E66.09 CLASS 1 OBESITY DUE TO EXCESS CALORIES WITH SERIOUS COMORBIDITY AND BODY MASS INDEX (BMI) OF 34.0 TO 34.9 IN ADULT: ICD-10-CM

## 2022-06-10 DIAGNOSIS — I10 HYPERTENSION, BENIGN: Chronic | ICD-10-CM

## 2022-06-10 DIAGNOSIS — I25.118 CORONARY ARTERY DISEASE OF NATIVE ARTERY OF NATIVE HEART WITH STABLE ANGINA PECTORIS: ICD-10-CM

## 2022-06-10 DIAGNOSIS — K21.9 GASTROESOPHAGEAL REFLUX DISEASE WITHOUT ESOPHAGITIS: ICD-10-CM

## 2022-06-10 DIAGNOSIS — B07.9 WART OF HAND: Primary | ICD-10-CM

## 2022-06-10 DIAGNOSIS — Z87.891 HISTORY OF TOBACCO USE: ICD-10-CM

## 2022-06-10 PROCEDURE — 99213 OFFICE O/P EST LOW 20 MIN: CPT | Performed by: FAMILY MEDICINE

## 2022-06-10 PROCEDURE — 17110 DESTRUCTION B9 LES UP TO 14: CPT | Performed by: FAMILY MEDICINE

## 2022-06-10 RX ORDER — HYDROCODONE BITARTRATE AND HOMATROPINE METHYLBROMIDE ORAL SOLUTION 5; 1.5 MG/5ML; MG/5ML
5 LIQUID ORAL EVERY 6 HOURS PRN
Qty: 120 ML | Refills: 0 | Status: SHIPPED | OUTPATIENT
Start: 2022-06-10 | End: 2022-06-22

## 2022-06-10 RX ORDER — OMEPRAZOLE 20 MG/1
20 TABLET, DELAYED RELEASE ORAL DAILY
Qty: 90 TABLET | Refills: 3 | Status: SHIPPED | OUTPATIENT
Start: 2022-06-10

## 2022-06-10 RX ORDER — AZITHROMYCIN 250 MG/1
TABLET, FILM COATED ORAL
Qty: 6 TABLET | Refills: 0 | Status: SHIPPED | OUTPATIENT
Start: 2022-06-10 | End: 2022-06-22

## 2022-06-10 RX ORDER — OMEPRAZOLE 20 MG/1
20 CAPSULE, DELAYED RELEASE ORAL DAILY
Qty: 90 CAPSULE | Refills: 2 | Status: SHIPPED | OUTPATIENT
Start: 2022-06-10 | End: 2022-06-10

## 2022-06-10 NOTE — PROGRESS NOTES
Chief Complaint  Wart, Lesion, Coronary Artery Disease, Hypertension, and URI    Subjective     CC  Problem List  Visit Diagnosis   Encounters  Notes  Medications  Labs  Result Review Imaging  Media    Sada Le presents to [facility] for   Wart:   The wart is located on the left Ring finger tip. Onset was 6 years. She describes this as Mild in severity and gradually worsening. Symptoms are exacerbated by rubbing and contact with clothing. Associated symptoms include pain. Current treatment includes OTC treatment.     Lesion:   Sada Le is here today for a lesion. The lesion appeared gradual and has been occurring for 1 month(s) ago.. The course has been increasing in size. The lesion is characterized as no sign of infection. The lesion is located on  tip of left ear. The symptoms have been associated with recent enlargement and itching.       Coronary Artery Disease  Presents for follow-up visit. Pertinent negatives include no chest pain, chest pressure, chest tightness, dizziness, leg swelling, palpitations, shortness of breath or weight gain. The symptoms have been stable. Compliance with diet is good. Compliance with medications is good.   Hypertension  This is a chronic problem. The current episode started more than 1 year ago. The problem is uncontrolled. Pertinent negatives include no chest pain, headaches, orthopnea, palpitations, peripheral edema, PND, shortness of breath or sweats. Risk factors for coronary artery disease include family history, obesity, post-menopausal state and dyslipidemia. Current antihypertension treatment includes diuretics and ACE inhibitors. The current treatment provides moderate improvement. Hypertensive end-organ damage includes CAD/MI.       Review of Systems   Constitutional: Negative for fever, unexpected weight gain and unexpected weight loss.   HENT: Positive for congestion and rhinorrhea.    Respiratory: Negative for  "cough, chest tightness and shortness of breath.    Cardiovascular: Negative for chest pain, palpitations, orthopnea, leg swelling and PND.   Endocrine: Negative for cold intolerance, heat intolerance, polydipsia and polyuria.   Skin: Positive for skin lesions (left 4th finger left ear ). Negative for rash.   Neurological: Negative for dizziness.   Hematological: Negative for adenopathy. Does not bruise/bleed easily.        Objective   Vital Signs:   /80   Pulse 91   Temp 97.3 °F (36.3 °C) (Temporal)   Resp 16   Ht 162.6 cm (64\")   Wt 94 kg (207 lb 3.2 oz)   SpO2 99% Comment: Room air  BMI 35.57 kg/m²     Physical Exam  Constitutional:       General: She is not in acute distress.  HENT:      Right Ear: Tympanic membrane normal.      Left Ear: Tympanic membrane normal.      Nose: No congestion.      Right Sinus: Maxillary sinus tenderness present.      Left Sinus: Maxillary sinus tenderness present.      Mouth/Throat:      Pharynx: No oropharyngeal exudate or posterior oropharyngeal erythema ( moderate post nasal secretions.  ).   Cardiovascular:      Rate and Rhythm: Normal rate and regular rhythm.      Heart sounds: No murmur heard.  Pulmonary:      Effort: Pulmonary effort is normal.      Breath sounds: Normal breath sounds.   Musculoskeletal:      Cervical back: Neck supple.      Right lower leg: No edema.      Left lower leg: No edema.   Lymphadenopathy:      Cervical: No cervical adenopathy.   Skin:     Findings: Lesion (There is a 4 mm verrucous lesion over the tip of the left 4th finger.  There is a 2 mm dry scaly lesion over the mid portion of the left ear consistent with an AK) present.   Neurological:      Mental Status: She is alert.        Result Review :Labs  Result Review  Imaging  Med Tab  Media          Destruction of Lesion    Date/Time: 6/10/2022 11:32 AM  Performed by: Yodit Johnston MD  Authorized by: Yodit Johnston MD   Preparation: Patient was prepped and draped in the " usual sterile fashion.  Local anesthesia used: yes  Anesthesia: local infiltration    Anesthesia:  Local anesthesia used: yes  Local Anesthetic: lidocaine 1% without epinephrine    Sedation:  Patient sedated: no    Patient tolerance: patient tolerated the procedure well with no immediate complications  Comments: The 4 mm verrcous lesion over the tip of the right finger was cleansed with betadine anesthestized with 1 % lidocaine w/o epi. The lesion was debrided and then cauterized with good results. She received wound care precautions and will notify us of healing problems. She left the office in stable condition.             Assessment and Plan CC Problem List  Visit Diagnosis  ROS  Review (Popup)  Health Maintenance  Quality  BestPractice  Medications  SmartSets  SnapShot Encounters  Media  Problem List Items Addressed This Visit        High    Hypertension, benign (Chronic)    Overview     Controlled.  Compliant continue meds.              Coronary artery disease of native artery of native heart with stable angina pectoris (HCC)    Overview     Two-vessel coronary disease, widely patent stent in the proximal LAD 02/12/2021 w angioplasty to the proximal circumflex.   S/P Acute Myocardial Infarct on 09/29/2019  S/P left cardiac cath 02/12/2021-left main no disease, RCA no disease, disease mid to distal LAD, Angioplasty proximal circumflex, EF 55-60%  S/P Rt Cardiac Cath-02/25/2020  S/P Left Cardiac Cath-09/29/2019  She is doing well. Risk factors modified.                 Low    Allergic rhinitis    Overview     Resume  maximum meds. Her allergies are likely contributing to her cough. Cough med also Rx           Class 1 obesity due to excess calories with serious comorbidity and body mass index (BMI) of 34.0 to 34.9 in adult    Overview     She has regained wt.  Prediabetes is understood.There is strong family hx.  Low calorie diet discussed. She will increase exercise.           Current Assessment &  Plan     Patient's (Body mass index is 35.57 kg/m².) indicates that they are obese (BMI >30) with health conditions that include coronary heart disease and dyslipidemias . Weight is unchanged. BMI is is above average; BMI management plan is completed. We discussed portion control and increasing exercise.            History of tobacco use    Overview     1 PPD X 34 years, she is abstaining though it is difficult.   We discussed techniques and the importance of continuing to abstain.              Gastroesophageal reflux disease without esophagitis    Overview     Stable with PPI which is continued.              Other Visit Diagnoses     Wart of hand    -  Primary    Left 4th digit, tip of finger    Relevant Orders    Ambulatory Referral to Dermatology    Destruction of Lesion    Acute non-recurrent maxillary sinusitis        Abx fluids saline flushes. F/U if no improvement over 48 hrs w. resolution over 1 wk    Relevant Medications    azithromycin (ZITHROMAX) 250 MG tablet    HYDROcodone Bit-Homatrop MBr (HYCODAN) 5-1.5 MG/5ML solution          Follow Up Instructions Charge Capture  Follow-up Communications  No follow-ups on file.  Patient was given instructions and counseling regarding her condition or for health maintenance advice. Please see specific information pulled into the AVS if appropriate.

## 2022-06-19 PROBLEM — I49.01 CARDIAC ARREST WITH VENTRICULAR FIBRILLATION: Status: RESOLVED | Noted: 2019-10-07 | Resolved: 2022-06-19

## 2022-06-19 PROBLEM — I25.110 CORONARY ARTERY DISEASE INVOLVING NATIVE CORONARY ARTERY OF NATIVE HEART WITH UNSTABLE ANGINA PECTORIS: Status: RESOLVED | Noted: 2021-02-10 | Resolved: 2022-06-19

## 2022-06-19 PROBLEM — I46.9 CARDIAC ARREST WITH VENTRICULAR FIBRILLATION: Status: RESOLVED | Noted: 2019-10-07 | Resolved: 2022-06-19

## 2022-06-19 PROBLEM — Z92.89 HISTORY OF TRANSESOPHAGEAL ECHOCARDIOGRAPHY (TEE): Status: RESOLVED | Noted: 2020-01-07 | Resolved: 2022-06-19

## 2022-06-19 PROBLEM — K21.9 GASTROESOPHAGEAL REFLUX DISEASE WITHOUT ESOPHAGITIS: Status: ACTIVE | Noted: 2022-06-19

## 2022-06-19 NOTE — ASSESSMENT & PLAN NOTE
Patient's (Body mass index is 35.57 kg/m².) indicates that they are obese (BMI >30) with health conditions that include coronary heart disease and dyslipidemias . Weight is unchanged. BMI is is above average; BMI management plan is completed. We discussed portion control and increasing exercise.

## 2022-06-22 ENCOUNTER — OFFICE VISIT (OUTPATIENT)
Dept: CARDIOLOGY | Facility: CLINIC | Age: 52
End: 2022-06-22

## 2022-06-22 VITALS
HEIGHT: 64 IN | HEART RATE: 69 BPM | DIASTOLIC BLOOD PRESSURE: 78 MMHG | SYSTOLIC BLOOD PRESSURE: 122 MMHG | BODY MASS INDEX: 35.85 KG/M2 | RESPIRATION RATE: 18 BRPM | WEIGHT: 210 LBS

## 2022-06-22 DIAGNOSIS — I10 HYPERTENSION, BENIGN: ICD-10-CM

## 2022-06-22 DIAGNOSIS — Z95.5 STENTED CORONARY ARTERY: ICD-10-CM

## 2022-06-22 DIAGNOSIS — E78.41 ELEVATED LIPOPROTEIN(A): ICD-10-CM

## 2022-06-22 DIAGNOSIS — I25.118 ATHEROSCLEROTIC HEART DISEASE OF NATIVE CORONARY ARTERY WITH OTHER FORMS OF ANGINA PECTORIS: ICD-10-CM

## 2022-06-22 DIAGNOSIS — R06.02 SHORTNESS OF BREATH: ICD-10-CM

## 2022-06-22 DIAGNOSIS — I10 ESSENTIAL (PRIMARY) HYPERTENSION: Primary | ICD-10-CM

## 2022-06-22 PROCEDURE — 99214 OFFICE O/P EST MOD 30 MIN: CPT | Performed by: INTERNAL MEDICINE

## 2022-07-01 NOTE — PROGRESS NOTES
Cardiology Clinic Note  Brant Martinez MD, PhD    Subjective:     Encounter Date:06/22/2022      Patient ID: Sada Le is a 51 y.o. female.    Chief Complaint:  Chief Complaint   Patient presents with   • Follow-up       HPI:      PreviouslyI the pleasure to see this patient who is a 49-year-old female who is well-known to me from prior hospitalization and clinic notes.  She has a history of anterior ST elevation myocardial infarction with complete occlusion of the proximal LAD under going revascularization with Xience 3.5 x 33 mm drug-eluting stent postdilated to 4.5 mm at 18 familia with good results.  This was an Impella supported procedure given under  Cardiogenic shock in the setting of acute MI which was complicated by VF arrest with successful defibrillation.  She did well with weaning Impella support over 2 days and institution of max goal-directed medical therapy ultimately being discharged to home.  We were very aggressive with her medical therapy as well as antiplatelets and her EF improved from around 28 to 30% to 55% with only ever so slight anterior wall very mild hypokinesis residually.  She has done well since this time and is completely quit smoking.  She had nonobstructive disease in the circumflex and RCA as well as mid to distal LAD.  She was changed secondary to shortness of breath from Brilinta to Effient which she is maintained on aspirin and Effient, she is on high intensity statin therapy as well as Aldactone and long-acting beta-blocker and loop diuretics 40 twice daily of Lasix.  She is on ACE inhibitor at moderate to high intensity as well tolerating this just fine without cough.  She has had a recent hospitalization for shortness of breath and underwent right heart catheterization with no evidence of pulmonary hypertension despite volume loading during the procedure with evidence of volume depletion initially with right atrial pressure of 2 to 3 mmHg.  Results of this are below.  She  had recent hospitalization  for atypical substernal chest pain also with jaw pain.  Troponins were unremarkable, EKG was unchanged, blood pressure has been somewhat labile at home recently she says and when high causes some degree of chest discomfort.  She underwent noninvasive evaluation with treadmill stress test and walked greater than 9 minutes with no EKG changes and she had normal myocardial perfusion at rest and stress.  There was no indication of balanced ischemia that would be suspected that may give false negative results relative to perfusion imaging.  Her echo showed normal preserved LV systolic function with ever so slight mild anterior wall hypokinesis compared to thickening of the posterior lateral walls.  EF was 55 to 60%.  Normal atrial sizes, no evidence of pulmonary hypertension, IVC diameter was normal.  She was discharged shortly thereafter.  She did continue to been followed peripherally in the clinic as an outpatient with optimization of antianginals however she continued ultimately to have some chest discomfort which she expressed along with more fatigue and ultimately we made a decision to proceed with diagnostic left heart catheterization for revisualization of LAD stents as well as other residual nonobstructive disease.  She underwent diagnostic left heart catheterization getting PCI to the ostial proximal circumflex, 3.0 x 18 Xience postdilated to 3.9 mm in the proximal ostial portion and 3.75 mm distally with good angiographic results and reduction of stenosis from 80% ulcerated appearing plaque to 0% residual.  Since this time and after discharge she has felt much better.  Her EDP was also very low indicative of volume depletion and her EF was normal at 55% with only very mild anterior wall hypokinesis from prior ischemic injury.  With improvement of her LV CV systolic function, low LVEDP and new complaints of dizziness with standing and orthostasis we decided to decrease her Lasix as  well as stop her Aldactone previously blood pressure has improved 130s over 90s.  Heart rates in the 70s.     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, repeat CT 1 year  Atypical chest pain     Widely patent stents to the LAD and circumflex 1 year ago  Obstructive disease in circumflex status post revascularization and stenting as above and below  Abnormal stress with small area of mild reversibility in the lateral wall unclear significance or correlation  Try additional antianginal therapy with low-dose nitrate  Continue dual antiplatelet therapy with aspirin and Plavix  High intensity statin therapy as tolerated  Beta-blocker on board heart rate 60s  Continue lisinopril to 5 daily  Continue Lasix to 20 daily  Remain off Aldactone, NYHA class I  Diet and exercise per AHA guidelines  Goal blood pressure simply less than 135 systolic  Statin therapy she has tried multiple agents and there are giving her severe myalgias and muscle pains she is down to taking them every other day or even not at all given her otherwise symptomatology.  We will evaluate with PCSK9 inhibitors as alternative therapy if she has tried more than 3 statins with nontolerance to all of them if she fails pravastatin which will be her fourth agent    Doing well, continue present CV therapies for all CV comorbidities as above  Historical data copied forward from previous encounters in EMR is unchanged and still relevant and continued        return to clinic 3 months     Cath films reviewed today  "extensively, stress images reviewed as well all questions as proposed by the patient were answered today     Greater than 25 minutes of face-to-face with the patient with additional 10 minutes and charting for total encounter time greater than 35 minutes     Brant Martinez MD, PhD       Historical data copied forward from previous encounters in EMR including the history, exam, and assessment/plan has been reviewed and is unchanged unless noted otherwise.    Cardiac medicines reviewed with risk, benefits, and necessity of each discussed.    Risk and benefit of cardiac testing reviewed including death heart attack stroke pain bleeding infection need for vascular /cardiovascular surgery were discussed and the patient     Objective:         /78 (BP Location: Left arm, Patient Position: Sitting)   Pulse 69   Resp 18   Ht 162.6 cm (64.02\")   Wt 95.3 kg (210 lb)   LMP 01/01/1996 (Approximate)   BMI 36.03 kg/m²     Physical Exam    Assessment:         There are no diagnoses linked to this encounter.       Plan:              The pleasure to be involved in this patient's cardiovascular care.  Please call with any questions or concerns  Brant Martinez MD, PhD    Most recent EKG as reviewed and interpreted by me:  Procedures     Most recent echo as reviewed and interpreted by me:  Results for orders placed during the hospital encounter of 03/16/22    Adult Transthoracic Echo Complete W/ Cont if Necessary Per Protocol    Interpretation Summary  · Estimated left ventricular EF was in agreement with the calculated left ventricular EF. Left ventricular ejection fraction appears to be 61 - 65%. Left ventricular systolic function is normal.  · Left ventricular diastolic function is consistent with (grade Ia w/high LAP) impaired relaxation.  · Estimated right ventricular systolic pressure from tricuspid regurgitation is normal (<35 mmHg).  · Moderate dilation of the aortic root is present. Moderate dilation of the sinuses " of Valsalva is present.      Most recent stress test as reviewed and interpreted by me:  Results for orders placed during the hospital encounter of 03/16/22    Stress Test With Myocardial Perfusion One Day    Interpretation Summary  · Left ventricular ejection fraction is normal. (Calculated EF = 68%).  · Myocardial perfusion imaging indicates a small-sized, mildly severe area of ischemia located in the lateral wall.  · Impressions are consistent with an intermediate risk study.  · There is no prior study available for comparison. Some different score of only 2 Small area of mild reversibility in the apical lateral versus mid lateral segments Normal EF by gated imaging 68%.  · Findings consistent with an equivocal ECG stress test.    Abnormal study  Intermediate risk study  Positive symptoms during the study with some chest heaviness  Biphasic T waves in the anterolateral leads but nonspecific finding from baseline  Normal EF 68%  Small area of mild reversibility in the lateral wall to apical lateral      Most recent cardiac catheterization as reviewed interpreted by me:  Results for orders placed during the hospital encounter of 02/12/21    Cardiac Catheterization/Vascular Study    Narrative  Cardiac cath note  Brant Martinez MD, PhD  Date of service 2-  [facility] cardiology    Procedure  1.  Left heart catheterization with coronary angiography left ventriculography in ANTON position  2.  Percutaneous coronary mention to the ostial proximal circumflex with 3.0 x 18 mm Xience drug-eluting stent postdilated to 3.9 mm proximally, 3.75 mm distally with good angiographic results    Lesion information  Ostial 50%, mid 70% with hazy appearance of the proximal circumflex, ulcerated plaque angiographically with daily anginal chest pain  Reduced to 0% residual stenosis  JANE-3 flow pre and post    After informed consent the patient was brought to the catheterization lab sterilely prepped and draped in usual fashion  with exposure the right groin for right common femoral arterial access via micropuncture modified standard technique with placement of a 6 Moldovan sheath under fluoroscopic guidance which was aspirated flushed with heparinized saline after 1% lidocaine analgesia.  An 035 guidewire was advanced to level aortic valve followed by diagnostic left heart catheterization with JL4 and JR4 6 Moldovan catheters.  A pigtail catheter was used to perform left ventriculography, EDP measurement and pullback assessment the transaortic valve gradient.  Secondary to findings of coronary disease in the ostial proximal circumflex decision was made to intervene given recurrent anginal chest pain, ulcerated appearance of the proximal circumflex.  Lesion was predilated after run-through wire to the obtuse marginal with 2.5 x 20 mm NC at 18 familia followed by stenting with 3.0 x 18 deployed at 18 familia at 3.25 followed by postdilatation with with 3.5 x 15 up to 1618 familia and then 3.75 x 15 NC balloon at the 14 familia at the distal edge and 16 familia at the proximal edge of 3.9 mm with good angiographic results.  There was no compromise of the LAD flow.  A wire directed down the LAD at the conclusion the case had a balloon that freely passed and there was no stent impingement from the left main to the LAD.  JANE-3 flow was maintained pre and post.  Lesion reduced from 70% to 0% residual.  Heparin was used to maintain ACT greater than 250 throughout the entire to the case.  Patient was already on aspirin and Effient but was given additional 10 mg of Effient on the table.  Right common femoral arteriotomy was closed by manual pressure once ACT less than 175.  She had no complications.  She left the Cath Lab chest pain-free hemodynamically electrically stable alert talking to staff neurologically grossly intact bilaterally    Moderate conscious sedation of Versed and fentanyl administered by registered nurse with complete ECG pulse oximetry and hemodynamic  monitoring throughout the entirety the case observed by me  Complications none  Blood loss less than 5 cc  Contrast 180 cc including diagnostic portion    Findings  1.  Opening aortic pressure of 114/66 with a mean of 86  2.  LV pressure 108 with an EDP of 2 to 5 mmHg  Closing pressure 111/66 with a mean of 85  EDP 6  No significant transaortic valve gradient    Angiography  1.  Left main large caliber vessel no angiographic disease  2.  LAD large caliber vessel with widely patent stent in the proximal portion but diffusely diseased mid to distal LAD with eccentric plaque likely 2.5 mm in diameter from the bifurcation of the diagonal all the way around the apex with again 30 to 40% luminal irregularities but no significant flow-limiting stenosis.,  Diagonal branch with no obstructive disease only mild luminal regularities  3.  Circumflex nondominant with large obtuse marginal branch.  There is a luminal filling defect right at the ostium concerning for nonocclusive thrombus versus eccentric plaque at 50%, immediately distal is a hazy eccentric ulcerated plaque 75% followed by bifurcation with continuation circumflex and obtuse marginal branch which has no disease.  There is JANE-3 flow throughout  4.  RCA small nondominant no angiographic disease, small PDA branch distally not amenable to any stents given caliber    Conclusions and recommendations  1 two-vessel coronary disease, widely patent stent in the proximal LAD with diffuse mid to distal LAD stenosis but nonobstructive, ostial proximal circumflex disease of questionable significance however angiographic appearance of significant ulceration, concern for thrombus status post intervention as described above  2.  Successful intervention as described above with balloon angioplasty and stenting of the ostial proximal circumflex postdilated to 3.9 mm at the ostium and 3.75 mm distally with good angiographic results  3.  Continue antiplatelet therapy with aspirin and  Effient  4.  Decrease Lasix with low LVEDP, hold ACE inhibitor  5 LV function appears 50 to 55% by LV gram  6.  No transaortic valve gradient  7.,  Mid to observation, likely home tomorrow which discharge criteria met  8.  After discharge follow-up with cardiology in 2 to 4 weeks  \  It is a pleasure be involved in her cardiovascular care.  Please call with any questions or concerns  Brant Martinez MD, PhD    The following portions of the patient's history were reviewed and updated as appropriate: allergies, current medications, past family history, past medical history, past social history, past surgical history and problem list.      ROS:  14 point review of systems negative except as mentioned above    Current Outpatient Medications:   •  ALPRAZolam (XANAX) 0.25 MG tablet, Take 0.25 mg by mouth Every 6 (Six) Hours As Needed for Anxiety., Disp: , Rfl:   •  aspirin ([employer] Aspirin Adult Low Dose) 81 MG chewable tablet, Chew 1 tablet Daily., Disp: 90 tablet, Rfl: 3  •  buPROPion XL (WELLBUTRIN XL) 150 MG 24 hr tablet, TAKE 1 TABLET BY MOUTH EVERY DAY, Disp: 90 tablet, Rfl: 4  •  cetirizine (zyrTEC) 10 MG tablet, Take 10 mg by mouth Every Night., Disp: , Rfl:   •  cholecalciferol (VITAMIN D3) 1.25 MG (36088 UT) capsule, Take 50,000 Units by mouth Daily., Disp: , Rfl:   •  clopidogrel (PLAVIX) 75 MG tablet, Take 1 tablet by mouth Daily., Disp: 90 tablet, Rfl: 3  •  escitalopram (LEXAPRO) 10 MG tablet, TAKE 1 TABLET BY MOUTH EVERY DAY, Disp: 90 tablet, Rfl: 4  •  furosemide (LASIX) 40 MG tablet, Take 1 tablet by mouth Daily., Disp: 90 tablet, Rfl: 3  •  isosorbide mononitrate (IMDUR) 30 MG 24 hr tablet, Take 1 tablet by mouth Daily., Disp: 90 tablet, Rfl: 1  •  lisinopril (PRINIVIL,ZESTRIL) 5 MG tablet, TAKE 1 TABLET BY MOUTH TWICE A DAY, Disp: 180 tablet, Rfl: 1  •  metoprolol succinate XL (TOPROL-XL) 25 MG 24 hr tablet, TAKE 1 TABLET BY MOUTH EVERY DAY, Disp: 90 tablet, Rfl: 1  •  nitroglycerin (NITROSTAT) 0.4 MG SL  tablet, Place 0.4 mg under the tongue Every 5 (Five) Minutes As Needed for Chest Pain. Take no more than 3 doses in 15 minutes., Disp: , Rfl:   •  omeprazole OTC (PrilOSEC OTC) 20 MG EC tablet, Take 1 tablet by mouth Daily., Disp: 90 tablet, Rfl: 3  •  Potassium 99 MG tablet, Take 1 tablet by mouth Daily., Disp: , Rfl:   •  pravastatin (Pravachol) 20 MG tablet, Take 1 tablet by mouth Daily., Disp: 90 tablet, Rfl: 1  •  vitamin B-12 (CYANOCOBALAMIN) 500 MCG tablet, Take 500 mcg by mouth Daily., Disp: , Rfl:     Problem List:  Patient Active Problem List   Diagnosis   • Allergic rhinitis   • Cervical disc disease with myelopathy   • Diverticulosis of colon   • Hypertension, benign   • Insomnia, organic   • Class 1 obesity due to excess calories with serious comorbidity and body mass index (BMI) of 34.0 to 34.9 in adult   • Osteopenia of spine   • Mixed hyperlipidemia   • Ischemic cardiomyopathy   • Stented coronary artery   • History of echocardiogram   • Annual visit for general adult medical examination with abnormal findings   • Cervical cancer screening   • Encounter for screening for malignant neoplasm of breast   • Colon cancer screening   • History of tobacco use   • Generalized anxiety disorder   • ASD (atrial septal defect)   • Coronary artery disease of native artery of native heart with stable angina pectoris (HCC)   • History of MI (myocardial infarction)   • Family history of diabetes mellitus type II   • Anxiety   • CAD S/P percutaneous coronary angioplasty   • Left arm cellulitis   • Branch retinal vein occlusion with macular edema   • Hypertensive retinopathy   • Gastroesophageal reflux disease without esophagitis     Past Medical History:  Past Medical History:   Diagnosis Date   • Absence of left thumb     Impression: hx of MRSA, she is signficantly improved She will continue meds and followup if sxs have not resolved over the next 2 weeks.. She is released from care and will notify us of any  problems   • Acute otitis media    • Allergic rhinitis    • Arthritis     neck   • Cancer (HCC)     skin   • Cervical disc disease with myelopathy    • Contact dermatitis or eczema    • Coronary artery disease    • Disorder of bone and cartilage    • Diverticulitis of colon     Impression: CT confirms in the sigmoid colon 10/2011   • Diverticulosis of colon     Impression: No sxs 02/13/2013   • Elevated cholesterol    • Elevated lipoprotein(a) 4/30/2020   • Elevated temperature    • External otitis of left ear    • Gastroesophageal reflux disease without esophagitis 6/19/2022   • Hearing loss due to cerumen impaction, left    • Hemangioma of liver    • History of echocardiogram 10/03/2019    Severely reduced LV systolic function. EF 34% Distribution indicative of LAD territory injury. Akinesis of the apex as well as apical lateral and mid to apical septal walls. Preservation of posterior inferior lateral walls. Mild AI, mild TR. Normal RV function.    • History of transesophageal echocardiography (NAFISA) 01/07/2020    EF 55% LA cavity is mild to moderately dilated. Mild MR. Interatrial septum appears redundant. Interatrial hypermobile c/w aneurysm. Large PFO is noted with right to left shunting on bubble study. PFO tunnel appears to be short.    • Hordeolum externum of left lower eyelid     Impression: She was started on Bactrim DS for her infection. She was also advised to use warm compression. She will followup should sxs not improve over the next 48 hrs and resolve over the next 1 weeek.   • Hyperlipidemia     Impression: Diet controled. She should see improvement with her successful wt loss. We discussed her lipid panel.   • Hypertension, benign     Impression: new onset Low salt low calorie diet and regular exercise and followup in 1 mo She will monitor her pressures and notify us of her pressures over the next 1 week.   • Inclusion cyst of right breast     Impression: We will follow for now. She is encouraged to  have Mammography. She is presently without insurance and reluctant. She bobby consider. She will notify us of any change at all in the lesion for the only way to be certain of it's etilogy is to remove it. She understands.   • Insomnia, organic    • Menopausal syndrome    • Overweight (BMI 25.0-29.9)    • RUQ abdominal pain     Impression: Resolving, negative GB u/s, HIDA EF 77%, we will follow   • Stented coronary artery 09/29/2019   • Tobacco use     Impression: She is strongly encouraged to stop smoking. Cessation techniques discussed and encouraged. The consequences of not stopping discussed, ie, CAD, PVD, Stroke, Lung and other cancers.     Past Surgical History:  Past Surgical History:   Procedure Laterality Date   • BIVENTRICULAR ASSIST DEVICE/LEFT VENTRICULAR ASSIST DEVICE INSERTION N/A 9/29/2019    Procedure: Left Ventricular Assist Device;  Surgeon: Brant Martinez MD;  Location: [facility] CATH INVASIVE LOCATION;  Service: Cardiovascular   • CARDIAC CATHETERIZATION N/A 9/29/2019    Procedure: Left Heart Cath;  Surgeon: Brant Martinez MD;  Location: [facility] CATH INVASIVE LOCATION;  Service: Cardiovascular   • CARDIAC CATHETERIZATION N/A 9/29/2019    Procedure: Coronary angiography;  Surgeon: Brant Martinez MD;  Location: [facility] CATH INVASIVE LOCATION;  Service: Cardiovascular   • CARDIAC CATHETERIZATION N/A 9/29/2019    Procedure: Left ventriculography;  Surgeon: Brant Martinez MD;  Location: [facility] CATH INVASIVE LOCATION;  Service: Cardiovascular   • CARDIAC CATHETERIZATION N/A 9/29/2019    Procedure: Stent SERENITY coronary;  Surgeon: Brant Martinez MD;  Location: [facility] CATH INVASIVE LOCATION;  Service: Cardiovascular   • CARDIAC CATHETERIZATION N/A 2/25/2020    Procedure: Right Heart Cath;  Surgeon: Brant Martinez MD;  Location: [facility] CATH INVASIVE LOCATION;  Service: Cardiology;  Laterality: N/A;   • CARDIAC CATHETERIZATION N/A 2/12/2021    Procedure:  Left Heart Cath;  Surgeon: Brant Martinez MD;  Location: [facility] CATH INVASIVE LOCATION;  Service: Cardiology;  Laterality: N/A;   • CARDIAC CATHETERIZATION N/A 2/12/2021    Procedure: Coronary angiography;  Surgeon: Brant Martinez MD;  Location: [facility] CATH INVASIVE LOCATION;  Service: Cardiology;  Laterality: N/A;   • CARDIAC CATHETERIZATION N/A 2/12/2021    Procedure: Left ventriculography;  Surgeon: Brant Martinez MD;  Location: [facility] CATH INVASIVE LOCATION;  Service: Cardiology;  Laterality: N/A;   • CARDIAC CATHETERIZATION N/A 2/12/2021    Procedure: Aortic root aortogram;  Surgeon: Brant Martinez MD;  Location: [facility] CATH INVASIVE LOCATION;  Service: Cardiology;  Laterality: N/A;   • CARDIAC CATHETERIZATION N/A 2/12/2021    Procedure: Percutaneous Coronary Intervention;  Surgeon: Brant Martinez MD;  Location: [facility] CATH INVASIVE LOCATION;  Service: Cardiology;  Laterality: N/A;   • CARDIAC CATHETERIZATION N/A 2/12/2021    Procedure: Stent SERENITY coronary;  Surgeon: Brant Martinez MD;  Location: [facility] CATH INVASIVE LOCATION;  Service: Cardiology;  Laterality: N/A;   • CARDIAC ELECTROPHYSIOLOGY PROCEDURE  9/29/2019    Procedure: Impella Insertion;  Surgeon: Brant Martinez MD;  Location: [facility] CATH INVASIVE LOCATION;  Service: Cardiovascular   • CORONARY ANGIOPLASTY  02/12/2021    1x stent to Circ   • CORONARY ANGIOPLASTY WITH STENT PLACEMENT  02/2021   • FL RT/LT HEART CATHETERS N/A 9/29/2019    Procedure: Percutaneous Coronary Intervention;  Surgeon: Brant Martinez MD;  Location: [facility] CATH INVASIVE LOCATION;  Service: Cardiovascular   • TOTAL ABDOMINAL HYSTERECTOMY WITH SALPINGO OOPHORECTOMY  1995    Endometriosis     Social History:  Social History     Socioeconomic History   • Marital status:    Tobacco Use   • Smoking status: Former Smoker     Packs/day: 1.00     Years: 34.00     Pack years: 34.00     Types:  Cigarettes     Start date:      Quit date: 2019     Years since quittin.7   • Smokeless tobacco: Never Used   • Tobacco comment: States she is quitting as of 2019.   Substance and Sexual Activity   • Alcohol use: Yes     Comment: occassionally    • Drug use: Not Currently     Comment: occasional   • Sexual activity: Defer     Allergies:  Allergies   Allergen Reactions   • Penicillins Rash   • Ciprofloxacin Rash   • Penicillin G Rash     Immunizations:  Immunization History   Administered Date(s) Administered   • DT 2004   • FluLaval/Fluarix/Fluzone >6 10/01/2019   • Tdap 2011            In-Office Procedure(s):  No orders to display        ASCVD RIsk Score::  The ASCVD Risk score ([place] AIME Jr., et al., 2013) failed to calculate for the following reasons:    The patient has a prior MI or stroke diagnosis    Imaging:    Results for orders placed in visit on 21    XR Knee 4+ View Right    Narrative  Examination: XR KNEE 4+ VW RIGHT-    Date of Exam: 2021 3:36 PM    Indication: right knee pain; M25.561-Pain in right knee; G89.29-Other  chronic pain.    Comparison: None available.    Technique: 4 radiographic views of the knee were obtained.    Findings:  There is no fracture. No periostitis or bone destruction. There is mild  spurring at the attachment of the quadriceps tendon. Knee joint space is  preserved. No soft tissue abnormality. No significant joint effusion.    Impression  Essentially negative exam.    Electronically Signed By-Jammie Sena MD On:2021 4:04 PM  This report was finalized on [id number] by  Jammie Sena MD.       Results for orders placed in visit on 20    CT Angiogram Chest With & Without Contrast    Narrative  DATE OF EXAM:  2021 8:23 AM    PROCEDURE:  CT ANGIOGRAM CHEST W WO CONTRAST-    INDICATIONS:  Thoracic aortic aneurysm (TAA), follow up; I71.2-Thoracic aortic  aneurysm, without rupture    COMPARISON:  No Comparisons  Available    TECHNIQUE:  Contiguous axial imaging was obtained from the thoracic inlet through  the upper abdomen following the intravenous administration of 100 mL  Isovue 370. Reconstructed coronal and sagittal images were also  obtained. In addition, a 3 D volume rendered image was obtained after  post processing. Automated exposure control and iterative reconstruction  methods were used.    FINDINGS:  VASCULAR FINDINGS: The transverse thoracic aorta has a maximal luminal  diameter of 3.6 cm. It measures 4.2 cm at the sinus of Valsalva. The  transverse thoracic aorta measures 2.4 cm. In the proximal descending  thoracic aorta measures 2.2 cm. No focal aneurysmal dilatation is  identified    NONVASCULAR FINDINGS: Scans through the lungs appear normal. No  pulmonary nodules or masses are identified. There is some linear discoid  atelectasis peripherally in the lingula. A coronary stent is in place in  the left anterior descending coronary artery and a second stent is  identified in the circumflex coronary artery. These vessels appear  widely patent following contrast administration. .    Impression  1. 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 Mild to moderate atherosclerotic plaque  deposition primarily in the descending thoracic aorta.  2. Status post coronary stent placement.    Electronically Signed By-Amaury Hendrix MD On:2/19/2021 10:53 AM  This report was finalized on [id number] by  Amaury Hendrix MD.      Results for orders placed in visit on 09/02/20    CT Angiogram Chest With & Without Contrast    Narrative  DATE OF EXAM:  2/19/2021 8:23 AM    PROCEDURE:  CT ANGIOGRAM CHEST W WO CONTRAST-    INDICATIONS:  Thoracic aortic aneurysm (TAA), follow up; I71.2-Thoracic aortic  aneurysm, without rupture    COMPARISON:  No Comparisons Available    TECHNIQUE:  Contiguous axial imaging was obtained from the thoracic inlet through  the upper abdomen following the  intravenous administration of 100 mL  Isovue 370. Reconstructed coronal and sagittal images were also  obtained. In addition, a 3 D volume rendered image was obtained after  post processing. Automated exposure control and iterative reconstruction  methods were used.    FINDINGS:  VASCULAR FINDINGS: The transverse thoracic aorta has a maximal luminal  diameter of 3.6 cm. It measures 4.2 cm at the sinus of Valsalva. The  transverse thoracic aorta measures 2.4 cm. In the proximal descending  thoracic aorta measures 2.2 cm. No focal aneurysmal dilatation is  identified    NONVASCULAR FINDINGS: Scans through the lungs appear normal. No  pulmonary nodules or masses are identified. There is some linear discoid  atelectasis peripherally in the lingula. A coronary stent is in place in  the left anterior descending coronary artery and a second stent is  identified in the circumflex coronary artery. These vessels appear  widely patent following contrast administration. .    Impression  1. 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 Mild to moderate atherosclerotic plaque  deposition primarily in the descending thoracic aorta.  2. Status post coronary stent placement.    Electronically Signed By-Amaury Hendrix MD On:2/19/2021 10:53 AM  This report was finalized on [id number] by  Amaury Hendrix MD.      Lab Review:   Hospital Outpatient Visit on 03/16/2022   Component Date Value   • Target HR (85%) 03/16/2022 143    • Max. Pred. HR (100%) 03/16/2022 168    • BH CV STRESS PROTOCOL 1 03/16/2022 Pharmacologic    • Stage 1 03/16/2022 1    • HR Stage 1 03/16/2022 72    • BP Stage 1 03/16/2022 109/70    • Duration Min Stage 1 03/16/2022 0    • Duration Sec Stage 1 03/16/2022 10    • Stress Dose Regadenoson * 03/16/2022 0.4    • Stress Comments Stage 1 03/16/2022 10 sec bolus injection    • Stage 2 03/16/2022 2    • HR Stage 2 03/16/2022 79    • BP Stage 2 03/16/2022 109/70    • Duration  Min Stage 2 03/16/2022 4    • Duration Sec Stage 2 03/16/2022 0    • Stress Comments Stage 2 03/16/2022 recovery    • Stage 3 03/16/2022 3    • HR Stage 3 03/16/2022 77    • BP Stage 3 03/16/2022 113/76    • Stage 4 03/16/2022 4    • HR Stage 4 03/16/2022 72    • BP Stage 4 03/16/2022 126/76    • Baseline HR 03/16/2022 59    • Baseline BP 03/16/2022 109/70    • Peak HR 03/16/2022 79    • Percent Max Pred HR 03/16/2022 47.02    • Percent Target HR 03/16/2022 55    • Peak BP 03/16/2022 126/76    • Recovery HR 03/16/2022 68    • Recovery BP 03/16/2022 120/79    • BH CV REST NUCLEAR ISOTO* 03/16/2022 7.2    • BH CV STRESS NUCLEAR ISO* 03/16/2022 21.9    • Nuc Stress EF 03/16/2022 68    Hospital Outpatient Visit on 03/16/2022   Component Date Value   • Target HR (85%) 03/16/2022 143    • Max. Pred. HR (100%) 03/16/2022 168    • ACS 03/16/2022 1.84    • Ao root diam 03/16/2022 3.9    • Ao pk solomon 03/16/2022 110.1    • Ao V2 VTI 03/16/2022 23.0    • HERNANDEZ(I,D) 03/16/2022 4.8    • EDV(cubed) 03/16/2022 123.0    • EDV(MOD-sp4) 03/16/2022 94.7    • EF(MOD-bp) 03/16/2022 66.0    • EF(MOD-sp4) 03/16/2022 66.3    • ESV(cubed) 03/16/2022 33.8    • ESV(MOD-sp4) 03/16/2022 31.9    • IVS/LVPW 03/16/2022 0.97    • LV mass(C)d 03/16/2022 186.8    • LV V1 max PG 03/16/2022 5.0    • LV V1 mean PG 03/16/2022 2.26    • LV V1 max 03/16/2022 111.4    • LVPWd 03/16/2022 1.04    • MV dec slope 03/16/2022 210.3    • MV dec time 03/16/2022 0.20    • MV V2 VTI 03/16/2022 21.1    • MVA(VTI) 03/16/2022 5.3    • PA acc time 03/16/2022 0.09    • PA pr(Accel) 03/16/2022 37.5    • PA V2 max 03/16/2022 80.9    • Pulm A Revs Solomon 03/16/2022 31.4    • RAP systole 03/16/2022 3.0    • RV V1 max PG 03/16/2022 1.70    • RV V1 max 03/16/2022 65.1    • RV V1 VTI 03/16/2022 13.9    • RVIDd 03/16/2022 2.5    • RVSP(TR) 03/16/2022 14.4    • SV(LVOT) 03/16/2022 111.2    • SV(MOD-sp4) 03/16/2022 62.8    • SV(RVOT) 03/16/2022 55.3    • TR max PG 03/16/2022 11.4    • Ao  max PG 03/16/2022 4.8    • Ao mean PG 03/16/2022 2.36    • FS 03/16/2022 35.0    • IVSd 03/16/2022 1.01    • LA dimension(2D) 03/16/2022 3.0    • LV V1 VTI 03/16/2022 23.0    • LVIDd 03/16/2022 5.0    • LVIDs 03/16/2022 3.2    • LVOT area 03/16/2022 4.8    • LVOT diam 03/16/2022 2.48    • MV E/A 03/16/2022 0.59    • MV max PG 03/16/2022 2.42    • MV mean PG 03/16/2022 0.76    • RVOT diam 03/16/2022 2.25    • MV A max solomon 03/16/2022 71.8    • MV E max solomon 03/16/2022 42.7    • Pulm A Revs Dur 03/16/2022 0.10    • Pulm Amin Solomon 03/16/2022 43.6    • Pulm Sys Solomon 03/16/2022 59.7    • TR max solomon 03/16/2022 169.1      Recent labs reviewed and interpreted for clinical significance and application            Level of Care:           Brant Martinez MD  07/01/22  .

## 2022-07-11 RX ORDER — LISINOPRIL 5 MG/1
5 TABLET ORAL 2 TIMES DAILY
Qty: 180 TABLET | Refills: 1 | Status: SHIPPED | OUTPATIENT
Start: 2022-07-11

## 2022-07-21 ENCOUNTER — TELEPHONE (OUTPATIENT)
Dept: FAMILY MEDICINE CLINIC | Facility: CLINIC | Age: 52
End: 2022-07-21

## 2022-07-21 NOTE — TELEPHONE ENCOUNTER
Called patient and let her know that we do not send in abx without being seen. Offered to make her appt, declined. Told patient she could take something OTC and patient stated that she is already on allergy medication.

## 2022-07-21 NOTE — TELEPHONE ENCOUNTER
Caller: Sada Le    Relationship: Self    Best call back number: [phone]    What medication are you requesting: BACTRIM     What are your current symptoms:YELLOW AND GREEN MUCUS WITH BLOOD FROM NOSTRIL     How long have you been experiencing symptoms: 06.10.22    Have you had these symptoms before:    [x] Yes  [] No    Have you been treated for these symptoms before:   [x] Yes  [] No    If a prescription is needed, what is your preferred pharmacy and phone number: [id number] - [place], [place] - [address] - [phone] [facility] [phone] FX     Additional notes: PATIENT IS REQUESTING THIS MEDICATION TO TREAT SYMPTOMS.     CALL TO DISCUSS IF NEEDED
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
## 2022-08-25 NOTE — PROGRESS NOTES
[facility]        LOS:  LOS: 8 days   Patient Name: Sada Le  Age/Sex: 49 y.o. female  : 1970  MRN: [id number]    Day of Service: 10/07/19   Length of Stay: 8  Encounter Provider: Amos Olivia MD, PhD  Place of Service: [facility] CARDIOLOGY  Patient Care Team:  Yodit Johnston MD as PCP - General (Family Medicine)    Subjective:     Chief Complaint: f/u anterior stemi    Subjective: patient is doing well today, she wants to go home. She denies chest pain or SOA, she states she still has cough and chest is sore when she takes deep breath but otherwise she is doing well      Current Medications:   Scheduled Meds:  aspirin 81 mg Oral Daily   atorvastatin 40 mg Oral Nightly   enoxaparin 40 mg Subcutaneous Q24H   furosemide 40 mg Oral Daily   hydrocortisone-diphenhydramine-nystatin 15 mL Oral 4x Daily   ipratropium-albuterol 3 mL Nebulization 4x Daily - RT   lidocaine 1 patch Transdermal Q24H   metoprolol succinate XL 12.5 mg Oral Q24H   sodium chloride 10 mL Intravenous Q12H   spironolactone 12.5 mg Oral Once   spironolactone 25 mg Oral Daily   ticagrelor 90 mg Oral BID     Continuous Infusions:     Allergies:  Allergies   Allergen Reactions   • Penicillins Rash   • Ciprofloxacin Rash   • Penicillin G Rash       Review of Symptoms:  Constitutional: Patient afebrile no chills or unexpected weight changes  Respiratory: No cough, no wheezing or dyspnea  Cardiovascular: No chest pain, palpitations, dyspnea, orthopnea and no edema  Gastrointestinal: No nausea, vomiting, constipation or diarrhea.  No melena or dark stools    All other systems reviewed and are negative      Objective:     Temp:  [97.5 °F (36.4 °C)-98.5 °F (36.9 °C)] 97.6 °F (36.4 °C)  Heart Rate:  [69-87] 70  Resp:  [12-24] 16  BP: ()/(59-76) 113/74     Intake/Output Summary (Last 24 hours) at 10/7/2019 0957  Last data filed at 10/6/2019 1800  Gross per 24 hour   Intake 720 ml    Output --   Net 720 ml     Body mass index is 29.52 kg/m².      10/05/19  0536 10/06/19  0000 10/07/19  0500   Weight: 78.6 kg (173 lb 4.5 oz) 77.9 kg (171 lb 11.8 oz) 78 kg (171 lb 15.3 oz)         Physical exam  Constitutional: well-nourished, and appears stated age in no acute distress  PERRL: Conjunctiva clear, no pallor, anicteric  HENMT: normocephalic, normal dentition, no cyanosis or pallor  Neck:no bruits, or thrills and bilateral normal carotid upstroke. Normal jugular venous pressure  Cardiovascular: No parasternal heaves an non-displaced focal PMI. Normal rate and rhythm: no rub, gallop, murmur or click and normal S1 and S2; no lower or upper extremity edema.   Lungs: unlabored, no wheezing with no rales or rhonchi on auscultation.  Extremities: Warm, no clubbing, cyanosis. Full and equal peripheral pulses in extremities with no bruits appreciated.   Abdomen: soft, non-tender, non-distended  Musculoskeletal: no joint tenderness or swelling and no erythema  Skin: Warm and dry, non-erythematous   Neuro:alert and normal affect. Oriented to time, place and person.         Lab Review:   Results from last 7 days   Lab Units 10/07/19  0150 10/06/19  0210   SODIUM mmol/L 136 134*   POTASSIUM mmol/L 4.5 4.5   CHLORIDE mmol/L 98* 96*   CO2 mmol/L 27.0 26.0   BUN mg/dL 16 16   CREATININE mg/dL 0.90 1.00   GLUCOSE mg/dL 104* 90   CALCIUM mg/dL 8.6* 8.9   AST (SGOT) U/L 27 29   ALT (SGPT) U/L 50 56*     Results from last 7 days   Lab Units 10/06/19  0210 10/05/19  0605 10/04/19  2342 10/04/19  1818 10/04/19  1245 10/04/19  0535 10/03/19  2331 10/03/19  1827 10/03/19  1312 10/03/19  0541 10/03/19  0005 10/02/19  1820 10/02/19  1343 10/02/19  0554 10/02/19  0116 10/01/19  1828 10/01/19  1235 10/01/19  0533 10/01/19  0022 09/30/19  2133 09/30/19  1833 09/30/19  1411 09/30/19  1410   CK TOTAL U/L  --   --   --   --   --   --   --   --   --   --   --   --   --   --   --   --   --  1,085*  --  1,609*  --  2,555*  --     TROPONIN I ng/mL 0.340* 0.730* 0.920* 1.060* 1.090* 1.790* 2.610* 3.310* 3.600* 5.990* 9.150* 8.460* 10.020* 11.440* 15.150* 15.810* 17.050* 23.110* 22.790*  --  30.860*  --  41.950*     Results from last 7 days   Lab Units 10/07/19  0150 10/06/19  0210   WBC 10*3/mm3 7.90 7.00   HEMOGLOBIN g/dL 11.3* 11.9*   HEMATOCRIT % 33.7* 35.2   PLATELETS 10*3/mm3 283 264     Results from last 7 days   Lab Units 10/01/19  0533   APTT seconds 26.5*     Results from last 7 days   Lab Units 10/07/19  0150 10/06/19  0210   MAGNESIUM mg/dL 2.3 2.3           Invalid input(s): LDLCALC            Recent Radiology:  Imaging Results (most recent)     Procedure Component Value Units Date/Time    XR Chest 1 View [[id number]] Collected:  10/01/19 1418     Updated:  10/01/19 1421    Narrative:       DATE OF EXAM:  10/1/2019 2:14 PM     PROCEDURE:  XR CHEST 1 VW-     INDICATIONS:  SOA; I21.02-ST elevation (STEMI) myocardial infarction involving left  anterior descending coronary artery       COMPARISON:  AP portable chest 09/29/2019.     TECHNIQUE:   Single radiographic view of the chest was obtained.     FINDINGS:  Mild medial left basilar and right infrahilar linear opacities.  Persistent subsegmental atelectasis or peribronchial lower infiltrates.  Stable mild cardiac enlargement. No pleural effusion or pneumothorax.       Impression:       Linear opacities in the right infrahilar region medial left lung base  favored to represent atelectasis, although subtle peribronchiolar  infiltrates or excluded.     Electronically Signed By-Dr. Sarita Pham MD On:10/1/2019 2:19 PM  This report was finalized on [id number] by Dr. Sarita Pham MD.    XR Chest 1 View [[id number]] Collected:  09/29/19 1956     Updated:  09/29/19 2159    Narrative:       Exam: Frontal chest    INDICATION: Dyspnea on exertion    COMPARISON: None    FINDINGS:  The lungs are clear and there are no effusions.  Poor inspiratory effort.    The heart size is normal.   Central catheter arising from the abdomen and terminates overlying the aorta.    Normal upper abdomen.      Impression:       There is some sort of catheter overlying the aorta, but below the diaphragm the catheters seem to the right of midline.  The exact course of this catheter is unclear.    No acute findings.    Electronically signed by:  Navdeep Maharaj M.D.    9/29/2019 7:58 PM          ECHOCARDIOGRAM:    Results for orders placed during the hospital encounter of 09/29/19   Adult Transthoracic Echo Complete W/ Cont if Necessary Per Protocol    Narrative · Mild pulmonic valve regurgitation is present.  · Mild aortic valve regurgitation is present.  · Left ventricular systolic function is severely decreased.  · Left ventricular diastolic dysfunction (grade I) consistent with   impaired relaxation.     Severely reduced LV systolic function biplane EF 34%  Distribution indicative of LAD territory injury  Akinesis of the apex as well as apical lateral and mid to apical septal   walls.  Preservation of posterior inferior lateral walls.  No significant valvulopathy seen, mild AI, mild TR.  Normal estimated right-sided pressures  Normal RV function  Normal LA RA sizes  No Definity or LV contrast used, apex not well visualized.             I reviewed the patient's new clinical results.    EKG:      Assessment:       STEMI involving left anterior descending coronary artery (CMS/HCC)    Hypertension, benign    Insomnia, organic    Obesity (BMI 30-39.9)    Tobacco dependency    Mixed hyperlipidemia    CAD (coronary artery disease)    Acute bronchitis due to Rhinovirus    Elevated LFTs    Hypocalcemia    Normocytic anemia    Thrombocytopenia (CMS/HCC)    1. LAD STEMI/CAD  - s/p PCI to the proximal / ostial to mid LAD with Xience 3.5 x 33 mm drug-eluting stent postdilated to 4.5 mm at 18 familia with good angiographic results - impella supported.  - residual borderline lesions in the distal LAD which will be staged for FFR in  future. Nonobstructive disease in the circumflex and RCA  -Dual platelet therapy aspirin and Brilinta uninterrupted 1 year, consider longer-term Plavix therapy with benefits up to 30 minutes per DAPT trial with low bleeding risk and residual lesions possible further unstable plaque.  -High intensity statin therapy per guidelines - LFTs trended down, now on statin therapy  -ACEI held d/t borderline b/p, on spironolactone 25mg daily  -troponin trending down, peak > 75, down to <1 on day of discharge  - continue metop XL 12.5 daily   - continue Lasix 40mg daily, no K supplements as pt's K is 4.5 and she is on aldactone     2. Cardiogenic shock / acute systolic HF   - improved, impella removed 9/29  -Estimated EF 20%, postop echo 30% with mid to apical septal akinesis, apical akinesis mid to apical anterior akinesis and apical lateral and apical inferior akinesis.  -lifevest at discharge  - continue Toprol XL 12.5mg daily, no ACEI d/t borderline b/p- reassess outpt, continue aldactone 25mg daily, lasix 40mg daily     3. Cardiac Arrest with  ROSC, VF arrest, none since reperfusion or greater than 48 hours out from initial insult.  No indication for antiarrhythmic or ICD acutely.     4. H/o Essential hypertension however borderline b/p currently  - holding ACEI, b/p has been borderline     4. Hyperlipidemia  - statin therapy     5. Elevated LFTs  - likely shock, trended down     6. Insomnia, melatonin as needed, avoid oversedation     7.  Acute bronchitis and Rhinovirus     9. Possible COPD, smoking history  - outpt pulmonary f/u    Plan:   Patient stable for discharge from cardiac standpoint  She should discharge on current cardiac medications (Brilinta 90mg PO BID, ASA 81 mg PO daily, Lasix 40mg daily, Aldactone 25mg daily, Toprol XL 12.5mg daily)- no ACEI d/c borderline b/p  She will discharge with lifevest  She should remain off work until seen by Dr. Martinez in f/u  Cardiology f/u on 10/16 at 11:45         Detail Level: Simple Additional Notes: Patient consent was obtained to proceed with the visit and recommended plan of care after discussion of all risks and benefits, including the risks of COVID-19 exposure.

## 2022-08-30 ENCOUNTER — OFFICE VISIT (OUTPATIENT)
Dept: FAMILY MEDICINE CLINIC | Facility: CLINIC | Age: 52
End: 2022-08-30

## 2022-08-30 VITALS
BODY MASS INDEX: 34.79 KG/M2 | TEMPERATURE: 97.3 F | SYSTOLIC BLOOD PRESSURE: 136 MMHG | WEIGHT: 203.8 LBS | OXYGEN SATURATION: 99 % | HEIGHT: 64 IN | HEART RATE: 82 BPM | DIASTOLIC BLOOD PRESSURE: 82 MMHG | RESPIRATION RATE: 16 BRPM

## 2022-08-30 DIAGNOSIS — L02.31 CELLULITIS AND ABSCESS OF BUTTOCK: Primary | ICD-10-CM

## 2022-08-30 DIAGNOSIS — L03.317 CELLULITIS AND ABSCESS OF BUTTOCK: Primary | ICD-10-CM

## 2022-08-30 PROCEDURE — 99213 OFFICE O/P EST LOW 20 MIN: CPT | Performed by: FAMILY MEDICINE

## 2022-08-30 RX ORDER — SULFAMETHOXAZOLE AND TRIMETHOPRIM 800; 160 MG/1; MG/1
1 TABLET ORAL 2 TIMES DAILY
Qty: 20 TABLET | Refills: 3 | Status: SHIPPED | OUTPATIENT
Start: 2022-08-30 | End: 2022-12-07

## 2022-09-23 ENCOUNTER — TELEPHONE (OUTPATIENT)
Dept: CARDIOLOGY | Facility: CLINIC | Age: 52
End: 2022-09-23

## 2022-09-23 DIAGNOSIS — I10 HYPERTENSION, BENIGN: Chronic | ICD-10-CM

## 2022-09-23 RX ORDER — NITROGLYCERIN 0.4 MG/1
0.4 TABLET SUBLINGUAL
Qty: 100 TABLET | Refills: 0 | Status: SHIPPED | OUTPATIENT
Start: 2022-09-23

## 2022-09-23 RX ORDER — FUROSEMIDE 40 MG/1
40 TABLET ORAL DAILY
Qty: 90 TABLET | Refills: 0 | Status: SHIPPED | OUTPATIENT
Start: 2022-09-23 | End: 2023-01-17

## 2022-09-23 RX ORDER — ISOSORBIDE MONONITRATE 30 MG/1
30 TABLET, EXTENDED RELEASE ORAL DAILY
Qty: 90 TABLET | Refills: 0 | Status: SHIPPED | OUTPATIENT
Start: 2022-09-23 | End: 2022-12-07

## 2022-09-23 NOTE — TELEPHONE ENCOUNTER
Pharmacy told patient they have been waiting a month for refills on her furosemide, ,nitro,pravastatin, and isosorbide  Please send to [facility] in [place]

## 2022-10-04 ENCOUNTER — TELEPHONE (OUTPATIENT)
Dept: FAMILY MEDICINE CLINIC | Facility: CLINIC | Age: 52
End: 2022-10-04

## 2022-10-04 NOTE — TELEPHONE ENCOUNTER
Caller: Sada Le    Relationship: Self    Best call back number: [phone]    What is the medical concern/diagnosis: ABSCESS ON UPPER LEG     What specialty or service is being requested: UNKNOWN     What is the provider, practice or medical service name: UNKNOWN     What is the office location: UNKNOWN    What is the office phone number: UNKNOWN     Any additional details: PATIENT STATES THE ABSCESS ON HER UPPER LEG IS COMING BACK. PATIENT IS REQUESTING A REFERRAL TO GO GET IT TAKEN CARE OF.

## 2022-10-04 NOTE — TELEPHONE ENCOUNTER
Spoke with Sada would like an appointment arranged with Dr Garcia.   Sada reports that she is on bactrim and should be ok until appointment is arranged.

## 2022-10-05 DIAGNOSIS — L03.317 CELLULITIS AND ABSCESS OF BUTTOCK: Primary | ICD-10-CM

## 2022-10-05 DIAGNOSIS — L02.31 CELLULITIS AND ABSCESS OF BUTTOCK: Primary | ICD-10-CM

## 2022-10-09 DIAGNOSIS — I10 ESSENTIAL (PRIMARY) HYPERTENSION: ICD-10-CM

## 2022-10-10 RX ORDER — METOPROLOL SUCCINATE 25 MG/1
TABLET, EXTENDED RELEASE ORAL
Qty: 90 TABLET | Refills: 1 | Status: SHIPPED | OUTPATIENT
Start: 2022-10-10

## 2022-12-07 ENCOUNTER — OFFICE VISIT (OUTPATIENT)
Dept: CARDIOLOGY | Facility: CLINIC | Age: 52
End: 2022-12-07

## 2022-12-07 VITALS
WEIGHT: 200 LBS | HEIGHT: 64 IN | BODY MASS INDEX: 34.15 KG/M2 | RESPIRATION RATE: 18 BRPM | DIASTOLIC BLOOD PRESSURE: 81 MMHG | SYSTOLIC BLOOD PRESSURE: 111 MMHG | HEART RATE: 76 BPM

## 2022-12-07 DIAGNOSIS — I25.118 CORONARY ARTERY DISEASE OF NATIVE ARTERY OF NATIVE HEART WITH STABLE ANGINA PECTORIS: ICD-10-CM

## 2022-12-07 DIAGNOSIS — I71.20 THORACIC AORTIC ANEURYSM WITHOUT RUPTURE, UNSPECIFIED PART: Primary | ICD-10-CM

## 2022-12-07 PROCEDURE — 99214 OFFICE O/P EST MOD 30 MIN: CPT | Performed by: INTERNAL MEDICINE

## 2022-12-07 NOTE — PROGRESS NOTES
Cardiology Clinic Note  Brant Martinez MD, PhD    Subjective:     Encounter Date:12/07/2022      Patient ID: Sada Le is a 52 y.o. female.    Chief Complaint:  Chief Complaint   Patient presents with   • Follow-up       HPI:       PreviouslyI the pleasure to see this patient who is a 49-year-old female who is well-known to me from prior hospitalization and clinic notes.  She has a history of anterior ST elevation myocardial infarction with complete occlusion of the proximal LAD under going revascularization with Xience 3.5 x 33 mm drug-eluting stent postdilated to 4.5 mm at 18 familia with good results.  This was an Impella supported procedure given under  Cardiogenic shock in the setting of acute MI which was complicated by VF arrest with successful defibrillation.  She did well with weaning Impella support over 2 days and institution of max goal-directed medical therapy ultimately being discharged to home.  We were very aggressive with her medical therapy as well as antiplatelets and her EF improved from around 28 to 30% to 55% with only ever so slight anterior wall very mild hypokinesis residually.  She has done well since this time and is completely quit smoking.  She had nonobstructive disease in the circumflex and RCA as well as mid to distal LAD.  She was changed secondary to shortness of breath from Brilinta to Effient which she is maintained on aspirin and Effient, she is on high intensity statin therapy as well as Aldactone and long-acting beta-blocker and loop diuretics 40 twice daily of Lasix.  She is on ACE inhibitor at moderate to high intensity as well tolerating this just fine without cough.  She has had a recent hospitalization for shortness of breath and underwent right heart catheterization with no evidence of pulmonary hypertension despite volume loading during the procedure with evidence of volume depletion initially with right atrial pressure of 2 to 3 mmHg.  Results of this are below.  She  had recent hospitalization  for atypical substernal chest pain also with jaw pain.  Troponins were unremarkable, EKG was unchanged, blood pressure has been somewhat labile at home recently she says and when high causes some degree of chest discomfort.  She underwent noninvasive evaluation with treadmill stress test and walked greater than 9 minutes with no EKG changes and she had normal myocardial perfusion at rest and stress.  There was no indication of balanced ischemia that would be suspected that may give false negative results relative to perfusion imaging.  Her echo showed normal preserved LV systolic function with ever so slight mild anterior wall hypokinesis compared to thickening of the posterior lateral walls.  EF was 55 to 60%.  Normal atrial sizes, no evidence of pulmonary hypertension, IVC diameter was normal.  She was discharged shortly thereafter.  She did continue to been followed peripherally in the clinic as an outpatient with optimization of antianginals however she continued ultimately to have some chest discomfort which she expressed along with more fatigue and ultimately we made a decision to proceed with diagnostic left heart catheterization for revisualization of LAD stents as well as other residual nonobstructive disease.  She underwent diagnostic left heart catheterization getting PCI to the ostial proximal circumflex, 3.0 x 18 Xience postdilated to 3.9 mm in the proximal ostial portion and 3.75 mm distally with good angiographic results and reduction of stenosis from 80% ulcerated appearing plaque to 0% residual.  Since this time and after discharge she has felt much better.  Her EDP was also very low indicative of volume depletion and her EF was normal at 55% with only very mild anterior wall hypokinesis from prior ischemic injury.  With improvement of her LV CV systolic function, low LVEDP and new complaints of dizziness with standing and orthostasis we decided to decrease her Lasix as  well as stop her Aldactone previously blood pressure has improved 130s over 90s at that time.      Today on repeat encounter she is doing very well no anginal chest pain shortness of breath heart failure signs or symptoms PND orthopnea.  She is compliant with all of her medicines continuing on dual antiplatelet therapy with LAD and circumflex stents in the past 2 to 4 years.  She continues on lisinopril and metoprolol, who went to class I CCS class I.  No angina, no new symptoms, stable medicines, continue these today     Normal CV exam  Regular rate and rhythm no rubs murmurs gallops  No clubbing cyanosis or edema  Vitals reviewed below blood pressure 110 120 systolic heart rates in the 70s  Normal pulses normal cap refill  Intact grossly  Soft nontender nondistended  Clear to auscultation bilateral  No carotid bruits or JVD  Normal cephalic atraumatic pupils are ground       Assessment plan  Obstructive coronary disease status post intervention  Essential hypertension hyperlipidemia  Obesity  History of ischemic cardiomyopathy  History of ST elevation microinfarction  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, 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, repeat CT 1 year  Atypical chest pain     Widely patent stents to the LAD and circumflex 1 year ago  Obstructive disease in circumflex status post revascularization and stenting as above and below  Abnormal stress with small area of mild reversibility in the lateral wall unclear significance or correlation  Try additional antianginal therapy with low-dose nitrate  Continue dual antiplatelet therapy with aspirin and Plavix  High intensity statin therapy as tolerated  Beta-blocker on board heart rate 60s  Continue lisinopril to 5 daily  Continue Lasix to 20 daily  Remain off Aldactone, NYHA class I  Diet and exercise per AHA guidelines  Goal blood pressure simply less than 135 systolic  Statin therapy she has tried multiple agents and there are giving her severe myalgias and muscle  "pains she is down to taking them every other day or even not at all given her otherwise symptomatology.  We will evaluate with PCSK9 inhibitors as alternative therapy if she has tried more than 3 statins with nontolerance to all of them if she fails pravastatin which will be her fourth agent     Doing well, continue present CV therapies for all CV comorbidities as above  Historical data copied forward from previous encounters in EMR is unchanged and still relevant and continued    Reorder CTA of the chest for thoracic enlargement at 4.3 in 2021 for staging and surveillance, thoracic ascending aortic aneurysm without rupture, 3.6 in the arch at that time  continue afterload reduction and beta-blockers        return to clinic 1 year     Cath films reviewed previously extensively, stress images reviewed as well all questions as proposed by the patient were answered today     Greater than 25 minutes of face-to-face with the patient with additional 10 minutes and charting for total encounter time greater than 35 minutes     Brant Martinez MD, PhD       Historical data copied forward from previous encounters in EMR including the history, exam, and assessment/plan has been reviewed and is unchanged unless noted otherwise.    Cardiac medicines reviewed with risk, benefits, and necessity of each discussed.    Risk and benefit of cardiac testing reviewed including death heart attack stroke pain bleeding infection need for vascular /cardiovascular surgery were discussed and the patient     Objective:         /81 (BP Location: Left arm, Patient Position: Sitting)   Pulse 76   Resp 18   Ht 162.6 cm (64\")   Wt 90.7 kg (200 lb)   LMP 01/01/1996 (Approximate)   BMI 34.33 kg/m²         The pleasure to be involved in this patient's cardiovascular care.  Please call with any questions or concerns  Brant Martinez MD, PhD    Most recent EKG as reviewed and interpreted by me:  Procedures     Most recent echo as reviewed and " interpreted by me:  Results for orders placed during the hospital encounter of 03/16/22    Adult Transthoracic Echo Complete W/ Cont if Necessary Per Protocol    Interpretation Summary  · Estimated left ventricular EF was in agreement with the calculated left ventricular EF. Left ventricular ejection fraction appears to be 61 - 65%. Left ventricular systolic function is normal.  · Left ventricular diastolic function is consistent with (grade Ia w/high LAP) impaired relaxation.  · Estimated right ventricular systolic pressure from tricuspid regurgitation is normal (<35 mmHg).  · Moderate dilation of the aortic root is present. Moderate dilation of the sinuses of Valsalva is present.      Most recent stress test as reviewed and interpreted by me:  Results for orders placed during the hospital encounter of 03/16/22    Stress Test With Myocardial Perfusion One Day    Interpretation Summary  · Left ventricular ejection fraction is normal. (Calculated EF = 68%).  · Myocardial perfusion imaging indicates a small-sized, mildly severe area of ischemia located in the lateral wall.  · Impressions are consistent with an intermediate risk study.  · There is no prior study available for comparison. Some different score of only 2 Small area of mild reversibility in the apical lateral versus mid lateral segments Normal EF by gated imaging 68%.  · Findings consistent with an equivocal ECG stress test.    Abnormal study  Intermediate risk study  Positive symptoms during the study with some chest heaviness  Biphasic T waves in the anterolateral leads but nonspecific finding from baseline  Normal EF 68%  Small area of mild reversibility in the lateral wall to apical lateral      Most recent cardiac catheterization as reviewed interpreted by me:  Results for orders placed during the hospital encounter of 02/12/21    Cardiac Catheterization/Vascular Study    Narrative  Cardiac cath note  Brant Martinez MD, PhD  Date of service  2-  [facility] cardiology    Procedure  1.  Left heart catheterization with coronary angiography left ventriculography in ANTON position  2.  Percutaneous coronary mention to the ostial proximal circumflex with 3.0 x 18 mm Xience drug-eluting stent postdilated to 3.9 mm proximally, 3.75 mm distally with good angiographic results    Lesion information  Ostial 50%, mid 70% with hazy appearance of the proximal circumflex, ulcerated plaque angiographically with daily anginal chest pain  Reduced to 0% residual stenosis  JANE-3 flow pre and post    After informed consent the patient was brought to the catheterization lab sterilely prepped and draped in usual fashion with exposure the right groin for right common femoral arterial access via micropuncture modified standard technique with placement of a 6 Anguillan sheath under fluoroscopic guidance which was aspirated flushed with heparinized saline after 1% lidocaine analgesia.  An 035 guidewire was advanced to level aortic valve followed by diagnostic left heart catheterization with JL4 and JR4 6 Anguillan catheters.  A pigtail catheter was used to perform left ventriculography, EDP measurement and pullback assessment the transaortic valve gradient.  Secondary to findings of coronary disease in the ostial proximal circumflex decision was made to intervene given recurrent anginal chest pain, ulcerated appearance of the proximal circumflex.  Lesion was predilated after run-through wire to the obtuse marginal with 2.5 x 20 mm NC at 18 familia followed by stenting with 3.0 x 18 deployed at 18 familia at 3.25 followed by postdilatation with with 3.5 x 15 up to 1618 familia and then 3.75 x 15 NC balloon at the 14 familia at the distal edge and 16 familia at the proximal edge of 3.9 mm with good angiographic results.  There was no compromise of the LAD flow.  A wire directed down the LAD at the conclusion the case had a balloon that freely passed and there was no stent impingement from the left  main to the LAD.  JANE-3 flow was maintained pre and post.  Lesion reduced from 70% to 0% residual.  Heparin was used to maintain ACT greater than 250 throughout the entire to the case.  Patient was already on aspirin and Effient but was given additional 10 mg of Effient on the table.  Right common femoral arteriotomy was closed by manual pressure once ACT less than 175.  She had no complications.  She left the Cath Lab chest pain-free hemodynamically electrically stable alert talking to staff neurologically grossly intact bilaterally    Moderate conscious sedation of Versed and fentanyl administered by registered nurse with complete ECG pulse oximetry and hemodynamic monitoring throughout the entirety the case observed by me  Complications none  Blood loss less than 5 cc  Contrast 180 cc including diagnostic portion    Findings  1.  Opening aortic pressure of 114/66 with a mean of 86  2.  LV pressure 108 with an EDP of 2 to 5 mmHg  Closing pressure 111/66 with a mean of 85  EDP 6  No significant transaortic valve gradient    Angiography  1.  Left main large caliber vessel no angiographic disease  2.  LAD large caliber vessel with widely patent stent in the proximal portion but diffusely diseased mid to distal LAD with eccentric plaque likely 2.5 mm in diameter from the bifurcation of the diagonal all the way around the apex with again 30 to 40% luminal irregularities but no significant flow-limiting stenosis.,  Diagonal branch with no obstructive disease only mild luminal regularities  3.  Circumflex nondominant with large obtuse marginal branch.  There is a luminal filling defect right at the ostium concerning for nonocclusive thrombus versus eccentric plaque at 50%, immediately distal is a hazy eccentric ulcerated plaque 75% followed by bifurcation with continuation circumflex and obtuse marginal branch which has no disease.  There is JANE-3 flow throughout  4.  RCA small nondominant no angiographic disease,  small PDA branch distally not amenable to any stents given caliber    Conclusions and recommendations  1 two-vessel coronary disease, widely patent stent in the proximal LAD with diffuse mid to distal LAD stenosis but nonobstructive, ostial proximal circumflex disease of questionable significance however angiographic appearance of significant ulceration, concern for thrombus status post intervention as described above  2.  Successful intervention as described above with balloon angioplasty and stenting of the ostial proximal circumflex postdilated to 3.9 mm at the ostium and 3.75 mm distally with good angiographic results  3.  Continue antiplatelet therapy with aspirin and Effient  4.  Decrease Lasix with low LVEDP, hold ACE inhibitor  5 LV function appears 50 to 55% by LV gram  6.  No transaortic valve gradient  7.,  Mid to observation, likely home tomorrow which discharge criteria met  8.  After discharge follow-up with cardiology in 2 to 4 weeks  \  It is a pleasure be involved in her cardiovascular care.  Please call with any questions or concerns  Brant Martinez MD, PhD    The following portions of the patient's history were reviewed and updated as appropriate: allergies, current medications, past family history, past medical history, past social history, past surgical history and problem list.      ROS:  14 point review of systems negative except as mentioned above    Current Outpatient Medications:   •  aspirin ([employer] Aspirin Adult Low Dose) 81 MG chewable tablet, Chew 1 tablet Daily., Disp: 90 tablet, Rfl: 3  •  cetirizine (zyrTEC) 10 MG tablet, Take 10 mg by mouth Every Night., Disp: , Rfl:   •  cholecalciferol (VITAMIN D3) 1.25 MG (44355 UT) capsule, Take 50,000 Units by mouth Daily., Disp: , Rfl:   •  clopidogrel (PLAVIX) 75 MG tablet, Take 1 tablet by mouth Daily., Disp: 90 tablet, Rfl: 3  •  furosemide (LASIX) 40 MG tablet, Take 1 tablet by mouth Daily. (Patient taking differently: Take 40 mg by mouth As  Needed.), Disp: 90 tablet, Rfl: 0  •  lisinopril (PRINIVIL,ZESTRIL) 5 MG tablet, Take 1 tablet by mouth 2 (Two) Times a Day., Disp: 180 tablet, Rfl: 1  •  metoprolol succinate XL (TOPROL-XL) 25 MG 24 hr tablet, TAKE 1 TABLET BY MOUTH EVERY DAY, Disp: 90 tablet, Rfl: 1  •  nitroglycerin (NITROSTAT) 0.4 MG SL tablet, Place 1 tablet under the tongue Every 5 (Five) Minutes As Needed for Chest Pain. Take no more than 3 doses in 15 minutes., Disp: 100 tablet, Rfl: 0  •  omeprazole OTC (PrilOSEC OTC) 20 MG EC tablet, Take 1 tablet by mouth Daily., Disp: 90 tablet, Rfl: 3  •  Potassium 99 MG tablet, Take 1 tablet by mouth Daily., Disp: , Rfl:   •  vitamin B-12 (CYANOCOBALAMIN) 500 MCG tablet, Take 500 mcg by mouth Daily., Disp: , Rfl:     Problem List:  Patient Active Problem List   Diagnosis   • Allergic rhinitis   • Cervical disc disease with myelopathy   • Diverticulosis of colon   • Hypertension, benign   • Insomnia, organic   • Class 1 obesity due to excess calories with serious comorbidity and body mass index (BMI) of 34.0 to 34.9 in adult   • Osteopenia of spine   • Mixed hyperlipidemia   • Ischemic cardiomyopathy   • Stented coronary artery   • History of echocardiogram   • Annual visit for general adult medical examination with abnormal findings   • Cervical cancer screening   • Encounter for screening for malignant neoplasm of breast   • Colon cancer screening   • History of tobacco use   • Generalized anxiety disorder   • ASD (atrial septal defect)   • Coronary artery disease of native artery of native heart with stable angina pectoris (HCC)   • History of MI (myocardial infarction)   • Family history of diabetes mellitus type II   • Anxiety   • CAD S/P percutaneous coronary angioplasty   • Left arm cellulitis   • Branch retinal vein occlusion with macular edema   • Hypertensive retinopathy   • Gastroesophageal reflux disease without esophagitis     Past Medical History:  Past Medical History:   Diagnosis Date    • Absence of left thumb     Impression: hx of MRSA, she is signficantly improved She will continue meds and followup if sxs have not resolved over the next 2 weeks.. She is released from care and will notify us of any problems   • Acute otitis media    • Allergic rhinitis    • Arthritis     neck   • Cancer (HCC)     skin   • Cervical disc disease with myelopathy    • Contact dermatitis or eczema    • Coronary artery disease    • Disorder of bone and cartilage    • Diverticulitis of colon     Impression: CT confirms in the sigmoid colon 10/2011   • Diverticulosis of colon     Impression: No sxs 02/13/2013   • Elevated cholesterol    • Elevated lipoprotein(a) 4/30/2020   • Elevated temperature    • External otitis of left ear    • Gastroesophageal reflux disease without esophagitis 6/19/2022   • Hearing loss due to cerumen impaction, left    • Hemangioma of liver    • History of echocardiogram 10/03/2019    Severely reduced LV systolic function. EF 34% Distribution indicative of LAD territory injury. Akinesis of the apex as well as apical lateral and mid to apical septal walls. Preservation of posterior inferior lateral walls. Mild AI, mild TR. Normal RV function.    • History of transesophageal echocardiography (NAFISA) 01/07/2020    EF 55% LA cavity is mild to moderately dilated. Mild MR. Interatrial septum appears redundant. Interatrial hypermobile c/w aneurysm. Large PFO is noted with right to left shunting on bubble study. PFO tunnel appears to be short.    • Hordeolum externum of left lower eyelid     Impression: She was started on Bactrim DS for her infection. She was also advised to use warm compression. She will followup should sxs not improve over the next 48 hrs and resolve over the next 1 weeek.   • Hyperlipidemia     Impression: Diet controled. She should see improvement with her successful wt loss. We discussed her lipid panel.   • Hypertension, benign     Impression: new onset Low salt low calorie diet  and regular exercise and followup in 1 mo She will monitor her pressures and notify us of her pressures over the next 1 week.   • Inclusion cyst of right breast     Impression: We will follow for now. She is encouraged to have Mammography. She is presently without insurance and reluctant. She bobby consider. She will notify us of any change at all in the lesion for the only way to be certain of it's etilogy is to remove it. She understands.   • Insomnia, organic    • Menopausal syndrome    • Overweight (BMI 25.0-29.9)    • RUQ abdominal pain     Impression: Resolving, negative GB u/s, HIDA EF 77%, we will follow   • Stented coronary artery 09/29/2019   • Tobacco use     Impression: She is strongly encouraged to stop smoking. Cessation techniques discussed and encouraged. The consequences of not stopping discussed, ie, CAD, PVD, Stroke, Lung and other cancers.     Past Surgical History:  Past Surgical History:   Procedure Laterality Date   • BIVENTRICULAR ASSIST DEVICE/LEFT VENTRICULAR ASSIST DEVICE INSERTION N/A 9/29/2019    Procedure: Left Ventricular Assist Device;  Surgeon: Brant Martinez MD;  Location: [facility] CATH INVASIVE LOCATION;  Service: Cardiovascular   • CARDIAC CATHETERIZATION N/A 9/29/2019    Procedure: Left Heart Cath;  Surgeon: Brant Martinez MD;  Location: [facility] CATH INVASIVE LOCATION;  Service: Cardiovascular   • CARDIAC CATHETERIZATION N/A 9/29/2019    Procedure: Coronary angiography;  Surgeon: Brant Martinez MD;  Location: [facility] CATH INVASIVE LOCATION;  Service: Cardiovascular   • CARDIAC CATHETERIZATION N/A 9/29/2019    Procedure: Left ventriculography;  Surgeon: Brant Martinez MD;  Location: [facility] CATH INVASIVE LOCATION;  Service: Cardiovascular   • CARDIAC CATHETERIZATION N/A 9/29/2019    Procedure: Stent SERENITY coronary;  Surgeon: Brant Martinez MD;  Location: [facility] CATH INVASIVE LOCATION;  Service: Cardiovascular   • CARDIAC CATHETERIZATION N/A  2/25/2020    Procedure: Right Heart Cath;  Surgeon: Brant Martinez MD;  Location: [facility] CATH INVASIVE LOCATION;  Service: Cardiology;  Laterality: N/A;   • CARDIAC CATHETERIZATION N/A 2/12/2021    Procedure: Left Heart Cath;  Surgeon: Brant Martinez MD;  Location: [facility] CATH INVASIVE LOCATION;  Service: Cardiology;  Laterality: N/A;   • CARDIAC CATHETERIZATION N/A 2/12/2021    Procedure: Coronary angiography;  Surgeon: Brant Martinez MD;  Location: [facility] CATH INVASIVE LOCATION;  Service: Cardiology;  Laterality: N/A;   • CARDIAC CATHETERIZATION N/A 2/12/2021    Procedure: Left ventriculography;  Surgeon: Brant Martinez MD;  Location: [facility] CATH INVASIVE LOCATION;  Service: Cardiology;  Laterality: N/A;   • CARDIAC CATHETERIZATION N/A 2/12/2021    Procedure: Aortic root aortogram;  Surgeon: Brant Martinez MD;  Location: [facility] CATH INVASIVE LOCATION;  Service: Cardiology;  Laterality: N/A;   • CARDIAC CATHETERIZATION N/A 2/12/2021    Procedure: Percutaneous Coronary Intervention;  Surgeon: Brant Martinez MD;  Location: [facility] CATH INVASIVE LOCATION;  Service: Cardiology;  Laterality: N/A;   • CARDIAC CATHETERIZATION N/A 2/12/2021    Procedure: Stent SERENITY coronary;  Surgeon: Brant Martinez MD;  Location: [facility] CATH INVASIVE LOCATION;  Service: Cardiology;  Laterality: N/A;   • CARDIAC ELECTROPHYSIOLOGY PROCEDURE  9/29/2019    Procedure: Impella Insertion;  Surgeon: Brant Martinez MD;  Location: [facility] CATH INVASIVE LOCATION;  Service: Cardiovascular   • CORONARY ANGIOPLASTY  02/12/2021    1x stent to Circ   • CORONARY ANGIOPLASTY WITH STENT PLACEMENT  02/2021   • MN RT/LT HEART CATHETERS N/A 9/29/2019    Procedure: Percutaneous Coronary Intervention;  Surgeon: Brant Martinez MD;  Location: [facility] CATH INVASIVE LOCATION;  Service: Cardiovascular   • TOTAL ABDOMINAL HYSTERECTOMY WITH SALPINGO OOPHORECTOMY  1995    Endometriosis      Social History:  Social History     Socioeconomic History   • Marital status:    Tobacco Use   • Smoking status: Former     Packs/day: 1.00     Years: 34.00     Pack years: 34.00     Types: Cigarettes     Start date: 1985     Quit date: 9/29/2019     Years since quitting: 3.1   • Smokeless tobacco: Never   • Tobacco comments:     States she is quitting as of 9/29/2019.   Substance and Sexual Activity   • Alcohol use: Yes     Comment: occassionally    • Drug use: Not Currently     Comment: occasional   • Sexual activity: Defer     Allergies:  Allergies   Allergen Reactions   • Penicillins Rash   • Ciprofloxacin Rash   • Penicillin G Rash     Immunizations:  Immunization History   Administered Date(s) Administered   • DT 01/01/2004   • FluLaval/Fluzone >6mos 10/01/2019   • Tdap 01/01/2011            In-Office Procedure(s):  No orders to display        ASCVD RIsk Score::  The ASCVD Risk score (Brian MEYER, et al., 2019) failed to calculate for the following reasons:    The patient has a prior MI or stroke diagnosis    Imaging:    Results for orders placed in visit on 06/04/21    XR Knee 4+ View Right    Narrative  Examination: XR KNEE 4+ VW RIGHT-    Date of Exam: 6/4/2021 3:36 PM    Indication: right knee pain; M25.561-Pain in right knee; G89.29-Other  chronic pain.    Comparison: None available.    Technique: 4 radiographic views of the knee were obtained.    Findings:  There is no fracture. No periostitis or bone destruction. There is mild  spurring at the attachment of the quadriceps tendon. Knee joint space is  preserved. No soft tissue abnormality. No significant joint effusion.    Impression  Essentially negative exam.    Electronically Signed By-Jammie Sena MD On:6/4/2021 4:04 PM  This report was finalized on [id number] by  Jammie Sena MD.       Results for orders placed in visit on 09/02/20    CT Angiogram Chest With & Without Contrast    Narrative  DATE OF EXAM:  2/19/2021 8:23  AM    PROCEDURE:  CT ANGIOGRAM CHEST W WO CONTRAST-    INDICATIONS:  Thoracic aortic aneurysm (TAA), follow up; I71.2-Thoracic aortic  aneurysm, without rupture    COMPARISON:  No Comparisons Available    TECHNIQUE:  Contiguous axial imaging was obtained from the thoracic inlet through  the upper abdomen following the intravenous administration of 100 mL  Isovue 370. Reconstructed coronal and sagittal images were also  obtained. In addition, a 3 D volume rendered image was obtained after  post processing. Automated exposure control and iterative reconstruction  methods were used.    FINDINGS:  VASCULAR FINDINGS: The transverse thoracic aorta has a maximal luminal  diameter of 3.6 cm. It measures 4.2 cm at the sinus of Valsalva. The  transverse thoracic aorta measures 2.4 cm. In the proximal descending  thoracic aorta measures 2.2 cm. No focal aneurysmal dilatation is  identified    NONVASCULAR FINDINGS: Scans through the lungs appear normal. No  pulmonary nodules or masses are identified. There is some linear discoid  atelectasis peripherally in the lingula. A coronary stent is in place in  the left anterior descending coronary artery and a second stent is  identified in the circumflex coronary artery. These vessels appear  widely patent following contrast administration. .    Impression  1. 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 Mild to moderate atherosclerotic plaque  deposition primarily in the descending thoracic aorta.  2. Status post coronary stent placement.    Electronically Signed By-Amaury Hendrix MD On:2/19/2021 10:53 AM  This report was finalized on [id number] by  Amaury Hendrix MD.      Results for orders placed in visit on 09/02/20    CT Angiogram Chest With & Without Contrast    Narrative  DATE OF EXAM:  2/19/2021 8:23 AM    PROCEDURE:  CT ANGIOGRAM CHEST W WO CONTRAST-    INDICATIONS:  Thoracic aortic aneurysm (TAA), follow up; I71.2-Thoracic  aortic  aneurysm, without rupture    COMPARISON:  No Comparisons Available    TECHNIQUE:  Contiguous axial imaging was obtained from the thoracic inlet through  the upper abdomen following the intravenous administration of 100 mL  Isovue 370. Reconstructed coronal and sagittal images were also  obtained. In addition, a 3 D volume rendered image was obtained after  post processing. Automated exposure control and iterative reconstruction  methods were used.    FINDINGS:  VASCULAR FINDINGS: The transverse thoracic aorta has a maximal luminal  diameter of 3.6 cm. It measures 4.2 cm at the sinus of Valsalva. The  transverse thoracic aorta measures 2.4 cm. In the proximal descending  thoracic aorta measures 2.2 cm. No focal aneurysmal dilatation is  identified    NONVASCULAR FINDINGS: Scans through the lungs appear normal. No  pulmonary nodules or masses are identified. There is some linear discoid  atelectasis peripherally in the lingula. A coronary stent is in place in  the left anterior descending coronary artery and a second stent is  identified in the circumflex coronary artery. These vessels appear  widely patent following contrast administration. .    Impression  1. 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 Mild to moderate atherosclerotic plaque  deposition primarily in the descending thoracic aorta.  2. Status post coronary stent placement.    Electronically Signed By-Amaury Hendrix MD On:2/19/2021 10:53 AM  This report was finalized on [id number] by  Amaury Hendrix MD.      Lab Review:   No visits with results within 6 Month(s) from this visit.   Latest known visit with results is:   Hospital Outpatient Visit on 03/16/2022   Component Date Value   • Target HR (85%) 03/16/2022 143    • Max. Pred. HR (100%) 03/16/2022 168    • BH CV STRESS PROTOCOL 1 03/16/2022 Pharmacologic    • Stage 1 03/16/2022 1    • HR Stage 1 03/16/2022 72    • BP Stage 1 03/16/2022 109/70     • Duration Min Stage 1 03/16/2022 0    • Duration Sec Stage 1 03/16/2022 10    • Stress Dose Regadenoson * 03/16/2022 0.4    • Stress Comments Stage 1 03/16/2022 10 sec bolus injection    • Stage 2 03/16/2022 2    • HR Stage 2 03/16/2022 79    • BP Stage 2 03/16/2022 109/70    • Duration Min Stage 2 03/16/2022 4    • Duration Sec Stage 2 03/16/2022 0    • Stress Comments Stage 2 03/16/2022 recovery    • Stage 3 03/16/2022 3    • HR Stage 3 03/16/2022 77    • BP Stage 3 03/16/2022 113/76    • Stage 4 03/16/2022 4    • HR Stage 4 03/16/2022 72    • BP Stage 4 03/16/2022 126/76    • Baseline HR 03/16/2022 59    • Baseline BP 03/16/2022 109/70    • Peak HR 03/16/2022 79    • Percent Max Pred HR 03/16/2022 47.02    • Percent Target HR 03/16/2022 55    • Peak BP 03/16/2022 126/76    • Recovery HR 03/16/2022 68    • Recovery BP 03/16/2022 120/79    • BH CV REST NUCLEAR ISOTO* 03/16/2022 7.2    • BH CV STRESS NUCLEAR ISO* 03/16/2022 21.9    • Nuc Stress EF 03/16/2022 68      Recent labs reviewed and interpreted for clinical significance and application            Level of Care:           Brant Martinez MD  12/07/22  .

## 2022-12-19 RX ORDER — CLOPIDOGREL BISULFATE 75 MG/1
TABLET ORAL
Qty: 90 TABLET | Refills: 3 | Status: SHIPPED | OUTPATIENT
Start: 2022-12-19

## 2023-01-17 ENCOUNTER — OFFICE VISIT (OUTPATIENT)
Dept: FAMILY MEDICINE CLINIC | Facility: CLINIC | Age: 53
End: 2023-01-17
Payer: MEDICAID

## 2023-01-17 VITALS
RESPIRATION RATE: 16 BRPM | WEIGHT: 200.8 LBS | HEIGHT: 64 IN | SYSTOLIC BLOOD PRESSURE: 130 MMHG | OXYGEN SATURATION: 98 % | TEMPERATURE: 97.1 F | BODY MASS INDEX: 34.28 KG/M2 | DIASTOLIC BLOOD PRESSURE: 100 MMHG | HEART RATE: 89 BPM

## 2023-01-17 DIAGNOSIS — I10 HYPERTENSION, BENIGN: Chronic | ICD-10-CM

## 2023-01-17 DIAGNOSIS — I25.5 ISCHEMIC CARDIOMYOPATHY: ICD-10-CM

## 2023-01-17 DIAGNOSIS — I25.118 CORONARY ARTERY DISEASE OF NATIVE ARTERY OF NATIVE HEART WITH STABLE ANGINA PECTORIS: ICD-10-CM

## 2023-01-17 DIAGNOSIS — Z87.891 HISTORY OF TOBACCO USE: ICD-10-CM

## 2023-01-17 DIAGNOSIS — K57.30 DIVERTICULOSIS OF COLON: ICD-10-CM

## 2023-01-17 DIAGNOSIS — K57.32 DIVERTICULITIS OF LARGE INTESTINE WITHOUT PERFORATION OR ABSCESS WITHOUT BLEEDING: Primary | ICD-10-CM

## 2023-01-17 PROBLEM — Z22.322 MRSA (METHICILLIN RESISTANT STAPH AUREUS) CULTURE POSITIVE: Status: ACTIVE | Noted: 2023-01-17

## 2023-01-17 PROBLEM — I21.9 HEART ATTACK: Status: ACTIVE | Noted: 2019-09-29

## 2023-01-17 PROBLEM — L02.31 CELLULITIS AND ABSCESS OF BUTTOCK: Status: ACTIVE | Noted: 2023-01-17

## 2023-01-17 PROBLEM — L03.317 CELLULITIS AND ABSCESS OF BUTTOCK: Status: ACTIVE | Noted: 2023-01-17

## 2023-01-17 PROCEDURE — 99214 OFFICE O/P EST MOD 30 MIN: CPT | Performed by: FAMILY MEDICINE

## 2023-01-17 RX ORDER — SULFAMETHOXAZOLE AND TRIMETHOPRIM 800; 160 MG/1; MG/1
1 TABLET ORAL 2 TIMES DAILY
Qty: 20 TABLET | Refills: 1 | Status: SHIPPED | OUTPATIENT
Start: 2023-01-17

## 2023-01-17 RX ORDER — FUROSEMIDE 40 MG/1
40 TABLET ORAL AS NEEDED
Qty: 90 TABLET | Refills: 3 | Status: SHIPPED | OUTPATIENT
Start: 2023-01-17

## 2023-01-23 DIAGNOSIS — F41.9 ANXIETY DISORDER, UNSPECIFIED: ICD-10-CM

## 2023-01-23 RX ORDER — ESCITALOPRAM OXALATE 10 MG/1
TABLET ORAL
Qty: 90 TABLET | Refills: 4 | OUTPATIENT
Start: 2023-01-23

## 2023-02-06 ENCOUNTER — HOSPITAL ENCOUNTER (OUTPATIENT)
Dept: CT IMAGING | Facility: HOSPITAL | Age: 53
Discharge: HOME | End: 2023-02-06
Admitting: INTERNAL MEDICINE
Payer: MEDICAID

## 2023-02-06 DIAGNOSIS — I71.20 THORACIC AORTIC ANEURYSM WITHOUT RUPTURE, UNSPECIFIED PART: ICD-10-CM

## 2023-02-06 LAB
CREAT BLDA-MCNC: 0.8 MG/DL (ref 0.6–1.3)
EGFRCR SERPLBLD CKD-EPI 2021: 88.2 ML/MIN/1.73

## 2023-02-06 PROCEDURE — 71275 CT ANGIOGRAPHY CHEST: CPT

## 2023-02-06 PROCEDURE — 0 IOPAMIDOL PER 1 ML: Performed by: INTERNAL MEDICINE

## 2023-02-06 PROCEDURE — 82565 ASSAY OF CREATININE: CPT

## 2023-02-06 RX ADMIN — IOPAMIDOL 100 ML: 755 INJECTION, SOLUTION INTRAVENOUS at 10:37

## 2023-02-07 DIAGNOSIS — Z87.891 HISTORY OF TOBACCO USE: Primary | ICD-10-CM

## 2023-02-07 PROBLEM — I21.9 HEART ATTACK: Status: RESOLVED | Noted: 2019-09-29 | Resolved: 2023-02-07

## 2023-02-07 PROBLEM — I71.21 ANEURYSM OF ASCENDING AORTA WITHOUT RUPTURE: Status: ACTIVE | Noted: 2023-02-07

## 2023-03-28 ENCOUNTER — TELEPHONE (OUTPATIENT)
Dept: FAMILY MEDICINE CLINIC | Facility: CLINIC | Age: 53
End: 2023-03-28

## 2023-03-28 NOTE — TELEPHONE ENCOUNTER
I sent her PA for her CT lung cancer screening and ins has denied that request. I put it in her chart for you to view. Let me know what you'd like to do.   Thank you

## 2023-03-29 DIAGNOSIS — Z87.891 HISTORY OF TOBACCO USE: Primary | ICD-10-CM

## 2023-04-03 ENCOUNTER — HOSPITAL ENCOUNTER (OUTPATIENT)
Dept: CT IMAGING | Facility: HOSPITAL | Age: 53
Discharge: HOME | End: 2023-04-03
Admitting: FAMILY MEDICINE
Payer: MEDICAID

## 2023-04-03 DIAGNOSIS — Z87.891 HISTORY OF TOBACCO USE: ICD-10-CM

## 2023-04-03 PROCEDURE — 71271 CT THORAX LUNG CANCER SCR C-: CPT

## 2023-04-09 DIAGNOSIS — I10 ESSENTIAL (PRIMARY) HYPERTENSION: ICD-10-CM

## 2023-04-10 RX ORDER — METOPROLOL SUCCINATE 25 MG/1
TABLET, EXTENDED RELEASE ORAL
Qty: 90 TABLET | Refills: 1 | Status: SHIPPED | OUTPATIENT
Start: 2023-04-10

## 2023-04-26 DIAGNOSIS — F41.9 ANXIETY DISORDER, UNSPECIFIED: ICD-10-CM

## 2023-04-26 RX ORDER — BUPROPION HYDROCHLORIDE 150 MG/1
TABLET ORAL
Qty: 90 TABLET | Refills: 4 | OUTPATIENT
Start: 2023-04-26

## 2023-04-26 RX ORDER — LORATADINE 10 MG
TABLET ORAL
Qty: 90 TABLET | Refills: 3 | Status: SHIPPED | OUTPATIENT
Start: 2023-04-26

## 2023-05-27 DIAGNOSIS — K21.9 GASTROESOPHAGEAL REFLUX DISEASE WITHOUT ESOPHAGITIS: ICD-10-CM

## 2023-05-30 RX ORDER — OMEPRAZOLE 20 MG/1
TABLET, DELAYED RELEASE ORAL
Qty: 90 TABLET | Refills: 3 | Status: SHIPPED | OUTPATIENT
Start: 2023-05-30

## 2023-07-21 NOTE — PROGRESS NOTES
Chief Complaint  Abdominal Pain, Knee Pain, Hypertension, and Coronary Artery Disease    Subjective     CC  Problem List  Visit Diagnosis   Encounters  Notes  Medications  Labs  Result Review Imaging  Media    Sada Le presents to [facility] for   History of Present Illness      Abdominal Pain  This is a recurrent problem. The current episode started more than 1 month ago. The onset quality is gradual. The problem occurs intermittently. The problem has been waxing and waning. The pain is located in the periumbilical region. The pain is at a severity of 8/10. The pain is moderate. The quality of the pain is sharp (Pressure). The abdominal pain does not radiate. Associated symptoms include arthralgias (right knee), constipation and diarrhea. Pertinent negatives include no dysuria, fever, frequency, headaches, hematuria, nausea, vomiting or weight loss. Nothing aggravates the pain. The pain is relieved by Nothing. She has tried nothing for the symptoms.   Knee Pain   The incident occurred more than 1 week ago (a year). There was no injury mechanism. The pain is present in the right knee. Quality: dull. The pain is at a severity of 5/10. The pain is mild. Pertinent negatives include no inability to bear weight, loss of motion, loss of sensation, muscle weakness, numbness or tingling. She reports no foreign bodies present. The symptoms are aggravated by movement and weight bearing. She has tried NSAIDs, ice and heat for the symptoms. The treatment provided mild relief.   Coronary Artery Disease  Presents for follow-up visit. Pertinent negatives include no chest pain, chest pressure, chest tightness, dizziness, leg swelling, palpitations, shortness of breath or weight gain. The symptoms have been stable. Compliance with diet is good. Compliance with exercise is variable. Compliance with medications is good.   Hypertension  This is a chronic problem. The current episode  "started more than 1 year ago. The problem is uncontrolled. Pertinent negatives include no chest pain, headaches, orthopnea, palpitations, PND or shortness of breath. Risk factors for coronary artery disease include family history, obesity, post-menopausal state and dyslipidemia. Current antihypertension treatment includes diuretics and ACE inhibitors. The current treatment provides moderate improvement. There are no compliance problems.  Hypertensive end-organ damage includes CAD/MI.   Review of Systems   Constitutional:  Negative for fever, unexpected weight gain and unexpected weight loss.   HENT:  Negative for congestion.    Respiratory:  Negative for chest tightness and shortness of breath.    Cardiovascular:  Negative for chest pain, palpitations, orthopnea, leg swelling and PND.   Gastrointestinal:  Positive for abdominal pain, constipation and diarrhea. Negative for nausea and vomiting.   Endocrine: Negative for cold intolerance, heat intolerance, polydipsia and polyuria.   Genitourinary:  Negative for dysuria, frequency and hematuria.   Musculoskeletal:  Positive for arthralgias (right knee).   Skin:  Negative for rash.   Neurological:  Negative for dizziness, tingling, weakness and numbness.   Hematological:  Does not bruise/bleed easily.      Objective   Vital Signs:   /92 (BP Location: Right arm, Patient Position: Sitting, Cuff Size: Adult) Comment: Home blood pressure 120/65  Pulse 87   Temp 97.3 °F (36.3 °C) (Temporal)   Resp 16   Ht 162.6 cm (64\")   Wt 90.1 kg (198 lb 9.6 oz)   SpO2 99% Comment: room air  BMI 34.09 kg/m²     Physical Exam  Constitutional:       General: She is not in acute distress.  Cardiovascular:      Rate and Rhythm: Normal rate and regular rhythm.      Heart sounds: No murmur heard.  Pulmonary:      Effort: Pulmonary effort is normal.      Breath sounds: Normal breath sounds. No wheezing.   Musculoskeletal:      Cervical back: Neck supple.      Right knee: Deformity " (arthritid) present. No swelling or bony tenderness. Decreased range of motion. No tenderness. Normal patellar mobility.      Left lower leg: No edema.   Lymphadenopathy:      Cervical: No cervical adenopathy.   Skin:     Findings: No rash.   Neurological:      Mental Status: She is alert.      Result Review :Labs  Result Review  Imaging  Med Tab  Media                 Assessment and Plan CC Problem List  Visit Diagnosis  ROS  Review (Popup)  Health Maintenance  Quality  BestPractice  Medications  SmartSets  SnapShot Encounters  Media  Problem List Items Addressed This Visit          High    Hypertension, benign (Chronic)    Overview     Controlled.  Compliant continue meds.            Coronary artery disease of native artery of native heart with stable angina pectoris    Overview     Two-vessel coronary disease, widely patent stent in the proximal LAD 02/12/2021 w angioplasty to the proximal circumflex.   S/P Acute Myocardial Infarct on 09/29/2019  S/P left cardiac cath 02/12/2021-left main no disease, RCA no disease, disease mid to distal LAD, Angioplasty proximal circumflex, EF 55-60%  S/P Rt Cardiac Cath-02/25/2020  S/P Left Cardiac Cath-09/29/2019  She is doing well. Risk factors modified.            Type 2 diabetes mellitus with hyperglycemia, without long-term current use of insulin - Primary    Overview     A1c 6.5 Improve low carb diet and lots of exercise begin metformin. Follow up in 3 mos.          Current Assessment & Plan     Diabetes is worsening. New onset  Medication changes per orders.  Diabetes will be reassessed in 3 months.         Relevant Orders    POC Glycosylated Hemoglobin (Hb A1C) (Completed)       Low    Class 1 obesity due to excess calories with serious comorbidity and body mass index (BMI) of 34.0 to 34.9 in adult    Overview     She has regained wt.  Prediabetes is understood.There is strong family hx.  Low calorie diet discussed. She will increase exercise.           Other Visit Diagnoses       Generalized abdominal pain        neg exam etiology uncertain etiology IBS likely. colorectal scree ing encouraged.    Relevant Orders    POCT urinalysis dipstick, manual (Completed)    POC Pregnancy, Urine (Completed)    Acute non-recurrent maxillary sinusitis        Abx saline flushes Rx allergies avoidance    Relevant Medications    clindamycin (CLEOCIN) 300 MG capsule    Acute pain of right knee        ice elevation OTc aleve as sparing as possible. quad str exercises mild arthritis on xray, ortho referral [facility]    Relevant Orders    XR Knee 4+ View Right (Completed)            Follow Up Instructions Charge Capture  Follow-up Communications  Return in about 3 months (around 10/24/2023), or if symptoms worsen or fail to improve.  Patient was given instructions and counseling regarding her condition or for health maintenance advice. Please see specific information pulled into the AVS if appropriate.

## 2023-07-24 ENCOUNTER — HOSPITAL ENCOUNTER (OUTPATIENT)
Dept: GENERAL RADIOLOGY | Facility: HOSPITAL | Age: 53
Discharge: HOME | End: 2023-07-24
Admitting: FAMILY MEDICINE
Payer: MEDICAID

## 2023-07-24 ENCOUNTER — OFFICE VISIT (OUTPATIENT)
Dept: FAMILY MEDICINE CLINIC | Facility: CLINIC | Age: 53
End: 2023-07-24
Payer: MEDICAID

## 2023-07-24 VITALS
DIASTOLIC BLOOD PRESSURE: 92 MMHG | TEMPERATURE: 97.3 F | WEIGHT: 198.6 LBS | RESPIRATION RATE: 16 BRPM | HEART RATE: 87 BPM | OXYGEN SATURATION: 99 % | HEIGHT: 64 IN | BODY MASS INDEX: 33.9 KG/M2 | SYSTOLIC BLOOD PRESSURE: 130 MMHG

## 2023-07-24 DIAGNOSIS — E11.65 TYPE 2 DIABETES MELLITUS WITH HYPERGLYCEMIA, WITHOUT LONG-TERM CURRENT USE OF INSULIN: Primary | ICD-10-CM

## 2023-07-24 DIAGNOSIS — M25.561 ACUTE PAIN OF RIGHT KNEE: ICD-10-CM

## 2023-07-24 DIAGNOSIS — I10 HYPERTENSION, BENIGN: Chronic | ICD-10-CM

## 2023-07-24 DIAGNOSIS — J01.00 ACUTE NON-RECURRENT MAXILLARY SINUSITIS: ICD-10-CM

## 2023-07-24 DIAGNOSIS — I25.118 CORONARY ARTERY DISEASE OF NATIVE ARTERY OF NATIVE HEART WITH STABLE ANGINA PECTORIS: ICD-10-CM

## 2023-07-24 DIAGNOSIS — R10.84 GENERALIZED ABDOMINAL PAIN: ICD-10-CM

## 2023-07-24 DIAGNOSIS — E66.09 CLASS 1 OBESITY DUE TO EXCESS CALORIES WITH SERIOUS COMORBIDITY AND BODY MASS INDEX (BMI) OF 34.0 TO 34.9 IN ADULT: ICD-10-CM

## 2023-07-24 LAB
B-HCG UR QL: NORMAL
BILIRUB BLD-MCNC: NEGATIVE MG/DL
CLARITY, POC: CLEAR
COLOR UR: YELLOW
EXPIRATION DATE: NORMAL
EXPIRATION DATE: NORMAL
GLUCOSE UR STRIP-MCNC: NEGATIVE MG/DL
HBA1C MFR BLD: 6.5 %
INTERNAL NEGATIVE CONTROL: NORMAL
INTERNAL POSITIVE CONTROL: NORMAL
KETONES UR QL: NEGATIVE
LEUKOCYTE EST, POC: NEGATIVE
Lab: NORMAL
Lab: NORMAL
NITRITE UR-MCNC: NEGATIVE MG/ML
PH UR: 5 [PH] (ref 5–8)
PROT UR STRIP-MCNC: NEGATIVE MG/DL
RBC # UR STRIP: NEGATIVE /UL
SP GR UR: 1.01 (ref 1–1.03)
UROBILINOGEN UR QL: NORMAL

## 2023-07-24 PROCEDURE — 73564 X-RAY EXAM KNEE 4 OR MORE: CPT

## 2023-07-24 RX ORDER — CLINDAMYCIN HYDROCHLORIDE 300 MG/1
300 CAPSULE ORAL 3 TIMES DAILY
Qty: 30 CAPSULE | Refills: 4 | Status: SHIPPED | OUTPATIENT
Start: 2023-07-24

## 2023-07-25 NOTE — PROGRESS NOTES
[employer] message was sent   Good morning we have your labs back and per Dr. Johnston   You Xray knee has come back and there is no advanced arthritis seen. No evidence of trauma and stable compared to prior exam.

## 2023-07-30 PROBLEM — E11.65 TYPE 2 DIABETES MELLITUS WITH HYPERGLYCEMIA, WITHOUT LONG-TERM CURRENT USE OF INSULIN: Status: ACTIVE | Noted: 2023-07-30

## 2023-07-31 ENCOUNTER — TELEPHONE (OUTPATIENT)
Dept: CARDIOLOGY | Facility: CLINIC | Age: 53
End: 2023-07-31

## 2023-07-31 NOTE — TELEPHONE ENCOUNTER
"  Caller: Sada Le \"Geeta\"    Relationship: Self    Best call back number:  [phone]          PATIENT IS REQUESTING THE PAPERWORK FROM [facility] THAT WAS FAXED ON 7.27.23 TO BE FAXED BACK ASAP, AS THEY WILL NOT SET UP A TIME FOR THE EXTRACTION WITHOUT KNOWING THE PARAMETERS FROM DR. DALIEY  "

## 2023-07-31 NOTE — ASSESSMENT & PLAN NOTE
Diabetes is worsening. New onset  Medication changes per orders.  Diabetes will be reassessed in 3 months.

## 2023-09-05 ENCOUNTER — TELEPHONE (OUTPATIENT)
Dept: FAMILY MEDICINE CLINIC | Facility: CLINIC | Age: 53
End: 2023-09-05
Payer: MEDICAID

## 2023-09-22 ENCOUNTER — OFFICE VISIT (OUTPATIENT)
Dept: FAMILY MEDICINE CLINIC | Facility: CLINIC | Age: 53
End: 2023-09-22
Payer: MEDICAID

## 2023-09-22 VITALS
HEART RATE: 81 BPM | OXYGEN SATURATION: 98 % | DIASTOLIC BLOOD PRESSURE: 86 MMHG | WEIGHT: 200 LBS | HEIGHT: 64 IN | RESPIRATION RATE: 16 BRPM | SYSTOLIC BLOOD PRESSURE: 138 MMHG | BODY MASS INDEX: 34.15 KG/M2 | TEMPERATURE: 97.5 F

## 2023-09-22 DIAGNOSIS — I10 HYPERTENSION, BENIGN: Chronic | ICD-10-CM

## 2023-09-22 DIAGNOSIS — E11.65 TYPE 2 DIABETES MELLITUS WITH HYPERGLYCEMIA, WITHOUT LONG-TERM CURRENT USE OF INSULIN: ICD-10-CM

## 2023-09-22 DIAGNOSIS — E78.2 MIXED HYPERLIPIDEMIA: Chronic | ICD-10-CM

## 2023-09-22 DIAGNOSIS — M25.561 CHRONIC PAIN OF RIGHT KNEE: ICD-10-CM

## 2023-09-22 DIAGNOSIS — Z87.891 HISTORY OF TOBACCO USE: ICD-10-CM

## 2023-09-22 DIAGNOSIS — I25.5 ISCHEMIC CARDIOMYOPATHY: ICD-10-CM

## 2023-09-22 DIAGNOSIS — F41.1 GENERALIZED ANXIETY DISORDER: Chronic | ICD-10-CM

## 2023-09-22 DIAGNOSIS — K21.9 GASTROESOPHAGEAL REFLUX DISEASE WITHOUT ESOPHAGITIS: ICD-10-CM

## 2023-09-22 DIAGNOSIS — M50.00 CERVICAL DISC DISEASE WITH MYELOPATHY: ICD-10-CM

## 2023-09-22 DIAGNOSIS — Z12.31 ENCOUNTER FOR SCREENING MAMMOGRAM FOR MALIGNANT NEOPLASM OF BREAST: ICD-10-CM

## 2023-09-22 DIAGNOSIS — E66.09 CLASS 1 OBESITY DUE TO EXCESS CALORIES WITH SERIOUS COMORBIDITY AND BODY MASS INDEX (BMI) OF 34.0 TO 34.9 IN ADULT: ICD-10-CM

## 2023-09-22 DIAGNOSIS — I25.2 HISTORY OF MI (MYOCARDIAL INFARCTION): ICD-10-CM

## 2023-09-22 DIAGNOSIS — Z12.11 COLON CANCER SCREENING: ICD-10-CM

## 2023-09-22 DIAGNOSIS — G89.29 CHRONIC PAIN OF RIGHT KNEE: ICD-10-CM

## 2023-09-22 DIAGNOSIS — I25.10 CORONARY ARTERY DISEASE INVOLVING NATIVE CORONARY ARTERY OF NATIVE HEART WITHOUT ANGINA PECTORIS: ICD-10-CM

## 2023-09-22 DIAGNOSIS — Q21.10 ASD (ATRIAL SEPTAL DEFECT): ICD-10-CM

## 2023-09-22 DIAGNOSIS — R10.84 GENERALIZED ABDOMINAL PAIN: ICD-10-CM

## 2023-09-22 DIAGNOSIS — M85.88 OSTEOPENIA OF SPINE: ICD-10-CM

## 2023-09-22 DIAGNOSIS — J30.1 SEASONAL ALLERGIC RHINITIS DUE TO POLLEN: ICD-10-CM

## 2023-09-22 DIAGNOSIS — I71.21 ANEURYSM OF ASCENDING AORTA WITHOUT RUPTURE: ICD-10-CM

## 2023-09-22 DIAGNOSIS — Z00.01 ANNUAL VISIT FOR GENERAL ADULT MEDICAL EXAMINATION WITH ABNORMAL FINDINGS: Primary | ICD-10-CM

## 2023-09-22 DIAGNOSIS — K57.30 DIVERTICULOSIS OF COLON: ICD-10-CM

## 2023-09-22 DIAGNOSIS — Z12.4 CERVICAL CANCER SCREENING: ICD-10-CM

## 2023-09-22 PROBLEM — R73.02 GLUCOSE INTOLERANCE (IMPAIRED GLUCOSE TOLERANCE): Status: ACTIVE | Noted: 2023-09-22

## 2023-09-22 PROBLEM — Z92.89 HISTORY OF ECHOCARDIOGRAM: Status: RESOLVED | Noted: 2019-10-03 | Resolved: 2023-09-22

## 2023-09-22 PROBLEM — Z22.322 MRSA (METHICILLIN RESISTANT STAPH AUREUS) CULTURE POSITIVE: Status: RESOLVED | Noted: 2023-01-17 | Resolved: 2023-09-22

## 2023-09-22 LAB
BILIRUB BLD-MCNC: NEGATIVE MG/DL
CLARITY, POC: CLEAR
COLOR UR: YELLOW
GLUCOSE UR STRIP-MCNC: NEGATIVE MG/DL
KETONES UR QL: NEGATIVE
LEUKOCYTE EST, POC: NEGATIVE
NITRITE UR-MCNC: NEGATIVE MG/ML
PH UR: 6 [PH] (ref 5–8)
PROT UR STRIP-MCNC: NEGATIVE MG/DL
RBC # UR STRIP: NEGATIVE /UL
SP GR UR: 1.01 (ref 1–1.03)
UROBILINOGEN UR QL: NORMAL

## 2023-09-22 NOTE — ASSESSMENT & PLAN NOTE
Patient's (Body mass index is 34.33 kg/m².) indicates that they are obese (BMI >30) with health conditions that include hypertension and diabetes mellitus . Weight is unchanged. BMI  is above average; BMI management plan is completed. We discussed low calorie, low carb based diet program, portion control, and increasing exercise.

## 2023-09-22 NOTE — PROGRESS NOTES
Chief Complaint  Annual Exam, Diabetes, Hypertension, Hyperlipidemia, Coronary Artery Disease, Abdominal Pain, and Knee Pain    Subjective     CC  Problem List  Visit Diagnosis   Encounters  Notes  Medications  Labs  Result Review Imaging  Media    Sada Le presents to [facility] for an annual exam. The patient is here: for coordination of medical care to discuss health maintenance and disease prevention to follow up on multiple medical problems. Overall has: moderate activity with work/home activities, exercises 1 time per week, good appetite, feels well with minor complaints, good energy level, and is sleeping well. Nutrition: eating a variety of foods. Last tetanus shot was   .Sada Le is -1, Parity-1. Patient's last menstrual period was 1996 (approximate). She is postmenopausal.       History of Present Illness  Influenza immunization was not given due to patient refusal    Coronary Artery Disease  Presents for follow-up visit. Pertinent negatives include no chest pain, chest pressure, chest tightness, dizziness, leg swelling, palpitations, shortness of breath or weight gain. Risk factors include hyperlipidemia and obesity. The symptoms have been stable. Compliance with diet is variable. Compliance with exercise is variable. Compliance with medications is good.   Hypertension  This is a chronic problem. The current episode started more than 1 year ago. The problem is uncontrolled. Pertinent negatives include no blurred vision, chest pain, headaches, orthopnea, palpitations, peripheral edema, PND, shortness of breath or sweats. Risk factors for coronary artery disease include family history, obesity, post-menopausal state, dyslipidemia and diabetes mellitus. Current antihypertension treatment includes ACE inhibitors and beta blockers. The current treatment provides moderate improvement. There are no compliance problems.  Hypertensive  end-organ damage includes CAD/MI.   Diabetes  She presents for her follow-up diabetic visit. She has type 2 diabetes mellitus. No MedicAlert identification noted. Her disease course has been fluctuating. Pertinent negatives for hypoglycemia include no confusion, dizziness, headaches, nervousness/anxiousness or sweats. Associated symptoms include fatigue and weakness. Pertinent negatives for diabetes include no blurred vision, no chest pain, no polydipsia, no polyphagia, no polyuria, no visual change and no weight loss. Risk factors for coronary artery disease include dyslipidemia, hypertension, diabetes mellitus, family history, obesity and post-menopausal. Current diabetic treatment includes oral agent (monotherapy). She is compliant with treatment most of the time. Her weight is fluctuating minimally. She is following a generally healthy diet. She has not had a previous visit with a dietitian. She participates in exercise intermittently. (Does not check blood sugar at home) An ACE inhibitor/angiotensin II receptor blocker is being taken. She does not see a podiatrist.Eye exam is current (Dr. Arellano).   Hyperlipidemia  This is a chronic problem. The current episode started more than 1 year ago. Exacerbating diseases include diabetes and obesity. Pertinent negatives include no chest pain, myalgias or shortness of breath. She is currently on no antihyperlipidemic treatment. Risk factors for coronary artery disease include dyslipidemia, diabetes mellitus, hypertension, obesity, family history and post-menopausal.   Abdominal Pain  This is a recurrent problem. The current episode started more than 1 month ago (9-10 months). The onset quality is gradual. The problem occurs intermittently. The problem has been waxing and waning. The pain is located in the RLQ, suprapubic region and LLQ. The quality of the pain is cramping and sharp. The abdominal pain radiates to the LLQ and RUQ. Associated symptoms include constipation  and diarrhea. Pertinent negatives include no arthralgias, dysuria, fever, frequency, headaches, hematuria, myalgias, nausea, vomiting or weight loss. The pain is relieved by Bowel movements. Treatments tried: Magnesium, increasing fluid intake. The treatment provided no relief.   Knee Pain   The incident occurred more than 1 week ago (3 years). The incident occurred at the gym. Injury mechanism: Injured after doing a box jump in crossfit. The pain is present in the right knee. The quality of the pain is described as burning (pressure). The pain is at a severity of 4/10. The pain is mild. The pain has been Fluctuating since onset. Associated symptoms include a loss of motion. Pertinent negatives include no inability to bear weight, loss of sensation, muscle weakness, numbness or tingling. She reports no foreign bodies present. The symptoms are aggravated by weight bearing and movement. She has tried acetaminophen, heat, ice and NSAIDs for the symptoms. The treatment provided no relief.     Review of Systems   Constitutional:  Positive for fatigue. Negative for activity change, appetite change, fever, unexpected weight gain and unexpected weight loss.   HENT:  Negative for congestion, ear pain, hearing loss, rhinorrhea, sinus pressure, sore throat, swollen glands, trouble swallowing and voice change.    Eyes:  Negative for blurred vision and visual disturbance.   Respiratory:  Negative for apnea, cough, chest tightness, shortness of breath and wheezing.    Cardiovascular:  Negative for chest pain, palpitations, orthopnea, leg swelling and PND.   Gastrointestinal:  Positive for abdominal pain, constipation and diarrhea. Negative for blood in stool, nausea, vomiting and indigestion.   Endocrine: Negative for cold intolerance, heat intolerance, polydipsia, polyphagia and polyuria.   Genitourinary:  Negative for breast discharge, breast lump, breast pain, dysuria, flank pain, frequency, hematuria, pelvic pain and vaginal  "bleeding.   Musculoskeletal:  Negative for arthralgias, joint swelling and myalgias.   Skin:  Negative for rash, skin lesions and wound.   Allergic/Immunologic: Negative for environmental allergies and immunocompromised state.   Neurological:  Positive for weakness. Negative for dizziness, tingling, syncope, numbness, headache, memory problem and confusion.   Hematological:  Negative for adenopathy. Does not bruise/bleed easily.   Psychiatric/Behavioral:  Negative for suicidal ideas and depressed mood. The patient is not nervous/anxious.       Objective   Vital Signs:   /86 (BP Location: Left arm, Patient Position: Sitting, Cuff Size: Adult)   Pulse 81   Temp 97.5 °F (36.4 °C) (Temporal)   Resp 16   Ht 162.6 cm (64\")   Wt 90.7 kg (200 lb)   SpO2 98% Comment: room air  BMI 34.33 kg/m²     Physical Exam  Constitutional:       General: She is not in acute distress.     Appearance: She is well-developed. She is obese.   HENT:      Head: Normocephalic. Hair is normal.      Right Ear: Tympanic membrane and external ear normal.      Left Ear: Tympanic membrane and external ear normal.      Nose: Nose normal. No mucosal edema.      Mouth/Throat:      Pharynx: Uvula midline.   Eyes:      General:         Right eye: No discharge.         Left eye: No discharge.      Conjunctiva/sclera: Conjunctivae normal.      Pupils: Pupils are equal, round, and reactive to light.   Neck:      Thyroid: No thyromegaly.      Vascular: No JVD.   Cardiovascular:      Rate and Rhythm: Normal rate and regular rhythm.      Chest Wall: PMI is not displaced.      Pulses:           Carotid pulses are 2+ on the right side and 2+ on the left side.       Femoral pulses are 2+ on the right side and 2+ on the left side.       Dorsalis pedis pulses are 2+ on the right side and 2+ on the left side.      Heart sounds: Normal heart sounds. No murmur heard.    No friction rub. No gallop.      Comments: Negative Homans' no edema  Pulmonary:      " Effort: Pulmonary effort is normal. No respiratory distress.      Breath sounds: Normal breath sounds. No decreased breath sounds, wheezing, rhonchi or rales.   Chest:   Breasts:     Breasts are symmetrical.      Right: No inverted nipple, mass, nipple discharge, skin change or tenderness.      Left: No inverted nipple, mass, nipple discharge, skin change or tenderness.   Abdominal:      General: Bowel sounds are normal. There is no distension or abdominal bruit.      Palpations: Abdomen is soft. There is no mass.      Tenderness: There is no abdominal tenderness.   Musculoskeletal:         General: No tenderness. Deformity: knees and distal fingers..     Cervical back: Normal range of motion and neck supple. No muscular tenderness.      Right knee: Deformity present. No swelling, erythema or bony tenderness. Decreased range of motion. No tenderness. Normal pulse.   Lymphadenopathy:      Cervical: No cervical adenopathy.      Upper Body:      Right upper body: No supraclavicular adenopathy.      Left upper body: No supraclavicular adenopathy.   Skin:     General: Skin is warm and dry.      Findings: No ecchymosis, erythema, lesion or rash.   Neurological:      Mental Status: She is alert and oriented to person, place, and time.      Cranial Nerves: No cranial nerve deficit.      Sensory: No sensory deficit.      Motor: No abnormal muscle tone.      Coordination: Coordination normal.      Deep Tendon Reflexes: Reflexes are normal and symmetric. Reflexes normal.   Psychiatric:         Speech: Speech normal.         Behavior: Behavior normal.         Thought Content: Thought content normal. Thought content does not include suicidal ideation.         Cognition and Memory: She does not exhibit impaired recent memory or impaired remote memory.         Judgment: Judgment normal. Judgment is not impulsive.      Recent Lab Results:  Lab Results   Component Value Date    CHLPL 239 (H) 09/22/2023    TRIG 435 (H) 09/22/2023     HDL 34 (L) 09/22/2023     (H) 09/22/2023    VLDL 78 (H) 09/22/2023    HGBA1C 7.8 (H) 09/22/2023    HGBA1C 6.5 07/24/2023      Result Review :Labs  Result Review  Imaging  Med Tab  Media                 Assessment and Plan CC Problem List  Visit Diagnosis  ROS  Review (Popup)  Health Maintenance  Quality  BestPractice  Medications  SmartSets  SnapShot Encounters  Media  Problem List Items Addressed This Visit          High    Hypertension, benign (Chronic)    Overview     Controlled.  Compliant continue meds.            Current Assessment & Plan     Hypertension is unchanged.  Continue current treatment regimen.  Dietary sodium restriction.  Weight loss.  Regular aerobic exercise.  Blood pressure will be reassessed in 3 months.         Ischemic cardiomyopathy    Overview     Ef 68 % 3/2021, 02/25/2023 65%  No sxs, continue diuretics and follow         Coronary artery disease involving native coronary artery of native heart without angina pectoris    Overview     Two-vessel coronary disease, widely patent stent in the proximal LAD 02/12/2021 w angioplasty to the proximal circumflex.   S/P Acute Myocardial Infarct on 09/29/2019  S/P left cardiac cath 02/12/2021-left main no disease, RCA no disease, disease mid to distal LAD, Angioplasty proximal circumflex, EF 55-60%  S/P Rt Cardiac Cath-02/25/2020  S/P Left Cardiac Cath-09/29/2019  She is doing well. Risk factors modified. She will resume statin Rx           History of MI (myocardial infarction)    Overview     09/2019 S/p stenting pf LAD with  chronic systolic heart failure EF 04/28/2020 60% she is doing well.          Aneurysm of ascending aorta without rupture    Overview     4 cm on CTA 02/2023 risk factors modified. Follow sequentially with cardiology         Type 2 diabetes mellitus with hyperglycemia, without long-term current use of insulin    Overview     A1c 6.6 Improve low carb diet and lots of exercise begin metformin. Follow up in 3  mos.          Current Assessment & Plan     Diabetes is worsening.   Dietary recommendations for ADA diet.  Regular aerobic exercise.  Medication changes per orders.  Diabetes will be reassessed in 3 months.         Relevant Medications    metFORMIN (GLUCOPHAGE) 500 MG tablet    Other Relevant Orders    Microalbumin / Creatinine Urine Ratio - Urine, Clean Catch (Completed)    CBC & Differential (Completed)    Comprehensive Metabolic Panel (Completed)    Hemoglobin A1c (Completed)       Medium    Mixed hyperlipidemia (Chronic)    Overview     She is compliant with meds  Goal of LDL 70 understood   LDL  2/13/2021 29 but her LDL decreased to 27  Her atorvastatin was decreased to 20mg   Diet exercise and continued wt loss encouraged.   Repeat lab within 3 mos.          Relevant Orders    Lipid Panel With / Chol / HDL Ratio (Completed)    TSH (Completed)       Low    Generalized anxiety disorder (Chronic)    Overview     Continue Wellbutrin and follow Sxs are controlled.          Allergic rhinitis    Overview     Continue maximum meds. Sxs controlled.          Cervical disc disease with myelopathy    Overview     Sxs are stable with ROM and topical anti inflammatories. Pros and cons of anti inflammatories discussed.          Diverticulosis of colon    Overview     Dx on CT 10/2011, sigmoid colon. Aware         Class 1 obesity due to excess calories with serious comorbidity and body mass index (BMI) of 34.0 to 34.9 in adult    Overview     She has regained wt.  Prediabetes is understood.There is strong family hx.  Low calorie diet discussed. She will increase exercise.         Current Assessment & Plan     Patient's (Body mass index is 34.33 kg/m².) indicates that they are obese (BMI >30) with health conditions that include hypertension and diabetes mellitus . Weight is unchanged. BMI  is above average; BMI management plan is completed. We discussed low calorie, low carb based diet program, portion control, and increasing  exercise.          Osteopenia of spine    Overview     -1.4 spine, -0.8 FN, -0.2 hip, 09/30/2005  Pt encourage to use calcium         History of tobacco use    Overview     1 PPD X 34 years, she is continues to abstain.   We discussed techniques and the importance of continuing to abstain. CT scan ordered           Relevant Orders     CT Chest Low Dose Cancer Screening WO    Gastroesophageal reflux disease without esophagitis    Overview     Stable with PPI which is continued. Risk benefit discussed         Chronic pain of right knee    Overview     Continue quad str exercise ice topical anti inflammatories and follow xray shows minimal arthritis ortho referral at request.             Unprioritized    Annual visit for general adult medical examination with abnormal findings - Primary    Overview     Low carb low cholesterol diet, and regular exercise encouraged. Previous lab reviewed, we will notify her of today's lab.          Relevant Orders    POCT urinalysis dipstick, manual (Completed)    Cervical cancer screening    Overview     Last pap smear unknown  S/p hysterectomy for endometriosis         Encounter for screening for malignant neoplasm of breast    Overview     Last mammogram 09/23/2019. normal         Relevant Orders    Mammo Screening Digital Tomosynthesis Bilateral With CAD    Colon cancer screening    Overview     cologuard         Relevant Orders    Cologuard - Stool, Per Rectum    ASD (atrial septal defect)    Overview     Added automatically from request for surgery [id number]          Other Visit Diagnoses       Generalized abdominal pain        mid abdomin hx of diverticulitis sxs x several mos. neg exam CT r/o pathology    Relevant Orders    CT Abdomen Pelvis With Contrast            Follow Up Instructions Charge Capture  Follow-up Communications  Return in about 3 months (around 12/22/2023), or if symptoms worsen or fail to improve.  Patient was given instructions and counseling regarding her  condition or for health maintenance advice. Please see specific information pulled into the AVS if appropriate.      Site care done- cleaned with alcohol swab, procedure tolerated well, dressing applied. Venipuncture was obtained after 1 time(s). 3 tubes were drawn. Needle gauge used was 22.

## 2023-09-23 LAB
ALBUMIN SERPL-MCNC: 4.2 G/DL (ref 3.8–4.9)
ALBUMIN/CREAT UR: <8 MG/G CREAT (ref 0–29)
ALBUMIN/GLOB SERPL: 1.6 {RATIO} (ref 1.2–2.2)
ALP SERPL-CCNC: 126 IU/L (ref 44–121)
ALT SERPL-CCNC: 24 IU/L (ref 0–32)
AST SERPL-CCNC: 18 IU/L (ref 0–40)
BASOPHILS # BLD AUTO: 0.1 X10E3/UL (ref 0–0.2)
BASOPHILS NFR BLD AUTO: 1 %
BILIRUB SERPL-MCNC: 0.2 MG/DL (ref 0–1.2)
BUN SERPL-MCNC: 9 MG/DL (ref 6–24)
BUN/CREAT SERPL: 11 (ref 9–23)
CALCIUM SERPL-MCNC: 9.3 MG/DL (ref 8.7–10.2)
CHLORIDE SERPL-SCNC: 97 MMOL/L (ref 96–106)
CHOLEST SERPL-MCNC: 239 MG/DL (ref 100–199)
CHOLEST/HDLC SERPL: 7 RATIO (ref 0–4.4)
CO2 SERPL-SCNC: 23 MMOL/L (ref 20–29)
CREAT SERPL-MCNC: 0.84 MG/DL (ref 0.57–1)
CREAT UR-MCNC: 37.3 MG/DL
EGFRCR SERPLBLD CKD-EPI 2021: 83 ML/MIN/1.73
EOSINOPHIL # BLD AUTO: 0.2 X10E3/UL (ref 0–0.4)
EOSINOPHIL NFR BLD AUTO: 2 %
ERYTHROCYTE [DISTWIDTH] IN BLOOD BY AUTOMATED COUNT: 12.3 % (ref 11.7–15.4)
GLOBULIN SER CALC-MCNC: 2.6 G/DL (ref 1.5–4.5)
GLUCOSE SERPL-MCNC: 203 MG/DL (ref 70–99)
HBA1C MFR BLD: 7.8 % (ref 4.8–5.6)
HCT VFR BLD AUTO: 41.1 % (ref 34–46.6)
HDLC SERPL-MCNC: 34 MG/DL
HGB BLD-MCNC: 13.9 G/DL (ref 11.1–15.9)
IMM GRANULOCYTES # BLD AUTO: 0.1 X10E3/UL (ref 0–0.1)
IMM GRANULOCYTES NFR BLD AUTO: 1 %
LDLC SERPL CALC-MCNC: 127 MG/DL (ref 0–99)
LYMPHOCYTES # BLD AUTO: 3.1 X10E3/UL (ref 0.7–3.1)
LYMPHOCYTES NFR BLD AUTO: 32 %
MCH RBC QN AUTO: 30.2 PG (ref 26.6–33)
MCHC RBC AUTO-ENTMCNC: 33.8 G/DL (ref 31.5–35.7)
MCV RBC AUTO: 89 FL (ref 79–97)
MICROALBUMIN UR-MCNC: <3 UG/ML
MONOCYTES # BLD AUTO: 0.5 X10E3/UL (ref 0.1–0.9)
MONOCYTES NFR BLD AUTO: 6 %
NEUTROPHILS # BLD AUTO: 5.9 X10E3/UL (ref 1.4–7)
NEUTROPHILS NFR BLD AUTO: 58 %
PLATELET # BLD AUTO: 209 X10E3/UL (ref 150–450)
POTASSIUM SERPL-SCNC: 4.1 MMOL/L (ref 3.5–5.2)
PROT SERPL-MCNC: 6.8 G/DL (ref 6–8.5)
RBC # BLD AUTO: 4.61 X10E6/UL (ref 3.77–5.28)
SODIUM SERPL-SCNC: 139 MMOL/L (ref 134–144)
TRIGL SERPL-MCNC: 435 MG/DL (ref 0–149)
TSH SERPL DL<=0.005 MIU/L-ACNC: 1.1 UIU/ML (ref 0.45–4.5)
VLDLC SERPL CALC-MCNC: 78 MG/DL (ref 5–40)
WBC # BLD AUTO: 9.8 X10E3/UL (ref 3.4–10.8)

## 2023-09-27 ENCOUNTER — TELEPHONE (OUTPATIENT)
Dept: FAMILY MEDICINE CLINIC | Facility: CLINIC | Age: 53
End: 2023-09-27
Payer: MEDICAID

## 2023-10-01 PROBLEM — I25.10 CORONARY ARTERY DISEASE INVOLVING NATIVE CORONARY ARTERY OF NATIVE HEART WITHOUT ANGINA PECTORIS: Status: ACTIVE | Noted: 2020-04-30

## 2023-10-01 PROBLEM — L03.114 LEFT ARM CELLULITIS: Status: RESOLVED | Noted: 2021-06-06 | Resolved: 2023-10-01

## 2023-10-01 PROBLEM — G47.00 INSOMNIA, ORGANIC: Chronic | Status: RESOLVED | Noted: 2019-09-13 | Resolved: 2023-10-01

## 2023-10-02 ENCOUNTER — TELEPHONE (OUTPATIENT)
Dept: FAMILY MEDICINE CLINIC | Facility: CLINIC | Age: 53
End: 2023-10-02
Payer: MEDICAID

## 2023-10-02 ENCOUNTER — APPOINTMENT (OUTPATIENT)
Dept: CT IMAGING | Facility: HOSPITAL | Age: 53
End: 2023-10-02
Payer: MEDICAID

## 2023-10-02 ENCOUNTER — HOSPITAL ENCOUNTER (INPATIENT)
Facility: HOSPITAL | Age: 53
LOS: 4 days | Discharge: HOME | End: 2023-10-06
Attending: EMERGENCY MEDICINE | Admitting: STUDENT IN AN ORGANIZED HEALTH CARE EDUCATION/TRAINING PROGRAM
Payer: MEDICAID

## 2023-10-02 DIAGNOSIS — K21.9 GASTROESOPHAGEAL REFLUX DISEASE WITHOUT ESOPHAGITIS: ICD-10-CM

## 2023-10-02 DIAGNOSIS — K57.20 DIVERTICULITIS OF LARGE INTESTINE WITH ABSCESS WITHOUT BLEEDING: Primary | ICD-10-CM

## 2023-10-02 PROBLEM — K57.80 DIVERTICULITIS OF INTESTINE WITH ABSCESS: Status: ACTIVE | Noted: 2023-10-02

## 2023-10-02 LAB
ALBUMIN SERPL-MCNC: 4.2 G/DL (ref 3.5–5.2)
ALBUMIN/GLOB SERPL: 1.6 G/DL
ALP SERPL-CCNC: 93 U/L (ref 39–117)
ALT SERPL W P-5'-P-CCNC: 31 U/L (ref 1–33)
ANION GAP SERPL CALCULATED.3IONS-SCNC: 10 MMOL/L (ref 5–15)
AST SERPL-CCNC: 21 U/L (ref 1–32)
BACTERIA UR QL AUTO: ABNORMAL /HPF
BASOPHILS # BLD AUTO: 0 10*3/MM3 (ref 0–0.2)
BASOPHILS NFR BLD AUTO: 0.4 % (ref 0–1.5)
BILIRUB SERPL-MCNC: 0.3 MG/DL (ref 0–1.2)
BILIRUB UR QL STRIP: NEGATIVE
BUN SERPL-MCNC: 12 MG/DL (ref 6–20)
BUN/CREAT SERPL: 14.3 (ref 7–25)
CALCIUM SPEC-SCNC: 9.1 MG/DL (ref 8.6–10.5)
CHLORIDE SERPL-SCNC: 99 MMOL/L (ref 98–107)
CLARITY UR: CLEAR
CO2 SERPL-SCNC: 29 MMOL/L (ref 22–29)
COLOR UR: YELLOW
CREAT SERPL-MCNC: 0.84 MG/DL (ref 0.57–1)
DEPRECATED RDW RBC AUTO: 42.9 FL (ref 37–54)
EGFRCR SERPLBLD CKD-EPI 2021: 83.2 ML/MIN/1.73
EOSINOPHIL # BLD AUTO: 0.1 10*3/MM3 (ref 0–0.4)
EOSINOPHIL NFR BLD AUTO: 1.2 % (ref 0.3–6.2)
ERYTHROCYTE [DISTWIDTH] IN BLOOD BY AUTOMATED COUNT: 13.5 % (ref 12.3–15.4)
GLOBULIN UR ELPH-MCNC: 2.6 GM/DL
GLUCOSE SERPL-MCNC: 117 MG/DL (ref 65–99)
GLUCOSE UR STRIP-MCNC: NEGATIVE MG/DL
HCT VFR BLD AUTO: 40.7 % (ref 34–46.6)
HGB BLD-MCNC: 13.3 G/DL (ref 12–15.9)
HGB UR QL STRIP.AUTO: NEGATIVE
HYALINE CASTS UR QL AUTO: ABNORMAL /LPF
KETONES UR QL STRIP: NEGATIVE
LEUKOCYTE ESTERASE UR QL STRIP.AUTO: ABNORMAL
LYMPHOCYTES # BLD AUTO: 3 10*3/MM3 (ref 0.7–3.1)
LYMPHOCYTES NFR BLD AUTO: 25.3 % (ref 19.6–45.3)
MCH RBC QN AUTO: 30 PG (ref 26.6–33)
MCHC RBC AUTO-ENTMCNC: 32.7 G/DL (ref 31.5–35.7)
MCV RBC AUTO: 91.8 FL (ref 79–97)
MONOCYTES # BLD AUTO: 1 10*3/MM3 (ref 0.1–0.9)
MONOCYTES NFR BLD AUTO: 8.2 % (ref 5–12)
NEUTROPHILS NFR BLD AUTO: 64.9 % (ref 42.7–76)
NEUTROPHILS NFR BLD AUTO: 7.7 10*3/MM3 (ref 1.7–7)
NITRITE UR QL STRIP: NEGATIVE
NRBC BLD AUTO-RTO: 0 /100 WBC (ref 0–0.2)
PH UR STRIP.AUTO: 6.5 [PH] (ref 5–8)
PLATELET # BLD AUTO: 225 10*3/MM3 (ref 140–450)
PMV BLD AUTO: 9.2 FL (ref 6–12)
POTASSIUM SERPL-SCNC: 3.6 MMOL/L (ref 3.5–5.2)
PROT SERPL-MCNC: 6.8 G/DL (ref 6–8.5)
PROT UR QL STRIP: NEGATIVE
RBC # BLD AUTO: 4.43 10*6/MM3 (ref 3.77–5.28)
RBC # UR STRIP: ABNORMAL /HPF
REF LAB TEST METHOD: ABNORMAL
SODIUM SERPL-SCNC: 138 MMOL/L (ref 136–145)
SP GR UR STRIP: 1.01 (ref 1–1.03)
SQUAMOUS #/AREA URNS HPF: ABNORMAL /HPF
UROBILINOGEN UR QL STRIP: ABNORMAL
WBC # UR STRIP: ABNORMAL /HPF
WBC NRBC COR # BLD: 11.9 10*3/MM3 (ref 3.4–10.8)

## 2023-10-02 PROCEDURE — 80053 COMPREHEN METABOLIC PANEL: CPT | Performed by: EMERGENCY MEDICINE

## 2023-10-02 PROCEDURE — 25010000002 CEFTRIAXONE PER 250 MG: Performed by: EMERGENCY MEDICINE

## 2023-10-02 PROCEDURE — 25010000002 METRONIDAZOLE 500 MG/100ML SOLUTION: Performed by: EMERGENCY MEDICINE

## 2023-10-02 PROCEDURE — 25810000003 LACTATED RINGERS PER 1000 ML: Performed by: EMERGENCY MEDICINE

## 2023-10-02 PROCEDURE — 25810000003 LACTATED RINGERS SOLUTION: Performed by: EMERGENCY MEDICINE

## 2023-10-02 PROCEDURE — 81001 URINALYSIS AUTO W/SCOPE: CPT | Performed by: EMERGENCY MEDICINE

## 2023-10-02 PROCEDURE — 25010000002 MORPHINE PER 10 MG: Performed by: STUDENT IN AN ORGANIZED HEALTH CARE EDUCATION/TRAINING PROGRAM

## 2023-10-02 PROCEDURE — 25510000001 IOPAMIDOL PER 1 ML: Performed by: EMERGENCY MEDICINE

## 2023-10-02 PROCEDURE — 85025 COMPLETE CBC W/AUTO DIFF WBC: CPT | Performed by: EMERGENCY MEDICINE

## 2023-10-02 PROCEDURE — 25810000003 LACTATED RINGERS PER 1000 ML: Performed by: STUDENT IN AN ORGANIZED HEALTH CARE EDUCATION/TRAINING PROGRAM

## 2023-10-02 PROCEDURE — 74177 CT ABD & PELVIS W/CONTRAST: CPT

## 2023-10-02 PROCEDURE — 99285 EMERGENCY DEPT VISIT HI MDM: CPT

## 2023-10-02 RX ORDER — METRONIDAZOLE 500 MG/100ML
500 INJECTION, SOLUTION INTRAVENOUS EVERY 8 HOURS
Status: DISCONTINUED | OUTPATIENT
Start: 2023-10-03 | End: 2023-10-06 | Stop reason: HOSPADM

## 2023-10-02 RX ORDER — SODIUM CHLORIDE 0.9 % (FLUSH) 0.9 %
10 SYRINGE (ML) INJECTION AS NEEDED
Status: DISCONTINUED | OUTPATIENT
Start: 2023-10-02 | End: 2023-10-06 | Stop reason: HOSPADM

## 2023-10-02 RX ORDER — SODIUM CHLORIDE, SODIUM LACTATE, POTASSIUM CHLORIDE, CALCIUM CHLORIDE 600; 310; 30; 20 MG/100ML; MG/100ML; MG/100ML; MG/100ML
75 INJECTION, SOLUTION INTRAVENOUS CONTINUOUS
Status: DISCONTINUED | OUTPATIENT
Start: 2023-10-02 | End: 2023-10-04

## 2023-10-02 RX ORDER — SODIUM CHLORIDE 0.9 % (FLUSH) 0.9 %
10 SYRINGE (ML) INJECTION EVERY 12 HOURS SCHEDULED
Status: DISCONTINUED | OUTPATIENT
Start: 2023-10-02 | End: 2023-10-06 | Stop reason: HOSPADM

## 2023-10-02 RX ORDER — PANTOPRAZOLE SODIUM 40 MG/1
40 TABLET, DELAYED RELEASE ORAL
Status: DISCONTINUED | OUTPATIENT
Start: 2023-10-03 | End: 2023-10-06 | Stop reason: HOSPADM

## 2023-10-02 RX ORDER — MORPHINE SULFATE 2 MG/ML
2 INJECTION, SOLUTION INTRAMUSCULAR; INTRAVENOUS EVERY 4 HOURS PRN
Status: DISCONTINUED | OUTPATIENT
Start: 2023-10-02 | End: 2023-10-06 | Stop reason: HOSPADM

## 2023-10-02 RX ORDER — METOPROLOL SUCCINATE 25 MG/1
25 TABLET, EXTENDED RELEASE ORAL DAILY
Status: DISCONTINUED | OUTPATIENT
Start: 2023-10-03 | End: 2023-10-06 | Stop reason: HOSPADM

## 2023-10-02 RX ORDER — AMOXICILLIN 250 MG
2 CAPSULE ORAL 2 TIMES DAILY
Status: DISCONTINUED | OUTPATIENT
Start: 2023-10-02 | End: 2023-10-06 | Stop reason: HOSPADM

## 2023-10-02 RX ORDER — NICOTINE POLACRILEX 4 MG
15 LOZENGE BUCCAL
Status: DISCONTINUED | OUTPATIENT
Start: 2023-10-02 | End: 2023-10-06 | Stop reason: HOSPADM

## 2023-10-02 RX ORDER — POLYETHYLENE GLYCOL 3350 17 G/17G
17 POWDER, FOR SOLUTION ORAL DAILY PRN
Status: DISCONTINUED | OUTPATIENT
Start: 2023-10-02 | End: 2023-10-06 | Stop reason: HOSPADM

## 2023-10-02 RX ORDER — IBUPROFEN 600 MG/1
1 TABLET ORAL
Status: DISCONTINUED | OUTPATIENT
Start: 2023-10-02 | End: 2023-10-06 | Stop reason: HOSPADM

## 2023-10-02 RX ORDER — SODIUM CHLORIDE, SODIUM LACTATE, POTASSIUM CHLORIDE, CALCIUM CHLORIDE 600; 310; 30; 20 MG/100ML; MG/100ML; MG/100ML; MG/100ML
125 INJECTION, SOLUTION INTRAVENOUS CONTINUOUS
Status: DISCONTINUED | OUTPATIENT
Start: 2023-10-02 | End: 2023-10-02

## 2023-10-02 RX ORDER — ONDANSETRON 2 MG/ML
4 INJECTION INTRAMUSCULAR; INTRAVENOUS EVERY 6 HOURS PRN
Status: DISCONTINUED | OUTPATIENT
Start: 2023-10-02 | End: 2023-10-06 | Stop reason: HOSPADM

## 2023-10-02 RX ORDER — METRONIDAZOLE 500 MG/100ML
500 INJECTION, SOLUTION INTRAVENOUS ONCE
Status: COMPLETED | OUTPATIENT
Start: 2023-10-02 | End: 2023-10-02

## 2023-10-02 RX ORDER — INSULIN LISPRO 100 [IU]/ML
2-7 INJECTION, SOLUTION INTRAVENOUS; SUBCUTANEOUS
Status: DISCONTINUED | OUTPATIENT
Start: 2023-10-02 | End: 2023-10-06 | Stop reason: HOSPADM

## 2023-10-02 RX ORDER — NITROGLYCERIN 0.4 MG/1
0.4 TABLET SUBLINGUAL
Status: DISCONTINUED | OUTPATIENT
Start: 2023-10-02 | End: 2023-10-06 | Stop reason: HOSPADM

## 2023-10-02 RX ORDER — ONDANSETRON 4 MG/1
4 TABLET, FILM COATED ORAL EVERY 6 HOURS PRN
Status: DISCONTINUED | OUTPATIENT
Start: 2023-10-02 | End: 2023-10-06 | Stop reason: HOSPADM

## 2023-10-02 RX ORDER — SODIUM CHLORIDE 9 MG/ML
40 INJECTION, SOLUTION INTRAVENOUS AS NEEDED
Status: DISCONTINUED | OUTPATIENT
Start: 2023-10-02 | End: 2023-10-06 | Stop reason: HOSPADM

## 2023-10-02 RX ORDER — BISACODYL 5 MG/1
5 TABLET, DELAYED RELEASE ORAL DAILY PRN
Status: DISCONTINUED | OUTPATIENT
Start: 2023-10-02 | End: 2023-10-06 | Stop reason: HOSPADM

## 2023-10-02 RX ORDER — BISACODYL 10 MG
10 SUPPOSITORY, RECTAL RECTAL DAILY PRN
Status: DISCONTINUED | OUTPATIENT
Start: 2023-10-02 | End: 2023-10-06 | Stop reason: HOSPADM

## 2023-10-02 RX ORDER — DEXTROSE MONOHYDRATE 25 G/50ML
25 INJECTION, SOLUTION INTRAVENOUS
Status: DISCONTINUED | OUTPATIENT
Start: 2023-10-02 | End: 2023-10-06 | Stop reason: HOSPADM

## 2023-10-02 RX ADMIN — IOPAMIDOL 100 ML: 755 INJECTION, SOLUTION INTRAVENOUS at 20:11

## 2023-10-02 RX ADMIN — SODIUM CHLORIDE, POTASSIUM CHLORIDE, SODIUM LACTATE AND CALCIUM CHLORIDE 125 ML/HR: 600; 310; 30; 20 INJECTION, SOLUTION INTRAVENOUS at 21:23

## 2023-10-02 RX ADMIN — SODIUM CHLORIDE, POTASSIUM CHLORIDE, SODIUM LACTATE AND CALCIUM CHLORIDE 75 ML/HR: 600; 310; 30; 20 INJECTION, SOLUTION INTRAVENOUS at 23:55

## 2023-10-02 RX ADMIN — CEFTRIAXONE 2000 MG: 2 INJECTION, POWDER, FOR SOLUTION INTRAMUSCULAR; INTRAVENOUS at 20:54

## 2023-10-02 RX ADMIN — METRONIDAZOLE 500 MG: 500 INJECTION, SOLUTION INTRAVENOUS at 21:23

## 2023-10-02 RX ADMIN — SODIUM CHLORIDE, SODIUM LACTATE, POTASSIUM CHLORIDE, AND CALCIUM CHLORIDE 500 ML: .6; .31; .03; .02 INJECTION, SOLUTION INTRAVENOUS at 18:58

## 2023-10-02 RX ADMIN — MORPHINE SULFATE 2 MG: 2 INJECTION, SOLUTION INTRAMUSCULAR; INTRAVENOUS at 22:02

## 2023-10-02 NOTE — TELEPHONE ENCOUNTER
Caller: TIM CT    Relationship to patient:     Best call back number: [phone]    Patient is needing: CALLING TO INFORM THAT THE CT OF THE ABDOMEN AND PELVIS HAS BEEN DENIED, PT WILL NEED TO BE RESCHEDULED IF A PEER TO PEER REVIEW HAS NOT BEEN DONE BEFORE 3 PM TODAY. ALL INFORMATION CAN BE FOUND UNDER MEDIA ON PATIENTS CHART ON HOW TO DO PEER TO PEER AND NUMBERS TO CONTACT.

## 2023-10-02 NOTE — TELEPHONE ENCOUNTER
Patient has been called and we have moved her Ct scan from 10/13/2023 to tomorrow at 2:30 and she was advised to keep the same instructions as that was given to her the when he last exam was scheduled

## 2023-10-02 NOTE — TELEPHONE ENCOUNTER
Relationship: SELF    Best call back number:    [phone]        What is your medical concern?  INCREASED INTESTINAL PAIN, ESPECIALLY WHEN SHE EATS FOOD, PAIN IS INTENSE, NOT ALL THE TIME.  PAIN WHEN HAVING A BOWEL MOVEMENT.      How long has this issue been going on? JANUARY 2023    Is your provider already aware of this issue? YES    Have you been treated for this issue? YES     PHARMACY: [facility]/pharmacy #[id number] - [place], [place] - [address] - [phone] [facility] [phone] FX

## 2023-10-03 LAB
ANION GAP SERPL CALCULATED.3IONS-SCNC: 9 MMOL/L (ref 5–15)
BUN SERPL-MCNC: 12 MG/DL (ref 6–20)
BUN/CREAT SERPL: 14.6 (ref 7–25)
CALCIUM SPEC-SCNC: 8.9 MG/DL (ref 8.6–10.5)
CHLORIDE SERPL-SCNC: 101 MMOL/L (ref 98–107)
CO2 SERPL-SCNC: 27 MMOL/L (ref 22–29)
CREAT SERPL-MCNC: 0.82 MG/DL (ref 0.57–1)
DEPRECATED RDW RBC AUTO: 43.3 FL (ref 37–54)
EGFRCR SERPLBLD CKD-EPI 2021: 85.1 ML/MIN/1.73
ERYTHROCYTE [DISTWIDTH] IN BLOOD BY AUTOMATED COUNT: 13.5 % (ref 12.3–15.4)
GLUCOSE BLDC GLUCOMTR-MCNC: 103 MG/DL (ref 70–105)
GLUCOSE BLDC GLUCOMTR-MCNC: 118 MG/DL (ref 70–105)
GLUCOSE BLDC GLUCOMTR-MCNC: 127 MG/DL (ref 70–105)
GLUCOSE BLDC GLUCOMTR-MCNC: 95 MG/DL (ref 70–105)
GLUCOSE SERPL-MCNC: 180 MG/DL (ref 65–99)
HCT VFR BLD AUTO: 38.3 % (ref 34–46.6)
HGB BLD-MCNC: 12.9 G/DL (ref 12–15.9)
MCH RBC QN AUTO: 30.6 PG (ref 26.6–33)
MCHC RBC AUTO-ENTMCNC: 33.6 G/DL (ref 31.5–35.7)
MCV RBC AUTO: 91.1 FL (ref 79–97)
PLATELET # BLD AUTO: 204 10*3/MM3 (ref 140–450)
PMV BLD AUTO: 9.2 FL (ref 6–12)
POTASSIUM SERPL-SCNC: 3.4 MMOL/L (ref 3.5–5.2)
POTASSIUM SERPL-SCNC: 4.5 MMOL/L (ref 3.5–5.2)
QT INTERVAL: 396 MS
QTC INTERVAL: 403 MS
RBC # BLD AUTO: 4.21 10*6/MM3 (ref 3.77–5.28)
SODIUM SERPL-SCNC: 137 MMOL/L (ref 136–145)
WBC NRBC COR # BLD: 10.1 10*3/MM3 (ref 3.4–10.8)

## 2023-10-03 PROCEDURE — 99222 1ST HOSP IP/OBS MODERATE 55: CPT | Performed by: SURGERY

## 2023-10-03 PROCEDURE — 25010000002 CEFTRIAXONE PER 250 MG: Performed by: STUDENT IN AN ORGANIZED HEALTH CARE EDUCATION/TRAINING PROGRAM

## 2023-10-03 PROCEDURE — 84132 ASSAY OF SERUM POTASSIUM: CPT | Performed by: STUDENT IN AN ORGANIZED HEALTH CARE EDUCATION/TRAINING PROGRAM

## 2023-10-03 PROCEDURE — 80048 BASIC METABOLIC PNL TOTAL CA: CPT | Performed by: STUDENT IN AN ORGANIZED HEALTH CARE EDUCATION/TRAINING PROGRAM

## 2023-10-03 PROCEDURE — 82948 REAGENT STRIP/BLOOD GLUCOSE: CPT

## 2023-10-03 PROCEDURE — 87040 BLOOD CULTURE FOR BACTERIA: CPT | Performed by: STUDENT IN AN ORGANIZED HEALTH CARE EDUCATION/TRAINING PROGRAM

## 2023-10-03 PROCEDURE — 85027 COMPLETE CBC AUTOMATED: CPT | Performed by: STUDENT IN AN ORGANIZED HEALTH CARE EDUCATION/TRAINING PROGRAM

## 2023-10-03 PROCEDURE — 93005 ELECTROCARDIOGRAM TRACING: CPT | Performed by: NURSE PRACTITIONER

## 2023-10-03 PROCEDURE — 25010000002 METRONIDAZOLE 500 MG/100ML SOLUTION: Performed by: STUDENT IN AN ORGANIZED HEALTH CARE EDUCATION/TRAINING PROGRAM

## 2023-10-03 PROCEDURE — 99222 1ST HOSP IP/OBS MODERATE 55: CPT | Performed by: NURSE PRACTITIONER

## 2023-10-03 PROCEDURE — 36415 COLL VENOUS BLD VENIPUNCTURE: CPT | Performed by: STUDENT IN AN ORGANIZED HEALTH CARE EDUCATION/TRAINING PROGRAM

## 2023-10-03 PROCEDURE — 25810000003 LACTATED RINGERS PER 1000 ML: Performed by: STUDENT IN AN ORGANIZED HEALTH CARE EDUCATION/TRAINING PROGRAM

## 2023-10-03 PROCEDURE — 93010 ELECTROCARDIOGRAM REPORT: CPT | Performed by: INTERNAL MEDICINE

## 2023-10-03 PROCEDURE — 25010000002 MORPHINE PER 10 MG: Performed by: STUDENT IN AN ORGANIZED HEALTH CARE EDUCATION/TRAINING PROGRAM

## 2023-10-03 RX ORDER — POTASSIUM CHLORIDE 20 MEQ/1
40 TABLET, EXTENDED RELEASE ORAL EVERY 4 HOURS
Status: COMPLETED | OUTPATIENT
Start: 2023-10-03 | End: 2023-10-03

## 2023-10-03 RX ADMIN — Medication 10 ML: at 08:31

## 2023-10-03 RX ADMIN — MORPHINE SULFATE 2 MG: 2 INJECTION, SOLUTION INTRAMUSCULAR; INTRAVENOUS at 12:45

## 2023-10-03 RX ADMIN — MORPHINE SULFATE 2 MG: 2 INJECTION, SOLUTION INTRAMUSCULAR; INTRAVENOUS at 04:14

## 2023-10-03 RX ADMIN — MORPHINE SULFATE 2 MG: 2 INJECTION, SOLUTION INTRAMUSCULAR; INTRAVENOUS at 08:30

## 2023-10-03 RX ADMIN — PANTOPRAZOLE SODIUM 40 MG: 40 TABLET, DELAYED RELEASE ORAL at 08:35

## 2023-10-03 RX ADMIN — METRONIDAZOLE 500 MG: 500 INJECTION, SOLUTION INTRAVENOUS at 08:31

## 2023-10-03 RX ADMIN — POTASSIUM CHLORIDE 40 MEQ: 1500 TABLET, EXTENDED RELEASE ORAL at 03:59

## 2023-10-03 RX ADMIN — POTASSIUM CHLORIDE 40 MEQ: 1500 TABLET, EXTENDED RELEASE ORAL at 08:31

## 2023-10-03 RX ADMIN — MORPHINE SULFATE 2 MG: 2 INJECTION, SOLUTION INTRAMUSCULAR; INTRAVENOUS at 16:54

## 2023-10-03 RX ADMIN — SODIUM CHLORIDE, POTASSIUM CHLORIDE, SODIUM LACTATE AND CALCIUM CHLORIDE 75 ML/HR: 600; 310; 30; 20 INJECTION, SOLUTION INTRAVENOUS at 08:43

## 2023-10-03 RX ADMIN — METOPROLOL SUCCINATE 25 MG: 25 TABLET, EXTENDED RELEASE ORAL at 08:31

## 2023-10-03 RX ADMIN — CEFTRIAXONE 2000 MG: 2 INJECTION, POWDER, FOR SOLUTION INTRAMUSCULAR; INTRAVENOUS at 21:04

## 2023-10-03 RX ADMIN — MORPHINE SULFATE 2 MG: 2 INJECTION, SOLUTION INTRAMUSCULAR; INTRAVENOUS at 21:13

## 2023-10-03 RX ADMIN — METRONIDAZOLE 500 MG: 500 INJECTION, SOLUTION INTRAVENOUS at 16:54

## 2023-10-03 NOTE — PROGRESS NOTES
[facility] Medicine Services  INPATIENT PROGRESS NOTE    Length of Stay: 1  Date of Admission: 10/2/2023  Primary Care Physician: Yodit Johnston MD    Subjective   Chief Complaint: Abdominal pain  HPI:    She has abdominal pain.  Continuing with the IV antibiotics.  Has been receiving pain medication as needed    Review of Systems   Respiratory:  Negative for shortness of breath.    Cardiovascular:  Negative for chest pain.      All pertinent negatives and positives are as above. All other systems have been reviewed and are negative unless otherwise stated.     Objective    Temp:  [98 °F (36.7 °C)-98.8 °F (37.1 °C)] 98.8 °F (37.1 °C)  Heart Rate:  [63-81] 81  Resp:  [13-20] 15  BP: (107-143)/(60-90) 109/62  Physical Exam  Constitutional:       General: She is not in acute distress.     Appearance: She is well-developed. She is not diaphoretic.   HENT:      Head: Normocephalic and atraumatic.   Cardiovascular:      Rate and Rhythm: Normal rate.   Pulmonary:      Effort: Pulmonary effort is normal. No respiratory distress.      Breath sounds: No wheezing.   Abdominal:      General: There is no distension.      Palpations: Abdomen is soft.   Musculoskeletal:         General: Normal range of motion.   Skin:     General: Skin is warm and dry.   Neurological:      Mental Status: She is alert.      Cranial Nerves: No cranial nerve deficit.   Psychiatric:         Behavior: Behavior normal.         Thought Content: Thought content normal.         Judgment: Judgment normal.           Results Review:  I have reviewed the labs, radiology results, and diagnostic studies.    Laboratory Data:   Lab Results (last 24 hours)       Procedure Component Value Units Date/Time    POC Glucose Once [[id number]]  (Abnormal) Collected: 10/03/23 0734    Specimen: Blood Updated: 10/03/23 0735     Glucose 127 mg/dL      Comment: Serial Number: [id number]:  [id number]       Basic Metabolic Panel [[id number]]   (Abnormal) Collected: 10/03/23 0117    Specimen: Blood Updated: 10/03/23 0154     Glucose 180 mg/dL      BUN 12 mg/dL      Creatinine 0.82 mg/dL      Sodium 137 mmol/L      Potassium 3.4 mmol/L      Chloride 101 mmol/L      CO2 27.0 mmol/L      Calcium 8.9 mg/dL      BUN/Creatinine Ratio 14.6     Anion Gap 9.0 mmol/L      eGFR 85.1 mL/min/1.73     Narrative:      GFR Normal >60  Chronic Kidney Disease <60  Kidney Failure <15      CBC (No Diff) [[id number]]  (Normal) Collected: 10/03/23 0117    Specimen: Blood Updated: 10/03/23 0125     WBC 10.10 10*3/mm3      RBC 4.21 10*6/mm3      Hemoglobin 12.9 g/dL      Hematocrit 38.3 %      MCV 91.1 fL      MCH 30.6 pg      MCHC 33.6 g/dL      RDW 13.5 %      RDW-SD 43.3 fl      MPV 9.2 fL      Platelets 204 10*3/mm3     Blood Culture - Blood, Hand, Left [[id number]] Collected: 10/03/23 0117    Specimen: Blood from Hand, Left Updated: 10/03/23 0123    Blood Culture - Blood, Arm, Right [[id number]] Collected: 10/03/23 0116    Specimen: Blood from Arm, Right Updated: 10/03/23 0123    Comprehensive Metabolic Panel [[id number]]  (Abnormal) Collected: 10/02/23 1849    Specimen: Blood Updated: 10/02/23 1915     Glucose 117 mg/dL      BUN 12 mg/dL      Creatinine 0.84 mg/dL      Sodium 138 mmol/L      Potassium 3.6 mmol/L      Chloride 99 mmol/L      CO2 29.0 mmol/L      Calcium 9.1 mg/dL      Total Protein 6.8 g/dL      Albumin 4.2 g/dL      ALT (SGPT) 31 U/L      AST (SGOT) 21 U/L      Alkaline Phosphatase 93 U/L      Total Bilirubin 0.3 mg/dL      Globulin 2.6 gm/dL      A/G Ratio 1.6 g/dL      BUN/Creatinine Ratio 14.3     Anion Gap 10.0 mmol/L      eGFR 83.2 mL/min/1.73     Narrative:      GFR Normal >60  Chronic Kidney Disease <60  Kidney Failure <15      Urinalysis, Microscopic Only - Urine, Clean Catch [[id number]]  (Abnormal) Collected: 10/02/23 1854    Specimen: Urine, Clean Catch Updated: 10/02/23 1904     RBC, UA 0-2 /HPF      WBC, UA 6-12 /HPF      Bacteria, UA 2+ /HPF       Squamous Epithelial Cells, UA 0-2 /HPF      Hyaline Casts, UA None Seen /LPF      Methodology Automated Microscopy    Urinalysis With Microscopic If Indicated (No Culture) - Urine, Clean Catch [[id number]]  (Abnormal) Collected: 10/02/23 1854    Specimen: Urine, Clean Catch Updated: 10/02/23 1900     Color, UA Yellow     Appearance, UA Clear     pH, UA 6.5     Specific Gravity, UA 1.011     Glucose, UA Negative     Ketones, UA Negative     Bilirubin, UA Negative     Blood, UA Negative     Protein, UA Negative     Leuk Esterase, UA Trace     Nitrite, UA Negative     Urobilinogen, UA 1.0 E.U./dL    CBC & Differential [[id number]]  (Abnormal) Collected: 10/02/23 1849    Specimen: Blood Updated: 10/02/23 1857    Narrative:      The following orders were created for panel order CBC & Differential.  Procedure                               Abnormality         Status                     ---------                               -----------         ------                     CBC Auto Differential[[id number]]        Abnormal            Final result                 Please view results for these tests on the individual orders.    CBC Auto Differential [[id number]]  (Abnormal) Collected: 10/02/23 1849    Specimen: Blood Updated: 10/02/23 1857     WBC 11.90 10*3/mm3      RBC 4.43 10*6/mm3      Hemoglobin 13.3 g/dL      Hematocrit 40.7 %      MCV 91.8 fL      MCH 30.0 pg      MCHC 32.7 g/dL      RDW 13.5 %      RDW-SD 42.9 fl      MPV 9.2 fL      Platelets 225 10*3/mm3      Neutrophil % 64.9 %      Lymphocyte % 25.3 %      Monocyte % 8.2 %      Eosinophil % 1.2 %      Basophil % 0.4 %      Neutrophils, Absolute 7.70 10*3/mm3      Lymphocytes, Absolute 3.00 10*3/mm3      Monocytes, Absolute 1.00 10*3/mm3      Eosinophils, Absolute 0.10 10*3/mm3      Basophils, Absolute 0.00 10*3/mm3      nRBC 0.0 /100 WBC             Culture Data:   No results found for: BLOODCX  No results found for: URINECX  No results found for: RESPCX  No results  found for: WOUNDCX  No results found for: STOOLCX  No components found for: BODYFLD    Radiology Data:   Imaging Results (Last 24 Hours)       Procedure Component Value Units Date/Time    CT Abdomen Pelvis With Contrast [[id number]] Collected: 10/02/23 2029     Updated: 10/02/23 2037    Narrative:      CT ABDOMEN PELVIS W CONTRAST    Date of Exam: 10/2/2023 8:00 PM EDT    Indication: pain.    Comparison: None available.    Technique: Axial CT images were obtained of the abdomen and pelvis following the uneventful intravenous administration of iodinated contrast. Sagittal and coronal reconstructions were performed.  Automated exposure control and iterative reconstruction   methods were used.        Findings:      Limited evaluation of the lung bases demonstrate no gross abnormality.    There is decreased attenuation of the liver which can be seen with fatty infiltration.    There are no radio opaque gallbladder calculi.    The spleen is unremarkable.    The pancreases is unremarkable.    There are bilateral adrenal nodules measuring approximately 1.3 cm on the right on image 40 and measuring approximately 1.4 cm on the left on image 39 of series 3.      The kidneys are within normal limits.    There is no small bowel obstruction .    The appendix is unremarkable.    Evaluation of the colon is limited by lack of oral contrast and under distention.    There are multiple diverticuli throughout the colon. There is circumferential wall thickening of the sigmoid colon with pericolonic stranding and free fluid consistent with acute diverticulitis. On image 118 of series 3 there is a rim-enhancing 2.0 x 2.2   x 1.3 cm collection within the wall of the sigmoid colon consistent with an intramural abscess.      The urinary bladder is unremarkable.    There is no pelvic free fluid.    The osseous structures are within normal limits.    IMPESSION :    Acute diverticulitis of the sigmoid colon with wall thickening pericolonic  stranding and free fluid. There is a 2.0 x 2.2 x 1.3 cm rim-enhancing intramural abscess on image 118 of series 3.    No bowel obstruction.    Fatty infiltration of the liver.    Bilateral adrenal nodules..    Findings were discussed with Dr. Oshea of the [facility] emergency department at approximately 8:35. P.m.                      Electronically Signed: Randy Mares MD    10/2/2023 8:35 PM EDT    Workstation ID: [id number]            I have reviewed the patient's current medications.     Assessment/Plan     Active Hospital Problems    Diagnosis     **Diverticulitis of intestine with abscess      #Diverticulitis with Abscess    - CT a/p shows acute diverticulitis of the sigmoid colon with wall thickening pericolonic stranding and free fluid complicated by 2.0 x 2.2 x 1.3cm rim enhancing intramural abscess    - Rocephin 2g IV daily, watch for toxicity    - Flagyl 500mg IV q8h    - blood cultures    - wbc 11.9, trend    - NPO    - LR @ 75/hr    - Morphine 2mg IV q4h PRN pain    - surgery consulted    - May need IR to drain abscess, will defer to surgery  Patient continue with IV antibiotics.  We will continue to follow cultures.     #CAD    - hold home Plavix for possible surgery    - resume home Toprol    - denies chest pain    - check EKG for possible surgery     #DM    - A1c was 7.8 on 9/22/23    - hold home metformin    - ISS     #HFpEF    - chronic, euvolemic    - hold home Lasix     - monitor for fluid overload     #Obesity    - BMI 32.87, weight loss encouraged     #HTN    - resume home Toprol    - hold home Lisinopril while admitted, consider restarting if necessary    - monitor     #HLD    - reconciled home meds     #GERD    - PPI        DVT prophylaxis: SCD     CODE STATUS:    Admission Status:  I believe this patient meets inpatient status.     I discussed the patient's findings and my recommendations with patient.      Andi Mcdonald MD   10/03/23   08:50 EDT

## 2023-10-03 NOTE — ED PROVIDER NOTES
Subjective   History of Present Illness  52-year-old female presents with complaint she has been dealing with diverticulitis since January of this year.  She states that it is gotten worse.  She has had crampy abdominal pain.  She describes her lower abdomen.  She has had no fever.  No vomiting no diarrhea.  She saw her primary provider 3 days ago.  States ordered CT scan although there is a note from today that the CT scan was denied by the insurance company.  Review of Systems    Past Medical History:   Diagnosis Date    Absence of left thumb     Impression: hx of MRSA, she is signficantly improved She will continue meds and followup if sxs have not resolved over the next 2 weeks.. She is released from care and will notify us of any problems    Acute otitis media     Allergic rhinitis     Arthritis     neck    Cancer     skin    Cervical disc disease with myelopathy     Contact dermatitis or eczema     Coronary artery disease     Disorder of bone and cartilage     Diverticulitis of colon     Impression: CT confirms in the sigmoid colon 10/2011    Diverticulosis of colon     Impression: No sxs 02/13/2013    Elevated cholesterol     Elevated lipoprotein(a) 4/30/2020    Elevated temperature     External otitis of left ear     Gastroesophageal reflux disease without esophagitis 6/19/2022    Hearing loss due to cerumen impaction, left     Hemangioma of liver     History of echocardiogram 10/03/2019    Severely reduced LV systolic function. EF 34% Distribution indicative of LAD territory injury. Akinesis of the apex as well as apical lateral and mid to apical septal walls. Preservation of posterior inferior lateral walls. Mild AI, mild TR. Normal RV function.     History of transesophageal echocardiography (NAFISA) 01/07/2020    EF 55% LA cavity is mild to moderately dilated. Mild MR. Interatrial septum appears redundant. Interatrial hypermobile c/w aneurysm. Large PFO is noted with right to left shunting on bubble study.  PFO tunnel appears to be short.     Hordeolum externum of left lower eyelid     Impression: She was started on Bactrim DS for her infection. She was also advised to use warm compression. She will followup should sxs not improve over the next 48 hrs and resolve over the next 1 weeek.    Hyperlipidemia     Impression: Diet controled. She should see improvement with her successful wt loss. We discussed her lipid panel.    Hypertension, benign     Impression: new onset Low salt low calorie diet and regular exercise and followup in 1 mo She will monitor her pressures and notify us of her pressures over the next 1 week.    Inclusion cyst of right breast     Impression: We will follow for now. She is encouraged to have Mammography. She is presently without insurance and reluctant. She bobby consider. She will notify us of any change at all in the lesion for the only way to be certain of it's etilogy is to remove it. She understands.    Insomnia, organic     Menopausal syndrome     Overweight (BMI 25.0-29.9)     RUQ abdominal pain     Impression: Resolving, negative GB u/s, HIDA EF 77%, we will follow    Stented coronary artery 09/29/2019    Tobacco use     Impression: She is strongly encouraged to stop smoking. Cessation techniques discussed and encouraged. The consequences of not stopping discussed, ie, CAD, PVD, Stroke, Lung and other cancers.       Allergies   Allergen Reactions    Penicillins Rash    Ciprofloxacin Rash    Penicillin G Rash       Past Surgical History:   Procedure Laterality Date    BIVENTRICULAR ASSIST DEVICE/LEFT VENTRICULAR ASSIST DEVICE INSERTION N/A 9/29/2019    Procedure: Left Ventricular Assist Device;  Surgeon: Brant Martinez MD;  Location: [facility] CATH INVASIVE LOCATION;  Service: Cardiovascular    CARDIAC CATHETERIZATION N/A 9/29/2019    Procedure: Left Heart Cath;  Surgeon: Brant Martinez MD;  Location: [facility] CATH INVASIVE LOCATION;  Service: Cardiovascular    CARDIAC  CATHETERIZATION N/A 9/29/2019    Procedure: Coronary angiography;  Surgeon: Brant Martinez MD;  Location:  [facility] CATH INVASIVE LOCATION;  Service: Cardiovascular    CARDIAC CATHETERIZATION N/A 9/29/2019    Procedure: Left ventriculography;  Surgeon: Brant Martinez MD;  Location:  [facility] CATH INVASIVE LOCATION;  Service: Cardiovascular    CARDIAC CATHETERIZATION N/A 9/29/2019    Procedure: Stent SERENITY coronary;  Surgeon: Brant Martinez MD;  Location:  [facility] CATH INVASIVE LOCATION;  Service: Cardiovascular    CARDIAC CATHETERIZATION N/A 2/25/2020    Procedure: Right Heart Cath;  Surgeon: Brant Martinez MD;  Location:  [facility] CATH INVASIVE LOCATION;  Service: Cardiology;  Laterality: N/A;    CARDIAC CATHETERIZATION N/A 2/12/2021    Procedure: Left Heart Cath;  Surgeon: Brant Martinez MD;  Location:  [facility] CATH INVASIVE LOCATION;  Service: Cardiology;  Laterality: N/A;    CARDIAC CATHETERIZATION N/A 2/12/2021    Procedure: Coronary angiography;  Surgeon: Brant Martinez MD;  Location:  [facility] CATH INVASIVE LOCATION;  Service: Cardiology;  Laterality: N/A;    CARDIAC CATHETERIZATION N/A 2/12/2021    Procedure: Left ventriculography;  Surgeon: Brant Martinez MD;  Location:  [facility] CATH INVASIVE LOCATION;  Service: Cardiology;  Laterality: N/A;    CARDIAC CATHETERIZATION N/A 2/12/2021    Procedure: Aortic root aortogram;  Surgeon: Brant Martinez MD;  Location:  [facility] CATH INVASIVE LOCATION;  Service: Cardiology;  Laterality: N/A;    CARDIAC CATHETERIZATION N/A 2/12/2021    Procedure: Percutaneous Coronary Intervention;  Surgeon: Brant Martinez MD;  Location:  [facility] CATH INVASIVE LOCATION;  Service: Cardiology;  Laterality: N/A;    CARDIAC CATHETERIZATION N/A 2/12/2021    Procedure: Stent SERENITY coronary;  Surgeon: Brant Martinez MD;  Location:  [facility] CATH INVASIVE LOCATION;  Service: Cardiology;  Laterality: N/A;    CARDIAC  ELECTROPHYSIOLOGY PROCEDURE  2019    Procedure: Impella Insertion;  Surgeon: Brant Martinez MD;  Location:  [facility] CATH INVASIVE LOCATION;  Service: Cardiovascular    CORONARY ANGIOPLASTY  2021    1x stent to Circ    CORONARY ANGIOPLASTY WITH STENT PLACEMENT  2021    WI RT/LT HEART CATHETERS N/A 2019    Procedure: Percutaneous Coronary Intervention;  Surgeon: Brant Martinez MD;  Location:  [facility] CATH INVASIVE LOCATION;  Service: Cardiovascular    TOTAL ABDOMINAL HYSTERECTOMY WITH SALPINGO OOPHORECTOMY      Endometriosis       Family History   Problem Relation Age of Onset    Alzheimer's disease Mother     Heart disease Mother         cardiovascular    Kidney disease Mother     Throat cancer Mother     Thyroid disease Mother     Diabetes Father         mellitus    Heart attack Father     Thyroid disease Father     Heart disease Sister         cardiovascular    Diabetes Maternal Aunt         mellitus    Heart disease Maternal Aunt         cardiovascular    Breast cancer Maternal Aunt     Cancer Maternal Aunt         breast    Diabetes Maternal Uncle         mellitus    Alzheimer's disease Maternal Uncle     Diabetes Maternal Grandmother         mellitus    Pancreatic cancer Maternal Grandmother     Kidney disease Paternal Uncle        Social History     Socioeconomic History    Marital status:    Tobacco Use    Smoking status: Former     Packs/day: 1.00     Years: 34.00     Pack years: 34.00     Types: Cigarettes     Start date:      Quit date: 2019     Years since quittin.0    Smokeless tobacco: Never    Tobacco comments:     States she is quitting as of 2019.   Vaping Use    Vaping Use: Never used   Substance and Sexual Activity    Alcohol use: Yes     Comment: occassionally     Drug use: Not Currently     Comment: occasional    Sexual activity: Defer     Prior to Admission medications    Medication Sig Start Date End Date Taking? Authorizing  Provider   cetirizine (zyrTEC) 10 MG tablet Take 1 tablet by mouth Every Night.    ProviderAl MD   cholecalciferol (VITAMIN D3) 1.25 MG (74715 UT) capsule Take 1 capsule by mouth Daily.    ProviderAl MD   clindamycin (CLEOCIN) 300 MG capsule Take 1 capsule by mouth 3 (Three) Times a Day. 7/24/23   Yodit Johnston MD   clopidogrel (PLAVIX) 75 MG tablet TAKE 1 TABLET BY MOUTH EVERY DAY 12/19/22   Brant Martinez MD   [facility] Aspirin Adult Low Dose 81 MG chewable tablet CHEW AND SWALLOW 1 TABLET BY MOUTH EVERY DAY 4/26/23   Brant Martinez MD   furosemide (LASIX) 40 MG tablet Take 1 tablet by mouth As Needed (swelling). 1/17/23   Yodit Johnston MD   lisinopril (PRINIVIL,ZESTRIL) 5 MG tablet Take 1 tablet by mouth 2 (Two) Times a Day. 7/11/22   Brant Martinez MD   metFORMIN (GLUCOPHAGE) 500 MG tablet Take 1 tablet by mouth 2 (Two) Times a Day With Meals.  Patient taking differently: Take 1 tablet by mouth Daily With Breakfast. 9/25/23   Yodit Johnston MD   metoprolol succinate XL (TOPROL-XL) 25 MG 24 hr tablet TAKE 1 TABLET BY MOUTH EVERY DAY 4/10/23   Brant Martinez MD   nitroglycerin (NITROSTAT) 0.4 MG SL tablet Place 1 tablet under the tongue Every 5 (Five) Minutes As Needed for Chest Pain. Take no more than 3 doses in 15 minutes. 9/23/22   Brant Martinez MD   omeprazole OTC (PriLOSEC OTC) 20 MG EC tablet TAKE 1 TABLET BY MOUTH EVERY DAY 5/30/23   Yodit Johnston MD   vitamin B-12 (CYANOCOBALAMIN) 500 MCG tablet Take 1 tablet by mouth Daily.    ProviderAl MD           Objective   Physical Exam  52-year-old female awake alert.  Generally well-developed well-nourished.  Pupils equal round react light.  Neck supple chest clear cardiovascular regular in rhythm abdomen is soft with some lower abdominal tenderness without involuntary guarding or rebound.  She describes the pain mostly left lower abdomen.  Skin without rash noted.  Legs  without tenderness or edema  Procedures           ED Course      Results for orders placed or performed during the hospital encounter of 10/02/23   Comprehensive Metabolic Panel    Specimen: Blood   Result Value Ref Range    Glucose 117 (H) 65 - 99 mg/dL    BUN 12 6 - 20 mg/dL    Creatinine 0.84 0.57 - 1.00 mg/dL    Sodium 138 136 - 145 mmol/L    Potassium 3.6 3.5 - 5.2 mmol/L    Chloride 99 98 - 107 mmol/L    CO2 29.0 22.0 - 29.0 mmol/L    Calcium 9.1 8.6 - 10.5 mg/dL    Total Protein 6.8 6.0 - 8.5 g/dL    Albumin 4.2 3.5 - 5.2 g/dL    ALT (SGPT) 31 1 - 33 U/L    AST (SGOT) 21 1 - 32 U/L    Alkaline Phosphatase 93 39 - 117 U/L    Total Bilirubin 0.3 0.0 - 1.2 mg/dL    Globulin 2.6 gm/dL    A/G Ratio 1.6 g/dL    BUN/Creatinine Ratio 14.3 7.0 - 25.0    Anion Gap 10.0 5.0 - 15.0 mmol/L    eGFR 83.2 >60.0 mL/min/1.73   Urinalysis With Microscopic If Indicated (No Culture) - Urine, Clean Catch    Specimen: Urine, Clean Catch   Result Value Ref Range    Color, UA Yellow Yellow, Straw    Appearance, UA Clear Clear    pH, UA 6.5 5.0 - 8.0    Specific Gravity, UA 1.011 1.005 - 1.030    Glucose, UA Negative Negative    Ketones, UA Negative Negative    Bilirubin, UA Negative Negative    Blood, UA Negative Negative    Protein, UA Negative Negative    Leuk Esterase, UA Trace (A) Negative    Nitrite, UA Negative Negative    Urobilinogen, UA 1.0 E.U./dL 0.2 - 1.0 E.U./dL   CBC Auto Differential    Specimen: Blood   Result Value Ref Range    WBC 11.90 (H) 3.40 - 10.80 10*3/mm3    RBC 4.43 3.77 - 5.28 10*6/mm3    Hemoglobin 13.3 12.0 - 15.9 g/dL    Hematocrit 40.7 34.0 - 46.6 %    MCV 91.8 79.0 - 97.0 fL    MCH 30.0 26.6 - 33.0 pg    MCHC 32.7 31.5 - 35.7 g/dL    RDW 13.5 12.3 - 15.4 %    RDW-SD 42.9 37.0 - 54.0 fl    MPV 9.2 6.0 - 12.0 fL    Platelets 225 140 - 450 10*3/mm3    Neutrophil % 64.9 42.7 - 76.0 %    Lymphocyte % 25.3 19.6 - 45.3 %    Monocyte % 8.2 5.0 - 12.0 %    Eosinophil % 1.2 0.3 - 6.2 %    Basophil % 0.4 0.0 -  1.5 %    Neutrophils, Absolute 7.70 (H) 1.70 - 7.00 10*3/mm3    Lymphocytes, Absolute 3.00 0.70 - 3.10 10*3/mm3    Monocytes, Absolute 1.00 (H) 0.10 - 0.90 10*3/mm3    Eosinophils, Absolute 0.10 0.00 - 0.40 10*3/mm3    Basophils, Absolute 0.00 0.00 - 0.20 10*3/mm3    nRBC 0.0 0.0 - 0.2 /100 WBC   Urinalysis, Microscopic Only - Urine, Clean Catch    Specimen: Urine, Clean Catch   Result Value Ref Range    RBC, UA 0-2 (A) None Seen /HPF    WBC, UA 6-12 (A) None Seen /HPF    Bacteria, UA 2+ (A) None Seen /HPF    Squamous Epithelial Cells, UA 0-2 None Seen, 0-2 /HPF    Hyaline Casts, UA None Seen None Seen /LPF    Methodology Automated Microscopy      No radiology results for the last day  Medications   cefTRIAXone (ROCEPHIN) 2,000 mg in sodium chloride 0.9 % 100 mL IVPB (has no administration in time range)   metroNIDAZOLE (FLAGYL) IVPB 500 mg (has no administration in time range)   lactated ringers infusion (75 mL/hr Intravenous New Bag 10/2/23 2355)   morphine injection 2 mg (2 mg Intravenous Given 10/2/23 2202)   metoprolol succinate XL (TOPROL-XL) 24 hr tablet 25 mg (has no administration in time range)   pantoprazole (PROTONIX) EC tablet 40 mg (has no administration in time range)   sodium chloride 0.9 % flush 10 mL (10 mL Intravenous Not Given 10/2/23 2347)   sodium chloride 0.9 % flush 10 mL (has no administration in time range)   sodium chloride 0.9 % infusion 40 mL (has no administration in time range)   sennosides-docusate (PERICOLACE) 8.6-50 MG per tablet 2 tablet (2 tablets Oral Not Given 10/2/23 2350)     And   polyethylene glycol (MIRALAX) packet 17 g (has no administration in time range)     And   bisacodyl (DULCOLAX) EC tablet 5 mg (has no administration in time range)     And   bisacodyl (DULCOLAX) suppository 10 mg (has no administration in time range)   ondansetron (ZOFRAN) tablet 4 mg (has no administration in time range)     Or   ondansetron (ZOFRAN) injection 4 mg (has no administration in time  "range)   dextrose (GLUTOSE) oral gel 15 g (has no administration in time range)   dextrose (D50W) (25 g/50 mL) IV injection 25 g (has no administration in time range)   glucagon (GLUCAGEN) injection 1 mg (has no administration in time range)   insulin lispro (HUMALOG/ADMELOG) injection 2-7 Units (has no administration in time range)   nitroglycerin (NITROSTAT) SL tablet 0.4 mg (has no administration in time range)   Potassium Replacement - Follow Nurse / BPA Driven Protocol (has no administration in time range)   Magnesium Standard Dose Replacement - Follow Nurse / BPA Driven Protocol (has no administration in time range)   Phosphorus Replacement - Follow Nurse / BPA Driven Protocol (has no administration in time range)   Calcium Replacement - Follow Nurse / BPA Driven Protocol (has no administration in time range)   lactated ringers bolus 500 mL (0 mL Intravenous Stopped 10/2/23 2025)   iopamidol (ISOVUE-370) 76 % injection 100 mL (100 mL Intravenous Given 10/2/23 2011)   cefTRIAXone (ROCEPHIN) 2,000 mg in sodium chloride 0.9 % 100 mL IVPB (0 mg Intravenous Stopped 10/2/23 2124)   metroNIDAZOLE (FLAGYL) IVPB 500 mg (0 mg Intravenous Stopped 10/2/23 2234)     /60 (BP Location: Right arm, Patient Position: Lying)   Pulse 68   Temp 98.3 °F (36.8 °C) (Tympanic)   Resp 16   Ht 164.5 cm (64.76\")   Wt 88 kg (194 lb)   LMP 01/01/1996 (Approximate)   SpO2 97%   BMI 32.52 kg/m²                                        Medical Decision Making  Problems Addressed:  Diverticulitis of large intestine with abscess without bleeding: complicated acute illness or injury    Amount and/or Complexity of Data Reviewed  Labs: ordered.  Radiology: ordered.    Risk  Prescription drug management.  Decision regarding hospitalization.    Chart review: Patient was noted to have CT scan denial noted earlier today.  Comorbidity: As per past history   Differential: Diverticulitis, perforation, UTI,  My EKG interpretation: Not " indicated  Lab: Urinalysis 6-12 white cells 2+ bacteria nitrite negative.  [facility] metabolic panel essentially normal glucose 117 CBC mild leukocytosis 11.9 with 64 segs no bands on differential  My Radiology review and interpretation: CT scan of abdomen pelvis with IV contrast reveals changes of diverticulitis with circumferential wall thickening the sigmoid colon with pericolonic stranding and some free fluid.  There is also a rim-enhancing 2 x 2.2 x 1.3 cm collection within the wall of the sigmoid colon consistent with intramural abscess.  Discussion/treatment: Patient's findings were discussed with her.  She was started on Zosyn.  She was discussed with Dr. Martínez will be admitted to the hospitalist service for continued care we will obtain surgical consultation.  Patient was evaluated using appropriate PPE      Final diagnoses:   Diverticulitis of large intestine with abscess without bleeding       ED Disposition  ED Disposition       ED Disposition   Decision to Admit    Condition   --    Comment   Level of Care: Med/Surg [1]   Admitting Physician: FAVIOLA MARTÍNEZ [[id number]]                 No follow-up provider specified.       Medication List      No changes were made to your prescriptions during this visit.            Kal Oshea MD  10/03/23 0003

## 2023-10-03 NOTE — H&P
[facility] Medicine Services      Patient Name: Sada Le  : 1970  MRN: [id number]  Primary Care Physician:  Yodit Johnston MD  Date of admission: 10/2/2023      Subjective      Chief Complaint: abdominal pain    History of Present Illness: Sada Le is a 52 y.o. female who presented to [facility] on 10/2/2023 complaining of abdominal pain.  Admits to cramping 8-9/10 LLQ abdominal pain with radiation to right side and started worsening today.  States she was on PO abx for diverticulitis in January, symptoms initially improved but have recurred multiple times this year.  States symptoms today are severe diverticular symptoms.  Denies melena, hematochezia, chest pain, fever, vomiting, diarrhea.  Due to worsening symptoms, presented to [facility] for evaluation.    In the ER, vitals 98F, HR 67, RR 18, /81, 98% RA.  Labs notable for glucose 117, wbc 11.9.  CT a/p shows acute diverticulitis of the sigmoid colon with wall thickening pericolonic stranding and free fluid complicated by 2.0 x 2.2 x 1.3cm rim enhancing intramural abscess.  She is to be admitted for IV abx and surgical evaluation of diverticulitis.      ROS   12 point ROS reviewed and negative except as mentioned above      Personal History     Past Medical History:   Diagnosis Date    Absence of left thumb     Impression: hx of MRSA, she is signficantly improved She will continue meds and followup if sxs have not resolved over the next 2 weeks.. She is released from care and will notify us of any problems    Acute otitis media     Allergic rhinitis     Arthritis     neck    Cancer     skin    Cervical disc disease with myelopathy     Contact dermatitis or eczema     Coronary artery disease     Disorder of bone and cartilage     Diverticulitis of colon     Impression: CT confirms in the sigmoid colon 10/2011    Diverticulosis of colon     Impression: No sxs 2013    Elevated cholesterol     Elevated  lipoprotein(a) 4/30/2020    Elevated temperature     External otitis of left ear     Gastroesophageal reflux disease without esophagitis 6/19/2022    Hearing loss due to cerumen impaction, left     Hemangioma of liver     History of echocardiogram 10/03/2019    Severely reduced LV systolic function. EF 34% Distribution indicative of LAD territory injury. Akinesis of the apex as well as apical lateral and mid to apical septal walls. Preservation of posterior inferior lateral walls. Mild AI, mild TR. Normal RV function.     History of transesophageal echocardiography (NAFISA) 01/07/2020    EF 55% LA cavity is mild to moderately dilated. Mild MR. Interatrial septum appears redundant. Interatrial hypermobile c/w aneurysm. Large PFO is noted with right to left shunting on bubble study. PFO tunnel appears to be short.     Hordeolum externum of left lower eyelid     Impression: She was started on Bactrim DS for her infection. She was also advised to use warm compression. She will followup should sxs not improve over the next 48 hrs and resolve over the next 1 weeek.    Hyperlipidemia     Impression: Diet controled. She should see improvement with her successful wt loss. We discussed her lipid panel.    Hypertension, benign     Impression: new onset Low salt low calorie diet and regular exercise and followup in 1 mo She will monitor her pressures and notify us of her pressures over the next 1 week.    Inclusion cyst of right breast     Impression: We will follow for now. She is encouraged to have Mammography. She is presently without insurance and reluctant. She bobby consider. She will notify us of any change at all in the lesion for the only way to be certain of it's etilogy is to remove it. She understands.    Insomnia, organic     Menopausal syndrome     Overweight (BMI 25.0-29.9)     RUQ abdominal pain     Impression: Resolving, negative GB u/s, HIDA EF 77%, we will follow    Stented coronary artery 09/29/2019    Tobacco  use     Impression: She is strongly encouraged to stop smoking. Cessation techniques discussed and encouraged. The consequences of not stopping discussed, ie, CAD, PVD, Stroke, Lung and other cancers.       Past Surgical History:   Procedure Laterality Date    BIVENTRICULAR ASSIST DEVICE/LEFT VENTRICULAR ASSIST DEVICE INSERTION N/A 9/29/2019    Procedure: Left Ventricular Assist Device;  Surgeon: Brant Martinez MD;  Location: [facility] CATH INVASIVE LOCATION;  Service: Cardiovascular    CARDIAC CATHETERIZATION N/A 9/29/2019    Procedure: Left Heart Cath;  Surgeon: Brant Martinez MD;  Location: [facility] CATH INVASIVE LOCATION;  Service: Cardiovascular    CARDIAC CATHETERIZATION N/A 9/29/2019    Procedure: Coronary angiography;  Surgeon: Brant Martinez MD;  Location: [facility] CATH INVASIVE LOCATION;  Service: Cardiovascular    CARDIAC CATHETERIZATION N/A 9/29/2019    Procedure: Left ventriculography;  Surgeon: Brant Martinez MD;  Location: [facility] CATH INVASIVE LOCATION;  Service: Cardiovascular    CARDIAC CATHETERIZATION N/A 9/29/2019    Procedure: Stent SERENITY coronary;  Surgeon: Brant Martinez MD;  Location: [facility] CATH INVASIVE LOCATION;  Service: Cardiovascular    CARDIAC CATHETERIZATION N/A 2/25/2020    Procedure: Right Heart Cath;  Surgeon: Brant Martinez MD;  Location: [facility] CATH INVASIVE LOCATION;  Service: Cardiology;  Laterality: N/A;    CARDIAC CATHETERIZATION N/A 2/12/2021    Procedure: Left Heart Cath;  Surgeon: Brant Martinez MD;  Location: [facility] CATH INVASIVE LOCATION;  Service: Cardiology;  Laterality: N/A;    CARDIAC CATHETERIZATION N/A 2/12/2021    Procedure: Coronary angiography;  Surgeon: Brant Martinez MD;  Location: [facility] CATH INVASIVE LOCATION;  Service: Cardiology;  Laterality: N/A;    CARDIAC CATHETERIZATION N/A 2/12/2021    Procedure: Left ventriculography;  Surgeon: Brant Martinez MD;  Location: [facility]  INVASIVE LOCATION;  Service: Cardiology;  Laterality: N/A;    CARDIAC CATHETERIZATION N/A 2/12/2021    Procedure: Aortic root aortogram;  Surgeon: Brant Martinez MD;  Location: [facility] CATH INVASIVE LOCATION;  Service: Cardiology;  Laterality: N/A;    CARDIAC CATHETERIZATION N/A 2/12/2021    Procedure: Percutaneous Coronary Intervention;  Surgeon: Brant Martinez MD;  Location: [facility] CATH INVASIVE LOCATION;  Service: Cardiology;  Laterality: N/A;    CARDIAC CATHETERIZATION N/A 2/12/2021    Procedure: Stent SERENITY coronary;  Surgeon: Brant Martinez MD;  Location: [facility] CATH INVASIVE LOCATION;  Service: Cardiology;  Laterality: N/A;    CARDIAC ELECTROPHYSIOLOGY PROCEDURE  9/29/2019    Procedure: Impella Insertion;  Surgeon: Brant Martinez MD;  Location: [facility] CATH INVASIVE LOCATION;  Service: Cardiovascular    CORONARY ANGIOPLASTY  02/12/2021    1x stent to Circ    CORONARY ANGIOPLASTY WITH STENT PLACEMENT  02/2021    CA RT/LT HEART CATHETERS N/A 9/29/2019    Procedure: Percutaneous Coronary Intervention;  Surgeon: Brant Martinez MD;  Location: [facility] CATH INVASIVE LOCATION;  Service: Cardiovascular    TOTAL ABDOMINAL HYSTERECTOMY WITH SALPINGO OOPHORECTOMY  1995    Endometriosis       Family History: family history includes Alzheimer's disease in her maternal uncle and mother; Breast cancer in her maternal aunt; Cancer in her maternal aunt; Diabetes in her father, maternal aunt, maternal grandmother, and maternal uncle; Heart attack in her father; Heart disease in her maternal aunt, mother, and sister; Kidney disease in her mother and paternal uncle; Pancreatic cancer in her maternal grandmother; Throat cancer in her mother; Thyroid disease in her father and mother. Otherwise pertinent FHx was reviewed and not pertinent to current issue.    Social History:  reports that she quit smoking about 4 years ago. Her smoking use included cigarettes. She started smoking about 38  years ago. She has a 34.00 pack-year smoking history. She has never used smokeless tobacco. She reports current alcohol use. She reports that she does not currently use drugs.    Home Medications:  Prior to Admission Medications       Prescriptions Last Dose Informant Patient Reported? Taking?    cetirizine (zyrTEC) 10 MG tablet   Yes No    Take 1 tablet by mouth Every Night.    cholecalciferol (VITAMIN D3) 1.25 MG (93357 UT) capsule   Yes No    Take 1 capsule by mouth Daily.    clindamycin (CLEOCIN) 300 MG capsule   No No    Take 1 capsule by mouth 3 (Three) Times a Day.    clopidogrel (PLAVIX) 75 MG tablet   No No    TAKE 1 TABLET BY MOUTH EVERY DAY    [facility] Aspirin Adult Low Dose 81 MG chewable tablet   No No    CHEW AND SWALLOW 1 TABLET BY MOUTH EVERY DAY    furosemide (LASIX) 40 MG tablet   No No    Take 1 tablet by mouth As Needed (swelling).    lisinopril (PRINIVIL,ZESTRIL) 5 MG tablet   No No    Take 1 tablet by mouth 2 (Two) Times a Day.    metFORMIN (GLUCOPHAGE) 500 MG tablet   No No    Take 1 tablet by mouth 2 (Two) Times a Day With Meals.    Patient taking differently:  Take 1 tablet by mouth Daily With Breakfast.    metoprolol succinate XL (TOPROL-XL) 25 MG 24 hr tablet   No No    TAKE 1 TABLET BY MOUTH EVERY DAY    nitroglycerin (NITROSTAT) 0.4 MG SL tablet   No No    Place 1 tablet under the tongue Every 5 (Five) Minutes As Needed for Chest Pain. Take no more than 3 doses in 15 minutes.    omeprazole OTC (PriLOSEC OTC) 20 MG EC tablet   No No    TAKE 1 TABLET BY MOUTH EVERY DAY    vitamin B-12 (CYANOCOBALAMIN) 500 MCG tablet   Yes No    Take 1 tablet by mouth Daily.              Allergies:  Allergies   Allergen Reactions    Penicillins Rash    Ciprofloxacin Rash    Penicillin G Rash       Objective      Vitals:   Temp:  [98 °F (36.7 °C)] 98 °F (36.7 °C)  Heart Rate:  [67-79] 67  Resp:  [18-20] 18  BP: (107-143)/(81-90) 107/81    Physical Exam  Constitutional:       General: She is not in acute  distress.     Appearance: She is obese. She is not toxic-appearing.   HENT:      Head: Normocephalic and atraumatic.      Nose: Nose normal. No congestion.      Mouth/Throat:      Pharynx: Oropharynx is clear. No oropharyngeal exudate.   Eyes:      General: No scleral icterus.  Cardiovascular:      Rate and Rhythm: Normal rate and regular rhythm.      Heart sounds: No murmur heard.    No friction rub. No gallop.   Pulmonary:      Effort: No respiratory distress.      Breath sounds: No wheezing or rales.   Abdominal:      General: There is no distension.      Tenderness: There is abdominal tenderness. There is no guarding.   Musculoskeletal:         General: No swelling or deformity.      Cervical back: Normal range of motion. No rigidity.      Right lower leg: No edema.      Left lower leg: No edema.   Skin:     Coloration: Skin is not jaundiced.      Findings: No bruising or lesion.   Neurological:      General: No focal deficit present.      Mental Status: She is alert and oriented to person, place, and time.      Motor: No weakness.        Result Review    Result Review:  I have personally reviewed the results from the time of this admission to 10/2/2023 21:30 EDT and agree with these findings:  []  Laboratory  []  Microbiology  []  Radiology  []  EKG/Telemetry   []  Cardiology/Vascular   []  Pathology  []  Old records  []  Other:        Assessment & Plan        Active Hospital Problems:  There are no active hospital problems to display for this patient.    Plan:     #Diverticulitis with Abscess    - CT a/p shows acute diverticulitis of the sigmoid colon with wall thickening pericolonic stranding and free fluid complicated by 2.0 x 2.2 x 1.3cm rim enhancing intramural abscess    - Rocephin 2g IV daily, watch for toxicity    - Flagyl 500mg IV q8h    - blood cultures    - wbc 11.9, trend    - NPO    - LR @ 75/hr    - Morphine 2mg IV q4h PRN pain    - surgery consulted    - May need IR to drain abscess, will defer  to surgery    #CAD    - hold home Plavix for possible surgery    - resume home Toprol    - denies chest pain    - check EKG for possible surgery    #DM    - A1c was 7.8 on 9/22/23    - hold home metformin    - ISS    #HFpEF    - chronic, euvolemic    - hold home Lasix     - monitor for fluid overload    #Obesity    - BMI 32.87, weight loss encouraged    #HTN    - resume home Toprol    - hold home Lisinopril while admitted, consider restarting if necessary    - monitor    #HLD    - reconciled home meds    #GERD    - PPI      DVT prophylaxis: SCD    CODE STATUS:       Admission Status:  I believe this patient meets inpatient status.    I discussed the patient's findings and my recommendations with patient.    This patient has been examined wearing appropriate Personal Protective Equipment and discussed with  [facility] . 10/02/23      Signature: Electronically signed by Genny Quigley DO, 10/02/23, 10:01 PM EDT.

## 2023-10-03 NOTE — CASE MANAGEMENT/SOCIAL WORK
Discharge Planning Assessment  [facility]     Patient Name: Sada Le  MRN: [id number]  Today's Date: 10/3/2023    Admit Date: 10/2/2023    Plan: [place] PLAN: Routine home     Discharge Needs Assessment       Row Name 10/03/23 1141       Living Environment    People in Home alone    Current Living Arrangements home    Potentially Unsafe Housing Conditions none    Primary Care Provided by self    Family Caregiver if Needed none    Able to Return to Prior Arrangements yes       Resource/Environmental Concerns    Resource/Environmental Concerns none       Food Insecurity    Within the past 12 months, you worried that your food would run out before you got the money to buy more. Never true    Within the past 12 months, the food you bought just didn't last and you didn't have money to get more. Never true       Transition Planning    Patient/Family Anticipates Transition to home    Patient/Family Anticipated Services at Transition     Transportation Anticipated car, drives self;family or friend will provide       Discharge Needs Assessment    Equipment Currently Used at Home none      Row Name 10/03/23 1139       Living Environment    People in Home alone      Row Name 10/03/23 1138       Living Environment    People in Home alone    Current Living Arrangements home    Potentially Unsafe Housing Conditions none    Primary Care Provided by self    Family Caregiver if Needed none    Able to Return to Prior Arrangements yes       Resource/Environmental Concerns    Resource/Environmental Concerns none       Food Insecurity    Within the past 12 months, you worried that your food would run out before you got the money to buy more. Never true    Within the past 12 months, the food you bought just didn't last and you didn't have money to get more. Never true       Transition Planning    Patient/Family Anticipates Transition to home    Patient/Family Anticipated Services at Transition        Discharge  Needs Assessment    Equipment Currently Used at Home none                   Discharge Plan       Row Name 10/03/23 1141       Plan    Plan DC PLAN: Routine home    Patient/Family in Agreement with Plan yes    Plan Comments Met with patient at bedside, from routine home alone. Independent with ADL's no DME. PCP is [facility] in [place]. Able to afford medications and denies any transportation issues, still drives. denies any concerns about return home.                  Continued Care and Services - Admitted Since 10/2/2023    Coordination has not been started for this encounter.       Expected Discharge Date and Time       Expected Discharge Date Expected Discharge Time    Oct 4, 2023            Demographic Summary       Row Name 10/03/23 1138       General Information    Admission Type inpatient    Arrived From emergency department    Referral Source admission list    Reason for Consult discharge planning    Preferred Language English       Contact Information    Permission Granted to Share Info With     Contact Information Obtained for                    Functional Status       Row Name 10/03/23 1138       Functional Status    Usual Activity Tolerance moderate    Current Activity Tolerance moderate       Assessment of Health Literacy    How often do you have someone help you read hospital materials? Sometimes    How often do you have problems learning about your medical condition because of difficulty understanding written information? Sometimes    How often do you have a problem understanding what is told to you about your medical condition? Sometimes    How confident are you filling out medical forms by yourself? Somewhat    Health Literacy Moderate       Functional Status, IADL    Medications independent    Meal Preparation independent    Housekeeping independent    Laundry independent    Shopping independent       Mental Status    General Appearance WDL WDL                   Kamilla Perla, RN

## 2023-10-03 NOTE — PLAN OF CARE
Goal Outcome Evaluation:                   VSS on room air. Pain treated per PRN orders. Pt ambulating independently in room. Surgical consult completed. Plan of care ongoing.

## 2023-10-03 NOTE — ED NOTES
Report to [facility]; pt pending additional abx and ivf; eating and drinking ; NAD; VSS; pending admission

## 2023-10-03 NOTE — CONSULTS
[facility] [place]        Subjective:     Encounter Date:10/02/2023      Patient ID: Sada Le is a 53 y.o. female.    Chief Complaint: Abdominal pain/ cramping    Referring Physician: Brandie Hale MD    HPI:  Sada Le is a 53 y.o. female who presents with abdominal pain and cramping.  Ms. Le is a patient of Dr. Martinez.     Pmh includes anterior ST elevation myocardial infarction with complete occlusion of the proximal LAD under going revascularization with Xience 3.5 x 33 mm drug-eluting stent postdilated to 4.5 mm at 18 familia with good results.  This was an Impella supported procedure. She had cardiogenic shock in the setting of acute MI which was complicated by VF arrest with successful defibrillation.  She did well with weaning Impella support over 2 days and institution of max goal-directed medical therapy ultimately being discharged to home.  We were very aggressive with her medical therapy as well as antiplatelets and her EF improved from around 28 to 30% to 55% with only ever so slight anterior wall very mild hypokinesis residually. She had nonobstructive disease in the circumflex and RCA as well as mid to distal LAD.  She was changed secondary to shortness of breath from Brilinta to Effient. She did have chest pain again in 2021 and underwent repeat [facility] where she received PCI to the ostial proximal circumflex, 3.0 x 18 Xience postdilated to 3.9 mm in the proximal ostial portion and 3.75 mm distally with good angiographic results and reduction of stenosis from 80% ulcerated appearing plaque to 0% residual. She has done well since this time and has been maintained on ASA / Plavix. She also has a known ascending aortic aneurysm- measuring at 4 in 2/2023.    Ms. Le presents this admission with abdominal pain and cramping. She has known diverticulitis. She has had worsening diarrhea. Surgery consulted   CT of the abdomen and pelvis showed diverticulitis of the sigmoid colon with an  intramural abscess. Cardiology consulted for pre operative risk assessment.  Patient states from a CV standpoint she has been doing well. She denies anginal chest pain. Has some mild dyspnea but also reports this is somewhat chronic. She has been active recently.     Past Medical History:   Diagnosis Date    Absence of left thumb     Impression: hx of MRSA, she is signficantly improved She will continue meds and followup if sxs have not resolved over the next 2 weeks.. She is released from care and will notify us of any problems    Acute otitis media     Allergic rhinitis     Arthritis     neck    Cancer     skin    Cervical disc disease with myelopathy     Contact dermatitis or eczema     Coronary artery disease     Disorder of bone and cartilage     Diverticulitis of colon     Impression: CT confirms in the sigmoid colon 10/2011    Diverticulosis of colon     Impression: No sxs 02/13/2013    Elevated cholesterol     Elevated lipoprotein(a) 4/30/2020    Elevated temperature     External otitis of left ear     Gastroesophageal reflux disease without esophagitis 6/19/2022    Hearing loss due to cerumen impaction, left     Hemangioma of liver     History of echocardiogram 10/03/2019    Severely reduced LV systolic function. EF 34% Distribution indicative of LAD territory injury. Akinesis of the apex as well as apical lateral and mid to apical septal walls. Preservation of posterior inferior lateral walls. Mild AI, mild TR. Normal RV function.     History of transesophageal echocardiography (NAFISA) 01/07/2020    EF 55% LA cavity is mild to moderately dilated. Mild MR. Interatrial septum appears redundant. Interatrial hypermobile c/w aneurysm. Large PFO is noted with right to left shunting on bubble study. PFO tunnel appears to be short.     Hordeolum externum of left lower eyelid     Impression: She was started on Bactrim DS for her infection. She was also advised to use warm compression. She will followup should sxs not  improve over the next 48 hrs and resolve over the next 1 weeek.    Hyperlipidemia     Impression: Diet controled. She should see improvement with her successful wt loss. We discussed her lipid panel.    Hypertension, benign     Impression: new onset Low salt low calorie diet and regular exercise and followup in 1 mo She will monitor her pressures and notify us of her pressures over the next 1 week.    Inclusion cyst of right breast     Impression: We will follow for now. She is encouraged to have Mammography. She is presently without insurance and reluctant. She bobby consider. She will notify us of any change at all in the lesion for the only way to be certain of it's etilogy is to remove it. She understands.    Insomnia, organic     Menopausal syndrome     Overweight (BMI 25.0-29.9)     RUQ abdominal pain     Impression: Resolving, negative GB u/s, HIDA EF 77%, we will follow    Stented coronary artery 09/29/2019    Tobacco use     Impression: She is strongly encouraged to stop smoking. Cessation techniques discussed and encouraged. The consequences of not stopping discussed, ie, CAD, PVD, Stroke, Lung and other cancers.       Past Surgical History:   Procedure Laterality Date    BIVENTRICULAR ASSIST DEVICE/LEFT VENTRICULAR ASSIST DEVICE INSERTION N/A 9/29/2019    Procedure: Left Ventricular Assist Device;  Surgeon: Brant Martinez MD;  Location: [facility] CATH INVASIVE LOCATION;  Service: Cardiovascular    CARDIAC CATHETERIZATION N/A 9/29/2019    Procedure: Left Heart Cath;  Surgeon: Brant Martinez MD;  Location: [facility] CATH INVASIVE LOCATION;  Service: Cardiovascular    CARDIAC CATHETERIZATION N/A 9/29/2019    Procedure: Coronary angiography;  Surgeon: Brant Martinez MD;  Location: [facility] CATH INVASIVE LOCATION;  Service: Cardiovascular    CARDIAC CATHETERIZATION N/A 9/29/2019    Procedure: Left ventriculography;  Surgeon: Brant Martinez MD;  Location: [facility] CATH INVASIVE  LOCATION;  Service: Cardiovascular    CARDIAC CATHETERIZATION N/A 9/29/2019    Procedure: Stent SERENITY coronary;  Surgeon: Brant Martinez MD;  Location: [facility] CATH INVASIVE LOCATION;  Service: Cardiovascular    CARDIAC CATHETERIZATION N/A 2/25/2020    Procedure: Right Heart Cath;  Surgeon: Brant Martinez MD;  Location: [facility] CATH INVASIVE LOCATION;  Service: Cardiology;  Laterality: N/A;    CARDIAC CATHETERIZATION N/A 2/12/2021    Procedure: Left Heart Cath;  Surgeon: Brant Martinez MD;  Location: [facility] CATH INVASIVE LOCATION;  Service: Cardiology;  Laterality: N/A;    CARDIAC CATHETERIZATION N/A 2/12/2021    Procedure: Coronary angiography;  Surgeon: Brant Martinez MD;  Location: [facility] CATH INVASIVE LOCATION;  Service: Cardiology;  Laterality: N/A;    CARDIAC CATHETERIZATION N/A 2/12/2021    Procedure: Left ventriculography;  Surgeon: Brant Martinez MD;  Location: [facility] CATH INVASIVE LOCATION;  Service: Cardiology;  Laterality: N/A;    CARDIAC CATHETERIZATION N/A 2/12/2021    Procedure: Aortic root aortogram;  Surgeon: Brant Martinez MD;  Location: [facility] CATH INVASIVE LOCATION;  Service: Cardiology;  Laterality: N/A;    CARDIAC CATHETERIZATION N/A 2/12/2021    Procedure: Percutaneous Coronary Intervention;  Surgeon: Brant Martinez MD;  Location: [facility] CATH INVASIVE LOCATION;  Service: Cardiology;  Laterality: N/A;    CARDIAC CATHETERIZATION N/A 2/12/2021    Procedure: Stent SERENITY coronary;  Surgeon: Brant Martinez MD;  Location: [facility] CATH INVASIVE LOCATION;  Service: Cardiology;  Laterality: N/A;    CARDIAC ELECTROPHYSIOLOGY PROCEDURE  9/29/2019    Procedure: Impella Insertion;  Surgeon: Brant Martinez MD;  Location: [facility] CATH INVASIVE LOCATION;  Service: Cardiovascular    CORONARY ANGIOPLASTY  02/12/2021    1x stent to Circ    CORONARY ANGIOPLASTY WITH STENT PLACEMENT  02/2021    HI RT/LT HEART CATHETERS N/A 9/29/2019     Procedure: Percutaneous Coronary Intervention;  Surgeon: Brant Martinez MD;  Location: [facility] INVASIVE LOCATION;  Service: Cardiovascular    TOTAL ABDOMINAL HYSTERECTOMY WITH SALPINGO OOPHORECTOMY      Endometriosis       Family History   Problem Relation Age of Onset    Alzheimer's disease Mother     Heart disease Mother         cardiovascular    Kidney disease Mother     Throat cancer Mother     Thyroid disease Mother     Diabetes Father         mellitus    Heart attack Father     Thyroid disease Father     Heart disease Sister         cardiovascular    Diabetes Maternal Aunt         mellitus    Heart disease Maternal Aunt         cardiovascular    Breast cancer Maternal Aunt     Cancer Maternal Aunt         breast    Diabetes Maternal Uncle         mellitus    Alzheimer's disease Maternal Uncle     Diabetes Maternal Grandmother         mellitus    Pancreatic cancer Maternal Grandmother     Kidney disease Paternal Uncle        Social History     Socioeconomic History    Marital status:    Tobacco Use    Smoking status: Former     Packs/day: 1.00     Years: 34.00     Pack years: 34.00     Types: Cigarettes     Start date:      Quit date: 2019     Years since quittin.0     Passive exposure: Never    Smokeless tobacco: Never    Tobacco comments:     States she is quitting as of 2019.   Vaping Use    Vaping Use: Never used   Substance and Sexual Activity    Alcohol use: Yes     Comment: occassionally     Drug use: Not Currently     Comment: occasional    Sexual activity: Defer         Allergies   Allergen Reactions    Penicillins Rash    Ciprofloxacin Rash    Penicillin G Rash       Current Medications:   Scheduled Meds:cefTRIAXone, 2,000 mg, Intravenous, Q24H  insulin lispro, 2-7 Units, Subcutaneous, 4x Daily AC & at Bedtime  metoprolol succinate XL, 25 mg, Oral, Daily  metroNIDAZOLE, 500 mg, Intravenous, Q8H  pantoprazole, 40 mg, Oral, Q AM  senna-docusate sodium, 2  "tablet, Oral, BID  sodium chloride, 10 mL, Intravenous, Q12H      Continuous Infusions:lactated ringers, 75 mL/hr, Last Rate: 75 mL/hr (10/03/23 0843)        Review of Systems   Constitutional: Negative for chills, diaphoresis and malaise/fatigue.   Cardiovascular:  Negative for chest pain, dyspnea on exertion, irregular heartbeat, leg swelling, near-syncope, orthopnea, palpitations, paroxysmal nocturnal dyspnea and syncope.   Respiratory:  Negative for cough, shortness of breath, sleep disturbances due to breathing and sputum production.    Gastrointestinal:  Positive for abdominal pain and diarrhea. Negative for change in bowel habit.   Genitourinary:  Negative for urgency.   Neurological:  Negative for dizziness and headaches.   Psychiatric/Behavioral:  Negative for altered mental status.           Objective:         /87 (BP Location: Right arm, Patient Position: Lying)   Pulse 62   Temp 98.6 °F (37 °C) (Oral)   Resp 15   Ht 164.5 cm (64.76\")   Wt 88 kg (194 lb)   LMP 01/01/1996 (Approximate)   SpO2 96%   BMI 32.52 kg/m²     Physical Exam:  General Appearance:    Alert, cooperative, in no acute distress                                Head: Atraumatic, normocephalic, PERRLA               Neck:   supple, trachea midline, no thyromegaly, no carotid bruit, no JVD   Lungs:     Clear to auscultation,respirations regular, even and               unlabored    Heart:    Regular rhythm and normal rate, normal S1 and S2   Abdomen:     Normal bowel sounds, no masses, no organomegaly, soft  nontender, nondistended, no guarding, no rebound  tenderness   Extremities:   Moves all extremities well, no edema, no cyanosis, no  redness   Pulses:   Pulses palpable and equal bilaterally   Skin:   No bleeding, bruising or rash   Neurologic:   Awake, alert, oriented x3                 ASCVD Risk Score::  The ASCVD Risk score (Rogerson DK, et al., 2019) failed to calculate for the following reasons:    The patient has a prior " MI or stroke diagnosis      Lab Review:     Results from last 7 days   Lab Units 10/03/23  1209 10/03/23  0117 10/02/23  1849   SODIUM mmol/L  --  137 138   POTASSIUM mmol/L 4.5 3.4* 3.6   CHLORIDE mmol/L  --  101 99   CO2 mmol/L  --  27.0 29.0   BUN mg/dL  --  12 12   CREATININE mg/dL  --  0.82 0.84   GLUCOSE mg/dL  --  180* 117*   CALCIUM mg/dL  --  8.9 9.1   AST (SGOT) U/L  --   --  21   ALT (SGPT) U/L  --   --  31         Results from last 7 days   Lab Units 10/03/23  0117 10/02/23  1849   WBC 10*3/mm3 10.10 11.90*   HEMOGLOBIN g/dL 12.9 13.3   HEMATOCRIT % 38.3 40.7   PLATELETS 10*3/mm3 204 225                   Invalid input(s): LDLCALC            Recent Radiology:  Imaging Results (Most Recent)       Procedure Component Value Units Date/Time    CT Abdomen Pelvis With Contrast [[id number]] Collected: 10/02/23 2029     Updated: 10/02/23 2037    Narrative:      CT ABDOMEN PELVIS W CONTRAST    Date of Exam: 10/2/2023 8:00 PM EDT    Indication: pain.    Comparison: None available.    Technique: Axial CT images were obtained of the abdomen and pelvis following the uneventful intravenous administration of iodinated contrast. Sagittal and coronal reconstructions were performed.  Automated exposure control and iterative reconstruction   methods were used.        Findings:      Limited evaluation of the lung bases demonstrate no gross abnormality.    There is decreased attenuation of the liver which can be seen with fatty infiltration.    There are no radio opaque gallbladder calculi.    The spleen is unremarkable.    The pancreases is unremarkable.    There are bilateral adrenal nodules measuring approximately 1.3 cm on the right on image 40 and measuring approximately 1.4 cm on the left on image 39 of series 3.      The kidneys are within normal limits.    There is no small bowel obstruction .    The appendix is unremarkable.    Evaluation of the colon is limited by lack of oral contrast and under distention.    There  are multiple diverticuli throughout the colon. There is circumferential wall thickening of the sigmoid colon with pericolonic stranding and free fluid consistent with acute diverticulitis. On image 118 of series 3 there is a rim-enhancing 2.0 x 2.2   x 1.3 cm collection within the wall of the sigmoid colon consistent with an intramural abscess.      The urinary bladder is unremarkable.    There is no pelvic free fluid.    The osseous structures are within normal limits.    IMPESSION :    Acute diverticulitis of the sigmoid colon with wall thickening pericolonic stranding and free fluid. There is a 2.0 x 2.2 x 1.3 cm rim-enhancing intramural abscess on image 118 of series 3.    No bowel obstruction.    Fatty infiltration of the liver.    Bilateral adrenal nodules..    Findings were discussed with Dr. Oshea of the [facility] emergency department at approximately 8:35. P.m.                      Electronically Signed: Randy Mares MD    10/2/2023 8:35 PM EDT    Workstation ID: [id number]              ECHOCARDIOGRAM:    Results for orders placed during the hospital encounter of 03/16/22    Adult Transthoracic Echo Complete W/ Cont if Necessary Per Protocol    Interpretation Summary  · Estimated left ventricular EF was in agreement with the calculated left ventricular EF. Left ventricular ejection fraction appears to be 61 - 65%. Left ventricular systolic function is normal.  · Left ventricular diastolic function is consistent with (grade Ia w/high LAP) impaired relaxation.  · Estimated right ventricular systolic pressure from tricuspid regurgitation is normal (<35 mmHg).  · Moderate dilation of the aortic root is present. Moderate dilation of the sinuses of Valsalva is present.            Assessment:         Active Hospital Problems    Diagnosis  POA    **Diverticulitis of intestine with abscess [K57.80]  Yes     Abdominal pain / cramping / diverticulitis with abscess  CAD s/p prior AMI in 2020 with most  recent PCI in 2/2021 (see details above)  Ischemic cardiomyopathy with recovered LVEF, chronic systolic and diastolic HF with recovered LVEF  Aortic aneurysm  HTN     Plan:   Ms. Le presents with abdominal pain / cramping, noted to have diverticulitis with abscess. Cardiology consulted for pre operative risk assessment and discussion of holding plavix.  Patient has been revascularized with most recent [facility] 2/2021. She is angina free. Has no signs of CHF. She has recovered LVEF  Will repeat ECHO  Check ECG  She is at an acceptable risk to proceed with non cardiac non vascular surgery  Okay to hold plavix pre operatively, would restart when safe post operatively                  Comfort Turpin, APRN  10/03/23  15:08 EDT

## 2023-10-04 ENCOUNTER — APPOINTMENT (OUTPATIENT)
Dept: CARDIOLOGY | Facility: HOSPITAL | Age: 53
End: 2023-10-04
Payer: MEDICAID

## 2023-10-04 LAB
ANION GAP SERPL CALCULATED.3IONS-SCNC: 9 MMOL/L (ref 5–15)
BASOPHILS # BLD AUTO: 0 10*3/MM3 (ref 0–0.2)
BASOPHILS NFR BLD AUTO: 0.3 % (ref 0–1.5)
BUN SERPL-MCNC: 8 MG/DL (ref 6–20)
BUN/CREAT SERPL: 11.3 (ref 7–25)
CALCIUM SPEC-SCNC: 8.7 MG/DL (ref 8.6–10.5)
CHLORIDE SERPL-SCNC: 101 MMOL/L (ref 98–107)
CO2 SERPL-SCNC: 28 MMOL/L (ref 22–29)
CREAT SERPL-MCNC: 0.71 MG/DL (ref 0.57–1)
DEPRECATED RDW RBC AUTO: 43.3 FL (ref 37–54)
DEPRECATED RDW RBC AUTO: 43.3 FL (ref 37–54)
EGFRCR SERPLBLD CKD-EPI 2021: 101.2 ML/MIN/1.73
EOSINOPHIL # BLD AUTO: 0.2 10*3/MM3 (ref 0–0.4)
EOSINOPHIL NFR BLD AUTO: 2.1 % (ref 0.3–6.2)
ERYTHROCYTE [DISTWIDTH] IN BLOOD BY AUTOMATED COUNT: 13.1 % (ref 12.3–15.4)
ERYTHROCYTE [DISTWIDTH] IN BLOOD BY AUTOMATED COUNT: 13.5 % (ref 12.3–15.4)
GLUCOSE BLDC GLUCOMTR-MCNC: 111 MG/DL (ref 70–105)
GLUCOSE BLDC GLUCOMTR-MCNC: 116 MG/DL (ref 70–105)
GLUCOSE BLDC GLUCOMTR-MCNC: 116 MG/DL (ref 70–105)
GLUCOSE BLDC GLUCOMTR-MCNC: 236 MG/DL (ref 70–105)
GLUCOSE SERPL-MCNC: 106 MG/DL (ref 65–99)
HCT VFR BLD AUTO: 36.7 % (ref 34–46.6)
HCT VFR BLD AUTO: 37.1 % (ref 34–46.6)
HGB BLD-MCNC: 12.4 G/DL (ref 12–15.9)
HGB BLD-MCNC: 12.5 G/DL (ref 12–15.9)
LYMPHOCYTES # BLD AUTO: 2.2 10*3/MM3 (ref 0.7–3.1)
LYMPHOCYTES NFR BLD AUTO: 29.9 % (ref 19.6–45.3)
MCH RBC QN AUTO: 30.2 PG (ref 26.6–33)
MCH RBC QN AUTO: 30.5 PG (ref 26.6–33)
MCHC RBC AUTO-ENTMCNC: 33.7 G/DL (ref 31.5–35.7)
MCHC RBC AUTO-ENTMCNC: 33.8 G/DL (ref 31.5–35.7)
MCV RBC AUTO: 89.4 FL (ref 79–97)
MCV RBC AUTO: 90.5 FL (ref 79–97)
MONOCYTES # BLD AUTO: 0.6 10*3/MM3 (ref 0.1–0.9)
MONOCYTES NFR BLD AUTO: 8.8 % (ref 5–12)
NEUTROPHILS NFR BLD AUTO: 4.3 10*3/MM3 (ref 1.7–7)
NEUTROPHILS NFR BLD AUTO: 58.9 % (ref 42.7–76)
NRBC BLD AUTO-RTO: 0.1 /100 WBC (ref 0–0.2)
PLATELET # BLD AUTO: 186 10*3/MM3 (ref 140–450)
PLATELET # BLD AUTO: 190 10*3/MM3 (ref 140–450)
PMV BLD AUTO: 9.2 FL (ref 6–12)
PMV BLD AUTO: 9.3 FL (ref 6–12)
POTASSIUM SERPL-SCNC: 3.8 MMOL/L (ref 3.5–5.2)
RBC # BLD AUTO: 4.06 10*6/MM3 (ref 3.77–5.28)
RBC # BLD AUTO: 4.15 10*6/MM3 (ref 3.77–5.28)
SODIUM SERPL-SCNC: 138 MMOL/L (ref 136–145)
WBC NRBC COR # BLD: 7.3 10*3/MM3 (ref 3.4–10.8)
WBC NRBC COR # BLD: 8.6 10*3/MM3 (ref 3.4–10.8)

## 2023-10-04 PROCEDURE — 99231 SBSQ HOSP IP/OBS SF/LOW 25: CPT | Performed by: SURGERY

## 2023-10-04 PROCEDURE — 93306 TTE W/DOPPLER COMPLETE: CPT

## 2023-10-04 PROCEDURE — 25010000002 MORPHINE PER 10 MG: Performed by: STUDENT IN AN ORGANIZED HEALTH CARE EDUCATION/TRAINING PROGRAM

## 2023-10-04 PROCEDURE — 25810000003 SODIUM CHLORIDE 0.9 % SOLUTION: Performed by: HOSPITALIST

## 2023-10-04 PROCEDURE — 93306 TTE W/DOPPLER COMPLETE: CPT | Performed by: STUDENT IN AN ORGANIZED HEALTH CARE EDUCATION/TRAINING PROGRAM

## 2023-10-04 PROCEDURE — 85025 COMPLETE CBC W/AUTO DIFF WBC: CPT | Performed by: HOSPITALIST

## 2023-10-04 PROCEDURE — 80048 BASIC METABOLIC PNL TOTAL CA: CPT | Performed by: HOSPITALIST

## 2023-10-04 PROCEDURE — 25010000002 CEFTRIAXONE PER 250 MG: Performed by: STUDENT IN AN ORGANIZED HEALTH CARE EDUCATION/TRAINING PROGRAM

## 2023-10-04 PROCEDURE — 82948 REAGENT STRIP/BLOOD GLUCOSE: CPT

## 2023-10-04 PROCEDURE — 25810000003 LACTATED RINGERS PER 1000 ML: Performed by: STUDENT IN AN ORGANIZED HEALTH CARE EDUCATION/TRAINING PROGRAM

## 2023-10-04 PROCEDURE — 25010000002 METRONIDAZOLE 500 MG/100ML SOLUTION: Performed by: STUDENT IN AN ORGANIZED HEALTH CARE EDUCATION/TRAINING PROGRAM

## 2023-10-04 PROCEDURE — 25010000002 HEPARIN (PORCINE) PER 1000 UNITS: Performed by: HOSPITALIST

## 2023-10-04 RX ORDER — HEPARIN SODIUM 5000 [USP'U]/ML
5000 INJECTION, SOLUTION INTRAVENOUS; SUBCUTANEOUS EVERY 12 HOURS SCHEDULED
Status: DISCONTINUED | OUTPATIENT
Start: 2023-10-04 | End: 2023-10-06 | Stop reason: HOSPADM

## 2023-10-04 RX ORDER — METOPROLOL SUCCINATE 25 MG/1
25 TABLET, EXTENDED RELEASE ORAL DAILY
COMMUNITY
End: 2023-10-16

## 2023-10-04 RX ORDER — ASPIRIN 81 MG/1
81 TABLET ORAL DAILY
COMMUNITY

## 2023-10-04 RX ORDER — ACETAMINOPHEN 325 MG/1
650 TABLET ORAL EVERY 6 HOURS PRN
COMMUNITY

## 2023-10-04 RX ORDER — OMEPRAZOLE 20 MG/1
20 CAPSULE, DELAYED RELEASE ORAL DAILY
COMMUNITY

## 2023-10-04 RX ORDER — HYDROCODONE BITARTRATE AND ACETAMINOPHEN 5; 325 MG/1; MG/1
1 TABLET ORAL EVERY 4 HOURS PRN
Status: DISCONTINUED | OUTPATIENT
Start: 2023-10-04 | End: 2023-10-06 | Stop reason: HOSPADM

## 2023-10-04 RX ORDER — SODIUM CHLORIDE 9 MG/ML
75 INJECTION, SOLUTION INTRAVENOUS CONTINUOUS
Status: DISCONTINUED | OUTPATIENT
Start: 2023-10-04 | End: 2023-10-06 | Stop reason: HOSPADM

## 2023-10-04 RX ORDER — FUROSEMIDE 40 MG/1
40 TABLET ORAL NIGHTLY PRN
Status: ON HOLD | COMMUNITY
End: 2023-10-05 | Stop reason: SDUPTHER

## 2023-10-04 RX ORDER — POLYETHYLENE GLYCOL 3350 17 G/17G
17 POWDER, FOR SOLUTION ORAL DAILY
Status: DISCONTINUED | OUTPATIENT
Start: 2023-10-04 | End: 2023-10-06 | Stop reason: HOSPADM

## 2023-10-04 RX ORDER — FUROSEMIDE 40 MG/1
40 TABLET ORAL EVERY MORNING
COMMUNITY

## 2023-10-04 RX ORDER — CLOPIDOGREL BISULFATE 75 MG/1
75 TABLET ORAL DAILY
COMMUNITY

## 2023-10-04 RX ADMIN — PANTOPRAZOLE SODIUM 40 MG: 40 TABLET, DELAYED RELEASE ORAL at 07:24

## 2023-10-04 RX ADMIN — METRONIDAZOLE 500 MG: 500 INJECTION, SOLUTION INTRAVENOUS at 18:59

## 2023-10-04 RX ADMIN — Medication 10 ML: at 20:30

## 2023-10-04 RX ADMIN — HYDROCODONE BITARTRATE AND ACETAMINOPHEN 1 TABLET: 5; 325 TABLET ORAL at 14:36

## 2023-10-04 RX ADMIN — POLYETHYLENE GLYCOL 3350 17 G: 17 POWDER, FOR SOLUTION ORAL at 09:04

## 2023-10-04 RX ADMIN — SODIUM CHLORIDE, POTASSIUM CHLORIDE, SODIUM LACTATE AND CALCIUM CHLORIDE 75 ML/HR: 600; 310; 30; 20 INJECTION, SOLUTION INTRAVENOUS at 09:04

## 2023-10-04 RX ADMIN — MORPHINE SULFATE 2 MG: 2 INJECTION, SOLUTION INTRAMUSCULAR; INTRAVENOUS at 11:11

## 2023-10-04 RX ADMIN — SENNOSIDES AND DOCUSATE SODIUM 2 TABLET: 50; 8.6 TABLET ORAL at 09:04

## 2023-10-04 RX ADMIN — SODIUM CHLORIDE 75 ML/HR: 9 INJECTION, SOLUTION INTRAVENOUS at 14:38

## 2023-10-04 RX ADMIN — MORPHINE SULFATE 2 MG: 2 INJECTION, SOLUTION INTRAMUSCULAR; INTRAVENOUS at 07:24

## 2023-10-04 RX ADMIN — MORPHINE SULFATE 2 MG: 2 INJECTION, SOLUTION INTRAMUSCULAR; INTRAVENOUS at 23:27

## 2023-10-04 RX ADMIN — HYDROCODONE BITARTRATE AND ACETAMINOPHEN 1 TABLET: 5; 325 TABLET ORAL at 20:29

## 2023-10-04 RX ADMIN — METOPROLOL SUCCINATE 25 MG: 25 TABLET, EXTENDED RELEASE ORAL at 09:04

## 2023-10-04 RX ADMIN — METRONIDAZOLE 500 MG: 500 INJECTION, SOLUTION INTRAVENOUS at 02:38

## 2023-10-04 RX ADMIN — HEPARIN SODIUM 5000 UNITS: 5000 INJECTION INTRAVENOUS; SUBCUTANEOUS at 23:22

## 2023-10-04 RX ADMIN — CEFTRIAXONE 2000 MG: 2 INJECTION, POWDER, FOR SOLUTION INTRAMUSCULAR; INTRAVENOUS at 20:29

## 2023-10-04 RX ADMIN — SENNOSIDES AND DOCUSATE SODIUM 2 TABLET: 50; 8.6 TABLET ORAL at 20:29

## 2023-10-04 RX ADMIN — METRONIDAZOLE 500 MG: 500 INJECTION, SOLUTION INTRAVENOUS at 09:04

## 2023-10-04 RX ADMIN — MORPHINE SULFATE 2 MG: 2 INJECTION, SOLUTION INTRAMUSCULAR; INTRAVENOUS at 02:57

## 2023-10-04 NOTE — PLAN OF CARE
Goal Outcome Evaluation:                      VSS on room air. Pain treated per PO/IV PRN orders. Pt able to make needs known. Plan of care ongoing.

## 2023-10-04 NOTE — PAYOR COMM NOTE
"Magdalena Le \"Geeta\" (53 y.o. Female)  1970  POLICY NO.   [id number]  Requesting IP Auth for ER Medical Admit    AUTHORIZATION PENDING 10/02- Pt remains in hospital 10/04/23  PLEASE FORWARD DETERMINATION TO FOLLOWING CONTACT:    KARLY MARTINEZ LPN UR  Utilization Review Nurse  [facility]  Direct & confidential phone # [phone]  Fax # [phone]           Date of Birth   1970    Social Security Number       Address   [address] [place] [address]    Home Phone   [phone]    MRN   [id number]       Episcopalian   None    Marital Status                               Admission Date   10/2/23    Admission Type   Emergency    Admitting Provider   Genny Quigley DO    Attending Provider   Brinda Redd MD    Department, Room/Bed   [facility] SURGICAL INPATIENT,        Discharge Date       Discharge Disposition       Discharge Destination                                 Attending Provider: Brinda Redd MD    Allergies: Penicillins, Ciprofloxacin, Penicillin G    Isolation: None   Infection: None   Code Status: CPR    Ht: 164.5 cm (64.76\")   Wt: 88 kg (194 lb)    Admission Cmt: None   Principal Problem: Diverticulitis of intestine with abscess [K57.80]                   Active Insurance as of 10/2/2023       Primary Coverage       Payor Plan Insurance Group Employer/Plan Group    [id number]       Payor Plan Address Payor Plan Phone Number Payor Plan Fax Number Effective Dates    MAIL STOP:   2019 - None Entered    [address] [address]       [facility] [address]         Subscriber Name Subscriber Birth Date Member ID       MAGDALENA LE 1970 [id number]                     Emergency Contacts        (Rel.) Home Phone Work Phone Mobile Phone    ARTHUR CORBIN (Daughter) -- -- [phone]                 History & Physical        Genny Quigley DO at 10/02/23 2129        "       [facility] Medicine Services      Patient Name: Sada Le  : 1970  MRN: [id number]  Primary Care Physician:  Yodit Johnston MD  Date of admission: 10/2/2023      Subjective      Chief Complaint: abdominal pain    History of Present Illness: Sada Le is a 52 y.o. female who presented to [facility] on 10/2/2023 complaining of abdominal pain.  Admits to cramping 8-9/10 LLQ abdominal pain with radiation to right side and started worsening today.  States she was on PO abx for diverticulitis in January, symptoms initially improved but have recurred multiple times this year.  States symptoms today are severe diverticular symptoms.  Denies melena, hematochezia, chest pain, fever, vomiting, diarrhea.  Due to worsening symptoms, presented to [facility] for evaluation.    In the ER, vitals 98F, HR 67, RR 18, /81, 98% RA.  Labs notable for glucose 117, wbc 11.9.  CT a/p shows acute diverticulitis of the sigmoid colon with wall thickening pericolonic stranding and free fluid complicated by 2.0 x 2.2 x 1.3cm rim enhancing intramural abscess.  She is to be admitted for IV abx and surgical evaluation of diverticulitis.      ROS   12 point ROS reviewed and negative except as mentioned above      Personal History     Past Medical History:   Diagnosis Date    Absence of left thumb     Impression: hx of MRSA, she is signficantly improved She will continue meds and followup if sxs have not resolved over the next 2 weeks.. She is released from care and will notify us of any problems    Acute otitis media     Allergic rhinitis     Arthritis     neck    Cancer     skin    Cervical disc disease with myelopathy     Contact dermatitis or eczema     Coronary artery disease     Disorder of bone and cartilage     Diverticulitis of colon     Impression: CT confirms in the sigmoid colon 10/2011    Diverticulosis of colon     Impression: No sxs 2013    Elevated cholesterol     Elevated  lipoprotein(a) 4/30/2020    Elevated temperature     External otitis of left ear     Gastroesophageal reflux disease without esophagitis 6/19/2022    Hearing loss due to cerumen impaction, left     Hemangioma of liver     History of echocardiogram 10/03/2019    Severely reduced LV systolic function. EF 34% Distribution indicative of LAD territory injury. Akinesis of the apex as well as apical lateral and mid to apical septal walls. Preservation of posterior inferior lateral walls. Mild AI, mild TR. Normal RV function.     History of transesophageal echocardiography (NAFISA) 01/07/2020    EF 55% LA cavity is mild to moderately dilated. Mild MR. Interatrial septum appears redundant. Interatrial hypermobile c/w aneurysm. Large PFO is noted with right to left shunting on bubble study. PFO tunnel appears to be short.     Hordeolum externum of left lower eyelid     Impression: She was started on Bactrim DS for her infection. She was also advised to use warm compression. She will followup should sxs not improve over the next 48 hrs and resolve over the next 1 weeek.    Hyperlipidemia     Impression: Diet controled. She should see improvement with her successful wt loss. We discussed her lipid panel.    Hypertension, benign     Impression: new onset Low salt low calorie diet and regular exercise and followup in 1 mo She will monitor her pressures and notify us of her pressures over the next 1 week.    Inclusion cyst of right breast     Impression: We will follow for now. She is encouraged to have Mammography. She is presently without insurance and reluctant. She bobby consider. She will notify us of any change at all in the lesion for the only way to be certain of it's etilogy is to remove it. She understands.    Insomnia, organic     Menopausal syndrome     Overweight (BMI 25.0-29.9)     RUQ abdominal pain     Impression: Resolving, negative GB u/s, HIDA EF 77%, we will follow    Stented coronary artery 09/29/2019    Tobacco  use     Impression: She is strongly encouraged to stop smoking. Cessation techniques discussed and encouraged. The consequences of not stopping discussed, ie, CAD, PVD, Stroke, Lung and other cancers.       Past Surgical History:   Procedure Laterality Date    BIVENTRICULAR ASSIST DEVICE/LEFT VENTRICULAR ASSIST DEVICE INSERTION N/A 9/29/2019    Procedure: Left Ventricular Assist Device;  Surgeon: Brant Martinez MD;  Location: [facility] CATH INVASIVE LOCATION;  Service: Cardiovascular    CARDIAC CATHETERIZATION N/A 9/29/2019    Procedure: Left Heart Cath;  Surgeon: Brant Martinez MD;  Location: [facility] CATH INVASIVE LOCATION;  Service: Cardiovascular    CARDIAC CATHETERIZATION N/A 9/29/2019    Procedure: Coronary angiography;  Surgeon: Brant Martinez MD;  Location: [facility] CATH INVASIVE LOCATION;  Service: Cardiovascular    CARDIAC CATHETERIZATION N/A 9/29/2019    Procedure: Left ventriculography;  Surgeon: Brant Martinez MD;  Location: [facility] CATH INVASIVE LOCATION;  Service: Cardiovascular    CARDIAC CATHETERIZATION N/A 9/29/2019    Procedure: Stent SERENITY coronary;  Surgeon: Brant Martinez MD;  Location: [facility] CATH INVASIVE LOCATION;  Service: Cardiovascular    CARDIAC CATHETERIZATION N/A 2/25/2020    Procedure: Right Heart Cath;  Surgeon: Brant Martinez MD;  Location: [facility] CATH INVASIVE LOCATION;  Service: Cardiology;  Laterality: N/A;    CARDIAC CATHETERIZATION N/A 2/12/2021    Procedure: Left Heart Cath;  Surgeon: Brant Martinez MD;  Location: [facility] CATH INVASIVE LOCATION;  Service: Cardiology;  Laterality: N/A;    CARDIAC CATHETERIZATION N/A 2/12/2021    Procedure: Coronary angiography;  Surgeon: Brant Martinez MD;  Location: [facility] CATH INVASIVE LOCATION;  Service: Cardiology;  Laterality: N/A;    CARDIAC CATHETERIZATION N/A 2/12/2021    Procedure: Left ventriculography;  Surgeon: Brant Martinez MD;  Location: [facility]  INVASIVE LOCATION;  Service: Cardiology;  Laterality: N/A;    CARDIAC CATHETERIZATION N/A 2/12/2021    Procedure: Aortic root aortogram;  Surgeon: Brant Martinez MD;  Location: [facility] CATH INVASIVE LOCATION;  Service: Cardiology;  Laterality: N/A;    CARDIAC CATHETERIZATION N/A 2/12/2021    Procedure: Percutaneous Coronary Intervention;  Surgeon: Brant Martinez MD;  Location: [facility] CATH INVASIVE LOCATION;  Service: Cardiology;  Laterality: N/A;    CARDIAC CATHETERIZATION N/A 2/12/2021    Procedure: Stent SERENITY coronary;  Surgeon: Brant Martinez MD;  Location: [facility] CATH INVASIVE LOCATION;  Service: Cardiology;  Laterality: N/A;    CARDIAC ELECTROPHYSIOLOGY PROCEDURE  9/29/2019    Procedure: Impella Insertion;  Surgeon: Brant Martinez MD;  Location: [facility] CATH INVASIVE LOCATION;  Service: Cardiovascular    CORONARY ANGIOPLASTY  02/12/2021    1x stent to Circ    CORONARY ANGIOPLASTY WITH STENT PLACEMENT  02/2021    IN RT/LT HEART CATHETERS N/A 9/29/2019    Procedure: Percutaneous Coronary Intervention;  Surgeon: Brant Martinez MD;  Location: [facility] CATH INVASIVE LOCATION;  Service: Cardiovascular    TOTAL ABDOMINAL HYSTERECTOMY WITH SALPINGO OOPHORECTOMY  1995    Endometriosis       Family History: family history includes Alzheimer's disease in her maternal uncle and mother; Breast cancer in her maternal aunt; Cancer in her maternal aunt; Diabetes in her father, maternal aunt, maternal grandmother, and maternal uncle; Heart attack in her father; Heart disease in her maternal aunt, mother, and sister; Kidney disease in her mother and paternal uncle; Pancreatic cancer in her maternal grandmother; Throat cancer in her mother; Thyroid disease in her father and mother. Otherwise pertinent FHx was reviewed and not pertinent to current issue.    Social History:  reports that she quit smoking about 4 years ago. Her smoking use included cigarettes. She started smoking about 38  years ago. She has a 34.00 pack-year smoking history. She has never used smokeless tobacco. She reports current alcohol use. She reports that she does not currently use drugs.    Home Medications:  Prior to Admission Medications       Prescriptions Last Dose Informant Patient Reported? Taking?    cetirizine (zyrTEC) 10 MG tablet   Yes No    Take 1 tablet by mouth Every Night.    cholecalciferol (VITAMIN D3) 1.25 MG (90955 UT) capsule   Yes No    Take 1 capsule by mouth Daily.    clindamycin (CLEOCIN) 300 MG capsule   No No    Take 1 capsule by mouth 3 (Three) Times a Day.    clopidogrel (PLAVIX) 75 MG tablet   No No    TAKE 1 TABLET BY MOUTH EVERY DAY    [facility] Aspirin Adult Low Dose 81 MG chewable tablet   No No    CHEW AND SWALLOW 1 TABLET BY MOUTH EVERY DAY    furosemide (LASIX) 40 MG tablet   No No    Take 1 tablet by mouth As Needed (swelling).    lisinopril (PRINIVIL,ZESTRIL) 5 MG tablet   No No    Take 1 tablet by mouth 2 (Two) Times a Day.    metFORMIN (GLUCOPHAGE) 500 MG tablet   No No    Take 1 tablet by mouth 2 (Two) Times a Day With Meals.    Patient taking differently:  Take 1 tablet by mouth Daily With Breakfast.    metoprolol succinate XL (TOPROL-XL) 25 MG 24 hr tablet   No No    TAKE 1 TABLET BY MOUTH EVERY DAY    nitroglycerin (NITROSTAT) 0.4 MG SL tablet   No No    Place 1 tablet under the tongue Every 5 (Five) Minutes As Needed for Chest Pain. Take no more than 3 doses in 15 minutes.    omeprazole OTC (PriLOSEC OTC) 20 MG EC tablet   No No    TAKE 1 TABLET BY MOUTH EVERY DAY    vitamin B-12 (CYANOCOBALAMIN) 500 MCG tablet   Yes No    Take 1 tablet by mouth Daily.              Allergies:  Allergies   Allergen Reactions    Penicillins Rash    Ciprofloxacin Rash    Penicillin G Rash       Objective      Vitals:   Temp:  [98 °F (36.7 °C)] 98 °F (36.7 °C)  Heart Rate:  [67-79] 67  Resp:  [18-20] 18  BP: (107-143)/(81-90) 107/81    Physical Exam  Constitutional:       General: She is not in acute  distress.     Appearance: She is obese. She is not toxic-appearing.   HENT:      Head: Normocephalic and atraumatic.      Nose: Nose normal. No congestion.      Mouth/Throat:      Pharynx: Oropharynx is clear. No oropharyngeal exudate.   Eyes:      General: No scleral icterus.  Cardiovascular:      Rate and Rhythm: Normal rate and regular rhythm.      Heart sounds: No murmur heard.    No friction rub. No gallop.   Pulmonary:      Effort: No respiratory distress.      Breath sounds: No wheezing or rales.   Abdominal:      General: There is no distension.      Tenderness: There is abdominal tenderness. There is no guarding.   Musculoskeletal:         General: No swelling or deformity.      Cervical back: Normal range of motion. No rigidity.      Right lower leg: No edema.      Left lower leg: No edema.   Skin:     Coloration: Skin is not jaundiced.      Findings: No bruising or lesion.   Neurological:      General: No focal deficit present.      Mental Status: She is alert and oriented to person, place, and time.      Motor: No weakness.        Result Review    Result Review:  I have personally reviewed the results from the time of this admission to 10/2/2023 21:30 EDT and agree with these findings:  []  Laboratory  []  Microbiology  []  Radiology  []  EKG/Telemetry   []  Cardiology/Vascular   []  Pathology  []  Old records  []  Other:        Assessment & Plan        Active Hospital Problems:  There are no active hospital problems to display for this patient.    Plan:     #Diverticulitis with Abscess    - CT a/p shows acute diverticulitis of the sigmoid colon with wall thickening pericolonic stranding and free fluid complicated by 2.0 x 2.2 x 1.3cm rim enhancing intramural abscess    - Rocephin 2g IV daily, watch for toxicity    - Flagyl 500mg IV q8h    - blood cultures    - wbc 11.9, trend    - NPO    - LR @ 75/hr    - Morphine 2mg IV q4h PRN pain    - surgery consulted    - May need IR to drain abscess, will defer  to surgery    #CAD    - hold home Plavix for possible surgery    - resume home Toprol    - denies chest pain    - check EKG for possible surgery    #DM    - A1c was 7.8 on 23    - hold home metformin    - ISS    #HFpEF    - chronic, euvolemic    - hold home Lasix     - monitor for fluid overload    #Obesity    - BMI 32.87, weight loss encouraged    #HTN    - resume home Toprol    - hold home Lisinopril while admitted, consider restarting if necessary    - monitor    #HLD    - reconciled home meds    #GERD    - PPI      DVT prophylaxis: SCD    CODE STATUS:       Admission Status:  I believe this patient meets inpatient status.    I discussed the patient's findings and my recommendations with patient.    This patient has been examined wearing appropriate Personal Protective Equipment and discussed with  Evens . 10/02/23      Signature: Electronically signed by Genny Quigley DO, 10/02/23, 10:01 PM EDT.       Electronically signed by Genny Quigley DO at 10/02/23 2201          Physician Progress Notes (last 48 hours)        Brandie Hale MD at 10/04/23 0933            General Surgery Inpatient Progress Note      Name: Sada Le ADMIT: 10/2/2023   : 1970  PCP: Yodit Johnston MD    MRN: [id number] LOS: 2 days   AGE/SEX: 53 y.o. female  ROOM: [address] [place] [address]      Patient Care Team:  Yodit Johnston MD as PCP - General (Family Medicine)  Amos Olivia MD as Consulting Physician (Cardiology)  Brant Martinez MD as Consulting Physician (Cardiology)  Chong Golden MD as Consulting Physician (Ophthalmology)  Chief Complaint   Patient presents with    Abdominal Pain     Abd pain, history of diverticulitis, states she has had pain since .  Pt states it is so bad right now she can't take it she has cramping and diarrhea         Subjective:  Patient seen and examined.  She reports still having intermittent cramping but it is less intense and is less frequent.  Has not  had a bowel movement.  Denies any nausea or vomiting.    Medications:  cefTRIAXone, 2,000 mg, Intravenous, Q24H  insulin lispro, 2-7 Units, Subcutaneous, 4x Daily AC & at Bedtime  metoprolol succinate XL, 25 mg, Oral, Daily  metroNIDAZOLE, 500 mg, Intravenous, Q8H  pantoprazole, 40 mg, Oral, Q AM  polyethylene glycol, 17 g, Oral, Daily  senna-docusate sodium, 2 tablet, Oral, BID  sodium chloride, 10 mL, Intravenous, Q12H         senna-docusate sodium **AND** polyethylene glycol **AND** bisacodyl **AND** bisacodyl    Calcium Replacement - Follow Nurse / BPA Driven Protocol    dextrose    dextrose    glucagon (human recombinant)    Magnesium Standard Dose Replacement - Follow Nurse / BPA Driven Protocol    Morphine    nitroglycerin    ondansetron **OR** ondansetron    Phosphorus Replacement - Follow Nurse / BPA Driven Protocol    Potassium Replacement - Follow Nurse / BPA Driven Protocol    sodium chloride    sodium chloride     Vitals:  Temp:  [98.2 °F (36.8 °C)-98.8 °F (37.1 °C)] 98.8 °F (37.1 °C)  Heart Rate:  [57-64] 64  Resp:  [15-18] 15  BP: (113-126)/(72-87) 118/72     Physical Exam:  No acute distress, alert  Nonlabored respirations  Abdomen soft, nondistended, mildly tender to palpation in left lower quadrant, no guarding  Extremities warm well perfused with no gross deformities    Labs:  Results from last 7 days   Lab Units 10/03/23  2355 10/03/23  0117 10/02/23  1849   WBC 10*3/mm3 8.60 10.10 11.90*   HEMOGLOBIN g/dL 12.4 12.9 13.3   HEMATOCRIT % 36.7 38.3 40.7   PLATELETS 10*3/mm3 190 204 225     Results from last 7 days   Lab Units 10/03/23  2355 10/03/23  1209 10/03/23  0117 10/02/23  1849   SODIUM mmol/L 138  --  137 138   POTASSIUM mmol/L 3.8 4.5 3.4* 3.6   CHLORIDE mmol/L 101  --  101 99   CO2 mmol/L 28.0  --  27.0 29.0   BUN mg/dL 8  --  12 12   CREATININE mg/dL 0.71  --  0.82 0.84   CALCIUM mg/dL 8.7  --  8.9 9.1   BILIRUBIN mg/dL  --   --   --  0.3   ALK PHOS U/L  --   --   --  93   ALT (SGPT) U/L   --   --   --  31   AST (SGOT) U/L  --   --   --  21   GLUCOSE mg/dL 106*  --  180* 117*     Assessment and Plan:  53 y.o. female with recurrent diverticulitis admitted for flare associated with intramural abscess.  Clinically improving on antibiotics.     -Continue present management with IV antibiotics  - Continue IV fluids  - We will start clears and monitor for tolerance  -As previously mentioned ideally we would treat her nonoperatively during this hospitalization and then have her follow-up with GI for colonoscopy 6 to 8 weeks out given no prior recent colonoscopy  - Given recent Plavix last taken on 10/2/2023 would not plan on any surgical intervention unless necessary until Plavix is held at least 5 days; given this over the weekend earlier surgery would likely be Monday  -Given extensive cardiac history cardiology has evaluated the patient and patient is acceptable risk for surgery if needed  - Given multiple recurrent symptoms and change in bowel habits patient may ultimately need colon resection but ideally this would be done in an outpatient setting  - We will continue to follow    This note was created using Dragon Voice Recognition software.    Brandie Hale MD  10/04/23  09:36 EDT     Electronically signed by Brandie Hale MD at 10/04/23 0939       Andi Mcdonald MD at 10/03/23 0850              [facility] ER Medicine Services  INPATIENT PROGRESS NOTE    Length of Stay: 1  Date of Admission: 10/2/2023  Primary Care Physician: Yodit Johnston MD    Subjective   Chief Complaint: Abdominal pain  HPI:    She has abdominal pain.  Continuing with the IV antibiotics.  Has been receiving pain medication as needed    Review of Systems   Respiratory:  Negative for shortness of breath.    Cardiovascular:  Negative for chest pain.      All pertinent negatives and positives are as above. All other systems have been reviewed and are negative unless otherwise stated.     Objective    Temp:   [98 °F (36.7 °C)-98.8 °F (37.1 °C)] 98.8 °F (37.1 °C)  Heart Rate:  [63-81] 81  Resp:  [13-20] 15  BP: (107-143)/(60-90) 109/62  Physical Exam  Constitutional:       General: She is not in acute distress.     Appearance: She is well-developed. She is not diaphoretic.   HENT:      Head: Normocephalic and atraumatic.   Cardiovascular:      Rate and Rhythm: Normal rate.   Pulmonary:      Effort: Pulmonary effort is normal. No respiratory distress.      Breath sounds: No wheezing.   Abdominal:      General: There is no distension.      Palpations: Abdomen is soft.   Musculoskeletal:         General: Normal range of motion.   Skin:     General: Skin is warm and dry.   Neurological:      Mental Status: She is alert.      Cranial Nerves: No cranial nerve deficit.   Psychiatric:         Behavior: Behavior normal.         Thought Content: Thought content normal.         Judgment: Judgment normal.           Results Review:  I have reviewed the labs, radiology results, and diagnostic studies.    Laboratory Data:   Lab Results (last 24 hours)       Procedure Component Value Units Date/Time    POC Glucose Once [[id number]]  (Abnormal) Collected: 10/03/23 0734    Specimen: Blood Updated: 10/03/23 0735     Glucose 127 mg/dL      Comment: Serial Number: [id number]:  [id number]       Basic Metabolic Panel [[id number]]  (Abnormal) Collected: 10/03/23 0117    Specimen: Blood Updated: 10/03/23 0154     Glucose 180 mg/dL      BUN 12 mg/dL      Creatinine 0.82 mg/dL      Sodium 137 mmol/L      Potassium 3.4 mmol/L      Chloride 101 mmol/L      CO2 27.0 mmol/L      Calcium 8.9 mg/dL      BUN/Creatinine Ratio 14.6     Anion Gap 9.0 mmol/L      eGFR 85.1 mL/min/1.73     Narrative:      GFR Normal >60  Chronic Kidney Disease <60  Kidney Failure <15      CBC (No Diff) [[id number]]  (Normal) Collected: 10/03/23 0117    Specimen: Blood Updated: 10/03/23 0125     WBC 10.10 10*3/mm3      RBC 4.21 10*6/mm3      Hemoglobin 12.9 g/dL       Hematocrit 38.3 %      MCV 91.1 fL      MCH 30.6 pg      MCHC 33.6 g/dL      RDW 13.5 %      RDW-SD 43.3 fl      MPV 9.2 fL      Platelets 204 10*3/mm3     Blood Culture - Blood, Hand, Left [[id number]] Collected: 10/03/23 0117    Specimen: Blood from Hand, Left Updated: 10/03/23 0123    Blood Culture - Blood, Arm, Right [[id number]] Collected: 10/03/23 0116    Specimen: Blood from Arm, Right Updated: 10/03/23 0123    Comprehensive Metabolic Panel [[id number]]  (Abnormal) Collected: 10/02/23 1849    Specimen: Blood Updated: 10/02/23 1915     Glucose 117 mg/dL      BUN 12 mg/dL      Creatinine 0.84 mg/dL      Sodium 138 mmol/L      Potassium 3.6 mmol/L      Chloride 99 mmol/L      CO2 29.0 mmol/L      Calcium 9.1 mg/dL      Total Protein 6.8 g/dL      Albumin 4.2 g/dL      ALT (SGPT) 31 U/L      AST (SGOT) 21 U/L      Alkaline Phosphatase 93 U/L      Total Bilirubin 0.3 mg/dL      Globulin 2.6 gm/dL      A/G Ratio 1.6 g/dL      BUN/Creatinine Ratio 14.3     Anion Gap 10.0 mmol/L      eGFR 83.2 mL/min/1.73     Narrative:      GFR Normal >60  Chronic Kidney Disease <60  Kidney Failure <15      Urinalysis, Microscopic Only - Urine, Clean Catch [[id number]]  (Abnormal) Collected: 10/02/23 1854    Specimen: Urine, Clean Catch Updated: 10/02/23 1904     RBC, UA 0-2 /HPF      WBC, UA 6-12 /HPF      Bacteria, UA 2+ /HPF      Squamous Epithelial Cells, UA 0-2 /HPF      Hyaline Casts, UA None Seen /LPF      Methodology Automated Microscopy    Urinalysis With Microscopic If Indicated (No Culture) - Urine, Clean Catch [[id number]]  (Abnormal) Collected: 10/02/23 1854    Specimen: Urine, Clean Catch Updated: 10/02/23 1900     Color, UA Yellow     Appearance, UA Clear     pH, UA 6.5     Specific Gravity, UA 1.011     Glucose, UA Negative     Ketones, UA Negative     Bilirubin, UA Negative     Blood, UA Negative     Protein, UA Negative     Leuk Esterase, UA Trace     Nitrite, UA Negative     Urobilinogen, UA 1.0 E.U./dL    CBC &  Differential [[id number]]  (Abnormal) Collected: 10/02/23 1849    Specimen: Blood Updated: 10/02/23 1857    Narrative:      The following orders were created for panel order CBC & Differential.  Procedure                               Abnormality         Status                     ---------                               -----------         ------                     CBC Auto Differential[[id number]]        Abnormal            Final result                 Please view results for these tests on the individual orders.    CBC Auto Differential [[id number]]  (Abnormal) Collected: 10/02/23 1849    Specimen: Blood Updated: 10/02/23 1857     WBC 11.90 10*3/mm3      RBC 4.43 10*6/mm3      Hemoglobin 13.3 g/dL      Hematocrit 40.7 %      MCV 91.8 fL      MCH 30.0 pg      MCHC 32.7 g/dL      RDW 13.5 %      RDW-SD 42.9 fl      MPV 9.2 fL      Platelets 225 10*3/mm3      Neutrophil % 64.9 %      Lymphocyte % 25.3 %      Monocyte % 8.2 %      Eosinophil % 1.2 %      Basophil % 0.4 %      Neutrophils, Absolute 7.70 10*3/mm3      Lymphocytes, Absolute 3.00 10*3/mm3      Monocytes, Absolute 1.00 10*3/mm3      Eosinophils, Absolute 0.10 10*3/mm3      Basophils, Absolute 0.00 10*3/mm3      nRBC 0.0 /100 WBC             Culture Data:   No results found for: BLOODCX  No results found for: URINECX  No results found for: RESPCX  No results found for: WOUNDCX  No results found for: STOOLCX  No components found for: BODYFLD    Radiology Data:   Imaging Results (Last 24 Hours)       Procedure Component Value Units Date/Time    CT Abdomen Pelvis With Contrast [[id number]] Collected: 10/02/23 2029     Updated: 10/02/23 2037    Narrative:      CT ABDOMEN PELVIS W CONTRAST    Date of Exam: 10/2/2023 8:00 PM EDT    Indication: pain.    Comparison: None available.    Technique: Axial CT images were obtained of the abdomen and pelvis following the uneventful intravenous administration of iodinated contrast. Sagittal and coronal reconstructions were  performed.  Automated exposure control and iterative reconstruction   methods were used.        Findings:      Limited evaluation of the lung bases demonstrate no gross abnormality.    There is decreased attenuation of the liver which can be seen with fatty infiltration.    There are no radio opaque gallbladder calculi.    The spleen is unremarkable.    The pancreases is unremarkable.    There are bilateral adrenal nodules measuring approximately 1.3 cm on the right on image 40 and measuring approximately 1.4 cm on the left on image 39 of series 3.      The kidneys are within normal limits.    There is no small bowel obstruction .    The appendix is unremarkable.    Evaluation of the colon is limited by lack of oral contrast and under distention.    There are multiple diverticuli throughout the colon. There is circumferential wall thickening of the sigmoid colon with pericolonic stranding and free fluid consistent with acute diverticulitis. On image 118 of series 3 there is a rim-enhancing 2.0 x 2.2   x 1.3 cm collection within the wall of the sigmoid colon consistent with an intramural abscess.      The urinary bladder is unremarkable.    There is no pelvic free fluid.    The osseous structures are within normal limits.    IMPESSION :    Acute diverticulitis of the sigmoid colon with wall thickening pericolonic stranding and free fluid. There is a 2.0 x 2.2 x 1.3 cm rim-enhancing intramural abscess on image 118 of series 3.    No bowel obstruction.    Fatty infiltration of the liver.    Bilateral adrenal nodules..    Findings were discussed with Dr. Oshea of the [facility] emergency department at approximately 8:35. P.m.                      Electronically Signed: Randy Mares MD    10/2/2023 8:35 PM EDT    Workstation ID: [id number]            I have reviewed the patient's current medications.     Assessment/Plan     Active Hospital Problems    Diagnosis     **Diverticulitis of intestine with abscess       #Diverticulitis with Abscess    - CT a/p shows acute diverticulitis of the sigmoid colon with wall thickening pericolonic stranding and free fluid complicated by 2.0 x 2.2 x 1.3cm rim enhancing intramural abscess    - Rocephin 2g IV daily, watch for toxicity    - Flagyl 500mg IV q8h    - blood cultures    - wbc 11.9, trend    - NPO    - LR @ 75/hr    - Morphine 2mg IV q4h PRN pain    - surgery consulted    - May need IR to drain abscess, will defer to surgery  Patient continue with IV antibiotics.  We will continue to follow cultures.     #CAD    - hold home Plavix for possible surgery    - resume home Toprol    - denies chest pain    - check EKG for possible surgery     #DM    - A1c was 7.8 on 9/22/23    - hold home metformin    - ISS     #HFpEF    - chronic, euvolemic    - hold home Lasix     - monitor for fluid overload     #Obesity    - BMI 32.87, weight loss encouraged     #HTN    - resume home Toprol    - hold home Lisinopril while admitted, consider restarting if necessary    - monitor     #HLD    - reconciled home meds     #GERD    - PPI        DVT prophylaxis: SCD     CODE STATUS:    Admission Status:  I believe this patient meets inpatient status.     I discussed the patient's findings and my recommendations with patient.      Andi Mcdonald MD   10/03/23   08:50 EDT       Electronically signed by Andi Mcdonald MD at 10/03/23 0849          Consult Notes (last 48 hours)        Comfort Turpin APRN at 10/03/23 1508        Consult Orders    1. Inpatient Cardiology Consult [[id number]] ordered by Brandie Hale MD at 10/03/23 1155              Attestation signed by Mer Mathur MD at 10/03/23 5038    I have reviewed this documentation and agree.    Can walk 4 miles without CP/dyspnea at baseline    Check TTE  Okay to hold plavix for 5 days if need be prior to surgery                    [facility]        Subjective:     Encounter Date:10/02/2023    Subjective  Patient ID: Sada Le is  a 53 y.o. female.    Chief Complaint: Abdominal pain/ cramping    Referring Physician: Brandie Hale MD    HPI:  Sada Le is a 53 y.o. female who presents with abdominal pain and cramping.  Ms. Le is a patient of Dr. Martinez.     Pmh includes anterior ST elevation myocardial infarction with complete occlusion of the proximal LAD under going revascularization with Xience 3.5 x 33 mm drug-eluting stent postdilated to 4.5 mm at 18 familia with good results.  This was an Impella supported procedure. She had cardiogenic shock in the setting of acute MI which was complicated by VF arrest with successful defibrillation.  She did well with weaning Impella support over 2 days and institution of max goal-directed medical therapy ultimately being discharged to home.  We were very aggressive with her medical therapy as well as antiplatelets and her EF improved from around 28 to 30% to 55% with only ever so slight anterior wall very mild hypokinesis residually. She had nonobstructive disease in the circumflex and RCA as well as mid to distal LAD.  She was changed secondary to shortness of breath from Brilinta to Effient. She did have chest pain again in 2021 and underwent repeat [facility] where she received PCI to the ostial proximal circumflex, 3.0 x 18 Xience postdilated to 3.9 mm in the proximal ostial portion and 3.75 mm distally with good angiographic results and reduction of stenosis from 80% ulcerated appearing plaque to 0% residual. She has done well since this time and has been maintained on ASA / Plavix. She also has a known ascending aortic aneurysm- measuring at 4 in 2/2023.    Ms. Le presents this admission with abdominal pain and cramping. She has known diverticulitis. She has had worsening diarrhea. Surgery consulted   CT of the abdomen and pelvis showed diverticulitis of the sigmoid colon with an intramural abscess. Cardiology consulted for pre operative risk assessment.  Patient states from a CV standpoint  she has been doing well. She denies anginal chest pain. Has some mild dyspnea but also reports this is somewhat chronic. She has been active recently.     Past Medical History:   Diagnosis Date    Absence of left thumb     Impression: hx of MRSA, she is signficantly improved She will continue meds and followup if sxs have not resolved over the next 2 weeks.. She is released from care and will notify us of any problems    Acute otitis media     Allergic rhinitis     Arthritis     neck    Cancer     skin    Cervical disc disease with myelopathy     Contact dermatitis or eczema     Coronary artery disease     Disorder of bone and cartilage     Diverticulitis of colon     Impression: CT confirms in the sigmoid colon 10/2011    Diverticulosis of colon     Impression: No sxs 02/13/2013    Elevated cholesterol     Elevated lipoprotein(a) 4/30/2020    Elevated temperature     External otitis of left ear     Gastroesophageal reflux disease without esophagitis 6/19/2022    Hearing loss due to cerumen impaction, left     Hemangioma of liver     History of echocardiogram 10/03/2019    Severely reduced LV systolic function. EF 34% Distribution indicative of LAD territory injury. Akinesis of the apex as well as apical lateral and mid to apical septal walls. Preservation of posterior inferior lateral walls. Mild AI, mild TR. Normal RV function.     History of transesophageal echocardiography (NAFISA) 01/07/2020    EF 55% LA cavity is mild to moderately dilated. Mild MR. Interatrial septum appears redundant. Interatrial hypermobile c/w aneurysm. Large PFO is noted with right to left shunting on bubble study. PFO tunnel appears to be short.     Hordeolum externum of left lower eyelid     Impression: She was started on Bactrim DS for her infection. She was also advised to use warm compression. She will followup should sxs not improve over the next 48 hrs and resolve over the next 1 weeek.    Hyperlipidemia     Impression: Diet  controled. She should see improvement with her successful wt loss. We discussed her lipid panel.    Hypertension, benign     Impression: new onset Low salt low calorie diet and regular exercise and followup in 1 mo She will monitor her pressures and notify us of her pressures over the next 1 week.    Inclusion cyst of right breast     Impression: We will follow for now. She is encouraged to have Mammography. She is presently without insurance and reluctant. She bobby consider. She will notify us of any change at all in the lesion for the only way to be certain of it's etilogy is to remove it. She understands.    Insomnia, organic     Menopausal syndrome     Overweight (BMI 25.0-29.9)     RUQ abdominal pain     Impression: Resolving, negative GB u/s, HIDA EF 77%, we will follow    Stented coronary artery 09/29/2019    Tobacco use     Impression: She is strongly encouraged to stop smoking. Cessation techniques discussed and encouraged. The consequences of not stopping discussed, ie, CAD, PVD, Stroke, Lung and other cancers.       Past Surgical History:   Procedure Laterality Date    BIVENTRICULAR ASSIST DEVICE/LEFT VENTRICULAR ASSIST DEVICE INSERTION N/A 9/29/2019    Procedure: Left Ventricular Assist Device;  Surgeon: Brant Martinez MD;  Location: [facility] CATH INVASIVE LOCATION;  Service: Cardiovascular    CARDIAC CATHETERIZATION N/A 9/29/2019    Procedure: Left Heart Cath;  Surgeon: Brant Martinez MD;  Location: [facility] CATH INVASIVE LOCATION;  Service: Cardiovascular    CARDIAC CATHETERIZATION N/A 9/29/2019    Procedure: Coronary angiography;  Surgeon: Brant Martinez MD;  Location: [facility] CATH INVASIVE LOCATION;  Service: Cardiovascular    CARDIAC CATHETERIZATION N/A 9/29/2019    Procedure: Left ventriculography;  Surgeon: Brant Martinez MD;  Location: [facility] CATH INVASIVE LOCATION;  Service: Cardiovascular    CARDIAC CATHETERIZATION N/A 9/29/2019    Procedure: Stent SERENITY  coronary;  Surgeon: Brant Martinez MD;  Location: [facility] CATH INVASIVE LOCATION;  Service: Cardiovascular    CARDIAC CATHETERIZATION N/A 2/25/2020    Procedure: Right Heart Cath;  Surgeon: Brant Martinez MD;  Location: [facility] CATH INVASIVE LOCATION;  Service: Cardiology;  Laterality: N/A;    CARDIAC CATHETERIZATION N/A 2/12/2021    Procedure: Left Heart Cath;  Surgeon: Brant Martinez MD;  Location: [facility] CATH INVASIVE LOCATION;  Service: Cardiology;  Laterality: N/A;    CARDIAC CATHETERIZATION N/A 2/12/2021    Procedure: Coronary angiography;  Surgeon: Brant Martinez MD;  Location: [facility] CATH INVASIVE LOCATION;  Service: Cardiology;  Laterality: N/A;    CARDIAC CATHETERIZATION N/A 2/12/2021    Procedure: Left ventriculography;  Surgeon: Brant Martinez MD;  Location: [facility] CATH INVASIVE LOCATION;  Service: Cardiology;  Laterality: N/A;    CARDIAC CATHETERIZATION N/A 2/12/2021    Procedure: Aortic root aortogram;  Surgeon: Brant Martinez MD;  Location: [facility] CATH INVASIVE LOCATION;  Service: Cardiology;  Laterality: N/A;    CARDIAC CATHETERIZATION N/A 2/12/2021    Procedure: Percutaneous Coronary Intervention;  Surgeon: Brant Martinez MD;  Location: [facility] CATH INVASIVE LOCATION;  Service: Cardiology;  Laterality: N/A;    CARDIAC CATHETERIZATION N/A 2/12/2021    Procedure: Stent SERENITY coronary;  Surgeon: Brant Martinez MD;  Location: [facility] CATH INVASIVE LOCATION;  Service: Cardiology;  Laterality: N/A;    CARDIAC ELECTROPHYSIOLOGY PROCEDURE  9/29/2019    Procedure: Impella Insertion;  Surgeon: Brant Martinez MD;  Location: [facility] CATH INVASIVE LOCATION;  Service: Cardiovascular    CORONARY ANGIOPLASTY  02/12/2021    1x stent to Circ    CORONARY ANGIOPLASTY WITH STENT PLACEMENT  02/2021    RI RT/LT HEART CATHETERS N/A 9/29/2019    Procedure: Percutaneous Coronary Intervention;  Surgeon: Brant Martinez MD;  Location:  [facility] INVASIVE LOCATION;  Service: Cardiovascular    TOTAL ABDOMINAL HYSTERECTOMY WITH SALPINGO OOPHORECTOMY      Endometriosis       Family History   Problem Relation Age of Onset    Alzheimer's disease Mother     Heart disease Mother         cardiovascular    Kidney disease Mother     Throat cancer Mother     Thyroid disease Mother     Diabetes Father         mellitus    Heart attack Father     Thyroid disease Father     Heart disease Sister         cardiovascular    Diabetes Maternal Aunt         mellitus    Heart disease Maternal Aunt         cardiovascular    Breast cancer Maternal Aunt     Cancer Maternal Aunt         breast    Diabetes Maternal Uncle         mellitus    Alzheimer's disease Maternal Uncle     Diabetes Maternal Grandmother         mellitus    Pancreatic cancer Maternal Grandmother     Kidney disease Paternal Uncle        Social History     Socioeconomic History    Marital status:    Tobacco Use    Smoking status: Former     Packs/day: 1.00     Years: 34.00     Pack years: 34.00     Types: Cigarettes     Start date:      Quit date: 2019     Years since quittin.0     Passive exposure: Never    Smokeless tobacco: Never    Tobacco comments:     States she is quitting as of 2019.   Vaping Use    Vaping Use: Never used   Substance and Sexual Activity    Alcohol use: Yes     Comment: occassionally     Drug use: Not Currently     Comment: occasional    Sexual activity: Defer         Allergies   Allergen Reactions    Penicillins Rash    Ciprofloxacin Rash    Penicillin G Rash       Current Medications:   Scheduled Meds:cefTRIAXone, 2,000 mg, Intravenous, Q24H  insulin lispro, 2-7 Units, Subcutaneous, 4x Daily AC & at Bedtime  metoprolol succinate XL, 25 mg, Oral, Daily  metroNIDAZOLE, 500 mg, Intravenous, Q8H  pantoprazole, 40 mg, Oral, Q AM  senna-docusate sodium, 2 tablet, Oral, BID  sodium chloride, 10 mL, Intravenous, Q12H      Continuous Infusions:lactated  "ringers, 75 mL/hr, Last Rate: 75 mL/hr (10/03/23 0843)        Review of Systems   Constitutional: Negative for chills, diaphoresis and malaise/fatigue.   Cardiovascular:  Negative for chest pain, dyspnea on exertion, irregular heartbeat, leg swelling, near-syncope, orthopnea, palpitations, paroxysmal nocturnal dyspnea and syncope.   Respiratory:  Negative for cough, shortness of breath, sleep disturbances due to breathing and sputum production.    Gastrointestinal:  Positive for abdominal pain and diarrhea. Negative for change in bowel habit.   Genitourinary:  Negative for urgency.   Neurological:  Negative for dizziness and headaches.   Psychiatric/Behavioral:  Negative for altered mental status.          Objective:   Objective      /87 (BP Location: Right arm, Patient Position: Lying)   Pulse 62   Temp 98.6 °F (37 °C) (Oral)   Resp 15   Ht 164.5 cm (64.76\")   Wt 88 kg (194 lb)   LMP 01/01/1996 (Approximate)   SpO2 96%   BMI 32.52 kg/m²     Physical Exam:  General Appearance:    Alert, cooperative, in no acute distress                                Head: Atraumatic, normocephalic, PERRLA               Neck:   supple, trachea midline, no thyromegaly, no carotid bruit, no JVD   Lungs:     Clear to auscultation,respirations regular, even and               unlabored    Heart:    Regular rhythm and normal rate, normal S1 and S2   Abdomen:     Normal bowel sounds, no masses, no organomegaly, soft  nontender, nondistended, no guarding, no rebound  tenderness   Extremities:   Moves all extremities well, no edema, no cyanosis, no  redness   Pulses:   Pulses palpable and equal bilaterally   Skin:   No bleeding, bruising or rash   Neurologic:   Awake, alert, oriented x3                 ASCVD Risk Score::  The ASCVD Risk score ([place] DK, et al., 2019) failed to calculate for the following reasons:    The patient has a prior MI or stroke diagnosis      Lab Review:     Results from last 7 days   Lab Units " 10/03/23  1209 10/03/23  0117 10/02/23  1849   SODIUM mmol/L  --  137 138   POTASSIUM mmol/L 4.5 3.4* 3.6   CHLORIDE mmol/L  --  101 99   CO2 mmol/L  --  27.0 29.0   BUN mg/dL  --  12 12   CREATININE mg/dL  --  0.82 0.84   GLUCOSE mg/dL  --  180* 117*   CALCIUM mg/dL  --  8.9 9.1   AST (SGOT) U/L  --   --  21   ALT (SGPT) U/L  --   --  31         Results from last 7 days   Lab Units 10/03/23  0117 10/02/23  1849   WBC 10*3/mm3 10.10 11.90*   HEMOGLOBIN g/dL 12.9 13.3   HEMATOCRIT % 38.3 40.7   PLATELETS 10*3/mm3 204 225                   Invalid input(s): LDLCALC            Recent Radiology:  Imaging Results (Most Recent)       Procedure Component Value Units Date/Time    CT Abdomen Pelvis With Contrast [[id number]] Collected: 10/02/23 2029     Updated: 10/02/23 2037    Narrative:      CT ABDOMEN PELVIS W CONTRAST    Date of Exam: 10/2/2023 8:00 PM EDT    Indication: pain.    Comparison: None available.    Technique: Axial CT images were obtained of the abdomen and pelvis following the uneventful intravenous administration of iodinated contrast. Sagittal and coronal reconstructions were performed.  Automated exposure control and iterative reconstruction   methods were used.        Findings:      Limited evaluation of the lung bases demonstrate no gross abnormality.    There is decreased attenuation of the liver which can be seen with fatty infiltration.    There are no radio opaque gallbladder calculi.    The spleen is unremarkable.    The pancreases is unremarkable.    There are bilateral adrenal nodules measuring approximately 1.3 cm on the right on image 40 and measuring approximately 1.4 cm on the left on image 39 of series 3.      The kidneys are within normal limits.    There is no small bowel obstruction .    The appendix is unremarkable.    Evaluation of the colon is limited by lack of oral contrast and under distention.    There are multiple diverticuli throughout the colon. There is circumferential wall  thickening of the sigmoid colon with pericolonic stranding and free fluid consistent with acute diverticulitis. On image 118 of series 3 there is a rim-enhancing 2.0 x 2.2   x 1.3 cm collection within the wall of the sigmoid colon consistent with an intramural abscess.      The urinary bladder is unremarkable.    There is no pelvic free fluid.    The osseous structures are within normal limits.    IMPESSION :    Acute diverticulitis of the sigmoid colon with wall thickening pericolonic stranding and free fluid. There is a 2.0 x 2.2 x 1.3 cm rim-enhancing intramural abscess on image 118 of series 3.    No bowel obstruction.    Fatty infiltration of the liver.    Bilateral adrenal nodules..    Findings were discussed with Dr. Oshea of the [facility] emergency department at approximately 8:35. P.m.                      Electronically Signed: Randy Mares MD    10/2/2023 8:35 PM EDT    Workstation ID: [id number]              ECHOCARDIOGRAM:    Results for orders placed during the hospital encounter of 03/16/22    Adult Transthoracic Echo Complete W/ Cont if Necessary Per Protocol    Interpretation Summary  · Estimated left ventricular EF was in agreement with the calculated left ventricular EF. Left ventricular ejection fraction appears to be 61 - 65%. Left ventricular systolic function is normal.  · Left ventricular diastolic function is consistent with (grade Ia w/high LAP) impaired relaxation.  · Estimated right ventricular systolic pressure from tricuspid regurgitation is normal (<35 mmHg).  · Moderate dilation of the aortic root is present. Moderate dilation of the sinuses of Valsalva is present.           Assessment:   Assessment      Active Hospital Problems    Diagnosis  POA    **Diverticulitis of intestine with abscess [K57.80]  Yes     Abdominal pain / cramping / diverticulitis with abscess  CAD s/p prior AMI in 2020 with most recent PCI in 2/2021 (see details above)  Ischemic cardiomyopathy with  recovered LVEF, chronic systolic and diastolic HF with recovered LVEF  Aortic aneurysm  HTN    Plan:   Ms. Le presents with abdominal pain / cramping, noted to have diverticulitis with abscess. Cardiology consulted for pre operative risk assessment and discussion of holding plavix.  Patient has been revascularized with most recent [facility] 2021. She is angina free. Has no signs of CHF. She has recovered LVEF  Will repeat ECHO  Check ECG  She is at an acceptable risk to proceed with non cardiac non vascular surgery  Okay to hold plavix pre operatively, would restart when safe post operatively                  Comfort Turpin, DARIEL  10/03/23  15:08 EDT      Electronically signed by Mer Mathur MD at 10/03/23 1754       Brandie Hale MD at 10/03/23 1008        Consult Orders    1. Inpatient General Surgery Consult [[id number]] ordered by Genny Quigley DO at 10/02/23 2150                   General Surgery Consult Note      Name: Sada Le ADMIT: 10/2/2023   : 1970  PCP: Yodit Johnston MD    MRN: [id number] LOS: 1 days   AGE/SEX: 53 y.o. female  ROOM: [address] [place] [address]      Patient Care Team:  Yodit Johnston MD as PCP - General (Family Medicine)  Amos Olivia MD as Consulting Physician (Cardiology)  Brant Martinez MD as Consulting Physician (Cardiology)  Chong Golden MD as Consulting Physician (Ophthalmology)  Chief Complaint   Patient presents with    Abdominal Pain     Abd pain, history of diverticulitis, states she has had pain since .  Pt states it is so bad right now she can't take it she has cramping and diarrhea         HPI  53 y.o. female who presents to the emergency department for abdominal pain.  She reports she has a history of diverticulitis for many years now however she previously went long periods of time without a flare.  Over the last year she has had multiple flares.  She reports the most recent flare started in July where she would have periods  of slight improvement and then subsequent worsening.  Over the last several days her pain has progressively gotten worse prompting her to present to the emergency department.  She denies ever having a prior colonoscopy.  She does report that more recently her stools have been smaller in caliber.  CT of the abdomen and pelvis showed diverticulitis of the sigmoid colon with an intramural abscess.  Denies any fevers or chills.  Pain is worse with urination and bowel movements.  Last bowel movement was yesterday.    Of note the patient has a history of LAD STEMI with associated cardiogenic shock for which she was admitted in September 2019.  She has multiple stents and is on Plavix.  Plavix was last taken yesterday.    Past Medical History:   Diagnosis Date    Absence of left thumb     Impression: hx of MRSA, she is signficantly improved She will continue meds and followup if sxs have not resolved over the next 2 weeks.. She is released from care and will notify us of any problems    Acute otitis media     Allergic rhinitis     Arthritis     neck    Cancer     skin    Cervical disc disease with myelopathy     Contact dermatitis or eczema     Coronary artery disease     Disorder of bone and cartilage     Diverticulitis of colon     Impression: CT confirms in the sigmoid colon 10/2011    Diverticulosis of colon     Impression: No sxs 02/13/2013    Elevated cholesterol     Elevated lipoprotein(a) 4/30/2020    Elevated temperature     External otitis of left ear     Gastroesophageal reflux disease without esophagitis 6/19/2022    Hearing loss due to cerumen impaction, left     Hemangioma of liver     History of echocardiogram 10/03/2019    Severely reduced LV systolic function. EF 34% Distribution indicative of LAD territory injury. Akinesis of the apex as well as apical lateral and mid to apical septal walls. Preservation of posterior inferior lateral walls. Mild AI, mild TR. Normal RV function.     History of  transesophageal echocardiography (NAFISA) 01/07/2020    EF 55% LA cavity is mild to moderately dilated. Mild MR. Interatrial septum appears redundant. Interatrial hypermobile c/w aneurysm. Large PFO is noted with right to left shunting on bubble study. PFO tunnel appears to be short.     Hordeolum externum of left lower eyelid     Impression: She was started on Bactrim DS for her infection. She was also advised to use warm compression. She will followup should sxs not improve over the next 48 hrs and resolve over the next 1 weeek.    Hyperlipidemia     Impression: Diet controled. She should see improvement with her successful wt loss. We discussed her lipid panel.    Hypertension, benign     Impression: new onset Low salt low calorie diet and regular exercise and followup in 1 mo She will monitor her pressures and notify us of her pressures over the next 1 week.    Inclusion cyst of right breast     Impression: We will follow for now. She is encouraged to have Mammography. She is presently without insurance and reluctant. She bobby consider. She will notify us of any change at all in the lesion for the only way to be certain of it's etilogy is to remove it. She understands.    Insomnia, organic     Menopausal syndrome     Overweight (BMI 25.0-29.9)     RUQ abdominal pain     Impression: Resolving, negative GB u/s, HIDA EF 77%, we will follow    Stented coronary artery 09/29/2019    Tobacco use     Impression: She is strongly encouraged to stop smoking. Cessation techniques discussed and encouraged. The consequences of not stopping discussed, ie, CAD, PVD, Stroke, Lung and other cancers.     Past Surgical History:   Procedure Laterality Date    BIVENTRICULAR ASSIST DEVICE/LEFT VENTRICULAR ASSIST DEVICE INSERTION N/A 9/29/2019    Procedure: Left Ventricular Assist Device;  Surgeon: Brant Martinez MD;  Location: [facility] CATH INVASIVE LOCATION;  Service: Cardiovascular    CARDIAC CATHETERIZATION N/A 9/29/2019     Procedure: Left Heart Cath;  Surgeon: Brant Martinez MD;  Location:  [facility] CATH INVASIVE LOCATION;  Service: Cardiovascular    CARDIAC CATHETERIZATION N/A 9/29/2019    Procedure: Coronary angiography;  Surgeon: Brant Martinez MD;  Location:  [facility] CATH INVASIVE LOCATION;  Service: Cardiovascular    CARDIAC CATHETERIZATION N/A 9/29/2019    Procedure: Left ventriculography;  Surgeon: Brant Martinez MD;  Location:  [facility] CATH INVASIVE LOCATION;  Service: Cardiovascular    CARDIAC CATHETERIZATION N/A 9/29/2019    Procedure: Stent SERENITY coronary;  Surgeon: Brant Martinez MD;  Location:  [facility] CATH INVASIVE LOCATION;  Service: Cardiovascular    CARDIAC CATHETERIZATION N/A 2/25/2020    Procedure: Right Heart Cath;  Surgeon: Brant Martinez MD;  Location: [facility] CATH INVASIVE LOCATION;  Service: Cardiology;  Laterality: N/A;    CARDIAC CATHETERIZATION N/A 2/12/2021    Procedure: Left Heart Cath;  Surgeon: Brant Martinez MD;  Location: [facility] CATH INVASIVE LOCATION;  Service: Cardiology;  Laterality: N/A;    CARDIAC CATHETERIZATION N/A 2/12/2021    Procedure: Coronary angiography;  Surgeon: Brant Martinez MD;  Location:  [facility] CATH INVASIVE LOCATION;  Service: Cardiology;  Laterality: N/A;    CARDIAC CATHETERIZATION N/A 2/12/2021    Procedure: Left ventriculography;  Surgeon: Brant Martinez MD;  Location: [facility] CATH INVASIVE LOCATION;  Service: Cardiology;  Laterality: N/A;    CARDIAC CATHETERIZATION N/A 2/12/2021    Procedure: Aortic root aortogram;  Surgeon: Brant Martinez MD;  Location: [facility] CATH INVASIVE LOCATION;  Service: Cardiology;  Laterality: N/A;    CARDIAC CATHETERIZATION N/A 2/12/2021    Procedure: Percutaneous Coronary Intervention;  Surgeon: Brant Martinez MD;  Location:  [facility] CATH INVASIVE LOCATION;  Service: Cardiology;  Laterality: N/A;    CARDIAC CATHETERIZATION N/A 2/12/2021    Procedure: Stent SERENITY  coronary;  Surgeon: Brant Martinez MD;  Location: [facility] CATH INVASIVE LOCATION;  Service: Cardiology;  Laterality: N/A;    CARDIAC ELECTROPHYSIOLOGY PROCEDURE  2019    Procedure: Impella Insertion;  Surgeon: Brant Martinez MD;  Location: [facility] CATH INVASIVE LOCATION;  Service: Cardiovascular    CORONARY ANGIOPLASTY  2021    1x stent to Circ    CORONARY ANGIOPLASTY WITH STENT PLACEMENT  2021    [place] RT/LT HEART CATHETERS N/A 2019    Procedure: Percutaneous Coronary Intervention;  Surgeon: Brant Martinez MD;  Location: [facility] CATH INVASIVE LOCATION;  Service: Cardiovascular    TOTAL ABDOMINAL HYSTERECTOMY WITH SALPINGO OOPHORECTOMY      Endometriosis     Family History   Problem Relation Age of Onset    Alzheimer's disease Mother     Heart disease Mother         cardiovascular    Kidney disease Mother     Throat cancer Mother     Thyroid disease Mother     Diabetes Father         mellitus    Heart attack Father     Thyroid disease Father     Heart disease Sister         cardiovascular    Diabetes Maternal Aunt         mellitus    Heart disease Maternal Aunt         cardiovascular    Breast cancer Maternal Aunt     Cancer Maternal Aunt         breast    Diabetes Maternal Uncle         mellitus    Alzheimer's disease Maternal Uncle     Diabetes Maternal Grandmother         mellitus    Pancreatic cancer Maternal Grandmother     Kidney disease Paternal Uncle        Social History     Tobacco Use    Smoking status: Former     Packs/day: 1.00     Years: 34.00     Pack years: 34.00     Types: Cigarettes     Start date:      Quit date: 2019     Years since quittin.0     Passive exposure: Never    Smokeless tobacco: Never    Tobacco comments:     States she is quitting as of 2019.   Vaping Use    Vaping Use: Never used   Substance Use Topics    Alcohol use: Yes     Comment: occassionally     Drug use: Not Currently     Comment: occasional     Medications  Prior to Admission   Medication Sig Dispense Refill Last Dose    cetirizine (zyrTEC) 10 MG tablet Take 1 tablet by mouth Every Night.   10/2/2023    cholecalciferol (VITAMIN D3) 1.25 MG (88536 UT) capsule Take 1 capsule by mouth Daily.   10/2/2023    clopidogrel (PLAVIX) 75 MG tablet TAKE 1 TABLET BY MOUTH EVERY DAY 90 tablet 3 10/2/2023    [facility] Aspirin Adult Low Dose 81 MG chewable tablet CHEW AND SWALLOW 1 TABLET BY MOUTH EVERY DAY 90 tablet 3 10/2/2023    furosemide (LASIX) 40 MG tablet Take 1 tablet by mouth As Needed (swelling). 90 tablet 3 10/2/2023    lisinopril (PRINIVIL,ZESTRIL) 5 MG tablet Take 1 tablet by mouth 2 (Two) Times a Day. 180 tablet 1 10/2/2023    metFORMIN (GLUCOPHAGE) 500 MG tablet Take 1 tablet by mouth 2 (Two) Times a Day With Meals. (Patient taking differently: Take 1 tablet by mouth Daily With Breakfast.) 180 tablet 3 Past Week    metoprolol succinate XL (TOPROL-XL) 25 MG 24 hr tablet TAKE 1 TABLET BY MOUTH EVERY DAY 90 tablet 1 10/2/2023    omeprazole OTC (PriLOSEC OTC) 20 MG EC tablet TAKE 1 TABLET BY MOUTH EVERY DAY 90 tablet 3 10/2/2023    vitamin B-12 (CYANOCOBALAMIN) 500 MCG tablet Take 1 tablet by mouth Daily.   10/2/2023    nitroglycerin (NITROSTAT) 0.4 MG SL tablet Place 1 tablet under the tongue Every 5 (Five) Minutes As Needed for Chest Pain. Take no more than 3 doses in 15 minutes. 100 tablet 0 More than a month     cefTRIAXone, 2,000 mg, Intravenous, Q24H  insulin lispro, 2-7 Units, Subcutaneous, 4x Daily AC & at Bedtime  metoprolol succinate XL, 25 mg, Oral, Daily  metroNIDAZOLE, 500 mg, Intravenous, Q8H  pantoprazole, 40 mg, Oral, Q AM  senna-docusate sodium, 2 tablet, Oral, BID  sodium chloride, 10 mL, Intravenous, Q12H      lactated ringers, 75 mL/hr, Last Rate: 75 mL/hr (10/03/23 0843)        senna-docusate sodium **AND** polyethylene glycol **AND** bisacodyl **AND** bisacodyl    Calcium Replacement - Follow Nurse / BPA Driven Protocol    dextrose    dextrose    glucagon  (human recombinant)    Magnesium Standard Dose Replacement - Follow Nurse / BPA Driven Protocol    Morphine    nitroglycerin    ondansetron **OR** ondansetron    Phosphorus Replacement - Follow Nurse / BPA Driven Protocol    Potassium Replacement - Follow Nurse / BPA Driven Protocol    sodium chloride    sodium chloride  Penicillins, Ciprofloxacin, and Penicillin g    Review of Systems:   As noted above in HPI    Vitals:  Temp:  [98 °F (36.7 °C)-98.8 °F (37.1 °C)] 98.8 °F (37.1 °C)  Heart Rate:  [63-81] 81  Resp:  [13-20] 15  BP: (107-143)/(60-90) 109/62     Physical Exam:   No acute distress, alert  Nonlabored respirations, slight audible wheezing  Abdomen soft, nondistended, mildly tender to palpation left lower quadrant, no guarding  Extremities warm well perfused with no gross deformities    Labs:  Results from last 7 days   Lab Units 10/03/23  0117 10/02/23  1849   WBC 10*3/mm3 10.10 11.90*   HEMOGLOBIN g/dL 12.9 13.3   HEMATOCRIT % 38.3 40.7   PLATELETS 10*3/mm3 204 225     Results from last 7 days   Lab Units 10/03/23  0117 10/02/23  1849   SODIUM mmol/L 137 138   POTASSIUM mmol/L 3.4* 3.6   CHLORIDE mmol/L 101 99   CO2 mmol/L 27.0 29.0   BUN mg/dL 12 12   CREATININE mg/dL 0.82 0.84   CALCIUM mg/dL 8.9 9.1   BILIRUBIN mg/dL  --  0.3   ALK PHOS U/L  --  93   ALT (SGPT) U/L  --  31   AST (SGOT) U/L  --  21   GLUCOSE mg/dL 180* 117*     Imaging:  CT abdomen/pelvis 10/2/2023  IMPESSION :  Acute diverticulitis of the sigmoid colon with wall thickening pericolonic stranding and free fluid. There is a 2.0 x 2.2 x 1.3 cm rim-enhancing intramural abscess on image 118 of series 3.  No bowel obstruction.  Fatty infiltration of the liver.  Bilateral adrenal nodules.    Assessment and Plan:  53 y.o. female with recurrent diverticulitis admitted for flare associated with intramural abscess.    - We discussed that ideally we would treat her nonoperatively during this hospitalization and then have her follow-up with GI for  colonoscopy 6 to 8 weeks out given no prior recent colonoscopy  - We discussed the possible courses of diverticulitis including improvement with antibiotics, smoldering infection without improvement or worsening, possible abscess formation, possible perforation requiring urgent/emergent surgical intervention  - Patient last took Plavix yesterday and has an extensive cardiac history; will ask cardiology to see patient for preop risk stratification in the event surgery is needed during this admission  - Given multiple recurrent symptoms and change in bowel habits patient may ultimately need colon resection but ideally this would be done in an outpatient setting  - Continue bowel rest, IV fluids, IV antibiotics  - We will continue to follow    This note was created using Dragon Voice Recognition software.    Brandie Hale MD  10/03/23  10:08 EDT      Electronically signed by Brandie Hale MD at 10/03/23 1201

## 2023-10-04 NOTE — PROGRESS NOTES
General Surgery Inpatient Progress Note      Name: Sada Le ADMIT: 10/2/2023   : 1970  PCP: Yodit Johnston MD    MRN: [id number] LOS: 2 days   AGE/SEX: 53 y.o. female  ROOM: [address] [place] [address]      Patient Care Team:  Yodit Johnston MD as PCP - General (Family Medicine)  Amos Olivia MD as Consulting Physician (Cardiology)  Brant Martinez MD as Consulting Physician (Cardiology)  Chong Golden MD as Consulting Physician (Ophthalmology)  Chief Complaint   Patient presents with    Abdominal Pain     Abd pain, history of diverticulitis, states she has had pain since .  Pt states it is so bad right now she can't take it she has cramping and diarrhea         Subjective:  Patient seen and examined.  She reports still having intermittent cramping but it is less intense and is less frequent.  Has not had a bowel movement.  Denies any nausea or vomiting.    Medications:  cefTRIAXone, 2,000 mg, Intravenous, Q24H  insulin lispro, 2-7 Units, Subcutaneous, 4x Daily AC & at Bedtime  metoprolol succinate XL, 25 mg, Oral, Daily  metroNIDAZOLE, 500 mg, Intravenous, Q8H  pantoprazole, 40 mg, Oral, Q AM  polyethylene glycol, 17 g, Oral, Daily  senna-docusate sodium, 2 tablet, Oral, BID  sodium chloride, 10 mL, Intravenous, Q12H         senna-docusate sodium **AND** polyethylene glycol **AND** bisacodyl **AND** bisacodyl    Calcium Replacement - Follow Nurse / BPA Driven Protocol    dextrose    dextrose    glucagon (human recombinant)    Magnesium Standard Dose Replacement - Follow Nurse / BPA Driven Protocol    Morphine    nitroglycerin    ondansetron **OR** ondansetron    Phosphorus Replacement - Follow Nurse / BPA Driven Protocol    Potassium Replacement - Follow Nurse / BPA Driven Protocol    sodium chloride    sodium chloride     Vitals:  Temp:  [98.2 °F (36.8 °C)-98.8 °F (37.1 °C)] 98.8 °F (37.1 °C)  Heart Rate:  [57-64] 64  Resp:  [15-18] 15  BP: (113-126)/(72-87) 118/72      Physical Exam:  No acute distress, alert  Nonlabored respirations  Abdomen soft, nondistended, mildly tender to palpation in left lower quadrant, no guarding  Extremities warm well perfused with no gross deformities    Labs:  Results from last 7 days   Lab Units 10/03/23  2355 10/03/23  0117 10/02/23  1849   WBC 10*3/mm3 8.60 10.10 11.90*   HEMOGLOBIN g/dL 12.4 12.9 13.3   HEMATOCRIT % 36.7 38.3 40.7   PLATELETS 10*3/mm3 190 204 225     Results from last 7 days   Lab Units 10/03/23  2355 10/03/23  1209 10/03/23  0117 10/02/23  1849   SODIUM mmol/L 138  --  137 138   POTASSIUM mmol/L 3.8 4.5 3.4* 3.6   CHLORIDE mmol/L 101  --  101 99   CO2 mmol/L 28.0  --  27.0 29.0   BUN mg/dL 8  --  12 12   CREATININE mg/dL 0.71  --  0.82 0.84   CALCIUM mg/dL 8.7  --  8.9 9.1   BILIRUBIN mg/dL  --   --   --  0.3   ALK PHOS U/L  --   --   --  93   ALT (SGPT) U/L  --   --   --  31   AST (SGOT) U/L  --   --   --  21   GLUCOSE mg/dL 106*  --  180* 117*     Assessment and Plan:  53 y.o. female with recurrent diverticulitis admitted for flare associated with intramural abscess.  Clinically improving on antibiotics.     -Continue present management with IV antibiotics  - Continue IV fluids  - We will start clears and monitor for tolerance  -As previously mentioned ideally we would treat her nonoperatively during this hospitalization and then have her follow-up with GI for colonoscopy 6 to 8 weeks out given no prior recent colonoscopy  - Given recent Plavix last taken on 10/2/2023 would not plan on any surgical intervention unless necessary until Plavix is held at least 5 days; given this over the weekend earlier surgery would likely be Monday  -Given extensive cardiac history cardiology has evaluated the patient and patient is acceptable risk for surgery if needed  - Given multiple recurrent symptoms and change in bowel habits patient may ultimately need colon resection but ideally this would be done in an outpatient setting  - We  will continue to follow    This note was created using Dragon Voice Recognition software.    Brandie Hale MD  10/04/23  09:36 EDT

## 2023-10-04 NOTE — PLAN OF CARE
Goal Outcome Evaluation:     Patient is NPO, pain controled with PRN orders and call light near. Repeat CT today for follow up with abscess.         Progress: no change

## 2023-10-04 NOTE — PROGRESS NOTES
[facility] Medicine Services   Daily Progress Note    Patient Name: Sada Le  : 1970  MRN: [id number]  Primary Care Physician:  Yodit Johnston MD  Date of admission: 10/2/2023  Date and Time of Service:       Subjective      Chief Complaint:     Patient feeling significantly better today  Denies any chest pain or shortness of breath  Abdominal pain has improved  Pain is controlled with pain medication   Tolerating current diet but had some abdominal discomfort        Objective      Vitals:   Temp:  [97.6 °F (36.4 °C)-98.8 °F (37.1 °C)] 97.6 °F (36.4 °C)  Heart Rate:  [57-64] 57  Resp:  [15-17] 17  BP: (113-132)/(71-78) 116/71    Physical Exam  General: No acute distress  HEENT: Neck supple, normal oral mucosa, no masses, no lymphadenopathy  Lungs: Clear bilaterally, no wheezing, no crackles, no rhonchi. Equal excursions.   CV - Normal S1/S2, no murmur, regular rate and rhythm   Abdomen - Soft, + mild LLQ tenderness, nondistended, normal bowel sounds  Extremities - no edema, no erythema  Neuro - No focal weakness, normal sensation  Psych - Alert and oriented x3  Skin - no wounds or lesions.          Result Review    Result Review:  I have personally reviewed the results from the time of this admission to 10/4/2023 18:20 EDT and agree with these findings:  [x]  Laboratory  [x]  Microbiology  [x]  Radiology  [x]  EKG/Telemetry   [x]  Cardiology/Vascular   []  Pathology  [x]  Old records  []  Other:  Most notable findings include:           Assessment & Plan      Brief Patient Summary:  Sada Le is a 53 y.o. female who       cefTRIAXone, 2,000 mg, Intravenous, Q24H  heparin (porcine), 5,000 Units, Subcutaneous, Q12H  insulin lispro, 2-7 Units, Subcutaneous, 4x Daily AC & at Bedtime  metoprolol succinate XL, 25 mg, Oral, Daily  metroNIDAZOLE, 500 mg, Intravenous, Q8H  pantoprazole, 40 mg, Oral, Q AM  polyethylene glycol, 17 g, Oral, Daily  senna-docusate sodium, 2 tablet, Oral,  BID  sodium chloride, 10 mL, Intravenous, Q12H       sodium chloride, 75 mL/hr, Last Rate: 75 mL/hr (10/04/23 8018)         Active Hospital Problems:  Active Hospital Problems    Diagnosis     **Diverticulitis of intestine with abscess      Plan:       Acute diverticulitis with abscess  - Surgery on consult. Continue non- operative management  - will need follow up with GI for colonoscopy in 6-8 weeks  - Off Plavix in case patient needs surgery.   - diet being advanced by surgery  - pain and nausea control       CAD  - no chest pain. Plavix on hold.   - Cardiology on consult.      DM-2    - A1c was 7.8 on 9/22/23    - hold home metformin    - ISS     #HFpEF    - chronic, euvolemic    - hold home Lasix     - monitor for fluid overload     #Obesity    - BMI 32.87, weight loss encouraged     #HTN    - resume home Toprol    - hold home Lisinopril while admitted, consider restarting if necessary    - monitor     #HLD    - reconciled home meds     #GERD    - PPI        DVT prophylaxis:  Medical and mechanical DVT prophylaxis orders are present.    CODE STATUS:    Code Status (Patient has no pulse and is not breathing): CPR (Attempt to Resuscitate)  Medical Interventions (Patient has pulse or is breathing): Full Support      Disposition:  I expect patient to be discharged .    This patient has been  and discussed with . 10/04/23      Electronically signed by Brinda Redd MD, 10/04/23, 18:20 EDT.  [facility] Hospitalist Team

## 2023-10-05 LAB
ANION GAP SERPL CALCULATED.3IONS-SCNC: 11 MMOL/L (ref 5–15)
ASCENDING AORTA: 3.8 CM
BH CV ECHO MEAS - ACS: 2.4 CM
BH CV ECHO MEAS - AI P1/2T: 749.9 MSEC
BH CV ECHO MEAS - AO MAX PG: 6.7 MMHG
BH CV ECHO MEAS - AO MEAN PG: 4 MMHG
BH CV ECHO MEAS - AO ROOT DIAM: 3.8 CM
BH CV ECHO MEAS - AO V2 MAX: 129 CM/SEC
BH CV ECHO MEAS - AO V2 VTI: 29.3 CM
BH CV ECHO MEAS - AVA(I,D): 2.35 CM2
BH CV ECHO MEAS - EDV(CUBED): 132.7 ML
BH CV ECHO MEAS - EDV(MOD-SP2): 59 ML
BH CV ECHO MEAS - EDV(MOD-SP4): 68.3 ML
BH CV ECHO MEAS - EF(MOD-BP): 61.5 %
BH CV ECHO MEAS - EF(MOD-SP2): 61.2 %
BH CV ECHO MEAS - EF(MOD-SP4): 61.8 %
BH CV ECHO MEAS - ESV(CUBED): 42.9 ML
BH CV ECHO MEAS - ESV(MOD-SP2): 22.9 ML
BH CV ECHO MEAS - ESV(MOD-SP4): 26.1 ML
BH CV ECHO MEAS - FS: 31.4 %
BH CV ECHO MEAS - IVS/LVPW: 0.75 CM
BH CV ECHO MEAS - IVSD: 0.6 CM
BH CV ECHO MEAS - LA DIMENSION: 3 CM
BH CV ECHO MEAS - LV DIASTOLIC VOL/BSA (35-75): 35.4 CM2
BH CV ECHO MEAS - LV MASS(C)D: 118.7 GRAMS
BH CV ECHO MEAS - LV MAX PG: 3.9 MMHG
BH CV ECHO MEAS - LV MEAN PG: 2 MMHG
BH CV ECHO MEAS - LV SYSTOLIC VOL/BSA (12-30): 13.5 CM2
BH CV ECHO MEAS - LV V1 MAX: 99 CM/SEC
BH CV ECHO MEAS - LV V1 VTI: 21.9 CM
BH CV ECHO MEAS - LVIDD: 5.1 CM
BH CV ECHO MEAS - LVIDS: 3.5 CM
BH CV ECHO MEAS - LVOT AREA: 3.1 CM2
BH CV ECHO MEAS - LVOT DIAM: 2 CM
BH CV ECHO MEAS - LVPWD: 0.8 CM
BH CV ECHO MEAS - MV A DUR: 0.14 SEC
BH CV ECHO MEAS - MV A MAX VEL: 85.9 CM/SEC
BH CV ECHO MEAS - MV DEC SLOPE: 342 CM/SEC2
BH CV ECHO MEAS - MV DEC TIME: 177 SEC
BH CV ECHO MEAS - MV E MAX VEL: 71.8 CM/SEC
BH CV ECHO MEAS - MV E/A: 0.84
BH CV ECHO MEAS - MV MAX PG: 4.5 MMHG
BH CV ECHO MEAS - MV MEAN PG: 2 MMHG
BH CV ECHO MEAS - MV P1/2T: 99.3 MSEC
BH CV ECHO MEAS - MV V2 VTI: 39.9 CM
BH CV ECHO MEAS - MVA(P1/2T): 2.21 CM2
BH CV ECHO MEAS - MVA(VTI): 1.72 CM2
BH CV ECHO MEAS - PA ACC TIME: 0.2 SEC
BH CV ECHO MEAS - PA V2 MAX: 84.6 CM/SEC
BH CV ECHO MEAS - PULM A REVS DUR: 0.16 SEC
BH CV ECHO MEAS - PULM A REVS VEL: 27.9 CM/SEC
BH CV ECHO MEAS - PULM DIAS VEL: 36.5 CM/SEC
BH CV ECHO MEAS - PULM S/D: 1.41
BH CV ECHO MEAS - PULM SYS VEL: 51.6 CM/SEC
BH CV ECHO MEAS - QP/QS: 0.9
BH CV ECHO MEAS - RV MAX PG: 1.51 MMHG
BH CV ECHO MEAS - RV V1 MAX: 61.5 CM/SEC
BH CV ECHO MEAS - RV V1 VTI: 16.2 CM
BH CV ECHO MEAS - RVDD: 3.1 CM
BH CV ECHO MEAS - RVOT DIAM: 2.2 CM
BH CV ECHO MEAS - SI(MOD-SP2): 18.7 ML/M2
BH CV ECHO MEAS - SI(MOD-SP4): 21.9 ML/M2
BH CV ECHO MEAS - SV(LVOT): 68.8 ML
BH CV ECHO MEAS - SV(MOD-SP2): 36.1 ML
BH CV ECHO MEAS - SV(MOD-SP4): 42.2 ML
BH CV ECHO MEAS - SV(RVOT): 61.6 ML
BH CV ECHO MEAS - TAPSE (>1.6): 2.17 CM
BH CV XLRA - RV BASE: 2.6 CM
BH CV XLRA - RV LENGTH: 6.8 CM
BH CV XLRA - RV MID: 2.1 CM
BUN SERPL-MCNC: 8 MG/DL (ref 6–20)
BUN/CREAT SERPL: 11.1 (ref 7–25)
CALCIUM SPEC-SCNC: 9 MG/DL (ref 8.6–10.5)
CHLORIDE SERPL-SCNC: 100 MMOL/L (ref 98–107)
CO2 SERPL-SCNC: 27 MMOL/L (ref 22–29)
CREAT SERPL-MCNC: 0.72 MG/DL (ref 0.57–1)
EGFRCR SERPLBLD CKD-EPI 2021: 99.5 ML/MIN/1.73
GLUCOSE BLDC GLUCOMTR-MCNC: 116 MG/DL (ref 70–105)
GLUCOSE BLDC GLUCOMTR-MCNC: 151 MG/DL (ref 70–105)
GLUCOSE BLDC GLUCOMTR-MCNC: 160 MG/DL (ref 70–105)
GLUCOSE BLDC GLUCOMTR-MCNC: 204 MG/DL (ref 70–105)
GLUCOSE SERPL-MCNC: 93 MG/DL (ref 65–99)
POTASSIUM SERPL-SCNC: 3.7 MMOL/L (ref 3.5–5.2)
SINUS: 3.6 CM
SODIUM SERPL-SCNC: 138 MMOL/L (ref 136–145)
STJ: 3.9 CM

## 2023-10-05 PROCEDURE — 99231 SBSQ HOSP IP/OBS SF/LOW 25: CPT | Performed by: SURGERY

## 2023-10-05 PROCEDURE — 25010000002 METRONIDAZOLE 500 MG/100ML SOLUTION: Performed by: STUDENT IN AN ORGANIZED HEALTH CARE EDUCATION/TRAINING PROGRAM

## 2023-10-05 PROCEDURE — 25010000002 CEFTRIAXONE PER 250 MG: Performed by: STUDENT IN AN ORGANIZED HEALTH CARE EDUCATION/TRAINING PROGRAM

## 2023-10-05 PROCEDURE — 25010000002 HEPARIN (PORCINE) PER 1000 UNITS: Performed by: HOSPITALIST

## 2023-10-05 PROCEDURE — 82948 REAGENT STRIP/BLOOD GLUCOSE: CPT

## 2023-10-05 RX ORDER — FUROSEMIDE 40 MG/1
40 TABLET ORAL NIGHTLY PRN
COMMUNITY

## 2023-10-05 RX ADMIN — HEPARIN SODIUM 5000 UNITS: 5000 INJECTION INTRAVENOUS; SUBCUTANEOUS at 09:35

## 2023-10-05 RX ADMIN — HYDROCODONE BITARTRATE AND ACETAMINOPHEN 1 TABLET: 5; 325 TABLET ORAL at 09:50

## 2023-10-05 RX ADMIN — Medication 10 ML: at 09:36

## 2023-10-05 RX ADMIN — METRONIDAZOLE 500 MG: 500 INJECTION, SOLUTION INTRAVENOUS at 02:00

## 2023-10-05 RX ADMIN — SENNOSIDES AND DOCUSATE SODIUM 2 TABLET: 50; 8.6 TABLET ORAL at 09:35

## 2023-10-05 RX ADMIN — HEPARIN SODIUM 5000 UNITS: 5000 INJECTION INTRAVENOUS; SUBCUTANEOUS at 21:18

## 2023-10-05 RX ADMIN — CEFTRIAXONE 2000 MG: 2 INJECTION, POWDER, FOR SOLUTION INTRAMUSCULAR; INTRAVENOUS at 21:18

## 2023-10-05 RX ADMIN — SENNOSIDES AND DOCUSATE SODIUM 2 TABLET: 50; 8.6 TABLET ORAL at 21:18

## 2023-10-05 RX ADMIN — POLYETHYLENE GLYCOL 3350 17 G: 17 POWDER, FOR SOLUTION ORAL at 09:35

## 2023-10-05 RX ADMIN — METRONIDAZOLE 500 MG: 500 INJECTION, SOLUTION INTRAVENOUS at 16:52

## 2023-10-05 RX ADMIN — Medication 10 ML: at 21:19

## 2023-10-05 RX ADMIN — HYDROCODONE BITARTRATE AND ACETAMINOPHEN 1 TABLET: 5; 325 TABLET ORAL at 16:52

## 2023-10-05 RX ADMIN — PANTOPRAZOLE SODIUM 40 MG: 40 TABLET, DELAYED RELEASE ORAL at 06:16

## 2023-10-05 RX ADMIN — METOPROLOL SUCCINATE 25 MG: 25 TABLET, EXTENDED RELEASE ORAL at 09:35

## 2023-10-05 RX ADMIN — METRONIDAZOLE 500 MG: 500 INJECTION, SOLUTION INTRAVENOUS at 09:35

## 2023-10-05 NOTE — PLAN OF CARE
Goal Outcome Evaluation:  Plan of Care Reviewed With: patient        Progress: improving  Outcome Evaluation: Pt up ad bouchra to bathroom and in room. Pt with some complaints of abdominal pain, PRN medication given per MAR. Pt diet advanced tolerating well, no nausea or vomiting, loose BMs x 2. VSS, no concerns at this time.

## 2023-10-05 NOTE — CASE MANAGEMENT/SOCIAL WORK
Continued Stay Note  KHUSHBU Kemp     Patient Name: Sada Le  MRN: [id number]  Today's Date: 10/5/2023    Admit Date: 10/2/2023    Plan: DC PLAN: Routine home   Discharge Plan       Row Name 10/05/23 1202       Plan    Plan Comments IV antibiotics, no surgery planned at this time.             Chart review only.         Expected Discharge Date and Time       Expected Discharge Date Expected Discharge Time    Oct 5, 2023               Evelio Lucas RN

## 2023-10-05 NOTE — PROGRESS NOTES
General Surgery Inpatient Progress Note      Name: Sada Le ADMIT: 10/2/2023   : 1970  PCP: Yodit Johnston MD    MRN: [id number] LOS: 3 days   AGE/SEX: 53 y.o. female  ROOM: [address] [place] [address]      Patient Care Team:  Yodit Johnston MD as PCP - General (Family Medicine)  Amos Olivia MD as Consulting Physician (Cardiology)  Brant Martinez MD as Consulting Physician (Cardiology)  Chong Golden MD as Consulting Physician (Ophthalmology)  Chief Complaint   Patient presents with    Abdominal Pain     Abd pain, history of diverticulitis, states she has had pain since .  Pt states it is so bad right now she can't take it she has cramping and diarrhea         Subjective:  Patient seen and examined.  Reports pain and cramping has essentially resolved.  She did have a bowel movement with some amount of pain.  She has been afebrile.  She tolerated clears without any worsening of symptoms.    Medications:  cefTRIAXone, 2,000 mg, Intravenous, Q24H  heparin (porcine), 5,000 Units, Subcutaneous, Q12H  insulin lispro, 2-7 Units, Subcutaneous, 4x Daily AC & at Bedtime  metoprolol succinate XL, 25 mg, Oral, Daily  metroNIDAZOLE, 500 mg, Intravenous, Q8H  pantoprazole, 40 mg, Oral, Q AM  polyethylene glycol, 17 g, Oral, Daily  senna-docusate sodium, 2 tablet, Oral, BID  sodium chloride, 10 mL, Intravenous, Q12H         senna-docusate sodium **AND** polyethylene glycol **AND** bisacodyl **AND** bisacodyl    Calcium Replacement - Follow Nurse / BPA Driven Protocol    dextrose    dextrose    glucagon (human recombinant)    HYDROcodone-acetaminophen    Magnesium Standard Dose Replacement - Follow Nurse / BPA Driven Protocol    Morphine    nitroglycerin    ondansetron **OR** ondansetron    Phosphorus Replacement - Follow Nurse / BPA Driven Protocol    Potassium Replacement - Follow Nurse / BPA Driven Protocol    sodium chloride    sodium chloride     Vitals:  Temp:  [97.4 °F (36.3  °C)-97.9 °F (36.6 °C)] 97.4 °F (36.3 °C)  Heart Rate:  [57-62] 60  Resp:  [13-17] 13  BP: (116-142)/(71-88) 142/88     Physical Exam:  No acute distress, alert  Nonlabored respirations  Abdomen soft, nondistended, mildly tender to palpation in left lower quadrant, no guarding  Extremities warm well perfused with no gross deformities    Labs:  Results from last 7 days   Lab Units 10/04/23  2319 10/03/23  2355 10/03/23  0117   WBC 10*3/mm3 7.30 8.60 10.10   HEMOGLOBIN g/dL 12.5 12.4 12.9   HEMATOCRIT % 37.1 36.7 38.3   PLATELETS 10*3/mm3 186 190 204     Results from last 7 days   Lab Units 10/04/23  2319 10/03/23  2355 10/03/23  1209 10/03/23  0117 10/02/23  1849   SODIUM mmol/L 138 138  --  137 138   POTASSIUM mmol/L 3.7 3.8 4.5 3.4* 3.6   CHLORIDE mmol/L 100 101  --  101 99   CO2 mmol/L 27.0 28.0  --  27.0 29.0   BUN mg/dL 8 8  --  12 12   CREATININE mg/dL 0.72 0.71  --  0.82 0.84   CALCIUM mg/dL 9.0 8.7  --  8.9 9.1   BILIRUBIN mg/dL  --   --   --   --  0.3   ALK PHOS U/L  --   --   --   --  93   ALT (SGPT) U/L  --   --   --   --  31   AST (SGOT) U/L  --   --   --   --  21   GLUCOSE mg/dL 93 106*  --  180* 117*     Assessment and Plan:  53 y.o. female with recurrent diverticulitis admitted for flare associated with intramural abscess.  Clinically improving on antibiotics.     -Continue present management with IV antibiotics  - Discontinue IV fluids  - We will advance to GI soft diet  - We will have patient referred to GI for colonoscopy 6 to 8 weeks out given no prior recent colonoscopy  - Given recent Plavix last taken on 10/2/2023 would not plan on any surgical intervention unless necessary until Plavix is held at least 5 days; given this over the weekend earlier surgery would likely be Monday  -Given extensive cardiac history cardiology has evaluated the patient and patient is acceptable risk for surgery if needed  - Given multiple recurrent symptoms and change in bowel habits patient may ultimately need colon  resection but ideally this would be done in an outpatient setting  - If patient continues to do well and tolerates a diet without any worsening of symptoms hopefully plan for discharge tomorrow on a 14-day course of antibiotics    This note was created using Dragon Voice Recognition software.    Brandie Hale MD  10/05/23  07:02 EDT

## 2023-10-05 NOTE — PROGRESS NOTES
[facility] Medicine Services   Daily Progress Note    Patient Name: Sada Le  : 1970  MRN: [id number]  Primary Care Physician:  Yodit Johnston MD  Date of admission: 10/2/2023  Date and Time of Service:       Subjective      Chief Complaint:     Patient feels better today  Denies any chest pain or shortness of breath  Abdominal pain has improved  Pain is controlled with pain medication   Tolerating current diet but had some abdominal discomfort in morning        Objective      Vitals:   Temp:  [97.4 °F (36.3 °C)-97.9 °F (36.6 °C)] 97.4 °F (36.3 °C)  Heart Rate:  [57-62] 60  Resp:  [13-17] 13  BP: (116-142)/(71-88) 142/88    Physical Exam  General: No acute distress  HEENT: Neck supple, normal oral mucosa, no masses, no lymphadenopathy  Lungs: Clear bilaterally, no wheezing, no crackles, no rhonchi. Equal excursions.   CV - Normal S1/S2, no murmur, regular rate and rhythm   Abdomen - Soft, + mild LLQ tenderness, nondistended, normal bowel sounds  Extremities - no edema, no erythema  Neuro - No focal weakness, normal sensation  Psych - Alert and oriented x3  Skin - no wounds or lesions.          Result Review    Result Review:  I have personally reviewed the results from the time of this admission to 10/5/2023 10:41 EDT and agree with these findings:  [x]  Laboratory  [x]  Microbiology  [x]  Radiology  [x]  EKG/Telemetry   [x]  Cardiology/Vascular   []  Pathology  [x]  Old records  []  Other:  Most notable findings include:           Assessment & Plan      Brief Patient Summary:  Sada Le is a 53 y.o. female who       cefTRIAXone, 2,000 mg, Intravenous, Q24H  heparin (porcine), 5,000 Units, Subcutaneous, Q12H  insulin lispro, 2-7 Units, Subcutaneous, 4x Daily AC & at Bedtime  metoprolol succinate XL, 25 mg, Oral, Daily  metroNIDAZOLE, 500 mg, Intravenous, Q8H  pantoprazole, 40 mg, Oral, Q AM  polyethylene glycol, 17 g, Oral, Daily  senna-docusate sodium, 2 tablet, Oral,  BID  sodium chloride, 10 mL, Intravenous, Q12H       sodium chloride, 75 mL/hr, Last Rate: 75 mL/hr (10/04/23 0638)         Active Hospital Problems:  Active Hospital Problems    Diagnosis     **Diverticulitis of intestine with abscess      Plan:       Acute diverticulitis with abscess  - Surgery on consult. Continue non- operative management  - will need follow up with GI for colonoscopy in 6-8 weeks  - Off Plavix in case patient needs surgery.   - diet being advanced by surgery  - pain and nausea control   - Per Surgery - if remains stable by tomorrow can d/c and complete 14 days course Abx      CAD  - no chest pain. Plavix on hold.   - Cardiology on consult.      DM-2    - A1c was 7.8 on 9/22/23    - hold home metformin    - ISS     #HFpEF    - chronic, euvolemic    - hold home Lasix     - monitor for fluid overload     #Obesity    - BMI 32.87, weight loss encouraged     #HTN    - resume home Toprol    - hold home Lisinopril while admitted, consider restarting if necessary    - monitor     #HLD    - reconciled home meds     #GERD    - PPI        DVT prophylaxis:  Medical and mechanical DVT prophylaxis orders are present.    CODE STATUS:    Code Status (Patient has no pulse and is not breathing): CPR (Attempt to Resuscitate)  Medical Interventions (Patient has pulse or is breathing): Full Support      Disposition:  I expect patient to be discharged .    This patient has been  and discussed with . 10/05/23      Electronically signed by Brinda Redd MD, 10/05/23, 10:41 EDT.  [facility] Hospitalist Team

## 2023-10-05 NOTE — PLAN OF CARE
Goal Outcome Evaluation:  Pt resting abed with no c/o pain or discomfort at this time. Pt abd remains tender to palpitation, bs +4 normoactive.

## 2023-10-06 ENCOUNTER — READMISSION MANAGEMENT (OUTPATIENT)
Dept: CALL CENTER | Facility: HOSPITAL | Age: 53
End: 2023-10-06
Payer: MEDICAID

## 2023-10-06 VITALS
WEIGHT: 195.77 LBS | DIASTOLIC BLOOD PRESSURE: 80 MMHG | OXYGEN SATURATION: 97 % | RESPIRATION RATE: 13 BRPM | BODY MASS INDEX: 33.42 KG/M2 | HEIGHT: 64 IN | SYSTOLIC BLOOD PRESSURE: 144 MMHG | HEART RATE: 65 BPM | TEMPERATURE: 98.1 F

## 2023-10-06 LAB — GLUCOSE BLDC GLUCOMTR-MCNC: 123 MG/DL (ref 70–105)

## 2023-10-06 PROCEDURE — 82948 REAGENT STRIP/BLOOD GLUCOSE: CPT

## 2023-10-06 PROCEDURE — 25010000002 HEPARIN (PORCINE) PER 1000 UNITS: Performed by: HOSPITALIST

## 2023-10-06 PROCEDURE — 99231 SBSQ HOSP IP/OBS SF/LOW 25: CPT | Performed by: SURGERY

## 2023-10-06 PROCEDURE — 25010000002 METRONIDAZOLE 500 MG/100ML SOLUTION: Performed by: STUDENT IN AN ORGANIZED HEALTH CARE EDUCATION/TRAINING PROGRAM

## 2023-10-06 RX ORDER — LEVOFLOXACIN 750 MG/1
750 TABLET ORAL DAILY
Qty: 10 TABLET | Refills: 0 | Status: SHIPPED | OUTPATIENT
Start: 2023-10-06 | End: 2023-10-16

## 2023-10-06 RX ORDER — METRONIDAZOLE 500 MG/1
500 TABLET ORAL EVERY 8 HOURS
Qty: 30 TABLET | Refills: 0 | Status: SHIPPED | OUTPATIENT
Start: 2023-10-06 | End: 2023-10-16

## 2023-10-06 RX ORDER — OMEPRAZOLE 20 MG/1
20 TABLET, DELAYED RELEASE ORAL DAILY
Qty: 30 TABLET | Refills: 0 | Status: SHIPPED | OUTPATIENT
Start: 2023-10-06 | End: 2023-11-05

## 2023-10-06 RX ORDER — HYDROCODONE BITARTRATE AND ACETAMINOPHEN 5; 325 MG/1; MG/1
1 TABLET ORAL EVERY 8 HOURS PRN
Qty: 9 TABLET | Refills: 0 | Status: SHIPPED | OUTPATIENT
Start: 2023-10-06 | End: 2023-10-09

## 2023-10-06 RX ADMIN — HEPARIN SODIUM 5000 UNITS: 5000 INJECTION INTRAVENOUS; SUBCUTANEOUS at 08:40

## 2023-10-06 RX ADMIN — Medication 10 ML: at 08:41

## 2023-10-06 RX ADMIN — PANTOPRAZOLE SODIUM 40 MG: 40 TABLET, DELAYED RELEASE ORAL at 08:40

## 2023-10-06 RX ADMIN — HYDROCODONE BITARTRATE AND ACETAMINOPHEN 1 TABLET: 5; 325 TABLET ORAL at 08:52

## 2023-10-06 RX ADMIN — SENNOSIDES AND DOCUSATE SODIUM 2 TABLET: 50; 8.6 TABLET ORAL at 08:40

## 2023-10-06 RX ADMIN — METRONIDAZOLE 500 MG: 500 INJECTION, SOLUTION INTRAVENOUS at 08:41

## 2023-10-06 RX ADMIN — METOPROLOL SUCCINATE 25 MG: 25 TABLET, EXTENDED RELEASE ORAL at 08:40

## 2023-10-06 RX ADMIN — METRONIDAZOLE 500 MG: 500 INJECTION, SOLUTION INTRAVENOUS at 00:27

## 2023-10-06 NOTE — PLAN OF CARE
Goal Outcome Evaluation:  Plan of Care Reviewed With: patient        Progress: improving  Outcome Evaluation: Pt to discharge home today.

## 2023-10-06 NOTE — OUTREACH NOTE
Prep Survey      Flowsheet Row Responses   [facility] facility patient discharged from? [facility]   Is LACE score < 7 ? No   Eligibility [facility]   Date of Admission 10/02/23   Date of Discharge 10/06/23   Discharge Disposition Home or Self Care   Discharge diagnosis Diverticulitis of large intestine with abscess without bleeding   Does the patient have one of the following disease processes/diagnoses(primary or secondary)? Other   Does the patient have Home health ordered? No   Is there a DME ordered? No   Prep survey completed? Yes            PARISH ELIZALDE - Registered Nurse

## 2023-10-06 NOTE — DISCHARGE SUMMARY
[facility] Medicine Services  Discharge Summary    Date of Service: 10/6/23   Patient Name: Sada Le  : 1970  MRN: [id number]    Date of Admission: 10/2/2023  Discharge Diagnosis: Diverticulitis  Date of Discharge: 2023  Primary Care Physician: Yodit Johnston MD      Presenting Problem:   Diverticulitis of large intestine with abscess without bleeding [K57.20]  Diverticulitis of intestine with abscess [K57.80]    Active and Resolved Hospital Problems:  Active Hospital Problems    Diagnosis POA    **Diverticulitis of intestine with abscess [K57.80] Yes      Resolved Hospital Problems   No resolved problems to display.         Hospital Course     Hospital Course:  52 y.o. female who presented to [facility] on 10/2/2023 complaining of abdominal pain. States she was on PO abx for diverticulitis in January, symptoms initially improved but have recurred multiple times this year. CT a/p shows acute diverticulitis of the sigmoid colon with wall thickening pericolonic stranding and free fluid complicated by 2.0 x 2.2 x 1.3cm rim enhancing intramural abscess.  She is to be admitted for IV abx and surgical evaluation of diverticulitis.Surgery on consult. Continue non- operative management.  She will need follow up with GI for colonoscopy in 6-8 weeks.  She is discharged with Levaquin and Flagyl by surgery team.  Plavix on hold for 1 week.    DISCHARGE Follow Up Recommendations for labs and diagnostics:   Follow-up with surgeon  Follow-up with GI team  Finish antibiotic full course as directed  Hold Plavix for 1 week      Reasons For Change In Medications and Indications for New Medications:      Day of Discharge     Vital Signs:  Temp:  [97.1 °F (36.2 °C)-98.1 °F (36.7 °C)] 98.1 °F (36.7 °C)  Heart Rate:  [65] 65  Resp:  [13] 13  BP: (144-153)/(78-80) 144/80    Physical Exam:  Physical Exam   Physical Exam  HENT:      Head: Normocephalic and atraumatic.      Nose:  Nose normal.   Eyes:      Extraocular Movements: Extraocular movements intact.      Conjunctiva/sclera: Conjunctivae normal.      Pupils: Pupils are equal, round, and reactive to light.   Cardiovascular:      Rate and Rhythm: normal       Pulses: Normal pulses.      Heart sounds: Normal heart sounds.   Pulmonary:      Effort: normal      Breath sounds: normal   Abdominal:      General: Abdomen is flat. Bowel sounds are normal.      Palpations: Abdomen is soft.   Musculoskeletal:         General: Normal range of motion.      Cervical back: Normal range of motion and neck supple.   Skin:     General: Skin is dry.   Neurological:      General: No focal deficit present.      Mental Status: alert.   Psychiatric:         Mood and Affect: Mood normal.            Pertinent  and/or Most Recent Results     LAB RESULTS:      Lab 10/04/23  2319 10/03/23  2355 10/03/23  0117 10/02/23  1849   WBC 7.30 8.60 10.10 11.90*   HEMOGLOBIN 12.5 12.4 12.9 13.3   HEMATOCRIT 37.1 36.7 38.3 40.7   PLATELETS 186 190 204 225   NEUTROS ABS 4.30  --   --  7.70*   LYMPHS ABS 2.20  --   --  3.00   MONOS ABS 0.60  --   --  1.00*   EOS ABS 0.20  --   --  0.10   MCV 89.4 90.5 91.1 91.8         Lab 10/04/23  2319 10/03/23  2355 10/03/23  1209 10/03/23  0117 10/02/23  1849   SODIUM 138 138  --  137 138   POTASSIUM 3.7 3.8 4.5 3.4* 3.6   CHLORIDE 100 101  --  101 99   CO2 27.0 28.0  --  27.0 29.0   ANION GAP 11.0 9.0  --  9.0 10.0   BUN 8 8  --  12 12   CREATININE 0.72 0.71  --  0.82 0.84   EGFR 99.5 101.2  --  85.1 83.2   GLUCOSE 93 106*  --  180* 117*   CALCIUM 9.0 8.7  --  8.9 9.1         Lab 10/02/23  1849   TOTAL PROTEIN 6.8   ALBUMIN 4.2   GLOBULIN 2.6   ALT (SGPT) 31   AST (SGOT) 21   BILIRUBIN 0.3   ALK PHOS 93                     Brief Urine Lab Results  (Last result in the past 365 days)        Color   Clarity   Blood   Leuk Est   Nitrite   Protein   CREAT   Urine HCG        10/02/23 1854 Yellow   Clear   Negative   Trace   Negative    Negative                 Microbiology Results (last 10 days)       Procedure Component Value - Date/Time    Blood Culture - Blood, Hand, Left [[id number]]  (Normal) Collected: 10/03/23 0117    Lab Status: Preliminary result Specimen: Blood from Hand, Left Updated: 10/06/23 0130     Blood Culture No growth at 3 days    Blood Culture - Blood, Arm, Right [[id number]]  (Normal) Collected: 10/03/23 0116    Lab Status: Preliminary result Specimen: Blood from Arm, Right Updated: 10/06/23 0130     Blood Culture No growth at 3 days                 Results for orders placed during the hospital encounter of 06/06/21    Duplex Venous Upper Extremity - Left CAR    Interpretation Summary  · Normal left upper extremity venous duplex scan.      Results for orders placed during the hospital encounter of 06/06/21    Duplex Venous Upper Extremity - Left CAR    Interpretation Summary  · Normal left upper extremity venous duplex scan.      Results for orders placed during the hospital encounter of 10/02/23    Adult Transthoracic Echo Complete W/ Cont if Necessary Per Protocol    Interpretation Summary  Normal left and right ventricular size and systolic function  Indeterminate left ventricular diastolic function  Normal biatrial size  Aortic valve sclerosis without stenosis  Mild to moderate central aortic regurgitation  Mildly dilated ascending aorta.      Labs Pending at Discharge:  Pending Labs       Order Current Status    Blood Culture - Blood, Arm, Right Preliminary result    Blood Culture - Blood, Hand, Left Preliminary result            Procedures Performed           Consults:   Consults       Date and Time Order Name Status Description    10/3/2023 11:55 AM Inpatient Cardiology Consult Completed     10/2/2023  9:51 PM Inpatient General Surgery Consult Completed               Discharge Details        Discharge Medications        New Medications        Instructions Start Date   HYDROcodone-acetaminophen 5-325 MG per  tablet  Commonly known as: NORCO   1 tablet, Oral, Every 8 Hours PRN      levoFLOXacin 750 MG tablet  Commonly known as: Levaquin   750 mg, Oral, Daily      metroNIDAZOLE 500 MG tablet  Commonly known as: Flagyl   500 mg, Oral, Every 8 Hours             Continue These Medications        Instructions Start Date   acetaminophen 325 MG tablet  Commonly known as: TYLENOL   650 mg, Oral, Every 6 Hours PRN      aspirin 81 MG EC tablet   81 mg, Oral, Daily      cetirizine 10 MG tablet  Commonly known as: zyrTEC   10 mg, Oral, Nightly      cholecalciferol 1.25 MG (07005 UT) capsule  Commonly known as: VITAMIN D3   50,000 Units, Oral, Daily      clopidogrel 75 MG tablet  Commonly known as: PLAVIX   75 mg, Oral, Daily      furosemide 40 MG tablet  Commonly known as: LASIX   40 mg, Oral, Every Morning      furosemide 40 MG tablet  Commonly known as: LASIX   40 mg, Oral, Nightly PRN      lisinopril 5 MG tablet  Commonly known as: PRINIVIL,ZESTRIL   5 mg, Oral, 2 Times Daily      metFORMIN 500 MG tablet  Commonly known as: GLUCOPHAGE   1,000 mg, Oral, 2 Times Daily With Meals      metoprolol succinate XL 25 MG 24 hr tablet  Commonly known as: TOPROL-XL   25 mg, Oral, Daily      nitroglycerin 0.4 MG SL tablet  Commonly known as: NITROSTAT   0.4 mg, Sublingual, Every 5 Minutes PRN, Take no more than 3 doses in 15 minutes.       omeprazole 20 MG capsule  Commonly known as: priLOSEC   20 mg, Oral, Daily      omeprazole OTC 20 MG EC tablet  Commonly known as: PriLOSEC OTC   20 mg, Oral, Daily      vitamin B-12 500 MCG tablet  Commonly known as: CYANOCOBALAMIN   500 mcg, Oral, Daily               Allergies   Allergen Reactions    Penicillins Rash    Ciprofloxacin Rash    Penicillin G Rash         Discharge Disposition:   Home or Self Care    Diet:  Hospital:No active diet order        Discharge Activity:   Activity Instructions    As tolerated             CODE STATUS:  Code Status and Medical Interventions:   Ordered at: 10/02/23 4590      Code Status (Patient has no pulse and is not breathing):    CPR (Attempt to Resuscitate)     Medical Interventions (Patient has pulse or is breathing):    Full Support         Future Appointments   Date Time Provider Department Center   10/13/2023  2:00 PM [facility]   10/16/2023  2:15 PM Brandie Hale MD [facility]   10/17/2023  2:30 PM Brandie Hale MD [facility]   10/30/2023 11:15 AM Yodit Johnston MD [facility]   12/11/2023  1:15 PM Brant Martinez MD [facility]   1/8/2024 10:00 AM Yodit Johnston MD [facility]           Time spent on Discharge including face to face service:  35 minutes      Signature: Electronically signed by Gerardo Reina MD, 10/06/23, 16:28 EDT.  [facility] Hospitalist Team

## 2023-10-06 NOTE — PROGRESS NOTES
General Surgery Inpatient Progress Note      Name: Sada Le ADMIT: 10/2/2023   : 1970  PCP: Yodit Johnston MD    MRN: [id number] LOS: 4 days   AGE/SEX: 53 y.o. female  ROOM: [address] [place] [address]      Patient Care Team:  Yodit Johnston MD as PCP - General (Family Medicine)  Amos Olivia MD as Consulting Physician (Cardiology)  Brant Martinez MD as Consulting Physician (Cardiology)  Chong Golden MD as Consulting Physician (Ophthalmology)  Chief Complaint   Patient presents with    Abdominal Pain     Abd pain, history of diverticulitis, states she has had pain since .  Pt states it is so bad right now she can't take it she has cramping and diarrhea         Subjective:  Patient seen and examined.  She reports she tolerated regular diet without any abdominal pain.  She did report however with bowel movements she was having abdominal cramping which did not last after completion of bowel movement.  Reports she is having loose stools which she was having prior to admission.  We discussed it is unclear whether or not she will or not do well with nonoperative management however it is reasonable to continue with a course of antibiotics for now to see if she does okay with nonoperative management.  Patient feels okay for discharge home at this time on a course of antibiotics and if any worsening will call the office or return to the ED.    Medications:  cefTRIAXone, 2,000 mg, Intravenous, Q24H  heparin (porcine), 5,000 Units, Subcutaneous, Q12H  insulin lispro, 2-7 Units, Subcutaneous, 4x Daily AC & at Bedtime  metoprolol succinate XL, 25 mg, Oral, Daily  metroNIDAZOLE, 500 mg, Intravenous, Q8H  pantoprazole, 40 mg, Oral, Q AM  polyethylene glycol, 17 g, Oral, Daily  senna-docusate sodium, 2 tablet, Oral, BID  sodium chloride, 10 mL, Intravenous, Q12H         senna-docusate sodium **AND** polyethylene glycol **AND** bisacodyl **AND** bisacodyl    Calcium Replacement -  Follow Nurse / BPA Driven Protocol    dextrose    dextrose    glucagon (human recombinant)    HYDROcodone-acetaminophen    Magnesium Standard Dose Replacement - Follow Nurse / BPA Driven Protocol    Morphine    nitroglycerin    ondansetron **OR** ondansetron    Phosphorus Replacement - Follow Nurse / BPA Driven Protocol    Potassium Replacement - Follow Nurse / BPA Driven Protocol    sodium chloride    sodium chloride     Vitals:  Temp:  [97.1 °F (36.2 °C)-98.1 °F (36.7 °C)] 98.1 °F (36.7 °C)  Heart Rate:  [53-65] 65  Resp:  [13-16] 13  BP: (101-153)/(63-80) 144/80     Physical Exam:  No acute distress, alert  Nonlabored respirations  Abdomen soft, nondistended, mildly tender to palpation in left lower quadrant to deep palpation, no guarding  Extremities warm well perfused with no gross deformities    Labs:  Results from last 7 days   Lab Units 10/04/23  2319 10/03/23  2355 10/03/23  0117   WBC 10*3/mm3 7.30 8.60 10.10   HEMOGLOBIN g/dL 12.5 12.4 12.9   HEMATOCRIT % 37.1 36.7 38.3   PLATELETS 10*3/mm3 186 190 204     Results from last 7 days   Lab Units 10/04/23  2319 10/03/23  2355 10/03/23  1209 10/03/23  0117 10/02/23  1849   SODIUM mmol/L 138 138  --  137 138   POTASSIUM mmol/L 3.7 3.8 4.5 3.4* 3.6   CHLORIDE mmol/L 100 101  --  101 99   CO2 mmol/L 27.0 28.0  --  27.0 29.0   BUN mg/dL 8 8  --  12 12   CREATININE mg/dL 0.72 0.71  --  0.82 0.84   CALCIUM mg/dL 9.0 8.7  --  8.9 9.1   BILIRUBIN mg/dL  --   --   --   --  0.3   ALK PHOS U/L  --   --   --   --  93   ALT (SGPT) U/L  --   --   --   --  31   AST (SGOT) U/L  --   --   --   --  21   GLUCOSE mg/dL 93 106*  --  180* 117*     Assessment and Plan:  53 y.o. female with recurrent diverticulitis admitted for flare associated with intramural abscess.  Clinically improving on antibiotics.     -Continue present management with IV antibiotics  - GI soft diet  - We will have patient referred to GI for colonoscopy 6 to 8 weeks out given no prior recent colonoscopy  -  Given recent Plavix last taken on 10/2/2023 would not plan on any surgical intervention unless necessary until Plavix is held at least 5 days; would like patient to continue to hold Plavix for about 1 week upon discharge in the event she does not do well with nonoperative management  -Given extensive cardiac history cardiology has evaluated the patient and patient is acceptable risk for surgery if needed  - Given multiple recurrent symptoms and change in bowel habits patient may ultimately need colon resection but ideally this would be done in an outpatient setting  - From a surgical standpoint patient is okay for discharge home on a course of oral antibiotics; will plan for a 14-day course of antibiotics  - Patient instructed to call the office to schedule follow-up with me in about 2 weeks and to call earlier if any recurrence or worsening of symptoms, also advised to schedule appointment with GI    This note was created using Dragon Voice Recognition software.    Brandie Hale MD  10/06/23  07:29 EDT

## 2023-10-06 NOTE — PAYOR COMM NOTE
"Magdalena Le \"Geeta\" (53 y.o. Female)  1970  Ref #[id number]   Pt dc'd 10/06/23 Routine to home    AUTHORIZATION PENDING  PLEASE FORWARD DETERMINATION TO FOLLOWING CONTACT:    KARLY MARTINEZ LPN UR  Utilization Review Nurse  [facility]  Direct & confidential phone # [phone]  Fax # [phone]        Date of Birth   1970    Social Security Number       Address   [address] [place] [address]    Home Phone   [phone]    MRN   [id number]       Yarsani   None    Marital Status                               Admission Date   10/2/23    Admission Type   Emergency    Admitting Provider   Genny Quigley DO    Attending Provider       Department, Room/Bed   [facility] SURGICAL INPATIENT,        Discharge Date   10/6/2023    Discharge Disposition   Home or Self Care    Discharge Destination                                 Attending Provider: (none)   Allergies: Penicillins, Ciprofloxacin, Penicillin G    Isolation: None   Infection: None   Code Status: Prior    Ht: 162.6 cm (64\")   Wt: 88.8 kg (195 lb 12.3 oz)    Admission Cmt: None   Principal Problem: Diverticulitis of intestine with abscess [K57.80]                   Active Insurance as of 10/2/2023       Primary Coverage       Payor Plan Insurance Group Employer/Plan Group    [id number]       Payor Plan Address Payor Plan Phone Number Payor Plan Fax Number Effective Dates    MAIL STOP:   2019 - None Entered    [address] [address]       [facility] [id number]         Subscriber Name Subscriber Birth Date Member ID       MAGDALENA LE 1970 [id number]                     Emergency Contacts        (Rel.) Home Phone Work Phone Mobile Phone    ARTHUR CORBIN (Daughter) -- -- [phone]              Discharge Summary    No notes of this type exist for this encounter.       "

## 2023-10-06 NOTE — PLAN OF CARE
Goal Outcome Evaluation:   Pt resting abed with no c/o pain or discomfort at this time. Pt expected to d/c home this am. Pt plans to schedule outpt colon resection at a later date.

## 2023-10-06 NOTE — DISCHARGE INSTRUCTIONS
Call general surgery office or present to the ED if any worsening of symptoms; fevers, worsening abdominal pain, nausea, vomiting  Followup in general surgery office in 2 wks  Call GI to schedule colonoscopy

## 2023-10-08 LAB
BACTERIA SPEC AEROBE CULT: NORMAL
BACTERIA SPEC AEROBE CULT: NORMAL

## 2023-10-09 ENCOUNTER — TRANSITIONAL CARE MANAGEMENT TELEPHONE ENCOUNTER (OUTPATIENT)
Dept: CALL CENTER | Facility: HOSPITAL | Age: 53
End: 2023-10-09
Payer: MEDICAID

## 2023-10-09 NOTE — CASE MANAGEMENT/SOCIAL WORK
Case Management Discharge Note      Final Note: Routine home         Selected Continued Care - Discharged on 10/6/2023 Admission date: 10/2/2023 - Discharge disposition: Home or Self Care         Transportation Services  Private: Car    Final Discharge Disposition Code: 01 - home or self-care

## 2023-10-09 NOTE — OUTREACH NOTE
Call Center TCM Note      Flowsheet Row Responses   [facility] [place], operated by [facility] patient discharged from? [facility]   Does the patient have one of the following disease processes/diagnoses(primary or secondary)? Other   TCM attempt successful? Yes   Call start time 1020   Call end time 1023   Discharge diagnosis Diverticulitis of large intestine with abscess without bleeding   Meds reviewed with patient/caregiver? Yes   Does the patient have all medications ordered at discharge? No   Nursing Interventions No intervention needed   Prescription comments Patient states she could not get her pain medication, due to something about the provider and her insurance, but she states she doesn't need the pain medication.   Is the patient taking all medications as directed (includes completed medication regime)? N/A  [She doesn't need the pain medication.]   Does the patient have an appointment with their PCP within 7-14 days of discharge? No   Nursing Interventions Patient declined scheduling/rescheduling appointment at this time, Routed TCM call to PCP office  [Patient has an already scheduled appointment on 10/30/23, and she declined scheduling a sooner appointment for a hospital follow up.]   Has home health visited the patient within 72 hours of discharge? N/A   Psychosocial issues? No   Did the patient receive a copy of their discharge instructions? Yes   Nursing interventions Reviewed instructions with patient   What is the patient's perception of their health status since discharge? Improving   Is the patient/caregiver able to teach back signs and symptoms related to disease process for when to call PCP? Yes   Is the patient/caregiver able to teach back signs and symptoms related to disease process for when to call 911? Yes   Is the patient/caregiver able to teach back the hierarchy of who to call/visit for symptoms/problems? PCP, Specialist, Home health nurse, Urgent Care, ED, 911 Yes   If the patient is a current smoker, are they  able to teach back resources for cessation? Not a smoker   TCM call completed? Yes   Call end time 1023   Would this patient benefit from a Referral to [facility] Social Work? No   Is the patient interested in additional calls from an ambulatory ? No            Humera Courtney LPN    10/9/2023, 10:29 EDT

## 2023-10-14 LAB
QT INTERVAL: 396 MS
QTC INTERVAL: 403 MS

## 2023-10-16 ENCOUNTER — OFFICE VISIT (OUTPATIENT)
Dept: SURGERY | Facility: CLINIC | Age: 53
End: 2023-10-16
Payer: MEDICAID

## 2023-10-16 VITALS
HEIGHT: 64 IN | OXYGEN SATURATION: 99 % | DIASTOLIC BLOOD PRESSURE: 79 MMHG | TEMPERATURE: 98.2 F | SYSTOLIC BLOOD PRESSURE: 117 MMHG | BODY MASS INDEX: 33.02 KG/M2 | HEART RATE: 75 BPM | WEIGHT: 193.4 LBS

## 2023-10-16 DIAGNOSIS — K57.92 DIVERTICULITIS: Primary | ICD-10-CM

## 2023-10-16 DIAGNOSIS — I10 ESSENTIAL (PRIMARY) HYPERTENSION: ICD-10-CM

## 2023-10-16 PROCEDURE — 1159F MED LIST DOCD IN RCRD: CPT | Performed by: SURGERY

## 2023-10-16 PROCEDURE — 3078F DIAST BP <80 MM HG: CPT | Performed by: SURGERY

## 2023-10-16 PROCEDURE — 1160F RVW MEDS BY RX/DR IN RCRD: CPT | Performed by: SURGERY

## 2023-10-16 PROCEDURE — 99213 OFFICE O/P EST LOW 20 MIN: CPT | Performed by: SURGERY

## 2023-10-16 PROCEDURE — 3074F SYST BP LT 130 MM HG: CPT | Performed by: SURGERY

## 2023-10-16 RX ORDER — METOPROLOL SUCCINATE 25 MG/1
25 TABLET, EXTENDED RELEASE ORAL DAILY
Qty: 90 TABLET | Refills: 1 | Status: SHIPPED | OUTPATIENT
Start: 2023-10-16

## 2023-10-17 NOTE — PROGRESS NOTES
General Surgery History and Physical      Referring Provider: No ref. provider found    Chief Complaint:    Recurrent diverticulitis    History of Present Illness:    Sada Le is a 53 y.o. previously seen in hospital for recurrent diverticulitis.  She was managed with a course of antibiotics with subsequent improvement.  CT scan of the abdomen/pelvis showed sigmoid diverticulitis with intramural abscess.  She reports she has never had a colonoscopy.  She completed her course of antibiotics yesterday.  She reports significant improvement still with very mild intermittent abdominal cramps.  Still having loose stools which are improving.  Denies any fevers or chills.    Past Medical History:   Past Medical History:   Diagnosis Date    Abnormal ECG 9/2019    Absence of left thumb     Impression: hx of MRSA, she is signficantly improved She will continue meds and followup if sxs have not resolved over the next 2 weeks.. She is released from care and will notify us of any problems    Acute otitis media     Allergic rhinitis     Arthritis     neck    Cancer     skin    Cervical disc disease with myelopathy     CHF (congestive heart failure) 9/2019    Contact dermatitis or eczema     Coronary artery disease     Diabetes mellitus 9/2023    Disorder of bone and cartilage     Diverticulitis of colon     Impression: CT confirms in the sigmoid colon 10/2011    Diverticulosis of colon     Impression: No sxs 02/13/2013    Elevated cholesterol     Elevated lipoprotein(a) 04/30/2020    Elevated temperature     External otitis of left ear     Gastroesophageal reflux disease without esophagitis 06/19/2022    Hearing loss due to cerumen impaction, left     Hemangioma of liver     History of echocardiogram 10/03/2019    Severely reduced LV systolic function. EF 34% Distribution indicative of LAD territory injury. Akinesis of the apex as well as apical lateral and mid to apical septal walls. Preservation of posterior inferior  lateral walls. Mild AI, mild TR. Normal RV function.     History of transesophageal echocardiography (NAFISA) 01/07/2020    EF 55% LA cavity is mild to moderately dilated. Mild MR. Interatrial septum appears redundant. Interatrial hypermobile c/w aneurysm. Large PFO is noted with right to left shunting on bubble study. PFO tunnel appears to be short.     Hordeolum externum of left lower eyelid     Impression: She was started on Bactrim DS for her infection. She was also advised to use warm compression. She will followup should sxs not improve over the next 48 hrs and resolve over the next 1 weeek.    Hyperlipidemia     Impression: Diet controled. She should see improvement with her successful wt loss. We discussed her lipid panel.    Hypertension, benign     Impression: new onset Low salt low calorie diet and regular exercise and followup in 1 mo She will monitor her pressures and notify us of her pressures over the next 1 week.    Inclusion cyst of right breast     Impression: We will follow for now. She is encouraged to have Mammography. She is presently without insurance and reluctant. She bobby consider. She will notify us of any change at all in the lesion for the only way to be certain of it's etilogy is to remove it. She understands.    Insomnia, organic     Menopausal syndrome     Myocardial infarction 9/29/19    Overweight (BMI 25.0-29.9)     Pulmonary arterial hypertension 9/2019    RUQ abdominal pain     Impression: Resolving, negative GB u/s, HIDA EF 77%, we will follow    Stented coronary artery 09/29/2019    Tobacco use     Impression: She is strongly encouraged to stop smoking. Cessation techniques discussed and encouraged. The consequences of not stopping discussed, ie, CAD, PVD, Stroke, Lung and other cancers.      Past Surgical History:    Past Surgical History:   Procedure Laterality Date    BIVENTRICULAR ASSIST DEVICE/LEFT VENTRICULAR ASSIST DEVICE INSERTION N/A 09/29/2019    Procedure: Left  Ventricular Assist Device;  Surgeon: Brant Martinez MD;  Location:  [facility]VASIVE [facility];  Service: Cardiovascular    CARDIAC CATHETERIZATION N/A 09/29/2019    Procedure: Left Heart Cath;  Surgeon: Brant Martinez MD;  Location:  [facility];  Service: Cardiovascular    CARDIAC CATHETERIZATION N/A 09/29/2019    Procedure: Coronary angiography;  Surgeon: Brant Martinez MD;  Location:  [facility];  Service: Cardiovascular    CARDIAC CATHETERIZATION N/A 09/29/2019    Procedure: Left ventriculography;  Surgeon: Brant Martinez MD;  Location:  [facility];  Service: Cardiovascular    CARDIAC CATHETERIZATION N/A 09/29/2019    Procedure: Stent SERENITY coronary;  Surgeon: Brant Martinez MD;  Location:  [facility];  Service: Cardiovascular    CARDIAC CATHETERIZATION N/A 02/25/2020    Procedure: Right Heart Cath;  Surgeon: Brant Martinez MD;  Location:  [facility];  Service: Cardiology;  Laterality: N/A;    CARDIAC CATHETERIZATION N/A 02/12/2021    Procedure: Left Heart Cath;  Surgeon: Brant Martinez MD;  Location:  [facility];  Service: Cardiology;  Laterality: N/A;    CARDIAC CATHETERIZATION N/A 02/12/2021    Procedure: Coronary angiography;  Surgeon: Brant Martinez MD;  Location:  [facility];  Service: Cardiology;  Laterality: N/A;    CARDIAC CATHETERIZATION N/A 02/12/2021    Procedure: Left ventriculography;  Surgeon: Brant Martinez MD;  [facility];  Service: Cardiology;  Laterality: N/A;    CARDIAC CATHETERIZATION N/A 02/12/2021    Procedure: Aortic root aortogram;  Surgeon: Brant Martinez MD;  Location:  [facility];  Service: Cardiology;  Laterality: N/A;    CARDIAC CATHETERIZATION N/A 02/12/2021    Procedure: Percutaneous Coronary Intervention;   Surgeon: Brant Martinez MD;  Location:  [facility];  Service: Cardiology;  Laterality: N/A;    CARDIAC CATHETERIZATION N/A 02/12/2021    Procedure: Stent SERENITY coronary;  Surgeon: Brant Martinez MD;  Location:  [facility];  Service: Cardiology;  Laterality: N/A;    CARDIAC ELECTROPHYSIOLOGY PROCEDURE  09/29/2019    Procedure: Impella Insertion;  Surgeon: Brant Martinez MD;  Location: [facility] CATH INVASIVE LOCATION;  Service: Cardiovascular    CORONARY ANGIOPLASTY  02/12/2021    1x stent to Circ    CORONARY ANGIOPLASTY WITH STENT PLACEMENT  02/2021    MD RT/LT HEART CATHETERS N/A 09/29/2019    Procedure: Percutaneous Coronary Intervention;  Surgeon: Brant Martinez MD;  Location: [facility] CATH INVASIVE LOCATION;  Service: Cardiovascular    TOTAL ABDOMINAL HYSTERECTOMY WITH SALPINGO OOPHORECTOMY  1995    Endometriosis     Family History:    Family History   Problem Relation Age of Onset    Alzheimer's disease Mother     Heart disease Mother         cardiovascular    Kidney disease Mother     Throat cancer Mother     Thyroid disease Mother     Diabetes Father         mellitus    Heart attack Father     Thyroid disease Father     Hypertension Father     Heart disease Sister         cardiovascular    Diabetes Maternal Aunt         mellitus    Heart disease Maternal Aunt         cardiovascular    Breast cancer Maternal Aunt     Cancer Maternal Aunt         breast    Diabetes Maternal Uncle         mellitus    Alzheimer's disease Maternal Uncle     Diabetes Maternal Grandmother         mellitus    Pancreatic cancer Maternal Grandmother     Kidney disease Paternal Uncle      Social History:    Social History     Socioeconomic History    Marital status:    Tobacco Use    Smoking status: Former     Packs/day: 1.00     Years: 34.00     Additional pack years: 0.00     Total pack years: 34.00     Types: Cigarettes     Start date: 1/1/1985     Quit  date: 2019     Years since quittin.0     Passive exposure: Current    Smokeless tobacco: Never    Tobacco comments:     States she is quitting as of 2019.   Vaping Use    Vaping Use: Never used    Passive vaping exposure: Yes   Substance and Sexual Activity    Alcohol use: Yes     Alcohol/week: 6.0 standard drinks of alcohol     Types: 6 Cans of beer per week     Comment: occassionally     Drug use: Not Currently     Types: Marijuana     Comment: occasional    Sexual activity: Yes     Partners: Male     Birth control/protection: Post-menopausal, Hysterectomy     Allergies:   Allergies   Allergen Reactions    Penicillins Rash    Ciprofloxacin Rash    Penicillin G Rash     Medications:     Current Outpatient Medications:     acetaminophen (TYLENOL) 325 MG tablet, Take 2 tablets by mouth Every 6 (Six) Hours As Needed for Mild Pain., Disp: , Rfl:     aspirin 81 MG EC tablet, Take 1 tablet by mouth Daily., Disp: , Rfl:     cetirizine (zyrTEC) 10 MG tablet, Take 1 tablet by mouth Every Night., Disp: , Rfl:     cholecalciferol (VITAMIN D3) 1.25 MG (28019 UT) capsule, Take 1 capsule by mouth Daily., Disp: , Rfl:     furosemide (LASIX) 40 MG tablet, Take 1 tablet by mouth Every Morning., Disp: , Rfl:     furosemide (LASIX) 40 MG tablet, Take 1 tablet by mouth At Night As Needed (additional dose for swelling)., Disp: , Rfl:     levoFLOXacin (Levaquin) 750 MG tablet, Take 1 tablet by mouth Daily for 10 days., Disp: 10 tablet, Rfl: 0    lisinopril (PRINIVIL,ZESTRIL) 5 MG tablet, Take 1 tablet by mouth 2 (Two) Times a Day., Disp: 180 tablet, Rfl: 1    metFORMIN (GLUCOPHAGE) 500 MG tablet, Take 2 tablets by mouth 2 (Two) Times a Day With Meals., Disp: , Rfl:     metoprolol succinate XL (TOPROL-XL) 25 MG 24 hr tablet, TAKE 1 TABLET BY MOUTH EVERY DAY, Disp: 90 tablet, Rfl: 1    metroNIDAZOLE (Flagyl) 500 MG tablet, Take 1 tablet by mouth Every 8 (Eight) Hours for 10 days., Disp: 30 tablet, Rfl: 0    nitroglycerin  (NITROSTAT) 0.4 MG SL tablet, Place 1 tablet under the tongue Every 5 (Five) Minutes As Needed for Chest Pain. Take no more than 3 doses in 15 minutes., Disp: 100 tablet, Rfl: 0    omeprazole (priLOSEC) 20 MG capsule, Take 1 capsule by mouth Daily., Disp: , Rfl:     omeprazole OTC (PriLOSEC OTC) 20 MG EC tablet, Take 1 tablet by mouth Daily for 30 days., Disp: 30 tablet, Rfl: 0    vitamin B-12 (CYANOCOBALAMIN) 500 MCG tablet, Take 1 tablet by mouth Daily., Disp: , Rfl:     clopidogrel (PLAVIX) 75 MG tablet, Take 1 tablet by mouth Daily. (Patient not taking: Reported on 10/16/2023), Disp: , Rfl:     Radiology/Endoscopy:    CT abdomen/pelvis 10/2/2023  IMPRESSION :  Acute diverticulitis of the sigmoid colon with wall thickening pericolonic stranding and free fluid. There is a 2.0 x 2.2 x 1.3 cm rim-enhancing intramural abscess on image 118 of series 3.  No bowel obstruction.  Fatty infiltration of the liver.  Bilateral adrenal nodules..     Labs:    Recent labs reviewed    Review of Systems:   As noted above in HPI    Physical Exam:   No acute distress, alert  Nonlabored respirations  Abdomen soft, nondistended, minimally tender to palpation MRI of TQ palpation only, no guarding    Assessment and Plan:  Sada Le is a 53 y.o. with a history of recurrent diverticulitis, most recently admitted for diverticulitis with intramural abscess.  Clinically improved.    - Given no prior history of colonoscopy will refer patient to GI for colonoscopy  - We did discuss if she continues to have recurrent bouts of diverticulitis would be reasonable to consider colon resection, ideally on and off basis  - Recommend resumption of Plavix    Brandie Hale MD  General Surgery

## 2023-10-19 RX ORDER — BUPROPION HYDROCHLORIDE 150 MG/1
150 TABLET ORAL DAILY
Qty: 90 TABLET | Refills: 4 | Status: SHIPPED | OUTPATIENT
Start: 2023-10-19

## 2023-10-24 DIAGNOSIS — K21.9 GASTROESOPHAGEAL REFLUX DISEASE WITHOUT ESOPHAGITIS: Primary | ICD-10-CM

## 2023-10-24 RX ORDER — OMEPRAZOLE 20 MG/1
20 CAPSULE, DELAYED RELEASE ORAL DAILY
Qty: 90 CAPSULE | Refills: 3 | Status: SHIPPED | OUTPATIENT
Start: 2023-10-24

## 2023-11-27 ENCOUNTER — TELEPHONE (OUTPATIENT)
Dept: FAMILY MEDICINE CLINIC | Facility: CLINIC | Age: 53
End: 2023-11-27
Payer: MEDICAID

## 2023-11-27 DIAGNOSIS — K57.30 DIVERTICULOSIS OF COLON: Primary | ICD-10-CM

## 2023-11-27 RX ORDER — METRONIDAZOLE 500 MG/1
500 TABLET ORAL 3 TIMES DAILY
Qty: 30 TABLET | Refills: 0 | Status: SHIPPED | OUTPATIENT
Start: 2023-11-27

## 2023-11-27 NOTE — TELEPHONE ENCOUNTER
Per  sent medication to pharmacy for patient. If does not improve should let us know.     Patient notified and voiced understanding

## 2023-11-27 NOTE — TELEPHONE ENCOUNTER
"  Caller: Sada Le \"Geeta\"    Relationship: Self    Best call back number: [phone]    What was the call regarding: PATIENT STATED THAT THEY HAD BEEN INFORMED TO CALL RIGHT AWAY IF THEY FELT THAT THE DIVERTICULITIS IS RETURNING. STATED THAT THEY DEFINITELY FEEL THAT IT IS TRYING TO COME BACK. PLEASE CALL AND ADVISE FURTHER    "

## 2023-11-27 NOTE — TELEPHONE ENCOUNTER
Per Dr. Johnston sent medication to pharmacy for patient.   Patient notified and voiced understanding.

## 2023-12-11 ENCOUNTER — TELEPHONE (OUTPATIENT)
Dept: SURGERY | Facility: CLINIC | Age: 53
End: 2023-12-11
Payer: MEDICAID

## 2023-12-11 ENCOUNTER — OFFICE VISIT (OUTPATIENT)
Dept: CARDIOLOGY | Facility: CLINIC | Age: 53
End: 2023-12-11
Payer: MEDICAID

## 2023-12-11 VITALS
RESPIRATION RATE: 18 BRPM | WEIGHT: 193 LBS | SYSTOLIC BLOOD PRESSURE: 121 MMHG | DIASTOLIC BLOOD PRESSURE: 70 MMHG | HEART RATE: 79 BPM | HEIGHT: 64 IN | BODY MASS INDEX: 32.95 KG/M2

## 2023-12-11 DIAGNOSIS — I10 HYPERTENSION, BENIGN: ICD-10-CM

## 2023-12-11 DIAGNOSIS — I25.118 ATHEROSCLEROTIC HEART DISEASE OF NATIVE CORONARY ARTERY WITH OTHER FORMS OF ANGINA PECTORIS: ICD-10-CM

## 2023-12-11 DIAGNOSIS — I71.20 THORACIC AORTIC ANEURYSM WITHOUT RUPTURE, UNSPECIFIED PART: ICD-10-CM

## 2023-12-11 DIAGNOSIS — E78.41 ELEVATED LIPOPROTEIN(A): ICD-10-CM

## 2023-12-11 DIAGNOSIS — I25.118 CORONARY ARTERY DISEASE OF NATIVE ARTERY OF NATIVE HEART WITH STABLE ANGINA PECTORIS: ICD-10-CM

## 2023-12-11 DIAGNOSIS — I10 ESSENTIAL (PRIMARY) HYPERTENSION: Primary | ICD-10-CM

## 2023-12-11 PROCEDURE — 99214 OFFICE O/P EST MOD 30 MIN: CPT | Performed by: INTERNAL MEDICINE

## 2023-12-11 PROCEDURE — 3078F DIAST BP <80 MM HG: CPT | Performed by: INTERNAL MEDICINE

## 2023-12-11 PROCEDURE — 3074F SYST BP LT 130 MM HG: CPT | Performed by: INTERNAL MEDICINE

## 2023-12-11 RX ORDER — AMOXICILLIN AND CLAVULANATE POTASSIUM 875; 125 MG/1; MG/1
1 TABLET, FILM COATED ORAL 2 TIMES DAILY
Qty: 20 TABLET | Refills: 0 | Status: SHIPPED | OUTPATIENT
Start: 2023-12-11 | End: 2023-12-21

## 2023-12-11 RX ORDER — EZETIMIBE 10 MG/1
10 TABLET ORAL DAILY
Qty: 30 TABLET | Refills: 11 | Status: SHIPPED | OUTPATIENT
Start: 2023-12-11

## 2023-12-11 NOTE — TELEPHONE ENCOUNTER
patient called needing direction for diverticulitis. she completed flagyl from PCP and is having pain and pressure in left quad again. colonoscopy ordered in October but still not scheduled despite her calling GI for appt. Message sent to dr. flores    provided prescription for augmentin x 10 days. she said she previously tolerated despite penicillin allergy. If any worsening she will call and we can order a CT. I did bring her up to GI to see if they can get her in earlier.   Patient informed by dr. Flores

## 2023-12-11 NOTE — PROGRESS NOTES
Cardiology Clinic Note  Brant Martinez MD, PhD    Subjective:     Encounter Date:12/11/2023      Patient ID: Sada Le is a 53 y.o. female.    Chief Complaint:  Chief Complaint   Patient presents with    Hospital Follow Up Visit       HPI:         PreviouslyI the pleasure to see this patient who is a 53-year-old female who is well-known to me from prior hospitalization and clinic notes.  She has a history of anterior ST elevation myocardial infarction with complete occlusion of the proximal LAD under going revascularization with Xience 3.5 x 33 mm drug-eluting stent postdilated to 4.5 mm at 18 familia with good results.  This was an Impella supported procedure given under  Cardiogenic shock in the setting of acute MI which was complicated by VF arrest with successful defibrillation.  She did well with weaning Impella support over 2 days and institution of max goal-directed medical therapy ultimately being discharged to home.  We were very aggressive with her medical therapy as well as antiplatelets and her EF improved from around 28 to 30% to 55% with only ever so slight anterior wall very mild hypokinesis residually.  She has done well since this time and is completely quit smoking.  She had nonobstructive disease in the circumflex and RCA as well as mid to distal LAD.  She was changed secondary to shortness of breath from Brilinta to Effient which she is maintained on aspirin and Effient, she is on high intensity statin therapy as well as Aldactone and long-acting beta-blocker and loop diuretics 40 twice daily of Lasix.  She is on ACE inhibitor at moderate to high intensity as well tolerating this just fine without cough.  She has had a recent hospitalization for shortness of breath and underwent right heart catheterization with no evidence of pulmonary hypertension despite volume loading during the procedure with evidence of volume depletion initially with right atrial pressure of 2 to 3 mmHg.  Results of this  are below.  She had recent hospitalization  for atypical substernal chest pain also with jaw pain.  Troponins were unremarkable, EKG was unchanged, blood pressure has been somewhat labile at home recently she says and when high causes some degree of chest discomfort.  She underwent noninvasive evaluation with treadmill stress test and walked greater than 9 minutes with no EKG changes and she had normal myocardial perfusion at rest and stress.  There was no indication of balanced ischemia that would be suspected that may give false negative results relative to perfusion imaging.  Her echo showed normal preserved LV systolic function with ever so slight mild anterior wall hypokinesis compared to thickening of the posterior lateral walls.  EF was 55 to 60%.  Normal atrial sizes, no evidence of pulmonary hypertension, IVC diameter was normal.  She was discharged shortly thereafter.  She did continue to been followed peripherally in the clinic as an outpatient with optimization of antianginals however she continued ultimately to have some chest discomfort which she expressed along with more fatigue and ultimately we made a decision to proceed with diagnostic left heart catheterization for revisualization of LAD stents as well as other residual nonobstructive disease.  She underwent diagnostic left heart catheterization getting PCI to the ostial proximal circumflex, 3.0 x 18 Xience postdilated to 3.9 mm in the proximal ostial portion and 3.75 mm distally with good angiographic results and reduction of stenosis from 80% ulcerated appearing plaque to 0% residual.  Since this time and after discharge she has felt much better.  Her EDP was also very low indicative of volume depletion and her EF was normal at 55% with only very mild anterior wall hypokinesis from prior ischemic injury.  With improvement of her LV CV systolic function, low LVEDP and new complaints of dizziness with standing and orthostasis we decided to  decrease her Lasix as well as stop her Aldactone previously blood pressure has improved 130s over 90s at that time.       Today on repeat encounter she is doing very well no anginal chest pain shortness of breath heart failure signs or symptoms PND orthopnea.  She is compliant with all of her medicines continuing on dual antiplatelet therapy with LAD and circumflex stents in the past 2 to 4 years. She continues on lisinopril and metoprolol, who went to class I CCS class I.  No angina, no new symptoms, stable medicines, continue these today.  She was hospitalized in October secondary to significant diverticulitis with abscess.  She says she is getting more abdominal pain now that she is off antibiotics and she has a call into her primary care and GI physician.  She was told to go to the ER for any worsening abdominal pain     Normal CV exam  Regular rate and rhythm no rubs murmurs gallops  No clubbing cyanosis or edema  Vitals reviewed below blood pressure 110 120 systolic heart rates in the 70s  Normal pulses normal cap refill  Intact grossly  Soft nontender nondistended  Clear to auscultation bilateral  No carotid bruits or JVD  Normal cephalic atraumatic pupils are ground        Assessment plan  Obstructive coronary disease status post intervention  Essential hypertension hyperlipidemia  Obesity  History of ischemic cardiomyopathy  History of ST elevation microinfarction  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, 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, repeat CT 1 year  Atypical chest pain     Widely patent stents to the LAD and circumflex 1 year ago  Obstructive disease in circumflex status post revascularization and stenting as above and below  Abnormal stress with small area of mild reversibility in the lateral wall unclear significance or correlation  Try additional antianginal therapy with low-dose nitrate  Continue dual antiplatelet therapy with aspirin and Plavix  High intensity statin therapy as tolerated  Beta-blocker  "on board heart rate 60s  Continue lisinopril to 5 daily  Continue Lasix to 40 daily  Remain off Aldactone, NYHA class I  Diet and exercise per AHA guidelines  Goal blood pressure simply less than 135 systolic  Statin therapy she has tried multiple agents and there are giving her severe myalgias and muscle pains she is down to taking them every other day or even not at all given her otherwise symptomatology.    Zetia is on board at 10 mg daily  We will evaluate with PCSK9 aCrolin has failed 4 statin medications including atorvastatin, rosuvastatin, pravastatin and simvastatin   Goal LDL less than 70 optimally less than 55 with history of LAD stenting and PCI     Recurrent diverticulitis with abscess October 2023  Okay for GI surgery if needed and she can stop Plavix at any time as she is greater than 3 years out from LAD PCI, does not need ischemic evaluation prior to urgent surgery she is asymptomatic and highly exertional on a daily basis      Historical data copied forward from previous encounters in EMR is unchanged and still relevant and continued     Reorder CTA of the chest for thoracic enlargement at 4.8 sinus of Valsalva February 2023 for staging and surveillance, thoracic ascending aortic aneurysm without rupture, 4.0 in the arch at that time as well  continue afterload reduction and beta-blockers        return to clinic 1 year, sooner.    Go to the ER for any recurrent abdominal pain concerning for recurrence of diverticulitis and abscess seen last month    Objective:         /70 (BP Location: Right arm, Patient Position: Sitting)   Pulse 79   Resp 18   Ht 162.6 cm (64\")   Wt 87.5 kg (193 lb)   LMP 01/01/1996 (Approximate)   BMI 33.13 kg/m²     Physical Exam    Assessment:         There are no diagnoses linked to this encounter.       Plan:              The pleasure to be involved in this patient's cardiovascular care.  Please call with any questions or concerns  Brant Martinez MD, " PhD    Most recent EKG as reviewed and interpreted by me:  Procedures     Most recent echo as reviewed and interpreted by me:  Results for orders placed during the hospital encounter of 10/02/23    Adult Transthoracic Echo Complete W/ Cont if Necessary Per Protocol    Interpretation Summary  Normal left and right ventricular size and systolic function  Indeterminate left ventricular diastolic function  Normal biatrial size  Aortic valve sclerosis without stenosis  Mild to moderate central aortic regurgitation  Mildly dilated ascending aorta.      Most recent stress test as reviewed and interpreted by me:  Results for orders placed during the hospital encounter of 03/16/22    Stress Test With Myocardial Perfusion One Day    Interpretation Summary  · Left ventricular ejection fraction is normal. (Calculated EF = 68%).  · Myocardial perfusion imaging indicates a small-sized, mildly severe area of ischemia located in the lateral wall.  · Impressions are consistent with an intermediate risk study.  · There is no prior study available for comparison. Some different score of only 2 Small area of mild reversibility in the apical lateral versus mid lateral segments Normal EF by gated imaging 68%.  · Findings consistent with an equivocal ECG stress test.    Abnormal study  Intermediate risk study  Positive symptoms during the study with some chest heaviness  Biphasic T waves in the anterolateral leads but nonspecific finding from baseline  Normal EF 68%  Small area of mild reversibility in the lateral wall to apical lateral      Most recent cardiac catheterization as reviewed interpreted by me:  Results for orders placed during the hospital encounter of 02/12/21    Cardiac Catheterization/Vascular Study    Narrative  Cardiac cath note  Brant Martinez MD, PhD  Date of service 2-  [facility] cardiology    Procedure  1.  Left heart catheterization with coronary angiography left ventriculography in ANTON position  2.   Percutaneous coronary mention to the ostial proximal circumflex with 3.0 x 18 mm Xience drug-eluting stent postdilated to 3.9 mm proximally, 3.75 mm distally with good angiographic results    Lesion information  Ostial 50%, mid 70% with hazy appearance of the proximal circumflex, ulcerated plaque angiographically with daily anginal chest pain  Reduced to 0% residual stenosis  JANE-3 flow pre and post    After informed consent the patient was brought to the catheterization lab sterilely prepped and draped in usual fashion with exposure the right groin for right common femoral arterial access via micropuncture modified standard technique with placement of a 6 Ghanaian sheath under fluoroscopic guidance which was aspirated flushed with heparinized saline after 1% lidocaine analgesia.  An 035 guidewire was advanced to level aortic valve followed by diagnostic left heart catheterization with JL4 and JR4 6 Ghanaian catheters.  A pigtail catheter was used to perform left ventriculography, EDP measurement and pullback assessment the transaortic valve gradient.  Secondary to findings of coronary disease in the ostial proximal circumflex decision was made to intervene given recurrent anginal chest pain, ulcerated appearance of the proximal circumflex.  Lesion was predilated after run-through wire to the obtuse marginal with 2.5 x 20 mm NC at 18 familia followed by stenting with 3.0 x 18 deployed at 18 familia at 3.25 followed by postdilatation with with 3.5 x 15 up to 1618 familia and then 3.75 x 15 NC balloon at the 14 familia at the distal edge and 16 familia at the proximal edge of 3.9 mm with good angiographic results.  There was no compromise of the LAD flow.  A wire directed down the LAD at the conclusion the case had a balloon that freely passed and there was no stent impingement from the left main to the LAD.  JANE-3 flow was maintained pre and post.  Lesion reduced from 70% to 0% residual.  Heparin was used to maintain ACT greater than 250  throughout the entire to the case.  Patient was already on aspirin and Effient but was given additional 10 mg of Effient on the table.  Right common femoral arteriotomy was closed by manual pressure once ACT less than 175.  She had no complications.  She left the Cath Lab chest pain-free hemodynamically electrically stable alert talking to staff neurologically grossly intact bilaterally    Moderate conscious sedation of Versed and fentanyl administered by registered nurse with complete ECG pulse oximetry and hemodynamic monitoring throughout the entirety the case observed by me  Complications none  Blood loss less than 5 cc  Contrast 180 cc including diagnostic portion    Findings  1.  Opening aortic pressure of 114/66 with a mean of 86  2.  LV pressure 108 with an EDP of 2 to 5 mmHg  Closing pressure 111/66 with a mean of 85  EDP 6  No significant transaortic valve gradient    Angiography  1.  Left main large caliber vessel no angiographic disease  2.  LAD large caliber vessel with widely patent stent in the proximal portion but diffusely diseased mid to distal LAD with eccentric plaque likely 2.5 mm in diameter from the bifurcation of the diagonal all the way around the apex with again 30 to 40% luminal irregularities but no significant flow-limiting stenosis.,  Diagonal branch with no obstructive disease only mild luminal regularities  3.  Circumflex nondominant with large obtuse marginal branch.  There is a luminal filling defect right at the ostium concerning for nonocclusive thrombus versus eccentric plaque at 50%, immediately distal is a hazy eccentric ulcerated plaque 75% followed by bifurcation with continuation circumflex and obtuse marginal branch which has no disease.  There is JANE-3 flow throughout  4.  RCA small nondominant no angiographic disease, small PDA branch distally not amenable to any stents given caliber    Conclusions and recommendations  1 two-vessel coronary disease, widely patent stent  in the proximal LAD with diffuse mid to distal LAD stenosis but nonobstructive, ostial proximal circumflex disease of questionable significance however angiographic appearance of significant ulceration, concern for thrombus status post intervention as described above  2.  Successful intervention as described above with balloon angioplasty and stenting of the ostial proximal circumflex postdilated to 3.9 mm at the ostium and 3.75 mm distally with good angiographic results  3.  Continue antiplatelet therapy with aspirin and Effient  4.  Decrease Lasix with low LVEDP, hold ACE inhibitor  5 LV function appears 50 to 55% by LV gram  6.  No transaortic valve gradient  7.,  Mid to observation, likely home tomorrow which discharge criteria met  8.  After discharge follow-up with cardiology in 2 to 4 weeks  \  It is a pleasure be involved in her cardiovascular care.  Please call with any questions or concerns  Brant Martinez MD, PhD    The following portions of the patient's history were reviewed and updated as appropriate: allergies, current medications, past family history, past medical history, past social history, past surgical history, and problem list.      ROS:  14 point review of systems negative except as mentioned above    Current Outpatient Medications:     acetaminophen (TYLENOL) 325 MG tablet, Take 2 tablets by mouth Every 6 (Six) Hours As Needed for Mild Pain., Disp: , Rfl:     aspirin 81 MG EC tablet, Take 1 tablet by mouth Daily., Disp: , Rfl:     cetirizine (zyrTEC) 10 MG tablet, Take 1 tablet by mouth Every Night., Disp: , Rfl:     cholecalciferol (VITAMIN D3) 1.25 MG (87782 UT) capsule, Take 1 capsule by mouth Daily., Disp: , Rfl:     clopidogrel (PLAVIX) 75 MG tablet, Take 1 tablet by mouth Daily., Disp: , Rfl:     furosemide (LASIX) 40 MG tablet, Take 1 tablet by mouth Every Morning., Disp: , Rfl:     furosemide (LASIX) 40 MG tablet, Take 1 tablet by mouth At Night As Needed (additional dose for  swelling)., Disp: , Rfl:     lisinopril (PRINIVIL,ZESTRIL) 5 MG tablet, Take 1 tablet by mouth 2 (Two) Times a Day. (Patient taking differently: Take 1 tablet by mouth Daily.), Disp: 180 tablet, Rfl: 1    metFORMIN (GLUCOPHAGE) 500 MG tablet, Take 2 tablets by mouth 2 (Two) Times a Day With Meals., Disp: , Rfl:     metoprolol succinate XL (TOPROL-XL) 25 MG 24 hr tablet, TAKE 1 TABLET BY MOUTH EVERY DAY, Disp: 90 tablet, Rfl: 1    omeprazole (priLOSEC) 20 MG capsule, Take 1 capsule by mouth Daily., Disp: 90 capsule, Rfl: 3    vitamin B-12 (CYANOCOBALAMIN) 500 MCG tablet, Take 1 tablet by mouth Daily., Disp: , Rfl:     nitroglycerin (NITROSTAT) 0.4 MG SL tablet, Place 1 tablet under the tongue Every 5 (Five) Minutes As Needed for Chest Pain. Take no more than 3 doses in 15 minutes. (Patient not taking: Reported on 12/11/2023), Disp: 100 tablet, Rfl: 0    Problem List:  Patient Active Problem List   Diagnosis    Allergic rhinitis    Cervical disc disease with myelopathy    Diverticulosis of colon    Hypertension, benign    Class 1 obesity due to excess calories with serious comorbidity and body mass index (BMI) of 34.0 to 34.9 in adult    Osteopenia of spine    Mixed hyperlipidemia    Ischemic cardiomyopathy    Stented coronary artery    Annual visit for general adult medical examination with abnormal findings    Cervical cancer screening    Encounter for screening for malignant neoplasm of breast    Colon cancer screening    History of tobacco use    Generalized anxiety disorder    ASD (atrial septal defect)    Coronary artery disease involving native coronary artery of native heart without angina pectoris    History of MI (myocardial infarction)    Branch retinal vein occlusion with macular edema    Hypertensive retinopathy    Gastroesophageal reflux disease without esophagitis    Cellulitis and abscess of buttock    Aneurysm of ascending aorta without rupture    Type 2 diabetes mellitus with hyperglycemia, without  long-term current use of insulin    Chronic pain of right knee    Diverticulitis of intestine with abscess     Past Medical History:  Past Medical History:   Diagnosis Date    Abnormal ECG 9/2019    Absence of left thumb     Impression: hx of MRSA, she is signficantly improved She will continue meds and followup if sxs have not resolved over the next 2 weeks.. She is released from care and will notify us of any problems    Acute otitis media     Allergic rhinitis     Arthritis     neck    Cancer     skin    Cervical disc disease with myelopathy     CHF (congestive heart failure) 9/2019    Contact dermatitis or eczema     Coronary artery disease     Diabetes mellitus 9/2023    Disorder of bone and cartilage     Diverticulitis of colon     Impression: CT confirms in the sigmoid colon 10/2011    Diverticulosis of colon     Impression: No sxs 02/13/2013    Elevated cholesterol     Elevated lipoprotein(a) 04/30/2020    Elevated temperature     External otitis of left ear     Gastroesophageal reflux disease without esophagitis 06/19/2022    Hearing loss due to cerumen impaction, left     Hemangioma of liver     History of echocardiogram 10/03/2019    Severely reduced LV systolic function. EF 34% Distribution indicative of LAD territory injury. Akinesis of the apex as well as apical lateral and mid to apical septal walls. Preservation of posterior inferior lateral walls. Mild AI, mild TR. Normal RV function.     History of transesophageal echocardiography (NAFISA) 01/07/2020    EF 55% LA cavity is mild to moderately dilated. Mild MR. Interatrial septum appears redundant. Interatrial hypermobile c/w aneurysm. Large PFO is noted with right to left shunting on bubble study. PFO tunnel appears to be short.     Hordeolum externum of left lower eyelid     Impression: She was started on Bactrim DS for her infection. She was also advised to use warm compression. She will followup should sxs not improve over the next 48 hrs and  resolve over the next 1 weeek.    Hyperlipidemia     Impression: Diet controled. She should see improvement with her successful wt loss. We discussed her lipid panel.    Hypertension, benign     Impression: new onset Low salt low calorie diet and regular exercise and followup in 1 mo She will monitor her pressures and notify us of her pressures over the next 1 week.    Inclusion cyst of right breast     Impression: We will follow for now. She is encouraged to have Mammography. She is presently without insurance and reluctant. She bobby consider. She will notify us of any change at all in the lesion for the only way to be certain of it's etilogy is to remove it. She understands.    Insomnia, organic     Menopausal syndrome     Myocardial infarction 9/29/19    Overweight (BMI 25.0-29.9)     Pulmonary arterial hypertension 9/2019    RUQ abdominal pain     Impression: Resolving, negative GB u/s, HIDA EF 77%, we will follow    Stented coronary artery 09/29/2019    Tobacco use     Impression: She is strongly encouraged to stop smoking. Cessation techniques discussed and encouraged. The consequences of not stopping discussed, ie, CAD, PVD, Stroke, Lung and other cancers.     Past Surgical History:  Past Surgical History:   Procedure Laterality Date    BIVENTRICULAR ASSIST DEVICE/LEFT VENTRICULAR ASSIST DEVICE INSERTION N/A 09/29/2019    Procedure: Left Ventricular Assist Device;  Surgeon: Brant Martinez MD;  Location: [facility] CATH INVASIVE LOCATION;  Service: Cardiovascular    CARDIAC CATHETERIZATION N/A 09/29/2019    Procedure: Left Heart Cath;  Surgeon: Brant Martinez MD;  Location: [facility] CATH INVASIVE LOCATION;  Service: Cardiovascular    CARDIAC CATHETERIZATION N/A 09/29/2019    Procedure: Coronary angiography;  Surgeon: Brant Martinez MD;  Location: [facility] CATH INVASIVE LOCATION;  Service: Cardiovascular    CARDIAC CATHETERIZATION N/A 09/29/2019    Procedure: Left ventriculography;   Surgeon: Brant Martinez MD;  Location: [facility] CATH INVASIVE LOCATION;  Service: Cardiovascular    CARDIAC CATHETERIZATION N/A 09/29/2019    Procedure: Stent SERENITY coronary;  Surgeon: Brant Martinez MD;  Location: [facility] CATH INVASIVE LOCATION;  Service: Cardiovascular    CARDIAC CATHETERIZATION N/A 02/25/2020    Procedure: Right Heart Cath;  Surgeon: Brant Martinez MD;  Location: [facility] CATH INVASIVE LOCATION;  Service: Cardiology;  Laterality: N/A;    CARDIAC CATHETERIZATION N/A 02/12/2021    Procedure: Left Heart Cath;  Surgeon: Brant Martinez MD;  Location: [facility] CATH INVASIVE LOCATION;  Service: Cardiology;  Laterality: N/A;    CARDIAC CATHETERIZATION N/A 02/12/2021    Procedure: Coronary angiography;  Surgeon: Brant Martinez MD;  Location: [facility] CATH INVASIVE LOCATION;  Service: Cardiology;  Laterality: N/A;    CARDIAC CATHETERIZATION N/A 02/12/2021    Procedure: Left ventriculography;  Surgeon: Brant Martinez MD;  Location: [facility] CATH INVASIVE LOCATION;  Service: Cardiology;  Laterality: N/A;    CARDIAC CATHETERIZATION N/A 02/12/2021    Procedure: Aortic root aortogram;  Surgeon: Brant Martinez MD;  Location: [facility] CATH INVASIVE LOCATION;  Service: Cardiology;  Laterality: N/A;    CARDIAC CATHETERIZATION N/A 02/12/2021    Procedure: Percutaneous Coronary Intervention;  Surgeon: Brant Martinez MD;  Location: [facility] CATH INVASIVE LOCATION;  Service: Cardiology;  Laterality: N/A;    CARDIAC CATHETERIZATION N/A 02/12/2021    Procedure: Stent SERENITY coronary;  Surgeon: Brant Martinez MD;  Location: [facility] CATH INVASIVE LOCATION;  Service: Cardiology;  Laterality: N/A;    CARDIAC ELECTROPHYSIOLOGY PROCEDURE  09/29/2019    Procedure: Impella Insertion;  Surgeon: Brant Martinez MD;  Location: [facility] CATH INVASIVE LOCATION;  Service: Cardiovascular    CORONARY ANGIOPLASTY  02/12/2021    1x stent to Circ    CORONARY  ANGIOPLASTY WITH STENT PLACEMENT  2021    MA RT/LT HEART CATHETERS N/A 2019    Procedure: Percutaneous Coronary Intervention;  Surgeon: Brant Martinez MD;  Location: [facility] INVASIVE LOCATION;  Service: Cardiovascular    TOTAL ABDOMINAL HYSTERECTOMY WITH SALPINGO OOPHORECTOMY      Endometriosis     Social History:  Social History     Socioeconomic History    Marital status:    Tobacco Use    Smoking status: Former     Packs/day: 1.00     Years: 34.00     Additional pack years: 0.00     Total pack years: 34.00     Types: Cigarettes     Start date: 1985     Quit date: 2019     Years since quittin.2     Passive exposure: Current    Smokeless tobacco: Never    Tobacco comments:     States she is quitting as of 2019.   Vaping Use    Vaping Use: Never used    Passive vaping exposure: Yes   Substance and Sexual Activity    Alcohol use: Yes     Alcohol/week: 6.0 standard drinks of alcohol     Types: 6 Cans of beer per week     Comment: occassionally     Drug use: Not Currently     Types: Marijuana     Comment: occasional    Sexual activity: Yes     Partners: Male     Birth control/protection: Post-menopausal, Hysterectomy     Allergies:  Allergies   Allergen Reactions    Penicillins Rash    Ciprofloxacin Rash    Penicillin G Rash     Immunizations:  Immunization History   Administered Date(s) Administered    DT 2004    Fluzone (or Fluarix & Flulaval for VFC) >6mos 10/01/2019    Tdap 2011            In-Office Procedure(s):  No orders to display        ASCVD RIsk Score::  The ASCVD Risk score (Brian DK, et al., 2019) failed to calculate for the following reasons:    The patient has a prior MI or stroke diagnosis    Imaging:    Results for orders placed in visit on 23    XR Knee 4+ View Right    Narrative  XR KNEE 4+ VW RIGHT    Date of Exam: 2023 12:40 PM EDT    Indication: pain no trauma    Comparison: Right knee plain films  6/4/2021    Findings:  Bones of the right knee joint appear anatomically aligned and intact. Joint spaces are well-maintained. No fracture, avulsion, foreign body or advanced DJD are seen. No plain film evidence of knee joint effusion is appreciated.    Impression  Impression:  No evidence of acute or healing right knee trauma or advanced knee joint DJD. Stable appearance compared to 6/4/2021 exam.      Electronically Signed: Scot Barrera  7/25/2023 10:17 AM EDT  Workstation ID: [id number]       Results for orders placed during the hospital encounter of 10/02/23    CT Abdomen Pelvis With Contrast    Narrative  CT ABDOMEN PELVIS W CONTRAST    Date of Exam: 10/2/2023 8:00 PM EDT    Indication: pain.    Comparison: None available.    Technique: Axial CT images were obtained of the abdomen and pelvis following the uneventful intravenous administration of iodinated contrast. Sagittal and coronal reconstructions were performed.  Automated exposure control and iterative reconstruction  methods were used.        Findings:      Limited evaluation of the lung bases demonstrate no gross abnormality.    There is decreased attenuation of the liver which can be seen with fatty infiltration.    There are no radio opaque gallbladder calculi.    The spleen is unremarkable.    The pancreases is unremarkable.    There are bilateral adrenal nodules measuring approximately 1.3 cm on the right on image 40 and measuring approximately 1.4 cm on the left on image 39 of series 3.      The kidneys are within normal limits.    There is no small bowel obstruction .    The appendix is unremarkable.    Evaluation of the colon is limited by lack of oral contrast and under distention.    There are multiple diverticuli throughout the colon. There is circumferential wall thickening of the sigmoid colon with pericolonic stranding and free fluid consistent with acute diverticulitis. On image 118 of series 3 there is a rim-enhancing 2.0 x 2.2  x 1.3 cm  collection within the wall of the sigmoid colon consistent with an intramural abscess.      The urinary bladder is unremarkable.    There is no pelvic free fluid.    The osseous structures are within normal limits.    IMPESSION :    Acute diverticulitis of the sigmoid colon with wall thickening pericolonic stranding and free fluid. There is a 2.0 x 2.2 x 1.3 cm rim-enhancing intramural abscess on image 118 of series 3.    No bowel obstruction.    Fatty infiltration of the liver.    Bilateral adrenal nodules..    Findings were discussed with Dr. Oshea of the [facility] emergency department at approximately 8:35. P.m.                      Electronically Signed: Randy Mares MD  10/2/2023 8:35 PM EDT  Workstation ID: [id number]      Results for orders placed during the hospital encounter of 10/02/23    CT Abdomen Pelvis With Contrast    Narrative  CT ABDOMEN PELVIS W CONTRAST    Date of Exam: 10/2/2023 8:00 PM EDT    Indication: pain.    Comparison: None available.    Technique: Axial CT images were obtained of the abdomen and pelvis following the uneventful intravenous administration of iodinated contrast. Sagittal and coronal reconstructions were performed.  Automated exposure control and iterative reconstruction  methods were used.        Findings:      Limited evaluation of the lung bases demonstrate no gross abnormality.    There is decreased attenuation of the liver which can be seen with fatty infiltration.    There are no radio opaque gallbladder calculi.    The spleen is unremarkable.    The pancreases is unremarkable.    There are bilateral adrenal nodules measuring approximately 1.3 cm on the right on image 40 and measuring approximately 1.4 cm on the left on image 39 of series 3.      The kidneys are within normal limits.    There is no small bowel obstruction .    The appendix is unremarkable.    Evaluation of the colon is limited by lack of oral contrast and under distention.    There are  multiple diverticuli throughout the colon. There is circumferential wall thickening of the sigmoid colon with pericolonic stranding and free fluid consistent with acute diverticulitis. On image 118 of series 3 there is a rim-enhancing 2.0 x 2.2  x 1.3 cm collection within the wall of the sigmoid colon consistent with an intramural abscess.      The urinary bladder is unremarkable.    There is no pelvic free fluid.    The osseous structures are within normal limits.    IMPESSION :    Acute diverticulitis of the sigmoid colon with wall thickening pericolonic stranding and free fluid. There is a 2.0 x 2.2 x 1.3 cm rim-enhancing intramural abscess on image 118 of series 3.    No bowel obstruction.    Fatty infiltration of the liver.    Bilateral adrenal nodules..    Findings were discussed with Dr. Oshea of the [facility] emergency department at approximately 8:35. P.m.                      Electronically Signed: Randy Mares MD  10/2/2023 8:35 PM EDT  Workstation ID: [id number]      Lab Review:   No results displayed because visit has over 200 results.      Office Visit on 09/22/2023   Component Date Value    Creatinine, Urine 09/22/2023 37.3     Microalbumin, Urine 09/22/2023 <3.0     Microalbumin/Creatinine * 09/22/2023 <8     Color 09/22/2023 Yellow     Clarity, UA 09/22/2023 Clear     Glucose, UA 09/22/2023 Negative     Bilirubin 09/22/2023 Negative     Ketones, UA 09/22/2023 Negative     Specific Gravity  09/22/2023 1.010     Blood, UA 09/22/2023 Negative     pH, Urine 09/22/2023 6.0     Protein, POC 09/22/2023 Negative     Urobilinogen, UA 09/22/2023 Normal     Leukocytes 09/22/2023 Negative     Nitrite, UA 09/22/2023 Negative     WBC 09/22/2023 9.8     RBC 09/22/2023 4.61     Hemoglobin 09/22/2023 13.9     Hematocrit 09/22/2023 41.1     MCV 09/22/2023 89     MCH 09/22/2023 30.2     MCHC 09/22/2023 33.8     RDW 09/22/2023 12.3     Platelets 09/22/2023 209     Neutrophil Rel % 09/22/2023 58      Lymphocyte Rel % 09/22/2023 32     Monocyte Rel % 09/22/2023 6     Eosinophil Rel % 09/22/2023 2     Basophil Rel % 09/22/2023 1     Neutrophils Absolute 09/22/2023 5.9     Lymphocytes Absolute 09/22/2023 3.1     Monocytes Absolute 09/22/2023 0.5     Eosinophils Absolute 09/22/2023 0.2     Basophils Absolute 09/22/2023 0.1     Immature Granulocyte Rel* 09/22/2023 1     Immature Grans Absolute 09/22/2023 0.1     Glucose 09/22/2023 203 (H)     BUN 09/22/2023 9     Creatinine 09/22/2023 0.84     EGFR Result 09/22/2023 83     BUN/Creatinine Ratio 09/22/2023 11     Sodium 09/22/2023 139     Potassium 09/22/2023 4.1     Chloride 09/22/2023 97     Total CO2 09/22/2023 23     Calcium 09/22/2023 9.3     Total Protein 09/22/2023 6.8     Albumin 09/22/2023 4.2     Globulin 09/22/2023 2.6     A/G Ratio 09/22/2023 1.6     Total Bilirubin 09/22/2023 0.2     Alkaline Phosphatase 09/22/2023 126 (H)     AST (SGOT) 09/22/2023 18     ALT (SGPT) 09/22/2023 24     Total Cholesterol 09/22/2023 239 (H)     Triglycerides 09/22/2023 435 (H)     HDL Cholesterol 09/22/2023 34 (L)     VLDL Cholesterol Nam 09/22/2023 78 (H)     LDL Chol Calc (NIH) 09/22/2023 127 (H)     Chol/HDL Ratio 09/22/2023 7.0 (H)     TSH 09/22/2023 1.100     Hemoglobin A1C 09/22/2023 7.8 (H)    Office Visit on 07/24/2023   Component Date Value    Color 07/24/2023 Yellow     Clarity, UA 07/24/2023 Clear     Glucose, UA 07/24/2023 Negative     Bilirubin 07/24/2023 Negative     Ketones, UA 07/24/2023 Negative     Specific Gravity  07/24/2023 1.010     Blood, UA 07/24/2023 Negative     pH, Urine 07/24/2023 5.0     Protein, POC 07/24/2023 Negative     Urobilinogen, UA 07/24/2023 Normal     Leukocytes 07/24/2023 Negative     Nitrite, UA 07/24/2023 Negative     HCG, Urine, QL 07/24/2023      Lot Number 07/24/2023      Internal Positive Control 07/24/2023      Internal Negative Control 07/24/2023      Expiration Date 07/24/2023      Hemoglobin A1C 07/24/2023 6.5     Lot Number  07/24/2023 [id number]     Expiration Date 07/24/2023 06/30/2025      Recent labs reviewed and interpreted for clinical significance and application            Level of Care:           Brant Martinez MD  12/11/23  .

## 2023-12-18 ENCOUNTER — TELEPHONE (OUTPATIENT)
Dept: SURGERY | Facility: CLINIC | Age: 53
End: 2023-12-18
Payer: MEDICAID

## 2023-12-18 DIAGNOSIS — Z87.19 HISTORY OF DIVERTICULITIS: Primary | ICD-10-CM

## 2023-12-18 NOTE — TELEPHONE ENCOUNTER
Geeta called states she has not heard from GI for colonoscopy. she has taken the antibiotics as prescribed by you but the last 2 days she has gotten worse. please let me know what I can do to help   Dr. Hale ordered Stat ct abdomen and pelvis. Insurance has sent this to review. Patient has LLQ pain, cramping, diarrhea but no fevers. She is on plavix. She has taken her plavix for today. She is current with cardio who advised she can stop her plavix for gut procedure.  Dr. Hale offered her 2 options of going to ED for eval or waiting for CT to be denied or approved. Patient states she will wait for auth and if denied she will go to ED at [facility]. If she gets worse tonight she will go to ED and let us know.

## 2023-12-19 DIAGNOSIS — R19.7 DIARRHEA, UNSPECIFIED TYPE: Primary | ICD-10-CM

## 2023-12-20 RX ORDER — CLOPIDOGREL BISULFATE 75 MG/1
75 TABLET ORAL DAILY
Qty: 90 TABLET | Refills: 3 | Status: SHIPPED | OUTPATIENT
Start: 2023-12-20

## 2023-12-21 LAB — C DIFF TOX A+B STL QL IA: NEGATIVE

## 2024-01-05 ENCOUNTER — OFFICE (OUTPATIENT)
Dept: URBAN - METROPOLITAN AREA CLINIC 64 | Facility: CLINIC | Age: 54
End: 2024-01-05
Payer: COMMERCIAL

## 2024-01-05 VITALS
SYSTOLIC BLOOD PRESSURE: 143 MMHG | HEIGHT: 65 IN | WEIGHT: 194 LBS | HEART RATE: 63 BPM | DIASTOLIC BLOOD PRESSURE: 101 MMHG

## 2024-01-05 DIAGNOSIS — Z79.02 LONG TERM (CURRENT) USE OF ANTITHROMBOTICS/ANTIPLATELETS: ICD-10-CM

## 2024-01-05 DIAGNOSIS — K57.32 DIVERTICULITIS OF LARGE INTESTINE WITHOUT PERFORATION OR ABS: ICD-10-CM

## 2024-01-05 DIAGNOSIS — R63.4 ABNORMAL WEIGHT LOSS: ICD-10-CM

## 2024-01-05 PROCEDURE — 99204 OFFICE O/P NEW MOD 45 MIN: CPT | Performed by: INTERNAL MEDICINE

## 2024-01-08 ENCOUNTER — HOSPITAL ENCOUNTER (OUTPATIENT)
Dept: CT IMAGING | Facility: HOSPITAL | Age: 54
Discharge: HOME | End: 2024-01-08
Admitting: INTERNAL MEDICINE
Payer: MEDICAID

## 2024-01-08 LAB
CREAT BLDA-MCNC: 0.8 MG/DL (ref 0.6–1.3)
EGFRCR SERPLBLD CKD-EPI 2021: 87.7 ML/MIN/1.73

## 2024-01-08 PROCEDURE — 25510000001 IOPAMIDOL PER 1 ML: Performed by: INTERNAL MEDICINE

## 2024-01-08 PROCEDURE — 82565 ASSAY OF CREATININE: CPT

## 2024-01-08 PROCEDURE — 71275 CT ANGIOGRAPHY CHEST: CPT

## 2024-01-08 RX ADMIN — IOPAMIDOL 100 ML: 755 INJECTION, SOLUTION INTRAVENOUS at 11:14

## 2024-02-04 ENCOUNTER — ANESTHESIA EVENT (OUTPATIENT)
Dept: GASTROENTEROLOGY | Facility: HOSPITAL | Age: 54
End: 2024-02-04
Payer: MEDICAID

## 2024-02-05 ENCOUNTER — ON CAMPUS - OUTPATIENT (OUTPATIENT)
Dept: URBAN - METROPOLITAN AREA HOSPITAL 85 | Facility: HOSPITAL | Age: 54
End: 2024-02-05
Payer: COMMERCIAL

## 2024-02-05 ENCOUNTER — HOSPITAL ENCOUNTER (OUTPATIENT)
Facility: HOSPITAL | Age: 54
Setting detail: HOSPITAL OUTPATIENT SURGERY
Discharge: HOME | End: 2024-02-05
Attending: INTERNAL MEDICINE | Admitting: INTERNAL MEDICINE
Payer: MEDICAID

## 2024-02-05 ENCOUNTER — ANESTHESIA (OUTPATIENT)
Dept: GASTROENTEROLOGY | Facility: HOSPITAL | Age: 54
End: 2024-02-05
Payer: MEDICAID

## 2024-02-05 VITALS
RESPIRATION RATE: 11 BRPM | HEIGHT: 65 IN | OXYGEN SATURATION: 98 % | WEIGHT: 188.49 LBS | SYSTOLIC BLOOD PRESSURE: 113 MMHG | DIASTOLIC BLOOD PRESSURE: 76 MMHG | BODY MASS INDEX: 31.4 KG/M2 | HEART RATE: 65 BPM | TEMPERATURE: 98.2 F

## 2024-02-05 DIAGNOSIS — D12.4 BENIGN NEOPLASM OF DESCENDING COLON: ICD-10-CM

## 2024-02-05 DIAGNOSIS — Z12.11 ENCOUNTER FOR SCREENING FOR MALIGNANT NEOPLASM OF COLON: ICD-10-CM

## 2024-02-05 DIAGNOSIS — Z12.11 SCREEN FOR COLON CANCER: ICD-10-CM

## 2024-02-05 DIAGNOSIS — K57.30 DIVERTICULOSIS OF LARGE INTESTINE WITHOUT PERFORATION OR ABS: ICD-10-CM

## 2024-02-05 DIAGNOSIS — D12.3 BENIGN NEOPLASM OF TRANSVERSE COLON: ICD-10-CM

## 2024-02-05 DIAGNOSIS — D12.5 BENIGN NEOPLASM OF SIGMOID COLON: ICD-10-CM

## 2024-02-05 DIAGNOSIS — D12.2 BENIGN NEOPLASM OF ASCENDING COLON: ICD-10-CM

## 2024-02-05 PROCEDURE — 25810000003 SODIUM CHLORIDE 0.9 % SOLUTION: Performed by: INTERNAL MEDICINE

## 2024-02-05 PROCEDURE — 45385 COLONOSCOPY W/LESION REMOVAL: CPT | Mod: 22,PT | Performed by: INTERNAL MEDICINE

## 2024-02-05 PROCEDURE — 25010000002 PROPOFOL 200 MG/20ML EMULSION: Performed by: NURSE ANESTHETIST, CERTIFIED REGISTERED

## 2024-02-05 PROCEDURE — 88305 TISSUE EXAM BY PATHOLOGIST: CPT | Performed by: INTERNAL MEDICINE

## 2024-02-05 RX ORDER — LIDOCAINE HYDROCHLORIDE 20 MG/ML
INJECTION, SOLUTION EPIDURAL; INFILTRATION; INTRACAUDAL; PERINEURAL AS NEEDED
Status: DISCONTINUED | OUTPATIENT
Start: 2024-02-05 | End: 2024-02-05 | Stop reason: SURG

## 2024-02-05 RX ORDER — PROPOFOL 10 MG/ML
INJECTION, EMULSION INTRAVENOUS AS NEEDED
Status: DISCONTINUED | OUTPATIENT
Start: 2024-02-05 | End: 2024-02-05 | Stop reason: SURG

## 2024-02-05 RX ORDER — SODIUM CHLORIDE 9 MG/ML
10 INJECTION, SOLUTION INTRAVENOUS ONCE
Status: COMPLETED | OUTPATIENT
Start: 2024-02-05 | End: 2024-02-05

## 2024-02-05 RX ORDER — SODIUM CHLORIDE 9 MG/ML
30 INJECTION, SOLUTION INTRAVENOUS CONTINUOUS PRN
Status: DISCONTINUED | OUTPATIENT
Start: 2024-02-05 | End: 2024-02-05 | Stop reason: HOSPADM

## 2024-02-05 RX ORDER — ONDANSETRON 2 MG/ML
4 INJECTION INTRAMUSCULAR; INTRAVENOUS ONCE AS NEEDED
Status: DISCONTINUED | OUTPATIENT
Start: 2024-02-05 | End: 2024-02-05 | Stop reason: HOSPADM

## 2024-02-05 RX ADMIN — LIDOCAINE HYDROCHLORIDE 30 MG: 20 INJECTION, SOLUTION EPIDURAL; INFILTRATION; INTRACAUDAL; PERINEURAL at 11:31

## 2024-02-05 RX ADMIN — PROPOFOL 50 MG: 10 INJECTION, EMULSION INTRAVENOUS at 11:45

## 2024-02-05 RX ADMIN — SODIUM CHLORIDE 10 ML/HR: 9 INJECTION, SOLUTION INTRAVENOUS at 10:51

## 2024-02-05 RX ADMIN — PROPOFOL 50 MG: 10 INJECTION, EMULSION INTRAVENOUS at 11:36

## 2024-02-05 RX ADMIN — PROPOFOL 50 MG: 10 INJECTION, EMULSION INTRAVENOUS at 12:00

## 2024-02-05 RX ADMIN — PROPOFOL 50 MG: 10 INJECTION, EMULSION INTRAVENOUS at 11:40

## 2024-02-05 RX ADMIN — PROPOFOL 150 MG: 10 INJECTION, EMULSION INTRAVENOUS at 11:31

## 2024-02-05 RX ADMIN — PROPOFOL 20 MG: 10 INJECTION, EMULSION INTRAVENOUS at 11:53

## 2024-02-05 RX ADMIN — PROPOFOL 100 MG: 10 INJECTION, EMULSION INTRAVENOUS at 11:50

## 2024-02-05 NOTE — ANESTHESIA POSTPROCEDURE EVALUATION
Patient: Sada Le    Procedure Summary       Date: 02/05/24 Room / Location: [facility] ENDOSCOPY 1 / [facility] ENDOSCOPY    Anesthesia Start: 1124 Anesthesia Stop: 1209    Procedure: COLONOSCOPY with polypectomy x 17 Diagnosis:       Screen for colon cancer      (Screen for colon cancer [Z12.11])    Surgeons: Braulio Hinojosa MD Provider: Ray Del Rio MD    Anesthesia Type: general ASA Status: 3            Anesthesia Type: general    Vitals  Vitals Value Taken Time   /69 02/05/24 1224   Temp     Pulse 64 02/05/24 1224   Resp 11 02/05/24 1220   SpO2 95 % 02/05/24 1224   Vitals shown include unfiled device data.        Post Anesthesia Care and Evaluation    Patient location during evaluation: PACU  Patient participation: complete - patient participated  Level of consciousness: awake  Pain scale: See nurse's notes for pain score.  Pain management: adequate    Airway patency: patent  Anesthetic complications: No anesthetic complications  PONV Status: none  Cardiovascular status: acceptable  Respiratory status: acceptable and spontaneous ventilation  Hydration status: acceptable    Comments: Patient seen and examined postoperatively; vital signs stable; SpO2 greater than or equal to 90%; cardiopulmonary status stable; nausea/vomiting adequately controlled; pain adequately controlled; no apparent anesthesia complications; patient discharged from anesthesia care when discharge criteria were met

## 2024-02-05 NOTE — ANESTHESIA PREPROCEDURE EVALUATION
Anesthesia Evaluation     NPO Solid Status: > 8 hours  NPO Liquid Status: > 8 hours           Airway   Mallampati: I  TM distance: >3 FB  Neck ROM: full  No difficulty expected  Dental - normal exam     Pulmonary - normal exam   Cardiovascular - normal exam    (+) hypertension, past MI , CAD, CHF , hyperlipidemia    ROS comment: Left Ventricle Left ventricular systolic function is normal. Calculated left ventricular EF = 61.5% Left ventricular ejection fraction appears to be 61 - 65%.     Normal left ventricular cavity size and wall thickness noted. All left ventricular wall segments contract normally. Left ventricular diastolic function was indeterminate.  Right Ventricle Normal right ventricular cavity size and systolic function noted.  Left Atrium Normal left atrial cavity size noted.  Right Atrium Normal right atrial cavity size noted.  Aortic Valve The aortic valve is structurally normal with no stenosis present. The aortic valve was poorly visualized but appears trileaflet. Mild to moderate aortic valve regurgitation is present.  Mitral Valve The mitral valve is structurally normal with no regurgitation or significant stenosis present.  Tricuspid Valve Trace tricuspid valve regurgitation is present. Insufficient TR velocity profile to estimate the right ventricular systolic pressure. No evidence of pulmonary hypertension is present. No evidence of significant tricuspid valve stenosis is present.  Pulmonic Valve The pulmonic valve is not well visualized.  Greater Vessels No dilation of the aortic root is present. Mild dilation of the ascending aorta is present.  Pericardium There is no evidence of pericardial effusion. .    10/2023    Neuro/Psych  (+) psychiatric history  GI/Hepatic/Renal/Endo    (+) obesity, morbid obesity, GERD, liver disease, diabetes mellitus    Musculoskeletal     Abdominal  - normal exam    Bowel sounds: normal.   Substance History      OB/GYN          Other   arthritis,   history of  cancer    ROS/Med Hx Other: HX PFO? NO MENTION ECHO 10/2023 PRIOR EP STUDY?  ASCENDING AO 3.8  LAST PLAVIX GREATER THAN 1 WEEK              Anesthesia Plan    ASA 3     general   total IV anesthesia  (MENTION OF PFO THOUGH NOTHING NOTED ECHO 10/2023. BUBBLE PRECAUTIONS)  intravenous induction     Anesthetic plan, risks, benefits, and alternatives have been provided, discussed and informed consent has been obtained with: patient.  Pre-procedure education provided  Plan discussed with CRNA.    CODE STATUS:

## 2024-02-05 NOTE — H&P
" LOS: 0 days   Patient Care Team:  Yodit Johnston MD as PCP - General (Family Medicine)  Amos Olivia MD as Consulting Physician (Cardiology)  Brant Martinez MD as Consulting Physician (Cardiology)  Chong Golden MD as Consulting Physician (Ophthalmology)  Brandie Hale MD as Surgeon (General Surgery)      Subjective     Interval History:     Subjective: Screening colonoscopy      ROS:   No chest pain, shortness of breath, or cough.       Medication Review:   No current facility-administered medications for this encounter.      Objective     Vital Signs  Vitals:    01/22/24 1235 02/05/24 1042   BP:  148/92   BP Location:  Left arm   Patient Position:  Lying   Pulse:  79   Resp:  12   Temp:  98.2 °F (36.8 °C)   TempSrc:  Oral   SpO2:  97%   Weight: 85.3 kg (188 lb) 85.5 kg (188 lb 7.9 oz)   Height: 165.1 cm (65\") 165.1 cm (65\")       Physical Exam:    General Appearance:    Awake and alert, in no acute distress   Head:    Normocephalic, without obvious abnormality   Eyes:          Conjunctivae normal, anicteric sclerae   Throat:   No oral lesions, no thrush, oral mucosa moist   Neck:   No adenopathy, supple, no JVD   Lungs:     respirations regular, even and unlabored   Abdomen:     Soft, non-dinesh, no rebound or guarding, nondistended, no hepatosplenomegaly   Rectal:     Deferred   Extremities:   No edema, no cyanosis   Skin:   No bruising or rash, no jaundice        Results Review:    Lab Results (last 24 hours)       ** No results found for the last 24 hours. **            Imaging Results (Last 24 Hours)       ** No results found for the last 24 hours. **              Assessment & Plan   Proceed with scope and MAC anesthesia        Braulio Hinojosa MD  02/05/24  11:27 EST  "

## 2024-02-05 NOTE — DISCHARGE INSTRUCTIONS
A responsible adult should stay with you and you should rest quietly for the rest of the day.    Do not drink alcohol, drive, operate any heavy machinery or power tools or make any legal/important decisions for the next 24 hours.     Progress your diet as tolerated.  If you begin to experience severe pain, increased shortness of breath, racing heartbeat or a fever above 101 F, seek immediate medical attention.     Follow up with MD as instructed. Call office for results in 3 to 5 days if needed. [phone]    Findings:    Normal mucosa of the terminal ileum  17 colon polyps total -6 to 18 mm in size all removed in single piece fashion with hot or cold snare, see the details below  3 in the ascending colon, 2 were removed with hot snare, 1 removed with cold snare  4 in the transverse colon, 2 removed with hot snare to remove cold snare  9 in the descending colon, 3 removed with hot snare, 6 remove the cold snare  1 in the sigmoid colon, 18 mm in size, removed in single piece fashion with hot snare polypectomy  Sigmoid diverticulosis without diverticulitis of moderate severity     Impression:  17 colon polyp status post snare polypectomy  Sigmoid diverticulosis     Recommendations:  Follow-up histopathology  High-fiber diet  Repeat colonoscopy in 1 year  Okay to resume Plavix in 3 days

## 2024-02-05 NOTE — OP NOTE
COLONOSCOPY Procedure Report    Patient Name:  Sada Le  YOB: 1970    Date of Surgery:  2/5/2024     Pre-Op Diagnosis:  Screen for colon cancer [Z12.11]       Post Op Diagnosis:  Colon polyps x 17, diverticulosis      Procedure/CPT® Codes:      Procedure(s):  COLONOSCOPY with hot and cold snare polypectomy x 17    Staff:  Surgeon(s):  Braulio Hinojosa MD         Anesthesia: Monitored Anesthesia Care    Implants:    Nothing was implanted during the procedure    Specimen:        See below    No blood loss    Complications:  None    Description of Procedure:  Informed consent was obtained for the procedure, including sedation.  Risks of perforation, hemorrhage, adverse drug reaction and aspiration were discussed.  The patient was brought into the endoscopy suite. Continuous cardiopulmonary monitoring was performed.  The patient was placed in the left lateral decubitus position. After adequate sedation was attained, the digital rectal exam was performed which was normal.  Subsequently, the Olympus colonoscope was inserted into the patient's rectum and advanced to the level of the cecum and terminal ileum without difficulty.  The bowel prep was excellent.  Circumferential examination of the patient's colon was performed on scope withdrawal.  A retroflex exam was performed in the rectum which showed normal mucosa.  The bowel was decompressed, the scope was withdrawn from the patient, and the patient tolerated the procedure well. There were no immediate post-operative complications.     Findings:    Normal mucosa of the terminal ileum  17 colon polyps total -6 to 18 mm in size all removed in single piece fashion with hot or cold snare, see the details below  3 in the ascending colon, 2 were removed with hot snare, 1 removed with cold snare  4 in the transverse colon, 2 removed with hot snare to remove cold snare  9 in the descending colon, 3 removed with hot snare, 6 remove the cold snare  1 in  the sigmoid colon, 18 mm in size, removed in single piece fashion with hot snare polypectomy  Sigmoid diverticulosis without diverticulitis of moderate severity    Impression:  17 colon polyp status post snare polypectomy  Sigmoid diverticulosis    Recommendations:  Follow-up histopathology  High-fiber diet  Repeat colonoscopy in 1 year  Okay to resume Plavix in 3 days      Braulio Hinojosa MD     Date: 2/5/2024  Time: 12:13 EST

## 2024-02-06 LAB
LAB AP CASE REPORT: NORMAL
PATH REPORT.FINAL DX SPEC: NORMAL
PATH REPORT.GROSS SPEC: NORMAL

## 2024-02-20 DIAGNOSIS — K21.9 GASTROESOPHAGEAL REFLUX DISEASE WITHOUT ESOPHAGITIS: ICD-10-CM

## 2024-02-21 RX ORDER — OMEPRAZOLE 20 MG/1
20 CAPSULE, DELAYED RELEASE ORAL DAILY
Qty: 90 CAPSULE | Refills: 3 | Status: SHIPPED | OUTPATIENT
Start: 2024-02-21

## 2024-03-12 ENCOUNTER — PATIENT MESSAGE (OUTPATIENT)
Dept: FAMILY MEDICINE CLINIC | Facility: CLINIC | Age: 54
End: 2024-03-12

## 2024-04-15 DIAGNOSIS — I10 ESSENTIAL (PRIMARY) HYPERTENSION: ICD-10-CM

## 2024-04-15 RX ORDER — METOPROLOL SUCCINATE 25 MG/1
25 TABLET, EXTENDED RELEASE ORAL DAILY
Qty: 90 TABLET | Refills: 1 | Status: SHIPPED | OUTPATIENT
Start: 2024-04-15

## 2024-05-15 DIAGNOSIS — I10 HYPERTENSION, BENIGN: Chronic | ICD-10-CM

## 2024-05-15 RX ORDER — FUROSEMIDE 40 MG/1
40 TABLET ORAL AS NEEDED
Qty: 90 TABLET | Refills: 3 | Status: SHIPPED | OUTPATIENT
Start: 2024-05-15

## 2024-05-15 RX ORDER — LISINOPRIL 5 MG/1
5 TABLET ORAL DAILY
Qty: 90 TABLET | Refills: 1 | Status: SHIPPED | OUTPATIENT
Start: 2024-05-15

## 2024-05-15 RX ORDER — NITROGLYCERIN 0.4 MG/1
0.4 TABLET SUBLINGUAL
Qty: 100 TABLET | Refills: 0 | Status: SHIPPED | OUTPATIENT
Start: 2024-05-15

## 2024-05-22 DIAGNOSIS — K21.9 GASTROESOPHAGEAL REFLUX DISEASE WITHOUT ESOPHAGITIS: ICD-10-CM

## 2024-05-22 RX ORDER — OMEPRAZOLE 20 MG/1
20 CAPSULE, DELAYED RELEASE ORAL DAILY
Qty: 90 CAPSULE | Refills: 3 | Status: SHIPPED | OUTPATIENT
Start: 2024-05-22

## 2024-05-23 RX ORDER — ASPIRIN 81 MG/1
81 TABLET ORAL DAILY
Qty: 90 TABLET | Refills: 3 | Status: SHIPPED | OUTPATIENT
Start: 2024-05-23

## 2024-06-25 DIAGNOSIS — K21.9 GASTROESOPHAGEAL REFLUX DISEASE WITHOUT ESOPHAGITIS: ICD-10-CM

## 2024-06-26 RX ORDER — NICOTINE POLACRILEX 4 MG/1
1 GUM, CHEWING ORAL DAILY
Qty: 90 EACH | Refills: 0 | Status: SHIPPED | OUTPATIENT
Start: 2024-06-26 | End: 2024-06-28 | Stop reason: RX

## 2024-06-28 DIAGNOSIS — K21.9 GASTROESOPHAGEAL REFLUX DISEASE WITHOUT ESOPHAGITIS: ICD-10-CM

## 2024-06-28 RX ORDER — OMEPRAZOLE 20 MG/1
20 CAPSULE, DELAYED RELEASE ORAL DAILY
Qty: 90 CAPSULE | Refills: 3 | Status: SHIPPED | OUTPATIENT
Start: 2024-06-28

## 2024-08-19 RX ORDER — LISINOPRIL 5 MG/1
5 TABLET ORAL DAILY
Qty: 90 TABLET | Refills: 3 | Status: SHIPPED | OUTPATIENT
Start: 2024-08-19

## 2024-09-17 ENCOUNTER — OFFICE VISIT (OUTPATIENT)
Dept: FAMILY MEDICINE CLINIC | Facility: CLINIC | Age: 54
End: 2024-09-17
Payer: MEDICAID

## 2024-09-17 ENCOUNTER — HOSPITAL ENCOUNTER (OUTPATIENT)
Dept: GENERAL RADIOLOGY | Facility: HOSPITAL | Age: 54
Discharge: HOME | End: 2024-09-17
Payer: MEDICAID

## 2024-09-17 ENCOUNTER — TELEPHONE (OUTPATIENT)
Dept: FAMILY MEDICINE CLINIC | Facility: CLINIC | Age: 54
End: 2024-09-17
Payer: MEDICAID

## 2024-09-17 VITALS
BODY MASS INDEX: 33.01 KG/M2 | HEIGHT: 65 IN | WEIGHT: 198.13 LBS | RESPIRATION RATE: 18 BRPM | SYSTOLIC BLOOD PRESSURE: 140 MMHG | HEART RATE: 84 BPM | OXYGEN SATURATION: 96 % | DIASTOLIC BLOOD PRESSURE: 82 MMHG | TEMPERATURE: 98.2 F

## 2024-09-17 DIAGNOSIS — L03.114 CELLULITIS OF LEFT UPPER EXTREMITY: ICD-10-CM

## 2024-09-17 DIAGNOSIS — E11.65 TYPE 2 DIABETES MELLITUS WITH HYPERGLYCEMIA, WITHOUT LONG-TERM CURRENT USE OF INSULIN: Primary | ICD-10-CM

## 2024-09-17 LAB
EXPIRATION DATE: ABNORMAL
HBA1C MFR BLD: 7.6 % (ref 4.5–5.7)
Lab: ABNORMAL

## 2024-09-17 PROCEDURE — 83036 HEMOGLOBIN GLYCOSYLATED A1C: CPT

## 2024-09-17 PROCEDURE — 3079F DIAST BP 80-89 MM HG: CPT

## 2024-09-17 PROCEDURE — 3051F HG A1C>EQUAL 7.0%<8.0%: CPT

## 2024-09-17 PROCEDURE — 99214 OFFICE O/P EST MOD 30 MIN: CPT

## 2024-09-17 PROCEDURE — 1125F AMNT PAIN NOTED PAIN PRSNT: CPT

## 2024-09-17 PROCEDURE — 3077F SYST BP >= 140 MM HG: CPT

## 2024-09-17 PROCEDURE — 96372 THER/PROPH/DIAG INJ SC/IM: CPT

## 2024-09-17 PROCEDURE — 73130 X-RAY EXAM OF HAND: CPT

## 2024-09-17 RX ORDER — CEFTRIAXONE 1 G/1
1 INJECTION, POWDER, FOR SOLUTION INTRAMUSCULAR; INTRAVENOUS EVERY 24 HOURS
Status: COMPLETED | OUTPATIENT
Start: 2024-09-17 | End: 2024-09-17

## 2024-09-17 RX ORDER — CEPHALEXIN 500 MG/1
500 CAPSULE ORAL 4 TIMES DAILY
Qty: 28 CAPSULE | Refills: 0 | Status: SHIPPED | OUTPATIENT
Start: 2024-09-17 | End: 2024-09-22 | Stop reason: HOSPADM

## 2024-09-17 RX ORDER — DOXYCYCLINE 100 MG/1
100 CAPSULE ORAL 2 TIMES DAILY
Qty: 14 CAPSULE | Refills: 0 | Status: SHIPPED | OUTPATIENT
Start: 2024-09-17 | End: 2024-09-22 | Stop reason: HOSPADM

## 2024-09-17 RX ORDER — SACCHAROMYCES BOULARDII 250 MG
250 CAPSULE ORAL 2 TIMES DAILY
Qty: 60 CAPSULE | Refills: 0 | Status: SHIPPED | OUTPATIENT
Start: 2024-09-17 | End: 2024-09-19

## 2024-09-17 RX ADMIN — CEFTRIAXONE 1 G: 1 INJECTION, POWDER, FOR SOLUTION INTRAMUSCULAR; INTRAVENOUS at 14:06

## 2024-09-17 NOTE — TELEPHONE ENCOUNTER
"  Caller: Sada Le \"Geeta\"    Relationship: Self    Best call back number: [phone]     What medication are you requesting: DOXYCYLINE OR GENTAMICIN    What are your current symptoms: STIFFNESS AND SORENESS AND THUMB AND SPOTS ARE COMING UP ON HAND     How long have you been experiencing symptoms: 09/16/24    Have you had these symptoms before:    [x] Yes  [] No    Have you been treated for these symptoms before:   [x] Yes  [] No    If a prescription is needed, what is your preferred pharmacy and phone number:   [id number] - [place], [place] - [address] - [phone] [facility] [phone] FX             "

## 2024-09-19 ENCOUNTER — HOSPITAL ENCOUNTER (OUTPATIENT)
Facility: HOSPITAL | Age: 54
Setting detail: OBSERVATION
Discharge: HOME | End: 2024-09-22
Attending: INTERNAL MEDICINE | Admitting: HOSPITALIST
Payer: MEDICAID

## 2024-09-19 ENCOUNTER — TELEPHONE (OUTPATIENT)
Dept: FAMILY MEDICINE CLINIC | Facility: CLINIC | Age: 54
End: 2024-09-19
Payer: MEDICAID

## 2024-09-19 DIAGNOSIS — L03.114 CELLULITIS OF LEFT UPPER EXTREMITY: Primary | ICD-10-CM

## 2024-09-19 PROBLEM — L03.012 CELLULITIS OF LEFT THUMB: Status: ACTIVE | Noted: 2024-09-19

## 2024-09-19 LAB
ANION GAP SERPL CALCULATED.3IONS-SCNC: 12.6 MMOL/L (ref 5–15)
BASOPHILS # BLD AUTO: 0.03 10*3/MM3 (ref 0–0.2)
BASOPHILS NFR BLD AUTO: 0.4 % (ref 0–1.5)
BUN SERPL-MCNC: 14 MG/DL (ref 6–20)
BUN/CREAT SERPL: 15.9 (ref 7–25)
CALCIUM SPEC-SCNC: 9.1 MG/DL (ref 8.6–10.5)
CHLORIDE SERPL-SCNC: 100 MMOL/L (ref 98–107)
CO2 SERPL-SCNC: 25.4 MMOL/L (ref 22–29)
CREAT SERPL-MCNC: 0.88 MG/DL (ref 0.57–1)
CRP SERPL-MCNC: 5.61 MG/DL (ref 0–0.5)
D-LACTATE SERPL-SCNC: 1.3 MMOL/L (ref 0.3–2)
DEPRECATED RDW RBC AUTO: 42.1 FL (ref 37–54)
EGFRCR SERPLBLD CKD-EPI 2021: 78.2 ML/MIN/1.73
EOSINOPHIL # BLD AUTO: 0.09 10*3/MM3 (ref 0–0.4)
EOSINOPHIL NFR BLD AUTO: 1.3 % (ref 0.3–6.2)
ERYTHROCYTE [DISTWIDTH] IN BLOOD BY AUTOMATED COUNT: 12.7 % (ref 12.3–15.4)
ERYTHROCYTE [SEDIMENTATION RATE] IN BLOOD: 32 MM/HR (ref 0–30)
GLUCOSE BLDC GLUCOMTR-MCNC: 250 MG/DL (ref 70–105)
GLUCOSE BLDC GLUCOMTR-MCNC: 257 MG/DL (ref 70–105)
GLUCOSE SERPL-MCNC: 191 MG/DL (ref 65–99)
HCT VFR BLD AUTO: 43.8 % (ref 34–46.6)
HGB BLD-MCNC: 14.6 G/DL (ref 12–15.9)
IMM GRANULOCYTES # BLD AUTO: 0.07 10*3/MM3 (ref 0–0.05)
IMM GRANULOCYTES NFR BLD AUTO: 1 % (ref 0–0.5)
LYMPHOCYTES # BLD AUTO: 1.78 10*3/MM3 (ref 0.7–3.1)
LYMPHOCYTES NFR BLD AUTO: 26.4 % (ref 19.6–45.3)
MCH RBC QN AUTO: 30 PG (ref 26.6–33)
MCHC RBC AUTO-ENTMCNC: 33.3 G/DL (ref 31.5–35.7)
MCV RBC AUTO: 90.1 FL (ref 79–97)
MONOCYTES # BLD AUTO: 0.81 10*3/MM3 (ref 0.1–0.9)
MONOCYTES NFR BLD AUTO: 12 % (ref 5–12)
NEUTROPHILS NFR BLD AUTO: 3.96 10*3/MM3 (ref 1.7–7)
NEUTROPHILS NFR BLD AUTO: 58.9 % (ref 42.7–76)
NRBC BLD AUTO-RTO: 0 /100 WBC (ref 0–0.2)
PLATELET # BLD AUTO: 170 10*3/MM3 (ref 140–450)
PMV BLD AUTO: 11.3 FL (ref 6–12)
POTASSIUM SERPL-SCNC: 3.6 MMOL/L (ref 3.5–5.2)
RBC # BLD AUTO: 4.86 10*6/MM3 (ref 3.77–5.28)
SODIUM SERPL-SCNC: 138 MMOL/L (ref 136–145)
WBC NRBC COR # BLD AUTO: 6.74 10*3/MM3 (ref 3.4–10.8)

## 2024-09-19 PROCEDURE — 83605 ASSAY OF LACTIC ACID: CPT

## 2024-09-19 PROCEDURE — 96372 THER/PROPH/DIAG INJ SC/IM: CPT

## 2024-09-19 PROCEDURE — 96365 THER/PROPH/DIAG IV INF INIT: CPT

## 2024-09-19 PROCEDURE — G0378 HOSPITAL OBSERVATION PER HR: HCPCS

## 2024-09-19 PROCEDURE — 25010000002 ENOXAPARIN PER 10 MG: Performed by: NURSE PRACTITIONER

## 2024-09-19 PROCEDURE — 25010000002 CEFTRIAXONE PER 250 MG: Performed by: PHYSICIAN ASSISTANT

## 2024-09-19 PROCEDURE — 25010000002 LINEZOLID 600 MG/300ML SOLUTION: Performed by: PHYSICIAN ASSISTANT

## 2024-09-19 PROCEDURE — 85025 COMPLETE CBC W/AUTO DIFF WBC: CPT | Performed by: PHYSICIAN ASSISTANT

## 2024-09-19 PROCEDURE — 96361 HYDRATE IV INFUSION ADD-ON: CPT

## 2024-09-19 PROCEDURE — 96367 TX/PROPH/DG ADDL SEQ IV INF: CPT

## 2024-09-19 PROCEDURE — 36415 COLL VENOUS BLD VENIPUNCTURE: CPT

## 2024-09-19 PROCEDURE — 99285 EMERGENCY DEPT VISIT HI MDM: CPT

## 2024-09-19 PROCEDURE — 87040 BLOOD CULTURE FOR BACTERIA: CPT | Performed by: PHYSICIAN ASSISTANT

## 2024-09-19 PROCEDURE — 63710000001 INSULIN LISPRO (HUMAN) PER 5 UNITS: Performed by: NURSE PRACTITIONER

## 2024-09-19 PROCEDURE — 86140 C-REACTIVE PROTEIN: CPT | Performed by: PHYSICIAN ASSISTANT

## 2024-09-19 PROCEDURE — 82948 REAGENT STRIP/BLOOD GLUCOSE: CPT

## 2024-09-19 PROCEDURE — 85652 RBC SED RATE AUTOMATED: CPT | Performed by: PHYSICIAN ASSISTANT

## 2024-09-19 PROCEDURE — 25810000003 SODIUM CHLORIDE 0.9 % SOLUTION: Performed by: NURSE PRACTITIONER

## 2024-09-19 PROCEDURE — 80048 BASIC METABOLIC PNL TOTAL CA: CPT | Performed by: PHYSICIAN ASSISTANT

## 2024-09-19 RX ORDER — POLYETHYLENE GLYCOL 3350 17 G/17G
17 POWDER, FOR SOLUTION ORAL DAILY PRN
Status: DISCONTINUED | OUTPATIENT
Start: 2024-09-19 | End: 2024-09-22 | Stop reason: HOSPADM

## 2024-09-19 RX ORDER — SODIUM CHLORIDE 9 MG/ML
40 INJECTION, SOLUTION INTRAVENOUS AS NEEDED
Status: DISCONTINUED | OUTPATIENT
Start: 2024-09-19 | End: 2024-09-22 | Stop reason: HOSPADM

## 2024-09-19 RX ORDER — SODIUM CHLORIDE 0.9 % (FLUSH) 0.9 %
10 SYRINGE (ML) INJECTION AS NEEDED
Status: DISCONTINUED | OUTPATIENT
Start: 2024-09-19 | End: 2024-09-22 | Stop reason: HOSPADM

## 2024-09-19 RX ORDER — POTASSIUM CHLORIDE 1500 MG/1
40 TABLET, EXTENDED RELEASE ORAL EVERY 4 HOURS
Status: COMPLETED | OUTPATIENT
Start: 2024-09-19 | End: 2024-09-19

## 2024-09-19 RX ORDER — LISINOPRIL 5 MG/1
10 TABLET ORAL DAILY
COMMUNITY

## 2024-09-19 RX ORDER — NICOTINE POLACRILEX 4 MG
15 LOZENGE BUCCAL
Status: DISCONTINUED | OUTPATIENT
Start: 2024-09-19 | End: 2024-09-22 | Stop reason: HOSPADM

## 2024-09-19 RX ORDER — LINEZOLID 2 MG/ML
600 INJECTION, SOLUTION INTRAVENOUS EVERY 12 HOURS
Status: DISCONTINUED | OUTPATIENT
Start: 2024-09-20 | End: 2024-09-21

## 2024-09-19 RX ORDER — ONDANSETRON 2 MG/ML
4 INJECTION INTRAMUSCULAR; INTRAVENOUS EVERY 6 HOURS PRN
Status: DISCONTINUED | OUTPATIENT
Start: 2024-09-19 | End: 2024-09-22 | Stop reason: HOSPADM

## 2024-09-19 RX ORDER — LINEZOLID 2 MG/ML
600 INJECTION, SOLUTION INTRAVENOUS ONCE
Status: COMPLETED | OUTPATIENT
Start: 2024-09-19 | End: 2024-09-19

## 2024-09-19 RX ORDER — BISACODYL 5 MG/1
5 TABLET, DELAYED RELEASE ORAL DAILY PRN
Status: DISCONTINUED | OUTPATIENT
Start: 2024-09-19 | End: 2024-09-22 | Stop reason: HOSPADM

## 2024-09-19 RX ORDER — DEXTROSE MONOHYDRATE 25 G/50ML
25 INJECTION, SOLUTION INTRAVENOUS
Status: DISCONTINUED | OUTPATIENT
Start: 2024-09-19 | End: 2024-09-22 | Stop reason: HOSPADM

## 2024-09-19 RX ORDER — ENOXAPARIN SODIUM 100 MG/ML
40 INJECTION SUBCUTANEOUS DAILY
Status: DISCONTINUED | OUTPATIENT
Start: 2024-09-19 | End: 2024-09-22 | Stop reason: HOSPADM

## 2024-09-19 RX ORDER — SODIUM CHLORIDE 0.9 % (FLUSH) 0.9 %
10 SYRINGE (ML) INJECTION EVERY 12 HOURS SCHEDULED
Status: DISCONTINUED | OUTPATIENT
Start: 2024-09-19 | End: 2024-09-22 | Stop reason: HOSPADM

## 2024-09-19 RX ORDER — HYDROCODONE BITARTRATE AND ACETAMINOPHEN 7.5; 325 MG/1; MG/1
1 TABLET ORAL EVERY 6 HOURS PRN
Status: DISCONTINUED | OUTPATIENT
Start: 2024-09-19 | End: 2024-09-22 | Stop reason: HOSPADM

## 2024-09-19 RX ORDER — FUROSEMIDE 40 MG
40 TABLET ORAL DAILY
COMMUNITY

## 2024-09-19 RX ORDER — NALOXONE HCL 0.4 MG/ML
0.4 VIAL (ML) INJECTION
Status: DISCONTINUED | OUTPATIENT
Start: 2024-09-19 | End: 2024-09-22 | Stop reason: HOSPADM

## 2024-09-19 RX ORDER — ACETAMINOPHEN 325 MG/1
650 TABLET ORAL EVERY 4 HOURS PRN
Status: DISCONTINUED | OUTPATIENT
Start: 2024-09-19 | End: 2024-09-22 | Stop reason: HOSPADM

## 2024-09-19 RX ORDER — AMOXICILLIN 250 MG
2 CAPSULE ORAL 2 TIMES DAILY PRN
Status: DISCONTINUED | OUTPATIENT
Start: 2024-09-19 | End: 2024-09-22 | Stop reason: HOSPADM

## 2024-09-19 RX ORDER — FUROSEMIDE 40 MG
40 TABLET ORAL AS NEEDED
COMMUNITY

## 2024-09-19 RX ORDER — SODIUM CHLORIDE 9 MG/ML
75 INJECTION, SOLUTION INTRAVENOUS CONTINUOUS
Status: DISCONTINUED | OUTPATIENT
Start: 2024-09-19 | End: 2024-09-21

## 2024-09-19 RX ORDER — INSULIN LISPRO 100 [IU]/ML
2-7 INJECTION, SOLUTION INTRAVENOUS; SUBCUTANEOUS
Status: DISCONTINUED | OUTPATIENT
Start: 2024-09-19 | End: 2024-09-22 | Stop reason: HOSPADM

## 2024-09-19 RX ORDER — IBUPROFEN 600 MG/1
1 TABLET ORAL
Status: DISCONTINUED | OUTPATIENT
Start: 2024-09-19 | End: 2024-09-22 | Stop reason: HOSPADM

## 2024-09-19 RX ORDER — BISACODYL 10 MG
10 SUPPOSITORY, RECTAL RECTAL DAILY PRN
Status: DISCONTINUED | OUTPATIENT
Start: 2024-09-19 | End: 2024-09-22 | Stop reason: HOSPADM

## 2024-09-19 RX ADMIN — LINEZOLID 600 MG: 600 INJECTION, SOLUTION INTRAVENOUS at 16:07

## 2024-09-19 RX ADMIN — ENOXAPARIN SODIUM 40 MG: 100 INJECTION SUBCUTANEOUS at 17:36

## 2024-09-19 RX ADMIN — POTASSIUM CHLORIDE 40 MEQ: 1500 TABLET, EXTENDED RELEASE ORAL at 17:34

## 2024-09-19 RX ADMIN — HYDROCODONE BITARTRATE AND ACETAMINOPHEN 1 TABLET: 7.5; 325 TABLET ORAL at 21:45

## 2024-09-19 RX ADMIN — INSULIN LISPRO 4 UNITS: 100 INJECTION, SOLUTION INTRAVENOUS; SUBCUTANEOUS at 17:36

## 2024-09-19 RX ADMIN — POTASSIUM CHLORIDE 40 MEQ: 1500 TABLET, EXTENDED RELEASE ORAL at 21:45

## 2024-09-19 RX ADMIN — SODIUM CHLORIDE 75 ML/HR: 9 INJECTION, SOLUTION INTRAVENOUS at 17:30

## 2024-09-19 RX ADMIN — Medication 10 ML: at 21:46

## 2024-09-19 RX ADMIN — CEFTRIAXONE 2000 MG: 2 INJECTION, POWDER, FOR SOLUTION INTRAMUSCULAR; INTRAVENOUS at 15:36

## 2024-09-19 RX ADMIN — INSULIN LISPRO 4 UNITS: 100 INJECTION, SOLUTION INTRAVENOUS; SUBCUTANEOUS at 21:46

## 2024-09-20 ENCOUNTER — APPOINTMENT (OUTPATIENT)
Dept: MRI IMAGING | Facility: HOSPITAL | Age: 54
End: 2024-09-20
Payer: MEDICAID

## 2024-09-20 ENCOUNTER — PATIENT MESSAGE (OUTPATIENT)
Dept: FAMILY MEDICINE CLINIC | Facility: CLINIC | Age: 54
End: 2024-09-20
Payer: MEDICAID

## 2024-09-20 LAB
ANION GAP SERPL CALCULATED.3IONS-SCNC: 9.3 MMOL/L (ref 5–15)
BASOPHILS # BLD AUTO: 0.03 10*3/MM3 (ref 0–0.2)
BASOPHILS NFR BLD AUTO: 0.5 % (ref 0–1.5)
BUN SERPL-MCNC: 15 MG/DL (ref 6–20)
BUN/CREAT SERPL: 19 (ref 7–25)
CALCIUM SPEC-SCNC: 8.8 MG/DL (ref 8.6–10.5)
CHLORIDE SERPL-SCNC: 105 MMOL/L (ref 98–107)
CO2 SERPL-SCNC: 24.7 MMOL/L (ref 22–29)
CREAT SERPL-MCNC: 0.79 MG/DL (ref 0.57–1)
DEPRECATED RDW RBC AUTO: 43.3 FL (ref 37–54)
EGFRCR SERPLBLD CKD-EPI 2021: 89 ML/MIN/1.73
EOSINOPHIL # BLD AUTO: 0.16 10*3/MM3 (ref 0–0.4)
EOSINOPHIL NFR BLD AUTO: 2.9 % (ref 0.3–6.2)
ERYTHROCYTE [DISTWIDTH] IN BLOOD BY AUTOMATED COUNT: 13 % (ref 12.3–15.4)
GLUCOSE BLDC GLUCOMTR-MCNC: 119 MG/DL (ref 70–105)
GLUCOSE BLDC GLUCOMTR-MCNC: 151 MG/DL (ref 70–105)
GLUCOSE BLDC GLUCOMTR-MCNC: 153 MG/DL (ref 70–105)
GLUCOSE BLDC GLUCOMTR-MCNC: 185 MG/DL (ref 70–105)
GLUCOSE SERPL-MCNC: 128 MG/DL (ref 65–99)
HCT VFR BLD AUTO: 39.3 % (ref 34–46.6)
HGB BLD-MCNC: 12.7 G/DL (ref 12–15.9)
IMM GRANULOCYTES # BLD AUTO: 0.05 10*3/MM3 (ref 0–0.05)
IMM GRANULOCYTES NFR BLD AUTO: 0.9 % (ref 0–0.5)
LYMPHOCYTES # BLD AUTO: 2.47 10*3/MM3 (ref 0.7–3.1)
LYMPHOCYTES NFR BLD AUTO: 44.5 % (ref 19.6–45.3)
MAGNESIUM SERPL-MCNC: 2.1 MG/DL (ref 1.6–2.6)
MCH RBC QN AUTO: 29.7 PG (ref 26.6–33)
MCHC RBC AUTO-ENTMCNC: 32.3 G/DL (ref 31.5–35.7)
MCV RBC AUTO: 92 FL (ref 79–97)
MONOCYTES # BLD AUTO: 0.76 10*3/MM3 (ref 0.1–0.9)
MONOCYTES NFR BLD AUTO: 13.7 % (ref 5–12)
NEUTROPHILS NFR BLD AUTO: 2.08 10*3/MM3 (ref 1.7–7)
NEUTROPHILS NFR BLD AUTO: 37.5 % (ref 42.7–76)
NRBC BLD AUTO-RTO: 0 /100 WBC (ref 0–0.2)
PLATELET # BLD AUTO: 165 10*3/MM3 (ref 140–450)
PMV BLD AUTO: 11 FL (ref 6–12)
POTASSIUM SERPL-SCNC: 4.7 MMOL/L (ref 3.5–5.2)
RBC # BLD AUTO: 4.27 10*6/MM3 (ref 3.77–5.28)
SODIUM SERPL-SCNC: 139 MMOL/L (ref 136–145)
WBC NRBC COR # BLD AUTO: 5.55 10*3/MM3 (ref 3.4–10.8)

## 2024-09-20 PROCEDURE — 25010000002 GADOTERIDOL PER 1 ML: Performed by: INTERNAL MEDICINE

## 2024-09-20 PROCEDURE — 73220 MRI UPPR EXTREMITY W/O&W/DYE: CPT

## 2024-09-20 PROCEDURE — A9579 GAD-BASE MR CONTRAST NOS,1ML: HCPCS | Performed by: INTERNAL MEDICINE

## 2024-09-20 PROCEDURE — 96375 TX/PRO/DX INJ NEW DRUG ADDON: CPT

## 2024-09-20 PROCEDURE — 80048 BASIC METABOLIC PNL TOTAL CA: CPT | Performed by: NURSE PRACTITIONER

## 2024-09-20 PROCEDURE — 63710000001 INSULIN LISPRO (HUMAN) PER 5 UNITS: Performed by: NURSE PRACTITIONER

## 2024-09-20 PROCEDURE — G0378 HOSPITAL OBSERVATION PER HR: HCPCS

## 2024-09-20 PROCEDURE — 25010000002 CEFTRIAXONE PER 250 MG: Performed by: NURSE PRACTITIONER

## 2024-09-20 PROCEDURE — 25010000002 HYDROMORPHONE 1 MG/ML SOLUTION: Performed by: NURSE PRACTITIONER

## 2024-09-20 PROCEDURE — 25010000002 LINEZOLID 600 MG/300ML SOLUTION: Performed by: NURSE PRACTITIONER

## 2024-09-20 PROCEDURE — 96372 THER/PROPH/DIAG INJ SC/IM: CPT

## 2024-09-20 PROCEDURE — 82948 REAGENT STRIP/BLOOD GLUCOSE: CPT

## 2024-09-20 PROCEDURE — 82948 REAGENT STRIP/BLOOD GLUCOSE: CPT | Performed by: NURSE PRACTITIONER

## 2024-09-20 PROCEDURE — 85025 COMPLETE CBC W/AUTO DIFF WBC: CPT | Performed by: NURSE PRACTITIONER

## 2024-09-20 PROCEDURE — 25010000002 ENOXAPARIN PER 10 MG: Performed by: NURSE PRACTITIONER

## 2024-09-20 PROCEDURE — 83735 ASSAY OF MAGNESIUM: CPT | Performed by: NURSE PRACTITIONER

## 2024-09-20 RX ORDER — LISINOPRIL 5 MG/1
10 TABLET ORAL DAILY
Status: DISCONTINUED | OUTPATIENT
Start: 2024-09-20 | End: 2024-09-22 | Stop reason: HOSPADM

## 2024-09-20 RX ORDER — PANTOPRAZOLE SODIUM 40 MG/1
40 TABLET, DELAYED RELEASE ORAL
Status: DISCONTINUED | OUTPATIENT
Start: 2024-09-21 | End: 2024-09-22 | Stop reason: HOSPADM

## 2024-09-20 RX ORDER — ASPIRIN 81 MG/1
81 TABLET ORAL DAILY
Status: DISCONTINUED | OUTPATIENT
Start: 2024-09-20 | End: 2024-09-22 | Stop reason: HOSPADM

## 2024-09-20 RX ORDER — METOPROLOL SUCCINATE 25 MG/1
25 TABLET, EXTENDED RELEASE ORAL DAILY
Status: DISCONTINUED | OUTPATIENT
Start: 2024-09-20 | End: 2024-09-22 | Stop reason: HOSPADM

## 2024-09-20 RX ORDER — CLOPIDOGREL BISULFATE 75 MG/1
75 TABLET ORAL DAILY
Status: DISCONTINUED | OUTPATIENT
Start: 2024-09-20 | End: 2024-09-22 | Stop reason: HOSPADM

## 2024-09-20 RX ADMIN — LISINOPRIL 10 MG: 5 TABLET ORAL at 08:41

## 2024-09-20 RX ADMIN — HYDROMORPHONE HYDROCHLORIDE 0.5 MG: 1 INJECTION, SOLUTION INTRAMUSCULAR; INTRAVENOUS; SUBCUTANEOUS at 21:34

## 2024-09-20 RX ADMIN — ACETAMINOPHEN 650 MG: 325 TABLET, FILM COATED ORAL at 21:34

## 2024-09-20 RX ADMIN — ENOXAPARIN SODIUM 40 MG: 100 INJECTION SUBCUTANEOUS at 17:11

## 2024-09-20 RX ADMIN — CEFTRIAXONE 2000 MG: 2 INJECTION, POWDER, FOR SOLUTION INTRAMUSCULAR; INTRAVENOUS at 17:11

## 2024-09-20 RX ADMIN — HYDROCODONE BITARTRATE AND ACETAMINOPHEN 1 TABLET: 7.5; 325 TABLET ORAL at 18:12

## 2024-09-20 RX ADMIN — Medication 10 ML: at 08:42

## 2024-09-20 RX ADMIN — METOPROLOL SUCCINATE 25 MG: 25 TABLET, FILM COATED, EXTENDED RELEASE ORAL at 08:41

## 2024-09-20 RX ADMIN — ASPIRIN 81 MG: 81 TABLET, COATED ORAL at 08:41

## 2024-09-20 RX ADMIN — INSULIN LISPRO 2 UNITS: 100 INJECTION, SOLUTION INTRAVENOUS; SUBCUTANEOUS at 08:41

## 2024-09-20 RX ADMIN — Medication 10 ML: at 21:34

## 2024-09-20 RX ADMIN — HYDROCODONE BITARTRATE AND ACETAMINOPHEN 1 TABLET: 7.5; 325 TABLET ORAL at 08:41

## 2024-09-20 RX ADMIN — CLOPIDOGREL BISULFATE 75 MG: 75 TABLET ORAL at 08:41

## 2024-09-20 RX ADMIN — GADOTERIDOL 15 ML: 279.3 INJECTION, SOLUTION INTRAVENOUS at 21:14

## 2024-09-20 RX ADMIN — PANTOPRAZOLE SODIUM 40 MG: 40 TABLET, DELAYED RELEASE ORAL at 18:12

## 2024-09-20 RX ADMIN — LINEZOLID 600 MG: 600 INJECTION, SOLUTION INTRAVENOUS at 17:11

## 2024-09-20 RX ADMIN — LINEZOLID 600 MG: 600 INJECTION, SOLUTION INTRAVENOUS at 05:48

## 2024-09-21 LAB
ANION GAP SERPL CALCULATED.3IONS-SCNC: 10.8 MMOL/L (ref 5–15)
BASOPHILS # BLD AUTO: 0.02 10*3/MM3 (ref 0–0.2)
BASOPHILS NFR BLD AUTO: 0.4 % (ref 0–1.5)
BUN SERPL-MCNC: 14 MG/DL (ref 6–20)
BUN/CREAT SERPL: 16.7 (ref 7–25)
CALCIUM SPEC-SCNC: 8.8 MG/DL (ref 8.6–10.5)
CHLORIDE SERPL-SCNC: 101 MMOL/L (ref 98–107)
CO2 SERPL-SCNC: 24.2 MMOL/L (ref 22–29)
CREAT SERPL-MCNC: 0.84 MG/DL (ref 0.57–1)
DEPRECATED RDW RBC AUTO: 42.5 FL (ref 37–54)
EGFRCR SERPLBLD CKD-EPI 2021: 82.7 ML/MIN/1.73
EOSINOPHIL # BLD AUTO: 0.17 10*3/MM3 (ref 0–0.4)
EOSINOPHIL NFR BLD AUTO: 3.2 % (ref 0.3–6.2)
ERYTHROCYTE [DISTWIDTH] IN BLOOD BY AUTOMATED COUNT: 12.8 % (ref 12.3–15.4)
GLUCOSE BLDC GLUCOMTR-MCNC: 161 MG/DL (ref 70–105)
GLUCOSE BLDC GLUCOMTR-MCNC: 162 MG/DL (ref 70–105)
GLUCOSE BLDC GLUCOMTR-MCNC: 177 MG/DL (ref 70–105)
GLUCOSE BLDC GLUCOMTR-MCNC: 189 MG/DL (ref 70–105)
GLUCOSE SERPL-MCNC: 227 MG/DL (ref 65–99)
HCT VFR BLD AUTO: 38.9 % (ref 34–46.6)
HGB BLD-MCNC: 12.7 G/DL (ref 12–15.9)
IMM GRANULOCYTES # BLD AUTO: 0.04 10*3/MM3 (ref 0–0.05)
IMM GRANULOCYTES NFR BLD AUTO: 0.8 % (ref 0–0.5)
LYMPHOCYTES # BLD AUTO: 1.76 10*3/MM3 (ref 0.7–3.1)
LYMPHOCYTES NFR BLD AUTO: 33.6 % (ref 19.6–45.3)
MAGNESIUM SERPL-MCNC: 2 MG/DL (ref 1.6–2.6)
MCH RBC QN AUTO: 29.7 PG (ref 26.6–33)
MCHC RBC AUTO-ENTMCNC: 32.6 G/DL (ref 31.5–35.7)
MCV RBC AUTO: 91.1 FL (ref 79–97)
MONOCYTES # BLD AUTO: 0.64 10*3/MM3 (ref 0.1–0.9)
MONOCYTES NFR BLD AUTO: 12.2 % (ref 5–12)
NEUTROPHILS NFR BLD AUTO: 2.61 10*3/MM3 (ref 1.7–7)
NEUTROPHILS NFR BLD AUTO: 49.8 % (ref 42.7–76)
NRBC BLD AUTO-RTO: 0 /100 WBC (ref 0–0.2)
PLATELET # BLD AUTO: 148 10*3/MM3 (ref 140–450)
PMV BLD AUTO: 11.2 FL (ref 6–12)
POTASSIUM SERPL-SCNC: 4.1 MMOL/L (ref 3.5–5.2)
RBC # BLD AUTO: 4.27 10*6/MM3 (ref 3.77–5.28)
SODIUM SERPL-SCNC: 136 MMOL/L (ref 136–145)
WBC NRBC COR # BLD AUTO: 5.24 10*3/MM3 (ref 3.4–10.8)

## 2024-09-21 PROCEDURE — 96372 THER/PROPH/DIAG INJ SC/IM: CPT

## 2024-09-21 PROCEDURE — 80048 BASIC METABOLIC PNL TOTAL CA: CPT | Performed by: NURSE PRACTITIONER

## 2024-09-21 PROCEDURE — 82948 REAGENT STRIP/BLOOD GLUCOSE: CPT

## 2024-09-21 PROCEDURE — 25010000002 LINEZOLID 600 MG/300ML SOLUTION: Performed by: NURSE PRACTITIONER

## 2024-09-21 PROCEDURE — G0378 HOSPITAL OBSERVATION PER HR: HCPCS

## 2024-09-21 PROCEDURE — 96366 THER/PROPH/DIAG IV INF ADDON: CPT

## 2024-09-21 PROCEDURE — 25010000002 ENOXAPARIN PER 10 MG: Performed by: NURSE PRACTITIONER

## 2024-09-21 PROCEDURE — 82948 REAGENT STRIP/BLOOD GLUCOSE: CPT | Performed by: NURSE PRACTITIONER

## 2024-09-21 PROCEDURE — 85025 COMPLETE CBC W/AUTO DIFF WBC: CPT | Performed by: NURSE PRACTITIONER

## 2024-09-21 PROCEDURE — 83735 ASSAY OF MAGNESIUM: CPT | Performed by: NURSE PRACTITIONER

## 2024-09-21 RX ORDER — VALACYCLOVIR HYDROCHLORIDE 500 MG/1
500 TABLET, FILM COATED ORAL EVERY 12 HOURS SCHEDULED
Status: DISCONTINUED | OUTPATIENT
Start: 2024-09-21 | End: 2024-09-22 | Stop reason: HOSPADM

## 2024-09-21 RX ORDER — DOXYCYCLINE 100 MG/1
100 CAPSULE ORAL EVERY 12 HOURS SCHEDULED
Status: DISCONTINUED | OUTPATIENT
Start: 2024-09-21 | End: 2024-09-22 | Stop reason: HOSPADM

## 2024-09-21 RX ADMIN — Medication 10 ML: at 21:27

## 2024-09-21 RX ADMIN — DOXYCYCLINE 100 MG: 100 CAPSULE ORAL at 21:27

## 2024-09-21 RX ADMIN — LISINOPRIL 10 MG: 5 TABLET ORAL at 09:39

## 2024-09-21 RX ADMIN — PANTOPRAZOLE SODIUM 40 MG: 40 TABLET, DELAYED RELEASE ORAL at 04:50

## 2024-09-21 RX ADMIN — METOPROLOL SUCCINATE 25 MG: 25 TABLET, FILM COATED, EXTENDED RELEASE ORAL at 09:39

## 2024-09-21 RX ADMIN — ASPIRIN 81 MG: 81 TABLET, COATED ORAL at 09:39

## 2024-09-21 RX ADMIN — LINEZOLID 600 MG: 600 INJECTION, SOLUTION INTRAVENOUS at 04:50

## 2024-09-21 RX ADMIN — HYDROCODONE BITARTRATE AND ACETAMINOPHEN 1 TABLET: 7.5; 325 TABLET ORAL at 21:43

## 2024-09-21 RX ADMIN — HYDROCODONE BITARTRATE AND ACETAMINOPHEN 1 TABLET: 7.5; 325 TABLET ORAL at 04:50

## 2024-09-21 RX ADMIN — ENOXAPARIN SODIUM 40 MG: 100 INJECTION SUBCUTANEOUS at 17:08

## 2024-09-21 RX ADMIN — CLOPIDOGREL BISULFATE 75 MG: 75 TABLET ORAL at 09:39

## 2024-09-21 RX ADMIN — VALACYCLOVIR HYDROCHLORIDE 500 MG: 500 TABLET, FILM COATED ORAL at 21:27

## 2024-09-22 ENCOUNTER — READMISSION MANAGEMENT (OUTPATIENT)
Dept: CALL CENTER | Facility: HOSPITAL | Age: 54
End: 2024-09-22
Payer: MEDICAID

## 2024-09-22 VITALS
HEIGHT: 65 IN | HEART RATE: 52 BPM | BODY MASS INDEX: 33.02 KG/M2 | WEIGHT: 198.19 LBS | SYSTOLIC BLOOD PRESSURE: 131 MMHG | DIASTOLIC BLOOD PRESSURE: 93 MMHG | OXYGEN SATURATION: 96 % | RESPIRATION RATE: 20 BRPM | TEMPERATURE: 97.6 F

## 2024-09-22 LAB
ANION GAP SERPL CALCULATED.3IONS-SCNC: 9.4 MMOL/L (ref 5–15)
BASOPHILS # BLD AUTO: 0.02 10*3/MM3 (ref 0–0.2)
BASOPHILS NFR BLD AUTO: 0.4 % (ref 0–1.5)
BUN SERPL-MCNC: 14 MG/DL (ref 6–20)
BUN/CREAT SERPL: 20 (ref 7–25)
CALCIUM SPEC-SCNC: 8.8 MG/DL (ref 8.6–10.5)
CHLORIDE SERPL-SCNC: 103 MMOL/L (ref 98–107)
CO2 SERPL-SCNC: 24.6 MMOL/L (ref 22–29)
CREAT SERPL-MCNC: 0.7 MG/DL (ref 0.57–1)
DEPRECATED RDW RBC AUTO: 42 FL (ref 37–54)
EGFRCR SERPLBLD CKD-EPI 2021: 102.9 ML/MIN/1.73
EOSINOPHIL # BLD AUTO: 0.18 10*3/MM3 (ref 0–0.4)
EOSINOPHIL NFR BLD AUTO: 3.3 % (ref 0.3–6.2)
ERYTHROCYTE [DISTWIDTH] IN BLOOD BY AUTOMATED COUNT: 12.7 % (ref 12.3–15.4)
GLUCOSE BLDC GLUCOMTR-MCNC: 124 MG/DL (ref 70–105)
GLUCOSE BLDC GLUCOMTR-MCNC: 165 MG/DL (ref 70–105)
GLUCOSE SERPL-MCNC: 108 MG/DL (ref 65–99)
HCT VFR BLD AUTO: 38.8 % (ref 34–46.6)
HGB BLD-MCNC: 12.4 G/DL (ref 12–15.9)
IMM GRANULOCYTES # BLD AUTO: 0.05 10*3/MM3 (ref 0–0.05)
IMM GRANULOCYTES NFR BLD AUTO: 0.9 % (ref 0–0.5)
LYMPHOCYTES # BLD AUTO: 2.39 10*3/MM3 (ref 0.7–3.1)
LYMPHOCYTES NFR BLD AUTO: 44.4 % (ref 19.6–45.3)
MAGNESIUM SERPL-MCNC: 2.3 MG/DL (ref 1.6–2.6)
MCH RBC QN AUTO: 29 PG (ref 26.6–33)
MCHC RBC AUTO-ENTMCNC: 32 G/DL (ref 31.5–35.7)
MCV RBC AUTO: 90.9 FL (ref 79–97)
MONOCYTES # BLD AUTO: 0.53 10*3/MM3 (ref 0.1–0.9)
MONOCYTES NFR BLD AUTO: 9.9 % (ref 5–12)
NEUTROPHILS NFR BLD AUTO: 2.21 10*3/MM3 (ref 1.7–7)
NEUTROPHILS NFR BLD AUTO: 41.1 % (ref 42.7–76)
NRBC BLD AUTO-RTO: 0 /100 WBC (ref 0–0.2)
PLATELET # BLD AUTO: 171 10*3/MM3 (ref 140–450)
PMV BLD AUTO: 11 FL (ref 6–12)
POTASSIUM SERPL-SCNC: 4.3 MMOL/L (ref 3.5–5.2)
RBC # BLD AUTO: 4.27 10*6/MM3 (ref 3.77–5.28)
SODIUM SERPL-SCNC: 137 MMOL/L (ref 136–145)
WBC NRBC COR # BLD AUTO: 5.38 10*3/MM3 (ref 3.4–10.8)

## 2024-09-22 PROCEDURE — 80048 BASIC METABOLIC PNL TOTAL CA: CPT | Performed by: NURSE PRACTITIONER

## 2024-09-22 PROCEDURE — 82948 REAGENT STRIP/BLOOD GLUCOSE: CPT | Performed by: NURSE PRACTITIONER

## 2024-09-22 PROCEDURE — G0378 HOSPITAL OBSERVATION PER HR: HCPCS

## 2024-09-22 PROCEDURE — 85025 COMPLETE CBC W/AUTO DIFF WBC: CPT | Performed by: NURSE PRACTITIONER

## 2024-09-22 PROCEDURE — 83735 ASSAY OF MAGNESIUM: CPT | Performed by: NURSE PRACTITIONER

## 2024-09-22 RX ORDER — DOXYCYCLINE 100 MG/1
100 CAPSULE ORAL EVERY 12 HOURS SCHEDULED
Qty: 12 CAPSULE | Refills: 0 | Status: SHIPPED | OUTPATIENT
Start: 2024-09-22 | End: 2024-09-28

## 2024-09-22 RX ORDER — VALACYCLOVIR HYDROCHLORIDE 500 MG/1
500 TABLET, FILM COATED ORAL EVERY 12 HOURS SCHEDULED
Qty: 12 TABLET | Refills: 0 | Status: SHIPPED | OUTPATIENT
Start: 2024-09-22 | End: 2024-09-28

## 2024-09-22 RX ADMIN — LISINOPRIL 10 MG: 5 TABLET ORAL at 09:20

## 2024-09-22 RX ADMIN — METOPROLOL SUCCINATE 25 MG: 25 TABLET, FILM COATED, EXTENDED RELEASE ORAL at 09:20

## 2024-09-22 RX ADMIN — DOXYCYCLINE 100 MG: 100 CAPSULE ORAL at 09:20

## 2024-09-22 RX ADMIN — CLOPIDOGREL BISULFATE 75 MG: 75 TABLET ORAL at 09:20

## 2024-09-22 RX ADMIN — ASPIRIN 81 MG: 81 TABLET, COATED ORAL at 09:20

## 2024-09-22 RX ADMIN — VALACYCLOVIR HYDROCHLORIDE 500 MG: 500 TABLET, FILM COATED ORAL at 09:20

## 2024-09-22 RX ADMIN — Medication 10 ML: at 09:20

## 2024-09-22 RX ADMIN — PANTOPRAZOLE SODIUM 40 MG: 40 TABLET, DELAYED RELEASE ORAL at 05:17

## 2024-09-23 ENCOUNTER — TRANSITIONAL CARE MANAGEMENT TELEPHONE ENCOUNTER (OUTPATIENT)
Dept: CALL CENTER | Facility: HOSPITAL | Age: 54
End: 2024-09-23
Payer: MEDICAID

## 2024-09-24 LAB
BACTERIA SPEC AEROBE CULT: NORMAL
BACTERIA SPEC AEROBE CULT: NORMAL

## 2024-09-27 RX ORDER — EZETIMIBE 10 MG/1
10 TABLET ORAL DAILY
Qty: 90 TABLET | Refills: 3 | Status: SHIPPED | OUTPATIENT
Start: 2024-09-27

## 2024-09-30 ENCOUNTER — OFFICE VISIT (OUTPATIENT)
Dept: FAMILY MEDICINE CLINIC | Facility: CLINIC | Age: 54
End: 2024-09-30
Payer: MEDICAID

## 2024-09-30 VITALS
WEIGHT: 197.38 LBS | HEIGHT: 65 IN | SYSTOLIC BLOOD PRESSURE: 128 MMHG | HEART RATE: 89 BPM | BODY MASS INDEX: 32.89 KG/M2 | DIASTOLIC BLOOD PRESSURE: 70 MMHG | RESPIRATION RATE: 18 BRPM | TEMPERATURE: 98.2 F | OXYGEN SATURATION: 97 %

## 2024-09-30 DIAGNOSIS — E66.09 CLASS 1 OBESITY DUE TO EXCESS CALORIES WITH SERIOUS COMORBIDITY AND BODY MASS INDEX (BMI) OF 32.0 TO 32.9 IN ADULT: ICD-10-CM

## 2024-09-30 DIAGNOSIS — E66.811 CLASS 1 OBESITY DUE TO EXCESS CALORIES WITH SERIOUS COMORBIDITY AND BODY MASS INDEX (BMI) OF 32.0 TO 32.9 IN ADULT: ICD-10-CM

## 2024-09-30 DIAGNOSIS — E11.65 TYPE 2 DIABETES MELLITUS WITH HYPERGLYCEMIA, WITHOUT LONG-TERM CURRENT USE OF INSULIN: ICD-10-CM

## 2024-09-30 DIAGNOSIS — L03.114 CELLULITIS OF LEFT UPPER EXTREMITY: Primary | ICD-10-CM

## 2024-09-30 PROCEDURE — 99495 TRANSJ CARE MGMT MOD F2F 14D: CPT

## 2024-09-30 PROCEDURE — 3051F HG A1C>EQUAL 7.0%<8.0%: CPT

## 2024-09-30 PROCEDURE — 3074F SYST BP LT 130 MM HG: CPT

## 2024-09-30 PROCEDURE — 1126F AMNT PAIN NOTED NONE PRSNT: CPT

## 2024-09-30 PROCEDURE — 3078F DIAST BP <80 MM HG: CPT

## 2024-09-30 NOTE — PROGRESS NOTES
"Chief Complaint  Diabetes follow up.    Subjective          Sada Le presents to [facility] for     HPI    A1c on 9/17 was 7.6. Pt has attempted dietary changes. Currently on metformin 500 mg qd monotherapy. Desires medication to help diabetes and aid in weight loss.    Objective   Vital Signs:   /70 (BP Location: Right arm, Patient Position: Sitting, Cuff Size: Adult)   Pulse 89   Temp 98.2 °F (36.8 °C) (Temporal)   Resp 18   Ht 165.1 cm (65\")   Wt 89.5 kg (197 lb 6 oz)   SpO2 97% Comment: room air  BMI 32.84 kg/m²     Physical Exam  Vitals and nursing note reviewed.   Constitutional:       General: She is not in acute distress.     Appearance: Normal appearance. She is not ill-appearing or diaphoretic.   HENT:      Head: Normocephalic and atraumatic.      Nose: No congestion or rhinorrhea.   Eyes:      Extraocular Movements: Extraocular movements intact.      Pupils: Pupils are equal, round, and reactive to light.   Cardiovascular:      Rate and Rhythm: Normal rate and regular rhythm.      Pulses: Normal pulses.   Pulmonary:      Effort: Pulmonary effort is normal.      Breath sounds: Normal breath sounds.   Musculoskeletal:         General: Normal range of motion.      Cervical back: Normal range of motion and neck supple.   Skin:     General: Skin is warm and dry.      Capillary Refill: Capillary refill takes less than 2 seconds.      Comments: Small area of erythema on dorsal aspect of left hand between the thumb and second digit.  There is also what appears to be a small well-healing wound.  Patient states that this is actually improved from prior.  It is nontender.   Neurological:      Mental Status: She is alert and oriented to person, place, and time.   Psychiatric:         Mood and Affect: Mood normal.         Behavior: Behavior normal.   Result Review :                 Assessment and Plan    Diagnoses and all orders for this visit:    1. Cellulitis of " left upper extremity (Primary)    2. Class 1 obesity due to excess calories with serious comorbidity and body mass index (BMI) of 32.0 to 32.9 in adult  Overview:  9/30/2024 - Started on Rybelsus.  Counseled on diet and exercise.    Assessment & Plan:  Patient's (Body mass index is 32.84 kg/m².) indicates that they are obese (BMI >30) with health conditions that include hypertension, coronary heart disease, diabetes mellitus, dyslipidemias, and GERD . Weight is unchanged. BMI  is above average; BMI management plan is completed. We discussed portion control and increasing exercise.     Orders:  -     Semaglutide,0.25 or 0.5MG/DOS, (Ozempic, 0.25 or 0.5 MG/DOSE,) 2 MG/1.5ML solution pen-injector; Inject 0.25 mg under the skin into the appropriate area as directed 1 (One) Time Per Week for 28 days, THEN 0.5 mg 1 (One) Time Per Week for 28 days.  Dispense: 1.5 mL; Refill: 1    3. Type 2 diabetes mellitus with hyperglycemia, without long-term current use of insulin  Overview:  9/17/2024  A1c 7.6  50 mg metformin qd, more gives her side effects.    9/30/2024  Started on Rybelsus.    Assessment & Plan:  Start Rybelsus for diabetes and weight loss. 3 mg qd x30 days, followed by 7 mg qd.  Follow up in December for A1c recheck.  In the meantime, pt to let us know if she is having any issues with the medication.    Orders:  -     Discontinue: Semaglutide 3 MG tablet; Take 1 tablet by mouth Daily.  Dispense: 30 tablet; Refill: 0  -     Discontinue: Semaglutide 7 MG tablet; Take 7 mg by mouth Daily. Do NOT start taking until you have completed 30 days of the 3 mg daily dose.  Dispense: 30 tablet; Refill: 12  -     Semaglutide,0.25 or 0.5MG/DOS, (Ozempic, 0.25 or 0.5 MG/DOSE,) 2 MG/1.5ML solution pen-injector; Inject 0.25 mg under the skin into the appropriate area as directed 1 (One) Time Per Week for 28 days, THEN 0.5 mg 1 (One) Time Per Week for 28 days.  Dispense: 1.5 mL; Refill: 1    Addendum: Insurance declined Rybelsus.  Ozempic preferred. Sent Ozempic Rx instead.    Follow Up   Return in about 11 weeks (around 12/16/2024).    Luis Madrid MD    NOTE: [employer], per Health Information accessibility laws, allows progress notes to be visible to patients. Please note that these notes will include professional medical terminology that may be somewhat confusing without some interpretation from your medical professional team. The intent of progress notes is to communicate information between any medical professionals involved in your case, or to serve as future reference for myself or any other provider when reviewing your case, as well as a reference for the patient viewing the record. Please ask a member of the medical team if you have any questions about terminology or content of note.    DISCLAIMER: This note was transcribed using a Dragon Medical voice recognition system. It may contain mistakes that may have not been recognized during proofreading. This note was also amended on the date signed due to recognized errors.

## 2024-09-30 NOTE — ASSESSMENT & PLAN NOTE
Patient's (Body mass index is 32.84 kg/m².) indicates that they are obese (BMI >30) with health conditions that include hypertension, coronary heart disease, diabetes mellitus, dyslipidemias, and GERD . Weight is unchanged. BMI  is above average; BMI management plan is completed. We discussed portion control and increasing exercise.

## 2024-09-30 NOTE — ASSESSMENT & PLAN NOTE
Start Rybelsus for diabetes and weight loss. 3 mg qd x30 days, followed by 7 mg qd.  Follow up in December for A1c recheck.  In the meantime, pt to let us know if she is having any issues with the medication.

## 2024-09-30 NOTE — PROGRESS NOTES
Transitional Care Follow Up Visit  Subjective     Sada Le is a 53 y.o. female who presents for a transitional care management visit.    Within 48 business hours after discharge our office contacted her via telephone to coordinate her care and needs.      I reviewed and discussed the details of that call along with the discharge summary, hospital problems, inpatient lab results, inpatient diagnostic studies, and consultation reports with Sada.     Current outpatient and discharge medications have been reconciled for the patient.  Reviewed by: Radha Rosales MA          9/22/2024     2:32 PM   Date of TCM Phone Call   Hospital [facility]   Date of Admission 9/19/2024   Date of Discharge 9/22/2024   Discharge Disposition Home or Self Care     Risk for Readmission (LACE) Score: 6 (9/22/2024  6:00 AM)      History of Present Illness   Course During Hospital Stay:    -Appears to be recurrent issue  -Unable to collect wound culture as no drainage present  -Blood culture negative  - treated with IV ceftriaxone and Zyvox ... Changed to oral doxycyline and acycylovir as per ID recommendation on discharge.  - Finding of MRI hand revealed no abscess.     The following portions of the patient's history were reviewed and updated as appropriate: allergies, current medications, past family history, past medical history, past social history, past surgical history, and problem list.    Sada was seen at [facility] . She was admitted on 09/19/2024  for cellulitis of left thumb. She was discharged on 09/22/2024. Discharge diagnosis was Cellulitis of left thumb and cellulitis of left upper extremity. Diagnostic studies that were performed included: MRI Hand Left with and without contrast: Evidence for soft tissue cellulitis of the subcutaneous soft tissues between the first and second digits and overlying the palmar aspect of the thenar eminence.There is mild edema and enhancement involving the  "superficial aspect of the underlying deep muscular soft tissues of the thenar eminence indicating spread of infection to involve the deep muscular soft tissues.There is no evidence for well-defined fluid collection or abscess. No evidence for osteomyelitis. Currently Sada receives care at home. Complications from the hospital stay include none. The patient stated that they do not need help with their daily life and activities. The patient stated that they do have emotional support at home.    ID believes HSV is the source of this recurrent problem.    Patient states that she feels well, symptoms improved, no concerns or complaints at this time.    Objective   /70 (BP Location: Right arm, Patient Position: Sitting, Cuff Size: Adult)   Pulse 89   Temp 98.2 °F (36.8 °C) (Temporal)   Resp 18   Ht 165.1 cm (65\")   Wt 89.5 kg (197 lb 6 oz)   SpO2 97% Comment: room air  BMI 32.84 kg/m²   Physical Exam  Vitals and nursing note reviewed.   Constitutional:       General: She is not in acute distress.     Appearance: Normal appearance. She is not ill-appearing or diaphoretic.   HENT:      Head: Normocephalic and atraumatic.      Nose: No congestion or rhinorrhea.   Eyes:      Extraocular Movements: Extraocular movements intact.      Pupils: Pupils are equal, round, and reactive to light.   Cardiovascular:      Rate and Rhythm: Normal rate and regular rhythm.      Pulses: Normal pulses.   Pulmonary:      Effort: Pulmonary effort is normal.      Breath sounds: Normal breath sounds.   Musculoskeletal:         General: Normal range of motion.      Cervical back: Normal range of motion and neck supple.   Skin:     General: Skin is warm and dry.      Capillary Refill: Capillary refill takes less than 2 seconds.      Comments: Small area of erythema on dorsal aspect of left hand between the thumb and second digit.  There is also what appears to be a small well-healing wound.  Patient states that this is actually " improved from prior.  It is nontender.   Neurological:      Mental Status: She is alert and oriented to person, place, and time.   Psychiatric:         Mood and Affect: Mood normal.         Behavior: Behavior normal.       Assessment & Plan   Diagnoses and all orders for this visit:    Cellulitis of left upper extremity (Primary)  Patient doing well. Has completed antibiotic and antiviral therapy. Pt to let us know if it recurs. In the future, she should be empirically initiated on both antibiotic and antiviral tx at first indication of recurrence.    Luis Madrid MD    NOTE: [employer], per Health Information accessibility laws, allows progress notes to be visible to patients. Please note that these notes will include professional medical terminology that may be somewhat confusing without some interpretation from your medical professional team. The intent of progress notes is to communicate information between any medical professionals involved in your case, or to serve as future reference for myself or any other provider when reviewing your case, as well as a reference for the patient viewing the record. Please ask a member of the medical team if you have any questions about terminology or content of note.    DISCLAIMER: This note was transcribed using a Dragon Medical voice recognition system. It may contain mistakes that may have not been recognized during proofreading. This note was also amended on the date signed due to recognized errors.

## 2024-10-02 RX ORDER — SEMAGLUTIDE 1.34 MG/ML
INJECTION, SOLUTION SUBCUTANEOUS
Qty: 1.5 ML | Refills: 1 | Status: SHIPPED | OUTPATIENT
Start: 2024-10-02 | End: 2024-11-27

## 2024-10-10 DIAGNOSIS — E11.65 TYPE 2 DIABETES MELLITUS WITH HYPERGLYCEMIA: ICD-10-CM

## 2024-10-11 DIAGNOSIS — I10 ESSENTIAL (PRIMARY) HYPERTENSION: ICD-10-CM

## 2024-10-11 RX ORDER — METOPROLOL SUCCINATE 25 MG/1
25 TABLET, EXTENDED RELEASE ORAL DAILY
Qty: 90 TABLET | Refills: 3 | Status: SHIPPED | OUTPATIENT
Start: 2024-10-11

## 2024-12-05 ENCOUNTER — HOSPITAL ENCOUNTER (EMERGENCY)
Facility: HOSPITAL | Age: 54
Discharge: HOME | End: 2024-12-05
Payer: MEDICAID

## 2024-12-05 ENCOUNTER — APPOINTMENT (OUTPATIENT)
Dept: GENERAL RADIOLOGY | Facility: HOSPITAL | Age: 54
End: 2024-12-05
Payer: MEDICAID

## 2024-12-05 VITALS
HEART RATE: 65 BPM | DIASTOLIC BLOOD PRESSURE: 100 MMHG | RESPIRATION RATE: 20 BRPM | BODY MASS INDEX: 31.4 KG/M2 | OXYGEN SATURATION: 94 % | HEIGHT: 65 IN | TEMPERATURE: 96.8 F | WEIGHT: 188.49 LBS | SYSTOLIC BLOOD PRESSURE: 154 MMHG

## 2024-12-05 DIAGNOSIS — G44.86 CERVICOGENIC HEADACHE: Primary | ICD-10-CM

## 2024-12-05 DIAGNOSIS — M54.2 NECK PAIN: ICD-10-CM

## 2024-12-05 LAB
ANION GAP SERPL CALCULATED.3IONS-SCNC: 11.8 MMOL/L (ref 5–15)
BASOPHILS # BLD AUTO: 0.03 10*3/MM3 (ref 0–0.2)
BASOPHILS NFR BLD AUTO: 0.3 % (ref 0–1.5)
BUN SERPL-MCNC: 9 MG/DL (ref 6–20)
BUN/CREAT SERPL: 13 (ref 7–25)
CALCIUM SPEC-SCNC: 9.6 MG/DL (ref 8.6–10.5)
CHLORIDE SERPL-SCNC: 102 MMOL/L (ref 98–107)
CO2 SERPL-SCNC: 28.2 MMOL/L (ref 22–29)
CREAT SERPL-MCNC: 0.69 MG/DL (ref 0.57–1)
DEPRECATED RDW RBC AUTO: 40.9 FL (ref 37–54)
EGFRCR SERPLBLD CKD-EPI 2021: 102.6 ML/MIN/1.73
EOSINOPHIL # BLD AUTO: 0.08 10*3/MM3 (ref 0–0.4)
EOSINOPHIL NFR BLD AUTO: 0.9 % (ref 0.3–6.2)
ERYTHROCYTE [DISTWIDTH] IN BLOOD BY AUTOMATED COUNT: 12.1 % (ref 12.3–15.4)
GLUCOSE SERPL-MCNC: 95 MG/DL (ref 65–99)
HCT VFR BLD AUTO: 41.5 % (ref 34–46.6)
HGB BLD-MCNC: 13.9 G/DL (ref 12–15.9)
HOLD SPECIMEN: NORMAL
IMM GRANULOCYTES # BLD AUTO: 0.07 10*3/MM3 (ref 0–0.05)
IMM GRANULOCYTES NFR BLD AUTO: 0.8 % (ref 0–0.5)
LYMPHOCYTES # BLD AUTO: 1.61 10*3/MM3 (ref 0.7–3.1)
LYMPHOCYTES NFR BLD AUTO: 18.4 % (ref 19.6–45.3)
MCH RBC QN AUTO: 30.7 PG (ref 26.6–33)
MCHC RBC AUTO-ENTMCNC: 33.5 G/DL (ref 31.5–35.7)
MCV RBC AUTO: 91.6 FL (ref 79–97)
MONOCYTES # BLD AUTO: 0.61 10*3/MM3 (ref 0.1–0.9)
MONOCYTES NFR BLD AUTO: 7 % (ref 5–12)
NEUTROPHILS NFR BLD AUTO: 6.34 10*3/MM3 (ref 1.7–7)
NEUTROPHILS NFR BLD AUTO: 72.6 % (ref 42.7–76)
NRBC BLD AUTO-RTO: 0 /100 WBC (ref 0–0.2)
PLATELET # BLD AUTO: 231 10*3/MM3 (ref 140–450)
PMV BLD AUTO: 10.4 FL (ref 6–12)
POTASSIUM SERPL-SCNC: 4 MMOL/L (ref 3.5–5.2)
RBC # BLD AUTO: 4.53 10*6/MM3 (ref 3.77–5.28)
SODIUM SERPL-SCNC: 142 MMOL/L (ref 136–145)
TROPONIN T SERPL HS-MCNC: 6 NG/L
WBC NRBC COR # BLD AUTO: 8.74 10*3/MM3 (ref 3.4–10.8)

## 2024-12-05 PROCEDURE — 97161 PT EVAL LOW COMPLEX 20 MIN: CPT | Performed by: PHYSICAL THERAPIST

## 2024-12-05 PROCEDURE — 85025 COMPLETE CBC W/AUTO DIFF WBC: CPT | Performed by: NURSE PRACTITIONER

## 2024-12-05 PROCEDURE — 25010000002 ORPHENADRINE CITRATE PER 60 MG: Performed by: NURSE PRACTITIONER

## 2024-12-05 PROCEDURE — 80048 BASIC METABOLIC PNL TOTAL CA: CPT | Performed by: NURSE PRACTITIONER

## 2024-12-05 PROCEDURE — 84484 ASSAY OF TROPONIN QUANT: CPT | Performed by: NURSE PRACTITIONER

## 2024-12-05 PROCEDURE — 93005 ELECTROCARDIOGRAM TRACING: CPT

## 2024-12-05 PROCEDURE — 72050 X-RAY EXAM NECK SPINE 4/5VWS: CPT

## 2024-12-05 PROCEDURE — 99284 EMERGENCY DEPT VISIT MOD MDM: CPT

## 2024-12-05 PROCEDURE — 96372 THER/PROPH/DIAG INJ SC/IM: CPT

## 2024-12-05 RX ORDER — HYDROCODONE BITARTRATE AND ACETAMINOPHEN 7.5; 325 MG/1; MG/1
1-2 TABLET ORAL EVERY 6 HOURS PRN
Qty: 15 TABLET | Refills: 0 | Status: SHIPPED | OUTPATIENT
Start: 2024-12-05 | End: 2024-12-05

## 2024-12-05 RX ORDER — METHYLPREDNISOLONE 4 MG/1
TABLET ORAL
Qty: 21 TABLET | Refills: 0 | Status: SHIPPED | OUTPATIENT
Start: 2024-12-05 | End: 2024-12-16

## 2024-12-05 RX ORDER — HYDROCODONE BITARTRATE AND ACETAMINOPHEN 5; 325 MG/1; MG/1
1 TABLET ORAL EVERY 6 HOURS PRN
Qty: 12 TABLET | Refills: 0 | Status: SHIPPED | OUTPATIENT
Start: 2024-12-05 | End: 2024-12-09

## 2024-12-05 RX ORDER — HYDROCODONE BITARTRATE AND ACETAMINOPHEN 5; 325 MG/1; MG/1
1 TABLET ORAL ONCE
Status: COMPLETED | OUTPATIENT
Start: 2024-12-05 | End: 2024-12-05

## 2024-12-05 RX ORDER — ORPHENADRINE CITRATE 30 MG/ML
60 INJECTION INTRAMUSCULAR; INTRAVENOUS ONCE
Status: COMPLETED | OUTPATIENT
Start: 2024-12-05 | End: 2024-12-05

## 2024-12-05 RX ORDER — SODIUM CHLORIDE 0.9 % (FLUSH) 0.9 %
10 SYRINGE (ML) INJECTION AS NEEDED
Status: DISCONTINUED | OUTPATIENT
Start: 2024-12-05 | End: 2024-12-05 | Stop reason: HOSPADM

## 2024-12-05 RX ADMIN — HYDROCODONE BITARTRATE AND ACETAMINOPHEN 1 TABLET: 5; 325 TABLET ORAL at 14:45

## 2024-12-05 RX ADMIN — ORPHENADRINE CITRATE 60 MG: 60 INJECTION INTRAMUSCULAR; INTRAVENOUS at 12:50

## 2024-12-05 NOTE — DISCHARGE INSTRUCTIONS
Medications as directed and follow all pharmacy precautions and warnings regarding any prescriptions.  Follow-up with your family doctor or spine center pain management.  Return for any new or worsening symptoms.

## 2024-12-05 NOTE — THERAPY EVALUATION
Patient Name: Sada Le  : 1970    MRN: [id number]                              Today's Date: 2024       Admit Date: 2024    Visit Dx: No diagnosis found.  Patient Active Problem List   Diagnosis    Allergic rhinitis    Cervical disc disease with myelopathy    Diverticulosis of colon    Hypertension, benign    Class 1 obesity due to excess calories with serious comorbidity and body mass index (BMI) of 32.0 to 32.9 in adult    Osteopenia of spine    Mixed hyperlipidemia    Ischemic cardiomyopathy    Stented coronary artery    Annual visit for general adult medical examination with abnormal findings    Cervical cancer screening    Encounter for screening for malignant neoplasm of breast    Colon cancer screening    History of tobacco use    Generalized anxiety disorder    ASD (atrial septal defect)    Coronary artery disease involving native coronary artery of native heart without angina pectoris    History of MI (myocardial infarction)    Branch retinal vein occlusion with macular edema    Hypertensive retinopathy    Gastroesophageal reflux disease without esophagitis    Cellulitis and abscess of buttock    Aneurysm of ascending aorta without rupture    Type 2 diabetes mellitus with hyperglycemia, without long-term current use of insulin    Chronic pain of right knee    Diverticulitis of intestine with abscess    Cellulitis of left thumb     Past Medical History:   Diagnosis Date    Abnormal ECG 2019    Absence of left thumb     Impression: hx of MRSA, she is signficantly improved She will continue meds and followup if sxs have not resolved over the next 2 weeks.. She is released from care and will notify us of any problems    Acute otitis media     Allergic rhinitis     Arthritis     neck    Cancer     skin    Cervical disc disease with myelopathy     CHF (congestive heart failure) 2019    Contact dermatitis or eczema     Coronary artery disease     Diabetes mellitus 2023    Disorder of  bone and cartilage     Diverticulitis of colon     Impression: CT confirms in the sigmoid colon 10/2011    Diverticulosis of colon     Impression: No sxs 02/13/2013    Elevated cholesterol     Elevated lipoprotein(a) 04/30/2020    Elevated temperature     External otitis of left ear     Gastroesophageal reflux disease without esophagitis 06/19/2022    Hearing loss due to cerumen impaction, left     Hemangioma of liver     History of echocardiogram 10/03/2019    Severely reduced LV systolic function. EF 34% Distribution indicative of LAD territory injury. Akinesis of the apex as well as apical lateral and mid to apical septal walls. Preservation of posterior inferior lateral walls. Mild AI, mild TR. Normal RV function.     History of transesophageal echocardiography (NAFISA) 01/07/2020    EF 55% LA cavity is mild to moderately dilated. Mild MR. Interatrial septum appears redundant. Interatrial hypermobile c/w aneurysm. Large PFO is noted with right to left shunting on bubble study. PFO tunnel appears to be short.     Hordeolum externum of left lower eyelid     Impression: She was started on Bactrim DS for her infection. She was also advised to use warm compression. She will followup should sxs not improve over the next 48 hrs and resolve over the next 1 weeek.    Hyperlipidemia     Impression: Diet controled. She should see improvement with her successful wt loss. We discussed her lipid panel.    Hypertension, benign     Impression: new onset Low salt low calorie diet and regular exercise and followup in 1 mo She will monitor her pressures and notify us of her pressures over the next 1 week.    Inclusion cyst of right breast     Impression: We will follow for now. She is encouraged to have Mammography. She is presently without insurance and reluctant. She bobby consider. She will notify us of any change at all in the lesion for the only way to be certain of it's etilogy is to remove it. She understands.    Insomnia,  organic     Menopausal syndrome     Myocardial infarction 9/29/19    Overweight (BMI 25.0-29.9)     Pulmonary arterial hypertension 9/2019    RUQ abdominal pain     Impression: Resolving, negative GB u/s, HIDA EF 77%, we will follow    Stented coronary artery 09/29/2019    Tobacco use     Impression: She is strongly encouraged to stop smoking. Cessation techniques discussed and encouraged. The consequences of not stopping discussed, ie, CAD, PVD, Stroke, Lung and other cancers.     Past Surgical History:   Procedure Laterality Date    BIVENTRICULAR ASSIST DEVICE/LEFT VENTRICULAR ASSIST DEVICE INSERTION N/A 09/29/2019    Procedure: Left Ventricular Assist Device;  Surgeon: Brant Martinez MD;  Location: [facility] CATH INVASIVE LOCATION;  Service: Cardiovascular    CARDIAC CATHETERIZATION N/A 09/29/2019    Procedure: Left Heart Cath;  Surgeon: Brant Martinez MD;  Location: [facility] CATH INVASIVE LOCATION;  Service: Cardiovascular    CARDIAC CATHETERIZATION N/A 09/29/2019    Procedure: Coronary angiography;  Surgeon: Brant Martinez MD;  Location: [facility] CATH INVASIVE LOCATION;  Service: Cardiovascular    CARDIAC CATHETERIZATION N/A 09/29/2019    Procedure: Left ventriculography;  Surgeon: Brant Martinez MD;  Location: [facility] CATH INVASIVE LOCATION;  Service: Cardiovascular    CARDIAC CATHETERIZATION N/A 09/29/2019    Procedure: Stent SERENITY coronary;  Surgeon: Brant Martinez MD;  Location: [facility] CATH INVASIVE LOCATION;  Service: Cardiovascular    CARDIAC CATHETERIZATION N/A 02/25/2020    Procedure: Right Heart Cath;  Surgeon: Brant Martinez MD;  Location: [facility] CATH INVASIVE LOCATION;  Service: Cardiology;  Laterality: N/A;    CARDIAC CATHETERIZATION N/A 02/12/2021    Procedure: Left Heart Cath;  Surgeon: Brant Martinez MD;  Location: [facility] CATH INVASIVE LOCATION;  Service: Cardiology;  Laterality: N/A;    CARDIAC CATHETERIZATION N/A 02/12/2021     Procedure: Coronary angiography;  Surgeon: Brant Martinez MD;  Location: [facility] CATH INVASIVE LOCATION;  Service: Cardiology;  Laterality: N/A;    CARDIAC CATHETERIZATION N/A 02/12/2021    Procedure: Left ventriculography;  Surgeon: Brant Martinez MD;  Location: [facility] CATH INVASIVE LOCATION;  Service: Cardiology;  Laterality: N/A;    CARDIAC CATHETERIZATION N/A 02/12/2021    Procedure: Aortic root aortogram;  Surgeon: Brant Martinez MD;  Location: [facility] CATH INVASIVE LOCATION;  Service: Cardiology;  Laterality: N/A;    CARDIAC CATHETERIZATION N/A 02/12/2021    Procedure: Percutaneous Coronary Intervention;  Surgeon: Brant Martinez MD;  Location: [facility] CATH INVASIVE LOCATION;  Service: Cardiology;  Laterality: N/A;    CARDIAC CATHETERIZATION N/A 02/12/2021    Procedure: Stent SERENITY coronary;  Surgeon: Brant Martinez MD;  Location: [facility] CATH INVASIVE LOCATION;  Service: Cardiology;  Laterality: N/A;    CARDIAC ELECTROPHYSIOLOGY PROCEDURE  09/29/2019    Procedure: Impella Insertion;  Surgeon: Brant Martinez MD;  Location: [facility] CATH INVASIVE LOCATION;  Service: Cardiovascular    COLONOSCOPY N/A 2/5/2024    Procedure: COLONOSCOPY with polypectomy x 17;  Surgeon: Braulio Hinojosa MD;  Location: [facility] ENDOSCOPY;  Service: Gastroenterology;  Laterality: N/A;  colon polyps    CORONARY ANGIOPLASTY  02/12/2021    1x stent to Circ    CORONARY ANGIOPLASTY WITH STENT PLACEMENT  02/2021    [place] RT/LT HEART CATHETERS N/A 09/29/2019    Procedure: Percutaneous Coronary Intervention;  Surgeon: Brant Martinez MD;  Location: [facility] CATH INVASIVE LOCATION;  Service: Cardiovascular    TOTAL ABDOMINAL HYSTERECTOMY WITH SALPINGO OOPHORECTOMY  1995    Endometriosis     SUBJECTIVE:  Pt with acute exacerbation of chronic neck pain which has been ongoing and creating HA for the past two weeks. 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   Imaging: negative neck xray     OBJECTIVE:    AROM - 50% limited left SB and R rotation   PROM - AA rotation left limited, low cervical left SB limited   MMT UE - 4/5 grossly   SPECIAL TESTING   Spurlings - negative    Vertebral aa test  - negative    Compression - inc pain    Distraction - dec pain    Nerve glides  - negative   POSTURE - mild kyphosis   SENSATION - intact     ASSESSMENT:   Pt presents with a diagnosis of cervical facet arthropathy and has pain and decreased mobility that are limiting her ability to perform ADLs and work activities. She tolerated manual therapy well and was given HEP for muscle energy techniques for improved facet mobilization. Pt with good understanding.      Goals:   LTG 1: The patient will be independent in HEP in order to decrease pain and improve tolerance to functional activities.  STATUS: Met    Interventions:   Manual Therapy: suboccipital release, upglides / downglides upper and lower cervical spine, MET left AA rotation and left low cervical sidebending     Therapeutic Exercises: chin tuck, scap retraction with ER, lat pull down, self MET     Modalities: none      PLAN:  home with HEP       Time Calculation:   PT Evaluation Complexity  History, PT Evaluation Complexity: 1-2 personal factors and/or comorbidities  Examination of Body Systems (PT Eval Complexity): 1-2 elements  Clinical Presentation (PT Evaluation Complexity): stable  Clinical Decision Making (PT Evaluation Complexity): low complexity  Overall Complexity (PT Evaluation Complexity): low complexity     PT Charges       Row Name 12/05/24 1406             Time Calculation    Start Time 1241  -AD      Stop Time 1324  -AD      Time Calculation (min) 43 min  -AD      PT Received On 12/05/24  -AD         Time Calculation- PT    Total Timed Code Minutes- PT 0 minute(s)  -AD                User Key  (r) = Recorded By, (t) = Taken By, (c) = Cosigned By       Initials Name Provider Type    AD Marlena Funk, PT Physical Therapist                  Therapy Charges for Today       Code Description Service Date Service Provider Modifiers Qty    37659870765 HC PT EVAL LOW COMPLEXITY 4 12/5/2024 Marlena Funk, PT GP 1               PT Discharge Summary  Anticipated Discharge Disposition (PT): home    Marlena Funk, MALATHI  12/5/2024

## 2024-12-05 NOTE — PLAN OF CARE
ASSESSMENT:   Pt presents with a diagnosis of cervical facet arthropathy and has pain and decreased mobility that are limiting her ability to perform ADLs and work activities. She tolerated manual therapy well and was given HEP for muscle energy techniques for improved facet mobilization. Pt with good understanding.      Goals:   LTG 1: The patient will be independent in HEP in order to decrease pain and improve tolerance to functional activities.  STATUS: Met    Interventions:   Manual Therapy: suboccipital release, upglides / downglides upper and lower cervical spine, MET left AA rotation and left low cervical sidebending     Therapeutic Exercises: chin tuck, scap retraction with ER, lat pull down, self MET     Modalities: none      PLAN:  home with HEP

## 2024-12-05 NOTE — ED PROVIDER NOTES
Subjective   History of Present Illness  Chief complaint: Headache neck pain      Context: Patient is a 54-year-old female who comes in with complaints of neck pain and headache.  She states she has a history of chronic neck pain but has been worse over the last week without injury.  No new weakness paresthesias saddle anesthesia bowel or bladder incontinence or retention.  She states she is been seeing her chiropractor without relief.  She took some tramadol couple days ago.  Denies any prior surgeries.  No fever or systemic illness.  She states she had a head injury is a 11-year-old and has been having intermittent problems since then.        PCP: temo Martinez      Review of Systems   Constitutional:  Negative for fever.       Past Medical History:   Diagnosis Date    Abnormal ECG 9/2019    Absence of left thumb     Impression: hx of MRSA, she is signficantly improved She will continue meds and followup if sxs have not resolved over the next 2 weeks.. She is released from care and will notify us of any problems    Acute otitis media     Allergic rhinitis     Arthritis     neck    Cancer     skin    Cervical disc disease with myelopathy     CHF (congestive heart failure) 9/2019    Contact dermatitis or eczema     Coronary artery disease     Diabetes mellitus 9/2023    Disorder of bone and cartilage     Diverticulitis of colon     Impression: CT confirms in the sigmoid colon 10/2011    Diverticulosis of colon     Impression: No sxs 02/13/2013    Elevated cholesterol     Elevated lipoprotein(a) 04/30/2020    Elevated temperature     External otitis of left ear     Gastroesophageal reflux disease without esophagitis 06/19/2022    Hearing loss due to cerumen impaction, left     Hemangioma of liver     History of echocardiogram 10/03/2019    Severely reduced LV systolic function. EF 34% Distribution indicative of LAD territory injury. Akinesis of the apex as well as apical lateral and mid to apical septal walls.  Preservation of posterior inferior lateral walls. Mild AI, mild TR. Normal RV function.     History of transesophageal echocardiography (NAFISA) 01/07/2020    EF 55% LA cavity is mild to moderately dilated. Mild MR. Interatrial septum appears redundant. Interatrial hypermobile c/w aneurysm. Large PFO is noted with right to left shunting on bubble study. PFO tunnel appears to be short.     Hordeolum externum of left lower eyelid     Impression: She was started on Bactrim DS for her infection. She was also advised to use warm compression. She will followup should sxs not improve over the next 48 hrs and resolve over the next 1 weeek.    Hyperlipidemia     Impression: Diet controled. She should see improvement with her successful wt loss. We discussed her lipid panel.    Hypertension, benign     Impression: new onset Low salt low calorie diet and regular exercise and followup in 1 mo She will monitor her pressures and notify us of her pressures over the next 1 week.    Inclusion cyst of right breast     Impression: We will follow for now. She is encouraged to have Mammography. She is presently without insurance and reluctant. She bobby consider. She will notify us of any change at all in the lesion for the only way to be certain of it's etilogy is to remove it. She understands.    Insomnia, organic     Menopausal syndrome     Myocardial infarction 9/29/19    Overweight (BMI 25.0-29.9)     Pulmonary arterial hypertension 9/2019    RUQ abdominal pain     Impression: Resolving, negative GB u/s, HIDA EF 77%, we will follow    Stented coronary artery 09/29/2019    Tobacco use     Impression: She is strongly encouraged to stop smoking. Cessation techniques discussed and encouraged. The consequences of not stopping discussed, ie, CAD, PVD, Stroke, Lung and other cancers.       Allergies   Allergen Reactions    Penicillins Rash    Ciprofloxacin Rash    Penicillin G Rash       Past Surgical History:   Procedure Laterality Date     BIVENTRICULAR ASSIST DEVICE/LEFT VENTRICULAR ASSIST DEVICE INSERTION N/A 09/29/2019    Procedure: Left Ventricular Assist Device;  Surgeon: Brant Martinez MD;  Location: [facility] CATH INVASIVE LOCATION;  Service: Cardiovascular    CARDIAC CATHETERIZATION N/A 09/29/2019    Procedure: Left Heart Cath;  Surgeon: Brant Martinez MD;  Location: [facility] CATH INVASIVE LOCATION;  Service: Cardiovascular    CARDIAC CATHETERIZATION N/A 09/29/2019    Procedure: Coronary angiography;  Surgeon: Brant Martinez MD;  Location:  [facility] CATH INVASIVE LOCATION;  Service: Cardiovascular    CARDIAC CATHETERIZATION N/A 09/29/2019    Procedure: Left ventriculography;  Surgeon: Brant Martinez MD;  Location: [facility] CATH INVASIVE LOCATION;  Service: Cardiovascular    CARDIAC CATHETERIZATION N/A 09/29/2019    Procedure: Stent SERENITY coronary;  Surgeon: Brant Martinez MD;  Location: [facility] CATH INVASIVE LOCATION;  Service: Cardiovascular    CARDIAC CATHETERIZATION N/A 02/25/2020    Procedure: Right Heart Cath;  Surgeon: Brant Martinez MD;  Location: [facility] CATH INVASIVE LOCATION;  Service: Cardiology;  Laterality: N/A;    CARDIAC CATHETERIZATION N/A 02/12/2021    Procedure: Left Heart Cath;  Surgeon: Brant Martinez MD;  Location: [facility] CATH INVASIVE LOCATION;  Service: Cardiology;  Laterality: N/A;    CARDIAC CATHETERIZATION N/A 02/12/2021    Procedure: Coronary angiography;  Surgeon: Brant Martinez MD;  Location: [facility] CATH INVASIVE LOCATION;  Service: Cardiology;  Laterality: N/A;    CARDIAC CATHETERIZATION N/A 02/12/2021    Procedure: Left ventriculography;  Surgeon: Brant Martinez MD;  Location: [facility] CATH INVASIVE LOCATION;  Service: Cardiology;  Laterality: N/A;    CARDIAC CATHETERIZATION N/A 02/12/2021    Procedure: Aortic root aortogram;  Surgeon: Brant Martinez MD;  Location: [facility] CATH INVASIVE LOCATION;  Service: Cardiology;  Laterality:  N/A;    CARDIAC CATHETERIZATION N/A 02/12/2021    Procedure: Percutaneous Coronary Intervention;  Surgeon: Brant Martinez MD;  Location: [facility] CATH INVASIVE LOCATION;  Service: Cardiology;  Laterality: N/A;    CARDIAC CATHETERIZATION N/A 02/12/2021    Procedure: Stent SERENITY coronary;  Surgeon: Brant Martinez MD;  Location: [facility] CATH INVASIVE LOCATION;  Service: Cardiology;  Laterality: N/A;    CARDIAC ELECTROPHYSIOLOGY PROCEDURE  09/29/2019    Procedure: Impella Insertion;  Surgeon: Brant Martinez MD;  Location: [facility] CATH INVASIVE LOCATION;  Service: Cardiovascular    COLONOSCOPY N/A 2/5/2024    Procedure: COLONOSCOPY with polypectomy x 17;  Surgeon: Braulio Hinojosa MD;  Location: [facility] ENDOSCOPY;  Service: Gastroenterology;  Laterality: N/A;  colon polyps    CORONARY ANGIOPLASTY  02/12/2021    1x stent to Circ    CORONARY ANGIOPLASTY WITH STENT PLACEMENT  02/2021    MD RT/LT HEART CATHETERS N/A 09/29/2019    Procedure: Percutaneous Coronary Intervention;  Surgeon: Brant Martinez MD;  Location: [facility] CATH INVASIVE LOCATION;  Service: Cardiovascular    TOTAL ABDOMINAL HYSTERECTOMY WITH SALPINGO OOPHORECTOMY  1995    Endometriosis       Family History   Problem Relation Age of Onset    Alzheimer's disease Mother     Heart disease Mother         cardiovascular    Kidney disease Mother     Throat cancer Mother     Thyroid disease Mother     Diabetes Father         mellitus    Heart attack Father     Thyroid disease Father     Hypertension Father     Heart disease Sister         cardiovascular    Diabetes Maternal Aunt         mellitus    Heart disease Maternal Aunt         cardiovascular    Breast cancer Maternal Aunt     Cancer Maternal Aunt         breast    Diabetes Maternal Uncle         mellitus    Alzheimer's disease Maternal Uncle     Diabetes Maternal Grandmother         mellitus    Pancreatic cancer Maternal Grandmother     Kidney disease Paternal Uncle         Social History     Socioeconomic History    Marital status:    Tobacco Use    Smoking status: Former     Current packs/day: 0.00     Average packs/day: 1 pack/day for 34.7 years (34.7 ttl pk-yrs)     Types: Cigarettes     Start date: 1985     Quit date: 2019     Years since quittin.1     Passive exposure: Current    Smokeless tobacco: Never    Tobacco comments:     States she is quitting as of 2019.   Vaping Use    Vaping status: Never Used    Passive vaping exposure: Yes   Substance and Sexual Activity    Alcohol use: Yes     Alcohol/week: 6.0 standard drinks of alcohol     Types: 6 Cans of beer per week     Comment: occassionally     Drug use: Not Currently     Types: Marijuana     Comment: occasional    Sexual activity: Yes     Partners: Male     Birth control/protection: Post-menopausal, Hysterectomy           Objective   Physical Exam    Vital signs and triage nurse note reviewed.  Constitutional: Awake, alert; well-developed and well-nourished.  Uncomfortable.  Nontoxic in appearance  HEENT: Normocephalic, atraumatic; pupils are PERRL with intact EOM; oropharynx is pink and moist without exudate or erythema.  Neck: Supple, full range of motion elicits pain.  No crepitus.  No bony step-off crepitus obvious signs of trauma or infection.  Pain over the bilateral traps and SCM's.  Cardiovascular: Regular rate and rhythm, normal S1-S2.  Pulmonary: Respiratory effort regular nonlabored, breath sounds clear to auscultation all fields.  Abdomen: Soft, nontender nondistended with normoactive bowel sounds; no rebound or guarding.  Musculoskeletal: Independent range of motion of all extremities with no palpable tenderness or edema.  Neuro: Alert oriented x3, speech is clear and appropriate, GCS 15  Skin:  Fleshtone warm, dry, intact; no erythematous or petechial rash or lesion      Procedures           ED Course        Labs Reviewed   CBC WITH AUTO DIFFERENTIAL - Abnormal; Notable for the  following components:       Result Value    RDW 12.1 (*)     Lymphocyte % 18.4 (*)     Immature Grans % 0.8 (*)     Immature Grans, Absolute 0.07 (*)     All other components within normal limits   BASIC METABOLIC PANEL - Normal    Narrative:     GFR Normal >60  Chronic Kidney Disease <60  Kidney Failure <15     SINGLE HS TROPONIN T - Normal    Narrative:     High Sensitive Troponin T Reference Range:  <14.0 ng/L- Negative Female for AMI  <22.0 ng/L- Negative Male for AMI  >=14 - Abnormal Female indicating possible myocardial injury.  >=22 - Abnormal Male indicating possible myocardial injury.   Clinicians would have to utilize clinical acumen, EKG, Troponin, and serial changes to determine if it is an Acute Myocardial Infarction or myocardial injury due to an underlying chronic condition.        CBC AND DIFFERENTIAL    Narrative:     The following orders were created for panel order CBC & Differential.  Procedure                               Abnormality         Status                     ---------                               -----------         ------                     CBC Auto Differential[[id number]]        Abnormal            Final result                 Please view results for these tests on the individual orders.   EXTRA TUBES    Narrative:     The following orders were created for panel order Extra Tubes.  Procedure                               Abnormality         Status                     ---------                               -----------         ------                     Gold Top - SST[[id number]]                                   Final result                 Please view results for these tests on the individual orders.   GOLD TOP - SST     Medications   sodium chloride 0.9 % flush 10 mL (has no administration in time range)   HYDROcodone-acetaminophen (NORCO) 5-325 MG per tablet 1 tablet (has no administration in time range)   orphenadrine (NORFLEX) injection 60 mg (60 mg Intramuscular Given 12/5/24  1250)       XR Spine Cervical Complete 4 or 5 View    Result Date: 12/5/2024  Impression: 1. Negative for acute osseous abnormality. 2. Multilevel cervical spondylosis above. Electronically Signed: Joseluis Cota MD  12/5/2024 12:37 PM EST  Workstation ID: [id number]   Prior to Admission medications    Medication Sig Start Date End Date Taking? Authorizing Provider   acetaminophen (TYLENOL) 325 MG tablet Take 2 tablets by mouth Every 6 (Six) Hours As Needed for Mild Pain.    Al Shannon MD   aspirin 81 MG EC tablet Take 1 tablet by mouth Daily. 5/23/24   Yodit Johnston MD   cetirizine (zyrTEC) 10 MG tablet Take 1 tablet by mouth Daily.    Al Shannon MD   cholecalciferol (VITAMIN D3) 1.25 MG (74608 UT) capsule Take 1 capsule by mouth Daily.    Al Shannon MD   clopidogrel (PLAVIX) 75 MG tablet TAKE 1 TABLET BY MOUTH EVERY DAY 12/20/23   Brant Martinez MD   ezetimibe (ZETIA) 10 MG tablet TAKE 1 TABLET BY MOUTH EVERY DAY 9/27/24   Brant Martinez MD   furosemide (LASIX) 40 MG tablet Take 1 tablet by mouth Every Morning.    Al Shannon MD   furosemide (LASIX) 40 MG tablet Take 1 tablet by mouth Daily.    Al Shannon MD   furosemide (LASIX) 40 MG tablet Take 1 tablet by mouth As Needed. As needed for swelling after first dose    Al Shannon MD   HYDROcodone-acetaminophen (Norco) 7.5-325 MG per tablet Take 1-2 tablets by mouth Every 6 (Six) Hours As Needed for Moderate Pain. 12/5/24   Rajani Manzano APRN   lisinopril (PRINIVIL,ZESTRIL) 5 MG tablet Take 2 tablets by mouth Daily.    Al Shannon MD   metFORMIN (GLUCOPHAGE) 500 MG tablet TAKE 1 TABLET BY MOUTH EVERY DAY WITH BREAKFAST 10/11/24   Luis Madrid MD   methylPREDNISolone (MEDROL) 4 MG dose pack Take as directed on package instructions. 12/5/24   Rajani Manzano APRN   metoprolol succinate XL (TOPROL-XL) 25 MG 24 hr tablet Take 1 tablet by mouth Daily. 10/11/24    "Brant Martinez MD   naloxone (NARCAN) 4 MG/0.1ML nasal spray Call 911. Don't prime. [place] in 1 nostril for overdose. Repeat in 2-3 minutes in other nostril if no or minimal breathing/responsiveness. 12/5/24   Rajani Manzano APRN   nitroglycerin (NITROSTAT) 0.4 MG SL tablet Place 1 tablet under the tongue Every 5 (Five) Minutes As Needed for Chest Pain. Take no more than 3 doses in 15 minutes. 5/15/24   Brant Martinez MD   omeprazole (priLOSEC) 20 MG capsule Take 1 capsule by mouth Daily. 6/28/24   Yodit Johnston MD   Probiotic Product (PROBIOTIC BLEND PO) Take 1 capsule by mouth Daily.    Provider, MD Al   vitamin B-12 (CYANOCOBALAMIN) 500 MCG tablet Take 1 tablet by mouth Daily.    Provider, MD Al                                                    Medical Decision Making      /82   Pulse 63   Temp 96.8 °F (36 °C) (Temporal)   Resp 20   Ht 165.1 cm (65\")   Wt 85.5 kg (188 lb 7.9 oz)   LMP 01/01/1996 (Approximate)   SpO2 95%   BMI 31.37 kg/m²      Inspect reviewed    Radiology interpretation:  X-rays reviewed and interpreted by cheryl: Cervical lordosis  Further interpretation by radiologist as above  Lab interpretation:  Labs all viewed by me and significant for, troponin negative, hemoglobin 13.9, glucose 95    EKG viewed and interpreted by Dr. Ross, sinus rhythm rate of 68 without acute ST depression or elevation QTc 417. Nonspecific anterolateral  comparison: 10/3/2023 sinus rhythm rate of 62            Appropriate PPE worn during exam.  Discussed care with: Patient was seen by Walt RANDOLPH while in the ER    Patient had labs EKG obtained per triage protocol.  Her symptoms sound MSK in etiology although she was concerned as she has a heart history. She states her jaw pain today was reproducible with head turning; no diaphoresis/nausea/chest pressure or soa. Troponins are negative and she has no findings consistent with classic anginal symptoms.  Her neck " pain and headache are reproducible.  No meningismus.  No findings consistent with cauda equina or epidural abscess.  She was given Norflex and [place] in the ER.  She was seen by physical therapy and will be referred to spine and pain management.  On reexam she resting comfortably no acute distress.      i discussed findings with patient who voices understanding of discharge instructions, signs and symptoms requiring return to ED; discharged improved and in stable condition with follow up for re-evaluation.  This document is intended for medical expert use only. Reading of this document by patients and/or patient's family without participating medical staff guidance may result in misinterpretation and unintended morbidity.  Any interpretation of such data is the responsibility of the patient and/or family member responsible for the patient in concert with their primary or specialist providers, not to be left for sources of online searches such as [employer], [employer] or similar queries. Relying on these approaches to knowledge may result in misinterpretation, misguided goals of care and even death should patients or family members try recommendations outside of the realm of professional medical care in a supervised inpatient environment.         Problems Addressed:  Cervicogenic headache: complicated acute illness or injury  Neck pain: complicated acute illness or injury    Amount and/or Complexity of Data Reviewed  Labs: ordered.  Radiology: ordered.    Risk  Prescription drug management.        Final diagnoses:   Neck pain   Cervicogenic headache       ED Disposition  ED Disposition       ED Disposition   Discharge    Condition   Stable    Comment   --               Luis Madrid MD  [address] [place] [address] Dr. CHOWDHURY  [facility] [address]  [place] [address]  [phone]          Amaury Liu IV, MD  [address] [place] [address] [phone]          [facility]  [address] [facility]  [address] [phone]             Medication List        New Prescriptions      HYDROcodone-acetaminophen 7.5-325 MG per tablet  Commonly known as: Norco  Take 1-2 tablets by mouth Every 6 (Six) Hours As Needed for Moderate Pain.     methylPREDNISolone 4 MG dose pack  Commonly known as: MEDROL  Take as directed on package instructions.     naloxone 4 MG/0.1ML nasal spray  Commonly known as: NARCAN  Call 911. Don't prime. [place] in 1 nostril for overdose. Repeat in 2-3 minutes in other nostril if no or minimal breathing/responsiveness.               Where to Get Your Medications        These medications were sent to [facility] #[id number] - [place], [place] - [address] [facility] - [phone]  - [phone] FX  [address] [facility] [place] [address]      Phone: [phone]   HYDROcodone-acetaminophen 7.5-325 MG per tablet  methylPREDNISolone 4 MG dose pack  naloxone 4 MG/0.1ML nasal spray            Rajani Manzano, DARIEL  12/05/24 1436       Rajani Manzano, DARIEL  12/05/24 1441

## 2024-12-06 DIAGNOSIS — E11.65 TYPE 2 DIABETES MELLITUS WITH HYPERGLYCEMIA, WITHOUT LONG-TERM CURRENT USE OF INSULIN: ICD-10-CM

## 2024-12-06 DIAGNOSIS — E66.811 CLASS 1 OBESITY DUE TO EXCESS CALORIES WITH SERIOUS COMORBIDITY AND BODY MASS INDEX (BMI) OF 32.0 TO 32.9 IN ADULT: ICD-10-CM

## 2024-12-06 DIAGNOSIS — E66.09 CLASS 1 OBESITY DUE TO EXCESS CALORIES WITH SERIOUS COMORBIDITY AND BODY MASS INDEX (BMI) OF 32.0 TO 32.9 IN ADULT: ICD-10-CM

## 2024-12-06 LAB
QT INTERVAL: 392 MS
QTC INTERVAL: 417 MS

## 2024-12-06 RX ORDER — SEMAGLUTIDE 1.34 MG/ML
0.5 INJECTION, SOLUTION SUBCUTANEOUS WEEKLY
Qty: 1.5 ML | Refills: 1 | Status: SHIPPED | OUTPATIENT
Start: 2024-12-06 | End: 2025-01-31

## 2024-12-09 ENCOUNTER — OFFICE VISIT (OUTPATIENT)
Dept: FAMILY MEDICINE CLINIC | Facility: CLINIC | Age: 54
End: 2024-12-09
Payer: MEDICAID

## 2024-12-09 VITALS
WEIGHT: 186.38 LBS | DIASTOLIC BLOOD PRESSURE: 62 MMHG | HEIGHT: 65 IN | RESPIRATION RATE: 18 BRPM | OXYGEN SATURATION: 98 % | SYSTOLIC BLOOD PRESSURE: 134 MMHG | BODY MASS INDEX: 31.05 KG/M2 | TEMPERATURE: 98.2 F | HEART RATE: 83 BPM

## 2024-12-09 DIAGNOSIS — M54.10 RADICULOPATHY AFFECTING UPPER EXTREMITY: ICD-10-CM

## 2024-12-09 DIAGNOSIS — M50.00 CERVICAL DISC DISEASE WITH MYELOPATHY: Primary | ICD-10-CM

## 2024-12-09 PROCEDURE — 1125F AMNT PAIN NOTED PAIN PRSNT: CPT

## 2024-12-09 PROCEDURE — 3051F HG A1C>EQUAL 7.0%<8.0%: CPT

## 2024-12-09 PROCEDURE — 3075F SYST BP GE 130 - 139MM HG: CPT

## 2024-12-09 PROCEDURE — 3078F DIAST BP <80 MM HG: CPT

## 2024-12-09 PROCEDURE — 99214 OFFICE O/P EST MOD 30 MIN: CPT

## 2024-12-09 RX ORDER — GABAPENTIN 300 MG/1
CAPSULE ORAL
Qty: 90 CAPSULE | Refills: 0 | Status: SHIPPED | OUTPATIENT
Start: 2024-12-09 | End: 2025-01-09

## 2024-12-09 NOTE — PROGRESS NOTES
"Chief Complaint  Neck Pain    Subjective          Sada Le presents to [facility] for     HPI    Neck pain, was seen at ER on 12/05/2024. Steroids and hydrocodone/acetaminophen 5-325 were given for treatment from the ER on 12/05/2024. Patient reports that neck pain has been present for 3 months. Currently seeing chiropractor for symptoms.   She states she had a head injury is a 11-year-old and has been having intermittent problems since then.     Pain has improved somewhat, currently rates it 6/10.  Bilateral neck.  Accompanying stiffness.  Radiates through bilateral posterior shoulders.  Numbness and tingling in fingers in bilateral hands, primarily 2nd and 3rd digits on dorsal surface.  Has had this pain chronically for over 40 years. Most recent exacerbation started 3 weeks ago.  Steroids and Norco from ED have helped a bit.    Objective   Vital Signs:   /62 (BP Location: Right arm, Patient Position: Sitting, Cuff Size: Adult)   Pulse 83   Temp 98.2 °F (36.8 °C) (Temporal)   Resp 18   Ht 165.1 cm (65\")   Wt 84.5 kg (186 lb 6 oz)   SpO2 98% Comment: room air  BMI 31.01 kg/m²     Physical Exam  Vitals and nursing note reviewed.   Constitutional:       General: She is not in acute distress.     Appearance: Normal appearance. She is not ill-appearing or diaphoretic.   HENT:      Head: Normocephalic and atraumatic.      Nose: No congestion or rhinorrhea.   Eyes:      Extraocular Movements: Extraocular movements intact.      Pupils: Pupils are equal, round, and reactive to light.   Cardiovascular:      Rate and Rhythm: Normal rate and regular rhythm.      Pulses: Normal pulses.   Pulmonary:      Effort: Pulmonary effort is normal.      Breath sounds: Normal breath sounds.   Musculoskeletal:         General: No swelling, tenderness or deformity. Normal range of motion.      Cervical back: Normal range of motion and neck supple. No tenderness.   Skin:     General: " Skin is warm and dry.      Capillary Refill: Capillary refill takes less than 2 seconds.   Neurological:      Mental Status: She is alert and oriented to person, place, and time.      Sensory: No sensory deficit.      Motor: No weakness.      Comments: CNs intact.  Strength 5/5 in bilateral upper extremities.  Pain with resisted head rotation, L > R.   Psychiatric:         Mood and Affect: Mood normal.         Behavior: Behavior normal.        Result Review :                 Assessment and Plan    Diagnoses and all orders for this visit:    1. Cervical disc disease with myelopathy (Primary)  -     MRI Cervical Spine Without Contrast; Future  -     gabapentin (NEURONTIN) 300 MG capsule; Take 1 capsule by mouth Daily for 1 day, THEN 1 capsule 2 (Two) Times a Day for 1 day, THEN 1 capsule 3 (Three) Times a Day for 29 days.  Dispense: 90 capsule; Refill: 0    2. Radiculopathy affecting upper extremity  -     MRI Cervical Spine Without Contrast; Future    Known cervical disc disease. Now experiencing worsening pain and radiculopathy. We will evaluate further with an MRI of the c-spine. Depending on findings, referral either to neurosurgery/ortho-spine or pain management. In the meantime, we will trial gabapentin as above.        Luis Madrid MD    NOTE: [employer], per Health Information accessibility laws, allows progress notes to be visible to patients. Please note that these notes will include professional medical terminology that may be somewhat confusing without some interpretation from your medical professional team. The intent of progress notes is to communicate information between any medical professionals involved in your case, or to serve as future reference for myself or any other provider when reviewing your case, as well as a reference for the patient viewing the record. Please ask a member of the medical team if you have any questions about terminology or content of note.    DISCLAIMER: This note was  transcribed using a Dragon Medical voice recognition system. It may contain mistakes that may have not been recognized during proofreading. This note was also amended on the date signed due to recognized errors.

## 2024-12-09 NOTE — ASSESSMENT & PLAN NOTE
Patient's (Body mass index is 31.01 kg/m².) indicates that they are obese (BMI >30) with health conditions that include hypertension, coronary heart disease, dyslipidemias, and GERD . Weight is unchanged. BMI  is above average; BMI management plan is completed. We discussed portion control and increasing exercise.

## 2024-12-16 ENCOUNTER — OFFICE VISIT (OUTPATIENT)
Dept: CARDIOLOGY | Facility: CLINIC | Age: 54
End: 2024-12-16
Payer: MEDICAID

## 2024-12-16 VITALS
SYSTOLIC BLOOD PRESSURE: 123 MMHG | RESPIRATION RATE: 18 BRPM | WEIGHT: 182.4 LBS | HEART RATE: 89 BPM | HEIGHT: 65 IN | BODY MASS INDEX: 30.39 KG/M2 | DIASTOLIC BLOOD PRESSURE: 83 MMHG | OXYGEN SATURATION: 96 %

## 2024-12-16 DIAGNOSIS — I10 ESSENTIAL (PRIMARY) HYPERTENSION: Primary | ICD-10-CM

## 2024-12-16 DIAGNOSIS — E78.41 ELEVATED LIPOPROTEIN(A): ICD-10-CM

## 2024-12-16 DIAGNOSIS — I10 HYPERTENSION, BENIGN: ICD-10-CM

## 2024-12-16 DIAGNOSIS — I25.118 ATHEROSCLEROTIC HEART DISEASE OF NATIVE CORONARY ARTERY WITH OTHER FORMS OF ANGINA PECTORIS: ICD-10-CM

## 2024-12-16 DIAGNOSIS — I71.20 THORACIC AORTIC ANEURYSM WITHOUT RUPTURE, UNSPECIFIED PART: ICD-10-CM

## 2024-12-16 DIAGNOSIS — I25.118 CORONARY ARTERY DISEASE OF NATIVE ARTERY OF NATIVE HEART WITH STABLE ANGINA PECTORIS: ICD-10-CM

## 2024-12-16 PROCEDURE — 3074F SYST BP LT 130 MM HG: CPT | Performed by: INTERNAL MEDICINE

## 2024-12-16 PROCEDURE — 3079F DIAST BP 80-89 MM HG: CPT | Performed by: INTERNAL MEDICINE

## 2024-12-16 PROCEDURE — 99214 OFFICE O/P EST MOD 30 MIN: CPT | Performed by: INTERNAL MEDICINE

## 2024-12-16 RX ORDER — METOPROLOL SUCCINATE 50 MG/1
50 TABLET, EXTENDED RELEASE ORAL DAILY
Qty: 90 TABLET | Refills: 3 | Status: SHIPPED | OUTPATIENT
Start: 2024-12-16

## 2024-12-16 RX ORDER — LISINOPRIL 10 MG/1
10 TABLET ORAL DAILY
Qty: 90 TABLET | Refills: 3 | Status: SHIPPED | OUTPATIENT
Start: 2024-12-16

## 2024-12-16 NOTE — PROGRESS NOTES
Cardiology Clinic Note  Brant Martinez MD, PhD    Subjective:     Encounter Date:12/16/2024      Patient ID: Sada Le is a 54 y.o. female.    Chief Complaint:  Chief Complaint   Patient presents with    Follow-up     Annual       HPI:         PreviouslyI the pleasure to see this patient who is a 54-year-old female who is well-known to me from prior encounters.  She has a history of anterior ST elevation myocardial infarction 2019 with complete occlusion of the proximal LAD under going revascularization with Xience 3.5 x 33 mm drug-eluting stent postdilated to 4.5 mm at 18 familia with good results.  This was an Impella supported procedure given compensated cardiogenic shock in the setting of acute MI which was complicated by VF arrest with successful defibrillation at that time.  She did well with LV unloading/weaning Impella support over 2 days and institution of max goal-directed medical therapy ultimately being discharged to home.  We were very aggressive with her medical therapy as well as antiplatelets and her EF improved from around 28 to 30% to 55% with only ever so slight anterior wall very mild hypokinesis residually. She had nonobstructive disease in the circumflex and RCA as well as mid to distal LAD.  She was changed secondary to shortness of breath from Brilinta to Effient which she is maintained on aspirin and Plavix currently., she is on high intensity statin therapy as well as Aldactone and long-acting beta-blocker and loop diuretics 40 twice daily of Lasix.  She is on ACE inhibitor at moderate to high intensity as well tolerating this just fine without cough.  2022 subsequent noninvasive evaluation with treadmill stress test she walked greater than 9 minutes with no EKG changes and she had normal myocardial perfusion at rest and stress.  she continued ultimately to have some chest discomfort which she expressed along with more fatigue and ultimately we made a decision to proceed with diagnostic  left heart catheterization getting PCI to the ostial proximal circumflex, 3.0 x 18 Xience postdilated to 3.9 mm in the proximal ostial portion and 3.75 mm distally with good angiographic results and reduction of stenosis from 80% ulcerated appearing plaque to 0% residual.        Today on repeat encounter she is doing very well no anginal chest pain shortness of breath heart failure signs or symptoms PND orthopnea.  She is compliant with all of her medicines continuing on dual antiplatelet therapy with LAD and circumflex stents in the past 5 years. She continues on lisinopril and metoprolol, who went to class I CCS class I.  She remains under surveillance for aortic root enlargement at 4.8 2024, she needs repeat CT in this year with enlargement from 4.2 to 4.8 over the last 24 months.    Historical data copied forward from previous encounters in EMR is unchanged  Review of systems otherwise negative x 14 point review of systems except as mentioned above    Exam  Reviewed below  Regular rate and rhythm no rubs murmurs gallops  No clubbing cyanosis or edema  Vitals reviewed below blood pressure 110 120 systolic heart rates in the 70s  Normal pulses normal cap refill  Intact grossly  Soft nontender nondistended  Clear to auscultation bilateral  No carotid bruits or JVD  Normal cephalic atraumatic pupils are ground  Change from prior     Assessment plan  Independently managed and assessed medical today  Obstructive coronary disease status post intervention, LAD and circumflex  Essential hypertension hyperlipidemia  Obesity  History of ischemic cardiomyopathy  History of ST elevation MI, LAD  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, 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 increased to 4.8 in 2024,, repeat CT pending  Atypical chest pain     PCI to the LAD 2019 and circumflex 2022  Obstructive disease in circumflex status post revascularization and stenting as above and below    Continue dual antiplatelet therapy with aspirin and  "Plavix  High intensity statin therapy as tolerated  Beta-blocker on board heart rate 60s, increase Toprol-XL 50 daily with heart rates in the 70s to 80s with aortic root enlargement  Continue lisinopril to 10 daily  Continue Lasix to 40 daily  Remain off Aldactone, NYHA class I  Diet and exercise per AHA guidelines  Goal blood pressure less than 130 systolic with aortic root enlargement  Statin therapy she has tried multiple agents and there are giving her severe myalgias and muscle pains she is down to taking them every other day or even not at all given her otherwise symptomatology.    Zetia is on board at 10 mg daily  We will evaluate with PCSK9 [place] has failed 4 statin medications including atorvastatin, rosuvastatin, pravastatin and simvastatin   Goal LDL less than 70 optimally less than 55 with history of LAD stenting and PCI     Recurrent diverticulitis with abscess October 2023  Okay for GI surgery if needed and she can stop Plavix at any time as she is greater than 3 years out from LAD PCI, does not need ischemic evaluation prior to urgent surgery she is asymptomatic and highly exertional on a daily basis        Reorder CTA of the chest for thoracic enlargement at 4.8 sinus of Valsalva February 2023 for staging and surveillance, thoracic ascending aortic aneurysm without rupture, 4.0 in the arch at that time as well  continue afterload reduction and beta-blockers        return to clinic 1 year, sooner.    Referred to aortic clinic with CV surgery as well    Neck indications    Objective:         /83 (BP Location: Right arm, Patient Position: Sitting, Cuff Size: Large Adult)   Pulse 89   Resp 18   Ht 165.1 cm (65\")   Wt 82.7 kg (182 lb 6.4 oz)   LMP 01/01/1996 (Approximate)   SpO2 96%   BMI 30.35 kg/m²     Physical Exam    Assessment:         There are no diagnoses linked to this encounter.       Plan:              The pleasure to be involved in this patient's cardiovascular care.  Please " call with any questions or concerns  Brant Martinez MD, PhD    Most recent EKG as reviewed and interpreted by me:  Procedures     Most recent echo as reviewed and interpreted by me:  Results for orders placed during the hospital encounter of 10/02/23    Adult Transthoracic Echo Complete W/ Cont if Necessary Per Protocol    Interpretation Summary  Normal left and right ventricular size and systolic function  Indeterminate left ventricular diastolic function  Normal biatrial size  Aortic valve sclerosis without stenosis  Mild to moderate central aortic regurgitation  Mildly dilated ascending aorta.      Most recent stress test as reviewed and interpreted by me:  Results for orders placed during the hospital encounter of 03/16/22    Stress Test With Myocardial Perfusion One Day    Interpretation Summary  · Left ventricular ejection fraction is normal. (Calculated EF = 68%).  · Myocardial perfusion imaging indicates a small-sized, mildly severe area of ischemia located in the lateral wall.  · Impressions are consistent with an intermediate risk study.  · There is no prior study available for comparison. Some different score of only 2 Small area of mild reversibility in the apical lateral versus mid lateral segments Normal EF by gated imaging 68%.  · Findings consistent with an equivocal ECG stress test.    Abnormal study  Intermediate risk study  Positive symptoms during the study with some chest heaviness  Biphasic T waves in the anterolateral leads but nonspecific finding from baseline  Normal EF 68%  Small area of mild reversibility in the lateral wall to apical lateral      Most recent cardiac catheterization as reviewed interpreted by me:  Results for orders placed during the hospital encounter of 02/12/21    Cardiac Catheterization/Vascular Study    Narrative  Cardiac cath note  Brant Martinez MD, PhD  Date of service 2-  [facility] cardiology    Procedure  1.  Left heart catheterization with coronary  angiography left ventriculography in ANTON position  2.  Percutaneous coronary mention to the ostial proximal circumflex with 3.0 x 18 mm Xience drug-eluting stent postdilated to 3.9 mm proximally, 3.75 mm distally with good angiographic results    Lesion information  Ostial 50%, mid 70% with hazy appearance of the proximal circumflex, ulcerated plaque angiographically with daily anginal chest pain  Reduced to 0% residual stenosis  JANE-3 flow pre and post    After informed consent the patient was brought to the catheterization lab sterilely prepped and draped in usual fashion with exposure the right groin for right common femoral arterial access via micropuncture modified standard technique with placement of a 6 [place] sheath under fluoroscopic guidance which was aspirated flushed with heparinized saline after 1% lidocaine analgesia.  An 035 guidewire was advanced to level aortic valve followed by diagnostic left heart catheterization with JL4 and JR4 6 Green[place]ic catheters.  A pigtail catheter was used to perform left ventriculography, EDP measurement and pullback assessment the transaortic valve gradient.  Secondary to findings of coronary disease in the ostial proximal circumflex decision was made to intervene given recurrent anginal chest pain, ulcerated appearance of the proximal circumflex.  Lesion was predilated after run-through wire to the obtuse marginal with 2.5 x 20 mm NC at 18 familia followed by stenting with 3.0 x 18 deployed at 18 familia at 3.25 followed by postdilatation with with 3.5 x 15 up to 1618 familia and then 3.75 x 15 NC balloon at the 14 familia at the distal edge and 16 familia at the proximal edge of 3.9 mm with good angiographic results.  There was no compromise of the LAD flow.  A wire directed down the LAD at the conclusion the case had a balloon that freely passed and there was no stent impingement from the left main to the LAD.  JANE-3 flow was maintained pre and post.  Lesion reduced from 70% to 0%  residual.  Heparin was used to maintain ACT greater than 250 throughout the entire to the case.  Patient was already on aspirin and Effient but was given additional 10 mg of Effient on the table.  Right common femoral arteriotomy was closed by manual pressure once ACT less than 175.  She had no complications.  She left the Cath Lab chest pain-free hemodynamically electrically stable alert talking to staff neurologically grossly intact bilaterally    Moderate conscious sedation of Versed and fentanyl administered by registered nurse with complete ECG pulse oximetry and hemodynamic monitoring throughout the entirety the case observed by me  Complications none  Blood loss less than 5 cc  Contrast 180 cc including diagnostic portion    Findings  1.  Opening aortic pressure of 114/66 with a mean of 86  2.  LV pressure 108 with an EDP of 2 to 5 mmHg  Closing pressure 111/66 with a mean of 85  EDP 6  No significant transaortic valve gradient    Angiography  1.  Left main large caliber vessel no angiographic disease  2.  LAD large caliber vessel with widely patent stent in the proximal portion but diffusely diseased mid to distal LAD with eccentric plaque likely 2.5 mm in diameter from the bifurcation of the diagonal all the way around the apex with again 30 to 40% luminal irregularities but no significant flow-limiting stenosis.,  Diagonal branch with no obstructive disease only mild luminal regularities  3.  Circumflex nondominant with large obtuse marginal branch.  There is a luminal filling defect right at the ostium concerning for nonocclusive thrombus versus eccentric plaque at 50%, immediately distal is a hazy eccentric ulcerated plaque 75% followed by bifurcation with continuation circumflex and obtuse marginal branch which has no disease.  There is JANE-3 flow throughout  4.  RCA small nondominant no angiographic disease, small PDA branch distally not amenable to any stents given caliber    Conclusions and  recommendations  1 two-vessel coronary disease, widely patent stent in the proximal LAD with diffuse mid to distal LAD stenosis but nonobstructive, ostial proximal circumflex disease of questionable significance however angiographic appearance of significant ulceration, concern for thrombus status post intervention as described above  2.  Successful intervention as described above with balloon angioplasty and stenting of the ostial proximal circumflex postdilated to 3.9 mm at the ostium and 3.75 mm distally with good angiographic results  3.  Continue antiplatelet therapy with aspirin and Effient  4.  Decrease Lasix with low LVEDP, hold ACE inhibitor  5 LV function appears 50 to 55% by LV gram  6.  No transaortic valve gradient  7.,  Mid to observation, likely home tomorrow which discharge criteria met  8.  After discharge follow-up with cardiology in 2 to 4 weeks  \  It is a pleasure be involved in her cardiovascular care.  Please call with any questions or concerns  Brant Martinez MD, PhD    The following portions of the patient's history were reviewed and updated as appropriate: allergies, current medications, past family history, past medical history, past social history, past surgical history, and problem list.      ROS:  14 point review of systems negative except as mentioned above    Current Outpatient Medications:     acetaminophen (TYLENOL) 325 MG tablet, Take 2 tablets by mouth Every 6 (Six) Hours As Needed for Mild Pain., Disp: , Rfl:     aspirin 81 MG EC tablet, Take 1 tablet by mouth Daily., Disp: 90 tablet, Rfl: 3    cetirizine (zyrTEC) 10 MG tablet, Take 1 tablet by mouth Daily., Disp: , Rfl:     cholecalciferol (VITAMIN D3) 1.25 MG (86159 UT) capsule, Take 1 capsule by mouth Daily., Disp: , Rfl:     clopidogrel (PLAVIX) 75 MG tablet, TAKE 1 TABLET BY MOUTH EVERY DAY, Disp: 90 tablet, Rfl: 3    ezetimibe (ZETIA) 10 MG tablet, TAKE 1 TABLET BY MOUTH EVERY DAY, Disp: 90 tablet, Rfl: 3    furosemide  (LASIX) 40 MG tablet, Take 1 tablet by mouth As Needed. As needed for swelling after first dose, Disp: , Rfl:     gabapentin (NEURONTIN) 300 MG capsule, Take 1 capsule by mouth Daily for 1 day, THEN 1 capsule 2 (Two) Times a Day for 1 day, THEN 1 capsule 3 (Three) Times a Day for 29 days., Disp: 90 capsule, Rfl: 0    lisinopril (PRINIVIL,ZESTRIL) 5 MG tablet, Take 2 tablets by mouth Daily., Disp: , Rfl:     metFORMIN (GLUCOPHAGE) 500 MG tablet, TAKE 1 TABLET BY MOUTH EVERY DAY WITH BREAKFAST, Disp: 90 tablet, Rfl: 3    metoprolol succinate XL (TOPROL-XL) 25 MG 24 hr tablet, Take 1 tablet by mouth Daily., Disp: 90 tablet, Rfl: 3    naloxone (NARCAN) 4 MG/0.1ML nasal spray, Call 911. Don't prime. [place] in 1 nostril for overdose. Repeat in 2-3 minutes in other nostril if no or minimal breathing/responsiveness., Disp: 2 each, Rfl: 0    nitroglycerin (NITROSTAT) 0.4 MG SL tablet, Place 1 tablet under the tongue Every 5 (Five) Minutes As Needed for Chest Pain. Take no more than 3 doses in 15 minutes., Disp: 100 tablet, Rfl: 0    omeprazole (priLOSEC) 20 MG capsule, Take 1 capsule by mouth Daily., Disp: 90 capsule, Rfl: 3    Probiotic Product (PROBIOTIC BLEND PO), Take 1 capsule by mouth Daily., Disp: , Rfl:     Semaglutide,0.25 or 0.5MG/DOS, (Ozempic, 0.25 or 0.5 MG/DOSE,) 2 MG/1.5ML solution pen-injector, Inject 0.5 mg under the skin into the appropriate area as directed 1 (One) Time Per Week for 56 days., Disp: 1.5 mL, Rfl: 1    vitamin B-12 (CYANOCOBALAMIN) 500 MCG tablet, Take 1 tablet by mouth Daily., Disp: , Rfl:     Problem List:  Patient Active Problem List   Diagnosis    Allergic rhinitis    Cervical disc disease with myelopathy    Diverticulosis of colon    Hypertension, benign    Class 1 obesity due to excess calories with serious comorbidity and body mass index (BMI) of 32.0 to 32.9 in adult    Osteopenia of spine    Mixed hyperlipidemia    Ischemic cardiomyopathy    Stented coronary artery    Annual visit for  general adult medical examination with abnormal findings    Cervical cancer screening    Encounter for screening for malignant neoplasm of breast    Colon cancer screening    History of tobacco use    Generalized anxiety disorder    ASD (atrial septal defect)    Coronary artery disease involving native coronary artery of native heart without angina pectoris    History of MI (myocardial infarction)    Branch retinal vein occlusion with macular edema    Hypertensive retinopathy    Gastroesophageal reflux disease without esophagitis    Cellulitis and abscess of buttock    Aneurysm of ascending aorta without rupture    Type 2 diabetes mellitus with hyperglycemia, without long-term current use of insulin    Chronic pain of right knee    Diverticulitis of intestine with abscess    Cellulitis of left thumb     Past Medical History:  Past Medical History:   Diagnosis Date    Abnormal ECG 9/2019    Absence of left thumb     Impression: hx of MRSA, she is signficantly improved She will continue meds and followup if sxs have not resolved over the next 2 weeks.. She is released from care and will notify us of any problems    Acute otitis media     Allergic rhinitis     Arthritis     neck    Cancer     skin    Cervical disc disease with myelopathy     CHF (congestive heart failure) 9/2019    Contact dermatitis or eczema     Coronary artery disease     Diabetes mellitus 9/2023    Disorder of bone and cartilage     Diverticulitis of colon     Impression: CT confirms in the sigmoid colon 10/2011    Diverticulosis of colon     Impression: No sxs 02/13/2013    Elevated cholesterol     Elevated lipoprotein(a) 04/30/2020    Elevated temperature     External otitis of left ear     Gastroesophageal reflux disease without esophagitis 06/19/2022    Hearing loss due to cerumen impaction, left     Hemangioma of liver     History of echocardiogram 10/03/2019    Severely reduced LV systolic function. EF 34% Distribution indicative of LAD  territory injury. Akinesis of the apex as well as apical lateral and mid to apical septal walls. Preservation of posterior inferior lateral walls. Mild AI, mild TR. Normal RV function.     History of transesophageal echocardiography (NAFISA) 01/07/2020    EF 55% LA cavity is mild to moderately dilated. Mild MR. Interatrial septum appears redundant. Interatrial hypermobile c/w aneurysm. Large PFO is noted with right to left shunting on bubble study. PFO tunnel appears to be short.     Hordeolum externum of left lower eyelid     Impression: She was started on Bactrim DS for her infection. She was also advised to use warm compression. She will followup should sxs not improve over the next 48 hrs and resolve over the next 1 weeek.    Hyperlipidemia     Impression: Diet controled. She should see improvement with her successful wt loss. We discussed her lipid panel.    Hypertension, benign     Impression: new onset Low salt low calorie diet and regular exercise and followup in 1 mo She will monitor her pressures and notify us of her pressures over the next 1 week.    Inclusion cyst of right breast     Impression: We will follow for now. She is encouraged to have Mammography. She is presently without insurance and reluctant. She bobby consider. She will notify us of any change at all in the lesion for the only way to be certain of it's etilogy is to remove it. She understands.    Insomnia, organic     Menopausal syndrome     Myocardial infarction 9/29/19    Overweight (BMI 25.0-29.9)     Pulmonary arterial hypertension 9/2019    RUQ abdominal pain     Impression: Resolving, negative GB u/s, HIDA EF 77%, we will follow    Stented coronary artery 09/29/2019    Tobacco use     Impression: She is strongly encouraged to stop smoking. Cessation techniques discussed and encouraged. The consequences of not stopping discussed, ie, CAD, PVD, Stroke, Lung and other cancers.     Past Surgical History:  Past Surgical History:   Procedure  Laterality Date    BIVENTRICULAR ASSIST DEVICE/LEFT VENTRICULAR ASSIST DEVICE INSERTION N/A 09/29/2019    Procedure: Left Ventricular Assist Device;  Surgeon: Brant Martinez MD;  Location: [facility] CATH INVASIVE LOCATION;  Service: Cardiovascular    CARDIAC CATHETERIZATION N/A 09/29/2019    Procedure: Left Heart Cath;  Surgeon: Brant Martinez MD;  Location: [facility] CATH INVASIVE LOCATION;  Service: Cardiovascular    CARDIAC CATHETERIZATION N/A 09/29/2019    Procedure: Coronary angiography;  Surgeon: Brant Martinez MD;  Location:  [facility] CATH INVASIVE LOCATION;  Service: Cardiovascular    CARDIAC CATHETERIZATION N/A 09/29/2019    Procedure: Left ventriculography;  Surgeon: Brant Martinez MD;  Location: [facility] CATH INVASIVE LOCATION;  Service: Cardiovascular    CARDIAC CATHETERIZATION N/A 09/29/2019    Procedure: Stent SERENITY coronary;  Surgeon: Brant Martinez MD;  Location: [facility] CATH INVASIVE LOCATION;  Service: Cardiovascular    CARDIAC CATHETERIZATION N/A 02/25/2020    Procedure: Right Heart Cath;  Surgeon: Brant Martinez MD;  Location: [facility] CATH INVASIVE LOCATION;  Service: Cardiology;  Laterality: N/A;    CARDIAC CATHETERIZATION N/A 02/12/2021    Procedure: Left Heart Cath;  Surgeon: Brant Martinez MD;  Location: [facility] CATH INVASIVE LOCATION;  Service: Cardiology;  Laterality: N/A;    CARDIAC CATHETERIZATION N/A 02/12/2021    Procedure: Coronary angiography;  Surgeon: Brant Martinez MD;  Location: [facility] CATH INVASIVE LOCATION;  Service: Cardiology;  Laterality: N/A;    CARDIAC CATHETERIZATION N/A 02/12/2021    Procedure: Left ventriculography;  Surgeon: Brant Martinez MD;  Location: [facility] CATH INVASIVE LOCATION;  Service: Cardiology;  Laterality: N/A;    CARDIAC CATHETERIZATION N/A 02/12/2021    Procedure: Aortic root aortogram;  Surgeon: Brant Martinez MD;  Location: [facility] CATH INVASIVE LOCATION;  Service:  Cardiology;  Laterality: N/A;    CARDIAC CATHETERIZATION N/A 2021    Procedure: Percutaneous Coronary Intervention;  Surgeon: Brant Martinez MD;  Location: [facility] CATH INVASIVE LOCATION;  Service: Cardiology;  Laterality: N/A;    CARDIAC CATHETERIZATION N/A 2021    Procedure: Stent SERENITY coronary;  Surgeon: Brant Martinez MD;  Location: [facility] CATH INVASIVE LOCATION;  Service: Cardiology;  Laterality: N/A;    CARDIAC ELECTROPHYSIOLOGY PROCEDURE  2019    Procedure: Impella Insertion;  Surgeon: Brant Martinez MD;  Location: [facility] CATH INVASIVE LOCATION;  Service: Cardiovascular    COLONOSCOPY N/A 2024    Procedure: COLONOSCOPY with polypectomy x 17;  Surgeon: Braulio Hinojosa MD;  Location: [facility] ENDOSCOPY;  Service: Gastroenterology;  Laterality: N/A;  colon polyps    CORONARY ANGIOPLASTY  2021    1x stent to Circ    CORONARY ANGIOPLASTY WITH STENT PLACEMENT  2021    WI RT/LT HEART CATHETERS N/A 2019    Procedure: Percutaneous Coronary Intervention;  Surgeon: Brant Martinez MD;  Location: [facility] CATH INVASIVE LOCATION;  Service: Cardiovascular    TOTAL ABDOMINAL HYSTERECTOMY WITH SALPINGO OOPHORECTOMY      Endometriosis     Social History:  Social History     Socioeconomic History    Marital status:    Tobacco Use    Smoking status: Former     Current packs/day: 0.00     Average packs/day: 1 pack/day for 34.7 years (34.7 ttl pk-yrs)     Types: Cigarettes     Start date: 1985     Quit date: 2019     Years since quittin.2     Passive exposure: Current    Smokeless tobacco: Never    Tobacco comments:     States she is quitting as of 2019.   Vaping Use    Vaping status: Never Used    Passive vaping exposure: Yes   Substance and Sexual Activity    Alcohol use: Yes     Alcohol/week: 6.0 standard drinks of alcohol     Types: 6 Cans of beer per week     Comment: occassionally     Drug use: Not Currently     Types:  Marijuana     Comment: occasional    Sexual activity: Yes     Partners: Male     Birth control/protection: Post-menopausal, Hysterectomy     Allergies:  Allergies   Allergen Reactions    Penicillins Rash    Ciprofloxacin Rash    Penicillin G Rash     Immunizations:  Immunization History   Administered Date(s) Administered    DT 01/01/2004    Fluzone (or Fluarix & Flulaval for VFC) >6mos 10/01/2019    Tdap 01/01/2011            In-Office Procedure(s):  No orders to display        ASCVD RIsk Score::  The 10-year ASCVD risk score (Brian MEYER, et al., 2019) is: 8.9%    Values used to calculate the score:      Age: 54 years      Sex: Female      Is Non- : No      Diabetic: Yes      Tobacco smoker: No      Systolic Blood Pressure: 123 mmHg      Is BP treated: Yes      HDL Cholesterol: 34 mg/dL      Total Cholesterol: 239 mg/dL    Imaging:    Results for orders placed during the hospital encounter of 12/05/24    XR Spine Cervical Complete 4 or 5 View    Narrative  XR SPINE CERVICAL COMPLETE 4 OR 5 VW    Date of Exam: 12/5/2024 12:12 PM EST    Indication: neck pain    Comparison: Cervical radiographs 12/4/2009    Findings:  Mild straightening of the cervical lordosis. Moderate disc narrowing at C5-6 and C6-7. Anterior endplate spurring most pronounced at the C6-7 level. Vertebral soft tissues normal thickness. No fracture. Posterior spinous processes intact. Included lung  apices are clear. Multilevel cervical facet arthritis. Lateral masses of C1 normally aligned on C2. Uncovertebral spurring and facet arthritis contributes to foraminal narrowing most pronounced on the right at C4-5, C5-6 and on the left at the C4-5, C5-6  and C6-7 levels.    Impression  Impression:  1. Negative for acute osseous abnormality.  2. Multilevel cervical spondylosis above.      Electronically Signed: Joseluis Cota MD  12/5/2024 12:37 PM EST  Workstation ID: [id number]       Results for orders placed in visit on  12/26/23    CT Outside Films    Narrative  This procedure was auto-finalized with no dictation required.      Results for orders placed in visit on 12/26/23    CT Outside Films    Narrative  This procedure was auto-finalized with no dictation required.      Lab Review:   Admission on 12/05/2024, Discharged on 12/05/2024   Component Date Value    QT Interval 12/05/2024 392     QTC Interval 12/05/2024 417     Glucose 12/05/2024 95     BUN 12/05/2024 9     Creatinine 12/05/2024 0.69     Sodium 12/05/2024 142     Potassium 12/05/2024 4.0     Chloride 12/05/2024 102     CO2 12/05/2024 28.2     Calcium 12/05/2024 9.6     BUN/Creatinine Ratio 12/05/2024 13.0     Anion Gap 12/05/2024 11.8     eGFR 12/05/2024 102.6     WBC 12/05/2024 8.74     RBC 12/05/2024 4.53     Hemoglobin 12/05/2024 13.9     Hematocrit 12/05/2024 41.5     MCV 12/05/2024 91.6     MCH 12/05/2024 30.7     MCHC 12/05/2024 33.5     RDW 12/05/2024 12.1 (L)     RDW-SD 12/05/2024 40.9     MPV 12/05/2024 10.4     Platelets 12/05/2024 231     Neutrophil % 12/05/2024 72.6     Lymphocyte % 12/05/2024 18.4 (L)     Monocyte % 12/05/2024 7.0     Eosinophil % 12/05/2024 0.9     Basophil % 12/05/2024 0.3     Immature Grans % 12/05/2024 0.8 (H)     Neutrophils, Absolute 12/05/2024 6.34     Lymphocytes, Absolute 12/05/2024 1.61     Monocytes, Absolute 12/05/2024 0.61     Eosinophils, Absolute 12/05/2024 0.08     Basophils, Absolute 12/05/2024 0.03     Immature Grans, Absolute 12/05/2024 0.07 (H)     nRBC 12/05/2024 0.0     HS Troponin T 12/05/2024 6     Extra Tube 12/05/2024 Hold for add-ons.    Admission on 09/19/2024, Discharged on 09/22/2024   Component Date Value    Blood Culture 09/19/2024 No growth at 5 days     Blood Culture 09/19/2024 No growth at 5 days     Glucose 09/19/2024 191 (H)     BUN 09/19/2024 14     Creatinine 09/19/2024 0.88     Sodium 09/19/2024 138     Potassium 09/19/2024 3.6     Chloride 09/19/2024 100     CO2 09/19/2024 25.4     Calcium 09/19/2024  9.1     BUN/Creatinine Ratio 09/19/2024 15.9     Anion Gap 09/19/2024 12.6     eGFR 09/19/2024 78.2     Sed Rate 09/19/2024 32 (H)     C-Reactive Protein 09/19/2024 5.61 (H)     WBC 09/19/2024 6.74     RBC 09/19/2024 4.86     Hemoglobin 09/19/2024 14.6     Hematocrit 09/19/2024 43.8     MCV 09/19/2024 90.1     MCH 09/19/2024 30.0     MCHC 09/19/2024 33.3     RDW 09/19/2024 12.7     RDW-SD 09/19/2024 42.1     MPV 09/19/2024 11.3     Platelets 09/19/2024 170     Neutrophil % 09/19/2024 58.9     Lymphocyte % 09/19/2024 26.4     Monocyte % 09/19/2024 12.0     Eosinophil % 09/19/2024 1.3     Basophil % 09/19/2024 0.4     Immature Grans % 09/19/2024 1.0 (H)     Neutrophils, Absolute 09/19/2024 3.96     Lymphocytes, Absolute 09/19/2024 1.78     Monocytes, Absolute 09/19/2024 0.81     Eosinophils, Absolute 09/19/2024 0.09     Basophils, Absolute 09/19/2024 0.03     Immature Grans, Absolute 09/19/2024 0.07 (H)     nRBC 09/19/2024 0.0     Lactate 09/19/2024 1.3     Glucose 09/20/2024 185 (H)     Glucose 09/20/2024 153 (H)     Glucose 09/19/2024 257 (H)     Glucose 09/19/2024 250 (H)     Glucose 09/20/2024 128 (H)     BUN 09/20/2024 15     Creatinine 09/20/2024 0.79     Sodium 09/20/2024 139     Potassium 09/20/2024 4.7     Chloride 09/20/2024 105     CO2 09/20/2024 24.7     Calcium 09/20/2024 8.8     BUN/Creatinine Ratio 09/20/2024 19.0     Anion Gap 09/20/2024 9.3     eGFR 09/20/2024 89.0     Magnesium 09/20/2024 2.1     WBC 09/20/2024 5.55     RBC 09/20/2024 4.27     Hemoglobin 09/20/2024 12.7     Hematocrit 09/20/2024 39.3     MCV 09/20/2024 92.0     MCH 09/20/2024 29.7     MCHC 09/20/2024 32.3     RDW 09/20/2024 13.0     RDW-SD 09/20/2024 43.3     MPV 09/20/2024 11.0     Platelets 09/20/2024 165     Neutrophil % 09/20/2024 37.5 (L)     Lymphocyte % 09/20/2024 44.5     Monocyte % 09/20/2024 13.7 (H)     Eosinophil % 09/20/2024 2.9     Basophil % 09/20/2024 0.5     Immature Grans % 09/20/2024 0.9 (H)     Neutrophils,  Absolute 09/20/2024 2.08     Lymphocytes, Absolute 09/20/2024 2.47     Monocytes, Absolute 09/20/2024 0.76     Eosinophils, Absolute 09/20/2024 0.16     Basophils, Absolute 09/20/2024 0.03     Immature Grans, Absolute 09/20/2024 0.05     nRBC 09/20/2024 0.0     Glucose 09/21/2024 162 (H)     Glucose 09/21/2024 161 (H)     Glucose 09/20/2024 151 (H)     Glucose 09/20/2024 119 (H)     Glucose 09/21/2024 227 (H)     BUN 09/21/2024 14     Creatinine 09/21/2024 0.84     Sodium 09/21/2024 136     Potassium 09/21/2024 4.1     Chloride 09/21/2024 101     CO2 09/21/2024 24.2     Calcium 09/21/2024 8.8     BUN/Creatinine Ratio 09/21/2024 16.7     Anion Gap 09/21/2024 10.8     eGFR 09/21/2024 82.7     Magnesium 09/21/2024 2.0     WBC 09/21/2024 5.24     RBC 09/21/2024 4.27     Hemoglobin 09/21/2024 12.7     Hematocrit 09/21/2024 38.9     MCV 09/21/2024 91.1     MCH 09/21/2024 29.7     MCHC 09/21/2024 32.6     RDW 09/21/2024 12.8     RDW-SD 09/21/2024 42.5     MPV 09/21/2024 11.2     Platelets 09/21/2024 148     Neutrophil % 09/21/2024 49.8     Lymphocyte % 09/21/2024 33.6     Monocyte % 09/21/2024 12.2 (H)     Eosinophil % 09/21/2024 3.2     Basophil % 09/21/2024 0.4     Immature Grans % 09/21/2024 0.8 (H)     Neutrophils, Absolute 09/21/2024 2.61     Lymphocytes, Absolute 09/21/2024 1.76     Monocytes, Absolute 09/21/2024 0.64     Eosinophils, Absolute 09/21/2024 0.17     Basophils, Absolute 09/21/2024 0.02     Immature Grans, Absolute 09/21/2024 0.04     nRBC 09/21/2024 0.0     Glucose 09/22/2024 124 (H)     Glucose 09/22/2024 165 (H)     Glucose 09/21/2024 189 (H)     Glucose 09/21/2024 177 (H)     Glucose 09/22/2024 108 (H)     BUN 09/22/2024 14     Creatinine 09/22/2024 0.70     Sodium 09/22/2024 137     Potassium 09/22/2024 4.3     Chloride 09/22/2024 103     CO2 09/22/2024 24.6     Calcium 09/22/2024 8.8     BUN/Creatinine Ratio 09/22/2024 20.0     Anion Gap 09/22/2024 9.4     eGFR 09/22/2024 102.9     Magnesium  09/22/2024 2.3     WBC 09/22/2024 5.38     RBC 09/22/2024 4.27     Hemoglobin 09/22/2024 12.4     Hematocrit 09/22/2024 38.8     MCV 09/22/2024 90.9     MCH 09/22/2024 29.0     MCHC 09/22/2024 32.0     RDW 09/22/2024 12.7     RDW-SD 09/22/2024 42.0     MPV 09/22/2024 11.0     Platelets 09/22/2024 171     Neutrophil % 09/22/2024 41.1 (L)     Lymphocyte % 09/22/2024 44.4     Monocyte % 09/22/2024 9.9     Eosinophil % 09/22/2024 3.3     Basophil % 09/22/2024 0.4     Immature Grans % 09/22/2024 0.9 (H)     Neutrophils, Absolute 09/22/2024 2.21     Lymphocytes, Absolute 09/22/2024 2.39     Monocytes, Absolute 09/22/2024 0.53     Eosinophils, Absolute 09/22/2024 0.18     Basophils, Absolute 09/22/2024 0.02     Immature Grans, Absolute 09/22/2024 0.05     nRBC 09/22/2024 0.0    Office Visit on 09/17/2024   Component Date Value    Hemoglobin A1C 09/17/2024 7.6 (A)     Lot Number 09/17/2024 #[id number]     Expiration Date 09/17/2024 05/31/2026      Recent labs reviewed and interpreted for clinical significance and application            Level of Care:           Brant Martinez MD  12/16/24  .

## 2024-12-17 ENCOUNTER — OFFICE VISIT (OUTPATIENT)
Dept: FAMILY MEDICINE CLINIC | Facility: CLINIC | Age: 54
End: 2024-12-17
Payer: MEDICAID

## 2024-12-17 VITALS
DIASTOLIC BLOOD PRESSURE: 78 MMHG | BODY MASS INDEX: 30.52 KG/M2 | HEIGHT: 65 IN | TEMPERATURE: 97.1 F | WEIGHT: 183.2 LBS | RESPIRATION RATE: 18 BRPM | SYSTOLIC BLOOD PRESSURE: 122 MMHG | OXYGEN SATURATION: 98 % | HEART RATE: 86 BPM

## 2024-12-17 DIAGNOSIS — M62.838 CERVICAL PARASPINOUS MUSCLE SPASM: Primary | ICD-10-CM

## 2024-12-17 PROCEDURE — 99213 OFFICE O/P EST LOW 20 MIN: CPT | Performed by: FAMILY MEDICINE

## 2024-12-17 PROCEDURE — 96372 THER/PROPH/DIAG INJ SC/IM: CPT | Performed by: FAMILY MEDICINE

## 2024-12-17 PROCEDURE — 1125F AMNT PAIN NOTED PAIN PRSNT: CPT | Performed by: FAMILY MEDICINE

## 2024-12-17 PROCEDURE — 3051F HG A1C>EQUAL 7.0%<8.0%: CPT | Performed by: FAMILY MEDICINE

## 2024-12-17 PROCEDURE — 3078F DIAST BP <80 MM HG: CPT | Performed by: FAMILY MEDICINE

## 2024-12-17 PROCEDURE — 3074F SYST BP LT 130 MM HG: CPT | Performed by: FAMILY MEDICINE

## 2024-12-17 RX ORDER — BACLOFEN 10 MG/1
10 TABLET ORAL 3 TIMES DAILY
Qty: 60 TABLET | Refills: 0 | Status: SHIPPED | OUTPATIENT
Start: 2024-12-17

## 2024-12-17 RX ORDER — KETOROLAC TROMETHAMINE 30 MG/ML
60 INJECTION, SOLUTION INTRAMUSCULAR; INTRAVENOUS ONCE
Status: COMPLETED | OUTPATIENT
Start: 2024-12-17 | End: 2024-12-17

## 2024-12-17 RX ORDER — PREDNISONE 20 MG/1
TABLET ORAL
Qty: 20 TABLET | Refills: 0 | Status: SHIPPED | OUTPATIENT
Start: 2024-12-17

## 2024-12-17 RX ORDER — METHYLPREDNISOLONE ACETATE 80 MG/ML
80 INJECTION, SUSPENSION INTRA-ARTICULAR; INTRALESIONAL; INTRAMUSCULAR; SOFT TISSUE ONCE
Status: COMPLETED | OUTPATIENT
Start: 2024-12-17 | End: 2024-12-17

## 2024-12-17 RX ADMIN — METHYLPREDNISOLONE ACETATE 80 MG: 80 INJECTION, SUSPENSION INTRA-ARTICULAR; INTRALESIONAL; INTRAMUSCULAR; SOFT TISSUE at 12:36

## 2024-12-17 RX ADMIN — KETOROLAC TROMETHAMINE 60 MG: 30 INJECTION, SOLUTION INTRAMUSCULAR; INTRAVENOUS at 12:36
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
## 2024-12-26 ENCOUNTER — TELEPHONE (OUTPATIENT)
Dept: CARDIOLOGY | Facility: CLINIC | Age: 54
End: 2024-12-26
Payer: MEDICAID

## 2024-12-26 NOTE — TELEPHONE ENCOUNTER
"Caller: Sada Le \"Geeta\"    Relationship to patient: Self    Best call back number: [phone]    Patient is needing: PT HAS REFERRAL IN FOR A CARDIO THORACIC SURGEON. NOTES IN REFERRAL STATE THAT PT NEEDS A CT SCAN BEFORE SHE CAN BE SCHEDULED WITH DR. VARGAS. PLEASE CALL PT TO SEE WHAT SHE NEEDS TO DO TO GET SCHEDULED.         "

## 2025-01-09 DIAGNOSIS — M50.00 CERVICAL DISC DISEASE WITH MYELOPATHY: ICD-10-CM

## 2025-01-09 DIAGNOSIS — M54.10 RADICULOPATHY AFFECTING UPPER EXTREMITY: ICD-10-CM

## 2025-01-09 DIAGNOSIS — M48.02 NEURAL FORAMINAL STENOSIS OF CERVICAL SPINE: Primary | ICD-10-CM

## 2025-01-15 NOTE — PROGRESS NOTES
"Subjective     No chief complaint on file.      HPI: Sada Le is a 54 y.o. female with ***        Previous Treatment: Voltaren,prednisone,gabapentin,Baclofen    Previous Injections:    Previous Neurosurgery:      PAST MEDICAL HISTORY:    {Common H&P Review Areas:51610}      Review of Systems      Objective     LMP 01/01/1996 (Approximate)    There is no height or weight on file to calculate BMI.      Physical Exam   Neurological Exam          Results Review  I personally reviewed and interpreted the images from the following studies:    ***      Assessment & Plan     MDM: Sada Le is a 54 y.o. female ***      No diagnosis found.    No follow-ups on file.      Sada Le  reports that she quit smoking about 5 years ago. Her smoking use included cigarettes. She started smoking about 40 years ago. She has a 34.7 pack-year smoking history. She has been exposed to tobacco smoke. She has never used smokeless tobacco. I have educated her on the risk of diseases from using tobacco products such as {Tobacco Cessation Diseases:82996::\"cancer\",\"COPD\",\"heart disease\"}.     I advised her to quit and she is {Willing/Not Willing to Quit Tobacco Products:75501}.    I spent {Time Spent Tobacco :13053} minutes counseling the patient.                   This patient was examined wearing appropriate personal protective equipment.            Luis Alberto Agudelo PA-C    01/15/25  14:14 EST      Part of this note may be an electronic transcription/translation of spoken language to printed text using the Dragon Dictation System.  "

## 2025-01-17 ENCOUNTER — TELEPHONE (OUTPATIENT)
Dept: CARDIOLOGY | Facility: CLINIC | Age: 55
End: 2025-01-17
Payer: MEDICAID

## 2025-01-17 DIAGNOSIS — R91.8 PULMONARY NODULES: Primary | ICD-10-CM

## 2025-01-17 NOTE — TELEPHONE ENCOUNTER
Attempted to contact patient about referral from Dr. Martniez to  pulmonology specialist and requested Cardiac side of CT angiogram from [facility] they agreed to fax today. [facility] may notify her of referral  office should be calling her to schedule.

## 2025-01-21 ENCOUNTER — TELEPHONE (OUTPATIENT)
Dept: FAMILY MEDICINE CLINIC | Facility: CLINIC | Age: 55
End: 2025-01-21
Payer: MEDICAID

## 2025-01-21 DIAGNOSIS — R91.8 PULMONARY NODULES: Primary | ICD-10-CM

## 2025-01-21 NOTE — TELEPHONE ENCOUNTER
----- Message from Luis Madrid sent at 1/19/2025  4:52 PM EST -----  This chest CT was ordered by her interventional cardiologist.  It incidentally showed some pulmonary nodules.  These often will resolve on their own.  However, she should have a repeat CT done in 3 months to ensure resolution.  I am assuming she wants [facility] done at [facility].  Please put the order in: CT chest without contrast, diagnosis pulmonary nodules, please request that they compare to previous CT, in 3 months.  Thank you.

## 2025-01-21 NOTE — TELEPHONE ENCOUNTER
"  Caller: Sada Le \"Geeta\"    Relationship: Self    Best call back number: [phone]    What is the best time to reach you: BEFORE 4 TODAY    Who are you requesting to speak with (clinical staff, provider,  specific staff member): VENANCIO     What was the call regarding: PATIENT IS REQUESTING A CALL FROM [facility] AS SHE'S RETURNING [facility]'S CALL FROM [facility] TODAY 1/21    Is it okay if the provider responds through MyChart: NO      "

## 2025-01-22 RX ORDER — CLOPIDOGREL BISULFATE 75 MG/1
75 TABLET ORAL DAILY
Qty: 90 TABLET | Refills: 3 | Status: SHIPPED | OUTPATIENT
Start: 2025-01-22
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
## 2025-01-27 ENCOUNTER — OFFICE VISIT (OUTPATIENT)
Dept: NEUROSURGERY | Facility: CLINIC | Age: 55
End: 2025-01-27
Payer: MEDICAID

## 2025-01-27 ENCOUNTER — OFFICE VISIT (OUTPATIENT)
Dept: FAMILY MEDICINE CLINIC | Facility: CLINIC | Age: 55
End: 2025-01-27
Payer: MEDICAID

## 2025-01-27 ENCOUNTER — PATIENT ROUNDING (BHMG ONLY) (OUTPATIENT)
Dept: NEUROSURGERY | Facility: CLINIC | Age: 55
End: 2025-01-27
Payer: MEDICAID

## 2025-01-27 VITALS
TEMPERATURE: 97.8 F | OXYGEN SATURATION: 98 % | RESPIRATION RATE: 18 BRPM | HEART RATE: 82 BPM | DIASTOLIC BLOOD PRESSURE: 76 MMHG | WEIGHT: 181.6 LBS | BODY MASS INDEX: 30.26 KG/M2 | HEIGHT: 65 IN | SYSTOLIC BLOOD PRESSURE: 122 MMHG

## 2025-01-27 VITALS
RESPIRATION RATE: 18 BRPM | WEIGHT: 180 LBS | HEART RATE: 73 BPM | OXYGEN SATURATION: 98 % | DIASTOLIC BLOOD PRESSURE: 91 MMHG | SYSTOLIC BLOOD PRESSURE: 121 MMHG | BODY MASS INDEX: 29.99 KG/M2 | HEIGHT: 65 IN

## 2025-01-27 DIAGNOSIS — Z12.31 ENCOUNTER FOR SCREENING MAMMOGRAM FOR MALIGNANT NEOPLASM OF BREAST: ICD-10-CM

## 2025-01-27 DIAGNOSIS — Z87.891 HISTORY OF TOBACCO USE: ICD-10-CM

## 2025-01-27 DIAGNOSIS — M54.2 NECK PAIN: ICD-10-CM

## 2025-01-27 DIAGNOSIS — K21.9 GASTROESOPHAGEAL REFLUX DISEASE WITHOUT ESOPHAGITIS: ICD-10-CM

## 2025-01-27 DIAGNOSIS — M54.12 CERVICAL RADICULOPATHY: Primary | ICD-10-CM

## 2025-01-27 DIAGNOSIS — Z12.4 CERVICAL CANCER SCREENING: ICD-10-CM

## 2025-01-27 DIAGNOSIS — M50.00 CERVICAL DISC DISEASE WITH MYELOPATHY: ICD-10-CM

## 2025-01-27 DIAGNOSIS — M85.88 OSTEOPENIA OF SPINE: ICD-10-CM

## 2025-01-27 DIAGNOSIS — E11.65 TYPE 2 DIABETES MELLITUS WITH HYPERGLYCEMIA, WITHOUT LONG-TERM CURRENT USE OF INSULIN: ICD-10-CM

## 2025-01-27 DIAGNOSIS — Z00.01 ANNUAL VISIT FOR GENERAL ADULT MEDICAL EXAMINATION WITH ABNORMAL FINDINGS: Primary | ICD-10-CM

## 2025-01-27 DIAGNOSIS — Z12.11 COLON CANCER SCREENING: ICD-10-CM

## 2025-01-27 LAB
EXPIRATION DATE: ABNORMAL
HBA1C MFR BLD: 6.4 % (ref 4.5–5.7)
Lab: ABNORMAL

## 2025-01-27 PROCEDURE — 83036 HEMOGLOBIN GLYCOSYLATED A1C: CPT

## 2025-01-27 PROCEDURE — 1159F MED LIST DOCD IN RCRD: CPT

## 2025-01-27 PROCEDURE — 3074F SYST BP LT 130 MM HG: CPT

## 2025-01-27 PROCEDURE — 1160F RVW MEDS BY RX/DR IN RCRD: CPT

## 2025-01-27 PROCEDURE — 3078F DIAST BP <80 MM HG: CPT

## 2025-01-27 PROCEDURE — 99203 OFFICE O/P NEW LOW 30 MIN: CPT

## 2025-01-27 PROCEDURE — 3080F DIAST BP >= 90 MM HG: CPT

## 2025-01-27 PROCEDURE — 99396 PREV VISIT EST AGE 40-64: CPT

## 2025-01-27 PROCEDURE — 1125F AMNT PAIN NOTED PAIN PRSNT: CPT

## 2025-01-27 PROCEDURE — 3044F HG A1C LEVEL LT 7.0%: CPT

## 2025-01-27 RX ORDER — GABAPENTIN 300 MG/1
CAPSULE ORAL
Qty: 90 CAPSULE | Refills: 0 | Status: SHIPPED | OUTPATIENT
Start: 2025-01-27 | End: 2025-02-26

## 2025-01-27 RX ORDER — PREDNISONE 20 MG/1
20 TABLET ORAL DAILY PRN
Qty: 12 TABLET | Refills: 0 | Status: SHIPPED | OUTPATIENT
Start: 2025-01-27

## 2025-01-27 RX ORDER — BACLOFEN 10 MG/1
10 TABLET ORAL 3 TIMES DAILY
Qty: 90 TABLET | Refills: 0 | Status: SHIPPED | OUTPATIENT
Start: 2025-01-27

## 2025-01-27 NOTE — PROGRESS NOTES
Chief Complaint  Annual Exam, Diabetes, Hypertension, and Hyperlipidemia    Subjective          Sada Le presents to [facility] for     HPI  Patient presents for annual wellness examination, to discuss health maintenance and disease prevention. Feels fairly well . Activity level: moderate. Diet: in between healthy and not .    Patient  reports current alcohol use of about 6.0 standard drinks of alcohol per week.     Tobacco Use: Medium Risk (2025)    Patient History     Smoking Tobacco Use: Former     Smokeless Tobacco Use: Never     Passive Exposure: Current       Cervical cancer screening: hysterectomy for endometriosis    Breast cancer screenin2019    Osteoporosis screenin2005    Colorectal cancer screenin2024, f/u 1 yr    Diabetes  Patient does not check blood sugar at home  0  times daily.  Per patient fasting blood sugars range between unknown  Associated symptoms include Fatigue  Per patient current diet is  between healthy and unhealthy .  Patient does not avoid concentrated sweets.  Patient does not see podiatry.  Patient see's Dr. Trejo  for diabetic eye exam and last eye exam was 2024.  Patient reports they are taking medications as prescribed and they are not having side effects.  Last A1c was 7.6 on 2024.       GERD: Malcom complains of heartburn. This has been associated with abdominal bloating, bilious reflux, choking on food, difficulty swallowing, heartburn, and nausea.  She denies regurgitation of undigested food, shortness of breath, and wheezing. Symptoms have been present for several years. She has had dysphagia for solids.  She has not lost weight. She denies melena, hematochezia, hematemesis, and coffee ground emesis. Medical therapy in the past has included proton pump inhibitors. Omeprazole 20 mg.      Objective   Vital Signs:   /76 (BP Location: Right arm, Patient Position: Sitting, Cuff Size: Adult)   " Pulse 82   Temp 97.8 °F (36.6 °C) (Temporal)   Resp 18   Ht 165.1 cm (65\")   Wt 82.4 kg (181 lb 9.6 oz)   SpO2 98% Comment: ra  BMI 30.22 kg/m²     Physical Exam  Vitals and nursing note reviewed.   Constitutional:       General: She is not in acute distress.     Appearance: Normal appearance. She is not ill-appearing or diaphoretic.   HENT:      Head: Normocephalic and atraumatic.      Nose: No congestion or rhinorrhea.   Eyes:      Extraocular Movements: Extraocular movements intact.      Pupils: Pupils are equal, round, and reactive to light.   Cardiovascular:      Rate and Rhythm: Normal rate and regular rhythm.      Pulses: Normal pulses.   Pulmonary:      Effort: Pulmonary effort is normal.      Breath sounds: Normal breath sounds.   Musculoskeletal:         General: Normal range of motion.      Cervical back: Normal range of motion and neck supple.   Skin:     General: Skin is warm and dry.      Capillary Refill: Capillary refill takes less than 2 seconds.   Neurological:      Mental Status: She is alert and oriented to person, place, and time.   Psychiatric:         Mood and Affect: Mood normal.         Behavior: Behavior normal.        Result Review :                 Assessment and Plan    Diagnoses and all orders for this visit:    1. Annual visit for general adult medical examination with abnormal findings (Primary)  Overview:  Low carb low cholesterol diet, and regular exercise encouraged. Previous lab reviewed, we will notify her of today's lab.       2. Colon cancer screening  Overview:  Last colonoscopy was 2/2024 - several polyps.  F/u 1 year.    Assessment & Plan:  Patient is due for repeat colonoscopy next month.  She says that she will get this scheduled.      3. Osteopenia of spine  Overview:  -1.4 spine, -0.8 FN, -0.2 hip, 09/30/2005  Strongly encouraged to take daily vitamin D3 and calcium, counseled on weight bearing exercise.    Assessment & Plan:  We will get an updated " DEXA.    Orders:  -     DEXA Bone Density Axial    4. Cervical cancer screening  Overview:  Last pap smear unknown  S/p hysterectomy for endometriosis  Declines further paps      5. Encounter for screening mammogram for malignant neoplasm of breast  Overview:  Last mammogram 09/23/2019. normal    Assessment & Plan:  Resume annual screenings.    Orders:  -     Mammo Screening Digital Tomosynthesis Bilateral With CAD; Future    6. History of tobacco use  Overview:  1 PPD X 34 years, she continues to abstain.   Chest CT 1/2025 - new pulmonary nodules, repeat q3-6 months to reassess.  Is scheduled to establish with pulmonology.      7. Type 2 diabetes mellitus with hyperglycemia, without long-term current use of insulin  Overview:  Regimen: Metformin 500 mg qd, Ozempic 0.5 mg weekly  1/27/2025 A1c 6.4 - Significant improvement with Ozempic despite recent steroids  9/17/2024 A1c 7.6 - Started on Ozempic    Assessment & Plan:  Significant improvement on Ozempic.  Diet reinforced.    Orders:  -     POC Glycosylated Hemoglobin (Hb A1C)    8. Gastroesophageal reflux disease without esophagitis  Overview:  Stable with PPI which is continued. Risk benefit discussed      9. Cervical disc disease with myelopathy  Overview:  Has established with neurosurgery - Dr. Liu.  Per neurosurgery, she is to continue baclofen, diclofenac, and gabapentin while awaiting definitive treatment.    Assessment & Plan:  We will refill her pain medications as above.  She had a tremendous response to the steroids she received last month - specifically, IM depomedrol and po prednisone.  Advised against routine steroid use, R/B discussed.  Following discussion, pt elects to conservatively use prn prednisone.  She is to take 20 mg qd prn very sparingly. Will give her 12 tablets total.    Orders:  -     baclofen (LIORESAL) 10 MG tablet; Take 1 tablet by mouth 3 (Three) Times a Day.  Dispense: 90 tablet; Refill: 0  -     diclofenac (VOLTAREN) 50 MG EC  tablet; Take 1 tablet by mouth 2 (Two) Times a Day As Needed (pain and inflammation).  Dispense: 90 tablet; Refill: 0  -     gabapentin (NEURONTIN) 300 MG capsule; Take 1 capsule by mouth Daily for 1 day, THEN 1 capsule 2 (Two) Times a Day for 1 day, THEN 1 capsule 3 (Three) Times a Day for 29 days.  Dispense: 90 capsule; Refill: 0  -     predniSONE (DELTASONE) 20 MG tablet; Take 1 tablet by mouth Daily As Needed (neck pain).  Dispense: 12 tablet; Refill: 0           Luis Madrid MD    NOTE: [facility], per Health Information accessibility laws, allows progress notes to be visible to patients. Please note that these notes will include professional medical terminology that may be somewhat confusing without some interpretation from your medical professional team. The intent of progress notes is to communicate information between any medical professionals involved in your case, or to serve as future reference for myself or any other provider when reviewing your case, as well as a reference for the patient viewing the record. Please ask a member of the medical team if you have any questions about terminology or content of note.    DISCLAIMER: Part of this note may be an electronic transcription/translation of spoken language to printed text using the Dragon Dictation System.

## 2025-01-27 NOTE — ASSESSMENT & PLAN NOTE
We will refill her pain medications as above.  She had a tremendous response to the steroids she received last month - specifically, IM depomedrol and po prednisone.  Advised against routine steroid use, R/B discussed.  Following discussion, pt elects to conservatively use prn prednisone.  She is to take 20 mg qd prn very sparingly. Will give her 12 tablets total.

## 2025-01-29 DIAGNOSIS — Z12.11 COLON CANCER SCREENING: Primary | ICD-10-CM

## 2025-02-06 DIAGNOSIS — E11.65 TYPE 2 DIABETES MELLITUS WITH HYPERGLYCEMIA, WITHOUT LONG-TERM CURRENT USE OF INSULIN: ICD-10-CM

## 2025-02-06 DIAGNOSIS — E66.811 CLASS 1 OBESITY DUE TO EXCESS CALORIES WITH SERIOUS COMORBIDITY AND BODY MASS INDEX (BMI) OF 32.0 TO 32.9 IN ADULT: ICD-10-CM

## 2025-02-06 DIAGNOSIS — E66.09 CLASS 1 OBESITY DUE TO EXCESS CALORIES WITH SERIOUS COMORBIDITY AND BODY MASS INDEX (BMI) OF 32.0 TO 32.9 IN ADULT: ICD-10-CM

## 2025-02-06 RX ORDER — SEMAGLUTIDE 1.34 MG/ML
0.5 INJECTION, SOLUTION SUBCUTANEOUS WEEKLY
Qty: 1.5 ML | Refills: 1 | Status: SHIPPED | OUTPATIENT
Start: 2025-02-06 | End: 2025-04-03

## 2025-02-06 NOTE — TELEPHONE ENCOUNTER
"  Caller: Sada Le \"Geeta\"    Relationship: Self    Best call back number: [phone]    Requested Prescriptions:   Requested Prescriptions     Pending Prescriptions Disp Refills    Semaglutide,0.25 or 0.5MG/DOS, (Ozempic, 0.25 or 0.5 MG/DOSE,) 2 MG/1.5ML solution pen-injector 1.5 mL 1     Sig: Inject 0.5 mg under the skin into the appropriate area as directed 1 (One) Time Per Week for 56 days.        Pharmacy where request should be sent: [id number] - [place], [place] - [address] [place] [address] [place] - [phone]  - [phone] FX     Last office visit with prescribing clinician: 1/27/2025   Last telemedicine visit with prescribing clinician: Visit date not found   Next office visit with prescribing clinician: Visit date not found     Does the patient have less than a 3 day supply:  [] Yes  [x] No    April Acuna Rep   02/06/25 10:10 EST         "

## 2025-02-10 ENCOUNTER — HOSPITAL ENCOUNTER (OUTPATIENT)
Dept: PAIN MEDICINE | Facility: HOSPITAL | Age: 55
Discharge: HOME | End: 2025-02-10
Payer: MEDICAID

## 2025-02-10 VITALS
SYSTOLIC BLOOD PRESSURE: 132 MMHG | DIASTOLIC BLOOD PRESSURE: 74 MMHG | TEMPERATURE: 97.1 F | OXYGEN SATURATION: 96 % | WEIGHT: 181 LBS | HEART RATE: 61 BPM | RESPIRATION RATE: 16 BRPM | HEIGHT: 65 IN | BODY MASS INDEX: 30.16 KG/M2

## 2025-02-10 DIAGNOSIS — R52 PAIN: ICD-10-CM

## 2025-02-10 DIAGNOSIS — M54.12 CERVICAL RADICULOPATHY: Primary | ICD-10-CM

## 2025-02-10 DIAGNOSIS — M54.2 NECK PAIN: ICD-10-CM

## 2025-02-10 PROCEDURE — 62321 NJX INTERLAMINAR CRV/THRC: CPT | Performed by: ANESTHESIOLOGY

## 2025-02-10 PROCEDURE — 77003 FLUOROGUIDE FOR SPINE INJECT: CPT

## 2025-02-10 PROCEDURE — 25010000002 METHYLPREDNISOLONE PER 40 MG: Performed by: ANESTHESIOLOGY

## 2025-02-10 PROCEDURE — 25510000001 IOPAMIDOL 41 % SOLUTION: Performed by: ANESTHESIOLOGY

## 2025-02-10 RX ORDER — IOPAMIDOL 408 MG/ML
3 INJECTION, SOLUTION INTRATHECAL
Status: COMPLETED | OUTPATIENT
Start: 2025-02-10 | End: 2025-02-10

## 2025-02-10 RX ORDER — METHYLPREDNISOLONE ACETATE 40 MG/ML
40 INJECTION, SUSPENSION INTRA-ARTICULAR; INTRALESIONAL; INTRAMUSCULAR; SOFT TISSUE ONCE
Status: COMPLETED | OUTPATIENT
Start: 2025-02-10 | End: 2025-02-10

## 2025-02-10 RX ADMIN — METHYLPREDNISOLONE ACETATE 40 MG: 40 INJECTION, SUSPENSION INTRA-ARTICULAR; INTRALESIONAL; INTRAMUSCULAR; INTRASYNOVIAL; SOFT TISSUE at 10:10

## 2025-02-10 RX ADMIN — IOPAMIDOL 1 ML: 408 INJECTION, SOLUTION INTRATHECAL at 10:10

## 2025-02-10 NOTE — DISCHARGE INSTRUCTIONS

## 2025-02-10 NOTE — PROCEDURES
"Subjective   CC neck pain  Sada Le is a 54 y.o. female with cervical stenosis and radiculitis here for cervical DESIREE.  Requested by neurosurgery..   Of Plavix 7 days    Pain Assessment   Location of Pain: Neck pain, joint  Description of Pain: Dull/Aching, Throbbing, Stabbing  Previous Pain Rating :4  Current Pain Ratin  Aggravating Factors: Activity  Alleviating Factors: Rest, Medication  Pain onset over 12 weeks ago  Pain interferes with ADL's  [place] back pain disability scale scanned in chart     The following portions of the patient's history were reviewed and updated as appropriate: allergies, current medications, past family history, past medical history, past social history, past surgical history and problem list.      Review of Systems  As in HPI  Objective   Physical Exam  Vitals reviewed.   Constitutional:       General: She is not in acute distress.  Pulmonary:      Effort: Pulmonary effort is normal.       /74   Pulse 61   Temp 97.1 °F (36.2 °C) (Skin)   Resp 16   Ht 165.1 cm (65\")   Wt 82.1 kg (181 lb)   LMP 1996 (Approximate)   SpO2 96%   BMI 30.12 kg/m²     Assessment & Plan    underwent cervical DESIREE    RTC 4-6 weeks or as needed for repeat    DATE OF PROCEDURE:  2/10/2025    PREOPERATIVE DIAGNOSIS:Cervical radiculitis    POSTOPERATIVE DIAGNOSIS: same    PROCEDURE PERFORMED:  cervical Epidural Steroid Injection    The patient presents with a history of   cervical degenerative disc disease  with  radiculitis. The patient presents today for a  cervical  epidural steroid injection at level C6/7. The patient understands the risks and benefits of the procedure and wishes to proceed.  The patient was seen in the preoperative area.  Patient's consent was obtained and updated.  Vitals were taken.  Patient was then brought to the procedure suite and placed in a prone position. The appropriate anatomic area was widely prepped with Chloraprep and draped in a sterile fashion. " Noninvasive monitoring per routine anesthesia protocol was placed.  Under fluoroscopic guidance using  AP view a 20 gauge styleted tuohy needle was passed through skin anesthetized with 1% Lidocaine without epinephrine.  The needle was advanced using the continuous loss of resistance to saline technique into the C6 epidural space. Needle tip placement in the epidural space was confirmed by loss of resistance and injection of 1 mL of  preservative free contrast.  Following this 7 mL of a solution containing  1 mL of 40 mg Depo-Medrol and 7 mL of preservative-free saline was carefully administered in the epidural space. Epidurogram following injection demonstrated dye spread as high as C4 and as low as T1. A sterile dressing was placed over the puncture site.    The patient tolerated the procedure with no complications . They were then brought to the post procedure area where they recovered nicely.

## 2025-02-11 ENCOUNTER — TELEPHONE (OUTPATIENT)
Dept: PAIN MEDICINE | Facility: HOSPITAL | Age: 55
End: 2025-02-11
Payer: MEDICAID

## 2025-02-12 DIAGNOSIS — K21.9 GASTROESOPHAGEAL REFLUX DISEASE WITHOUT ESOPHAGITIS: ICD-10-CM

## 2025-02-17 ENCOUNTER — OFFICE VISIT (OUTPATIENT)
Dept: PULMONOLOGY | Facility: HOSPITAL | Age: 55
End: 2025-02-17
Payer: MEDICAID

## 2025-02-17 VITALS
OXYGEN SATURATION: 98 % | SYSTOLIC BLOOD PRESSURE: 123 MMHG | WEIGHT: 174.6 LBS | HEART RATE: 73 BPM | DIASTOLIC BLOOD PRESSURE: 74 MMHG | HEIGHT: 65 IN | BODY MASS INDEX: 29.09 KG/M2

## 2025-02-17 DIAGNOSIS — R06.83 SNORING: Primary | ICD-10-CM

## 2025-02-17 PROCEDURE — G0463 HOSPITAL OUTPT CLINIC VISIT: HCPCS

## 2025-02-17 NOTE — PROGRESS NOTES
PULMONARY/ CRITICAL CARE/ SLEEP MEDICINE OUTPATIENT CONSULT/ FOLLOW UP NOTE        Patient Name:  Sada Le    :  1970    Medical Record:  [id number]    PRIMARY CARE PHYSICIAN     Luis Madrid MD    REASON FOR CONSULTATION    Sada Le is a 54 y.o. female who is referred for consultation for lung nodule  REVIEW OF SYSTEMS    Constitutional:  Denies fever or chills   Eyes:  Denies change in visual acuity   HENT:  Denies nasal congestion or sore throat   Respiratory:  Denies cough or shortness of breath   Cardiovascular:  Denies chest pain or edema   GI:  Denies abdominal pain, nausea, vomiting, bloody stools or diarrhea   :  Denies dysuria   Musculoskeletal:  Denies back pain or joint pain   Integument:  Denies rash   Neurologic:  Denies headache, focal weakness or sensory changes   Endocrine:  Denies polyuria or polydipsia   Lymphatic:  Denies swollen glands   Psychiatric:  Denies depression or anxiety     MEDICAL HISTORY    Past Medical History:   Diagnosis Date    Abnormal ECG 2019    Absence of left thumb     Impression: hx of MRSA, she is signficantly improved She will continue meds and followup if sxs have not resolved over the next 2 weeks.. She is released from care and will notify us of any problems    Acute otitis media     Allergic rhinitis     Aneurysm 2019    Arteriovenous malformation     Arthritis     neck    Cancer     skin    Cervical disc disease with myelopathy     CHF (congestive heart failure) 2019    Contact dermatitis or eczema     Coronary artery disease     Diabetes mellitus 2023    Disorder of bone and cartilage     Diverticulitis of colon     Impression: CT confirms in the sigmoid colon 10/2011    Diverticulosis of colon     Impression: No sxs 2013    Elevated cholesterol     Elevated lipoprotein(a) 2020    Elevated temperature     External otitis of left ear     Gastroesophageal reflux disease without esophagitis 2022    Hearing loss due  to cerumen impaction, left     Hemangioma of liver     History of echocardiogram 10/03/2019    Severely reduced LV systolic function. EF 34% Distribution indicative of LAD territory injury. Akinesis of the apex as well as apical lateral and mid to apical septal walls. Preservation of posterior inferior lateral walls. Mild AI, mild TR. Normal RV function.     History of transesophageal echocardiography (NAFISA) 01/07/2020    EF 55% LA cavity is mild to moderately dilated. Mild MR. Interatrial septum appears redundant. Interatrial hypermobile c/w aneurysm. Large PFO is noted with right to left shunting on bubble study. PFO tunnel appears to be short.     Hordeolum externum of left lower eyelid     Impression: She was started on Bactrim DS for her infection. She was also advised to use warm compression. She will followup should sxs not improve over the next 48 hrs and resolve over the next 1 weeek.    Hyperlipidemia     Impression: Diet controled. She should see improvement with her successful wt loss. We discussed her lipid panel.    Hypertension, benign     Impression: new onset Low salt low calorie diet and regular exercise and followup in 1 mo She will monitor her pressures and notify us of her pressures over the next 1 week.    Inclusion cyst of right breast     Impression: We will follow for now. She is encouraged to have Mammography. She is presently without insurance and reluctant. She bobby consider. She will notify us of any change at all in the lesion for the only way to be certain of it's etilogy is to remove it. She understands.    Insomnia, organic     Menopausal syndrome     Myocardial infarction 9/29/19    Overweight (BMI 25.0-29.9)     Pulmonary arterial hypertension 9/2019    RUQ abdominal pain     Impression: Resolving, negative GB u/s, HIDA EF 77%, we will follow    Stented coronary artery 09/29/2019    Thoracic disc disorder     Tobacco use     Impression: She is strongly encouraged to stop smoking.  Cessation techniques discussed and encouraged. The consequences of not stopping discussed, ie, CAD, PVD, Stroke, Lung and other cancers.        SURGICAL HISTORY    Past Surgical History:   Procedure Laterality Date    BIVENTRICULAR ASSIST DEVICE/LEFT VENTRICULAR ASSIST DEVICE INSERTION N/A 09/29/2019    Procedure: Left Ventricular Assist Device;  Surgeon: Brant Martinez MD;  Location: [facility] CATH INVASIVE LOCATION;  Service: Cardiovascular    CARDIAC CATHETERIZATION N/A 09/29/2019    Procedure: Left Heart Cath;  Surgeon: Brant Martinez MD;  Location: [facility] CATH INVASIVE LOCATION;  Service: Cardiovascular    CARDIAC CATHETERIZATION N/A 09/29/2019    Procedure: Coronary angiography;  Surgeon: Brant Martinez MD;  Location: [facility] CATH INVASIVE LOCATION;  Service: Cardiovascular    CARDIAC CATHETERIZATION N/A 09/29/2019    Procedure: Left ventriculography;  Surgeon: Brant Martinez MD;  Location: [facility] CATH INVASIVE LOCATION;  Service: Cardiovascular    CARDIAC CATHETERIZATION N/A 09/29/2019    Procedure: Stent SERENITY coronary;  Surgeon: Brant Martinez MD;  Location: [facility] CATH INVASIVE LOCATION;  Service: Cardiovascular    CARDIAC CATHETERIZATION N/A 02/25/2020    Procedure: Right Heart Cath;  Surgeon: Brant Martinez MD;  Location: [facility] CATH INVASIVE LOCATION;  Service: Cardiology;  Laterality: N/A;    CARDIAC CATHETERIZATION N/A 02/12/2021    Procedure: Left Heart Cath;  Surgeon: Brant Martinez MD;  Location: [facility] CATH INVASIVE LOCATION;  Service: Cardiology;  Laterality: N/A;    CARDIAC CATHETERIZATION N/A 02/12/2021    Procedure: Coronary angiography;  Surgeon: Brant Martinez MD;  Location: [facility] CATH INVASIVE LOCATION;  Service: Cardiology;  Laterality: N/A;    CARDIAC CATHETERIZATION N/A 02/12/2021    Procedure: Left ventriculography;  Surgeon: Brant Martinez MD;  Location: [facility] CATH INVASIVE LOCATION;  Service:  Cardiology;  Laterality: N/A;    CARDIAC CATHETERIZATION N/A 02/12/2021    Procedure: Aortic root aortogram;  Surgeon: Brant Martinez MD;  Location: [facility] CATH INVASIVE LOCATION;  Service: Cardiology;  Laterality: N/A;    CARDIAC CATHETERIZATION N/A 02/12/2021    Procedure: Percutaneous Coronary Intervention;  Surgeon: Brant Martinez MD;  Location: [facility] CATH INVASIVE LOCATION;  Service: Cardiology;  Laterality: N/A;    CARDIAC CATHETERIZATION N/A 02/12/2021    Procedure: Stent SERENITY coronary;  Surgeon: Brant Martinez MD;  Location: [facility] CATH INVASIVE LOCATION;  Service: Cardiology;  Laterality: N/A;    CARDIAC ELECTROPHYSIOLOGY PROCEDURE  09/29/2019    Procedure: Impella Insertion;  Surgeon: Brant Martinez MD;  Location: [facility] CATH INVASIVE LOCATION;  Service: Cardiovascular    COLONOSCOPY N/A 2/5/2024    Procedure: COLONOSCOPY with polypectomy x 17;  Surgeon: Braulio Hinojosa MD;  Location: [facility] ENDOSCOPY;  Service: Gastroenterology;  Laterality: N/A;  colon polyps    CORONARY ANGIOPLASTY  02/12/2021    1x stent to Circ    CORONARY ANGIOPLASTY WITH STENT PLACEMENT  02/2021    NE RT/LT HEART CATHETERS N/A 09/29/2019    Procedure: Percutaneous Coronary Intervention;  Surgeon: Brant Martinez MD;  Location: [facility] CATH INVASIVE LOCATION;  Service: Cardiovascular    TOTAL ABDOMINAL HYSTERECTOMY WITH SALPINGO OOPHORECTOMY  1995    Endometriosis        FAMILY HISTORY    Family History   Problem Relation Age of Onset    Alzheimer's disease Mother     Heart disease Mother         cardiovascular    Kidney disease Mother     Throat cancer Mother     Thyroid disease Mother     Arthritis Mother     Cancer Mother     Diabetes Father         mellitus    Heart attack Father     Thyroid disease Father     Hypertension Father     Heart disease Sister         cardiovascular    Hyperlipidemia Sister     Diabetes Maternal Aunt         mellitus    Heart disease Maternal Aunt          cardiovascular    Breast cancer Maternal Aunt     Cancer Maternal Aunt         breast    Diabetes Maternal Uncle         mellitus    Alzheimer's disease Maternal Uncle     Diabetes Maternal Grandmother         mellitus    Pancreatic cancer Maternal Grandmother     Kidney disease Paternal Uncle        SOCIAL HISTORY    Social History     Tobacco Use    Smoking status: Former     Current packs/day: 0.00     Average packs/day: 1 pack/day for 34.7 years (34.7 ttl pk-yrs)     Types: Cigarettes     Start date: 1985     Quit date: 2019     Years since quittin.3     Passive exposure: Current    Smokeless tobacco: Never    Tobacco comments:     States she is quitting as of 2019.   Substance Use Topics    Alcohol use: Yes     Alcohol/week: 6.0 standard drinks of alcohol     Types: 6 Cans of beer per week     Comment: occassionally         ALLERGIES    Allergies   Allergen Reactions    Penicillins Rash    Ciprofloxacin Rash    Penicillin G Rash         MEDICATIONS    Current Outpatient Medications on File Prior to Visit   Medication Sig Dispense Refill    acetaminophen (TYLENOL) 325 MG tablet Take 2 tablets by mouth Every 6 (Six) Hours As Needed for Mild Pain.      aspirin 81 MG EC tablet Take 1 tablet by mouth Daily. 90 tablet 3    baclofen (LIORESAL) 10 MG tablet Take 1 tablet by mouth 3 (Three) Times a Day. 90 tablet 0    cetirizine (zyrTEC) 10 MG tablet Take 1 tablet by mouth Daily.      cholecalciferol (VITAMIN D3) 1.25 MG (18286 UT) capsule Take 1 capsule by mouth Daily.      clopidogrel (PLAVIX) 75 MG tablet Take 1 tablet by mouth Daily. 90 tablet 3    diclofenac (VOLTAREN) 50 MG EC tablet Take 1 tablet by mouth 2 (Two) Times a Day As Needed (pain and inflammation). 90 tablet 0    ezetimibe (ZETIA) 10 MG tablet TAKE 1 TABLET BY MOUTH EVERY DAY 90 tablet 3    furosemide (LASIX) 40 MG tablet Take 1 tablet by mouth As Needed. As needed for swelling after first dose      gabapentin (NEURONTIN) 300  "MG capsule Take 1 capsule by mouth Daily for 1 day, THEN 1 capsule 2 (Two) Times a Day for 1 day, THEN 1 capsule 3 (Three) Times a Day for 29 days. 90 capsule 0    lisinopril (PRINIVIL,ZESTRIL) 10 MG tablet Take 1 tablet by mouth Daily. 90 tablet 3    metFORMIN (GLUCOPHAGE) 500 MG tablet TAKE 1 TABLET BY MOUTH EVERY DAY WITH BREAKFAST 90 tablet 3    metoprolol succinate XL (TOPROL-XL) 50 MG 24 hr tablet Take 1 tablet by mouth Daily. 90 tablet 3    naloxone (NARCAN) 4 MG/0.1ML nasal spray Call 911. Don't prime. [place] in 1 nostril for overdose. Repeat in 2-3 minutes in other nostril if no or minimal breathing/responsiveness. 2 each 0    nitroglycerin (NITROSTAT) 0.4 MG SL tablet Place 1 tablet under the tongue Every 5 (Five) Minutes As Needed for Chest Pain. Take no more than 3 doses in 15 minutes. 100 tablet 0    omeprazole (priLOSEC) 20 MG capsule Take 1 capsule by mouth Daily. 90 capsule 3    Probiotic Product (PROBIOTIC BLEND PO) Take 1 capsule by mouth Daily.      Semaglutide,0.25 or 0.5MG/DOS, (Ozempic, 0.25 or 0.5 MG/DOSE,) 2 MG/1.5ML solution pen-injector Inject 0.5 mg under the skin into the appropriate area as directed 1 (One) Time Per Week for 56 days. 1.5 mL 1    vitamin B-12 (CYANOCOBALAMIN) 500 MCG tablet Take 1 tablet by mouth Daily.      predniSONE (DELTASONE) 20 MG tablet Take 1 tablet by mouth Daily As Needed (neck pain). (Patient not taking: Reported on 2/17/2025) 12 tablet 0     No current facility-administered medications on file prior to visit.       PHYSICAL EXAM    Vitals:    02/17/25 0937   BP: 123/74   BP Location: Left arm   Patient Position: Sitting   Cuff Size: Adult   Pulse: 73   SpO2: 98%   Weight: 79.2 kg (174 lb 9.6 oz)   Height: 165.1 cm (65\")        Constitutional:  Well developed, well nourished, no acute distress, non-toxic appearance   Eyes:  PERRL, conjunctiva normal   HENT:  Atraumatic, external ears normal, nose normal, oropharynx moist, no pharyngeal exudates. mallampatti "   Neck- normal range of motion, no tenderness, supple   Respiratory:  No respiratory distress, normal breath sounds, no rales, no wheezing   Cardiovascular:  Normal rate, normal rhythm, no murmurs, no gallops, no rubs   GI:  Soft, nondistended, normal bowel sounds, nontender, no organomegaly, no mass, no rebound, no guarding   :  No costovertebral angle tenderness   Musculoskeletal:  No edema, no tenderness, no deformities. Back- no tenderness  Integument:  Well hydrated, no rash   Lymphatic:  No lymphadenopathy noted   Neurologic:  Alert & oriented x 3, CN 2-12 normal, normal motor function, normal sensory function, no focal deficits noted   Psychiatric:  Speech and behavior appropriate     XR Spine Cervical Complete 4 or 5 View    Result Date: 12/5/2024  Impression: 1. Negative for acute osseous abnormality. 2. Multilevel cervical spondylosis above. Electronically Signed: Joseluis Cota MD  12/5/2024 12:37 PM EST  Workstation ID: [id number]    Results for orders placed during the hospital encounter of 10/02/23    Adult Transthoracic Echo Complete W/ Cont if Necessary Per Protocol    Interpretation Summary  Normal left and right ventricular size and systolic function  Indeterminate left ventricular diastolic function  Normal biatrial size  Aortic valve sclerosis without stenosis  Mild to moderate central aortic regurgitation  Mildly dilated ascending aorta.      ASSESSMENT & PLAN:      54-year-old female quit smoking September 2029 after heart attack, status post PCI left main, CT chest on 1/8/2024, interval development of bilateral pulm nodules up to 7 mm in size  Denies any dyspnea at rest  On exam diminished breath sounds but no rales no wheezing  No PFTs  Does not use inhalers.    Assessment   interval development of bilateral micronodules on CT scan 1/13/2025 at priority radiology measuring up to 7 mm  History of coronary artery disease with occasional snoring and witnessed apneas and daytime  sleepiness.    Plan  Repeat CT scan of the chest is scheduled already April 7, 2025  Home sleep study        This document has been electronically signed by  Kassandra Cordero MD  09:44 EST

## 2025-02-18 RX ORDER — PANTOPRAZOLE SODIUM 20 MG/1
20 TABLET, DELAYED RELEASE ORAL DAILY
Qty: 90 TABLET | Refills: 3 | Status: SHIPPED | OUTPATIENT
Start: 2025-02-18

## 2025-02-24 ENCOUNTER — HOSPITAL ENCOUNTER (OUTPATIENT)
Dept: MAMMOGRAPHY | Facility: HOSPITAL | Age: 55
Discharge: HOME | End: 2025-02-24
Payer: MEDICAID

## 2025-02-24 ENCOUNTER — HOSPITAL ENCOUNTER (OUTPATIENT)
Dept: BONE DENSITY | Facility: HOSPITAL | Age: 55
Discharge: HOME | End: 2025-02-24
Payer: MEDICAID

## 2025-02-24 DIAGNOSIS — Z12.31 ENCOUNTER FOR SCREENING MAMMOGRAM FOR MALIGNANT NEOPLASM OF BREAST: ICD-10-CM

## 2025-02-24 PROCEDURE — 77063 BREAST TOMOSYNTHESIS BI: CPT

## 2025-02-24 PROCEDURE — 77067 SCR MAMMO BI INCL CAD: CPT

## 2025-02-24 PROCEDURE — 77080 DXA BONE DENSITY AXIAL: CPT

## 2025-02-27 DIAGNOSIS — M50.00 CERVICAL DISC DISEASE WITH MYELOPATHY: ICD-10-CM

## 2025-02-27 RX ORDER — BACLOFEN 10 MG/1
10 TABLET ORAL 3 TIMES DAILY
Qty: 90 TABLET | Refills: 0 | Status: SHIPPED | OUTPATIENT
Start: 2025-02-27

## 2025-03-10 ENCOUNTER — HOSPITAL ENCOUNTER (OUTPATIENT)
Dept: SLEEP MEDICINE | Facility: HOSPITAL | Age: 55
Discharge: HOME | End: 2025-03-10
Admitting: INTERNAL MEDICINE
Payer: MEDICAID

## 2025-03-10 DIAGNOSIS — R06.83 SNORING: ICD-10-CM

## 2025-03-10 PROCEDURE — G0399 HOME SLEEP TEST/TYPE 3 PORTA: HCPCS

## 2025-03-12 DIAGNOSIS — R06.83 SNORING: Primary | ICD-10-CM

## 2025-03-21 DIAGNOSIS — E11.65 TYPE 2 DIABETES MELLITUS WITH HYPERGLYCEMIA, WITHOUT LONG-TERM CURRENT USE OF INSULIN: Primary | ICD-10-CM

## 2025-03-25 DIAGNOSIS — M50.00 CERVICAL DISC DISEASE WITH MYELOPATHY: ICD-10-CM

## 2025-03-25 RX ORDER — BACLOFEN 10 MG/1
10 TABLET ORAL 3 TIMES DAILY
Qty: 90 TABLET | Refills: 0 | Status: SHIPPED | OUTPATIENT
Start: 2025-03-25

## 2025-04-07 ENCOUNTER — HOSPITAL ENCOUNTER (OUTPATIENT)
Dept: CT IMAGING | Facility: HOSPITAL | Age: 55
Discharge: HOME | End: 2025-04-07
Payer: MEDICAID

## 2025-04-07 DIAGNOSIS — R91.8 PULMONARY NODULES: ICD-10-CM

## 2025-04-07 PROCEDURE — 71250 CT THORAX DX C-: CPT

## 2025-04-09 ENCOUNTER — TELEPHONE (OUTPATIENT)
Dept: CARDIAC SURGERY | Facility: CLINIC | Age: 55
End: 2025-04-09
Payer: MEDICAID

## 2025-04-09 RX ORDER — OMEPRAZOLE 20 MG/1
20 CAPSULE, DELAYED RELEASE ORAL DAILY
COMMUNITY

## 2025-04-16 ENCOUNTER — OFFICE VISIT (OUTPATIENT)
Dept: CARDIAC SURGERY | Facility: CLINIC | Age: 55
End: 2025-04-16
Payer: MEDICAID

## 2025-04-16 VITALS
RESPIRATION RATE: 18 BRPM | OXYGEN SATURATION: 98 % | WEIGHT: 174 LBS | HEART RATE: 73 BPM | HEIGHT: 65 IN | DIASTOLIC BLOOD PRESSURE: 79 MMHG | BODY MASS INDEX: 28.99 KG/M2 | TEMPERATURE: 97.7 F | SYSTOLIC BLOOD PRESSURE: 114 MMHG

## 2025-04-16 DIAGNOSIS — Q25.43 AORTIC ROOT ANEURYSM: ICD-10-CM

## 2025-04-16 DIAGNOSIS — E78.2 MIXED HYPERLIPIDEMIA: Chronic | ICD-10-CM

## 2025-04-16 DIAGNOSIS — E11.65 TYPE 2 DIABETES MELLITUS WITH HYPERGLYCEMIA, WITHOUT LONG-TERM CURRENT USE OF INSULIN: ICD-10-CM

## 2025-04-16 DIAGNOSIS — Z87.891 HISTORY OF TOBACCO USE: ICD-10-CM

## 2025-04-16 DIAGNOSIS — I25.2 HISTORY OF MI (MYOCARDIAL INFARCTION): ICD-10-CM

## 2025-04-16 DIAGNOSIS — I10 HYPERTENSION, BENIGN: Primary | Chronic | ICD-10-CM

## 2025-04-16 DIAGNOSIS — I25.10 CORONARY ARTERY DISEASE INVOLVING NATIVE CORONARY ARTERY OF NATIVE HEART WITHOUT ANGINA PECTORIS: ICD-10-CM

## 2025-04-16 NOTE — PROGRESS NOTES
4/16/2025      Subjective:      Luis Madrid MD    Chief Complaint: Evaluation of aortic root aneurysm/ ascending aortic dilatation    History of Present Illness:       Dear Luis Wallace MD and Colleagues,    It was nice to see Sada Le in [facility] today. She is a 54 y.o. female with known hx of interval growth of an aortic aneurysm, ascending aortic dilatation, CAD--s/p Impella-supported stenting to prox LAD after an anterior STEMI in 2019 and subsequent stenting to proximal LCx in 2022, hx ICM/ HF, DM type 2 on Ozempic, former tobacco abuse, pulmonary nodules,  HTN, HLD, compensated HFpEF, basal cell carcinoma, and diverticulitis with abscess in Oct 2023. Her aortic aneurysm was originally discovered during her cardiac workup including an echo. Dr. Martinez has followed her dilatation until recently when she had notable interval growth from 4.2-4.8 cm in 2 years.  She comes in today for routine evaluation and management of aortic aneurysm. The CTA of chest in 2021 was reviewed.  The aortic root measures 4.3 cm, ascending aorta measures 3.6 cm. CTA chest 2023 root 4.2 cm, asc ao 3.7. CTA chest 2024 ao root 4.7 cm, asc ao 3.6 cm. CT chest without contrast on 4/7/2025 was reviewed. The aortic root measures 4.6-4.7 cm, ascending aorta measures 3.6-3.7,  and DTA measures 2.5 cm.  These measurements are stable over last couple years. Incidental findings include multiple non-calcified pulmonary nodules. She is aware of these findings and that they are decreasing in size. She is seeing Dr. Cordero as well. Her last echo was in 2023 showing EF 60%, structurally normal aortic valve with mild to moderate AI, no AS, and trace TR.  She denies shortness of breath, chest/back pain, numbness/tingling/pain of extremities.  No vascular deficiencies or hyperextensible or hypermobile joints are noted on exam.  The patient's family history is positive for aneurysms. Her mother has an ascending aortic dilatation.  Family hx is negative for aortic dissections, negative for connective tissue disease, and positive for coronary disease in first degree family members. Her father  from a heart attack. Her BP today is 114/79. 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 She is very knowledgeable about her health and her medications. She reports an upcoming colonoscopy as well. She is alone for the appt today.        Patient Active Problem List   Diagnosis    Allergic rhinitis    Cervical disc disease with myelopathy    Diverticulosis of colon    Hypertension, benign    Class 1 obesity due to excess calories with serious comorbidity and body mass index (BMI) of 32.0 to 32.9 in adult    Osteopenia of spine    Mixed hyperlipidemia    Ischemic cardiomyopathy    Stented coronary artery    Annual visit for general adult medical examination with abnormal findings    Cervical cancer screening    Encounter for screening for malignant neoplasm of breast    Colon cancer screening    History of tobacco use    Generalized anxiety disorder    ASD (atrial septal defect)    Coronary artery disease involving native coronary artery of native heart without angina pectoris    History of MI (myocardial infarction)    Branch retinal vein occlusion with macular edema    Hypertensive retinopathy    Gastroesophageal reflux disease without esophagitis    Cellulitis and abscess of buttock    Aneurysm of ascending aorta without rupture    Type 2 diabetes mellitus with hyperglycemia, without long-term current use of insulin    Chronic pain of right knee    Diverticulitis of intestine with abscess    Cellulitis of left thumb       Past Medical History:   Diagnosis Date    Abnormal ECG 2019    Absence of left thumb     Impression: hx of MRSA, she is signficantly improved She will continue meds and followup if sxs have not resolved over the next 2 weeks.. She is released from care and will notify us of any problems    Acute otitis media     Allergic  rhinitis     Aneurysm 2019    Arteriovenous malformation     Arthritis     neck    Cancer     skin    Cervical disc disease with myelopathy     CHF (congestive heart failure) 9/2019    Contact dermatitis or eczema     Coronary artery disease     Diabetes mellitus 9/2023    Disorder of bone and cartilage     Diverticulitis of colon     Impression: CT confirms in the sigmoid colon 10/2011    Diverticulosis of colon     Impression: No sxs 02/13/2013    Elevated cholesterol     Elevated lipoprotein(a) 04/30/2020    Elevated temperature     External otitis of left ear     Gastroesophageal reflux disease without esophagitis 06/19/2022    Hearing loss due to cerumen impaction, left     Hemangioma of liver     History of echocardiogram 10/03/2019    Severely reduced LV systolic function. EF 34% Distribution indicative of LAD territory injury. Akinesis of the apex as well as apical lateral and mid to apical septal walls. Preservation of posterior inferior lateral walls. Mild AI, mild TR. Normal RV function.     History of transesophageal echocardiography (NAFISA) 01/07/2020    EF 55% LA cavity is mild to moderately dilated. Mild MR. Interatrial septum appears redundant. Interatrial hypermobile c/w aneurysm. Large PFO is noted with right to left shunting on bubble study. PFO tunnel appears to be short.     Hordeolum externum of left lower eyelid     Impression: She was started on Bactrim DS for her infection. She was also advised to use warm compression. She will followup should sxs not improve over the next 48 hrs and resolve over the next 1 weeek.    Hyperlipidemia     Impression: Diet controled. She should see improvement with her successful wt loss. We discussed her lipid panel.    Hypertension, benign     Impression: new onset Low salt low calorie diet and regular exercise and followup in 1 mo She will monitor her pressures and notify us of her pressures over the next 1 week.    Inclusion cyst of right breast      Impression: We will follow for now. She is encouraged to have Mammography. She is presently without insurance and reluctant. She bobby consider. She will notify us of any change at all in the lesion for the only way to be certain of it's etilogy is to remove it. She understands.    Insomnia, organic     Menopausal syndrome     Myocardial infarction 9/29/19    Overweight (BMI 25.0-29.9)     Pulmonary arterial hypertension 9/2019    RUQ abdominal pain     Impression: Resolving, negative GB u/s, HIDA EF 77%, we will follow    Sleep apnea     Stented coronary artery 09/29/2019    Thoracic aortic aneurysm     Thoracic disc disorder     Tobacco use     Impression: She is strongly encouraged to stop smoking. Cessation techniques discussed and encouraged. The consequences of not stopping discussed, ie, CAD, PVD, Stroke, Lung and other cancers.       Past Surgical History:   Procedure Laterality Date    BIVENTRICULAR ASSIST DEVICE/LEFT VENTRICULAR ASSIST DEVICE INSERTION N/A 09/29/2019    Procedure: Left Ventricular Assist Device;  Surgeon: Brant Martinez MD;  Location: [facility] CATH INVASIVE LOCATION;  Service: Cardiovascular    CARDIAC CATHETERIZATION N/A 09/29/2019    Procedure: Left Heart Cath;  Surgeon: Brant Martinez MD;  Location: [facility] CATH INVASIVE LOCATION;  Service: Cardiovascular    CARDIAC CATHETERIZATION N/A 09/29/2019    Procedure: Coronary angiography;  Surgeon: Brant Martinez MD;  Location: [facility] CATH INVASIVE LOCATION;  Service: Cardiovascular    CARDIAC CATHETERIZATION N/A 09/29/2019    Procedure: Left ventriculography;  Surgeon: Brant Martinez MD;  Location: [facility] CATH INVASIVE LOCATION;  Service: Cardiovascular    CARDIAC CATHETERIZATION N/A 09/29/2019    Procedure: Stent SERENITY coronary;  Surgeon: Brant Martinez MD;  Location: [facility] CATH INVASIVE LOCATION;  Service: Cardiovascular    CARDIAC CATHETERIZATION N/A 02/25/2020    Procedure: Right Heart Cath;   Surgeon: Brant Martinez MD;  Location: [facility] CATH INVASIVE LOCATION;  Service: Cardiology;  Laterality: N/A;    CARDIAC CATHETERIZATION N/A 02/12/2021    Procedure: Left Heart Cath;  Surgeon: Brant Martinez MD;  Location: [facility] CATH INVASIVE LOCATION;  Service: Cardiology;  Laterality: N/A;    CARDIAC CATHETERIZATION N/A 02/12/2021    Procedure: Coronary angiography;  Surgeon: Brant Martinez MD;  Location: [facility] CATH INVASIVE LOCATION;  Service: Cardiology;  Laterality: N/A;    CARDIAC CATHETERIZATION N/A 02/12/2021    Procedure: Left ventriculography;  Surgeon: Brant Martinez MD;  Location: [facility] CATH INVASIVE LOCATION;  Service: Cardiology;  Laterality: N/A;    CARDIAC CATHETERIZATION N/A 02/12/2021    Procedure: Aortic root aortogram;  Surgeon: Brant Martinez MD;  Location: [facility] CATH INVASIVE LOCATION;  Service: Cardiology;  Laterality: N/A;    CARDIAC CATHETERIZATION N/A 02/12/2021    Procedure: Percutaneous Coronary Intervention;  Surgeon: Brant Martinez MD;  Location: [facility] CATH INVASIVE LOCATION;  Service: Cardiology;  Laterality: N/A;    CARDIAC CATHETERIZATION N/A 02/12/2021    Procedure: Stent SERENITY coronary;  Surgeon: Brant Martinez MD;  Location: [facility] CATH INVASIVE LOCATION;  Service: Cardiology;  Laterality: N/A;    CARDIAC ELECTROPHYSIOLOGY PROCEDURE  09/29/2019    Procedure: Impella Insertion;  Surgeon: Brant Martinez MD;  Location: [facility] CATH INVASIVE LOCATION;  Service: Cardiovascular    COLONOSCOPY N/A 2/5/2024    Procedure: COLONOSCOPY with polypectomy x 17;  Surgeon: Braulio Hinojosa MD;  Location: [facility] ENDOSCOPY;  Service: Gastroenterology;  Laterality: N/A;  colon polyps    CORONARY ANGIOPLASTY  02/12/2021    1x stent to Circ    CORONARY ANGIOPLASTY WITH STENT PLACEMENT  02/2021    AK RT/LT HEART CATHETERS N/A 09/29/2019    Procedure: Percutaneous Coronary Intervention;  Surgeon: Brant Martinez  MD Ajit;  Location: [facility] INVASIVE LOCATION;  Service: Cardiovascular    TOTAL ABDOMINAL HYSTERECTOMY WITH SALPINGO OOPHORECTOMY  1995    Endometriosis       Allergies   Allergen Reactions    Penicillins Rash    Ciprofloxacin Rash    Penicillin G Rash         Current Outpatient Medications:     acetaminophen (TYLENOL) 325 MG tablet, Take 2 tablets by mouth Every 6 (Six) Hours As Needed for Mild Pain., Disp: , Rfl:     aspirin 81 MG EC tablet, Take 1 tablet by mouth Daily., Disp: 90 tablet, Rfl: 3    baclofen (LIORESAL) 10 MG tablet, Take 1 tablet by mouth 3 (Three) Times a Day., Disp: 90 tablet, Rfl: 0    cetirizine (zyrTEC) 10 MG tablet, Take 1 tablet by mouth Daily., Disp: , Rfl:     cholecalciferol (VITAMIN D3) 1.25 MG (30164 UT) capsule, Take 1 capsule by mouth Daily., Disp: , Rfl:     diclofenac (VOLTAREN) 50 MG EC tablet, Take 1 tablet by mouth 2 (Two) Times a Day As Needed (pain and inflammation)., Disp: 90 tablet, Rfl: 0    ezetimibe (ZETIA) 10 MG tablet, TAKE 1 TABLET BY MOUTH EVERY DAY, Disp: 90 tablet, Rfl: 3    furosemide (LASIX) 40 MG tablet, Take 1 tablet by mouth As Needed. As needed for swelling after first dose, Disp: , Rfl:     lisinopril (PRINIVIL,ZESTRIL) 10 MG tablet, Take 1 tablet by mouth Daily., Disp: 90 tablet, Rfl: 3    metFORMIN (GLUCOPHAGE) 500 MG tablet, TAKE 1 TABLET BY MOUTH EVERY DAY WITH BREAKFAST, Disp: 90 tablet, Rfl: 3    metoprolol succinate XL (TOPROL-XL) 50 MG 24 hr tablet, Take 1 tablet by mouth Daily., Disp: 90 tablet, Rfl: 3    nitroglycerin (NITROSTAT) 0.4 MG SL tablet, Place 1 tablet under the tongue Every 5 (Five) Minutes As Needed for Chest Pain. Take no more than 3 doses in 15 minutes., Disp: 100 tablet, Rfl: 0    omeprazole (priLOSEC) 20 MG capsule, Take 1 capsule by mouth Daily., Disp: , Rfl:     pantoprazole (PROTONIX) 20 MG EC tablet, Take 1 tablet by mouth Daily., Disp: 90 tablet, Rfl: 3    Probiotic Product (PROBIOTIC BLEND PO), Take 1 capsule by mouth  Daily., Disp: , Rfl:     vitamin B-12 (CYANOCOBALAMIN) 500 MCG tablet, Take 1 tablet by mouth Daily., Disp: , Rfl:     clopidogrel (PLAVIX) 75 MG tablet, Take 1 tablet by mouth Daily. (Patient not taking: Reported on 2025), Disp: 90 tablet, Rfl: 3    gabapentin (NEURONTIN) 300 MG capsule, Take 1 capsule by mouth Daily for 1 day, THEN 1 capsule 2 (Two) Times a Day for 1 day, THEN 1 capsule 3 (Three) Times a Day for 29 days., Disp: 90 capsule, Rfl: 0    naloxone (NARCAN) 4 MG/0.1ML nasal spray, Call 911. Don't prime. [place] in 1 nostril for overdose. Repeat in 2-3 minutes in other nostril if no or minimal breathing/responsiveness. (Patient not taking: Reported on 2025), Disp: 2 each, Rfl: 0    predniSONE (DELTASONE) 20 MG tablet, Take 1 tablet by mouth Daily As Needed (neck pain). (Patient not taking: Reported on 2025), Disp: 12 tablet, Rfl: 0    Semaglutide, 1 MG/DOSE, (OZEMPIC) 4 MG/3ML solution pen-injector, Inject 1 mg under the skin into the appropriate area as directed 1 (One) Time Per Week. (Patient not taking: Reported on 2025), Disp: 3 mL, Rfl: 11    Social History     Socioeconomic History    Marital status:    Tobacco Use    Smoking status: Former     Current packs/day: 0.00     Average packs/day: 1 pack/day for 34.7 years (34.7 ttl pk-yrs)     Types: Cigarettes     Start date: 1985     Quit date: 2019     Years since quittin.5     Passive exposure: Current    Smokeless tobacco: Never    Tobacco comments:     States she is quitting as of 2019.   Vaping Use    Vaping status: Never Used    Passive vaping exposure: Yes   Substance and Sexual Activity    Alcohol use: Yes     Alcohol/week: 6.0 standard drinks of alcohol     Types: 6 Cans of beer per week     Comment: occassionally     Drug use: Not Currently     Types: Marijuana     Comment: occasional    Sexual activity: Yes     Partners: Male     Birth control/protection: Post-menopausal, Hysterectomy       Family  History   Problem Relation Age of Onset    Alzheimer's disease Mother     Heart disease Mother         cardiovascular    Kidney disease Mother     Throat cancer Mother     Thyroid disease Mother     Arthritis Mother     Cancer Mother     Diabetes Father         mellitus    Heart attack Father     Thyroid disease Father     Hypertension Father     Heart disease Sister         cardiovascular    Hyperlipidemia Sister     Diabetes Maternal Aunt         mellitus    Heart disease Maternal Aunt         cardiovascular    Breast cancer Maternal Aunt     Cancer Maternal Aunt         breast    Diabetes Maternal Uncle         mellitus    Alzheimer's disease Maternal Uncle     Diabetes Maternal Grandmother         mellitus    Pancreatic cancer Maternal Grandmother     Kidney disease Paternal Uncle            Review of Systems:  Review of Systems   Respiratory:  Negative for shortness of breath.    Cardiovascular:  Negative for palpitations and leg swelling.   Neurological:  Negative for dizziness and light-headedness.       Physical Exam:    Vital Signs:  Weight: 78.9 kg (174 lb)   Body mass index is 28.96 kg/m².  Temp: 97.7 °F (36.5 °C)   Heart Rate: 73   BP: 114/79     Physical Exam  Vitals and nursing note reviewed.   Constitutional:       General: She is awake.      Appearance: Normal appearance. She is well-developed and well-groomed.   HENT:      Head: Normocephalic and atraumatic.      Nose: Nose normal.      Mouth/Throat:      Lips: Pink.      Mouth: Mucous membranes are moist.      Pharynx: Uvula midline.   Eyes:      General: Lids are normal. No scleral icterus.     Extraocular Movements: Extraocular movements intact.      Conjunctiva/sclera: Conjunctivae normal.      Pupils: Pupils are equal, round, and reactive to light.   Neck:      Thyroid: No thyroid mass or thyromegaly.      Vascular: Normal carotid pulses. No carotid bruit, hepatojugular reflux or JVD.      Trachea: Trachea normal.   Cardiovascular:      Rate  and Rhythm: Normal rate and regular rhythm.      Pulses:           Carotid pulses are 2+ on the right side and 2+ on the left side.       Radial pulses are 2+ on the right side and 2+ on the left side.        Femoral pulses are 2+ on the right side and 2+ on the left side.       Popliteal pulses are 2+ on the right side and 2+ on the left side.        Dorsalis pedis pulses are 2+ on the right side and 2+ on the left side.        Posterior tibial pulses are 2+ on the right side and 2+ on the left side.      Heart sounds: Normal heart sounds. No murmur heard.  Pulmonary:      Effort: Pulmonary effort is normal.      Breath sounds: Normal breath sounds.   Abdominal:      General: Bowel sounds are normal. There is no distension.      Palpations: Abdomen is soft.      Tenderness: There is no abdominal tenderness.   Musculoskeletal:      Cervical back: Neck supple.      Right lower leg: No edema.      Left lower leg: No edema.      Comments: Gait steady and strong without use of assistive devices   Lymphadenopathy:      Cervical: No cervical adenopathy.      Upper Body:      Right upper body: No supraclavicular adenopathy.      Left upper body: No supraclavicular adenopathy.   Skin:     General: Skin is warm and dry.      Capillary Refill: Capillary refill takes less than 2 seconds.      Findings: No erythema or rash.      Nails: There is no clubbing.   Neurological:      Mental Status: She is alert and oriented to person, place, and time.      GCS: GCS eye subscore is 4. GCS verbal subscore is 5. GCS motor subscore is 6.   Psychiatric:         Attention and Perception: Attention and perception normal.         Mood and Affect: Mood and affect normal.         Speech: Speech normal.         Behavior: Behavior normal. Behavior is cooperative.         Thought Content: Thought content normal.         Cognition and Memory: Cognition and memory normal.         Judgment: Judgment normal.          Assessment:     Aortic root  aneurysm with structurally normal aortic valve and mild to moderate AI--interval growth over last 2 years, last echo 2023  Ascending aortic dilatation, 3.6-3.7 cm  ASD--per echo  HTN--well controlled  HLD--statin  DM type 2 with Ozempic use  Former tobacco abuse--cessation 2019  CAD with anterior STEMI presentation (2019) with Impella stenting to proximal LAD (2019), subsequent stenting to LCx (2022)--on asa/Plavix  Compensated HFpEF--GDMT  Pulmonary nodules, stable--followed by Dr. Cordero  Family hx aortic dilatation--mother    Recommendation/Plan:       Continued risk factor modification of hypertension with a goal blood pressure less than 130/80, hyperlipidemia optimization, and continued avoidance of tobacco/nicotine products.    We discussed the importance of avoidance of over the counter decongestants and stimulants, specifically pseudoephedrine, for hypertension and aneurysm management.    Initiation of low aerobic exercise is indicated to help reduce body habitus, assist with blood pressure and cholesterol control.       Although risk of rupture is low, emergency department presentation is warranted for acute chest, back, or abdominal pain, syncope, or stroke like symptoms.    Follow up in [facility] in 1 year(s) with CTA scan of chest and echocardiogram. Aortic measurements have had interval growth over last couple years. We discussed the natural history of aortic aneurysmal disease and general guidelines for surgical intervention. She is aware of a genetic component of aneurysmal disease, the need to avoid smoking, uncontrolled HTN, and possibly the fluoroquinolone family of antibiotics as independent risk factors for aneurysmal growth and rupture.     I spent approximately 47 minutes total in evaluating the data, examining the patient, discussing the options and counseling.  Independent interpretation of imaging.  Imaging shown to pt.     Thank you for allowing us to participate in her  care.    Regards,    Colleen Pond, APRN      **All problems and history are new to this examiner.  Extensive review of records prior to and during patient visit

## 2025-04-17 DIAGNOSIS — I71.21 ANEURYSM OF ASCENDING AORTA WITHOUT RUPTURE: ICD-10-CM

## 2025-04-17 DIAGNOSIS — Q25.43 AORTIC ROOT ANEURYSM: Primary | ICD-10-CM

## 2025-04-20 ENCOUNTER — ANESTHESIA EVENT (OUTPATIENT)
Dept: GASTROENTEROLOGY | Facility: HOSPITAL | Age: 55
End: 2025-04-20
Payer: MEDICAID

## 2025-04-21 ENCOUNTER — HOSPITAL ENCOUNTER (OUTPATIENT)
Facility: HOSPITAL | Age: 55
Setting detail: HOSPITAL OUTPATIENT SURGERY
Discharge: HOME | End: 2025-04-21
Attending: INTERNAL MEDICINE | Admitting: INTERNAL MEDICINE
Payer: MEDICAID

## 2025-04-21 ENCOUNTER — ON CAMPUS - OUTPATIENT (OUTPATIENT)
Dept: URBAN - METROPOLITAN AREA HOSPITAL 85 | Facility: HOSPITAL | Age: 55
End: 2025-04-21
Payer: COMMERCIAL

## 2025-04-21 ENCOUNTER — ANESTHESIA (OUTPATIENT)
Dept: GASTROENTEROLOGY | Facility: HOSPITAL | Age: 55
End: 2025-04-21
Payer: MEDICAID

## 2025-04-21 VITALS
OXYGEN SATURATION: 98 % | BODY MASS INDEX: 27.99 KG/M2 | HEART RATE: 63 BPM | DIASTOLIC BLOOD PRESSURE: 77 MMHG | RESPIRATION RATE: 16 BRPM | TEMPERATURE: 97.4 F | SYSTOLIC BLOOD PRESSURE: 136 MMHG | HEIGHT: 65 IN | WEIGHT: 168 LBS

## 2025-04-21 DIAGNOSIS — Z86.0100 PERSONAL HISTORY OF COLON POLYPS, UNSPECIFIED: ICD-10-CM

## 2025-04-21 DIAGNOSIS — D12.4 BENIGN NEOPLASM OF DESCENDING COLON: ICD-10-CM

## 2025-04-21 DIAGNOSIS — Z09 ENCOUNTER FOR FOLLOW-UP EXAMINATION AFTER COMPLETED TREATMEN: ICD-10-CM

## 2025-04-21 DIAGNOSIS — K57.30 DIVERTICULOSIS OF LARGE INTESTINE WITHOUT PERFORATION OR ABS: ICD-10-CM

## 2025-04-21 DIAGNOSIS — D12.2 BENIGN NEOPLASM OF ASCENDING COLON: ICD-10-CM

## 2025-04-21 DIAGNOSIS — K63.5 POLYP OF COLON: ICD-10-CM

## 2025-04-21 DIAGNOSIS — Z86.0101 PERSONAL HISTORY OF ADENOMATOUS AND SERRATED COLON POLYPS: ICD-10-CM

## 2025-04-21 DIAGNOSIS — D12.0 BENIGN NEOPLASM OF CECUM: ICD-10-CM

## 2025-04-21 LAB — GLUCOSE BLDC GLUCOMTR-MCNC: 121 MG/DL (ref 70–105)

## 2025-04-21 PROCEDURE — 45385 COLONOSCOPY W/LESION REMOVAL: CPT | Mod: 33 | Performed by: INTERNAL MEDICINE

## 2025-04-21 PROCEDURE — 25810000003 SODIUM CHLORIDE 0.9 % SOLUTION: Performed by: INTERNAL MEDICINE

## 2025-04-21 PROCEDURE — 25810000003 SODIUM CHLORIDE 0.9 % SOLUTION: Performed by: ANESTHESIOLOGY

## 2025-04-21 PROCEDURE — 88305 TISSUE EXAM BY PATHOLOGIST: CPT | Performed by: INTERNAL MEDICINE

## 2025-04-21 PROCEDURE — 82948 REAGENT STRIP/BLOOD GLUCOSE: CPT

## 2025-04-21 PROCEDURE — 25010000002 LIDOCAINE 2% SOLUTION: Performed by: NURSE ANESTHETIST, CERTIFIED REGISTERED

## 2025-04-21 PROCEDURE — 45380 COLONOSCOPY AND BIOPSY: CPT | Mod: 59,33 | Performed by: INTERNAL MEDICINE

## 2025-04-21 PROCEDURE — 25010000002 PROPOFOL 200 MG/20ML EMULSION: Performed by: NURSE ANESTHETIST, CERTIFIED REGISTERED

## 2025-04-21 PROCEDURE — 45380 COLONOSCOPY AND BIOPSY: CPT | Mod: 33,59 | Performed by: INTERNAL MEDICINE

## 2025-04-21 PROCEDURE — 25010000002 PROPOFOL 10 MG/ML EMULSION: Performed by: NURSE ANESTHETIST, CERTIFIED REGISTERED

## 2025-04-21 PROCEDURE — 25010000002 GLYCOPYRROLATE 0.2 MG/ML SOLUTION: Performed by: NURSE ANESTHETIST, CERTIFIED REGISTERED

## 2025-04-21 PROCEDURE — 25010000002 FENTANYL CITRATE (PF) 100 MCG/2ML SOLUTION: Performed by: NURSE ANESTHETIST, CERTIFIED REGISTERED

## 2025-04-21 RX ORDER — PROPOFOL 10 MG/ML
INJECTION, EMULSION INTRAVENOUS AS NEEDED
Status: DISCONTINUED | OUTPATIENT
Start: 2025-04-21 | End: 2025-04-21 | Stop reason: SURG

## 2025-04-21 RX ORDER — ONDANSETRON 2 MG/ML
4 INJECTION INTRAMUSCULAR; INTRAVENOUS ONCE AS NEEDED
Status: DISCONTINUED | OUTPATIENT
Start: 2025-04-21 | End: 2025-04-21 | Stop reason: SDUPTHER

## 2025-04-21 RX ORDER — HYDRALAZINE HYDROCHLORIDE 20 MG/ML
5 INJECTION INTRAMUSCULAR; INTRAVENOUS
Status: DISCONTINUED | OUTPATIENT
Start: 2025-04-21 | End: 2025-04-21 | Stop reason: HOSPADM

## 2025-04-21 RX ORDER — ONDANSETRON 2 MG/ML
4 INJECTION INTRAMUSCULAR; INTRAVENOUS ONCE AS NEEDED
Status: DISCONTINUED | OUTPATIENT
Start: 2025-04-21 | End: 2025-04-21 | Stop reason: HOSPADM

## 2025-04-21 RX ORDER — PROPOFOL 10 MG/ML
VIAL (ML) INTRAVENOUS CONTINUOUS PRN
Status: DISCONTINUED | OUTPATIENT
Start: 2025-04-21 | End: 2025-04-21 | Stop reason: SURG

## 2025-04-21 RX ORDER — SODIUM CHLORIDE 9 MG/ML
30 INJECTION, SOLUTION INTRAVENOUS CONTINUOUS PRN
Status: DISCONTINUED | OUTPATIENT
Start: 2025-04-21 | End: 2025-04-21 | Stop reason: HOSPADM

## 2025-04-21 RX ORDER — DEXMEDETOMIDINE HYDROCHLORIDE 100 UG/ML
INJECTION, SOLUTION INTRAVENOUS AS NEEDED
Status: DISCONTINUED | OUTPATIENT
Start: 2025-04-21 | End: 2025-04-21 | Stop reason: SURG

## 2025-04-21 RX ORDER — LABETALOL HYDROCHLORIDE 5 MG/ML
5 INJECTION, SOLUTION INTRAVENOUS
Status: DISCONTINUED | OUTPATIENT
Start: 2025-04-21 | End: 2025-04-21 | Stop reason: HOSPADM

## 2025-04-21 RX ORDER — DIPHENHYDRAMINE HYDROCHLORIDE 50 MG/ML
12.5 INJECTION, SOLUTION INTRAMUSCULAR; INTRAVENOUS
Status: DISCONTINUED | OUTPATIENT
Start: 2025-04-21 | End: 2025-04-21 | Stop reason: HOSPADM

## 2025-04-21 RX ORDER — EPHEDRINE SULFATE/0.9% NACL/PF 25 MG/5 ML
5 SYRINGE (ML) INTRAVENOUS ONCE AS NEEDED
Status: DISCONTINUED | OUTPATIENT
Start: 2025-04-21 | End: 2025-04-21 | Stop reason: HOSPADM

## 2025-04-21 RX ORDER — FENTANYL CITRATE 50 UG/ML
INJECTION, SOLUTION INTRAMUSCULAR; INTRAVENOUS AS NEEDED
Status: DISCONTINUED | OUTPATIENT
Start: 2025-04-21 | End: 2025-04-21 | Stop reason: SURG

## 2025-04-21 RX ORDER — IPRATROPIUM BROMIDE AND ALBUTEROL SULFATE 2.5; .5 MG/3ML; MG/3ML
3 SOLUTION RESPIRATORY (INHALATION) ONCE AS NEEDED
Status: DISCONTINUED | OUTPATIENT
Start: 2025-04-21 | End: 2025-04-21 | Stop reason: HOSPADM

## 2025-04-21 RX ORDER — GLYCOPYRROLATE 0.2 MG/ML
INJECTION INTRAMUSCULAR; INTRAVENOUS AS NEEDED
Status: DISCONTINUED | OUTPATIENT
Start: 2025-04-21 | End: 2025-04-21 | Stop reason: SURG

## 2025-04-21 RX ORDER — SODIUM CHLORIDE 9 MG/ML
50 INJECTION, SOLUTION INTRAVENOUS ONCE
Status: COMPLETED | OUTPATIENT
Start: 2025-04-21 | End: 2025-04-21

## 2025-04-21 RX ORDER — LIDOCAINE HYDROCHLORIDE 20 MG/ML
INJECTION, SOLUTION INFILTRATION; PERINEURAL AS NEEDED
Status: DISCONTINUED | OUTPATIENT
Start: 2025-04-21 | End: 2025-04-21 | Stop reason: SURG

## 2025-04-21 RX ADMIN — DEXMEDETOMIDINE HYDROCHLORIDE 6 MCG: 100 INJECTION, SOLUTION INTRAVENOUS at 10:29

## 2025-04-21 RX ADMIN — GLYCOPYRROLATE 0.2 MG: 0.2 INJECTION, SOLUTION INTRAMUSCULAR; INTRAVENOUS at 10:25

## 2025-04-21 RX ADMIN — PROPOFOL 20 MG: 10 INJECTION, EMULSION INTRAVENOUS at 10:29

## 2025-04-21 RX ADMIN — SODIUM CHLORIDE: 9 INJECTION, SOLUTION INTRAVENOUS at 10:21

## 2025-04-21 RX ADMIN — SODIUM CHLORIDE 50 ML/HR: 9 INJECTION, SOLUTION INTRAVENOUS at 09:28

## 2025-04-21 RX ADMIN — FENTANYL CITRATE 50 MCG: 50 INJECTION, SOLUTION INTRAMUSCULAR; INTRAVENOUS at 10:31

## 2025-04-21 RX ADMIN — LIDOCAINE HYDROCHLORIDE 80 MG: 20 INJECTION, SOLUTION INFILTRATION; PERINEURAL at 10:27

## 2025-04-21 RX ADMIN — PROPOFOL 50 MG: 10 INJECTION, EMULSION INTRAVENOUS at 10:27

## 2025-04-21 RX ADMIN — DEXMEDETOMIDINE HYDROCHLORIDE 4 MCG: 100 INJECTION, SOLUTION INTRAVENOUS at 10:27

## 2025-04-21 RX ADMIN — PROPOFOL 30 MG: 10 INJECTION, EMULSION INTRAVENOUS at 10:28

## 2025-04-21 RX ADMIN — PROPOFOL 150 MCG/KG/MIN: 10 INJECTION, EMULSION INTRAVENOUS at 10:27

## 2025-04-21 NOTE — OP NOTE
COLONOSCOPY Procedure Report    Patient Name:  Sada Le  YOB: 1970    Date of Surgery:  4/21/2025     Pre-Op Diagnosis:  Personal history of colon polyps, unspecified [Z86.0100]       Post Op Diagnosis:  Colon polyps x 6, diverticulosis      Procedure/CPT® Codes:  No CPT Code Applied in Case Entry    Procedure(s):  COLONOSCOPY with cold snare and cold forceps POLYPECTOMY x6    Staff:  Surgeon(s):  Braulio Hinojosa MD         Anesthesia: Monitored Anesthesia Care    Implants:    Nothing was implanted during the procedure    Specimen:        See below    No blood loss    Complications:  None    Description of Procedure:  Informed consent was obtained for the procedure, including sedation.  Risks of perforation, hemorrhage, adverse drug reaction and aspiration were discussed.  The patient was brought into the endoscopy suite. Continuous cardiopulmonary monitoring was performed.  The patient was placed in the left lateral decubitus position. After adequate sedation was attained, the digital rectal exam was performed which was normal.  Subsequently, the Olympus colonoscope was inserted into the patient's rectum and advanced to the level of the cecum and terminal ileum without difficulty.  The bowel prep was excellent.  Circumferential examination of the patient's colon was performed on scope withdrawal.  A retroflex exam was performed in the rectum which showed normal.  The bowel was decompressed, the scope was withdrawn from the patient, and the patient tolerated the procedure well. There were no immediate post-operative complications.     Findings:    Normal mucosa in the terminal ileum  IC valve polyp x 1, 2 mm in size, removed using refashion cold forcep polypectomy  Ascending colon polyps x 2, 4-5 mm in size, removed in single piece fashion with cold snare polypectomy.  Descending colon polyps x 3, 6-8 mm in size, removed in single piece fashion with cold snare  polypectomy.  Diverticulosis of mild severity is noted in the sigmoid colon without diverticulitis.    Impression:  Colon polyps x 6 and diverticulosis  17 TA polyps noted on colonoscopy last year    Recommendations:  Follow-up histopathology  High-fiber diet  Okay to resume Plavix in 3 days  Repeat colonoscopy in 3 years.         Braulio Hinojosa MD     Date: 4/21/2025  Time: 10:52 EDT

## 2025-04-21 NOTE — ANESTHESIA POSTPROCEDURE EVALUATION
Patient: Sada Le    Procedure Summary       Date: 04/21/25 Room / Location: [facility] ENDOSCOPY 1 / [facility] ENDOSCOPY    Anesthesia Start: 1021 Anesthesia Stop: 1054    Procedure: COLONOSCOPY with POLYPECTOMY x6 Diagnosis:       Personal history of colon polyps, unspecified      (Personal history of colon polyps, unspecified [Z86.0100])    Surgeons: Braulio Hinojosa MD Provider: Ervin Fong MD    Anesthesia Type: MAC ASA Status: 3            Anesthesia Type: MAC    Vitals  Vitals Value Taken Time   /84 04/21/25 11:29   Temp     Pulse 60 04/21/25 11:29   Resp     SpO2 99 % 04/21/25 11:29   Vitals shown include unfiled device data.        Post Anesthesia Care and Evaluation    Patient location during evaluation: PACU  Patient participation: complete - patient participated  Level of consciousness: awake  Pain scale: See nurse's notes for pain score.  Pain management: adequate    Airway patency: patent  Anesthetic complications: No anesthetic complications  PONV Status: none  Cardiovascular status: acceptable  Respiratory status: acceptable and spontaneous ventilation  Hydration status: acceptable    Comments: Patient seen and examined postoperatively; vital signs stable; SpO2 greater than or equal to 90%; cardiopulmonary status stable; nausea/vomiting adequately controlled; pain adequately controlled; no apparent anesthesia complications; patient discharged from anesthesia care when discharge criteria were met

## 2025-04-21 NOTE — H&P
" LOS: 0 days   Patient Care Team:  Luis Madrid MD as PCP - General (Family Medicine)  Amos Olivia MD as Consulting Physician (Cardiology)  Brant Martinez MD as Consulting Physician (Cardiology)  Chong Golden MD as Consulting Physician (Ophthalmology)  Brandie Hale MD as Surgeon (General Surgery)  Slime Lu, RN as Registered Nurse      Subjective     Interval History:     Subjective: Personal history of TA polyps.  She had 17 TA polyps on colonoscopy 2024      ROS:   No chest pain, shortness of breath, or cough.        Medication Review:     Current Facility-Administered Medications:     atropine sulfate injection 0.5 mg, 0.5 mg, Intravenous, Once PRN, Buckner, April F, CRNA    diphenhydrAMINE (BENADRYL) injection 12.5 mg, 12.5 mg, Intravenous, Q15 Min PRN, Buckner, April F, CRNA    ePHEDrine Sulfate (Pressors) 25 MG/5ML syringe 5 mg, 5 mg, Intravenous, Once PRN, Buckner, April F, CRNA    hydrALAZINE (APRESOLINE) injection 5 mg, 5 mg, Intravenous, Q10 Min PRN, Buckner, April F, CRNA    ipratropium-albuterol (DUO-NEB) nebulizer solution 3 mL, 3 mL, Nebulization, Once PRN, Buckner, April F, CRNA    labetalol (NORMODYNE,TRANDATE) injection 5 mg, 5 mg, Intravenous, Q5 Min PRN, Buckner, April F, CRNA    lidocaine (cardiac) (XYLOCAINE) injection 100 mg, 100 mg, Intravenous, Q5 Min PRN, Buckner, April F, CRNA    ondansetron (ZOFRAN) injection 4 mg, 4 mg, Intravenous, Once PRN, Buckner, April F, CRNA      Objective     Vital Signs  Vitals:    04/09/25 1427 04/21/25 0901   BP:  134/75   Pulse:  73   Resp:  16   Temp:  97.4 °F (36.3 °C)   TempSrc:  Oral   SpO2:  97%   Weight: 76.2 kg (168 lb) 76.2 kg (168 lb)   Height: 165.1 cm (65\") 165.1 cm (65\")       Physical Exam:    General Appearance:    Awake and alert, in no acute distress   Head:    Normocephalic, without obvious abnormality   Eyes:          Conjunctivae normal, anicteric sclerae   Throat:   No oral lesions, no thrush, " oral mucosa moist   Neck:   No adenopathy, supple, no JVD   Lungs:     respirations regular, even and unlabored   Abdomen:     Soft, non-dinesh, no rebound or guarding, nondistended, no hepatosplenomegaly   Rectal:     Deferred   Extremities:   No edema, no cyanosis   Skin:   No bruising or rash, no jaundice        Results Review:    Lab Results (last 24 hours)       Procedure Component Value Units Date/Time    POC Glucose Once [[id number]]  (Abnormal) Collected: 04/21/25 0905    Specimen: Blood Updated: 04/21/25 0906     Glucose 121 mg/dL      Comment: Serial Number: [id number]:  [id number]               Imaging Results (Last 24 Hours)       ** No results found for the last 24 hours. **              Assessment & Plan   Proceed with scope and MAC anesthesia        Braulio Hinojosa MD  04/21/25  10:23 EDT

## 2025-04-21 NOTE — DISCHARGE INSTRUCTIONS
A responsible adult should stay with you and you should rest quietly for the rest of the day.    Do not drink alcohol, drive, operate any heavy machinery or power tools or make any legal/important decisions for the next 24 hours.     If you begin to experience severe pain, increased shortness of breath, racing heartbeat or a fever above 101 F, seek immediate medical attention.     Follow up with MD as instructed. Call office for results in 3 to 5 days if needed.     Impression:  Colon polyps x 6 and diverticulosis  17 TA polyps noted on colonoscopy last year     Recommendations:  Follow-up histopathology  High-fiber diet  Okay to resume Plavix in 3 days  Repeat colonoscopy in 3 years.

## 2025-04-21 NOTE — ANESTHESIA PREPROCEDURE EVALUATION
Anesthesia Evaluation     Patient summary reviewed and Nursing notes reviewed   NPO Solid Status: > 8 hours  NPO Liquid Status: > 8 hours           Airway   Mallampati: II  TM distance: >3 FB  Neck ROM: full  No difficulty expected  Dental - normal exam     Pulmonary    (+) ,sleep apnea  Cardiovascular     (+) hypertension, past MI , CAD, cardiac stents , CHF , hyperlipidemia      Neuro/Psych  (+) psychiatric history  GI/Hepatic/Renal/Endo    (+) GERD, liver disease, diabetes mellitus    Musculoskeletal     Abdominal    Substance History      OB/GYN          Other   arthritis,   history of cancer    ROS/Med Hx Other: Hx of thoracic aortic aneurysm 4.6 cm              Anesthesia Plan    ASA 3     MAC     intravenous induction     Anesthetic plan, risks, benefits, and alternatives have been provided, discussed and informed consent has been obtained with: patient.    Plan discussed with CRNA.    CODE STATUS:

## 2025-04-22 LAB
LAB AP CASE REPORT: NORMAL
PATH REPORT.FINAL DX SPEC: NORMAL
PATH REPORT.GROSS SPEC: NORMAL

## 2025-05-12 ENCOUNTER — OFFICE VISIT (OUTPATIENT)
Dept: PULMONOLOGY | Facility: HOSPITAL | Age: 55
End: 2025-05-12
Payer: MEDICAID

## 2025-05-12 VITALS
RESPIRATION RATE: 14 BRPM | HEART RATE: 69 BPM | BODY MASS INDEX: 27.32 KG/M2 | SYSTOLIC BLOOD PRESSURE: 111 MMHG | HEIGHT: 65 IN | OXYGEN SATURATION: 100 % | DIASTOLIC BLOOD PRESSURE: 76 MMHG | WEIGHT: 164 LBS

## 2025-05-12 DIAGNOSIS — J44.9 CHRONIC OBSTRUCTIVE PULMONARY DISEASE, UNSPECIFIED COPD TYPE: Primary | ICD-10-CM

## 2025-05-12 PROCEDURE — G0463 HOSPITAL OUTPT CLINIC VISIT: HCPCS

## 2025-05-12 NOTE — PROGRESS NOTES
PULMONARY/ CRITICAL CARE/ SLEEP MEDICINE OUTPATIENT CONSULT/ FOLLOW UP NOTE        Patient Name:  Sada Le    :  1970    Medical Record:  [id number]    PRIMARY CARE PHYSICIAN     Luis Madrid MD    REASON FOR CONSULTATION    Sada Le is a 54 y.o. female who is referred for consultation for COPD  REVIEW OF SYSTEMS    Constitutional:  Denies fever or chills   Eyes:  Denies change in visual acuity   HENT:  Denies nasal congestion or sore throat   Respiratory:  Denies cough or shortness of breath   Cardiovascular:  Denies chest pain or edema   GI:  Denies abdominal pain, nausea, vomiting, bloody stools or diarrhea   :  Denies dysuria   Musculoskeletal:  Denies back pain or joint pain   Integument:  Denies rash   Neurologic:  Denies headache, focal weakness or sensory changes   Endocrine:  Denies polyuria or polydipsia   Lymphatic:  Denies swollen glands   Psychiatric:  Denies depression or anxiety     MEDICAL HISTORY    Past Medical History:   Diagnosis Date    Abnormal ECG 2019    Absence of left thumb     Impression: hx of MRSA, she is signficantly improved She will continue meds and followup if sxs have not resolved over the next 2 weeks.. She is released from care and will notify us of any problems    Acute otitis media     Allergic rhinitis     Aneurysm 2019    Arteriovenous malformation     Arthritis     neck    Atrial fibrillation     Cancer     skin    Cervical disc disease with myelopathy     CHF (congestive heart failure) 2019    Congenital heart disease 2019    Contact dermatitis or eczema     Coronary artery disease     Diabetes mellitus 2023    Disorder of bone and cartilage     Diverticulitis of colon     Impression: CT confirms in the sigmoid colon 10/2011    Diverticulosis of colon     Impression: No sxs 2013    Elevated cholesterol     Elevated lipoprotein(a) 2020    Elevated temperature     External otitis of left ear     Gastroesophageal reflux  disease without esophagitis 06/19/2022    Hearing loss due to cerumen impaction, left     Hemangioma of liver     History of echocardiogram 10/03/2019    Severely reduced LV systolic function. EF 34% Distribution indicative of LAD territory injury. Akinesis of the apex as well as apical lateral and mid to apical septal walls. Preservation of posterior inferior lateral walls. Mild AI, mild TR. Normal RV function.     History of transesophageal echocardiography (NAFISA) 01/07/2020    EF 55% LA cavity is mild to moderately dilated. Mild MR. Interatrial septum appears redundant. Interatrial hypermobile c/w aneurysm. Large PFO is noted with right to left shunting on bubble study. PFO tunnel appears to be short.     Hordeolum externum of left lower eyelid     Impression: She was started on Bactrim DS for her infection. She was also advised to use warm compression. She will followup should sxs not improve over the next 48 hrs and resolve over the next 1 weeek.    Hyperlipidemia     Impression: Diet controled. She should see improvement with her successful wt loss. We discussed her lipid panel.    Hypertension, benign     Impression: new onset Low salt low calorie diet and regular exercise and followup in 1 mo She will monitor her pressures and notify us of her pressures over the next 1 week.    Inclusion cyst of right breast     Impression: We will follow for now. She is encouraged to have Mammography. She is presently without insurance and reluctant. She bobby consider. She will notify us of any change at all in the lesion for the only way to be certain of it's etilogy is to remove it. She understands.    Insomnia, organic     Menopausal syndrome     Myocardial infarction 9/29/19    Overweight (BMI 25.0-29.9)     Pulmonary arterial hypertension 9/2019    RUQ abdominal pain     Impression: Resolving, negative GB u/s, HIDA EF 77%, we will follow    Sleep apnea     Stented coronary artery 09/29/2019    Thoracic aortic aneurysm      Thoracic disc disorder     Tobacco use     Impression: She is strongly encouraged to stop smoking. Cessation techniques discussed and encouraged. The consequences of not stopping discussed, ie, CAD, PVD, Stroke, Lung and other cancers.        SURGICAL HISTORY    Past Surgical History:   Procedure Laterality Date    BIVENTRICULAR ASSIST DEVICE/LEFT VENTRICULAR ASSIST DEVICE INSERTION N/A 09/29/2019    Procedure: Left Ventricular Assist Device;  Surgeon: Brant Martinez MD;  Location: [facility] CATH INVASIVE LOCATION;  Service: Cardiovascular    CARDIAC CATHETERIZATION N/A 09/29/2019    Procedure: Left Heart Cath;  Surgeon: Brant Martinez MD;  Location: [facility] CATH INVASIVE LOCATION;  Service: Cardiovascular    CARDIAC CATHETERIZATION N/A 09/29/2019    Procedure: Coronary angiography;  Surgeon: Brant Martinez MD;  Location: [facility] CATH INVASIVE LOCATION;  Service: Cardiovascular    CARDIAC CATHETERIZATION N/A 09/29/2019    Procedure: Left ventriculography;  Surgeon: Brant Martinez MD;  Location: [facility] CATH INVASIVE LOCATION;  Service: Cardiovascular    CARDIAC CATHETERIZATION N/A 09/29/2019    Procedure: Stent SERENITY coronary;  Surgeon: Brant Martinez MD;  Location: [facility] CATH INVASIVE LOCATION;  Service: Cardiovascular    CARDIAC CATHETERIZATION N/A 02/25/2020    Procedure: Right Heart Cath;  Surgeon: Brant Martinez MD;  Location: [facility] CATH INVASIVE LOCATION;  Service: Cardiology;  Laterality: N/A;    CARDIAC CATHETERIZATION N/A 02/12/2021    Procedure: Left Heart Cath;  Surgeon: Brant Martinez MD;  Location: [facility] CATH INVASIVE LOCATION;  Service: Cardiology;  Laterality: N/A;    CARDIAC CATHETERIZATION N/A 02/12/2021    Procedure: Coronary angiography;  Surgeon: Brant Martinez MD;  Location: [facility] CATH INVASIVE LOCATION;  Service: Cardiology;  Laterality: N/A;    CARDIAC CATHETERIZATION N/A 02/12/2021    Procedure: Left  ventriculography;  Surgeon: Brant Martinez MD;  Location: [facility] CATH INVASIVE LOCATION;  Service: Cardiology;  Laterality: N/A;    CARDIAC CATHETERIZATION N/A 02/12/2021    Procedure: Aortic root aortogram;  Surgeon: Brant Martinez MD;  Location: [facility] CATH INVASIVE LOCATION;  Service: Cardiology;  Laterality: N/A;    CARDIAC CATHETERIZATION N/A 02/12/2021    Procedure: Percutaneous Coronary Intervention;  Surgeon: Brant Martinez MD;  Location: [facility] CATH INVASIVE LOCATION;  Service: Cardiology;  Laterality: N/A;    CARDIAC CATHETERIZATION N/A 02/12/2021    Procedure: Stent SERENITY coronary;  Surgeon: Brant Martinez MD;  Location: [facility] CATH INVASIVE LOCATION;  Service: Cardiology;  Laterality: N/A;    CARDIAC ELECTROPHYSIOLOGY PROCEDURE  09/29/2019    Procedure: Impella Insertion;  Surgeon: Brant Martinez MD;  Location: [facility] CATH INVASIVE LOCATION;  Service: Cardiovascular    COLONOSCOPY N/A 2/5/2024    Procedure: COLONOSCOPY with polypectomy x 17;  Surgeon: Braulio Hinojosa MD;  Location: [facility] ENDOSCOPY;  Service: Gastroenterology;  Laterality: N/A;  colon polyps    COLONOSCOPY N/A 4/21/2025    Procedure: COLONOSCOPY with POLYPECTOMY x6;  Surgeon: Braulio Hinojosa MD;  Location: [facility] ENDOSCOPY;  Service: Gastroenterology;  Laterality: N/A;  POST: DIVERTICULOSIS, COLON POLYPS    CORONARY ANGIOPLASTY  02/12/2021    1x stent to Circ    CORONARY ANGIOPLASTY WITH STENT PLACEMENT  02/2021    DE RT/LT HEART CATHETERS N/A 09/29/2019    Procedure: Percutaneous Coronary Intervention;  Surgeon: Brant Martinez MD;  Location: [facility] CATH INVASIVE LOCATION;  Service: Cardiovascular    TOTAL ABDOMINAL HYSTERECTOMY WITH SALPINGO OOPHORECTOMY  1995    Endometriosis        FAMILY HISTORY    Family History   Problem Relation Age of Onset    Aneurysm Mother     Alzheimer's disease Mother     Heart disease Mother         cardiovascular    Kidney disease Mother      Throat cancer Mother     Thyroid disease Mother     Arthritis Mother     Cancer Mother     Diabetes Father         mellitus    Heart attack Father     Thyroid disease Father     Hypertension Father     Heart disease Sister         cardiovascular    Hyperlipidemia Sister     Diabetes Maternal Aunt         mellitus    Heart disease Maternal Aunt         cardiovascular    Breast cancer Maternal Aunt     Cancer Maternal Aunt         breast    Diabetes Maternal Uncle         mellitus    Alzheimer's disease Maternal Uncle     Kidney disease Paternal Uncle     Diabetes Maternal Grandmother         mellitus    Pancreatic cancer Maternal Grandmother        SOCIAL HISTORY    Social History     Tobacco Use    Smoking status: Former     Current packs/day: 0.00     Average packs/day: 1 pack/day for 34.7 years (34.7 ttl pk-yrs)     Types: Cigarettes     Start date: 1985     Quit date: 2019     Years since quittin.6     Passive exposure: Current    Smokeless tobacco: Never    Tobacco comments:     States she is quitting as of 2019.   Substance Use Topics    Alcohol use: Yes     Alcohol/week: 6.0 standard drinks of alcohol     Types: 6 Cans of beer per week     Comment: occassionally         ALLERGIES    Allergies   Allergen Reactions    Penicillins Rash    Ciprofloxacin Rash    Penicillin G Rash         MEDICATIONS    Current Outpatient Medications on File Prior to Visit   Medication Sig Dispense Refill    acetaminophen (TYLENOL) 325 MG tablet Take 2 tablets by mouth Every 6 (Six) Hours As Needed for Mild Pain.      aspirin 81 MG EC tablet Take 1 tablet by mouth Daily. 90 tablet 3    baclofen (LIORESAL) 10 MG tablet Take 1 tablet by mouth 3 (Three) Times a Day. 90 tablet 0    cetirizine (zyrTEC) 10 MG tablet Take 1 tablet by mouth Daily.      cholecalciferol (VITAMIN D3) 1.25 MG (98774 UT) capsule Take 1 capsule by mouth Daily.      clopidogrel (PLAVIX) 75 MG tablet Take 1 tablet by mouth Daily. 90 tablet 3     diclofenac (VOLTAREN) 50 MG EC tablet Take 1 tablet by mouth 2 (Two) Times a Day As Needed (pain and inflammation). 90 tablet 0    ezetimibe (ZETIA) 10 MG tablet TAKE 1 TABLET BY MOUTH EVERY DAY 90 tablet 3    furosemide (LASIX) 40 MG tablet Take 1 tablet by mouth As Needed. As needed for swelling after first dose      gabapentin (NEURONTIN) 300 MG capsule Take 1 capsule by mouth Daily for 1 day, THEN 1 capsule 2 (Two) Times a Day for 1 day, THEN 1 capsule 3 (Three) Times a Day for 29 days. 90 capsule 0    lisinopril (PRINIVIL,ZESTRIL) 10 MG tablet Take 1 tablet by mouth Daily. 90 tablet 3    metFORMIN (GLUCOPHAGE) 500 MG tablet TAKE 1 TABLET BY MOUTH EVERY DAY WITH BREAKFAST 90 tablet 3    metoprolol succinate XL (TOPROL-XL) 50 MG 24 hr tablet Take 1 tablet by mouth Daily. 90 tablet 3    nitroglycerin (NITROSTAT) 0.4 MG SL tablet Place 1 tablet under the tongue Every 5 (Five) Minutes As Needed for Chest Pain. Take no more than 3 doses in 15 minutes. 100 tablet 0    omeprazole (priLOSEC) 20 MG capsule Take 1 capsule by mouth Daily.      pantoprazole (PROTONIX) 20 MG EC tablet Take 1 tablet by mouth Daily. 90 tablet 3    predniSONE (DELTASONE) 20 MG tablet Take 1 tablet by mouth Daily As Needed (neck pain). 12 tablet 0    Probiotic Product (PROBIOTIC BLEND PO) Take 1 capsule by mouth Daily.      Semaglutide, 1 MG/DOSE, (OZEMPIC) 4 MG/3ML solution pen-injector Inject 1 mg under the skin into the appropriate area as directed 1 (One) Time Per Week. 3 mL 11    vitamin B-12 (CYANOCOBALAMIN) 500 MCG tablet Take 1 tablet by mouth Daily.      [DISCONTINUED] naloxone (NARCAN) 4 MG/0.1ML nasal spray Call 911. Don't prime. [place] in 1 nostril for overdose. Repeat in 2-3 minutes in other nostril if no or minimal breathing/responsiveness. 2 each 0     No current facility-administered medications on file prior to visit.       PHYSICAL EXAM    Vitals:    05/12/25 1006   BP: 111/76   BP Location: Left arm   Patient Position:  "Sitting   Cuff Size: Adult   Pulse: 69   Resp: 14   SpO2: 100%   Weight: 74.4 kg (164 lb)   Height: 165.1 cm (65\")        Constitutional:  Well developed, well nourished, no acute distress, non-toxic appearance   Eyes:  PERRL, conjunctiva normal   HENT:  Atraumatic, external ears normal, nose normal, oropharynx moist, no pharyngeal exudates. mallampatti   Neck- normal range of motion, no tenderness, supple   Respiratory:  No respiratory distress, normal breath sounds, no rales, no wheezing   Cardiovascular:  Normal rate, normal rhythm, no murmurs, no gallops, no rubs   GI:  Soft, nondistended, normal bowel sounds, nontender, no organomegaly, no mass, no rebound, no guarding   :  No costovertebral angle tenderness   Musculoskeletal:  No edema, no tenderness, no deformities. Back- no tenderness  Integument:  Well hydrated, no rash   Lymphatic:  No lymphadenopathy noted   Neurologic:  Alert & oriented x 3, CN 2-12 normal, normal motor function, normal sensory function, no focal deficits noted   Psychiatric:  Speech and behavior appropriate     CT Chest Without Contrast Diagnostic  Result Date: 4/10/2025  Impression: 1. Multiple noncalcified bilateral pulmonary nodules are present, 4 mm or less in size. Many of these have significantly diminished in size since prior examination. Other nodules have completely resolved in the interval. The findings are in keeping with benign infectious/inflammatory etiology. No new pulmonary nodules. 2. No acute airspace disease 3. Mild emphysema. 4. Stable bilateral adrenal adenomas. Electronically Signed: Sarita Pham MD  4/10/2025 12:16 PM EDT  Workstation ID: [id number]    Mammo Screening Digital Tomosynthesis Bilateral With CAD  Result Date: 2/24/2025  No mammographic evidence of malignancy.  Recommend annual screening mammography.  BI-RADS ASSESSMENT: Category 1: Negative  Note: It has been reported that there is approximately a 15% false negative rate in mammography.  Therefore, " management of a palpable abnormality should not be deferred because of a negative mammogram.  2/24/2025 11:25 AM by Chadd Florian on Workstation: [id number]      DEXA Bone Density Axial  Result Date: 2/24/2025  Normal Bone Mineral Density.   Copies of the computerized summary reports can be obtained from [employer] via the health information department of [facility].   2/24/2025 11:12 AM by Chadd Florian on Workstation: [id number]       Results for orders placed during the hospital encounter of 10/02/23    Adult Transthoracic Echo Complete W/ Cont if Necessary Per Protocol    Interpretation Summary  Normal left and right ventricular size and systolic function  Indeterminate left ventricular diastolic function  Normal biatrial size  Aortic valve sclerosis without stenosis  Mild to moderate central aortic regurgitation  Mildly dilated ascending aorta.      ASSESSMENT & PLAN:      54-year-old female quit smoking September 2019 after heart attack, status post PCI left main, CT chest on 1/8/2024, interval development of bilateral pulm nodules up to 7 mm in size    Repeat CT chest April 2025  1. Multiple noncalcified bilateral pulmonary nodules are present, 4 mm or less in size. Many of these have significantly diminished in size since prior examination. Other nodules have completely resolved in the interval. The findings are in keeping with   benign infectious/inflammatory etiology. No new pulmonary nodules.  2. No acute airspace disease  3. Mild emphysema.  4. Stable bilateral adrenal adenomas.    Denies any dyspnea at rest  On exam diminished breath sounds but no rales no wheezing    Does not use inhalers.     Assessment   interval development of bilateral micronodules on CT scan 1/13/2025 at priority radiology measuring up to 7 mm  History of coronary artery disease with occasional snoring and witnessed apneas and daytime sleepiness.     Plan  Repeat CT scan of the chest is scheduled already April, 2026    Home  sleep study baseline AHI 8   discussed with patient options such as weight loss mandibular device and CPAP she lost 20 pounds after sleep study currently on Ozempic     PFTs       This document has been electronically signed by  Kassandra Cordero MD  10:19 EDT
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
## 2025-05-17 DIAGNOSIS — I10 ESSENTIAL (PRIMARY) HYPERTENSION: ICD-10-CM

## 2025-05-19 ENCOUNTER — OFFICE VISIT (OUTPATIENT)
Dept: NEUROSURGERY | Facility: CLINIC | Age: 55
End: 2025-05-19
Payer: MEDICAID

## 2025-05-19 ENCOUNTER — HOSPITAL ENCOUNTER (OUTPATIENT)
Dept: GENERAL RADIOLOGY | Facility: HOSPITAL | Age: 55
Discharge: HOME | End: 2025-05-19
Payer: MEDICAID

## 2025-05-19 VITALS
BODY MASS INDEX: 27.32 KG/M2 | HEART RATE: 66 BPM | WEIGHT: 164 LBS | OXYGEN SATURATION: 100 % | SYSTOLIC BLOOD PRESSURE: 138 MMHG | HEIGHT: 65 IN | DIASTOLIC BLOOD PRESSURE: 92 MMHG | RESPIRATION RATE: 18 BRPM

## 2025-05-19 DIAGNOSIS — M54.12 CERVICAL RADICULOPATHY: Primary | ICD-10-CM

## 2025-05-19 DIAGNOSIS — M54.2 NECK PAIN: ICD-10-CM

## 2025-05-19 DIAGNOSIS — M54.12 CERVICAL RADICULOPATHY: ICD-10-CM

## 2025-05-19 DIAGNOSIS — I10 ESSENTIAL (PRIMARY) HYPERTENSION: ICD-10-CM

## 2025-05-19 PROCEDURE — 1160F RVW MEDS BY RX/DR IN RCRD: CPT

## 2025-05-19 PROCEDURE — 99213 OFFICE O/P EST LOW 20 MIN: CPT

## 2025-05-19 PROCEDURE — 3075F SYST BP GE 130 - 139MM HG: CPT

## 2025-05-19 PROCEDURE — 3080F DIAST BP >= 90 MM HG: CPT

## 2025-05-19 PROCEDURE — 1159F MED LIST DOCD IN RCRD: CPT

## 2025-05-19 PROCEDURE — 72052 X-RAY EXAM NECK SPINE 6/>VWS: CPT

## 2025-05-19 RX ORDER — FUROSEMIDE 40 MG/1
40 TABLET ORAL DAILY
Qty: 90 TABLET | Refills: 3 | Status: SHIPPED | OUTPATIENT
Start: 2025-05-19

## 2025-05-19 RX ORDER — FUROSEMIDE 40 MG/1
TABLET ORAL
Qty: 90 TABLET | Refills: 3 | Status: SHIPPED | OUTPATIENT
Start: 2025-05-19 | End: 2025-05-19 | Stop reason: SDUPTHER

## 2025-05-19 NOTE — TELEPHONE ENCOUNTER
"Caller: Sada Le \"Geeta\"    Relationship: Self    Best call back number: [phone]    Requested Prescriptions:   Requested Prescriptions     Pending Prescriptions Disp Refills    furosemide (LASIX) 40 MG tablet 90 tablet 3        Pharmacy where request should be sent:      [facility]/pharmacy #[id number] - [place], [place] - [address] - [phone]  - [phone] FX   [address] [facility] [place] [address]   Phone: [phone] Fax: [phone]       Last office visit with prescribing clinician: 12/16/2024   Last telemedicine visit with prescribing clinician: Visit date not found   Next office visit with prescribing clinician: 11/17/2025     Additional details provided by patient:     Does the patient have less than a 3 day supply:  [] Yes  [x] No    Would you like a call back once the refill request has been completed: [] Yes [x] No    If the office needs to give you a call back, can they leave a voicemail: [] Yes [x] No    April Harvey Rep   05/19/25 13:46 EDT         "

## 2025-05-22 ENCOUNTER — OFFICE VISIT (OUTPATIENT)
Dept: FAMILY MEDICINE CLINIC | Facility: CLINIC | Age: 55
End: 2025-05-22
Payer: MEDICAID

## 2025-05-22 VITALS
TEMPERATURE: 97.5 F | OXYGEN SATURATION: 97 % | HEART RATE: 84 BPM | DIASTOLIC BLOOD PRESSURE: 82 MMHG | BODY MASS INDEX: 27.49 KG/M2 | HEIGHT: 65 IN | WEIGHT: 165 LBS | SYSTOLIC BLOOD PRESSURE: 122 MMHG | RESPIRATION RATE: 20 BRPM

## 2025-05-22 DIAGNOSIS — H00.012 HORDEOLUM EXTERNUM OF RIGHT LOWER EYELID: Primary | ICD-10-CM

## 2025-05-22 DIAGNOSIS — H01.002 BLEPHARITIS OF RIGHT LOWER EYELID, UNSPECIFIED TYPE: ICD-10-CM

## 2025-05-22 PROCEDURE — 99213 OFFICE O/P EST LOW 20 MIN: CPT | Performed by: STUDENT IN AN ORGANIZED HEALTH CARE EDUCATION/TRAINING PROGRAM

## 2025-05-22 PROCEDURE — 3079F DIAST BP 80-89 MM HG: CPT | Performed by: STUDENT IN AN ORGANIZED HEALTH CARE EDUCATION/TRAINING PROGRAM

## 2025-05-22 PROCEDURE — 3044F HG A1C LEVEL LT 7.0%: CPT | Performed by: STUDENT IN AN ORGANIZED HEALTH CARE EDUCATION/TRAINING PROGRAM

## 2025-05-22 PROCEDURE — 1125F AMNT PAIN NOTED PAIN PRSNT: CPT | Performed by: STUDENT IN AN ORGANIZED HEALTH CARE EDUCATION/TRAINING PROGRAM

## 2025-05-22 PROCEDURE — 3074F SYST BP LT 130 MM HG: CPT | Performed by: STUDENT IN AN ORGANIZED HEALTH CARE EDUCATION/TRAINING PROGRAM

## 2025-05-22 RX ORDER — METHYLPREDNISOLONE 4 MG/1
TABLET ORAL
Qty: 21 TABLET | Refills: 0 | Status: SHIPPED | OUTPATIENT
Start: 2025-05-22

## 2025-05-22 NOTE — PROGRESS NOTES
"Subjective   Sada Le is a 54 y.o. female.   Chief Complaint   Patient presents with    Stye     On right eye       History of Present Illness     Right eye stye, lower lid  - Started on 5/20/2025  -Has been using warm compresses but feels that the stye is getting larger, has noted some puffiness underneath her eye  -Has stopped applying eyeliner  - Describes itchy eyes (\"like sand\"), watery eyes  - Had taken some steroids in the past which helped with some of the puffiness and discomfort      The following portions of the patient's history were reviewed and updated as appropriate: allergies, current medications, past family history, past medical history, past social history, past surgical history, and problem list.    Patient Active Problem List   Diagnosis    Allergic rhinitis    Cervical disc disease with myelopathy    Diverticulosis of colon    Hypertension, benign    Class 1 obesity due to excess calories with serious comorbidity and body mass index (BMI) of 32.0 to 32.9 in adult    Osteopenia of spine    Mixed hyperlipidemia    Ischemic cardiomyopathy    Stented coronary artery    Annual visit for general adult medical examination with abnormal findings    Cervical cancer screening    Encounter for screening for malignant neoplasm of breast    Colon cancer screening    History of tobacco use    Generalized anxiety disorder    ASD (atrial septal defect)    Coronary artery disease involving native coronary artery of native heart without angina pectoris    History of MI (myocardial infarction)    Branch retinal vein occlusion with macular edema    Hypertensive retinopathy    Gastroesophageal reflux disease without esophagitis    Cellulitis and abscess of buttock    Aneurysm of ascending aorta without rupture    Type 2 diabetes mellitus with hyperglycemia, without long-term current use of insulin    Chronic pain of right knee    Diverticulitis of intestine with abscess    Cellulitis of left thumb    Aortic " root aneurysm       Current Outpatient Medications on File Prior to Visit   Medication Sig Dispense Refill    acetaminophen (TYLENOL) 325 MG tablet Take 2 tablets by mouth Every 6 (Six) Hours As Needed for Mild Pain.      aspirin 81 MG EC tablet Take 1 tablet by mouth Daily. 90 tablet 3    baclofen (LIORESAL) 10 MG tablet Take 1 tablet by mouth 3 (Three) Times a Day. 90 tablet 0    cetirizine (zyrTEC) 10 MG tablet Take 1 tablet by mouth Daily.      cholecalciferol (VITAMIN D3) 1.25 MG (12573 UT) capsule Take 1 capsule by mouth Daily.      clopidogrel (PLAVIX) 75 MG tablet Take 1 tablet by mouth Daily. 90 tablet 3    diclofenac (VOLTAREN) 50 MG EC tablet Take 1 tablet by mouth 2 (Two) Times a Day As Needed (pain and inflammation). 90 tablet 0    ezetimibe (ZETIA) 10 MG tablet TAKE 1 TABLET BY MOUTH EVERY DAY 90 tablet 3    furosemide (LASIX) 40 MG tablet Take 1 tablet by mouth Daily. 90 tablet 3    lisinopril (PRINIVIL,ZESTRIL) 10 MG tablet Take 1 tablet by mouth Daily. 90 tablet 3    metFORMIN (GLUCOPHAGE) 500 MG tablet TAKE 1 TABLET BY MOUTH EVERY DAY WITH BREAKFAST 90 tablet 3    metoprolol succinate XL (TOPROL-XL) 50 MG 24 hr tablet Take 1 tablet by mouth Daily. 90 tablet 3    nitroglycerin (NITROSTAT) 0.4 MG SL tablet Place 1 tablet under the tongue Every 5 (Five) Minutes As Needed for Chest Pain. Take no more than 3 doses in 15 minutes. 100 tablet 0    omeprazole (priLOSEC) 20 MG capsule Take 1 capsule by mouth Daily.      pantoprazole (PROTONIX) 20 MG EC tablet Take 1 tablet by mouth Daily. 90 tablet 3    predniSONE (DELTASONE) 20 MG tablet Take 1 tablet by mouth Daily As Needed (neck pain). 12 tablet 0    Probiotic Product (PROBIOTIC BLEND PO) Take 1 capsule by mouth Daily.      Semaglutide, 1 MG/DOSE, (OZEMPIC) 4 MG/3ML solution pen-injector Inject 1 mg under the skin into the appropriate area as directed 1 (One) Time Per Week. 3 mL 11    vitamin B-12 (CYANOCOBALAMIN) 500 MCG tablet Take 1 tablet by mouth  "Daily.      gabapentin (NEURONTIN) 300 MG capsule Take 1 capsule by mouth Daily for 1 day, THEN 1 capsule 2 (Two) Times a Day for 1 day, THEN 1 capsule 3 (Three) Times a Day for 29 days. 90 capsule 0     No current facility-administered medications on file prior to visit.     Current outpatient and discharge medications have been reconciled for the patient.  Reviewed by: Frieda Eason DO      Allergies   Allergen Reactions    Penicillins Rash    Ciprofloxacin Rash         Objective   Visit Vitals  /82 (BP Location: Right arm, Patient Position: Sitting, Cuff Size: Adult)   Pulse 84   Temp 97.5 °F (36.4 °C) (Temporal)   Resp 20   Ht 165.1 cm (65\")   Wt 74.8 kg (165 lb)   LMP 01/01/1996 (Approximate)   SpO2 97%   BMI 27.46 kg/m²       Physical Exam  HENT:      Head: Normocephalic and atraumatic.   Eyes:      Conjunctiva/sclera: Conjunctivae normal.      Comments: - No exudate or unusual eye discharge.  Small stye  -Near the medial corner of lower lid, edema beneath the eye.  No increased warmth or overt erythema of the lower lid   Neurological:      General: No focal deficit present.      Mental Status: She is alert and oriented to person, place, and time.   Psychiatric:         Mood and Affect: Mood normal.         Behavior: Behavior normal.            Right Eye Stye      Diagnoses and all orders for this visit:    1. Hordeolum externum of right lower eyelid (Primary)/ Blepharitis of right lower eyelid, unspecified type  - Recommend the patient continue warm eye compresses to help the stye open and expressed.  Discussed with patient that the sensation of it getting larger might be a good sign that this is getting closer to that stage  -Will send in steroid pack for comfort  -  methylPREDNISolone (MEDROL) 4 MG dose pack; Take as directed on package instructions.  Dispense: 21 tablet; Refill: 0  - Told patient that if the area starts to become hot, the area right around the stye starts to get very red to take a " picture, send it to me and call the office.  Would consider antibiotic treatment at that time. If we do need to send in antibiotics, asked patient to call her eye doctor to see if she can set up an appoint with them for potential drainage.  Patient verbalized understanding.         Follow Up  - prn    Expected course, medications, and adverse effects discussed as appropriate.  Call or return if worsening or persistent symptoms. Hand hygiene was performed before donning protective equipment and after removal when leaving the room.    This document is intended for medical expert use only. Reading of this document by patients and/or patient's family without participating medical staff guidance may result in misinterpretation and unintended morbidity. Any interpretation of such data is the responsibility of the patient and/or family member responsible for the patient in concert with their primary or specialist providers, not to be left for sources of online searches such as [employer], [employer]) or similar queries. Relying on these approaches to knowledge may result in misinterpretation, misguided goals of care and even death should patients or family members try recommendations outside of the realm of professional medical care.

## 2025-05-30 NOTE — PROGRESS NOTES
"Subjective   History of Present Illness: Sada Le is a 54 y.o. female is here today for follow-up on Cervical Radiculopathy. Today patient reports neck pain as well as pain that will radiate down her upper extremities right worse than left.  The pain will radiate down to her thumb and her 2nd and 3rd fingers.  This can be associate with numbness and tingling.  Patient denies any weakness.  She has undergone extensive conservative measures including physical therapy and epidural injection without any lasting improvement.      Chief Complaint   Patient presents with    Neck Pain          Previous treatment:   Chiropractic Care, NSAID'S, Muscle Relaxants, Home exercise program, Ice, Heat, Rest, and Nerve pain medications    Previous neurosurgery:      Previous injections:   DESIREE 2025    The following portions of the patient's history were reviewed and updated as appropriate: allergies, current medications, past family history, past medical history, past social history, past surgical history, and problem list.    Review of Systems   Constitutional:  Positive for activity change.   Eyes: Negative.    Respiratory: Negative.     Cardiovascular: Negative.    Gastrointestinal: Negative.    Endocrine: Negative.    Genitourinary: Negative.    Musculoskeletal:  Positive for arthralgias, myalgias and neck pain (Bilateral/shoulders/arms).   Skin: Negative.    Allergic/Immunologic: Negative.    Neurological:  Positive for dizziness, weakness (bilateral hands/right>left), light-headedness, numbness (tingling/bilateral arms/hands,right>left) and headaches.   Hematological: Negative.    Psychiatric/Behavioral:  Positive for sleep disturbance.        Objective      /88 (BP Location: Left arm, Patient Position: Sitting)   Pulse 70   Ht 165.1 cm (65\")   Wt 73.5 kg (162 lb)   LMP 01/01/1996 (Approximate)   SpO2 100%   BMI 26.96 kg/m²    Body mass index is 26.96 kg/m².  There were no vitals filed for this visit.    "   Neurological Exam  Mental Status  Awake, alert and oriented to person, place and time.    Motor   Strength is 5/5 throughout all four extremities.    Sensory  Sensation is intact to light touch, pinprick, vibration and proprioception in all four extremities.    Reflexes    Right pathological reflexes: Allen's absent.  Left pathological reflexes: Allen's absent.          Assessment & Plan   Independent Review of Radiographic Studies:      I personally reviewed and interpreted the images from the following studies.    MRI cervical spine: Degenerative changes most severe at C5-6 where there is moderate to severe disc degeneration and foraminal stenosis that is severe on the right and moderate on the left.  There is moderate central stenosis.  No other high-grade central or foraminal stenosis    Medical Decision Making:      Sada Le is a 54 y.o. female with a history of worsening neck pain and radiculopathy that correlates well with the patient's MRI findings of disc degeneration and foraminal stenosis at C5-6.  Patient has failed conservative measures and would benefit from surgical intervention in the form of a C5-6 ACDF.  Patient would like to hold off on surgery at least until the winter.  She can follow-up with me in 3 months and we can get the ball rolling on surgery if she would like to proceed.  If her symptoms were to worsen over the course of time she could also follow-up with me or proceed with surgery sooner      Diagnoses and all orders for this visit:    1. Cervical radiculopathy (Primary)    2. DDD (degenerative disc disease), cervical    3. Cervical stenosis of spine      No follow-ups on file.    This patient was examined wearing appropriate personal protective equipment.                      Dr. Amaury Liu IV    06/02/25  10:03 EDT

## 2025-06-02 ENCOUNTER — OFFICE VISIT (OUTPATIENT)
Dept: NEUROSURGERY | Facility: CLINIC | Age: 55
End: 2025-06-02
Payer: MEDICAID

## 2025-06-02 ENCOUNTER — ANCILLARY ORDERS (OUTPATIENT)
Dept: NEUROSURGERY | Facility: CLINIC | Age: 55
End: 2025-06-02

## 2025-06-02 VITALS
BODY MASS INDEX: 26.99 KG/M2 | HEIGHT: 65 IN | DIASTOLIC BLOOD PRESSURE: 88 MMHG | OXYGEN SATURATION: 100 % | HEART RATE: 70 BPM | WEIGHT: 162 LBS | SYSTOLIC BLOOD PRESSURE: 123 MMHG

## 2025-06-02 DIAGNOSIS — M54.12 CERVICAL RADICULOPATHY: Primary | ICD-10-CM

## 2025-06-02 DIAGNOSIS — M48.02 CERVICAL STENOSIS OF SPINE: ICD-10-CM

## 2025-06-02 DIAGNOSIS — M50.30 DDD (DEGENERATIVE DISC DISEASE), CERVICAL: ICD-10-CM

## 2025-06-02 PROCEDURE — 1160F RVW MEDS BY RX/DR IN RCRD: CPT | Performed by: NEUROLOGICAL SURGERY

## 2025-06-02 PROCEDURE — 3079F DIAST BP 80-89 MM HG: CPT | Performed by: NEUROLOGICAL SURGERY

## 2025-06-02 PROCEDURE — 99213 OFFICE O/P EST LOW 20 MIN: CPT | Performed by: NEUROLOGICAL SURGERY

## 2025-06-02 PROCEDURE — 3074F SYST BP LT 130 MM HG: CPT | Performed by: NEUROLOGICAL SURGERY

## 2025-06-02 PROCEDURE — 1159F MED LIST DOCD IN RCRD: CPT | Performed by: NEUROLOGICAL SURGERY

## 2025-06-09 ENCOUNTER — HOSPITAL ENCOUNTER (OUTPATIENT)
Dept: RESPIRATORY THERAPY | Facility: HOSPITAL | Age: 55
Discharge: HOME | End: 2025-06-09
Admitting: INTERNAL MEDICINE
Payer: MEDICAID

## 2025-06-09 DIAGNOSIS — J44.9 CHRONIC OBSTRUCTIVE PULMONARY DISEASE, UNSPECIFIED COPD TYPE: ICD-10-CM

## 2025-06-09 PROCEDURE — 94726 PLETHYSMOGRAPHY LUNG VOLUMES: CPT

## 2025-06-09 PROCEDURE — 94729 DIFFUSING CAPACITY: CPT

## 2025-06-09 PROCEDURE — 94010 BREATHING CAPACITY TEST: CPT

## 2025-06-23 ENCOUNTER — OFFICE VISIT (OUTPATIENT)
Dept: NEUROSURGERY | Facility: CLINIC | Age: 55
End: 2025-06-23
Payer: MEDICAID

## 2025-06-23 ENCOUNTER — PREP FOR SURGERY (OUTPATIENT)
Dept: OTHER | Facility: HOSPITAL | Age: 55
End: 2025-06-23
Payer: MEDICAID

## 2025-06-23 VITALS
HEART RATE: 64 BPM | BODY MASS INDEX: 26.66 KG/M2 | DIASTOLIC BLOOD PRESSURE: 88 MMHG | OXYGEN SATURATION: 99 % | SYSTOLIC BLOOD PRESSURE: 135 MMHG | WEIGHT: 160 LBS | HEIGHT: 65 IN

## 2025-06-23 DIAGNOSIS — M54.12 CERVICAL RADICULOPATHY: Primary | ICD-10-CM

## 2025-06-23 DIAGNOSIS — M50.30 DDD (DEGENERATIVE DISC DISEASE), CERVICAL: ICD-10-CM

## 2025-06-23 DIAGNOSIS — M48.02 CERVICAL STENOSIS OF SPINE: ICD-10-CM

## 2025-06-23 PROCEDURE — 1159F MED LIST DOCD IN RCRD: CPT | Performed by: NEUROLOGICAL SURGERY

## 2025-06-23 PROCEDURE — 99213 OFFICE O/P EST LOW 20 MIN: CPT | Performed by: NEUROLOGICAL SURGERY

## 2025-06-23 PROCEDURE — 3079F DIAST BP 80-89 MM HG: CPT | Performed by: NEUROLOGICAL SURGERY

## 2025-06-23 PROCEDURE — 3075F SYST BP GE 130 - 139MM HG: CPT | Performed by: NEUROLOGICAL SURGERY

## 2025-06-23 PROCEDURE — 1160F RVW MEDS BY RX/DR IN RCRD: CPT | Performed by: NEUROLOGICAL SURGERY

## 2025-06-23 RX ORDER — METHYLPREDNISOLONE 4 MG/1
TABLET ORAL
Qty: 21 TABLET | Refills: 0 | Status: SHIPPED | OUTPATIENT
Start: 2025-06-23

## 2025-06-23 NOTE — PROGRESS NOTES
"Subjective   History of Present Illness: Sada Le is a 54 y.o. female is here today for follow-up. Today patient reports centralized neck pain into bilateral shoulders and arms.  Patient has had worsened pain in her neck and rating into her arms since I last saw her a few weeks ago.  Pain is quite severe.  We had talked about scheduling surgery at her last visit and she would still like to do this but she cannot do until the fall.        Chief Complaint   Patient presents with    Neck Pain          Previous treatment:   Chiropractic Care, Corticosteroids, NSAID'S, Muscle Relaxants, Home exercise program, Ice, Heat, Rest, and Nerve pain medications    Previous neurosurgery:      Previous injections:   DESIREE 2025     The following portions of the patient's history were reviewed and updated as appropriate: allergies, current medications, past family history, past medical history, past social history, past surgical history, and problem list.    Review of Systems   Constitutional:  Positive for activity change.   Eyes: Negative.    Respiratory: Negative.     Cardiovascular: Negative.    Gastrointestinal: Negative.    Endocrine: Negative.    Genitourinary: Negative.    Musculoskeletal:  Positive for arthralgias, myalgias, neck pain (Centralized/into bilateral shoulders) and neck stiffness.   Skin: Negative.    Allergic/Immunologic: Negative.    Neurological:  Positive for dizziness, numbness (bilateral arms/left > right) and headaches.   Hematological: Negative.    Psychiatric/Behavioral:  Positive for sleep disturbance.        Objective      /88 (BP Location: Left arm, Patient Position: Sitting)   Pulse 64   Ht 165.1 cm (65\")   Wt 72.6 kg (160 lb)   LMP 01/01/1996 (Approximate)   SpO2 99%   BMI 26.63 kg/m²    Body mass index is 26.63 kg/m².  Vitals:    06/23/25 1132   PainSc: 6          Neurological Exam        Assessment & Plan   Independent Review of Radiographic Studies:      I personally reviewed and " interpreted the images from the following studies.    No new image    Medical Decision Making:      Sada Le is a 54 y.o. female with severe disc degeneration and central and foraminal stenosis at C5-6 with severe neck pain and radiculopathy.  Will plan to proceed with C5-6 ACDF, but the patient would like to wait until the fall.  Given this I will provide her with a Medrol Dosepak and send her for another cervical DESIREE.  We will plan for C5-6 ACDF      Diagnoses and all orders for this visit:    1. Cervical radiculopathy (Primary)    2. DDD (degenerative disc disease), cervical    3. Cervical stenosis of spine      No follow-ups on file.    This patient was examined wearing appropriate personal protective equipment.                      Dr. Amaury Liu IV    06/23/25  12:27 EDT

## 2025-07-07 ENCOUNTER — OFFICE VISIT (OUTPATIENT)
Dept: PAIN MEDICINE | Facility: CLINIC | Age: 55
End: 2025-07-07
Payer: COMMERCIAL

## 2025-07-07 VITALS
DIASTOLIC BLOOD PRESSURE: 77 MMHG | HEART RATE: 69 BPM | WEIGHT: 159 LBS | SYSTOLIC BLOOD PRESSURE: 105 MMHG | RESPIRATION RATE: 16 BRPM | BODY MASS INDEX: 26.46 KG/M2 | OXYGEN SATURATION: 99 %

## 2025-07-07 DIAGNOSIS — M47.812 CERVICAL SPONDYLOSIS WITHOUT MYELOPATHY: Primary | ICD-10-CM

## 2025-07-07 DIAGNOSIS — M48.02 CERVICAL SPINAL STENOSIS: ICD-10-CM

## 2025-07-07 DIAGNOSIS — M54.12 CERVICAL RADICULITIS: ICD-10-CM

## 2025-07-07 NOTE — PROGRESS NOTES
Subjective   CC neck pain, bilateral arm pain  Sada Le is a 54 y.o. female with history of CAD/PCI on Plavix, chronic neck pain here for initial evaluation.  Referred by neurosurgery.  Complains of several months of continued and worsening neck pain radiating to both shoulders both arms worse on the right, constant with burning tingling numbness in both hands, worse with activity and at night.  Denies new injury, balance issues, bowel incontinence.  Tried physical therapy, home exercise program anti-inflammatories and muscle relaxers with marginal relief.  Pain is interfering with ADL and work.  Seen neurosurgery, ACDF discussed however not ready for surgery at this time would like to plan it during the fall.  Referred to try injections.    Imaging reviewed  C-spine MRI : Advanced degenerative changes with severe foraminal narrowing at C5-C6 and C4-C5.  Moderate central canal stenosis C5-C6.  No cord abnormalities    Pain Assessment   Location of Pain: Arm pain neck pain, joint  Description of Pain: Dull/Aching, Throbbing, Stabbing  Previous Pain Rating :6  Current Pain Ratin  Aggravating Factors: Activity  Alleviating Factors: Rest, Medication  Pain onset over 12 weeks ago  Interferes with ADL's.   [place] back pain disability scale scanned in chart    PEG Assessment   What number best describes your pain on average in the past week?6  What number best describes how, during the past week, pain has interfered with your enjoyment of life?4  What number best describes how, during the past week, pain has interfered with your general activity?9      The following portions of the patient's history were reviewed and updated as appropriate: allergies, current medications, past family history, past medical history, past social history, past surgical history and problem list.     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), Elevated temperature, External otitis of left ear, Gastroesophageal reflux disease without esophagitis (06/19/2022), Hearing loss due to cerumen impaction, left, Hemangioma of liver, History of echocardiogram (10/03/2019), History of transesophageal echocardiography (NAFISA) (01/07/2020), Hordeolum externum of left lower eyelid, Hyperlipidemia, Hypertension, benign, Inclusion cyst of right breast, Insomnia, organic, Menopausal syndrome, Myocardial infarction (9/29/19), Overweight (BMI 25.0-29.9), Pulmonary arterial hypertension (9/2019), RUQ abdominal pain, Sleep apnea, Stented coronary artery (09/29/2019), Thoracic aortic aneurysm, Thoracic disc disorder, and Tobacco use.   has a past surgical history that includes Total abdominal hysterectomy w/ bilateral salpingoophorectomy (1995); pr rt/lt heart catheters (N/A, 09/29/2019); Cardiac catheterization (N/A, 09/29/2019); Cardiac catheterization (N/A, 09/29/2019); Cardiac catheterization (N/A, 09/29/2019); Cardiac electrophysiology procedure (09/29/2019); Biventricular Assist Device/Left Ventricular Assist Device Insertion (N/A, 09/29/2019); Cardiac catheterization (N/A, 09/29/2019); Cardiac catheterization (N/A, 02/25/2020); Coronary angioplasty (02/12/2021); Cardiac catheterization (N/A, 02/12/2021); Cardiac catheterization (N/A, 02/12/2021); Cardiac catheterization (N/A, 02/12/2021); Cardiac catheterization (N/A, 02/12/2021); Cardiac catheterization (N/A, 02/12/2021); Cardiac catheterization (N/A, 02/12/2021); Coronary angioplasty with stent (02/2021); Colonoscopy  (N/A, 2024); and Colonoscopy (N/A, 2025).  family history includes Alzheimer's disease in her maternal uncle and mother; Aneurysm in her mother; Arthritis in her mother; Breast cancer in her maternal aunt; Cancer in her maternal aunt and mother; Diabetes in her father, maternal aunt, maternal grandmother, and maternal uncle; Heart attack in her father; Heart disease in her maternal aunt, mother, and sister; Hyperlipidemia in her sister; Hypertension in her father; Kidney disease in her mother and paternal uncle; Pancreatic cancer in her maternal grandmother; Throat cancer in her mother; Thyroid disease in her father and mother.  Social History     Tobacco Use    Smoking status: Former     Current packs/day: 0.00     Average packs/day: 1 pack/day for 34.7 years (34.7 ttl pk-yrs)     Types: Cigarettes     Start date: 1985     Quit date: 2019     Years since quittin.7     Passive exposure: Current    Smokeless tobacco: Never    Tobacco comments:     States she is quitting as of 2019.   Substance Use Topics    Alcohol use: Yes     Alcohol/week: 6.0 standard drinks of alcohol     Types: 6 Cans of beer per week     Comment: occassionally        Review of Systems   Musculoskeletal:  Positive for neck pain.   All other systems reviewed and are negative.      Objective   Physical Exam  Vitals reviewed.   Constitutional:       General: She is not in acute distress.  Pulmonary:      Effort: Pulmonary effort is normal.   Musculoskeletal:      Cervical back: Tenderness present. Decreased range of motion.      Comments: Positive Spurling       /77 (BP Location: Left arm, Patient Position: Sitting, Cuff Size: Adult)   Pulse 69   Resp 16   Wt 72.1 kg (159 lb)   LMP 1996 (Approximate)   SpO2 99%   BMI 26.46 kg/m²     PHQ 9 on chart  Opioid risk tool low risk      Assessment & Plan   Diagnoses and all orders for this visit:    1. Cervical spondylosis without myelopathy (Primary)    2.  Cervical radiculitis  -     Epidural Block    3. Cervical spinal stenosis      Summary  Sada Le is a 54 y.o. female with history of CAD/PCI on Plavix, chronic neck pain here for initial evaluation.  Referred by neurosurgery.  Complains of several months of continued and worsening neck pain radiating to both shoulders both arms worse on the right, constant with burning tingling numbness in both hands, worse with activity and at night.  Denies new injury, balance issues, bowel incontinence.  Tried physical therapy, home exercise program anti-inflammatories and muscle relaxers with marginal relief.  Pain is interfering with ADL and work.  Seen neurosurgery, ACDF discussed however not ready for surgery at this time would like to plan it during the fall.  Referred to try injections.    Has 80% relief with cervical DESIREE which lasted over 3 months.  Will schedule for repeat cervical DESIREE.  Needs to be off Plavix 7 days.  Risks and benefits discussed.    RTC for procedure          Note is dictated utilizing voice recognition software. Unfortunately this leads to occasional typographical errors. I apologize in advance if the situation occurs. If questions occur please do not hesitate to call our office.

## 2025-07-08 ENCOUNTER — PATIENT ROUNDING (BHMG ONLY) (OUTPATIENT)
Dept: PAIN MEDICINE | Facility: CLINIC | Age: 55
End: 2025-07-08
Payer: OTHER GOVERNMENT

## 2025-07-08 ENCOUNTER — TELEPHONE (OUTPATIENT)
Dept: NEUROSURGERY | Facility: CLINIC | Age: 55
End: 2025-07-08

## 2025-07-08 NOTE — PROGRESS NOTES
July 8, 2025    Hello, may I speak with Sada Le?    My name is Lennie Mayo      I am  with [facility]  [address] [place] [address].    Before we get started may I verify your date of birth? 1970    I am calling to officially welcome you to our practice and ask about your recent visit. Is this a good time to talk? yes    Tell me about your visit with us. What things went well?  Good visit!       We're always looking for ways to make our patients' experiences even better. Do you have recommendations on ways we may improve?  no    Overall were you satisfied with your first visit to our practice? yes       I appreciate you taking the time to speak with me today. Is there anything else I can do for you? no      Thank you, and have a great day.

## 2025-07-09 ENCOUNTER — TELEPHONE (OUTPATIENT)
Dept: CARDIOLOGY | Facility: CLINIC | Age: 55
End: 2025-07-09

## 2025-07-14 ENCOUNTER — HOSPITAL ENCOUNTER (OUTPATIENT)
Dept: PAIN MEDICINE | Facility: HOSPITAL | Age: 55
Discharge: HOME | End: 2025-07-14
Payer: OTHER GOVERNMENT

## 2025-07-14 VITALS
BODY MASS INDEX: 26.49 KG/M2 | SYSTOLIC BLOOD PRESSURE: 127 MMHG | TEMPERATURE: 97.3 F | WEIGHT: 159 LBS | HEIGHT: 65 IN | HEART RATE: 61 BPM | OXYGEN SATURATION: 99 % | DIASTOLIC BLOOD PRESSURE: 88 MMHG | RESPIRATION RATE: 16 BRPM

## 2025-07-14 DIAGNOSIS — M54.12 CERVICAL RADICULOPATHY: Primary | ICD-10-CM

## 2025-07-14 DIAGNOSIS — R52 PAIN: ICD-10-CM

## 2025-07-14 PROCEDURE — 62321 NJX INTERLAMINAR CRV/THRC: CPT | Performed by: ANESTHESIOLOGY

## 2025-07-14 PROCEDURE — 25010000002 METHYLPREDNISOLONE PER 40 MG: Performed by: ANESTHESIOLOGY

## 2025-07-14 PROCEDURE — 25510000001 IOPAMIDOL 41 % SOLUTION: Performed by: ANESTHESIOLOGY

## 2025-07-14 PROCEDURE — 77003 FLUOROGUIDE FOR SPINE INJECT: CPT

## 2025-07-14 RX ORDER — IOPAMIDOL 408 MG/ML
3 INJECTION, SOLUTION INTRATHECAL
Status: COMPLETED | OUTPATIENT
Start: 2025-07-14 | End: 2025-07-14

## 2025-07-14 RX ORDER — METHYLPREDNISOLONE ACETATE 40 MG/ML
40 INJECTION, SUSPENSION INTRA-ARTICULAR; INTRALESIONAL; INTRAMUSCULAR; SOFT TISSUE ONCE
Status: COMPLETED | OUTPATIENT
Start: 2025-07-14 | End: 2025-07-14

## 2025-07-14 RX ADMIN — IOPAMIDOL 3 ML: 408 INJECTION, SOLUTION INTRATHECAL at 09:57

## 2025-07-14 RX ADMIN — METHYLPREDNISOLONE ACETATE 40 MG: 40 INJECTION, SUSPENSION INTRA-ARTICULAR; INTRALESIONAL; INTRAMUSCULAR; INTRASYNOVIAL; SOFT TISSUE at 09:57

## 2025-07-14 NOTE — DISCHARGE INSTRUCTIONS

## 2025-07-15 ENCOUNTER — TELEPHONE (OUTPATIENT)
Dept: PAIN MEDICINE | Facility: HOSPITAL | Age: 55
End: 2025-07-15
Payer: OTHER GOVERNMENT

## 2025-07-21 NOTE — PROCEDURES
"Subjective   CC neck pain  Sada Le is a 54 y.o. female with cervical radiculitis here for cervical DESIREE.  Requested by neurosurgery..   Off Plavix 70    Pain Assessment   Location of Pain: Lower Back,neck pain, joint  Description of Pain: Dull/Aching, Throbbing, Stabbing  Previous Pain Rating :4  Current Pain Ratin  Aggravating Factors: Activity  Alleviating Factors: Rest, Medication  Pain onset over 12 weeks ago  Pain interferes with ADL's  [place] back pain disability scale scanned in chart     The following portions of the patient's history were reviewed and updated as appropriate: allergies, current medications, past family history, past medical history, past social history, past surgical history and problem list.      Review of Systems  As in HPI  Objective   Physical Exam  Vitals reviewed.   Constitutional:       General: She is not in acute distress.  Pulmonary:      Effort: Pulmonary effort is normal.   Musculoskeletal:      Cervical back: Tenderness present.      Lumbar back: Tenderness present.       /88 (BP Location: Right arm, Patient Position: Sitting)   Pulse 61   Temp 97.3 °F (36.3 °C) (Skin)   Resp 16   Ht 165.1 cm (65\")   Wt 72.1 kg (159 lb)   LMP 1996 (Approximate)   SpO2 99%   BMI 26.46 kg/m²       Assessment & Plan    underwent cervical DESIREE.    RTC 4-6 weeks or as needed for repeat    DATE OF PROCEDURE:  2025    PREOPERATIVE DIAGNOSIS:Cervical radiculitis    POSTOPERATIVE DIAGNOSIS: same    PROCEDURE PERFORMED:  cervical Epidural Steroid Injection    The patient presents with a history of   cervical degenerative disc disease  with  radiculitis. The patient presents today for a  cervical  epidural steroid injection at level C6/7. The patient understands the risks and benefits of the procedure and wishes to proceed.  The patient was seen in the preoperative area.  Patient's consent was obtained and updated.  Vitals were taken.  Patient was then brought to the " procedure suite and placed in a prone position. The appropriate anatomic area was widely prepped with Chloraprep and draped in a sterile fashion. Noninvasive monitoring per routine anesthesia protocol was placed.  Under fluoroscopic guidance using  AP view a 20 gauge styleted tuohy needle was passed through skin anesthetized with 1% Lidocaine without epinephrine.  The needle was advanced using the continuous loss of resistance to saline technique into the C6 epidural space. Needle tip placement in the epidural space was confirmed by loss of resistance and injection of 1 mL of  preservative free contrast.  Following this 7 mL of a solution containing  1 mL of 40 mg Depo-Medrol and 7 mL of preservative-free saline was carefully administered in the epidural space. Epidurogram following injection demonstrated dye spread as high as C4 and as low as T1. A sterile dressing was placed over the puncture site.    The patient tolerated the procedure with no complications . They were then brought to the post procedure area where they recovered nicely.

## 2025-07-30 ENCOUNTER — OFFICE VISIT (OUTPATIENT)
Dept: FAMILY MEDICINE CLINIC | Facility: CLINIC | Age: 55
End: 2025-07-30
Payer: OTHER GOVERNMENT

## 2025-07-30 VITALS
WEIGHT: 155 LBS | SYSTOLIC BLOOD PRESSURE: 110 MMHG | HEIGHT: 65 IN | HEART RATE: 76 BPM | RESPIRATION RATE: 18 BRPM | TEMPERATURE: 98.1 F | BODY MASS INDEX: 25.83 KG/M2 | OXYGEN SATURATION: 99 % | DIASTOLIC BLOOD PRESSURE: 60 MMHG

## 2025-07-30 DIAGNOSIS — I10 HYPERTENSION, BENIGN: Chronic | ICD-10-CM

## 2025-07-30 DIAGNOSIS — H02.846 PAIN AND SWELLING OF EYELID OF LEFT EYE: Primary | ICD-10-CM

## 2025-07-30 DIAGNOSIS — H02.89 PAIN AND SWELLING OF EYELID OF LEFT EYE: Primary | ICD-10-CM

## 2025-07-30 PROCEDURE — 3078F DIAST BP <80 MM HG: CPT

## 2025-07-30 PROCEDURE — 3044F HG A1C LEVEL LT 7.0%: CPT

## 2025-07-30 PROCEDURE — 99214 OFFICE O/P EST MOD 30 MIN: CPT

## 2025-07-30 PROCEDURE — 1126F AMNT PAIN NOTED NONE PRSNT: CPT

## 2025-07-30 PROCEDURE — 3074F SYST BP LT 130 MM HG: CPT

## 2025-07-30 RX ORDER — LISINOPRIL 5 MG/1
5 TABLET ORAL DAILY
Qty: 90 TABLET | Refills: 3 | Status: SHIPPED | OUTPATIENT
Start: 2025-07-30

## 2025-07-30 RX ORDER — PREDNISONE 10 MG/1
TABLET ORAL
Qty: 21 TABLET | Refills: 0 | Status: SHIPPED | OUTPATIENT
Start: 2025-07-30 | End: 2025-08-08

## 2025-07-30 RX ORDER — ERYTHROMYCIN 5 MG/G
OINTMENT OPHTHALMIC
Qty: 3.5 G | Refills: 0 | Status: SHIPPED | OUTPATIENT
Start: 2025-07-30

## 2025-07-30 NOTE — ASSESSMENT & PLAN NOTE
She had hypotension on Saturday likely provoke by heat and dehydration.  She has not had any since.  BP has progressively come down since losing significant weight over the past several months.  Do feel a medication adjustment is warranted.  We will decrease lisinopril from 10 to 5 mg qd to start.  She is to continue monitoring BP closely.

## 2025-07-30 NOTE — PROGRESS NOTES
"Chief Complaint  eye irritation  and Hypotension    Subjective          Sada Le presents to [facility] for     HPI    Eye problem  Patient is here today and has a stye on her left lateral upper eyelid. This started Sunday and has progressively worsened. This is her 6th stye since April. She says that it has successfully been treated with steroids previously. She has been using a warm compress without results. She thinks it drained a bit last night.  Denies any pain with eye movements.  Denies any changes in vision.  Denies fever, chills.    Blood pressure  Patient states that she had a hypotensive episode.  She was outside in the heat all day on Saturday, was not staying well hydrated.  She felt lightheaded and checked her BP, it was 70/50.  BP has been normal since then.  Typical BP for her has been 100s/60s.    Objective   Vital Signs:   /60 (BP Location: Right arm, Patient Position: Sitting, Cuff Size: Adult)   Pulse 76   Temp 98.1 °F (36.7 °C) (Temporal)   Resp 18   Ht 165.1 cm (65\")   Wt 70.3 kg (155 lb)   SpO2 99%   BMI 25.79 kg/m²     Physical Exam  Vitals and nursing note reviewed.   Constitutional:       General: She is not in acute distress.     Appearance: Normal appearance. She is not ill-appearing or diaphoretic.   HENT:      Head: Normocephalic and atraumatic.      Nose: No congestion or rhinorrhea.   Eyes:      General:         Right eye: No discharge.         Left eye: No discharge.      Extraocular Movements: Extraocular movements intact.      Conjunctiva/sclera: Conjunctivae normal.      Pupils: Pupils are equal, round, and reactive to light.      Comments: Left upper eyelid swelling, erythema, and warmth.  No TTP.  No discharge.   Cardiovascular:      Rate and Rhythm: Normal rate and regular rhythm.   Pulmonary:      Effort: Pulmonary effort is normal. No respiratory distress.   Neurological:      Mental Status: She is alert and oriented to " person, place, and time.   Psychiatric:         Mood and Affect: Mood normal.         Behavior: Behavior normal.        Result Review :                 Assessment and Plan    Diagnoses and all orders for this visit:    1. Pain and swelling of eyelid of left eye (Primary)  Assessment & Plan:  Hordeolum vs blepharitis vs other process. Could be allergy or immune mediated given frequent recurrence and response to steroids. We will get an ophtho consult. Empiric tx with prednisone and topical erythromycin. No pain with EOM, no visual disturbances. Return/ED precautions advised.    Orders:  -     predniSONE (DELTASONE) 10 MG tablet; Take 4 tablets by mouth Daily for 3 days, THEN 2 tablets Daily for 3 days, THEN 1 tablet Daily for 3 days.  Dispense: 21 tablet; Refill: 0  -     erythromycin (ROMYCIN) 5 MG/GM ophthalmic ointment; Administer  into the left eye 6 (Six) Times a Day.  Dispense: 3.5 g; Refill: 0  -     Ambulatory Referral to Ophthalmology    2. Hypertension, benign  Assessment & Plan:  She had hypotension on Saturday likely provoke by heat and dehydration.  She has not had any since.  BP has progressively come down since losing significant weight over the past several months.  Do feel a medication adjustment is warranted.  We will decrease lisinopril from 10 to 5 mg qd to start.  She is to continue monitoring BP closely.    Orders:  -     lisinopril (PRINIVIL,ZESTRIL) 5 MG tablet; Take 1 tablet by mouth Daily.  Dispense: 90 tablet; Refill: 3           Luis Madrid MD    NOTE: [facility], per Health Information accessibility laws, allows progress notes to be visible to patients. Please note that these notes will include professional medical terminology that may be somewhat confusing without some interpretation from your medical professional team. The intent of progress notes is to communicate information between any medical professionals involved in your case, or to serve as future reference for myself or any  other provider when reviewing your case, as well as a reference for the patient viewing the record. Please ask a member of the medical team if you have any questions about terminology or content of note.    DISCLAIMER: Part of this note may be an electronic transcription/translation of spoken language to printed text using the Dragon Dictation System.

## 2025-07-30 NOTE — ASSESSMENT & PLAN NOTE
Hordeolum vs blepharitis vs other process. Could be allergy or immune mediated given frequent recurrence and response to steroids. We will get an ophtho consult. Empiric tx with prednisone and topical erythromycin. No pain with EOM, no visual disturbances. Return/ED precautions advised.

## 2025-08-06 ENCOUNTER — OFFICE VISIT (OUTPATIENT)
Dept: FAMILY MEDICINE CLINIC | Facility: CLINIC | Age: 55
End: 2025-08-06
Payer: OTHER GOVERNMENT

## 2025-08-06 VITALS
SYSTOLIC BLOOD PRESSURE: 122 MMHG | TEMPERATURE: 98.7 F | HEART RATE: 90 BPM | RESPIRATION RATE: 18 BRPM | WEIGHT: 156.2 LBS | OXYGEN SATURATION: 100 % | DIASTOLIC BLOOD PRESSURE: 80 MMHG | HEIGHT: 65 IN | BODY MASS INDEX: 26.02 KG/M2

## 2025-08-06 DIAGNOSIS — J06.9 VIRAL UPPER RESPIRATORY TRACT INFECTION: Primary | ICD-10-CM

## 2025-08-06 DIAGNOSIS — U07.1 COVID-19 VIRUS INFECTION: ICD-10-CM

## 2025-08-06 DIAGNOSIS — J01.00 ACUTE MAXILLARY SINUSITIS, RECURRENCE NOT SPECIFIED: ICD-10-CM

## 2025-08-06 DIAGNOSIS — I10 ESSENTIAL (PRIMARY) HYPERTENSION: ICD-10-CM

## 2025-08-06 LAB
EXPIRATION DATE: ABNORMAL
FLUAV AG UPPER RESP QL IA.RAPID: NOT DETECTED
FLUBV AG UPPER RESP QL IA.RAPID: NOT DETECTED
INTERNAL CONTROL: ABNORMAL
Lab: ABNORMAL
SARS-COV-2 AG UPPER RESP QL IA.RAPID: DETECTED

## 2025-08-06 PROCEDURE — 87428 SARSCOV & INF VIR A&B AG IA: CPT

## 2025-08-06 PROCEDURE — 3079F DIAST BP 80-89 MM HG: CPT

## 2025-08-06 PROCEDURE — 99213 OFFICE O/P EST LOW 20 MIN: CPT

## 2025-08-06 PROCEDURE — 3044F HG A1C LEVEL LT 7.0%: CPT

## 2025-08-06 PROCEDURE — 3074F SYST BP LT 130 MM HG: CPT

## 2025-08-06 PROCEDURE — 1126F AMNT PAIN NOTED NONE PRSNT: CPT

## 2025-08-06 RX ORDER — METOPROLOL SUCCINATE 25 MG/1
25 TABLET, EXTENDED RELEASE ORAL DAILY
Qty: 90 TABLET | Refills: 3 | OUTPATIENT
Start: 2025-08-06

## 2025-08-06 RX ORDER — EZETIMIBE 10 MG/1
10 TABLET ORAL DAILY
Qty: 90 TABLET | Refills: 3 | Status: SHIPPED | OUTPATIENT
Start: 2025-08-06

## 2025-08-06 RX ORDER — CEPHALEXIN 500 MG/1
500 CAPSULE ORAL 3 TIMES DAILY
Qty: 21 CAPSULE | Refills: 0 | Status: SHIPPED | OUTPATIENT
Start: 2025-08-06 | End: 2025-08-13

## 2025-08-11 RX ORDER — OMEPRAZOLE 20 MG/1
20 CAPSULE, DELAYED RELEASE ORAL DAILY
OUTPATIENT
Start: 2025-08-11

## 2025-08-11 RX ORDER — METOPROLOL SUCCINATE 50 MG/1
50 TABLET, EXTENDED RELEASE ORAL DAILY
Qty: 90 TABLET | Refills: 3 | Status: SHIPPED | OUTPATIENT
Start: 2025-08-11

## 2025-08-13 DIAGNOSIS — K21.9 GASTRO-ESOPHAGEAL REFLUX DISEASE WITHOUT ESOPHAGITIS: ICD-10-CM

## 2025-08-13 RX ORDER — OMEPRAZOLE 20 MG/1
20 CAPSULE, DELAYED RELEASE ORAL DAILY
Qty: 90 CAPSULE | Refills: 3 | OUTPATIENT
Start: 2025-08-13

## 2025-08-18 RX ORDER — OMEPRAZOLE 20 MG/1
20 CAPSULE, DELAYED RELEASE ORAL DAILY
OUTPATIENT
Start: 2025-08-18

## 2025-08-25 DIAGNOSIS — K21.9 GASTROESOPHAGEAL REFLUX DISEASE WITHOUT ESOPHAGITIS: ICD-10-CM

## 2025-08-25 RX ORDER — PANTOPRAZOLE SODIUM 20 MG/1
20 TABLET, DELAYED RELEASE ORAL DAILY
Qty: 90 TABLET | Refills: 3 | Status: SHIPPED | OUTPATIENT
Start: 2025-08-25

## 2025-08-27 ENCOUNTER — TELEPHONE (OUTPATIENT)
Dept: NEUROSURGERY | Facility: CLINIC | Age: 55
End: 2025-08-27
Payer: OTHER GOVERNMENT

## (undated) DEVICE — PINNACLE INTRODUCER SHEATH: Brand: PINNACLE

## (undated) DEVICE — BALN NC/EUPHORA RX 2.50X20MM

## (undated) DEVICE — CATH GUIDE LAUNCHER EBU3.5 7F

## (undated) DEVICE — BALN WEDGE/PRESS 1L .035IN 6F 10MM 90CM

## (undated) DEVICE — ST ACC MICROPUNCTURE STFF/CANN PLAT/TP 4F 21G 40CM

## (undated) DEVICE — PK TRY HEART CATH 50

## (undated) DEVICE — DEV INFL COMPAK W/ACCESSPLUS IN4530

## (undated) DEVICE — TRAP WIDEEYE POLYP

## (undated) DEVICE — NC TREK CORONARY DILATATION CATHETER 3.5 MM X 15 MM / RAPID-EXCHANGE: Brand: NC TREK

## (undated) DEVICE — NC TREK CORONARY DILATATION CATHETER 4.0 MM X 15 MM / RAPID-EXCHANGE: Brand: NC TREK

## (undated) DEVICE — NC TREK™ CORONARY DILATATION CATHETER 4.5 MM X 15 MM / RAPID-EXCHANGE: Brand: NC TREK™

## (undated) DEVICE — CATH DIAG IMPULSE FL4 6F 100CM

## (undated) DEVICE — GW FC J TFE/COAT .025 3MM 180CM

## (undated) DEVICE — RUNTHROUGH NS EXTRA FLOPPY PTCA GUIDEWIRE: Brand: RUNTHROUGH

## (undated) DEVICE — SNAR POLYP HOTSNARE/BRAIDED OVL/MINI 7F 2.8X10MM 230CM 1P/U

## (undated) DEVICE — SNAR POLYP CAPTIVATOR OVL 13MM 240CM

## (undated) DEVICE — GW RUNTHROUGH NS HYPERCOAT .014 3X180CM

## (undated) DEVICE — IMMOB KN 3PNL DLX CANVS 16IN BLU

## (undated) DEVICE — PK ENDO GI 50

## (undated) DEVICE — CATH GUIDE LAUNCHER EBU4.0 6F 100CM

## (undated) DEVICE — SWAN-GANZ THERMODILUTION CATHETER: Brand: SWAN-GANZ

## (undated) DEVICE — GW DIAG EMERALD HEPCOAT MOVE JTIP STD .035 3MM 150CM

## (undated) DEVICE — CATH GUIDE LAUNCHER EBU4 7F

## (undated) DEVICE — NC TREK CORONARY DILATATION CATHETER 3.75 MM X 15 MM / RAPID-EXCHANGE: Brand: NC TREK

## (undated) DEVICE — GUIDELINER CATHETERS ARE INTENDED TO BE USED IN CONJUNCTION WITH GUIDE CATHETERS TO ACCESS DISCRETE REGIONS OF THE CORONARY AND/OR PERIPHERAL VASCULATURE, AND TO FACILITATE PLACEMENT OF INTERVENTIONAL DEVICES.: Brand: GUIDELINER® V3 CATHETER

## (undated) DEVICE — CONTRST ISOVUE300 61PCT 50ML

## (undated) DEVICE — SUT SILK 2/0 FS BLK 18IN 685G

## (undated) DEVICE — PERCLOSE PROGLIDE™ SUTURE-MEDIATED CLOSURE SYSTEM: Brand: PERCLOSE PROGLIDE™

## (undated) DEVICE — CATH DIAG IMPULSE PIG .056 6F 110CM

## (undated) DEVICE — CATH MPAC STP 6F: Brand: SUPER TORQUE

## (undated) DEVICE — Device: Brand: PORTEX

## (undated) DEVICE — CATH DIAG IMPULSE FL5 6F 100CM

## (undated) DEVICE — NC TREK CORONARY DILATATION CATHETER 2.75 MM X 12 MM / RAPID-EXCHANGE: Brand: NC TREK

## (undated) DEVICE — TRANSD PRESS STOPCOCK1WAY CABL48IN

## (undated) DEVICE — MEDICINE CUP, GRADUATED, STER: Brand: MEDLINE

## (undated) DEVICE — PAD GRND 1100SERIES A/

## (undated) DEVICE — HI-TORQUE BALANCE MIDDLEWEIGHT UNIVERSAL II GUIDE WIRE J TIP PAK 190 CM: Brand: HI-TORQUE BALANCE MIDDLEWEIGHT UNIVERSAL II

## (undated) DEVICE — ELECTRD DEFIB M/FUNC PROPADZ RADIOL 2PK

## (undated) DEVICE — INTRO PERFORMER CHECKFLO/LG RAD/BND NO/GW 14F .038IN 30CM

## (undated) DEVICE — CATH DIAG IMPULSE FR4 6F 100CM

## (undated) DEVICE — CATH DIAG IMPULSE MPA 6F 100CM

## (undated) DEVICE — FRCP BX RADJAW4 NDL 2.8 240CM LG OG BX80

## (undated) DEVICE — SWAN-GANZ TRUE SIZE THERMODILUTION "S" TIP: Brand: SWAN-GANZ TRUE SIZE

## (undated) DEVICE — GW EMR FIX EXCHG J STD .035 3MM 260CM

## (undated) DEVICE — SKIN PREP TRAY W/CHG: Brand: MEDLINE INDUSTRIES, INC.

## (undated) DEVICE — STPCK 3WY HP ROT

## (undated) DEVICE — TBG NAMIC PRESS MONTR A/ F/M 12IN

## (undated) DEVICE — KT CATH CAD SUP IMPELLA/CP 9/14F 5L

## (undated) DEVICE — SINGLE-USE BIOPSY FORCEPS: Brand: RADIAL JAW 4